# Patient Record
Sex: MALE | Race: WHITE | NOT HISPANIC OR LATINO | Employment: OTHER | URBAN - NONMETROPOLITAN AREA
[De-identification: names, ages, dates, MRNs, and addresses within clinical notes are randomized per-mention and may not be internally consistent; named-entity substitution may affect disease eponyms.]

---

## 2017-01-03 ENCOUNTER — APPOINTMENT (OUTPATIENT)
Dept: LAB | Facility: HOSPITAL | Age: 57
End: 2017-01-03

## 2017-01-03 ENCOUNTER — HOSPITAL ENCOUNTER (OUTPATIENT)
Dept: ULTRASOUND IMAGING | Facility: HOSPITAL | Age: 57
Discharge: HOME | End: 2017-01-03
Attending: INTERNAL MEDICINE | Admitting: INTERNAL MEDICINE

## 2017-01-03 ENCOUNTER — HOSPITAL ENCOUNTER (OUTPATIENT)
Dept: ULTRASOUND IMAGING | Facility: HOSPITAL | Age: 57
Discharge: HOME | End: 2017-01-03

## 2017-01-03 DIAGNOSIS — K70.31 ASCITES DUE TO ALCOHOLIC CIRRHOSIS (HCC): ICD-10-CM

## 2017-01-03 LAB
APPEARANCE FLD: ABNORMAL
APTT PPP: 28.9 SECONDS (ref 24.1–34.8)
COLOR FLD: YELLOW
INR PPP: 0.99 (ref 0.91–1.09)
LYMPHOCYTES NFR FLD MANUAL: 100 %
METHOD: ABNORMAL
NUC CELL # FLD: 196 /MM3
PLATELET # BLD AUTO: 164 10*3/MM3 (ref 130–400)
PROTHROMBIN TIME: 13.4 SECONDS (ref 11.9–14.6)
RBC # FLD AUTO: 70 /MM3

## 2017-01-03 PROCEDURE — 87205 SMEAR GRAM STAIN: CPT | Performed by: INTERNAL MEDICINE

## 2017-01-03 PROCEDURE — 82042 OTHER SOURCE ALBUMIN QUAN EA: CPT | Performed by: INTERNAL MEDICINE

## 2017-01-03 PROCEDURE — 84157 ASSAY OF PROTEIN OTHER: CPT | Performed by: INTERNAL MEDICINE

## 2017-01-03 PROCEDURE — 76942 ECHO GUIDE FOR BIOPSY: CPT

## 2017-01-03 PROCEDURE — 85730 THROMBOPLASTIN TIME PARTIAL: CPT | Performed by: RADIOLOGY

## 2017-01-03 PROCEDURE — 85049 AUTOMATED PLATELET COUNT: CPT | Performed by: RADIOLOGY

## 2017-01-03 PROCEDURE — 83615 LACTATE (LD) (LDH) ENZYME: CPT | Performed by: INTERNAL MEDICINE

## 2017-01-03 PROCEDURE — 76705 ECHO EXAM OF ABDOMEN: CPT

## 2017-01-03 PROCEDURE — 87070 CULTURE OTHR SPECIMN AEROBIC: CPT | Performed by: INTERNAL MEDICINE

## 2017-01-03 PROCEDURE — 87015 SPECIMEN INFECT AGNT CONCNTJ: CPT | Performed by: INTERNAL MEDICINE

## 2017-01-03 PROCEDURE — 89051 BODY FLUID CELL COUNT: CPT | Performed by: INTERNAL MEDICINE

## 2017-01-03 PROCEDURE — 85610 PROTHROMBIN TIME: CPT | Performed by: RADIOLOGY

## 2017-01-04 LAB
ALBUMIN FLD-MCNC: 1.4 G/DL
LDH FLD-CCNC: 86 IU/L
PROT FLD-MCNC: 3.1 G/DL

## 2017-01-08 LAB
BACTERIA FLD CULT: NORMAL
GRAM STN SPEC: NORMAL
GRAM STN SPEC: NORMAL

## 2017-01-09 ENCOUNTER — OFFICE VISIT (OUTPATIENT)
Dept: CARDIOLOGY | Facility: CLINIC | Age: 57
End: 2017-01-09

## 2017-01-09 ENCOUNTER — TRANSCRIBE ORDERS (OUTPATIENT)
Dept: ADMINISTRATIVE | Facility: HOSPITAL | Age: 57
End: 2017-01-09

## 2017-01-09 VITALS
HEART RATE: 84 BPM | HEIGHT: 68 IN | SYSTOLIC BLOOD PRESSURE: 110 MMHG | DIASTOLIC BLOOD PRESSURE: 58 MMHG | WEIGHT: 166 LBS | BODY MASS INDEX: 25.16 KG/M2

## 2017-01-09 DIAGNOSIS — E78.2 MIXED HYPERLIPIDEMIA: ICD-10-CM

## 2017-01-09 DIAGNOSIS — I50.22 CHRONIC SYSTOLIC CONGESTIVE HEART FAILURE (HCC): ICD-10-CM

## 2017-01-09 DIAGNOSIS — R18.8 OTHER ASCITES: Primary | ICD-10-CM

## 2017-01-09 DIAGNOSIS — I25.10 CORONARY ARTERY DISEASE INVOLVING NATIVE CORONARY ARTERY OF NATIVE HEART WITHOUT ANGINA PECTORIS: Primary | ICD-10-CM

## 2017-01-09 DIAGNOSIS — I10 ESSENTIAL HYPERTENSION: ICD-10-CM

## 2017-01-09 PROCEDURE — 93000 ELECTROCARDIOGRAM COMPLETE: CPT | Performed by: INTERNAL MEDICINE

## 2017-01-09 PROCEDURE — 99214 OFFICE O/P EST MOD 30 MIN: CPT | Performed by: INTERNAL MEDICINE

## 2017-01-09 RX ORDER — DIAZEPAM 5 MG/1
5 TABLET ORAL 2 TIMES DAILY PRN
COMMUNITY
End: 2017-07-12

## 2017-01-09 RX ORDER — OXYCODONE AND ACETAMINOPHEN 10; 325 MG/1; MG/1
1 TABLET ORAL EVERY 4 HOURS PRN
COMMUNITY
End: 2018-07-18

## 2017-01-10 ENCOUNTER — HOSPITAL ENCOUNTER (OUTPATIENT)
Dept: ULTRASOUND IMAGING | Facility: HOSPITAL | Age: 57
Discharge: HOME | End: 2017-01-10
Attending: INTERNAL MEDICINE | Admitting: INTERNAL MEDICINE

## 2017-01-10 ENCOUNTER — TRANSCRIBE ORDERS (OUTPATIENT)
Dept: ADMINISTRATIVE | Facility: HOSPITAL | Age: 57
End: 2017-01-10

## 2017-01-10 ENCOUNTER — LAB (OUTPATIENT)
Dept: LAB | Facility: HOSPITAL | Age: 57
End: 2017-01-10
Attending: INTERNAL MEDICINE

## 2017-01-10 DIAGNOSIS — R18.8 OTHER ASCITES: ICD-10-CM

## 2017-01-10 DIAGNOSIS — R18.8 OTHER ASCITES: Primary | ICD-10-CM

## 2017-01-10 LAB
INR PPP: 1.03 (ref 0.91–1.09)
PROTHROMBIN TIME: 13.8 SECONDS (ref 11.9–14.6)

## 2017-01-10 PROCEDURE — 76942 ECHO GUIDE FOR BIOPSY: CPT

## 2017-01-10 PROCEDURE — 85610 PROTHROMBIN TIME: CPT | Performed by: INTERNAL MEDICINE

## 2017-01-10 PROCEDURE — 36415 COLL VENOUS BLD VENIPUNCTURE: CPT

## 2017-01-10 PROCEDURE — 76705 ECHO EXAM OF ABDOMEN: CPT

## 2017-01-12 ENCOUNTER — TELEPHONE (OUTPATIENT)
Dept: VASCULAR SURGERY | Age: 57
End: 2017-01-12

## 2017-01-12 ENCOUNTER — OFFICE VISIT (OUTPATIENT)
Dept: VASCULAR SURGERY | Age: 57
End: 2017-01-12

## 2017-01-12 VITALS
TEMPERATURE: 97.7 F | HEART RATE: 83 BPM | DIASTOLIC BLOOD PRESSURE: 74 MMHG | SYSTOLIC BLOOD PRESSURE: 140 MMHG | RESPIRATION RATE: 18 BRPM

## 2017-01-12 DIAGNOSIS — N18.6 CKD (CHRONIC KIDNEY DISEASE) STAGE V REQUIRING CHRONIC DIALYSIS (HCC): Primary | ICD-10-CM

## 2017-01-12 DIAGNOSIS — Z99.2 CKD (CHRONIC KIDNEY DISEASE) STAGE V REQUIRING CHRONIC DIALYSIS (HCC): Primary | ICD-10-CM

## 2017-01-12 PROCEDURE — 99024 POSTOP FOLLOW-UP VISIT: CPT | Performed by: NURSE PRACTITIONER

## 2017-01-12 RX ORDER — LACTULOSE 10 G/10G
10 SOLUTION ORAL 3 TIMES DAILY PRN
Status: ON HOLD | COMMUNITY
End: 2020-04-10 | Stop reason: HOSPADM

## 2017-01-12 RX ORDER — LEVOCETIRIZINE DIHYDROCHLORIDE 5 MG/1
5 TABLET, FILM COATED ORAL NIGHTLY
COMMUNITY
End: 2017-02-09 | Stop reason: ALTCHOICE

## 2017-01-12 RX ORDER — CIPROFLOXACIN/CIPROFLOXA HCL 500 MG
500 TABLET,EXTENDED RELEASE MULTIPHASE 24 HR ORAL 2 TIMES DAILY PRN
COMMUNITY
End: 2019-06-04 | Stop reason: ALTCHOICE

## 2017-01-12 RX ORDER — OXYCODONE HCL 10 MG/1
10 TABLET, FILM COATED, EXTENDED RELEASE ORAL EVERY 8 HOURS
COMMUNITY
End: 2018-01-25 | Stop reason: ALTCHOICE

## 2017-01-16 ENCOUNTER — TELEPHONE (OUTPATIENT)
Dept: GASTROENTEROLOGY | Facility: CLINIC | Age: 57
End: 2017-01-16

## 2017-01-16 ENCOUNTER — APPOINTMENT (OUTPATIENT)
Dept: ULTRASOUND IMAGING | Facility: HOSPITAL | Age: 57
End: 2017-01-16
Attending: INTERNAL MEDICINE

## 2017-01-16 DIAGNOSIS — K70.31 ASCITES DUE TO ALCOHOLIC CIRRHOSIS (HCC): Primary | ICD-10-CM

## 2017-01-18 ENCOUNTER — PREP FOR PROCEDURE (OUTPATIENT)
Dept: VASCULAR SURGERY | Age: 57
End: 2017-01-18

## 2017-01-18 RX ORDER — CLONIDINE HYDROCHLORIDE 0.1 MG/1
0.1 TABLET ORAL PRN
Status: CANCELLED | OUTPATIENT
Start: 2017-01-18

## 2017-01-18 RX ORDER — ASPIRIN 81 MG/1
81 TABLET ORAL ONCE
Status: CANCELLED | OUTPATIENT
Start: 2017-01-18 | End: 2017-01-18

## 2017-01-18 RX ORDER — SODIUM CHLORIDE 0.9 % (FLUSH) 0.9 %
10 SYRINGE (ML) INJECTION PRN
Status: CANCELLED | OUTPATIENT
Start: 2017-01-18

## 2017-01-19 ENCOUNTER — HOSPITAL ENCOUNTER (OUTPATIENT)
Dept: ULTRASOUND IMAGING | Facility: HOSPITAL | Age: 57
Discharge: HOME | End: 2017-01-19
Attending: INTERNAL MEDICINE | Admitting: INTERNAL MEDICINE

## 2017-01-19 ENCOUNTER — LAB (OUTPATIENT)
Dept: LAB | Facility: HOSPITAL | Age: 57
End: 2017-01-19
Attending: INTERNAL MEDICINE

## 2017-01-19 DIAGNOSIS — K70.31 ASCITES DUE TO ALCOHOLIC CIRRHOSIS (HCC): ICD-10-CM

## 2017-01-19 LAB
APPEARANCE FLD: ABNORMAL
APTT PPP: 29.7 SECONDS (ref 24.1–34.8)
COLOR FLD: YELLOW
EOSINOPHIL NFR FLD MANUAL: 1 %
INR PPP: 1.03 (ref 0.91–1.09)
LYMPHOCYTES NFR FLD MANUAL: 26 %
METHOD: ABNORMAL
MONOCYTES NFR FLD: 19 %
NEUTROPHILS NFR FLD MANUAL: 33 %
NUC CELL # FLD: 657 /MM3
PROTHROMBIN TIME: 13.8 SECONDS (ref 11.9–14.6)
RBC # FLD AUTO: 253 /MM3
UNCLASSIFIED CELLS, FLUID: 21 %

## 2017-01-19 PROCEDURE — 89051 BODY FLUID CELL COUNT: CPT | Performed by: INTERNAL MEDICINE

## 2017-01-19 PROCEDURE — 83615 LACTATE (LD) (LDH) ENZYME: CPT | Performed by: INTERNAL MEDICINE

## 2017-01-19 PROCEDURE — 88305 TISSUE EXAM BY PATHOLOGIST: CPT | Performed by: INTERNAL MEDICINE

## 2017-01-19 PROCEDURE — 36415 COLL VENOUS BLD VENIPUNCTURE: CPT

## 2017-01-19 PROCEDURE — 88342 IMHCHEM/IMCYTCHM 1ST ANTB: CPT | Performed by: INTERNAL MEDICINE

## 2017-01-19 PROCEDURE — 87070 CULTURE OTHR SPECIMN AEROBIC: CPT | Performed by: INTERNAL MEDICINE

## 2017-01-19 PROCEDURE — 88341 IMHCHEM/IMCYTCHM EA ADD ANTB: CPT | Performed by: INTERNAL MEDICINE

## 2017-01-19 PROCEDURE — 76705 ECHO EXAM OF ABDOMEN: CPT

## 2017-01-19 PROCEDURE — 87205 SMEAR GRAM STAIN: CPT | Performed by: INTERNAL MEDICINE

## 2017-01-19 PROCEDURE — 85730 THROMBOPLASTIN TIME PARTIAL: CPT | Performed by: INTERNAL MEDICINE

## 2017-01-19 PROCEDURE — 84157 ASSAY OF PROTEIN OTHER: CPT | Performed by: INTERNAL MEDICINE

## 2017-01-19 PROCEDURE — 88112 CYTOPATH CELL ENHANCE TECH: CPT | Performed by: INTERNAL MEDICINE

## 2017-01-19 PROCEDURE — 85610 PROTHROMBIN TIME: CPT | Performed by: INTERNAL MEDICINE

## 2017-01-19 PROCEDURE — 87015 SPECIMEN INFECT AGNT CONCNTJ: CPT | Performed by: INTERNAL MEDICINE

## 2017-01-19 PROCEDURE — 82042 OTHER SOURCE ALBUMIN QUAN EA: CPT | Performed by: INTERNAL MEDICINE

## 2017-01-19 PROCEDURE — 76942 ECHO GUIDE FOR BIOPSY: CPT

## 2017-01-20 ENCOUNTER — HOSPITAL ENCOUNTER (OUTPATIENT)
Dept: INTERVENTIONAL RADIOLOGY/VASCULAR | Age: 57
Discharge: HOME | End: 2017-01-20
Payer: MEDICARE

## 2017-01-20 VITALS
BODY MASS INDEX: 23.25 KG/M2 | RESPIRATION RATE: 17 BRPM | WEIGHT: 156.97 LBS | HEART RATE: 76 BPM | TEMPERATURE: 99.3 F | OXYGEN SATURATION: 94 % | SYSTOLIC BLOOD PRESSURE: 148 MMHG | HEIGHT: 69 IN | DIASTOLIC BLOOD PRESSURE: 32 MMHG

## 2017-01-20 DIAGNOSIS — I82.A22 CHRONIC DEEP VEIN THROMBOSIS (DVT) OF AXILLARY VEIN OF LEFT UPPER EXTREMITY (HCC): ICD-10-CM

## 2017-01-20 DIAGNOSIS — Z99.2 CKD (CHRONIC KIDNEY DISEASE) STAGE V REQUIRING CHRONIC DIALYSIS (HCC): ICD-10-CM

## 2017-01-20 DIAGNOSIS — N18.6 CKD (CHRONIC KIDNEY DISEASE) STAGE V REQUIRING CHRONIC DIALYSIS (HCC): ICD-10-CM

## 2017-01-20 LAB
ALBUMIN FLD-MCNC: 1.9 G/DL
CYTO UR: NORMAL
LAB AP CASE REPORT: NORMAL
LDH FLD-CCNC: 93 IU/L
Lab: NORMAL
PATH REPORT.FINAL DX SPEC: NORMAL
PATH REPORT.GROSS SPEC: NORMAL
PROT FLD-MCNC: 3.3 G/DL

## 2017-01-20 PROCEDURE — 75822 VEIN X-RAY ARMS/LEGS: CPT | Performed by: SURGERY

## 2017-01-20 PROCEDURE — 6360000004 HC RX CONTRAST MEDICATION: Performed by: SURGERY

## 2017-01-20 PROCEDURE — 37248 TRLUML BALO ANGIOP 1ST VEIN: CPT | Performed by: SURGERY

## 2017-01-20 PROCEDURE — 6360000002 HC RX W HCPCS: Performed by: SURGERY

## 2017-01-20 PROCEDURE — 36005 INJECTION EXT VENOGRAPHY: CPT | Performed by: SURGERY

## 2017-01-20 PROCEDURE — 6370000000 HC RX 637 (ALT 250 FOR IP): Performed by: NURSE PRACTITIONER

## 2017-01-20 PROCEDURE — 76499 UNLISTED DX RADIOGRAPHIC PX: CPT | Performed by: SURGERY

## 2017-01-20 PROCEDURE — 2580000003 HC RX 258: Performed by: NURSE PRACTITIONER

## 2017-01-20 PROCEDURE — 2500000003 HC RX 250 WO HCPCS: Performed by: SURGERY

## 2017-01-20 PROCEDURE — 36010 PLACE CATHETER IN VEIN: CPT | Performed by: SURGERY

## 2017-01-20 PROCEDURE — 6360000002 HC RX W HCPCS: Performed by: NURSE PRACTITIONER

## 2017-01-20 RX ORDER — HEPARIN SODIUM 5000 [USP'U]/ML
INJECTION, SOLUTION INTRAVENOUS; SUBCUTANEOUS
Status: COMPLETED | OUTPATIENT
Start: 2017-01-20 | End: 2017-01-20

## 2017-01-20 RX ORDER — FENTANYL CITRATE 50 UG/ML
INJECTION, SOLUTION INTRAMUSCULAR; INTRAVENOUS
Status: COMPLETED | OUTPATIENT
Start: 2017-01-20 | End: 2017-01-20

## 2017-01-20 RX ORDER — CLONIDINE HYDROCHLORIDE 0.1 MG/1
0.1 TABLET ORAL PRN
Status: DISCONTINUED | OUTPATIENT
Start: 2017-01-20 | End: 2017-01-22 | Stop reason: HOSPADM

## 2017-01-20 RX ORDER — ASPIRIN 81 MG/1
81 TABLET ORAL ONCE
Status: DISCONTINUED | OUTPATIENT
Start: 2017-01-20 | End: 2017-01-22 | Stop reason: HOSPADM

## 2017-01-20 RX ORDER — ACETAMINOPHEN 325 MG/1
650 TABLET ORAL EVERY 4 HOURS PRN
Status: DISCONTINUED | OUTPATIENT
Start: 2017-01-20 | End: 2017-01-22 | Stop reason: HOSPADM

## 2017-01-20 RX ORDER — HYDROCODONE BITARTRATE AND ACETAMINOPHEN 5; 325 MG/1; MG/1
2 TABLET ORAL EVERY 4 HOURS PRN
Status: DISCONTINUED | OUTPATIENT
Start: 2017-01-20 | End: 2017-01-22 | Stop reason: HOSPADM

## 2017-01-20 RX ORDER — MIDAZOLAM HYDROCHLORIDE 1 MG/ML
INJECTION INTRAMUSCULAR; INTRAVENOUS
Status: COMPLETED | OUTPATIENT
Start: 2017-01-20 | End: 2017-01-20

## 2017-01-20 RX ORDER — SODIUM CHLORIDE 0.9 % (FLUSH) 0.9 %
10 SYRINGE (ML) INJECTION PRN
Status: DISCONTINUED | OUTPATIENT
Start: 2017-01-20 | End: 2017-01-22 | Stop reason: HOSPADM

## 2017-01-20 RX ORDER — HYDROCODONE BITARTRATE AND ACETAMINOPHEN 5; 325 MG/1; MG/1
1 TABLET ORAL EVERY 4 HOURS PRN
Status: DISCONTINUED | OUTPATIENT
Start: 2017-01-20 | End: 2017-01-22 | Stop reason: HOSPADM

## 2017-01-20 RX ORDER — CLONIDINE HYDROCHLORIDE 0.1 MG/1
0.1 TABLET ORAL PRN
Status: DISCONTINUED | OUTPATIENT
Start: 2017-01-20 | End: 2017-01-20 | Stop reason: SDUPTHER

## 2017-01-20 RX ORDER — ONDANSETRON 2 MG/ML
4 INJECTION INTRAMUSCULAR; INTRAVENOUS EVERY 8 HOURS PRN
Status: DISCONTINUED | OUTPATIENT
Start: 2017-01-20 | End: 2017-01-22 | Stop reason: HOSPADM

## 2017-01-20 RX ORDER — LIDOCAINE HYDROCHLORIDE 20 MG/ML
INJECTION, SOLUTION INFILTRATION; PERINEURAL
Status: COMPLETED | OUTPATIENT
Start: 2017-01-20 | End: 2017-01-20

## 2017-01-20 RX ORDER — SODIUM CHLORIDE 9 MG/ML
INJECTION, SOLUTION INTRAVENOUS CONTINUOUS
Status: DISCONTINUED | OUTPATIENT
Start: 2017-01-20 | End: 2017-01-22 | Stop reason: HOSPADM

## 2017-01-20 RX ORDER — ASPIRIN 81 MG/1
81 TABLET ORAL ONCE
Status: COMPLETED | OUTPATIENT
Start: 2017-01-20 | End: 2017-01-20

## 2017-01-20 RX ADMIN — LIDOCAINE HYDROCHLORIDE 10 ML: 20 INJECTION, SOLUTION INFILTRATION; PERINEURAL at 13:04

## 2017-01-20 RX ADMIN — FENTANYL CITRATE 25 MCG: 50 INJECTION INTRAMUSCULAR; INTRAVENOUS at 12:50

## 2017-01-20 RX ADMIN — SODIUM CHLORIDE: 9 INJECTION, SOLUTION INTRAVENOUS at 11:38

## 2017-01-20 RX ADMIN — IOVERSOL 65 ML: 678 INJECTION INTRA-ARTERIAL; INTRAVENOUS at 13:14

## 2017-01-20 RX ADMIN — ASPIRIN 81 MG: 81 TABLET, COATED ORAL at 11:35

## 2017-01-20 RX ADMIN — CEFAZOLIN SODIUM 1 G: 1 INJECTION, SOLUTION INTRAVENOUS at 12:40

## 2017-01-20 RX ADMIN — HEPARIN SODIUM 5000 UNITS: 5000 INJECTION, SOLUTION INTRAVENOUS; SUBCUTANEOUS at 13:10

## 2017-01-20 RX ADMIN — FENTANYL CITRATE 25 MCG: 50 INJECTION INTRAMUSCULAR; INTRAVENOUS at 13:11

## 2017-01-20 RX ADMIN — MIDAZOLAM HYDROCHLORIDE 1 MG: 1 INJECTION, SOLUTION INTRAMUSCULAR; INTRAVENOUS at 13:11

## 2017-01-20 RX ADMIN — MIDAZOLAM HYDROCHLORIDE 1 MG: 1 INJECTION, SOLUTION INTRAMUSCULAR; INTRAVENOUS at 12:50

## 2017-01-24 ENCOUNTER — TELEPHONE (OUTPATIENT)
Dept: VASCULAR SURGERY | Age: 57
End: 2017-01-24

## 2017-01-24 LAB
BACTERIA FLD CULT: NORMAL
GRAM STN SPEC: NORMAL
GRAM STN SPEC: NORMAL

## 2017-01-25 ENCOUNTER — TELEPHONE (OUTPATIENT)
Dept: CARDIOLOGY | Facility: CLINIC | Age: 57
End: 2017-01-25

## 2017-01-30 ENCOUNTER — TELEPHONE (OUTPATIENT)
Dept: GASTROENTEROLOGY | Facility: CLINIC | Age: 57
End: 2017-01-30

## 2017-01-30 DIAGNOSIS — K70.31 ASCITES DUE TO ALCOHOLIC CIRRHOSIS (HCC): Primary | ICD-10-CM

## 2017-01-31 ENCOUNTER — LAB (OUTPATIENT)
Dept: LAB | Facility: HOSPITAL | Age: 57
End: 2017-01-31
Attending: INTERNAL MEDICINE

## 2017-01-31 ENCOUNTER — HOSPITAL ENCOUNTER (OUTPATIENT)
Dept: ULTRASOUND IMAGING | Facility: HOSPITAL | Age: 57
Discharge: HOME | End: 2017-01-31
Attending: INTERNAL MEDICINE | Admitting: INTERNAL MEDICINE

## 2017-01-31 DIAGNOSIS — K70.31 ASCITES DUE TO ALCOHOLIC CIRRHOSIS (HCC): ICD-10-CM

## 2017-01-31 LAB
INR PPP: 1.1 (ref 0.91–1.09)
PROTHROMBIN TIME: 13.7 SECONDS (ref 10–13.8)

## 2017-01-31 PROCEDURE — 76705 ECHO EXAM OF ABDOMEN: CPT

## 2017-01-31 PROCEDURE — 85610 PROTHROMBIN TIME: CPT | Performed by: FAMILY MEDICINE

## 2017-02-02 ENCOUNTER — CLINICAL SUPPORT (OUTPATIENT)
Dept: CARDIOLOGY | Facility: CLINIC | Age: 57
End: 2017-02-02

## 2017-02-02 DIAGNOSIS — Z95.0 CARDIAC PACEMAKER IN SITU: Primary | ICD-10-CM

## 2017-02-02 DIAGNOSIS — I49.5 SICK SINUS SYNDROME (HCC): ICD-10-CM

## 2017-02-02 PROCEDURE — 93294 REM INTERROG EVL PM/LDLS PM: CPT | Performed by: INTERNAL MEDICINE

## 2017-02-02 PROCEDURE — 93296 REM INTERROG EVL PM/IDS: CPT | Performed by: INTERNAL MEDICINE

## 2017-02-03 DIAGNOSIS — K70.31 ASCITES DUE TO ALCOHOLIC CIRRHOSIS (HCC): Primary | ICD-10-CM

## 2017-02-07 ENCOUNTER — HOSPITAL ENCOUNTER (OUTPATIENT)
Dept: ULTRASOUND IMAGING | Facility: HOSPITAL | Age: 57
Discharge: HOME | End: 2017-02-07
Attending: INTERNAL MEDICINE | Admitting: INTERNAL MEDICINE

## 2017-02-07 ENCOUNTER — LAB (OUTPATIENT)
Dept: LAB | Facility: HOSPITAL | Age: 57
End: 2017-02-07
Attending: INTERNAL MEDICINE

## 2017-02-07 DIAGNOSIS — K70.31 ASCITES DUE TO ALCOHOLIC CIRRHOSIS (HCC): ICD-10-CM

## 2017-02-07 LAB
APPEARANCE FLD: ABNORMAL
COLOR FLD: YELLOW
INR PPP: 1.1 (ref 0.91–1.09)
LYMPHOCYTES NFR FLD MANUAL: 86 %
METHOD: ABNORMAL
NEUTROPHILS NFR FLD MANUAL: 7 %
NUC CELL # FLD: 268 /MM3
PROTHROMBIN TIME: 13.7 SECONDS (ref 10–13.8)
RBC # FLD AUTO: 38 /MM3
UNCLASSIFIED CELLS, FLUID: 7 %

## 2017-02-07 PROCEDURE — 88305 TISSUE EXAM BY PATHOLOGIST: CPT | Performed by: INTERNAL MEDICINE

## 2017-02-07 PROCEDURE — 76705 ECHO EXAM OF ABDOMEN: CPT

## 2017-02-07 PROCEDURE — 84157 ASSAY OF PROTEIN OTHER: CPT | Performed by: INTERNAL MEDICINE

## 2017-02-07 PROCEDURE — 87205 SMEAR GRAM STAIN: CPT | Performed by: INTERNAL MEDICINE

## 2017-02-07 PROCEDURE — 82042 OTHER SOURCE ALBUMIN QUAN EA: CPT | Performed by: INTERNAL MEDICINE

## 2017-02-07 PROCEDURE — 87070 CULTURE OTHR SPECIMN AEROBIC: CPT | Performed by: INTERNAL MEDICINE

## 2017-02-07 PROCEDURE — 88112 CYTOPATH CELL ENHANCE TECH: CPT | Performed by: INTERNAL MEDICINE

## 2017-02-07 PROCEDURE — 76942 ECHO GUIDE FOR BIOPSY: CPT

## 2017-02-07 PROCEDURE — 88312 SPECIAL STAINS GROUP 1: CPT | Performed by: INTERNAL MEDICINE

## 2017-02-07 PROCEDURE — 89051 BODY FLUID CELL COUNT: CPT | Performed by: INTERNAL MEDICINE

## 2017-02-07 PROCEDURE — 83615 LACTATE (LD) (LDH) ENZYME: CPT | Performed by: INTERNAL MEDICINE

## 2017-02-07 PROCEDURE — 85610 PROTHROMBIN TIME: CPT | Performed by: FAMILY MEDICINE

## 2017-02-07 PROCEDURE — 87015 SPECIMEN INFECT AGNT CONCNTJ: CPT | Performed by: INTERNAL MEDICINE

## 2017-02-08 LAB
ALBUMIN FLD-MCNC: 1.8 G/DL
LDH FLD-CCNC: 89 IU/L
PROT FLD-MCNC: 3.4 G/DL

## 2017-02-09 ENCOUNTER — HOSPITAL ENCOUNTER (OUTPATIENT)
Dept: PREADMISSION TESTING | Age: 57
Discharge: HOME | End: 2017-02-09
Payer: MEDICARE

## 2017-02-09 ENCOUNTER — HOSPITAL ENCOUNTER (OUTPATIENT)
Dept: GENERAL RADIOLOGY | Age: 57
Discharge: HOME | End: 2017-02-09
Payer: MEDICARE

## 2017-02-09 VITALS — HEIGHT: 68 IN | WEIGHT: 166 LBS | BODY MASS INDEX: 25.16 KG/M2

## 2017-02-09 LAB
CYTO UR: NORMAL
LAB AP CASE REPORT: NORMAL
Lab: NORMAL
PATH REPORT.FINAL DX SPEC: NORMAL
PATH REPORT.GROSS SPEC: NORMAL

## 2017-02-09 PROCEDURE — 71020 XR CHEST STANDARD TWO VW: CPT

## 2017-02-09 PROCEDURE — 93005 ELECTROCARDIOGRAM TRACING: CPT

## 2017-02-09 PROCEDURE — 87070 CULTURE OTHR SPECIMN AEROBIC: CPT

## 2017-02-10 DIAGNOSIS — K70.31 ASCITES DUE TO ALCOHOLIC CIRRHOSIS (HCC): Primary | ICD-10-CM

## 2017-02-10 LAB — MRSA CULTURE ONLY: NORMAL

## 2017-02-10 RX ORDER — ASPIRIN 81 MG/1
81 TABLET ORAL ONCE
Status: CANCELLED | OUTPATIENT
Start: 2017-02-10 | End: 2017-02-10

## 2017-02-10 RX ORDER — VANCOMYCIN HYDROCHLORIDE 1 G/200ML
1000 INJECTION, SOLUTION INTRAVENOUS
Status: CANCELLED | OUTPATIENT
Start: 2017-02-10 | End: 2017-02-10

## 2017-02-10 RX ORDER — SODIUM CHLORIDE 0.9 % (FLUSH) 0.9 %
10 SYRINGE (ML) INJECTION PRN
Status: CANCELLED | OUTPATIENT
Start: 2017-02-10

## 2017-02-10 RX ORDER — SODIUM CHLORIDE 0.9 % (FLUSH) 0.9 %
10 SYRINGE (ML) INJECTION EVERY 12 HOURS SCHEDULED
Status: CANCELLED | OUTPATIENT
Start: 2017-02-10

## 2017-02-12 LAB
BACTERIA FLD CULT: NORMAL
GRAM STN SPEC: NORMAL
GRAM STN SPEC: NORMAL

## 2017-02-14 LAB
EKG P AXIS: -3 DEGREES
EKG P-R INTERVAL: 186 MS
EKG Q-T INTERVAL: 462 MS
EKG QRS DURATION: 98 MS
EKG QTC CALCULATION (BAZETT): 467 MS
EKG T AXIS: 132 DEGREES

## 2017-02-16 ENCOUNTER — ANESTHESIA (OUTPATIENT)
Dept: OPERATING ROOM | Age: 57
End: 2017-02-16
Payer: MEDICARE

## 2017-02-16 ENCOUNTER — APPOINTMENT (OUTPATIENT)
Dept: INTERVENTIONAL RADIOLOGY/VASCULAR | Age: 57
End: 2017-02-16
Attending: SURGERY
Payer: MEDICARE

## 2017-02-16 ENCOUNTER — SURGERY (OUTPATIENT)
Age: 57
End: 2017-02-16

## 2017-02-16 ENCOUNTER — HOSPITAL ENCOUNTER (OUTPATIENT)
Age: 57
Setting detail: OUTPATIENT SURGERY
Discharge: HOME | End: 2017-02-16
Attending: SURGERY | Admitting: SURGERY
Payer: MEDICARE

## 2017-02-16 ENCOUNTER — ANESTHESIA EVENT (OUTPATIENT)
Dept: OPERATING ROOM | Age: 57
End: 2017-02-16
Payer: MEDICARE

## 2017-02-16 VITALS
HEART RATE: 66 BPM | BODY MASS INDEX: 25.16 KG/M2 | SYSTOLIC BLOOD PRESSURE: 106 MMHG | HEIGHT: 68 IN | RESPIRATION RATE: 18 BRPM | TEMPERATURE: 99.6 F | OXYGEN SATURATION: 95 % | WEIGHT: 166 LBS | DIASTOLIC BLOOD PRESSURE: 60 MMHG

## 2017-02-16 VITALS
DIASTOLIC BLOOD PRESSURE: 50 MMHG | SYSTOLIC BLOOD PRESSURE: 117 MMHG | RESPIRATION RATE: 2 BRPM | OXYGEN SATURATION: 100 % | TEMPERATURE: 98 F

## 2017-02-16 LAB
ANION GAP SERPL CALCULATED.3IONS-SCNC: 16 MMOL/L (ref 7–19)
APTT: 32.9 SEC (ref 26–36.2)
BASOPHILS ABSOLUTE: 0 K/UL (ref 0–0.2)
BASOPHILS RELATIVE PERCENT: 0.6 % (ref 0–1)
BUN BLDV-MCNC: 32 MG/DL (ref 6–20)
CALCIUM SERPL-MCNC: 8.6 MG/DL (ref 8.6–10)
CHLORIDE BLD-SCNC: 92 MMOL/L (ref 98–111)
CO2: 31 MMOL/L (ref 22–29)
CREAT SERPL-MCNC: 5.6 MG/DL (ref 0.5–1.2)
EOSINOPHILS ABSOLUTE: 0.1 K/UL (ref 0–0.6)
EOSINOPHILS RELATIVE PERCENT: 3 % (ref 0–5)
GFR NON-AFRICAN AMERICAN: 11
GLUCOSE BLD-MCNC: 102 MG/DL (ref 74–109)
HCT VFR BLD CALC: 37.2 % (ref 42–52)
HEMOGLOBIN: 12 G/DL (ref 14–18)
INR BLD: 1.12 (ref 0.88–1.18)
LYMPHOCYTES ABSOLUTE: 0.6 K/UL (ref 1.1–4.5)
LYMPHOCYTES RELATIVE PERCENT: 18.9 % (ref 20–40)
MCH RBC QN AUTO: 29.8 PG (ref 27–31)
MCHC RBC AUTO-ENTMCNC: 32.3 G/DL (ref 33–37)
MCV RBC AUTO: 92.3 FL (ref 80–94)
MONOCYTES ABSOLUTE: 0.3 K/UL (ref 0–0.9)
MONOCYTES RELATIVE PERCENT: 9.6 % (ref 0–10)
NEUTROPHILS ABSOLUTE: 2.3 K/UL (ref 1.5–7.5)
NEUTROPHILS RELATIVE PERCENT: 67.9 % (ref 50–65)
PDW BLD-RTO: 16.6 % (ref 11.5–14.5)
PLATELET # BLD: 124 K/UL (ref 130–400)
PMV BLD AUTO: 11 FL (ref 7.4–10.4)
POTASSIUM SERPL-SCNC: 4.6 MMOL/L (ref 3.5–4.9)
PROTHROMBIN TIME: 14.4 SEC (ref 12–14.6)
RBC # BLD: 4.03 M/UL (ref 4.7–6.1)
SODIUM BLD-SCNC: 139 MMOL/L (ref 136–145)
WBC # BLD: 3.3 K/UL (ref 4.8–10.8)

## 2017-02-16 PROCEDURE — 85610 PROTHROMBIN TIME: CPT

## 2017-02-16 PROCEDURE — C1768 GRAFT, VASCULAR: HCPCS | Performed by: SURGERY

## 2017-02-16 PROCEDURE — 37238 OPEN/PERQ PLACE STENT SAME: CPT | Performed by: SURGERY

## 2017-02-16 PROCEDURE — 2720000003 HC MISC SUTURE/STAPLES/RELOADS/ETC: Performed by: SURGERY

## 2017-02-16 PROCEDURE — 64415 NJX AA&/STRD BRCH PLXS IMG: CPT | Performed by: NURSE ANESTHETIST, CERTIFIED REGISTERED

## 2017-02-16 PROCEDURE — 85730 THROMBOPLASTIN TIME PARTIAL: CPT

## 2017-02-16 PROCEDURE — 2720000001 HC MISC SURG SUPPLY STERILE $51-500: Performed by: SURGERY

## 2017-02-16 PROCEDURE — C1769 GUIDE WIRE: HCPCS | Performed by: SURGERY

## 2017-02-16 PROCEDURE — 3600000013 HC SURGERY LEVEL 3 ADDTL 15MIN: Performed by: SURGERY

## 2017-02-16 PROCEDURE — C1887 CATHETER, GUIDING: HCPCS | Performed by: SURGERY

## 2017-02-16 PROCEDURE — 3700000000 HC ANESTHESIA ATTENDED CARE: Performed by: SURGERY

## 2017-02-16 PROCEDURE — 85025 COMPLETE CBC W/AUTO DIFF WBC: CPT

## 2017-02-16 PROCEDURE — 2500000003 HC RX 250 WO HCPCS: Performed by: NURSE ANESTHETIST, CERTIFIED REGISTERED

## 2017-02-16 PROCEDURE — C1725 CATH, TRANSLUMIN NON-LASER: HCPCS | Performed by: SURGERY

## 2017-02-16 PROCEDURE — 3700000001 HC ADD 15 MINUTES (ANESTHESIA): Performed by: SURGERY

## 2017-02-16 PROCEDURE — 7100000011 HC PHASE II RECOVERY - ADDTL 15 MIN: Performed by: SURGERY

## 2017-02-16 PROCEDURE — 7100000000 HC PACU RECOVERY - FIRST 15 MIN: Performed by: SURGERY

## 2017-02-16 PROCEDURE — 80048 BASIC METABOLIC PNL TOTAL CA: CPT

## 2017-02-16 PROCEDURE — 6360000002 HC RX W HCPCS: Performed by: SURGERY

## 2017-02-16 PROCEDURE — 2780000001 HC MISC IMPLANT NES: Performed by: SURGERY

## 2017-02-16 PROCEDURE — 7100000010 HC PHASE II RECOVERY - FIRST 15 MIN: Performed by: SURGERY

## 2017-02-16 PROCEDURE — 6360000002 HC RX W HCPCS: Performed by: ANESTHESIOLOGY

## 2017-02-16 PROCEDURE — 2580000003 HC RX 258: Performed by: NURSE ANESTHETIST, CERTIFIED REGISTERED

## 2017-02-16 PROCEDURE — 6360000002 HC RX W HCPCS: Performed by: NURSE ANESTHETIST, CERTIFIED REGISTERED

## 2017-02-16 PROCEDURE — C1894 INTRO/SHEATH, NON-LASER: HCPCS | Performed by: SURGERY

## 2017-02-16 PROCEDURE — 36415 COLL VENOUS BLD VENIPUNCTURE: CPT

## 2017-02-16 PROCEDURE — 36830 ARTERY-VEIN NONAUTOGRAFT: CPT | Performed by: SURGERY

## 2017-02-16 PROCEDURE — 3600000003 HC SURGERY LEVEL 3 BASE: Performed by: SURGERY

## 2017-02-16 PROCEDURE — 7100000001 HC PACU RECOVERY - ADDTL 15 MIN: Performed by: SURGERY

## 2017-02-16 PROCEDURE — C1760 CLOSURE DEV, VASC: HCPCS | Performed by: SURGERY

## 2017-02-16 PROCEDURE — 6370000000 HC RX 637 (ALT 250 FOR IP): Performed by: SURGERY

## 2017-02-16 PROCEDURE — 2580000003 HC RX 258: Performed by: SURGERY

## 2017-02-16 DEVICE — ANASTOCLIP GC CLOSURE SYSTEM, MEDIUM (M)
Type: IMPLANTABLE DEVICE | Status: FUNCTIONAL
Brand: ANASTOCLIP GC CLOSURE SYSTEM

## 2017-02-16 DEVICE — GRAFT VASC L40CM ID7MM BOV CAR ART CLLGN FOR FUNC HEMO DYLS: Type: IMPLANTABLE DEVICE | Status: FUNCTIONAL

## 2017-02-16 DEVICE — IMPLANTABLE DEVICE: Type: IMPLANTABLE DEVICE | Status: FUNCTIONAL

## 2017-02-16 RX ORDER — MORPHINE SULFATE 4 MG/ML
2 INJECTION, SOLUTION INTRAMUSCULAR; INTRAVENOUS EVERY 5 MIN PRN
Status: DISCONTINUED | OUTPATIENT
Start: 2017-02-16 | End: 2017-02-16 | Stop reason: HOSPADM

## 2017-02-16 RX ORDER — MIDAZOLAM HYDROCHLORIDE 1 MG/ML
2 INJECTION INTRAMUSCULAR; INTRAVENOUS
Status: COMPLETED | OUTPATIENT
Start: 2017-02-16 | End: 2017-02-16

## 2017-02-16 RX ORDER — ASPIRIN 81 MG/1
81 TABLET ORAL ONCE
Status: COMPLETED | OUTPATIENT
Start: 2017-02-16 | End: 2017-02-16

## 2017-02-16 RX ORDER — SODIUM CHLORIDE 450 MG/100ML
INJECTION, SOLUTION INTRAVENOUS CONTINUOUS PRN
Status: DISCONTINUED | OUTPATIENT
Start: 2017-02-16 | End: 2017-02-16 | Stop reason: SDUPTHER

## 2017-02-16 RX ORDER — SODIUM CHLORIDE 0.9 % (FLUSH) 0.9 %
10 SYRINGE (ML) INJECTION PRN
Status: DISCONTINUED | OUTPATIENT
Start: 2017-02-16 | End: 2017-02-16 | Stop reason: HOSPADM

## 2017-02-16 RX ORDER — SCOLOPAMINE TRANSDERMAL SYSTEM 1 MG/1
1 PATCH, EXTENDED RELEASE TRANSDERMAL
Status: DISCONTINUED | OUTPATIENT
Start: 2017-02-16 | End: 2017-02-16 | Stop reason: HOSPADM

## 2017-02-16 RX ORDER — FENTANYL CITRATE 50 UG/ML
50 INJECTION, SOLUTION INTRAMUSCULAR; INTRAVENOUS
Status: DISCONTINUED | OUTPATIENT
Start: 2017-02-16 | End: 2017-02-16 | Stop reason: HOSPADM

## 2017-02-16 RX ORDER — LIDOCAINE HYDROCHLORIDE 10 MG/ML
INJECTION, SOLUTION INFILTRATION; PERINEURAL PRN
Status: DISCONTINUED | OUTPATIENT
Start: 2017-02-16 | End: 2017-02-16 | Stop reason: SDUPTHER

## 2017-02-16 RX ORDER — HYDRALAZINE HYDROCHLORIDE 20 MG/ML
5 INJECTION INTRAMUSCULAR; INTRAVENOUS EVERY 10 MIN PRN
Status: DISCONTINUED | OUTPATIENT
Start: 2017-02-16 | End: 2017-02-16 | Stop reason: HOSPADM

## 2017-02-16 RX ORDER — LABETALOL HYDROCHLORIDE 5 MG/ML
5 INJECTION, SOLUTION INTRAVENOUS EVERY 10 MIN PRN
Status: DISCONTINUED | OUTPATIENT
Start: 2017-02-16 | End: 2017-02-16 | Stop reason: HOSPADM

## 2017-02-16 RX ORDER — LIDOCAINE HYDROCHLORIDE 10 MG/ML
1 INJECTION, SOLUTION EPIDURAL; INFILTRATION; INTRACAUDAL; PERINEURAL
Status: DISCONTINUED | OUTPATIENT
Start: 2017-02-16 | End: 2017-02-16 | Stop reason: HOSPADM

## 2017-02-16 RX ORDER — SODIUM CHLORIDE 0.9 % (FLUSH) 0.9 %
10 SYRINGE (ML) INJECTION EVERY 12 HOURS SCHEDULED
Status: DISCONTINUED | OUTPATIENT
Start: 2017-02-16 | End: 2017-02-16 | Stop reason: HOSPADM

## 2017-02-16 RX ORDER — VANCOMYCIN HYDROCHLORIDE 1 G/200ML
1000 INJECTION, SOLUTION INTRAVENOUS
Status: COMPLETED | OUTPATIENT
Start: 2017-02-16 | End: 2017-02-16

## 2017-02-16 RX ORDER — HEPARIN SODIUM 1000 [USP'U]/ML
INJECTION, SOLUTION INTRAVENOUS; SUBCUTANEOUS PRN
Status: DISCONTINUED | OUTPATIENT
Start: 2017-02-16 | End: 2017-02-16 | Stop reason: SDUPTHER

## 2017-02-16 RX ORDER — HYDROMORPHONE HYDROCHLORIDE 2 MG/1
2 TABLET ORAL EVERY 6 HOURS PRN
Qty: 30 TABLET | Refills: 0 | Status: SHIPPED | OUTPATIENT
Start: 2017-02-16 | End: 2017-02-23

## 2017-02-16 RX ORDER — METOCLOPRAMIDE HYDROCHLORIDE 5 MG/ML
10 INJECTION INTRAMUSCULAR; INTRAVENOUS
Status: DISCONTINUED | OUTPATIENT
Start: 2017-02-16 | End: 2017-02-16 | Stop reason: HOSPADM

## 2017-02-16 RX ORDER — MEPERIDINE HYDROCHLORIDE 25 MG/ML
12.5 INJECTION INTRAMUSCULAR; INTRAVENOUS; SUBCUTANEOUS EVERY 5 MIN PRN
Status: DISCONTINUED | OUTPATIENT
Start: 2017-02-16 | End: 2017-02-16 | Stop reason: HOSPADM

## 2017-02-16 RX ORDER — MORPHINE SULFATE 4 MG/ML
4 INJECTION, SOLUTION INTRAMUSCULAR; INTRAVENOUS EVERY 5 MIN PRN
Status: DISCONTINUED | OUTPATIENT
Start: 2017-02-16 | End: 2017-02-16 | Stop reason: HOSPADM

## 2017-02-16 RX ORDER — PROPOFOL 10 MG/ML
INJECTION, EMULSION INTRAVENOUS PRN
Status: DISCONTINUED | OUTPATIENT
Start: 2017-02-16 | End: 2017-02-16 | Stop reason: SDUPTHER

## 2017-02-16 RX ORDER — PROMETHAZINE HYDROCHLORIDE 25 MG/ML
6.25 INJECTION, SOLUTION INTRAMUSCULAR; INTRAVENOUS
Status: DISCONTINUED | OUTPATIENT
Start: 2017-02-16 | End: 2017-02-16 | Stop reason: HOSPADM

## 2017-02-16 RX ORDER — DIPHENHYDRAMINE HYDROCHLORIDE 50 MG/ML
12.5 INJECTION INTRAMUSCULAR; INTRAVENOUS
Status: DISCONTINUED | OUTPATIENT
Start: 2017-02-16 | End: 2017-02-16 | Stop reason: HOSPADM

## 2017-02-16 RX ORDER — SODIUM CHLORIDE 450 MG/100ML
INJECTION, SOLUTION INTRAVENOUS ONCE
Status: COMPLETED | OUTPATIENT
Start: 2017-02-16 | End: 2017-02-16

## 2017-02-16 RX ORDER — SODIUM CHLORIDE, SODIUM LACTATE, POTASSIUM CHLORIDE, CALCIUM CHLORIDE 600; 310; 30; 20 MG/100ML; MG/100ML; MG/100ML; MG/100ML
INJECTION, SOLUTION INTRAVENOUS CONTINUOUS
Status: DISCONTINUED | OUTPATIENT
Start: 2017-02-16 | End: 2017-02-16 | Stop reason: HOSPADM

## 2017-02-16 RX ORDER — FENTANYL CITRATE 50 UG/ML
INJECTION, SOLUTION INTRAMUSCULAR; INTRAVENOUS PRN
Status: DISCONTINUED | OUTPATIENT
Start: 2017-02-16 | End: 2017-02-16 | Stop reason: SDUPTHER

## 2017-02-16 RX ORDER — ONDANSETRON 2 MG/ML
INJECTION INTRAMUSCULAR; INTRAVENOUS PRN
Status: DISCONTINUED | OUTPATIENT
Start: 2017-02-16 | End: 2017-02-16 | Stop reason: SDUPTHER

## 2017-02-16 RX ORDER — MORPHINE SULFATE 4 MG/ML
4 INJECTION, SOLUTION INTRAMUSCULAR; INTRAVENOUS
Status: DISCONTINUED | OUTPATIENT
Start: 2017-02-16 | End: 2017-02-16 | Stop reason: HOSPADM

## 2017-02-16 RX ORDER — ONDANSETRON 2 MG/ML
4 INJECTION INTRAMUSCULAR; INTRAVENOUS EVERY 8 HOURS PRN
Status: DISCONTINUED | OUTPATIENT
Start: 2017-02-16 | End: 2017-02-16 | Stop reason: HOSPADM

## 2017-02-16 RX ADMIN — MIDAZOLAM 2 MG: 1 INJECTION INTRAMUSCULAR; INTRAVENOUS at 13:04

## 2017-02-16 RX ADMIN — ONDANSETRON HYDROCHLORIDE 4 MG: 2 INJECTION, SOLUTION INTRAVENOUS at 15:05

## 2017-02-16 RX ADMIN — LIDOCAINE HYDROCHLORIDE 50 MG: 10 INJECTION, SOLUTION INFILTRATION; PERINEURAL at 13:25

## 2017-02-16 RX ADMIN — HEPARIN SODIUM: 1000 INJECTION, SOLUTION INTRAVENOUS; SUBCUTANEOUS at 13:30

## 2017-02-16 RX ADMIN — VANCOMYCIN HYDROCHLORIDE: 500 INJECTION, POWDER, LYOPHILIZED, FOR SOLUTION INTRAVENOUS at 14:11

## 2017-02-16 RX ADMIN — HEPARIN SODIUM: 1000 INJECTION, SOLUTION INTRAVENOUS; SUBCUTANEOUS at 13:59

## 2017-02-16 RX ADMIN — ASPIRIN 81 MG: 81 TABLET, COATED ORAL at 12:04

## 2017-02-16 RX ADMIN — FENTANYL CITRATE 25 MCG: 50 INJECTION INTRAMUSCULAR; INTRAVENOUS at 13:28

## 2017-02-16 RX ADMIN — VANCOMYCIN HYDROCHLORIDE 1000 MG: 1 INJECTION, SOLUTION INTRAVENOUS at 12:43

## 2017-02-16 RX ADMIN — HEPARIN SODIUM 3000 UNITS: 1000 INJECTION, SOLUTION INTRAVENOUS; SUBCUTANEOUS at 14:04

## 2017-02-16 RX ADMIN — SODIUM CHLORIDE: 4.5 INJECTION, SOLUTION INTRAVENOUS at 13:18

## 2017-02-16 RX ADMIN — PROPOFOL 120 MG: 10 INJECTION, EMULSION INTRAVENOUS at 13:25

## 2017-02-16 RX ADMIN — SODIUM CHLORIDE: 4.5 INJECTION, SOLUTION INTRAVENOUS at 12:04

## 2017-02-16 RX ADMIN — FENTANYL CITRATE 50 MCG: 50 INJECTION INTRAMUSCULAR; INTRAVENOUS at 14:38

## 2017-02-16 ASSESSMENT — ENCOUNTER SYMPTOMS: SHORTNESS OF BREATH: 1

## 2017-02-16 ASSESSMENT — PAIN DESCRIPTION - LOCATION: LOCATION: ARM

## 2017-02-16 ASSESSMENT — PAIN DESCRIPTION - DESCRIPTORS: DESCRIPTORS: BURNING

## 2017-02-16 ASSESSMENT — PAIN SCALES - GENERAL
PAINLEVEL_OUTOF10: 0
PAINLEVEL_OUTOF10: 3
PAINLEVEL_OUTOF10: 1

## 2017-02-16 ASSESSMENT — PAIN DESCRIPTION - PAIN TYPE: TYPE: SURGICAL PAIN

## 2017-02-16 ASSESSMENT — PAIN DESCRIPTION - ORIENTATION: ORIENTATION: LEFT

## 2017-02-21 ENCOUNTER — LAB (OUTPATIENT)
Dept: LAB | Facility: HOSPITAL | Age: 57
End: 2017-02-21
Attending: INTERNAL MEDICINE

## 2017-02-21 ENCOUNTER — HOSPITAL ENCOUNTER (OUTPATIENT)
Dept: ULTRASOUND IMAGING | Facility: HOSPITAL | Age: 57
Discharge: HOME | End: 2017-02-21
Attending: INTERNAL MEDICINE | Admitting: INTERNAL MEDICINE

## 2017-02-21 DIAGNOSIS — K70.31 ASCITES DUE TO ALCOHOLIC CIRRHOSIS (HCC): ICD-10-CM

## 2017-02-21 LAB
INR PPP: 1.1 (ref 0.91–1.09)
PROTHROMBIN TIME: 12.9 SECONDS (ref 10–13.8)

## 2017-02-21 PROCEDURE — 76705 ECHO EXAM OF ABDOMEN: CPT

## 2017-02-21 PROCEDURE — 85610 PROTHROMBIN TIME: CPT | Performed by: FAMILY MEDICINE

## 2017-03-01 ENCOUNTER — OFFICE VISIT (OUTPATIENT)
Dept: GASTROENTEROLOGY | Facility: CLINIC | Age: 57
End: 2017-03-01

## 2017-03-01 VITALS
HEIGHT: 67 IN | BODY MASS INDEX: 26.37 KG/M2 | TEMPERATURE: 95.3 F | OXYGEN SATURATION: 93 % | HEART RATE: 97 BPM | WEIGHT: 168 LBS | DIASTOLIC BLOOD PRESSURE: 56 MMHG | SYSTOLIC BLOOD PRESSURE: 142 MMHG

## 2017-03-01 DIAGNOSIS — K70.31 ASCITES DUE TO ALCOHOLIC CIRRHOSIS (HCC): ICD-10-CM

## 2017-03-01 DIAGNOSIS — B18.2 CHRONIC HEPATITIS C WITHOUT HEPATIC COMA (HCC): ICD-10-CM

## 2017-03-01 DIAGNOSIS — K70.31 ALCOHOLIC CIRRHOSIS OF LIVER WITH ASCITES (HCC): Primary | ICD-10-CM

## 2017-03-01 PROCEDURE — 99214 OFFICE O/P EST MOD 30 MIN: CPT | Performed by: INTERNAL MEDICINE

## 2017-03-02 ENCOUNTER — OFFICE VISIT (OUTPATIENT)
Dept: VASCULAR SURGERY | Age: 57
End: 2017-03-02

## 2017-03-02 VITALS
HEART RATE: 83 BPM | DIASTOLIC BLOOD PRESSURE: 78 MMHG | SYSTOLIC BLOOD PRESSURE: 139 MMHG | RESPIRATION RATE: 18 BRPM | TEMPERATURE: 97.9 F

## 2017-03-02 DIAGNOSIS — Z09 FOLLOW UP: Primary | ICD-10-CM

## 2017-03-02 PROCEDURE — 99024 POSTOP FOLLOW-UP VISIT: CPT | Performed by: PHYSICIAN ASSISTANT

## 2017-03-09 DIAGNOSIS — K70.31 ASCITES DUE TO ALCOHOLIC CIRRHOSIS (HCC): Primary | ICD-10-CM

## 2017-03-23 ENCOUNTER — HOSPITAL ENCOUNTER (OUTPATIENT)
Dept: ULTRASOUND IMAGING | Facility: HOSPITAL | Age: 57
Discharge: HOME | End: 2017-03-23
Attending: INTERNAL MEDICINE | Admitting: INTERNAL MEDICINE

## 2017-03-23 ENCOUNTER — LAB (OUTPATIENT)
Dept: LAB | Facility: HOSPITAL | Age: 57
End: 2017-03-23
Attending: INTERNAL MEDICINE

## 2017-03-23 DIAGNOSIS — K70.31 ASCITES DUE TO ALCOHOLIC CIRRHOSIS (HCC): ICD-10-CM

## 2017-03-23 LAB
APPEARANCE FLD: ABNORMAL
COLOR FLD: YELLOW
INR PPP: 1.2 (ref 0.91–1.09)
LYMPHOCYTES NFR FLD MANUAL: 45 %
MONOCYTES NFR FLD: 22 %
NEUTROPHILS NFR FLD MANUAL: 3 %
NUC CELL # FLD: 162 /MM3
PROTHROMBIN TIME: 14.5 SECONDS (ref 10–13.8)
RBC # FLD AUTO: 93 /MM3
UNCLASSIFIED CELLS, FLUID: 30 %

## 2017-03-23 PROCEDURE — 82042 OTHER SOURCE ALBUMIN QUAN EA: CPT | Performed by: INTERNAL MEDICINE

## 2017-03-23 PROCEDURE — 87205 SMEAR GRAM STAIN: CPT | Performed by: INTERNAL MEDICINE

## 2017-03-23 PROCEDURE — 76942 ECHO GUIDE FOR BIOPSY: CPT

## 2017-03-23 PROCEDURE — 85610 PROTHROMBIN TIME: CPT | Performed by: FAMILY MEDICINE

## 2017-03-23 PROCEDURE — 88112 CYTOPATH CELL ENHANCE TECH: CPT | Performed by: INTERNAL MEDICINE

## 2017-03-23 PROCEDURE — 88312 SPECIAL STAINS GROUP 1: CPT | Performed by: INTERNAL MEDICINE

## 2017-03-23 PROCEDURE — 84157 ASSAY OF PROTEIN OTHER: CPT | Performed by: INTERNAL MEDICINE

## 2017-03-23 PROCEDURE — 89051 BODY FLUID CELL COUNT: CPT | Performed by: INTERNAL MEDICINE

## 2017-03-23 PROCEDURE — 88305 TISSUE EXAM BY PATHOLOGIST: CPT | Performed by: INTERNAL MEDICINE

## 2017-03-23 PROCEDURE — 83615 LACTATE (LD) (LDH) ENZYME: CPT | Performed by: INTERNAL MEDICINE

## 2017-03-23 PROCEDURE — 87015 SPECIMEN INFECT AGNT CONCNTJ: CPT | Performed by: INTERNAL MEDICINE

## 2017-03-23 PROCEDURE — 87070 CULTURE OTHR SPECIMN AEROBIC: CPT | Performed by: INTERNAL MEDICINE

## 2017-03-23 PROCEDURE — 76705 ECHO EXAM OF ABDOMEN: CPT

## 2017-03-24 LAB
ALBUMIN FLD-MCNC: 2.3 G/DL
LDH FLD-CCNC: 80 IU/L
PROT FLD-MCNC: 4 G/DL

## 2017-03-28 LAB
BACTERIA FLD CULT: NORMAL
GRAM STN SPEC: NORMAL
GRAM STN SPEC: NORMAL

## 2017-03-30 ENCOUNTER — TELEPHONE (OUTPATIENT)
Dept: VASCULAR SURGERY | Age: 57
End: 2017-03-30

## 2017-04-10 ENCOUNTER — PREP FOR PROCEDURE (OUTPATIENT)
Dept: VASCULAR SURGERY | Age: 57
End: 2017-04-10

## 2017-04-10 RX ORDER — LIDOCAINE HYDROCHLORIDE 10 MG/ML
5 INJECTION, SOLUTION EPIDURAL; INFILTRATION; INTRACAUDAL; PERINEURAL ONCE
Status: CANCELLED | OUTPATIENT
Start: 2017-04-10 | End: 2017-04-10

## 2017-04-11 ENCOUNTER — HOSPITAL ENCOUNTER (OUTPATIENT)
Dept: OTHER | Age: 57
Discharge: HOME | End: 2017-04-11
Payer: MEDICARE

## 2017-04-11 VITALS
BODY MASS INDEX: 26.37 KG/M2 | SYSTOLIC BLOOD PRESSURE: 141 MMHG | DIASTOLIC BLOOD PRESSURE: 52 MMHG | TEMPERATURE: 99.4 F | WEIGHT: 174 LBS | HEART RATE: 73 BPM | OXYGEN SATURATION: 99 % | RESPIRATION RATE: 10 BRPM | HEIGHT: 68 IN

## 2017-04-11 PROCEDURE — 36589 REMOVAL TUNNELED CV CATH: CPT | Performed by: SURGERY

## 2017-04-11 PROCEDURE — 36589 REMOVAL TUNNELED CV CATH: CPT

## 2017-04-11 RX ORDER — HYDROCODONE BITARTRATE AND ACETAMINOPHEN 5; 325 MG/1; MG/1
2 TABLET ORAL EVERY 4 HOURS PRN
Status: DISCONTINUED | OUTPATIENT
Start: 2017-04-11 | End: 2017-04-13 | Stop reason: HOSPADM

## 2017-04-11 RX ORDER — LIDOCAINE HYDROCHLORIDE 10 MG/ML
20 INJECTION, SOLUTION INFILTRATION; PERINEURAL ONCE
Status: DISCONTINUED | OUTPATIENT
Start: 2017-04-11 | End: 2017-04-13 | Stop reason: HOSPADM

## 2017-04-11 RX ORDER — ONDANSETRON 2 MG/ML
4 INJECTION INTRAMUSCULAR; INTRAVENOUS EVERY 8 HOURS PRN
Status: DISCONTINUED | OUTPATIENT
Start: 2017-04-11 | End: 2017-04-13 | Stop reason: HOSPADM

## 2017-04-11 RX ORDER — HYDROCODONE BITARTRATE AND ACETAMINOPHEN 5; 325 MG/1; MG/1
1 TABLET ORAL EVERY 4 HOURS PRN
Status: DISCONTINUED | OUTPATIENT
Start: 2017-04-11 | End: 2017-04-13 | Stop reason: HOSPADM

## 2017-04-11 RX ORDER — LIDOCAINE HYDROCHLORIDE 10 MG/ML
5 INJECTION, SOLUTION EPIDURAL; INFILTRATION; INTRACAUDAL; PERINEURAL ONCE
Status: DISCONTINUED | OUTPATIENT
Start: 2017-04-11 | End: 2017-04-11

## 2017-05-03 ENCOUNTER — TELEPHONE (OUTPATIENT)
Dept: GASTROENTEROLOGY | Facility: CLINIC | Age: 57
End: 2017-05-03

## 2017-05-03 DIAGNOSIS — K70.31 ASCITES DUE TO ALCOHOLIC CIRRHOSIS (HCC): Primary | ICD-10-CM

## 2017-05-04 ENCOUNTER — HOSPITAL ENCOUNTER (OUTPATIENT)
Dept: ULTRASOUND IMAGING | Facility: HOSPITAL | Age: 57
Discharge: HOME | End: 2017-05-04
Attending: INTERNAL MEDICINE | Admitting: INTERNAL MEDICINE

## 2017-05-04 ENCOUNTER — LAB (OUTPATIENT)
Dept: LAB | Facility: HOSPITAL | Age: 57
End: 2017-05-04
Attending: INTERNAL MEDICINE

## 2017-05-04 DIAGNOSIS — K70.31 ASCITES DUE TO ALCOHOLIC CIRRHOSIS (HCC): ICD-10-CM

## 2017-05-04 LAB
INR PPP: 1.1 (ref 0.91–1.09)
PROTHROMBIN TIME: 13.7 SECONDS (ref 10–13.8)

## 2017-05-04 PROCEDURE — 36415 COLL VENOUS BLD VENIPUNCTURE: CPT

## 2017-05-04 PROCEDURE — 76705 ECHO EXAM OF ABDOMEN: CPT

## 2017-05-04 PROCEDURE — 85610 PROTHROMBIN TIME: CPT | Performed by: FAMILY MEDICINE

## 2017-07-12 ENCOUNTER — OFFICE VISIT (OUTPATIENT)
Dept: CARDIOLOGY | Facility: CLINIC | Age: 57
End: 2017-07-12

## 2017-07-12 ENCOUNTER — CLINICAL SUPPORT (OUTPATIENT)
Dept: CARDIOLOGY | Facility: CLINIC | Age: 57
End: 2017-07-12

## 2017-07-12 VITALS
DIASTOLIC BLOOD PRESSURE: 70 MMHG | WEIGHT: 155 LBS | BODY MASS INDEX: 23.49 KG/M2 | HEART RATE: 95 BPM | SYSTOLIC BLOOD PRESSURE: 135 MMHG | HEIGHT: 68 IN | RESPIRATION RATE: 18 BRPM

## 2017-07-12 DIAGNOSIS — N18.6 END STAGE KIDNEY DISEASE (HCC): Primary | ICD-10-CM

## 2017-07-12 DIAGNOSIS — K70.31 ALCOHOLIC CIRRHOSIS OF LIVER WITH ASCITES (HCC): ICD-10-CM

## 2017-07-12 DIAGNOSIS — I25.10 CORONARY ARTERY DISEASE INVOLVING NATIVE CORONARY ARTERY OF NATIVE HEART WITHOUT ANGINA PECTORIS: ICD-10-CM

## 2017-07-12 DIAGNOSIS — E78.2 MIXED HYPERLIPIDEMIA: ICD-10-CM

## 2017-07-12 DIAGNOSIS — I49.5 SICK SINUS SYNDROME (HCC): ICD-10-CM

## 2017-07-12 DIAGNOSIS — Z95.0 PACEMAKER: ICD-10-CM

## 2017-07-12 DIAGNOSIS — I50.22 CHRONIC SYSTOLIC CONGESTIVE HEART FAILURE (HCC): ICD-10-CM

## 2017-07-12 DIAGNOSIS — Z95.0 CARDIAC PACEMAKER IN SITU: Primary | ICD-10-CM

## 2017-07-12 DIAGNOSIS — I10 ESSENTIAL HYPERTENSION: ICD-10-CM

## 2017-07-12 PROCEDURE — 93288 INTERROG EVL PM/LDLS PM IP: CPT | Performed by: INTERNAL MEDICINE

## 2017-07-12 PROCEDURE — 93000 ELECTROCARDIOGRAM COMPLETE: CPT | Performed by: PHYSICIAN ASSISTANT

## 2017-07-12 PROCEDURE — 99214 OFFICE O/P EST MOD 30 MIN: CPT | Performed by: PHYSICIAN ASSISTANT

## 2017-07-12 RX ORDER — ASPIRIN 81 MG/1
81 TABLET ORAL DAILY
COMMUNITY
End: 2018-03-15 | Stop reason: ALTCHOICE

## 2017-07-19 ENCOUNTER — OFFICE VISIT (OUTPATIENT)
Dept: GASTROENTEROLOGY | Facility: CLINIC | Age: 57
End: 2017-07-19

## 2017-07-19 ENCOUNTER — LAB (OUTPATIENT)
Dept: LAB | Facility: HOSPITAL | Age: 57
End: 2017-07-19
Attending: INTERNAL MEDICINE

## 2017-07-19 VITALS
HEART RATE: 82 BPM | TEMPERATURE: 98.7 F | DIASTOLIC BLOOD PRESSURE: 68 MMHG | WEIGHT: 159 LBS | BODY MASS INDEX: 24.1 KG/M2 | OXYGEN SATURATION: 98 % | SYSTOLIC BLOOD PRESSURE: 140 MMHG | HEIGHT: 68 IN

## 2017-07-19 DIAGNOSIS — R93.2 ABNORMAL CT OF LIVER: ICD-10-CM

## 2017-07-19 DIAGNOSIS — K70.31 ALCOHOLIC CIRRHOSIS OF LIVER WITH ASCITES (HCC): Primary | ICD-10-CM

## 2017-07-19 DIAGNOSIS — B18.2 CHRONIC HEPATITIS C WITHOUT HEPATIC COMA (HCC): ICD-10-CM

## 2017-07-19 DIAGNOSIS — K65.2 SPONTANEOUS BACTERIAL PERITONITIS (HCC): ICD-10-CM

## 2017-07-19 DIAGNOSIS — K70.31 ASCITES DUE TO ALCOHOLIC CIRRHOSIS (HCC): ICD-10-CM

## 2017-07-19 DIAGNOSIS — K70.31 ALCOHOLIC CIRRHOSIS OF LIVER WITH ASCITES (HCC): ICD-10-CM

## 2017-07-19 LAB
ALBUMIN SERPL-MCNC: 4.8 G/DL (ref 3.5–5)
ALBUMIN/GLOB SERPL: 1.2 G/DL (ref 1.1–2.5)
ALP SERPL-CCNC: 92 U/L (ref 24–120)
ALT SERPL W P-5'-P-CCNC: 67 U/L (ref 0–54)
ANION GAP SERPL CALCULATED.3IONS-SCNC: 14 MMOL/L (ref 4–13)
AST SERPL-CCNC: 48 U/L (ref 7–45)
BILIRUB SERPL-MCNC: 0.9 MG/DL (ref 0.1–1)
BUN BLD-MCNC: 21 MG/DL (ref 5–21)
BUN/CREAT SERPL: 5.1 (ref 7–25)
CALCIUM SPEC-SCNC: 9.6 MG/DL (ref 8.4–10.4)
CHLORIDE SERPL-SCNC: 90 MMOL/L (ref 98–110)
CO2 SERPL-SCNC: 36 MMOL/L (ref 24–31)
CREAT BLD-MCNC: 4.13 MG/DL (ref 0.5–1.4)
DEPRECATED RDW RBC AUTO: 54.7 FL (ref 40–54)
ERYTHROCYTE [DISTWIDTH] IN BLOOD BY AUTOMATED COUNT: 15.6 % (ref 12–15)
GFR SERPL CREATININE-BSD FRML MDRD: 15 ML/MIN/1.73
GLOBULIN UR ELPH-MCNC: 4 GM/DL
GLUCOSE BLD-MCNC: 118 MG/DL (ref 70–100)
HCT VFR BLD AUTO: 39.4 % (ref 40–52)
HGB BLD-MCNC: 12.8 G/DL (ref 14–18)
INR PPP: 1.01 (ref 0.91–1.09)
MCH RBC QN AUTO: 30.9 PG (ref 28–32)
MCHC RBC AUTO-ENTMCNC: 32.5 G/DL (ref 33–36)
MCV RBC AUTO: 95.2 FL (ref 82–95)
PLATELET # BLD AUTO: 132 10*3/MM3 (ref 130–400)
PMV BLD AUTO: 11.1 FL (ref 6–12)
POTASSIUM BLD-SCNC: 4.5 MMOL/L (ref 3.5–5.3)
PROT SERPL-MCNC: 8.8 G/DL (ref 6.3–8.7)
PROTHROMBIN TIME: 13.6 SECONDS (ref 11.9–14.6)
RBC # BLD AUTO: 4.14 10*6/MM3 (ref 4.8–5.9)
SODIUM BLD-SCNC: 140 MMOL/L (ref 135–145)
WBC NRBC COR # BLD: 5.02 10*3/MM3 (ref 4.8–10.8)

## 2017-07-19 PROCEDURE — 82105 ALPHA-FETOPROTEIN SERUM: CPT | Performed by: INTERNAL MEDICINE

## 2017-07-19 PROCEDURE — 85610 PROTHROMBIN TIME: CPT | Performed by: INTERNAL MEDICINE

## 2017-07-19 PROCEDURE — 99214 OFFICE O/P EST MOD 30 MIN: CPT | Performed by: INTERNAL MEDICINE

## 2017-07-19 PROCEDURE — 36415 COLL VENOUS BLD VENIPUNCTURE: CPT

## 2017-07-19 PROCEDURE — 80053 COMPREHEN METABOLIC PANEL: CPT | Performed by: INTERNAL MEDICINE

## 2017-07-19 PROCEDURE — 85027 COMPLETE CBC AUTOMATED: CPT | Performed by: INTERNAL MEDICINE

## 2017-07-20 ENCOUNTER — TELEPHONE (OUTPATIENT)
Dept: GASTROENTEROLOGY | Facility: CLINIC | Age: 57
End: 2017-07-20

## 2017-07-20 LAB — AFP-TM SERPL-MCNC: 4 NG/ML (ref 0–8.3)

## 2017-07-26 ENCOUNTER — TELEPHONE (OUTPATIENT)
Dept: CARDIOLOGY | Facility: CLINIC | Age: 57
End: 2017-07-26

## 2017-07-27 ENCOUNTER — HOSPITAL ENCOUNTER (EMERGENCY)
Facility: HOSPITAL | Age: 57
Discharge: ANOTHER HOSPITAL | End: 2017-07-27
Admitting: EMERGENCY MEDICINE

## 2017-07-27 ENCOUNTER — APPOINTMENT (OUTPATIENT)
Dept: CT IMAGING | Facility: HOSPITAL | Age: 57
End: 2017-07-27

## 2017-07-27 VITALS
HEART RATE: 93 BPM | OXYGEN SATURATION: 94 % | DIASTOLIC BLOOD PRESSURE: 51 MMHG | SYSTOLIC BLOOD PRESSURE: 130 MMHG | TEMPERATURE: 98.9 F | WEIGHT: 164 LBS | HEIGHT: 68 IN | RESPIRATION RATE: 14 BRPM | BODY MASS INDEX: 24.86 KG/M2

## 2017-07-27 DIAGNOSIS — R59.0 RETROPERITONEAL LYMPHADENOPATHY: Primary | ICD-10-CM

## 2017-07-27 DIAGNOSIS — R18.8 CIRRHOSIS OF LIVER WITH ASCITES, UNSPECIFIED HEPATIC CIRRHOSIS TYPE (HCC): ICD-10-CM

## 2017-07-27 DIAGNOSIS — Z99.2 END STAGE RENAL FAILURE ON DIALYSIS (HCC): ICD-10-CM

## 2017-07-27 DIAGNOSIS — N18.6 END STAGE RENAL FAILURE ON DIALYSIS (HCC): ICD-10-CM

## 2017-07-27 DIAGNOSIS — K74.60 CIRRHOSIS OF LIVER WITH ASCITES, UNSPECIFIED HEPATIC CIRRHOSIS TYPE (HCC): ICD-10-CM

## 2017-07-27 LAB
ALBUMIN SERPL-MCNC: 4.7 G/DL (ref 3.5–5)
ALBUMIN/GLOB SERPL: 1.3 G/DL (ref 1.1–2.5)
ALP SERPL-CCNC: 107 U/L (ref 24–120)
ALT SERPL W P-5'-P-CCNC: 43 U/L (ref 0–54)
AMYLASE SERPL-CCNC: 102 U/L (ref 30–110)
ANION GAP SERPL CALCULATED.3IONS-SCNC: 20 MMOL/L (ref 4–13)
APTT PPP: 31.5 SECONDS (ref 24.1–34.8)
AST SERPL-CCNC: 27 U/L (ref 7–45)
BASOPHILS # BLD AUTO: 0.02 10*3/MM3 (ref 0–0.2)
BASOPHILS NFR BLD AUTO: 0.4 % (ref 0–2)
BILIRUB SERPL-MCNC: 0.9 MG/DL (ref 0.1–1)
BUN BLD-MCNC: 79 MG/DL (ref 5–21)
BUN/CREAT SERPL: 8 (ref 7–25)
CALCIUM SPEC-SCNC: 10.3 MG/DL (ref 8.4–10.4)
CHLORIDE SERPL-SCNC: 91 MMOL/L (ref 98–110)
CO2 SERPL-SCNC: 25 MMOL/L (ref 24–31)
CREAT BLD-MCNC: 9.89 MG/DL (ref 0.5–1.4)
D-LACTATE SERPL-SCNC: 0.7 MMOL/L (ref 0.5–2)
DEPRECATED RDW RBC AUTO: 52.9 FL (ref 40–54)
EOSINOPHIL # BLD AUTO: 0.29 10*3/MM3 (ref 0–0.7)
EOSINOPHIL NFR BLD AUTO: 5.8 % (ref 0–4)
ERYTHROCYTE [DISTWIDTH] IN BLOOD BY AUTOMATED COUNT: 15.5 % (ref 12–15)
GFR SERPL CREATININE-BSD FRML MDRD: 5 ML/MIN/1.73
GLOBULIN UR ELPH-MCNC: 3.7 GM/DL
GLUCOSE BLD-MCNC: 96 MG/DL (ref 70–100)
HCT VFR BLD AUTO: 36.3 % (ref 40–52)
HGB BLD-MCNC: 12.4 G/DL (ref 14–18)
IMM GRANULOCYTES # BLD: 0.01 10*3/MM3 (ref 0–0.03)
IMM GRANULOCYTES NFR BLD: 0.2 % (ref 0–5)
INR PPP: 1.02 (ref 0.91–1.09)
LIPASE SERPL-CCNC: 73 U/L (ref 23–203)
LYMPHOCYTES # BLD AUTO: 1.1 10*3/MM3 (ref 0.72–4.86)
LYMPHOCYTES NFR BLD AUTO: 21.9 % (ref 15–45)
MAGNESIUM SERPL-MCNC: 2.4 MG/DL (ref 1.4–2.2)
MCH RBC QN AUTO: 31.7 PG (ref 28–32)
MCHC RBC AUTO-ENTMCNC: 34.2 G/DL (ref 33–36)
MCV RBC AUTO: 92.8 FL (ref 82–95)
MONOCYTES # BLD AUTO: 0.3 10*3/MM3 (ref 0.19–1.3)
MONOCYTES NFR BLD AUTO: 6 % (ref 4–12)
NEUTROPHILS # BLD AUTO: 3.3 10*3/MM3 (ref 1.87–8.4)
NEUTROPHILS NFR BLD AUTO: 65.7 % (ref 39–78)
PLATELET # BLD AUTO: 133 10*3/MM3 (ref 130–400)
PMV BLD AUTO: 11.8 FL (ref 6–12)
POTASSIUM BLD-SCNC: 7.7 MMOL/L (ref 3.5–5.3)
PROCALCITONIN SERPL-MCNC: 0.7 NG/ML
PROT SERPL-MCNC: 8.4 G/DL (ref 6.3–8.7)
PROTHROMBIN TIME: 13.7 SECONDS (ref 11.9–14.6)
RBC # BLD AUTO: 3.91 10*6/MM3 (ref 4.8–5.9)
SODIUM BLD-SCNC: 136 MMOL/L (ref 135–145)
TROPONIN I SERPL-MCNC: 0.03 NG/ML (ref 0–0.03)
WBC NRBC COR # BLD: 5.02 10*3/MM3 (ref 4.8–10.8)

## 2017-07-27 PROCEDURE — 25010000002 ONDANSETRON PER 1 MG: Performed by: EMERGENCY MEDICINE

## 2017-07-27 PROCEDURE — 83735 ASSAY OF MAGNESIUM: CPT | Performed by: EMERGENCY MEDICINE

## 2017-07-27 PROCEDURE — 74176 CT ABD & PELVIS W/O CONTRAST: CPT

## 2017-07-27 PROCEDURE — 85730 THROMBOPLASTIN TIME PARTIAL: CPT | Performed by: PHYSICIAN ASSISTANT

## 2017-07-27 PROCEDURE — 85610 PROTHROMBIN TIME: CPT | Performed by: PHYSICIAN ASSISTANT

## 2017-07-27 PROCEDURE — 94640 AIRWAY INHALATION TREATMENT: CPT

## 2017-07-27 PROCEDURE — 84145 PROCALCITONIN (PCT): CPT | Performed by: PHYSICIAN ASSISTANT

## 2017-07-27 PROCEDURE — 99284 EMERGENCY DEPT VISIT MOD MDM: CPT

## 2017-07-27 PROCEDURE — 25010000002 HYDRALAZINE PER 20 MG: Performed by: PHYSICIAN ASSISTANT

## 2017-07-27 PROCEDURE — 83605 ASSAY OF LACTIC ACID: CPT | Performed by: EMERGENCY MEDICINE

## 2017-07-27 PROCEDURE — 25010000002 CALCIUM GLUCONATE PER 10 ML: Performed by: PHYSICIAN ASSISTANT

## 2017-07-27 PROCEDURE — 94799 UNLISTED PULMONARY SVC/PX: CPT

## 2017-07-27 PROCEDURE — 93010 ELECTROCARDIOGRAM REPORT: CPT | Performed by: INTERNAL MEDICINE

## 2017-07-27 PROCEDURE — 25010000002 HYDROMORPHONE PER 4 MG: Performed by: FAMILY MEDICINE

## 2017-07-27 PROCEDURE — 80053 COMPREHEN METABOLIC PANEL: CPT | Performed by: EMERGENCY MEDICINE

## 2017-07-27 PROCEDURE — 93005 ELECTROCARDIOGRAM TRACING: CPT | Performed by: EMERGENCY MEDICINE

## 2017-07-27 PROCEDURE — 85025 COMPLETE CBC W/AUTO DIFF WBC: CPT | Performed by: EMERGENCY MEDICINE

## 2017-07-27 PROCEDURE — 63710000001 INSULIN REGULAR HUMAN PER 5 UNITS: Performed by: PHYSICIAN ASSISTANT

## 2017-07-27 PROCEDURE — 84484 ASSAY OF TROPONIN QUANT: CPT | Performed by: EMERGENCY MEDICINE

## 2017-07-27 PROCEDURE — 83690 ASSAY OF LIPASE: CPT | Performed by: PHYSICIAN ASSISTANT

## 2017-07-27 PROCEDURE — 82150 ASSAY OF AMYLASE: CPT | Performed by: PHYSICIAN ASSISTANT

## 2017-07-27 PROCEDURE — 25010000002 MORPHINE PER 10 MG: Performed by: EMERGENCY MEDICINE

## 2017-07-27 PROCEDURE — 25010000002 HYDRALAZINE PER 20 MG: Performed by: EMERGENCY MEDICINE

## 2017-07-27 PROCEDURE — 96376 TX/PRO/DX INJ SAME DRUG ADON: CPT

## 2017-07-27 PROCEDURE — 25010000002 ONDANSETRON PER 1 MG: Performed by: PHYSICIAN ASSISTANT

## 2017-07-27 PROCEDURE — 96375 TX/PRO/DX INJ NEW DRUG ADDON: CPT

## 2017-07-27 PROCEDURE — 96374 THER/PROPH/DIAG INJ IV PUSH: CPT

## 2017-07-27 PROCEDURE — G0257 UNSCHED DIALYSIS ESRD PT HOS: HCPCS

## 2017-07-27 PROCEDURE — 25010000002 HYDROMORPHONE PER 4 MG: Performed by: EMERGENCY MEDICINE

## 2017-07-27 RX ORDER — ONDANSETRON 2 MG/ML
4 INJECTION INTRAMUSCULAR; INTRAVENOUS ONCE
Status: COMPLETED | OUTPATIENT
Start: 2017-07-27 | End: 2017-07-27

## 2017-07-27 RX ORDER — CALCIUM GLUCONATE 94 MG/ML
1 INJECTION, SOLUTION INTRAVENOUS ONCE
Status: COMPLETED | OUTPATIENT
Start: 2017-07-27 | End: 2017-07-27

## 2017-07-27 RX ORDER — HYDRALAZINE HYDROCHLORIDE 20 MG/ML
10 INJECTION INTRAMUSCULAR; INTRAVENOUS ONCE
Status: COMPLETED | OUTPATIENT
Start: 2017-07-27 | End: 2017-07-27

## 2017-07-27 RX ORDER — HYDRALAZINE HYDROCHLORIDE 20 MG/ML
20 INJECTION INTRAMUSCULAR; INTRAVENOUS ONCE
Status: COMPLETED | OUTPATIENT
Start: 2017-07-27 | End: 2017-07-27

## 2017-07-27 RX ORDER — LORAZEPAM 2 MG/ML
1 INJECTION INTRAMUSCULAR ONCE
Status: DISCONTINUED | OUTPATIENT
Start: 2017-07-27 | End: 2017-07-27

## 2017-07-27 RX ORDER — DEXTROSE MONOHYDRATE 25 G/50ML
25 INJECTION, SOLUTION INTRAVENOUS ONCE
Status: COMPLETED | OUTPATIENT
Start: 2017-07-27 | End: 2017-07-27

## 2017-07-27 RX ORDER — MORPHINE SULFATE 4 MG/ML
4 INJECTION, SOLUTION INTRAMUSCULAR; INTRAVENOUS ONCE
Status: COMPLETED | OUTPATIENT
Start: 2017-07-27 | End: 2017-07-27

## 2017-07-27 RX ORDER — ALBUTEROL SULFATE 2.5 MG/3ML
2.5 SOLUTION RESPIRATORY (INHALATION) ONCE
Status: COMPLETED | OUTPATIENT
Start: 2017-07-27 | End: 2017-07-27

## 2017-07-27 RX ADMIN — ONDANSETRON 4 MG: 2 INJECTION, SOLUTION INTRAMUSCULAR; INTRAVENOUS at 10:55

## 2017-07-27 RX ADMIN — MORPHINE SULFATE 4 MG: 4 INJECTION, SOLUTION INTRAMUSCULAR; INTRAVENOUS at 14:20

## 2017-07-27 RX ADMIN — HYDROMORPHONE HYDROCHLORIDE 1 MG: 1 INJECTION, SOLUTION INTRAMUSCULAR; INTRAVENOUS; SUBCUTANEOUS at 12:39

## 2017-07-27 RX ADMIN — CALCIUM GLUCONATE 1 G: 94 INJECTION, SOLUTION INTRAVENOUS at 11:48

## 2017-07-27 RX ADMIN — HYDRALAZINE HYDROCHLORIDE 20 MG: 20 INJECTION INTRAMUSCULAR; INTRAVENOUS at 11:46

## 2017-07-27 RX ADMIN — HYDRALAZINE HYDROCHLORIDE 10 MG: 20 INJECTION INTRAMUSCULAR; INTRAVENOUS at 10:59

## 2017-07-27 RX ADMIN — ALBUTEROL SULFATE 2.5 MG: 2.5 SOLUTION RESPIRATORY (INHALATION) at 12:03

## 2017-07-27 RX ADMIN — SODIUM CHLORIDE 1000 ML: 9 INJECTION, SOLUTION INTRAVENOUS at 10:53

## 2017-07-27 RX ADMIN — ONDANSETRON 4 MG: 2 INJECTION INTRAMUSCULAR; INTRAVENOUS at 12:39

## 2017-07-27 RX ADMIN — HYDROMORPHONE HYDROCHLORIDE 1 MG: 1 INJECTION, SOLUTION INTRAMUSCULAR; INTRAVENOUS; SUBCUTANEOUS at 10:55

## 2017-07-27 RX ADMIN — HYDROMORPHONE HYDROCHLORIDE 1 MG: 1 INJECTION, SOLUTION INTRAMUSCULAR; INTRAVENOUS; SUBCUTANEOUS at 19:42

## 2017-07-27 RX ADMIN — DEXTROSE MONOHYDRATE 25 G: 25 INJECTION, SOLUTION INTRAVENOUS at 11:44

## 2017-07-27 RX ADMIN — INSULIN HUMAN 5 UNITS: 100 INJECTION, SOLUTION PARENTERAL at 11:41

## 2017-08-04 ENCOUNTER — TELEPHONE (OUTPATIENT)
Dept: GASTROENTEROLOGY | Facility: CLINIC | Age: 57
End: 2017-08-04

## 2017-08-04 DIAGNOSIS — K70.30 ALCOHOLIC CIRRHOSIS OF LIVER WITHOUT ASCITES (HCC): Primary | ICD-10-CM

## 2017-09-14 ENCOUNTER — TELEPHONE (OUTPATIENT)
Dept: CARDIOLOGY | Facility: CLINIC | Age: 57
End: 2017-09-14

## 2017-10-27 ENCOUNTER — TELEPHONE (OUTPATIENT)
Dept: GASTROENTEROLOGY | Facility: CLINIC | Age: 57
End: 2017-10-27

## 2017-10-27 DIAGNOSIS — K70.31 ASCITES DUE TO ALCOHOLIC CIRRHOSIS (HCC): Primary | ICD-10-CM

## 2017-10-31 ENCOUNTER — LAB (OUTPATIENT)
Dept: LAB | Facility: HOSPITAL | Age: 57
End: 2017-10-31
Attending: INTERNAL MEDICINE

## 2017-10-31 ENCOUNTER — HOSPITAL ENCOUNTER (OUTPATIENT)
Dept: ULTRASOUND IMAGING | Facility: HOSPITAL | Age: 57
Discharge: HOME | End: 2017-10-31
Attending: INTERNAL MEDICINE | Admitting: INTERNAL MEDICINE

## 2017-10-31 DIAGNOSIS — K70.31 ASCITES DUE TO ALCOHOLIC CIRRHOSIS (HCC): ICD-10-CM

## 2017-10-31 LAB
INR PPP: 1.2 (ref 0.91–1.09)
PROTHROMBIN TIME: 14.6 SECONDS (ref 10–13.8)

## 2017-10-31 PROCEDURE — 85610 PROTHROMBIN TIME: CPT | Performed by: INTERNAL MEDICINE

## 2017-10-31 PROCEDURE — 76705 ECHO EXAM OF ABDOMEN: CPT

## 2017-11-16 ENCOUNTER — HOSPITAL ENCOUNTER (OUTPATIENT)
Dept: CT IMAGING | Facility: HOSPITAL | Age: 57
Discharge: HOME | End: 2017-11-16
Attending: INTERNAL MEDICINE | Admitting: INTERNAL MEDICINE

## 2017-11-16 DIAGNOSIS — K70.30 ALCOHOLIC CIRRHOSIS OF LIVER WITHOUT ASCITES (HCC): ICD-10-CM

## 2017-11-16 LAB
CREAT BLD-MCNC: 5.11 MG/DL (ref 0.5–1.4)
GFR SERPL CREATININE-BSD FRML MDRD: 12 ML/MIN/1.73

## 2017-11-16 PROCEDURE — 82565 ASSAY OF CREATININE: CPT | Performed by: INTERNAL MEDICINE

## 2017-11-16 PROCEDURE — 74177 CT ABD & PELVIS W/CONTRAST: CPT

## 2017-11-16 PROCEDURE — 0 IOPAMIDOL 61 % SOLUTION: Performed by: INTERNAL MEDICINE

## 2017-11-16 RX ADMIN — IOPAMIDOL 100 ML: 612 INJECTION, SOLUTION INTRAVENOUS at 10:00

## 2017-11-26 ENCOUNTER — TELEPHONE (OUTPATIENT)
Dept: GASTROENTEROLOGY | Facility: CLINIC | Age: 57
End: 2017-11-26

## 2017-11-26 DIAGNOSIS — R93.2 ABNORMAL CT SCAN, LIVER: Primary | ICD-10-CM

## 2018-01-22 ENCOUNTER — OFFICE VISIT (OUTPATIENT)
Dept: GASTROENTEROLOGY | Facility: CLINIC | Age: 58
End: 2018-01-22

## 2018-01-22 ENCOUNTER — LAB (OUTPATIENT)
Dept: LAB | Facility: HOSPITAL | Age: 58
End: 2018-01-22
Attending: INTERNAL MEDICINE

## 2018-01-22 ENCOUNTER — TELEPHONE (OUTPATIENT)
Dept: VASCULAR SURGERY | Age: 58
End: 2018-01-22

## 2018-01-22 ENCOUNTER — OFFICE VISIT (OUTPATIENT)
Dept: VASCULAR SURGERY | Age: 58
End: 2018-01-22
Payer: MEDICARE

## 2018-01-22 VITALS
SYSTOLIC BLOOD PRESSURE: 128 MMHG | RESPIRATION RATE: 18 BRPM | OXYGEN SATURATION: 97 % | DIASTOLIC BLOOD PRESSURE: 60 MMHG | HEART RATE: 68 BPM | TEMPERATURE: 98 F

## 2018-01-22 VITALS
TEMPERATURE: 97.2 F | HEART RATE: 80 BPM | WEIGHT: 172 LBS | OXYGEN SATURATION: 99 % | DIASTOLIC BLOOD PRESSURE: 58 MMHG | BODY MASS INDEX: 26.07 KG/M2 | SYSTOLIC BLOOD PRESSURE: 132 MMHG | HEIGHT: 68 IN

## 2018-01-22 DIAGNOSIS — K70.31 ALCOHOLIC CIRRHOSIS OF LIVER WITH ASCITES (HCC): Primary | ICD-10-CM

## 2018-01-22 DIAGNOSIS — N18.6 CKD (CHRONIC KIDNEY DISEASE) STAGE V REQUIRING CHRONIC DIALYSIS (HCC): Primary | ICD-10-CM

## 2018-01-22 DIAGNOSIS — S61.209A OPEN WOUND OF FINGER, INITIAL ENCOUNTER: ICD-10-CM

## 2018-01-22 DIAGNOSIS — R93.2 ABNORMAL CT SCAN, LIVER: ICD-10-CM

## 2018-01-22 DIAGNOSIS — K70.31 ASCITES DUE TO ALCOHOLIC CIRRHOSIS (HCC): ICD-10-CM

## 2018-01-22 DIAGNOSIS — Z99.2 CKD (CHRONIC KIDNEY DISEASE) STAGE V REQUIRING CHRONIC DIALYSIS (HCC): Primary | ICD-10-CM

## 2018-01-22 DIAGNOSIS — B18.2 CHRONIC HEPATITIS C WITHOUT HEPATIC COMA (HCC): ICD-10-CM

## 2018-01-22 DIAGNOSIS — K65.2 SPONTANEOUS BACTERIAL PERITONITIS (HCC): ICD-10-CM

## 2018-01-22 DIAGNOSIS — R93.2 ABNORMAL FINDINGS ON DIAGNOSTIC IMAGING OF LIVER: ICD-10-CM

## 2018-01-22 DIAGNOSIS — Z99.2 DIALYSIS PATIENT (HCC): ICD-10-CM

## 2018-01-22 DIAGNOSIS — K63.5 POLYP OF COLON, UNSPECIFIED PART OF COLON, UNSPECIFIED TYPE: ICD-10-CM

## 2018-01-22 PROCEDURE — G8421 BMI NOT CALCULATED: HCPCS | Performed by: PHYSICIAN ASSISTANT

## 2018-01-22 PROCEDURE — G8484 FLU IMMUNIZE NO ADMIN: HCPCS | Performed by: PHYSICIAN ASSISTANT

## 2018-01-22 PROCEDURE — G8427 DOCREV CUR MEDS BY ELIG CLIN: HCPCS | Performed by: PHYSICIAN ASSISTANT

## 2018-01-22 PROCEDURE — 82105 ALPHA-FETOPROTEIN SERUM: CPT | Performed by: INTERNAL MEDICINE

## 2018-01-22 PROCEDURE — 99213 OFFICE O/P EST LOW 20 MIN: CPT | Performed by: PHYSICIAN ASSISTANT

## 2018-01-22 PROCEDURE — 99214 OFFICE O/P EST MOD 30 MIN: CPT | Performed by: INTERNAL MEDICINE

## 2018-01-22 PROCEDURE — G8599 NO ASA/ANTIPLAT THER USE RNG: HCPCS | Performed by: PHYSICIAN ASSISTANT

## 2018-01-22 PROCEDURE — 3017F COLORECTAL CA SCREEN DOC REV: CPT | Performed by: PHYSICIAN ASSISTANT

## 2018-01-22 PROCEDURE — 4004F PT TOBACCO SCREEN RCVD TLK: CPT | Performed by: PHYSICIAN ASSISTANT

## 2018-01-22 PROCEDURE — 36415 COLL VENOUS BLD VENIPUNCTURE: CPT

## 2018-01-22 RX ORDER — CLINDAMYCIN HYDROCHLORIDE 300 MG/1
300 CAPSULE ORAL EVERY 8 HOURS
Status: ON HOLD | COMMUNITY
End: 2018-01-30 | Stop reason: ALTCHOICE

## 2018-01-22 RX ORDER — LACTULOSE 10 G/15ML
10 SOLUTION ORAL DAILY
Qty: 473 ML | Refills: 11 | Status: SHIPPED | OUTPATIENT
Start: 2018-01-22

## 2018-01-22 RX ORDER — OXYCODONE AND ACETAMINOPHEN 10; 325 MG/1; MG/1
1 TABLET ORAL EVERY 8 HOURS PRN
Status: ON HOLD | COMMUNITY
End: 2019-06-28 | Stop reason: HOSPADM

## 2018-01-22 RX ORDER — CIPROFLOXACIN 500 MG/1
250 TABLET, FILM COATED ORAL DAILY
Qty: 30 TABLET | Refills: 11 | Status: SHIPPED | OUTPATIENT
Start: 2018-01-22 | End: 2019-02-28 | Stop reason: SDUPTHER

## 2018-01-23 LAB — AFP-TM SERPL-MCNC: 3 NG/ML (ref 0–8.3)

## 2018-01-24 ENCOUNTER — PREP FOR PROCEDURE (OUTPATIENT)
Dept: VASCULAR SURGERY | Age: 58
End: 2018-01-24

## 2018-01-24 RX ORDER — CLONIDINE HYDROCHLORIDE 0.1 MG/1
0.1 TABLET ORAL PRN
Status: CANCELLED | OUTPATIENT
Start: 2018-01-24

## 2018-01-24 RX ORDER — ASPIRIN 81 MG/1
81 TABLET ORAL ONCE
Status: CANCELLED | OUTPATIENT
Start: 2018-01-24 | End: 2018-01-24

## 2018-01-24 RX ORDER — SODIUM CHLORIDE 0.9 % (FLUSH) 0.9 %
10 SYRINGE (ML) INJECTION PRN
Status: CANCELLED | OUTPATIENT
Start: 2018-01-24

## 2018-01-24 RX ORDER — ONDANSETRON 2 MG/ML
4 INJECTION INTRAMUSCULAR; INTRAVENOUS EVERY 6 HOURS PRN
Status: CANCELLED | OUTPATIENT
Start: 2018-01-24

## 2018-01-24 RX ORDER — SODIUM CHLORIDE 9 MG/ML
INJECTION, SOLUTION INTRAVENOUS CONTINUOUS
Status: CANCELLED | OUTPATIENT
Start: 2018-01-24

## 2018-01-24 NOTE — PROGRESS NOTES
tablet Take 20 mg by mouth daily Indications: High Blood Pressure       amLODIPine (NORVASC) 10 MG tablet Take 10 mg by mouth daily Indications: High Blood Pressure       Calcium Acetate, Phos Binder, (PHOSLO PO) Take 3 tablets by mouth 3 times daily (with meals) Indications: Kidney Failure       oxyCODONE (OXYCONTIN) 10 MG extended release tablet Take 10 mg by mouth every 8 hours  Indications: Pain .  CIPROFLOX HCL-CIPRO BETAINE (CIPRO XR) 500 MG TB24 Take 500 mg by mouth 2 times daily as needed (takes when having paracentesis) Indications: Prevention of Bacterial Infection       diazepam (VALIUM) 5 MG tablet Take 5 mg by mouth 3 times daily Indications: Feeling Anxious        No current facility-administered medications for this visit. Allergies: Review of patient's allergies indicates no known allergies. Past Medical History:   Diagnosis Date    Anxiety     CAD (coronary artery disease)     stents x 2; per dr. Shahid Kerr [facility]congestive [facility]) [facility]     Chyloperitoneum determined by paracentesis     CKD (chronic kidney disease), stage V ([facility]     Dialysis patient ([facility]     mon wed fri at [place] [facility])     Hepatic cirrhosis ([facility]     dr. Reji Rios; has been going to [facility] for liver and kidney transplant list.    Hepatitis C, chronic ([facility]     HTN (hypertension)     Hx of blood clots     arm/fistula    Osteoarthritis     Sleep apnea     Sleep apnea     no cpap at present.  Type II diabetes mellitus (HCC)     no meds. Past Surgical History:   Procedure Laterality Date    ANGIOPLASTY  08/09/11 SUKHWINDER GERARDO cephalic vein balloon angioplasty with 7mm x 4cm balloon. coild embolization of 2 cephallic vein brances with 3mm x 5cm coils    CARDIAC CATHETERIZATION  04/04/11    cardiac cath and stent x1 by Dr. Jason Cook  07/26/11 SUKHWINDER GERARDO AV fistula. coild embolization of cephalic vein branch near the wrist with 3mm EMBO coils.  balloon a'plasty left cephalic vein with [id number] balloon and then [id number] balloon    DIALYSIS FISTULA CREATION Left 2/16/2017    LEFT UPPER EXTREMITY BRACHIAL / AXILLARY GRAFT AND STENT LEFT SUBCLAVIAN VEIN  performed by Uvaldo Carr MD at [address] [place], [place] [address]u Said  12/26/2010    Right internal jugular vein tunneled dialysis catheter placement    OTHER SURGICAL HISTORY  [id number]    Redo of dialysis catheter    OTHER SURGICAL HISTORY  9/6/2011   [facility]    Removal of RT IJV tunneled dialysis cath    OTHER SURGICAL HISTORY  5/22/12   [facility]    Ultrasound-guided cannulation of left cephalic vein in the mid forearm toward the AV anastomosis, Left upper  ext. fistulograms including venograms of the SVC, Left cephalic vein balloon angioplasty with a 4mm x 100mm Tod balloon, Completion fistulograms    PACEMAKER PLACEMENT      medtronic    PARACENTESIS      multiple/recent; per dr. Marc Woodard at  [facility]  6/19/12    [facility] arteriovenous fistulogram including venography of the superior vena cava. Balloon angioplasty, left forearm cephalic vein and upper arm cephalic vein with [id number] tod balloon.  VASCULAR SURGERY  7/10/2012 [facility]     Revision of left distal radial artery to cephalic vein arteriovenous fistula with 7mm Artegraft interposition.  VASCULAR SURGERY  7/30/15 [facility] [place] [facility] & [address]    Left upper extremity arteriovenous fistulograms including venography of the superior vena cava. Left cephalic vein balloon angioplasty with an 8 x 20 cm cutting balloon proximal cephalic vein. Balloon angioplasty of left cephalic vein/antecubital vein near the antecubital crease with 8 x 20 mm cutting balloon.  VASCULAR SURGERY  7/30/15 [place] [facility]    Balloon angioplasty proximal end distal upper arm cephalic vein with 9 x 40 cm  balloon. Completion fistulograms of the left upper extremity.     VASCULAR SURGERY  8/13/15 [facility]    Revision of left upper extremity arteriovenous fistula with excision of pseudoaneurysm and primary repair of cephalic vein.  VASCULAR SURGERY  5/3/16 [facility]    Left upper fistulograms/venograms, left cephalic balloon 8 x 20 cutter,9 x 40 conquest,left proximal cephalic vein stents (flair 2 9 x 50), balloon stents 9 x 40 conquest.    VASCULAR SURGERY  12/08/2016    [facility]- ultrasound guided cannulation of left distal cephalic vein ultrasound guided cannulation right internal jugular vein placement of right internal jugular vein tunneled dialysis catheter (Bard Equistream XK 23cm tip to cuff)     VASCULAR SURGERY  01/20/2017    [facility]-Right upper venograms, u/s guided cannulation left basilic vein, left upper venograms, balloon angioplasty left subclavian vein 10x40 conquest.    VASCULAR SURGERY  02/16/2017    [facility]. Left brachial artery to axillary vein arteriovenous graft with 7 mm artegraft. Left upper extremity venograms. Left subclavian vein stent viabahn 13x50. Left subclavian vein stent balloon angioplasty 12x40 atlas.  VASCULAR SURGERY  04/11/2017    [facility]. Removal of tunneled dialysis catheter right internal jugular vein. Family History   Problem Relation Age of Onset    Heart Disease Other     Diabetes Other     Hypertension Other     High Blood Pressure Mother     High Blood Pressure Father      Social History   Substance Use Topics    Smoking status: Former Smoker     Quit date: 2/20/2017    Smokeless tobacco: Current User    Alcohol use No       Review of Systems    Constitutional  no significant activity change, appetite change, or unexpected weight change. No fever or chills. No diaphoresis or significant fatigue. HENT  no significant rhinorrhea or epistaxis. No tinnitus or significant hearing loss. Eyes  no sudden vision change or amaurosis. Respiratory  no significant shortness of breath, wheezing, or stridor. No apnea, cough, or chest tightness associated with shortness of breath. Cardiovascular  no chest pain, syncope, or significant dizziness.   No palpitations or

## 2018-01-25 ENCOUNTER — HOSPITAL ENCOUNTER (OUTPATIENT)
Dept: INTERVENTIONAL RADIOLOGY/VASCULAR | Age: 58
Discharge: HOME | End: 2018-01-25
Payer: MEDICARE

## 2018-01-25 ENCOUNTER — HOSPITAL ENCOUNTER (OUTPATIENT)
Dept: GENERAL RADIOLOGY | Age: 58
Discharge: HOME | End: 2018-01-25
Payer: MEDICARE

## 2018-01-25 ENCOUNTER — TELEPHONE (OUTPATIENT)
Dept: VASCULAR SURGERY | Age: 58
End: 2018-01-25

## 2018-01-25 VITALS
RESPIRATION RATE: 14 BRPM | TEMPERATURE: 97.6 F | HEART RATE: 63 BPM | SYSTOLIC BLOOD PRESSURE: 180 MMHG | HEIGHT: 68 IN | DIASTOLIC BLOOD PRESSURE: 57 MMHG | OXYGEN SATURATION: 99 % | BODY MASS INDEX: 26.22 KG/M2 | WEIGHT: 173 LBS

## 2018-01-25 DIAGNOSIS — T82.898A STEAL SYNDROME AS COMPLICATION OF DIALYSIS ACCESS, INITIAL ENCOUNTER (HCC): ICD-10-CM

## 2018-01-25 DIAGNOSIS — N18.6 ESRD ON DIALYSIS (HCC): ICD-10-CM

## 2018-01-25 DIAGNOSIS — Z99.2 ESRD ON DIALYSIS (HCC): ICD-10-CM

## 2018-01-25 PROCEDURE — 76937 US GUIDE VASCULAR ACCESS: CPT | Performed by: SURGERY

## 2018-01-25 PROCEDURE — 2500000003 HC RX 250 WO HCPCS: Performed by: SURGERY

## 2018-01-25 PROCEDURE — 6360000004 HC RX CONTRAST MEDICATION: Performed by: SURGERY

## 2018-01-25 PROCEDURE — 87070 CULTURE OTHR SPECIMN AEROBIC: CPT

## 2018-01-25 PROCEDURE — 2580000003 HC RX 258: Performed by: PHYSICIAN ASSISTANT

## 2018-01-25 PROCEDURE — 93970 EXTREMITY STUDY: CPT

## 2018-01-25 PROCEDURE — 75710 ARTERY X-RAYS ARM/LEG: CPT | Performed by: SURGERY

## 2018-01-25 PROCEDURE — 93005 ELECTROCARDIOGRAM TRACING: CPT

## 2018-01-25 PROCEDURE — 36215 PLACE CATHETER IN ARTERY: CPT | Performed by: SURGERY

## 2018-01-25 PROCEDURE — C1894 INTRO/SHEATH, NON-LASER: HCPCS

## 2018-01-25 PROCEDURE — 36200 PLACE CATHETER IN AORTA: CPT | Performed by: SURGERY

## 2018-01-25 PROCEDURE — 99152 MOD SED SAME PHYS/QHP 5/>YRS: CPT | Performed by: SURGERY

## 2018-01-25 PROCEDURE — 36901 INTRO CATH DIALYSIS CIRCUIT: CPT | Performed by: SURGERY

## 2018-01-25 PROCEDURE — 99153 MOD SED SAME PHYS/QHP EA: CPT | Performed by: SURGERY

## 2018-01-25 PROCEDURE — 36010 PLACE CATHETER IN VEIN: CPT | Performed by: SURGERY

## 2018-01-25 PROCEDURE — 71046 X-RAY EXAM CHEST 2 VIEWS: CPT

## 2018-01-25 PROCEDURE — 6360000002 HC RX W HCPCS: Performed by: SURGERY

## 2018-01-25 PROCEDURE — 6360000002 HC RX W HCPCS: Performed by: PHYSICIAN ASSISTANT

## 2018-01-25 RX ORDER — HYDROCODONE BITARTRATE AND ACETAMINOPHEN 5; 325 MG/1; MG/1
1 TABLET ORAL EVERY 4 HOURS PRN
Status: DISCONTINUED | OUTPATIENT
Start: 2018-01-25 | End: 2018-01-27 | Stop reason: HOSPADM

## 2018-01-25 RX ORDER — ACETAMINOPHEN 325 MG/1
650 TABLET ORAL EVERY 4 HOURS PRN
Status: DISCONTINUED | OUTPATIENT
Start: 2018-01-25 | End: 2018-01-27 | Stop reason: HOSPADM

## 2018-01-25 RX ORDER — LIDOCAINE HYDROCHLORIDE 20 MG/ML
INJECTION, SOLUTION INFILTRATION; PERINEURAL
Status: COMPLETED | OUTPATIENT
Start: 2018-01-25 | End: 2018-01-25

## 2018-01-25 RX ORDER — SODIUM CHLORIDE 9 MG/ML
INJECTION, SOLUTION INTRAVENOUS CONTINUOUS
Status: DISCONTINUED | OUTPATIENT
Start: 2018-01-25 | End: 2018-01-27 | Stop reason: HOSPADM

## 2018-01-25 RX ORDER — HEPARIN SODIUM 5000 [USP'U]/ML
INJECTION, SOLUTION INTRAVENOUS; SUBCUTANEOUS
Status: COMPLETED | OUTPATIENT
Start: 2018-01-25 | End: 2018-01-25

## 2018-01-25 RX ORDER — HYDROCODONE BITARTRATE AND ACETAMINOPHEN 5; 325 MG/1; MG/1
2 TABLET ORAL EVERY 4 HOURS PRN
Status: DISCONTINUED | OUTPATIENT
Start: 2018-01-25 | End: 2018-01-27 | Stop reason: HOSPADM

## 2018-01-25 RX ORDER — ONDANSETRON 2 MG/ML
4 INJECTION INTRAMUSCULAR; INTRAVENOUS EVERY 6 HOURS PRN
Status: DISCONTINUED | OUTPATIENT
Start: 2018-01-25 | End: 2018-01-25 | Stop reason: SDUPTHER

## 2018-01-25 RX ORDER — FENTANYL CITRATE 50 UG/ML
INJECTION, SOLUTION INTRAMUSCULAR; INTRAVENOUS
Status: COMPLETED | OUTPATIENT
Start: 2018-01-25 | End: 2018-01-25

## 2018-01-25 RX ORDER — SODIUM CHLORIDE 0.9 % (FLUSH) 0.9 %
10 SYRINGE (ML) INJECTION PRN
Status: DISCONTINUED | OUTPATIENT
Start: 2018-01-25 | End: 2018-01-27 | Stop reason: HOSPADM

## 2018-01-25 RX ORDER — ASPIRIN 81 MG/1
81 TABLET ORAL ONCE
Status: DISCONTINUED | OUTPATIENT
Start: 2018-01-25 | End: 2018-01-27 | Stop reason: HOSPADM

## 2018-01-25 RX ORDER — CLONIDINE HYDROCHLORIDE 0.1 MG/1
0.1 TABLET ORAL PRN
Status: DISCONTINUED | OUTPATIENT
Start: 2018-01-25 | End: 2018-01-27 | Stop reason: HOSPADM

## 2018-01-25 RX ORDER — MIDAZOLAM HYDROCHLORIDE 1 MG/ML
INJECTION INTRAMUSCULAR; INTRAVENOUS
Status: COMPLETED | OUTPATIENT
Start: 2018-01-25 | End: 2018-01-25

## 2018-01-25 RX ORDER — ONDANSETRON 2 MG/ML
4 INJECTION INTRAMUSCULAR; INTRAVENOUS EVERY 8 HOURS PRN
Status: DISCONTINUED | OUTPATIENT
Start: 2018-01-25 | End: 2018-01-27 | Stop reason: HOSPADM

## 2018-01-25 RX ADMIN — SODIUM CHLORIDE: 9 INJECTION, SOLUTION INTRAVENOUS at 12:21

## 2018-01-25 RX ADMIN — HEPARIN SODIUM 5000 UNITS: 5000 INJECTION, SOLUTION INTRAVENOUS; SUBCUTANEOUS at 15:03

## 2018-01-25 RX ADMIN — MIDAZOLAM HYDROCHLORIDE 1 MG: 1 INJECTION, SOLUTION INTRAMUSCULAR; INTRAVENOUS at 14:52

## 2018-01-25 RX ADMIN — FENTANYL CITRATE 25 MCG: 50 INJECTION, SOLUTION INTRAMUSCULAR; INTRAVENOUS at 14:25

## 2018-01-25 RX ADMIN — WATER 1 G: 1 INJECTION INTRAMUSCULAR; INTRAVENOUS; SUBCUTANEOUS at 14:39

## 2018-01-25 RX ADMIN — IOVERSOL 80 ML: 678 INJECTION INTRA-ARTERIAL; INTRAVENOUS at 15:26

## 2018-01-25 RX ADMIN — LIDOCAINE HYDROCHLORIDE 10 ML: 20 INJECTION, SOLUTION INFILTRATION; PERINEURAL at 15:06

## 2018-01-25 NOTE — H&P (VIEW-ONLY)
tablet Take 20 mg by mouth daily Indications: High Blood Pressure       amLODIPine (NORVASC) 10 MG tablet Take 10 mg by mouth daily Indications: High Blood Pressure       Calcium Acetate, Phos Binder, (PHOSLO PO) Take 3 tablets by mouth 3 times daily (with meals) Indications: Kidney Failure       oxyCODONE (OXYCONTIN) 10 MG extended release tablet Take 10 mg by mouth every 8 hours  Indications: Pain .  CIPROFLOX HCL-CIPRO BETAINE (CIPRO XR) 500 MG TB24 Take 500 mg by mouth 2 times daily as needed (takes when having paracentesis) Indications: Prevention of Bacterial Infection       diazepam (VALIUM) 5 MG tablet Take 5 mg by mouth 3 times daily Indications: Feeling Anxious        No current facility-administered medications for this visit. Allergies: Review of patient's allergies indicates no known allergies. Past Medical History:   Diagnosis Date    Anxiety     CAD (coronary artery disease)     stents x 2; per dr. Ximena Blake CHF (congestive heart failure) ([facility]     Chyloperitoneum determined by paracentesis     CKD (chronic kidney disease), stage V ([facility]     Dialysis patient ([facility]     mon wed fri at [place] [facility])     Hepatic cirrhosis ([facility]     dr. Fantasma Sanchez; has been going [facility] for liver and kidney transplant list.    Hepatitis C, chronic ([facility]     HTN (hypertension)     Hx of blood clots     arm/fistula    Osteoarthritis     Sleep apnea     Sleep apnea     no cpap at present.  Type II diabetes mellitus (HCC)     no meds. Past Surgical History:   Procedure Laterality Date    ANGIOPLASTY  08/09/11 SUKHWINDER GERARDO cephalic vein balloon angioplasty with 7mm x 4cm balloon. coild embolization of 2 cephallic vein brances with 3mm x 5cm coils    CARDIAC CATHETERIZATION  04/04/11    cardiac cath and stent x1 by Dr. Shaunna Li  07/26/11 SUKHWINDER GERARDO AV fistula. coild embolization of cephalic vein branch near the wrist with 3mm EMBO coils.  balloon a'plasty left cephalic vein with [id number] balloon and then [id number] balloon    DIALYSIS FISTULA CREATION Left 2/16/2017    LEFT UPPER EXTREMITY BRACHIAL / AXILLARY GRAFT AND STENT LEFT SUBCLAVIAN VEIN  performed by Andria Geiger MD at [address] [place], [place] [address]u Said  12/26/2010    Right internal jugular vein tunneled dialysis catheter placement    OTHER SURGICAL HISTORY  [id number]    Redo of dialysis catheter    OTHER SURGICAL HISTORY  9/6/2011   [facility]    Removal of RT IJV tunneled dialysis cath    OTHER SURGICAL HISTORY  5/22/12   [facility]    Ultrasound-guided cannulation of left cephalic vein in the mid forearm toward the AV anastomosis, Left upper  ext. fistulograms including venograms of the SVC, Left cephalic vein balloon angioplasty with a 4mm x 100mm Tod balloon, Completion fistulograms    PACEMAKER PLACEMENT      medtronic    PARACENTESIS      multiple/recent; per dr. Harshad Trevino at [place] [address]  6/19/12    [facility] [place] arteriovenous fistulogram including venography of the superior vena cava. Balloon angioplasty, left forearm cephalic vein and upper arm cephalic vein with [id number] tod balloon.  VASCULAR SURGERY  7/10/2012 [facility]     Revision of left distal radial artery to cephalic vein arteriovenous fistula with 7mm Artegraft interposition.  VASCULAR SURGERY  7/30/15 [facility] & [address]    Left upper extremity arteriovenous fistulograms including venography of the superior vena cava. Left cephalic vein balloon angioplasty with an 8 x 20 cm cutting balloon proximal cephalic vein. Balloon angioplasty of left cephalic vein/antecubital vein near the antecubital crease with 8 x 20 mm cutting balloon.  VASCULAR SURGERY  7/30/15 [facility]    Balloon angioplasty proximal end distal upper arm cephalic vein with 9 x 40 cm  balloon. Completion fistulograms of the left upper extremity.     VASCULAR SURGERY  8/13/15 [facility]    Revision of left upper extremity arteriovenous fistula with excision of pseudoaneurysm and oriented X 3. Physiologic. Skin  warm, dry, and intact. No rash, erythema, or pallor. Psychiatric  mood, affect, and behavior appear normal.  Judgment and thought processes appear normal.      Options have been discussed with the patient including continued medical management and fistulogram with possible a'plasty; LUE angiogram with possible angioplasty/ stent. Patient has opted to proceed with fistulogram with possible angioplasty;LUE angiogram with possible angioplasty/ stent . Risks have been discussed with the patient including MI, death, stroke, nerve injury, infection, bleed and steal phenomenon. Assessment    1.  CKD (chronic kidney disease) stage V requiring chronic dialysis ([facility]    2. Open wound of finger, initial encounter          Plan    We encouraged him to keep his [facility] appointment  Fistulogram with possible angioplasty/stent; LUE angiogram with possible angioplasty/ stent

## 2018-01-25 NOTE — TELEPHONE ENCOUNTER
Due to steal syndrome and wound on LUE finger Pt has been scheduled for HUMAIRA ACKERMAN/Quang Mitchell at [facility] has been notified of patients upcoming appointment for surgery with [facility]. Patient is to arrive at [facility] on Tuesday 01/30/18 at 11:30am.  Debbi Monterroso voiced understanding.  OB

## 2018-01-26 ENCOUNTER — TELEPHONE (OUTPATIENT)
Dept: GASTROENTEROLOGY | Facility: CLINIC | Age: 58
End: 2018-01-26

## 2018-01-26 DIAGNOSIS — K70.31 ALCOHOLIC CIRRHOSIS OF LIVER WITH ASCITES (HCC): Primary | ICD-10-CM

## 2018-01-27 LAB — MRSA CULTURE ONLY: NORMAL

## 2018-01-29 ENCOUNTER — PREP FOR PROCEDURE (OUTPATIENT)
Dept: VASCULAR SURGERY | Age: 58
End: 2018-01-29

## 2018-01-29 ENCOUNTER — ANESTHESIA EVENT (OUTPATIENT)
Dept: OPERATING ROOM | Age: 58
DRG: 252 | End: 2018-01-29
Payer: MEDICARE

## 2018-01-29 LAB
EKG P AXIS: -37 DEGREES
EKG P-R INTERVAL: 256 MS
EKG Q-T INTERVAL: 458 MS
EKG QRS DURATION: 100 MS
EKG QTC CALCULATION (BAZETT): 466 MS
EKG T AXIS: 114 DEGREES

## 2018-01-29 RX ORDER — SODIUM CHLORIDE 0.9 % (FLUSH) 0.9 %
10 SYRINGE (ML) INJECTION PRN
Status: CANCELLED | OUTPATIENT
Start: 2018-01-29

## 2018-01-29 RX ORDER — ASPIRIN 81 MG/1
81 TABLET ORAL ONCE
Status: CANCELLED | OUTPATIENT
Start: 2018-01-29 | End: 2018-01-29

## 2018-01-29 RX ORDER — VANCOMYCIN HYDROCHLORIDE 1 G/200ML
1000 INJECTION, SOLUTION INTRAVENOUS
Status: CANCELLED | OUTPATIENT
Start: 2018-01-29 | End: 2018-01-29

## 2018-01-29 RX ORDER — SODIUM CHLORIDE 0.9 % (FLUSH) 0.9 %
10 SYRINGE (ML) INJECTION EVERY 12 HOURS SCHEDULED
Status: CANCELLED | OUTPATIENT
Start: 2018-01-29

## 2018-01-29 NOTE — PROGRESS NOTES
[facility] REVIEWED THE CARDIAC READ EKG PATIENT HAD DONE IN [facility] AND STATED PT WAS OK TO PROCEED WITH ANESTHESIA FOR SURGERY.

## 2018-01-29 NOTE — H&P
extremity arteriovenous fistula with excision of pseudoaneurysm and primary repair of cephalic vein.  VASCULAR SURGERY  5/3/16 [facility]    Left upper fistulograms/venograms, left cephalic balloon 8 x 20 cutter,9 x 40 conquest,left proximal cephalic vein stents (flair 2 9 x 50), balloon stents 9 x 40 conquest.    VASCULAR SURGERY  12/08/2016    [facility]- ultrasound guided cannulation of left distal cephalic vein ultrasound guided cannulation right internal jugular vein placement of right internal jugular vein tunneled dialysis catheter (Bard Equistream XK 23cm tip to cuff)     VASCULAR SURGERY  01/20/2017    [facility]-Right upper venograms, u/s guided cannulation left basilic vein, left upper venograms, balloon angioplasty left subclavian vein 10x40 conquest.    VASCULAR SURGERY  02/16/2017    [facility]. Left brachial artery to axillary vein arteriovenous graft with 7 mm artegraft. Left upper extremity venograms. Left subclavian vein stent viabahn 13x50. Left subclavian vein stent balloon angioplasty 12x40 atlas.  VASCULAR SURGERY  04/11/2017    [facility]. Removal of tunneled dialysis catheter right internal jugular vein.  VASCULAR SURGERY  01/25/2018    [facility]. Arch aortogram, left upper arteriogram, AV graft angiogram/venogram.      Family History   Problem Relation Age of Onset    Heart Disease Other     Diabetes Other     Hypertension Other     High Blood Pressure Mother     High Blood Pressure Father       Social History   Substance Use Topics    Smoking status: Former Smoker     Quit date: 2/20/2017    Smokeless tobacco: Former User    Alcohol use No       HEENT __normocepahlic; sclera clear  Neck   Supple  Lungs -cta  Heart  rr  GI - Abdomen soft, non-tender  Extremities without cyanosis  Skin without significant lesions   Diagnosis Stage V CKD    Permit for LUE DRIL possible permcath  Risks have been reviewed with the patient including but not limited to heart attack, death, CVA, bleeding, nerve injury, infection, steal, pain, and need for further surgery.       _______________________________________________________________________  Signature     Date    Time

## 2018-01-30 ENCOUNTER — ANESTHESIA (OUTPATIENT)
Dept: OPERATING ROOM | Age: 58
DRG: 252 | End: 2018-01-30
Payer: MEDICARE

## 2018-01-30 ENCOUNTER — HOSPITAL ENCOUNTER (INPATIENT)
Age: 58
LOS: 1 days | Discharge: HOME | DRG: 252 | End: 2018-01-31
Attending: SURGERY | Admitting: SURGERY
Payer: MEDICARE

## 2018-01-30 VITALS
TEMPERATURE: 95.8 F | SYSTOLIC BLOOD PRESSURE: 196 MMHG | RESPIRATION RATE: 35 BRPM | DIASTOLIC BLOOD PRESSURE: 74 MMHG | OXYGEN SATURATION: 100 %

## 2018-01-30 PROBLEM — T82.898A STEAL SYNDROME OF DIALYSIS VASCULAR ACCESS (HCC): Status: ACTIVE | Noted: 2018-01-30

## 2018-01-30 LAB
ANION GAP SERPL CALCULATED.3IONS-SCNC: 18 MMOL/L (ref 7–19)
APTT: 31.9 SEC (ref 26–36.2)
BUN BLDV-MCNC: 27 MG/DL (ref 6–20)
CALCIUM SERPL-MCNC: 9.3 MG/DL (ref 8.6–10)
CHLORIDE BLD-SCNC: 90 MMOL/L (ref 98–111)
CO2: 30 MMOL/L (ref 22–29)
CREAT SERPL-MCNC: 6.2 MG/DL (ref 0.5–1.2)
GFR NON-AFRICAN AMERICAN: 9
GLUCOSE BLD-MCNC: 90 MG/DL (ref 74–109)
HCT VFR BLD CALC: 39.3 % (ref 42–52)
HEMOGLOBIN: 12.7 G/DL (ref 14–18)
INR BLD: 1.14 (ref 0.88–1.18)
MCH RBC QN AUTO: 31.1 PG (ref 27–31)
MCHC RBC AUTO-ENTMCNC: 32.3 G/DL (ref 33–37)
MCV RBC AUTO: 96.1 FL (ref 80–94)
PDW BLD-RTO: 16.1 % (ref 11.5–14.5)
PLATELET # BLD: 125 K/UL (ref 130–400)
PMV BLD AUTO: 10.5 FL (ref 9.4–12.4)
POTASSIUM REFLEX MAGNESIUM: 5.1 MMOL/L (ref 3.5–4.9)
PROTHROMBIN TIME: 14.5 SEC (ref 12–14.6)
RBC # BLD: 4.09 M/UL (ref 4.7–6.1)
SODIUM BLD-SCNC: 138 MMOL/L (ref 136–145)
WBC # BLD: 4.6 K/UL (ref 4.8–10.8)

## 2018-01-30 PROCEDURE — 3600000014 HC SURGERY LEVEL 4 ADDTL 15MIN: Performed by: SURGERY

## 2018-01-30 PROCEDURE — 6360000002 HC RX W HCPCS: Performed by: SURGERY

## 2018-01-30 PROCEDURE — 0318091 BYPASS LEFT BRACHIAL ARTERY TO LEFT UPPER ARM ARTERY WITH AUTOLOGOUS VENOUS TISSUE, OPEN APPROACH: ICD-10-PCS | Performed by: SURGERY

## 2018-01-30 PROCEDURE — 7100000000 HC PACU RECOVERY - FIRST 15 MIN: Performed by: SURGERY

## 2018-01-30 PROCEDURE — 6360000002 HC RX W HCPCS: Performed by: NURSE ANESTHETIST, CERTIFIED REGISTERED

## 2018-01-30 PROCEDURE — 2500000003 HC RX 250 WO HCPCS: Performed by: NURSE ANESTHETIST, CERTIFIED REGISTERED

## 2018-01-30 PROCEDURE — 3600000004 HC SURGERY LEVEL 4 BASE: Performed by: SURGERY

## 2018-01-30 PROCEDURE — 6370000000 HC RX 637 (ALT 250 FOR IP): Performed by: SURGERY

## 2018-01-30 PROCEDURE — 85027 COMPLETE CBC AUTOMATED: CPT

## 2018-01-30 PROCEDURE — 85730 THROMBOPLASTIN TIME PARTIAL: CPT

## 2018-01-30 PROCEDURE — 2720000001 HC MISC SURG SUPPLY STERILE $51-500: Performed by: SURGERY

## 2018-01-30 PROCEDURE — 2580000003 HC RX 258: Performed by: NURSE ANESTHETIST, CERTIFIED REGISTERED

## 2018-01-30 PROCEDURE — 3700000001 HC ADD 15 MINUTES (ANESTHESIA): Performed by: SURGERY

## 2018-01-30 PROCEDURE — 06BP0ZZ EXCISION OF RIGHT SAPHENOUS VEIN, OPEN APPROACH: ICD-10-PCS | Performed by: SURGERY

## 2018-01-30 PROCEDURE — 2580000003 HC RX 258: Performed by: ANESTHESIOLOGY

## 2018-01-30 PROCEDURE — 6360000002 HC RX W HCPCS: Performed by: ANESTHESIOLOGY

## 2018-01-30 PROCEDURE — 6370000000 HC RX 637 (ALT 250 FOR IP): Performed by: NURSE PRACTITIONER

## 2018-01-30 PROCEDURE — 94664 DEMO&/EVAL PT USE INHALER: CPT

## 2018-01-30 PROCEDURE — 3700000000 HC ANESTHESIA ATTENDED CARE: Performed by: SURGERY

## 2018-01-30 PROCEDURE — 36415 COLL VENOUS BLD VENIPUNCTURE: CPT

## 2018-01-30 PROCEDURE — 1210000000 HC MED SURG R&B

## 2018-01-30 PROCEDURE — 2700000000 HC OXYGEN THERAPY PER DAY

## 2018-01-30 PROCEDURE — A4364 ADHESIVE, LIQUID OR EQUAL: HCPCS | Performed by: SURGERY

## 2018-01-30 PROCEDURE — 7100000001 HC PACU RECOVERY - ADDTL 15 MIN: Performed by: SURGERY

## 2018-01-30 PROCEDURE — 80048 BASIC METABOLIC PNL TOTAL CA: CPT

## 2018-01-30 PROCEDURE — 2500000003 HC RX 250 WO HCPCS: Performed by: ANESTHESIOLOGY

## 2018-01-30 PROCEDURE — 85610 PROTHROMBIN TIME: CPT

## 2018-01-30 PROCEDURE — 6360000002 HC RX W HCPCS: Performed by: NURSE PRACTITIONER

## 2018-01-30 PROCEDURE — 36838 DIST REVAS LIGATION HEMO: CPT | Performed by: SURGERY

## 2018-01-30 PROCEDURE — 2580000003 HC RX 258: Performed by: SURGERY

## 2018-01-30 RX ORDER — FENTANYL CITRATE 50 UG/ML
50 INJECTION, SOLUTION INTRAMUSCULAR; INTRAVENOUS
Status: DISCONTINUED | OUTPATIENT
Start: 2018-01-30 | End: 2018-01-30 | Stop reason: HOSPADM

## 2018-01-30 RX ORDER — HYDROMORPHONE HCL 110MG/55ML
0.5 PATIENT CONTROLLED ANALGESIA SYRINGE INTRAVENOUS EVERY 5 MIN PRN
Status: COMPLETED | OUTPATIENT
Start: 2018-01-30 | End: 2018-01-30

## 2018-01-30 RX ORDER — MORPHINE SULFATE 4 MG/ML
2 INJECTION, SOLUTION INTRAMUSCULAR; INTRAVENOUS EVERY 5 MIN PRN
Status: DISCONTINUED | OUTPATIENT
Start: 2018-01-30 | End: 2018-01-30 | Stop reason: HOSPADM

## 2018-01-30 RX ORDER — EPHEDRINE SULFATE 50 MG/ML
INJECTION, SOLUTION INTRAVENOUS PRN
Status: DISCONTINUED | OUTPATIENT
Start: 2018-01-30 | End: 2018-01-30 | Stop reason: SDUPTHER

## 2018-01-30 RX ORDER — ROCURONIUM BROMIDE 10 MG/ML
INJECTION, SOLUTION INTRAVENOUS PRN
Status: DISCONTINUED | OUTPATIENT
Start: 2018-01-30 | End: 2018-01-30 | Stop reason: SDUPTHER

## 2018-01-30 RX ORDER — CLONIDINE HYDROCHLORIDE 0.1 MG/1
0.1 TABLET ORAL
Status: DISCONTINUED | OUTPATIENT
Start: 2018-01-30 | End: 2018-01-31 | Stop reason: HOSPADM

## 2018-01-30 RX ORDER — SCOLOPAMINE TRANSDERMAL SYSTEM 1 MG/1
1 PATCH, EXTENDED RELEASE TRANSDERMAL ONCE
Status: DISCONTINUED | OUTPATIENT
Start: 2018-01-30 | End: 2018-01-30 | Stop reason: HOSPADM

## 2018-01-30 RX ORDER — MEPERIDINE HYDROCHLORIDE 50 MG/ML
12.5 INJECTION INTRAMUSCULAR; INTRAVENOUS; SUBCUTANEOUS EVERY 5 MIN PRN
Status: DISCONTINUED | OUTPATIENT
Start: 2018-01-30 | End: 2018-01-30 | Stop reason: HOSPADM

## 2018-01-30 RX ORDER — DIPHENHYDRAMINE HYDROCHLORIDE 50 MG/ML
12.5 INJECTION INTRAMUSCULAR; INTRAVENOUS
Status: DISCONTINUED | OUTPATIENT
Start: 2018-01-30 | End: 2018-01-30 | Stop reason: HOSPADM

## 2018-01-30 RX ORDER — AMLODIPINE BESYLATE 5 MG/1
10 TABLET ORAL DAILY
Status: DISCONTINUED | OUTPATIENT
Start: 2018-01-30 | End: 2018-01-31 | Stop reason: HOSPADM

## 2018-01-30 RX ORDER — FENTANYL CITRATE 50 UG/ML
INJECTION, SOLUTION INTRAMUSCULAR; INTRAVENOUS PRN
Status: DISCONTINUED | OUTPATIENT
Start: 2018-01-30 | End: 2018-01-30 | Stop reason: SDUPTHER

## 2018-01-30 RX ORDER — SODIUM CHLORIDE 450 MG/100ML
INJECTION, SOLUTION INTRAVENOUS CONTINUOUS PRN
Status: DISCONTINUED | OUTPATIENT
Start: 2018-01-30 | End: 2018-01-30 | Stop reason: SDUPTHER

## 2018-01-30 RX ORDER — SODIUM CHLORIDE 0.9 % (FLUSH) 0.9 %
10 SYRINGE (ML) INJECTION EVERY 12 HOURS SCHEDULED
Status: DISCONTINUED | OUTPATIENT
Start: 2018-01-30 | End: 2018-01-30 | Stop reason: HOSPADM

## 2018-01-30 RX ORDER — MORPHINE SULFATE 4 MG/ML
4 INJECTION, SOLUTION INTRAMUSCULAR; INTRAVENOUS
Status: DISCONTINUED | OUTPATIENT
Start: 2018-01-30 | End: 2018-01-30 | Stop reason: HOSPADM

## 2018-01-30 RX ORDER — SODIUM CHLORIDE 0.9 % (FLUSH) 0.9 %
10 SYRINGE (ML) INJECTION PRN
Status: DISCONTINUED | OUTPATIENT
Start: 2018-01-30 | End: 2018-01-31 | Stop reason: HOSPADM

## 2018-01-30 RX ORDER — LIDOCAINE HYDROCHLORIDE 10 MG/ML
1 INJECTION, SOLUTION EPIDURAL; INFILTRATION; INTRACAUDAL; PERINEURAL
Status: COMPLETED | OUTPATIENT
Start: 2018-01-30 | End: 2018-01-30

## 2018-01-30 RX ORDER — VANCOMYCIN HYDROCHLORIDE 1 G/200ML
1000 INJECTION, SOLUTION INTRAVENOUS
Status: COMPLETED | OUTPATIENT
Start: 2018-01-30 | End: 2018-01-30

## 2018-01-30 RX ORDER — SODIUM CHLORIDE 0.9 % (FLUSH) 0.9 %
10 SYRINGE (ML) INJECTION EVERY 12 HOURS SCHEDULED
Status: DISCONTINUED | OUTPATIENT
Start: 2018-01-30 | End: 2018-01-31 | Stop reason: HOSPADM

## 2018-01-30 RX ORDER — ONDANSETRON 2 MG/ML
INJECTION INTRAMUSCULAR; INTRAVENOUS PRN
Status: DISCONTINUED | OUTPATIENT
Start: 2018-01-30 | End: 2018-01-30 | Stop reason: SDUPTHER

## 2018-01-30 RX ORDER — LACTULOSE 10 G/15ML
10 SOLUTION ORAL 3 TIMES DAILY
Status: DISCONTINUED | OUTPATIENT
Start: 2018-01-30 | End: 2018-01-31 | Stop reason: HOSPADM

## 2018-01-30 RX ORDER — SODIUM CHLORIDE 450 MG/100ML
INJECTION, SOLUTION INTRAVENOUS CONTINUOUS
Status: DISCONTINUED | OUTPATIENT
Start: 2018-01-30 | End: 2018-01-31

## 2018-01-30 RX ORDER — MORPHINE SULFATE 4 MG/ML
4 INJECTION, SOLUTION INTRAMUSCULAR; INTRAVENOUS
Status: DISCONTINUED | OUTPATIENT
Start: 2018-01-30 | End: 2018-01-31 | Stop reason: HOSPADM

## 2018-01-30 RX ORDER — OXYCODONE HYDROCHLORIDE 5 MG/1
5 TABLET ORAL EVERY 4 HOURS PRN
Status: DISCONTINUED | OUTPATIENT
Start: 2018-01-30 | End: 2018-01-31 | Stop reason: HOSPADM

## 2018-01-30 RX ORDER — HEPARIN SODIUM 1000 [USP'U]/ML
INJECTION, SOLUTION INTRAVENOUS; SUBCUTANEOUS PRN
Status: DISCONTINUED | OUTPATIENT
Start: 2018-01-30 | End: 2018-01-30 | Stop reason: SDUPTHER

## 2018-01-30 RX ORDER — MORPHINE SULFATE 10 MG/ML
INJECTION, SOLUTION INTRAMUSCULAR; INTRAVENOUS PRN
Status: DISCONTINUED | OUTPATIENT
Start: 2018-01-30 | End: 2018-01-30 | Stop reason: SDUPTHER

## 2018-01-30 RX ORDER — MORPHINE SULFATE 4 MG/ML
4 INJECTION, SOLUTION INTRAMUSCULAR; INTRAVENOUS EVERY 5 MIN PRN
Status: DISCONTINUED | OUTPATIENT
Start: 2018-01-30 | End: 2018-01-30 | Stop reason: HOSPADM

## 2018-01-30 RX ORDER — OXYCODONE HYDROCHLORIDE 5 MG/1
10 TABLET ORAL EVERY 4 HOURS PRN
Status: DISCONTINUED | OUTPATIENT
Start: 2018-01-30 | End: 2018-01-31 | Stop reason: HOSPADM

## 2018-01-30 RX ORDER — HYDRALAZINE HYDROCHLORIDE 20 MG/ML
5 INJECTION INTRAMUSCULAR; INTRAVENOUS EVERY 10 MIN PRN
Status: DISCONTINUED | OUTPATIENT
Start: 2018-01-30 | End: 2018-01-30 | Stop reason: HOSPADM

## 2018-01-30 RX ORDER — ASPIRIN 81 MG/1
81 TABLET ORAL ONCE
Status: COMPLETED | OUTPATIENT
Start: 2018-01-30 | End: 2018-01-30

## 2018-01-30 RX ORDER — VECURONIUM BROMIDE 1 MG/ML
INJECTION, POWDER, LYOPHILIZED, FOR SOLUTION INTRAVENOUS PRN
Status: DISCONTINUED | OUTPATIENT
Start: 2018-01-30 | End: 2018-01-30 | Stop reason: SDUPTHER

## 2018-01-30 RX ORDER — PROPOFOL 10 MG/ML
INJECTION, EMULSION INTRAVENOUS PRN
Status: DISCONTINUED | OUTPATIENT
Start: 2018-01-30 | End: 2018-01-30 | Stop reason: SDUPTHER

## 2018-01-30 RX ORDER — DEXAMETHASONE SODIUM PHOSPHATE 10 MG/ML
INJECTION INTRAMUSCULAR; INTRAVENOUS PRN
Status: DISCONTINUED | OUTPATIENT
Start: 2018-01-30 | End: 2018-01-30 | Stop reason: SDUPTHER

## 2018-01-30 RX ORDER — HYDROMORPHONE HCL 110MG/55ML
0.25 PATIENT CONTROLLED ANALGESIA SYRINGE INTRAVENOUS EVERY 5 MIN PRN
Status: DISCONTINUED | OUTPATIENT
Start: 2018-01-30 | End: 2018-01-30 | Stop reason: HOSPADM

## 2018-01-30 RX ORDER — SODIUM CHLORIDE 0.9 % (FLUSH) 0.9 %
10 SYRINGE (ML) INJECTION PRN
Status: DISCONTINUED | OUTPATIENT
Start: 2018-01-30 | End: 2018-01-30 | Stop reason: HOSPADM

## 2018-01-30 RX ORDER — LABETALOL HYDROCHLORIDE 5 MG/ML
5 INJECTION, SOLUTION INTRAVENOUS EVERY 10 MIN PRN
Status: DISCONTINUED | OUTPATIENT
Start: 2018-01-30 | End: 2018-01-30 | Stop reason: HOSPADM

## 2018-01-30 RX ORDER — ACETAMINOPHEN 325 MG/1
650 TABLET ORAL EVERY 4 HOURS PRN
Status: DISCONTINUED | OUTPATIENT
Start: 2018-01-30 | End: 2018-01-31 | Stop reason: HOSPADM

## 2018-01-30 RX ORDER — GLYCOPYRROLATE 0.2 MG/ML
INJECTION INTRAMUSCULAR; INTRAVENOUS PRN
Status: DISCONTINUED | OUTPATIENT
Start: 2018-01-30 | End: 2018-01-30 | Stop reason: SDUPTHER

## 2018-01-30 RX ORDER — MORPHINE SULFATE 4 MG/ML
2 INJECTION, SOLUTION INTRAMUSCULAR; INTRAVENOUS
Status: DISCONTINUED | OUTPATIENT
Start: 2018-01-30 | End: 2018-01-31 | Stop reason: HOSPADM

## 2018-01-30 RX ORDER — PROMETHAZINE HYDROCHLORIDE 25 MG/ML
6.25 INJECTION, SOLUTION INTRAMUSCULAR; INTRAVENOUS
Status: DISCONTINUED | OUTPATIENT
Start: 2018-01-30 | End: 2018-01-30 | Stop reason: HOSPADM

## 2018-01-30 RX ORDER — ONDANSETRON 2 MG/ML
4 INJECTION INTRAMUSCULAR; INTRAVENOUS EVERY 6 HOURS PRN
Status: DISCONTINUED | OUTPATIENT
Start: 2018-01-30 | End: 2018-01-31 | Stop reason: HOSPADM

## 2018-01-30 RX ORDER — METOCLOPRAMIDE HYDROCHLORIDE 5 MG/ML
10 INJECTION INTRAMUSCULAR; INTRAVENOUS
Status: DISCONTINUED | OUTPATIENT
Start: 2018-01-30 | End: 2018-01-30 | Stop reason: HOSPADM

## 2018-01-30 RX ORDER — MINOXIDIL 10 MG/1
10 TABLET ORAL DAILY
Status: DISCONTINUED | OUTPATIENT
Start: 2018-01-30 | End: 2018-01-31 | Stop reason: HOSPADM

## 2018-01-30 RX ORDER — PROTAMINE SULFATE 10 MG/ML
INJECTION, SOLUTION INTRAVENOUS PRN
Status: DISCONTINUED | OUTPATIENT
Start: 2018-01-30 | End: 2018-01-30 | Stop reason: SDUPTHER

## 2018-01-30 RX ORDER — MIDAZOLAM HYDROCHLORIDE 1 MG/ML
2 INJECTION INTRAMUSCULAR; INTRAVENOUS
Status: COMPLETED | OUTPATIENT
Start: 2018-01-30 | End: 2018-01-30

## 2018-01-30 RX ORDER — SODIUM CHLORIDE, SODIUM LACTATE, POTASSIUM CHLORIDE, CALCIUM CHLORIDE 600; 310; 30; 20 MG/100ML; MG/100ML; MG/100ML; MG/100ML
INJECTION, SOLUTION INTRAVENOUS CONTINUOUS
Status: DISCONTINUED | OUTPATIENT
Start: 2018-01-30 | End: 2018-01-30

## 2018-01-30 RX ORDER — LISINOPRIL 10 MG/1
10 TABLET ORAL 2 TIMES DAILY
Status: DISCONTINUED | OUTPATIENT
Start: 2018-01-30 | End: 2018-01-31 | Stop reason: HOSPADM

## 2018-01-30 RX ADMIN — AMLODIPINE BESYLATE 5 MG: 5 TABLET ORAL at 22:15

## 2018-01-30 RX ADMIN — HYDROMORPHONE HYDROCHLORIDE 0.5 MG: 2 INJECTION INTRAMUSCULAR; INTRAVENOUS; SUBCUTANEOUS at 18:48

## 2018-01-30 RX ADMIN — VANCOMYCIN HYDROCHLORIDE 1000 MG: 1 INJECTION, SOLUTION INTRAVENOUS at 14:00

## 2018-01-30 RX ADMIN — VECURONIUM BROMIDE FOR INJECTION 2 MG: 1 INJECTION, POWDER, LYOPHILIZED, FOR SOLUTION INTRAVENOUS at 16:00

## 2018-01-30 RX ADMIN — VANCOMYCIN HYDROCHLORIDE 1000 MG: 1 INJECTION, POWDER, LYOPHILIZED, FOR SOLUTION INTRAVENOUS at 14:15

## 2018-01-30 RX ADMIN — SODIUM CHLORIDE: 4.5 INJECTION, SOLUTION INTRAVENOUS at 11:55

## 2018-01-30 RX ADMIN — MIDAZOLAM HYDROCHLORIDE 1 MG: 1 INJECTION, SOLUTION INTRAMUSCULAR; INTRAVENOUS at 14:59

## 2018-01-30 RX ADMIN — SODIUM CHLORIDE: 4.5 INJECTION, SOLUTION INTRAVENOUS at 17:41

## 2018-01-30 RX ADMIN — HYDROMORPHONE HYDROCHLORIDE 0.5 MG: 2 INJECTION INTRAMUSCULAR; INTRAVENOUS; SUBCUTANEOUS at 18:32

## 2018-01-30 RX ADMIN — NEOSTIGMINE METHYLSULFATE 5 MG: 1 INJECTION, SOLUTION INTRAMUSCULAR; INTRAVENOUS; SUBCUTANEOUS at 17:30

## 2018-01-30 RX ADMIN — EPHEDRINE SULFATE 5 MG: 50 INJECTION, SOLUTION INTRAMUSCULAR; INTRAVENOUS; SUBCUTANEOUS at 16:57

## 2018-01-30 RX ADMIN — FENTANYL CITRATE 50 MCG: 50 INJECTION, SOLUTION INTRAMUSCULAR; INTRAVENOUS at 16:14

## 2018-01-30 RX ADMIN — FENTANYL CITRATE 100 MCG: 50 INJECTION, SOLUTION INTRAMUSCULAR; INTRAVENOUS at 15:08

## 2018-01-30 RX ADMIN — Medication 4 MG: at 20:17

## 2018-01-30 RX ADMIN — PROTAMINE SULFATE 30 MG: 10 INJECTION, SOLUTION INTRAVENOUS at 17:25

## 2018-01-30 RX ADMIN — LIDOCAINE HYDROCHLORIDE 50 MG: 10 INJECTION, SOLUTION EPIDURAL; INFILTRATION; INTRACAUDAL; PERINEURAL at 15:08

## 2018-01-30 RX ADMIN — PROTAMINE SULFATE 30 MG: 10 INJECTION, SOLUTION INTRAVENOUS at 17:02

## 2018-01-30 RX ADMIN — MIDAZOLAM HYDROCHLORIDE 1 MG: 1 INJECTION, SOLUTION INTRAMUSCULAR; INTRAVENOUS at 15:07

## 2018-01-30 RX ADMIN — ROCURONIUM BROMIDE 50 MG: 10 INJECTION INTRAVENOUS at 15:08

## 2018-01-30 RX ADMIN — ASPIRIN 81 MG: 81 TABLET, COATED ORAL at 12:02

## 2018-01-30 RX ADMIN — Medication 4 MG: at 22:16

## 2018-01-30 RX ADMIN — HYDROMORPHONE HYDROCHLORIDE 0.5 MG: 2 INJECTION INTRAMUSCULAR; INTRAVENOUS; SUBCUTANEOUS at 18:38

## 2018-01-30 RX ADMIN — VECURONIUM BROMIDE FOR INJECTION 2 MG: 1 INJECTION, POWDER, LYOPHILIZED, FOR SOLUTION INTRAVENOUS at 16:57

## 2018-01-30 RX ADMIN — FENTANYL CITRATE 50 MCG: 50 INJECTION, SOLUTION INTRAMUSCULAR; INTRAVENOUS at 16:05

## 2018-01-30 RX ADMIN — MORPHINE SULFATE 5 MG: 10 INJECTION INTRAMUSCULAR; INTRAVENOUS; SUBCUTANEOUS at 17:58

## 2018-01-30 RX ADMIN — PROPOFOL 150 MG: 10 INJECTION, EMULSION INTRAVENOUS at 15:08

## 2018-01-30 RX ADMIN — HEPARIN SODIUM 5000 UNITS: 1000 INJECTION, SOLUTION INTRAVENOUS; SUBCUTANEOUS at 16:21

## 2018-01-30 RX ADMIN — DEXAMETHASONE SODIUM PHOSPHATE 10 MG: 10 INJECTION INTRAMUSCULAR; INTRAVENOUS at 15:36

## 2018-01-30 RX ADMIN — SODIUM CHLORIDE: 4.5 INJECTION, SOLUTION INTRAVENOUS at 22:16

## 2018-01-30 RX ADMIN — HYDROMORPHONE HYDROCHLORIDE 0.5 MG: 2 INJECTION INTRAMUSCULAR; INTRAVENOUS; SUBCUTANEOUS at 18:27

## 2018-01-30 RX ADMIN — GLYCOPYRROLATE 0.8 MG: 0.2 INJECTION, SOLUTION INTRAMUSCULAR; INTRAVENOUS at 17:30

## 2018-01-30 RX ADMIN — MORPHINE SULFATE 5 MG: 10 INJECTION INTRAMUSCULAR; INTRAVENOUS; SUBCUTANEOUS at 17:50

## 2018-01-30 RX ADMIN — CLONIDINE HYDROCHLORIDE 0.3 MG: 0.2 TABLET ORAL at 20:16

## 2018-01-30 RX ADMIN — LISINOPRIL 10 MG: 10 TABLET ORAL at 20:16

## 2018-01-30 RX ADMIN — SODIUM CHLORIDE: 4.5 INJECTION, SOLUTION INTRAVENOUS at 14:50

## 2018-01-30 RX ADMIN — ONDANSETRON HYDROCHLORIDE 4 MG: 2 SOLUTION INTRAMUSCULAR; INTRAVENOUS at 17:26

## 2018-01-30 RX ADMIN — MINOXIDIL 10 MG: 10 TABLET ORAL at 22:15

## 2018-01-30 ASSESSMENT — PAIN SCALES - GENERAL
PAINLEVEL_OUTOF10: 9
PAINLEVEL_OUTOF10: 10
PAINLEVEL_OUTOF10: 10
PAINLEVEL_OUTOF10: 9
PAINLEVEL_OUTOF10: 7
PAINLEVEL_OUTOF10: 8

## 2018-01-30 NOTE — ANESTHESIA PRE PROCEDURE
SURGERY  8/13/15 [facility]    Revision of left upper extremity arteriovenous fistula with excision of pseudoaneurysm and primary repair of cephalic vein.  VASCULAR SURGERY  5/3/16 [facility]    Left upper fistulograms/venograms, left cephalic balloon 8 x 20 cutter,9 x 40 conquest,left proximal cephalic vein stents (flair 2 9 x 50), balloon stents 9 x 40 conquest.    VASCULAR SURGERY  12/08/2016    [facility]- ultrasound guided cannulation of left distal cephalic vein ultrasound guided cannulation right internal jugular vein placement of right internal jugular vein tunneled dialysis catheter (Bard Equistream XK 23cm tip to cuff)     VASCULAR SURGERY  01/20/2017    [facility]-Right upper venograms, u/s guided cannulation left basilic vein, left upper venograms, balloon angioplasty left subclavian vein 10x40 conquest.    VASCULAR SURGERY  02/16/2017    [facility]. Left brachial artery to axillary vein arteriovenous graft with 7 mm artegraft. Left upper extremity venograms. Left subclavian vein stent viabahn 13x50. Left subclavian vein stent balloon angioplasty 12x40 atlas.  VASCULAR SURGERY  04/11/2017    [facility]. Removal of tunneled dialysis catheter right internal jugular vein.  VASCULAR SURGERY  01/25/2018    [facility]. Arch aortogram, left upper arteriogram, AV graft angiogram/venogram.       Social History:    Social History   Substance Use Topics    Smoking status: Former Smoker     Years: 35.00     Types: Cigarettes     Quit date: 2/20/2017    Smokeless tobacco: Former User    Alcohol use No                                Counseling given: Not Answered      Vital Signs (Current):   Vitals:    01/29/18 1429 01/30/18 1123   BP:  (!) 181/71   Pulse:  72   Temp:  97.7 °F (36.5 °C)   TempSrc:  Temporal   SpO2:  94%   Weight: 173 lb (78.5 kg) 173 lb (78.5 kg)   Height: 5' 8\" (1.727 m) 5' 8\" (1.727 m)                                              BP Readings from Last 3 Encounters:   01/30/18 (!) 181/71   01/25/18 (!) 180/57   01/22/18 128/60       NPO

## 2018-01-31 VITALS
TEMPERATURE: 98.1 F | SYSTOLIC BLOOD PRESSURE: 141 MMHG | HEIGHT: 68 IN | RESPIRATION RATE: 16 BRPM | BODY MASS INDEX: 26.25 KG/M2 | DIASTOLIC BLOOD PRESSURE: 55 MMHG | OXYGEN SATURATION: 98 % | WEIGHT: 173.19 LBS | HEART RATE: 69 BPM

## 2018-01-31 LAB
ANION GAP SERPL CALCULATED.3IONS-SCNC: 13 MMOL/L (ref 7–19)
BUN BLDV-MCNC: 35 MG/DL (ref 6–20)
CALCIUM SERPL-MCNC: 8.1 MG/DL (ref 8.6–10)
CHLORIDE BLD-SCNC: 88 MMOL/L (ref 98–111)
CO2: 32 MMOL/L (ref 22–29)
CREAT SERPL-MCNC: 8.3 MG/DL (ref 0.5–1.2)
GFR NON-AFRICAN AMERICAN: 7
GLUCOSE BLD-MCNC: 145 MG/DL (ref 74–109)
HCT VFR BLD CALC: 33.2 % (ref 42–52)
HEMOGLOBIN: 10.7 G/DL (ref 14–18)
MCH RBC QN AUTO: 30.7 PG (ref 27–31)
MCHC RBC AUTO-ENTMCNC: 32.2 G/DL (ref 33–37)
MCV RBC AUTO: 95.4 FL (ref 80–94)
PDW BLD-RTO: 16 % (ref 11.5–14.5)
PLATELET # BLD: 134 K/UL (ref 130–400)
PMV BLD AUTO: 11.5 FL (ref 9.4–12.4)
POTASSIUM REFLEX MAGNESIUM: 7.1 MMOL/L (ref 3.5–5)
RBC # BLD: 3.48 M/UL (ref 4.7–6.1)
SODIUM BLD-SCNC: 133 MMOL/L (ref 136–145)
WBC # BLD: 7.3 K/UL (ref 4.8–10.8)

## 2018-01-31 PROCEDURE — 85027 COMPLETE CBC AUTOMATED: CPT

## 2018-01-31 PROCEDURE — 8010000000 HC HEMODIALYSIS ACUTE INPT

## 2018-01-31 PROCEDURE — 2580000003 HC RX 258: Performed by: SURGERY

## 2018-01-31 PROCEDURE — 36415 COLL VENOUS BLD VENIPUNCTURE: CPT

## 2018-01-31 PROCEDURE — 5A1D70Z PERFORMANCE OF URINARY FILTRATION, INTERMITTENT, LESS THAN 6 HOURS PER DAY: ICD-10-PCS | Performed by: INTERNAL MEDICINE

## 2018-01-31 PROCEDURE — 99024 POSTOP FOLLOW-UP VISIT: CPT | Performed by: SURGERY

## 2018-01-31 PROCEDURE — 6360000002 HC RX W HCPCS: Performed by: SURGERY

## 2018-01-31 PROCEDURE — 80048 BASIC METABOLIC PNL TOTAL CA: CPT

## 2018-01-31 PROCEDURE — 6370000000 HC RX 637 (ALT 250 FOR IP): Performed by: SURGERY

## 2018-01-31 RX ORDER — OXYCODONE AND ACETAMINOPHEN 10; 325 MG/1; MG/1
1 TABLET ORAL EVERY 8 HOURS PRN
Qty: 20 TABLET | Refills: 0 | Status: SHIPPED | OUTPATIENT
Start: 2018-01-31 | End: 2018-02-07

## 2018-01-31 RX ADMIN — LACTULOSE 10 G: 20 SOLUTION ORAL at 14:19

## 2018-01-31 RX ADMIN — MINOXIDIL 10 MG: 10 TABLET ORAL at 11:58

## 2018-01-31 RX ADMIN — Medication 4 MG: at 11:59

## 2018-01-31 RX ADMIN — Medication 10 ML: at 07:00

## 2018-01-31 RX ADMIN — CALCIUM ACETATE 2001 MG: 667 CAPSULE ORAL at 11:58

## 2018-01-31 RX ADMIN — CLONIDINE HYDROCHLORIDE 0.3 MG: 0.2 TABLET ORAL at 14:21

## 2018-01-31 RX ADMIN — Medication 4 MG: at 14:19

## 2018-01-31 RX ADMIN — AMLODIPINE BESYLATE 10 MG: 5 TABLET ORAL at 11:58

## 2018-01-31 RX ADMIN — ONDANSETRON 4 MG: 2 INJECTION INTRAMUSCULAR; INTRAVENOUS at 00:21

## 2018-01-31 RX ADMIN — LACTULOSE 10 G: 20 SOLUTION ORAL at 11:58

## 2018-01-31 RX ADMIN — Medication 4 MG: at 02:26

## 2018-01-31 RX ADMIN — OXYCODONE HYDROCHLORIDE 10 MG: 5 TABLET ORAL at 00:21

## 2018-01-31 RX ADMIN — Medication 4 MG: at 06:42

## 2018-01-31 RX ADMIN — LISINOPRIL 10 MG: 10 TABLET ORAL at 11:58

## 2018-01-31 ASSESSMENT — PAIN SCALES - GENERAL
PAINLEVEL_OUTOF10: 7
PAINLEVEL_OUTOF10: 8
PAINLEVEL_OUTOF10: 10
PAINLEVEL_OUTOF10: 10
PAINLEVEL_OUTOF10: 9
PAINLEVEL_OUTOF10: 8

## 2018-01-31 NOTE — PROGRESS NOTES
Vascular Surgery  Dr.Scott Connie Palacios   Daily Progress Note    Pt Name: Ej Earl  Medical Record Number: [id number]  Date of Birth 1960   Today's Date: 1/31/2018        SUBJECTIVE:     Patient was seen and examined. Pain is not controlled, stating right thigh and left 2nd finger hurting  has had nausea/vomiting, received zofran     OBJECTIVE:     Patient Vitals for the past 24 hrs:   BP Temp Temp src Pulse Resp SpO2 Height Weight   01/31/18 0638 (!) 160/64 98.3 °F (36.8 °C) Temporal 69 16 94 % - -   01/31/18 0441 - - - - - - - 173 lb 3 oz (78.6 kg)   01/31/18 0412 (!) 140/58 98 °F (36.7 °C) Temporal 62 18 98 % - -   01/31/18 0018 (!) 142/62 99.2 °F (37.3 °C) Temporal 67 20 98 % - -   01/30/18 2128 (!) 198/75 - - 83 - 99 % - -   01/30/18 2007 (!) 218/70 - - - - - - -   01/30/18 1948 (!) 220/78 - - 77 - - - -   01/30/18 1947 (!) 203/69 98 °F (36.7 °C) Temporal 81 18 98 % - -   01/30/18 1853 - - - 63 - - - -   01/30/18 1850 (!) 183/54 - - 65 17 99 % - -   01/30/18 1845 (!) 192/56 - - 63 14 99 % - -   01/30/18 1840 (!) 195/58 - - 63 11 99 % - -   01/30/18 1835 (!) 203/57 - - 65 14 99 % - -   01/30/18 1830 (!) 211/57 - - 67 15 98 % - -   01/30/18 1825 (!) 210/63 - - 64 17 100 % - -   01/30/18 1820 (!) 198/61 - - 64 16 100 % - -   01/30/18 1815 (!) 217/64 - - 66 15 99 % - -   01/30/18 1810 (!) 229/70 - - 72 19 90 % - -   01/30/18 1805 (!) 243/71 98.2 °F (36.8 °C) Temporal 74 19 99 % - -   01/30/18 1801 - 98.2 °F (36.8 °C) - - - - - -   01/30/18 1800 - - - 80 19 98 % - -   01/30/18 1123 (!) 181/71 97.7 °F (36.5 °C) Temporal 72 - 94 % 5' 8\" (1.727 m) 173 lb (78.5 kg)         Intake/Output Summary (Last 24 hours) at 01/31/18 0659  Last data filed at 01/31/18 2396   Gross per 24 hour   Intake             1182 ml   Output                0 ml   Net             1182 ml       In: 1182 [P.O.:360; I.V.:822]  Out: -     I/O last 3 completed shifts:   In: 860 [P.O.:360; I.V.:500]  Out: -        Date 01/31/18 0000 - 01/31/18 2108

## 2018-01-31 NOTE — CONSULTS
(chronic kidney disease), stage V ([facility]  No date: Dialysis patient [facility])  No date: Hemodialysis patient ([facility]  No date: Hepatic cirrhosis ([facility]  No date: Hepatitis C, chronic (HCC)  No date: HTN (hypertension)  No date: Hx of blood clots  No date: Osteoarthritis  No date: Sleep apnea  No date: Sleep apnea  No date: Type II diabetes mellitus ([facility]      Review of Systems    Constitutional:  No weight loss, no fever/chills  Eyes:  No eye pain, no eye redness  Cardiovascular:  No chest pain, no worsening of edema  Respiratory:  No hemoptysis, no stidor  Gastrointestinal:  No blood in stool, no n/v, no diarrhea  Genitoruinary:  No hematuria, no difficulty with urination  Musculoskeletal:  Left arm pain and left index pain  Integumentary:  Gangrene in left index  Neurological:  No focal weakness, No new sensory deficit  Psychiatric:  No depression, no confusion  Endocrine:  No polyuria, no polydipsia       Medications     Current Facility-Administered Medications:   0.45 % sodium chloride infusion, , Intravenous, Continuous, Shayy Scott MD, Last Rate: 100 mL/hr at 01/30/18 2216  amLODIPine (NORVASC) tablet 10 mg, 10 mg, Oral, Daily, Mindy Bates MD, 5 mg at 01/30/18 2215  calcium acetate (PHOSLO) capsule 2,001 mg, 2,001 mg, Oral, TID WC, Mindy Bates MD  cloNIDine (CATAPRES) tablet 0.3 mg, 0.3 mg, Oral, TID, Mindy Bates MD, 0.3 mg at 01/30/18 2016  lactulose (CHRONULAC) 10 GM/15ML solution 10 g, 10 g, Oral, TID, Mindy Bates MD  lisinopril (PRINIVIL;ZESTRIL) tablet 10 mg, 10 mg, Oral, BID, Mindy Bates MD, 10 mg at 01/30/18 2016  minoxidil (LONITEN) tablet 10 mg, 10 mg, Oral, Daily, Mindy Bates MD, 10 mg at 01/30/18 2215  sodium chloride flush 0.9 % injection 10 mL, 10 mL, Intravenous, 2 times per day, Mindy Bates MD  sodium chloride flush 0.9 % injection 10 mL, 10 mL, Intravenous, PRN, Mindy Bates MD  acetaminophen (TYLENOL) tablet 650 mg, 650 mg, Oral, Q4H PRN, Jolinda Like

## 2018-01-31 NOTE — PROGRESS NOTES
Consult for nephrology called to [facility] . Given lab result of potassium 7.1 . Notified Dialysis per [facility] request of patient admission here and would need dialysis today.

## 2018-02-15 ENCOUNTER — OFFICE VISIT (OUTPATIENT)
Dept: VASCULAR SURGERY | Age: 58
End: 2018-02-15

## 2018-02-15 VITALS
OXYGEN SATURATION: 95 % | HEART RATE: 70 BPM | RESPIRATION RATE: 18 BRPM | SYSTOLIC BLOOD PRESSURE: 150 MMHG | DIASTOLIC BLOOD PRESSURE: 60 MMHG | TEMPERATURE: 98.3 F

## 2018-02-15 DIAGNOSIS — Z99.2 CKD (CHRONIC KIDNEY DISEASE) STAGE V REQUIRING CHRONIC DIALYSIS (HCC): ICD-10-CM

## 2018-02-15 DIAGNOSIS — N18.6 CKD (CHRONIC KIDNEY DISEASE) STAGE V REQUIRING CHRONIC DIALYSIS (HCC): ICD-10-CM

## 2018-02-15 DIAGNOSIS — T82.898D STEAL SYNDROME AS COMPLICATION OF DIALYSIS ACCESS, SUBSEQUENT ENCOUNTER: Primary | ICD-10-CM

## 2018-02-15 DIAGNOSIS — I70.213 ATHEROSCLEROSIS OF NATIVE ARTERY OF BOTH LOWER EXTREMITIES WITH INTERMITTENT CLAUDICATION (HCC): ICD-10-CM

## 2018-02-15 PROCEDURE — 99024 POSTOP FOLLOW-UP VISIT: CPT | Performed by: NURSE PRACTITIONER

## 2018-02-15 NOTE — OP NOTE
[employer] [facility] RUTH [address], [address] [facility]                                 OPERATIVE REPORT    PATIENT NAME: Edie Hawthorne                       :        1960  MED REC NO:   [id number]                              ROOM:       [facility]  ACCOUNT NO:   [de-identified]                           ADMIT DATE: 2018  PROVIDER:     Magdy Beckman MD    DATE OF PROCEDURE:  2018    PREOPERATIVE DIAGNOSES:  1.  End-stage renal disease on chronic hemodialysis. 2.  Left second finger gangrenous traumatic wound. 3.  Steal, left upper extremity secondary to atherosclerosis of native  arteries plus brachial axillary arteriovenous graft. POSTOPERATIVE DIAGNOSES:  1.  End-stage renal disease on chronic hemodialysis. 2.  Left second finger gangrenous traumatic wound. 3.  Steal, left upper extremity secondary to atherosclerosis of native  arteries plus brachial axillary arteriovenous graft. OPERATION PERFORMED:  Left upper extremity DRIL (proximal brachial artery  to distal brachial artery bypass with reverse saphenous vein graft  harvested from the right thigh) (distal revascularization and interval  ligation). SURGEON:  Magdy Beckman MD    ANESTHESIA:  General endotracheal.    ESTIMATED BLOOD LOSS:  100 mL. SPECIMENS:  None. FINDINGS:  1. The patient has good proximal and distal target with minimal plaque in  the brachial artery. 2.  The greater saphenous vein is around 3-4 mm in diameter and fairly good  quality vein for bypass. PROCEDURE IN DETAIL:  After the patient was consented and given intravenous  antibiotics, he was brought to the operating room, placed on the operating  table in supine position. General endotracheal anesthesia was achieved and  the patient's left arm, right thigh and leg were prepped and draped in the  usual sterile procedure.     An incision was made in the distal upper arm with knife and carried down  through subcutaneous tissue with Bovie electrocautery. The brachial artery  as well as the brachial axillary arteriovenous graft was dissected. Vessel  loops were placed on the brachial artery proximal and distal to the  arteriovenous graft anastomosis. Next, a longitudinal incision was made in the medial upper arm at least 15  cm proximal to the arterial anastomosis of the arteriovenous graft. This  was carried down through subcutaneous tissue and the brachial artery was  dissected proximally and distally here and vessel loops were placed for  future vascular control. Next, over the greater saphenous vein, separate skip incisions were made  with knife and the greater saphenous vein was dissected and branches were  transected between hemostats and ligated with 3-0 and 4-0 Vicryl sutures  and/or clips. The proximal and distal portion of the vein were then  ligated with 3-0 Vicryl sutures and clips and the vein was brought up into  the field and tested with Papaverine. It was of very good quality and  there were no leaks in the vein. Next, a subcutaneous tunnel was created in the arm between the proximal and  distal arm incisions. The patient was given intravenous heparin. The vein  was marked for orientation purposes and then tunneled. The proximal  brachial artery loops were tightened and a longitudinal arteriotomy made  with an 11 blade and White scissors. The vein was spatulated and then an  end vein to side artery anastomosis was created with 6-0 Prolene running  suture. A bulldog clamp was placed distally and standard flushing  techniques were used to remove any debris from the native vessels and  bypass graft. There was an excellent pulse in the bypass graft and it was  flushed with heparinized saline. The distal brachial artery loops were tightened and longitudinal  arteriotomy made in the distal brachial artery with 11 blade and White  scissors.   The vein was cut to length and spatulated and an end vein to  side brachial artery anastomosis was created with 6-0 Prolene running  suture. Prior to completing this anastomosis, standard flushing techniques  were used to remove any debris from native vessels and flow was restored to  the left arm. The brachial artery proximal to the distal anastomosis was then ligated  with 2-0 Vicryl suture. The patient has an excellent Doppler signal in the  radial artery in the hand and the hand and fingers are now pink. Hemostasis was excellent in all wounds and the arm wounds were closed in  layers with 3-0 PDS subcutaneous sutures and 3-0 nylon interrupted vertical  mattress sutures. The leg wounds are closed in layers with 3-0 PDS  subcutaneous sutures and skin staples. The patient tolerated the procedure  well. He was extubated and brought to the recovery room in good condition.         Merna Francis MD    D: 02/14/2018 17:34:45       T: 02/14/2018 21:04:19     [id number]  Job#: [id number]     Doc#: [id number]    CC:  Cherylene Bile, MD Vassie Drew, MD Lestine Herrlich, MD

## 2018-02-15 NOTE — PROGRESS NOTES
TB24 Take 500 mg by mouth 2 times daily as needed (takes when having paracentesis) Indications: Prevention of Bacterial Infection       minoxidil (LONITEN) 10 MG tablet Take 10 mg by mouth daily Indications: High Blood Pressure       cloNIDine (CATAPRES) 0.2 MG tablet Take 0.3 mg by mouth 3 times daily Indications: High Blood Pressure       lisinopril (PRINIVIL;ZESTRIL) 20 MG tablet Take 10 mg by mouth 2 times daily       amLODIPine (NORVASC) 10 MG tablet Take 10 mg by mouth daily Indications: High Blood Pressure       Calcium Acetate, Phos Binder, (PHOSLO PO) Take 3 tablets by mouth 3 times daily (with meals) Indications: Kidney Failure        No current facility-administered medications for this visit. Allergies: Review of patient's allergies indicates no known allergies. Past Medical History:   Diagnosis Date    Anxiety     CAD (coronary artery disease)     stents x 2; per dr. Cesar Starkey CHF [facility]congestive heart failure) [facility] [address]     Chyloperitoneum determined by paracentesis     CKD (chronic kidney disease), stage V ([address]     Dialysis patient ([facility] [address]     mon wed fri at [place] [facility])     Hepatic cirrhosis ([facility] [address]     dr. Sadia Quinones; has been going [facility] for liver and kidney transplant list.    Hepatitis C, chronic ([facility] [address]     HTN (hypertension)     Hx of blood clots     arm/fistula    Osteoarthritis     Sleep apnea     Sleep apnea     no cpap at present.  Type II diabetes mellitus (HCC)     no meds. Past Surgical History:   Procedure Laterality Date    ANGIOPLASTY  08/09/11 SUKHWINDER GERARDO cephalic vein balloon angioplasty with 7mm x 4cm balloon. coild embolization of 2 cephallic vein brances with 3mm x 5cm coils    CARDIAC CATHETERIZATION  04/04/11    cardiac cath and stent x1 by Dr. Queen Yanez  07/26/11 SUKHWINDER GERARDO AV fistula. coild embolization of cephalic vein branch near the wrist with 3mm EMBO coils.  balloon a'plasty left cephalic vein with [id number] edema. No signs atheroembolic event. Pulmonary  effort appears normal.  No respiratory distress. Lungs - Breath sounds normal. No wheezes or rales. GI - Abdomen  soft, non tender, bowel sounds X 4 quadrants. No guarding or rebound tenderness. No distension or palpable mass. Genitourinary  deferred. Musculoskeletal  ROM appears normal.  No significant edema. Neurologic  alert and oriented X 3. Physiologic. Skin  lateral foot with skin splits and discoloration; incision looks good  Psychiatric  mood, affect, and behavior appear normal.  Judgment and thought processes appear normal.    Risk factors for atherosclerosis of all vascular beds have been reviewed with the patient including:  Family history, tobacco abuse in all forms, elevated cholesterol, hyperlipidemia, and diabetes. Assessment    1. Steal syndrome as complication of dialysis access, subsequent encounter    2. Atherosclerosis of native artery of both lower extremities with intermittent claudication ([facility] [address]    3. CKD (chronic kidney disease) stage V requiring chronic dialysis (HCC)          Plan    Lower extremity arterial study:   The patient has moderately to severely diminished ankle-brachial indices    bilateral lower extremity(ies) which would be consistent with claudication    level or rest pain symptoms.    Based on segmental pressures and doppler waveforms, the patient likely has    disease in the right iliofemoral, superficial femoral, tibial arterial    segments.    Based on segmental pressures and doppler waveforms, the patient likely has    disease in the left superficial femoral, tibial arterial segments       Individual films reviewed: Yes. Test results were reviewed with the patient. Disease process is unstable      Options have been discussed with the patient including continued medical management and angiography and potential intervention. Patient has opted to proceed with angiography and potential intervention.

## 2018-02-22 ENCOUNTER — HOSPITAL ENCOUNTER (OUTPATIENT)
Dept: VASCULAR LAB | Age: 58
Discharge: HOME | End: 2018-02-22
Payer: MEDICARE

## 2018-02-22 DIAGNOSIS — I70.213 ATHEROSCLEROSIS OF NATIVE ARTERY OF BOTH LOWER EXTREMITIES WITH INTERMITTENT CLAUDICATION (HCC): ICD-10-CM

## 2018-02-22 PROCEDURE — 93923 UPR/LXTR ART STDY 3+ LVLS: CPT

## 2018-02-23 ENCOUNTER — TELEPHONE (OUTPATIENT)
Dept: VASCULAR SURGERY | Age: 58
End: 2018-02-23

## 2018-02-23 ENCOUNTER — PREP FOR PROCEDURE (OUTPATIENT)
Dept: VASCULAR SURGERY | Age: 58
End: 2018-02-23

## 2018-02-23 RX ORDER — ASPIRIN 81 MG/1
81 TABLET ORAL ONCE
Status: CANCELLED | OUTPATIENT
Start: 2018-02-23 | End: 2018-02-23

## 2018-02-23 RX ORDER — SODIUM CHLORIDE 0.9 % (FLUSH) 0.9 %
10 SYRINGE (ML) INJECTION PRN
Status: CANCELLED | OUTPATIENT
Start: 2018-02-23

## 2018-02-23 RX ORDER — CLONIDINE HYDROCHLORIDE 0.1 MG/1
0.1 TABLET ORAL PRN
Status: CANCELLED | OUTPATIENT
Start: 2018-02-23

## 2018-02-23 NOTE — H&P
Patient Care Team:  Tamika Rico MD as PCP - General (Anesthesiology)  Nikolas Culp MD as Consulting Physician (Cardiology)      History and Physical    Alec Turner is a 62 y.o. male who presents for post op follow up. Dr. Maria E Landeros performed a DRIL  2 weeks ago. Post op complications reported - none. Post op pain is minimal.  Symptoms of steal are absent. We removed the sutures and staples and the incision looked good. Milton Booth has a history of leg pain with walking. He reports that he has been declined a transplant in [place] [address] in the last couple of weeks due to pvd. He has started having rest pain in his left foot. He has developed some discoloration and some skin splits. Alec Turner is a 62 y.o. male with the following history reviewed and recorded in [facility]:  Patient Active Problem List    Diagnosis Date Noted    Atherosclerosis of native artery of both lower extremities with intermittent claudication ([facility] [address] 02/15/2018    Steal syndrome of dialysis vascular access ([facility] [address] 01/30/2018    Steal syndrome as complication of dialysis access ([address] 01/25/2018    ESRD on dialysis [facility])     Open wnd of finger 01/22/2018    Chronic deep vein thrombosis (DVT) of axillary vein of left upper extremity ([address] 01/20/2017    Superficial venous thrombosis of left upper extremity 12/16/2016    CKD (chronic kidney disease) stage V requiring chronic dialysis ([address]     Osteoarthritis     CHF (congestive heart failure) ([address]     CAD (coronary artery disease)     HTN (hypertension)     Liver disease      cirrosis, hep c       Current Outpatient Prescriptions   Medication Sig Dispense Refill    oxyCODONE-acetaminophen (PERCOCET)  MG per tablet Take 1 tablet by mouth every 4 hours as needed for Pain.       lactulose (CEPHULAC) 10 G packet Take 10 g by mouth 3 times daily Indications: Disorder of the Liver Enzymes       CIPROFLOX HCL-CIPRO BETAINE (CIPRO XR) 500 MG TB24 Take 500 mg by mouth 2 times daily as needed (takes when having paracentesis) Indications: Prevention of Bacterial Infection       minoxidil (LONITEN) 10 MG tablet Take 10 mg by mouth daily Indications: High Blood Pressure       cloNIDine (CATAPRES) 0.2 MG tablet Take 0.3 mg by mouth 3 times daily Indications: High Blood Pressure       lisinopril (PRINIVIL;ZESTRIL) 20 MG tablet Take 10 mg by mouth 2 times daily       amLODIPine (NORVASC) 10 MG tablet Take 10 mg by mouth daily Indications: High Blood Pressure       Calcium Acetate, Phos Binder, (PHOSLO PO) Take 3 tablets by mouth 3 times daily (with meals) Indications: Kidney Failure        No current facility-administered medications for this visit. Allergies: Review of patient's allergies indicates no known allergies. Past Medical History:   Diagnosis Date    Anxiety     CAD (coronary artery disease)     stents x 2; per dr. Fitz Almonte CHF (congestive heart failure) ([facility] [address]     Chyloperitoneum determined by paracentesis     CKD (chronic kidney disease), stage V ([facility] [address]     Dialysis patient ([facility] [address]     mon wed fri at [address] [facility] patient [facility])     Hepatic cirrhosis ([facility] [address]     dr. Marc Woodard; has been going to [facility] for liver and kidney transplant list.    Hepatitis C, chronic ([facility] [address]     HTN (hypertension)     Hx of blood clots     arm/fistula    Osteoarthritis     Sleep apnea     Sleep apnea     no cpap at present.  Type II diabetes mellitus (HCC)     no meds. Past Surgical History:   Procedure Laterality Date    ANGIOPLASTY  08/09/11 SUKHWINDER GERARDO cephalic vein balloon angioplasty with 7mm x 4cm balloon. coild embolization of 2 cephallic vein brances with 3mm x 5cm coils    CARDIAC CATHETERIZATION  04/04/11    cardiac cath and stent x1 by Dr. Ant Snyder  07/26/11 SUKHWINDER GERARDO AV fistula. coild embolization of cephalic vein branch near the wrist with 3mm EMBO coils.  balloon a'plasty left cephalic vein with [id number] VASCULAR SURGERY  8/13/15 [facility]    Revision of left upper extremity arteriovenous fistula with excision of pseudoaneurysm and primary repair of cephalic vein.  VASCULAR SURGERY  5/3/16 [facility]    Left upper fistulograms/venograms, left cephalic balloon 8 x 20 cutter,9 x 40 conquest,left proximal cephalic vein stents (flair 2 9 x 50), balloon stents 9 x 40 conquest.    VASCULAR SURGERY  12/08/2016    [facility]- ultrasound guided cannulation of left distal cephalic vein ultrasound guided cannulation right internal jugular vein placement of right internal jugular vein tunneled dialysis catheter (Bard Equistream XK 23cm tip to cuff)     VASCULAR SURGERY  01/20/2017    [facility]-Right upper venograms, u/s guided cannulation left basilic vein, left upper venograms, balloon angioplasty left subclavian vein 10x40 conquest.    VASCULAR SURGERY  02/16/2017    [facility]. Left brachial artery to axillary vein arteriovenous graft with 7 mm artegraft. Left upper extremity venograms. Left subclavian vein stent viabahn 13x50. Left subclavian vein stent balloon angioplasty 12x40 atlas.  VASCULAR SURGERY  04/11/2017    [facility]. Removal of tunneled dialysis catheter right internal jugular vein.  VASCULAR SURGERY  01/25/2018    [facility]. Arch aortogram, left upper arteriogram, AV graft angiogram/venogram.     Family History   Problem Relation Age of Onset    Heart Disease Other     Diabetes Other     Hypertension Other     High Blood Pressure Mother     High Blood Pressure Father      Social History   Substance Use Topics    Smoking status: Former Smoker     Years: 35.00     Types: Cigarettes     Quit date: 2/20/2017    Smokeless tobacco: Former User    Alcohol use No         Review of Systems    Constitutional - no significant activity change, appetite change, or unexpected weight change. No fever or chills. No diaphoresis or significant fatigue. HENT - no significant rhinorrhea or epistaxis. No tinnitus or significant hearing loss.   Eyes - no

## 2018-02-28 ENCOUNTER — HOSPITAL ENCOUNTER (OUTPATIENT)
Dept: INTERVENTIONAL RADIOLOGY/VASCULAR | Age: 58
Discharge: HOME | End: 2018-02-28
Payer: MEDICARE

## 2018-02-28 VITALS
RESPIRATION RATE: 16 BRPM | OXYGEN SATURATION: 94 % | HEIGHT: 68 IN | TEMPERATURE: 98.8 F | SYSTOLIC BLOOD PRESSURE: 117 MMHG | BODY MASS INDEX: 26.67 KG/M2 | HEART RATE: 65 BPM | WEIGHT: 176 LBS | DIASTOLIC BLOOD PRESSURE: 41 MMHG

## 2018-02-28 DIAGNOSIS — I70.229 ATHEROSCLEROSIS OF ARTERY OF EXTREMITY WITH REST PAIN (HCC): ICD-10-CM

## 2018-02-28 DIAGNOSIS — I70.213 ATHEROSCLEROSIS OF NATIVE ARTERY OF BOTH LOWER EXTREMITIES WITH INTERMITTENT CLAUDICATION (HCC): ICD-10-CM

## 2018-02-28 DIAGNOSIS — L97.522 SKIN ULCER OF LEFT FOOT WITH FAT LAYER EXPOSED (HCC): ICD-10-CM

## 2018-02-28 PROCEDURE — 2500000003 HC RX 250 WO HCPCS: Performed by: SURGERY

## 2018-02-28 PROCEDURE — 75716 ARTERY X-RAYS ARMS/LEGS: CPT | Performed by: SURGERY

## 2018-02-28 PROCEDURE — 6370000000 HC RX 637 (ALT 250 FOR IP): Performed by: SURGERY

## 2018-02-28 PROCEDURE — 37224 HC PLASTY UNI FEMPOP: CPT | Performed by: SURGERY

## 2018-02-28 PROCEDURE — 75625 CONTRAST EXAM ABDOMINL AORTA: CPT | Performed by: SURGERY

## 2018-02-28 PROCEDURE — 2580000003 HC RX 258: Performed by: SURGERY

## 2018-02-28 PROCEDURE — C1769 GUIDE WIRE: HCPCS

## 2018-02-28 PROCEDURE — 37224 PR REVSC OPN/PRG FEM/POP W/ANGIOPLASTY UNI: CPT | Performed by: SURGERY

## 2018-02-28 PROCEDURE — 6360000002 HC RX W HCPCS: Performed by: NURSE PRACTITIONER

## 2018-02-28 PROCEDURE — 6360000004 HC RX CONTRAST MEDICATION: Performed by: SURGERY

## 2018-02-28 PROCEDURE — 6370000000 HC RX 637 (ALT 250 FOR IP): Performed by: NURSE PRACTITIONER

## 2018-02-28 PROCEDURE — 6360000002 HC RX W HCPCS: Performed by: SURGERY

## 2018-02-28 PROCEDURE — 37221 HC PLASTY ILIAC W STENT: CPT | Performed by: SURGERY

## 2018-02-28 PROCEDURE — 37221 PR REVSC OPN/PRQ ILIAC ART W/STNT PLMT & ANGIOPLSTY: CPT | Performed by: SURGERY

## 2018-02-28 RX ORDER — HEPARIN SODIUM 5000 [USP'U]/ML
INJECTION, SOLUTION INTRAVENOUS; SUBCUTANEOUS
Status: COMPLETED | OUTPATIENT
Start: 2018-02-28 | End: 2018-02-28

## 2018-02-28 RX ORDER — FENTANYL CITRATE 50 UG/ML
INJECTION, SOLUTION INTRAMUSCULAR; INTRAVENOUS
Status: COMPLETED | OUTPATIENT
Start: 2018-02-28 | End: 2018-02-28

## 2018-02-28 RX ORDER — MIDAZOLAM HYDROCHLORIDE 1 MG/ML
INJECTION INTRAMUSCULAR; INTRAVENOUS
Status: COMPLETED | OUTPATIENT
Start: 2018-02-28 | End: 2018-02-28

## 2018-02-28 RX ORDER — ASPIRIN 81 MG/1
81 TABLET ORAL ONCE
Status: COMPLETED | OUTPATIENT
Start: 2018-02-28 | End: 2018-02-28

## 2018-02-28 RX ORDER — ONDANSETRON 2 MG/ML
4 INJECTION INTRAMUSCULAR; INTRAVENOUS EVERY 8 HOURS PRN
Status: DISCONTINUED | OUTPATIENT
Start: 2018-02-28 | End: 2018-03-02 | Stop reason: HOSPADM

## 2018-02-28 RX ORDER — ACETAMINOPHEN 325 MG/1
650 TABLET ORAL EVERY 4 HOURS PRN
Status: DISCONTINUED | OUTPATIENT
Start: 2018-02-28 | End: 2018-03-02 | Stop reason: HOSPADM

## 2018-02-28 RX ORDER — SODIUM CHLORIDE 9 MG/ML
INJECTION, SOLUTION INTRAVENOUS CONTINUOUS
Status: DISCONTINUED | OUTPATIENT
Start: 2018-02-28 | End: 2018-03-02 | Stop reason: HOSPADM

## 2018-02-28 RX ORDER — CLOPIDOGREL BISULFATE 75 MG/1
75 TABLET ORAL DAILY
Qty: 30 TABLET | Refills: 12 | Status: SHIPPED | OUTPATIENT
Start: 2018-02-28 | End: 2018-03-01 | Stop reason: SDUPTHER

## 2018-02-28 RX ORDER — SODIUM CHLORIDE 0.9 % (FLUSH) 0.9 %
10 SYRINGE (ML) INJECTION PRN
Status: DISCONTINUED | OUTPATIENT
Start: 2018-02-28 | End: 2018-03-02 | Stop reason: HOSPADM

## 2018-02-28 RX ORDER — LIDOCAINE HYDROCHLORIDE 20 MG/ML
INJECTION, SOLUTION INFILTRATION; PERINEURAL
Status: COMPLETED | OUTPATIENT
Start: 2018-02-28 | End: 2018-02-28

## 2018-02-28 RX ORDER — OXYCODONE HYDROCHLORIDE AND ACETAMINOPHEN 5; 325 MG/1; MG/1
2 TABLET ORAL EVERY 4 HOURS PRN
Status: DISCONTINUED | OUTPATIENT
Start: 2018-02-28 | End: 2018-03-02 | Stop reason: HOSPADM

## 2018-02-28 RX ORDER — OXYCODONE HYDROCHLORIDE AND ACETAMINOPHEN 5; 325 MG/1; MG/1
1 TABLET ORAL EVERY 4 HOURS PRN
Status: DISCONTINUED | OUTPATIENT
Start: 2018-02-28 | End: 2018-03-02 | Stop reason: HOSPADM

## 2018-02-28 RX ORDER — CLONIDINE HYDROCHLORIDE 0.1 MG/1
0.1 TABLET ORAL PRN
Status: DISCONTINUED | OUTPATIENT
Start: 2018-02-28 | End: 2018-03-02 | Stop reason: HOSPADM

## 2018-02-28 RX ADMIN — SODIUM CHLORIDE: 9 INJECTION, SOLUTION INTRAVENOUS at 12:58

## 2018-02-28 RX ADMIN — FENTANYL CITRATE 25 MCG: 50 INJECTION, SOLUTION INTRAMUSCULAR; INTRAVENOUS at 14:23

## 2018-02-28 RX ADMIN — IODIXANOL 220 ML: 320 INJECTION, SOLUTION INTRAVASCULAR at 15:13

## 2018-02-28 RX ADMIN — FENTANYL CITRATE 25 MCG: 50 INJECTION, SOLUTION INTRAMUSCULAR; INTRAVENOUS at 13:44

## 2018-02-28 RX ADMIN — ASPIRIN 81 MG: 81 TABLET, COATED ORAL at 13:08

## 2018-02-28 RX ADMIN — FENTANYL CITRATE 25 MCG: 50 INJECTION, SOLUTION INTRAMUSCULAR; INTRAVENOUS at 14:05

## 2018-02-28 RX ADMIN — MIDAZOLAM HYDROCHLORIDE 1 MG: 1 INJECTION, SOLUTION INTRAMUSCULAR; INTRAVENOUS at 14:23

## 2018-02-28 RX ADMIN — HEPARIN SODIUM 6000 UNITS: 5000 INJECTION, SOLUTION INTRAVENOUS; SUBCUTANEOUS at 13:56

## 2018-02-28 RX ADMIN — FENTANYL CITRATE 25 MCG: 50 INJECTION, SOLUTION INTRAMUSCULAR; INTRAVENOUS at 14:42

## 2018-02-28 RX ADMIN — MIDAZOLAM HYDROCHLORIDE 1 MG: 1 INJECTION, SOLUTION INTRAMUSCULAR; INTRAVENOUS at 14:42

## 2018-02-28 RX ADMIN — LIDOCAINE HYDROCHLORIDE 10 ML: 20 INJECTION, SOLUTION INFILTRATION; PERINEURAL at 13:46

## 2018-02-28 RX ADMIN — CEFAZOLIN SODIUM 1 G: 1 INJECTION, SOLUTION INTRAVENOUS at 13:26

## 2018-02-28 RX ADMIN — MIDAZOLAM HYDROCHLORIDE 1 MG: 1 INJECTION, SOLUTION INTRAMUSCULAR; INTRAVENOUS at 14:05

## 2018-02-28 RX ADMIN — OXYCODONE HYDROCHLORIDE AND ACETAMINOPHEN 2 TABLET: 5; 325 TABLET ORAL at 15:42

## 2018-02-28 RX ADMIN — HEPARIN SODIUM 5000 UNITS: 5000 INJECTION, SOLUTION INTRAVENOUS; SUBCUTANEOUS at 13:47

## 2018-02-28 RX ADMIN — FENTANYL CITRATE 50 MCG: 50 INJECTION, SOLUTION INTRAMUSCULAR; INTRAVENOUS at 14:54

## 2018-02-28 RX ADMIN — MIDAZOLAM HYDROCHLORIDE 1 MG: 1 INJECTION, SOLUTION INTRAMUSCULAR; INTRAVENOUS at 13:44

## 2018-02-28 ASSESSMENT — PAIN SCALES - GENERAL: PAINLEVEL_OUTOF10: 7

## 2018-02-28 NOTE — H&P (VIEW-ONLY)
Patient Care Team:  Karen Calderon MD as PCP - General (Anesthesiology)  Darrick Yeager MD as Consulting Physician (Cardiology)      History and Physical    Erin Gomez is a 62 y.o. male who presents for post op follow up. Dr. Jesus Manuel Wu performed a DRIL  2 weeks ago. Post op complications reported  none. Post op pain is minimal.  Symptoms of steal are absent. We removed the sutures and staples and the incision looked good. Lizeth Sun has a history of leg pain with walking. He reports that he has been declined a transplant in [place] [address] in the last couple of weeks due to pvd. He has started having rest pain in his left foot. He has developed some discoloration and some skin splits. Erin Gomez is a 62 y.o. male with the following history reviewed and recorded in [facility]:  Patient Active Problem List    Diagnosis Date Noted    Atherosclerosis of native artery of both lower extremities with intermittent claudication ([facility] [address] 02/15/2018    Steal syndrome of dialysis vascular access ([facility] [address] 01/30/2018    Steal syndrome as complication of dialysis access ([facility] [address] 01/25/2018    ESRD on dialysis [facility])     Open wnd of finger 01/22/2018    Chronic deep vein thrombosis (DVT) of axillary vein of left upper extremity ([address] 01/20/2017    Superficial venous thrombosis of left upper extremity 12/16/2016    CKD (chronic kidney disease) stage V requiring chronic dialysis ([address]     Osteoarthritis     CHF (congestive heart failure) ([address]     CAD (coronary artery disease)     HTN (hypertension)     Liver disease      cirrosis, hep c       Current Outpatient Prescriptions   Medication Sig Dispense Refill    oxyCODONE-acetaminophen (PERCOCET)  MG per tablet Take 1 tablet by mouth every 4 hours as needed for Pain.       lactulose (CEPHULAC) 10 G packet Take 10 g by mouth 3 times daily Indications: Disorder of the Liver Enzymes       CIPROFLOX HCL-CIPRO BETAINE (CIPRO XR) 500 MG balloon and then [id number] balloon    DIALYSIS FISTULA CREATION Left 2/16/2017    LEFT UPPER EXTREMITY BRACHIAL / AXILLARY GRAFT AND STENT LEFT SUBCLAVIAN VEIN  [facility]  12/26/2010    Right internal jugular vein tunneled dialysis catheter placement    OTHER SURGICAL HISTORY  [id number]    Redo of dialysis catheter    OTHER SURGICAL HISTORY  9/6/2011   [facility]    Removal of RT IJV tunneled dialysis cath    OTHER SURGICAL HISTORY  5/22/12   [facility]    Ultrasound-guided cannulation of left cephalic vein in the mid forearm toward the AV anastomosis, Left upper  ext. fistulograms including venograms of the SVC, Left cephalic vein balloon angioplasty with a 4mm x 100mm Tod balloon, Completion fistulograms    PACEMAKER PLACEMENT      medtronic    PARACENTESIS      multiple/recent; per dr. Cathie Luis at [address] [place] [address] Left 1/30/2018    UPPER EXTREMITY DRIL PROCEDURE WITH LEFT SAPHENOUS VEIN HARVESTING performed by Gamaliel Shah MD at [facility]  6/19/12    LUE arteriovenous fistulogram including venography of the superior vena cava. Balloon angioplasty, left forearm cephalic vein and upper arm cephalic vein with 2IGC0UX tod balloon.  VASCULAR SURGERY  7/10/2012 [facility]     Revision of left distal radial artery to cephalic vein arteriovenous fistula with 7mm Artegraft interposition.  VASCULAR SURGERY  7/30/15 [facility] [address]    Left upper extremity arteriovenous fistulograms including venography of the superior vena cava. Left cephalic vein balloon angioplasty with an 8 x 20 cm cutting balloon proximal cephalic vein. Balloon angioplasty of left cephalic vein/antecubital vein near the antecubital crease with 8 x 20 mm cutting balloon.  VASCULAR SURGERY  7/30/15 [facility]    Balloon angioplasty proximal end distal upper arm cephalic vein with 9 x 40 cm  balloon. Completion fistulograms of the left upper extremity.     sudden vision change or amaurosis. Respiratory  no significant shortness of breath, wheezing, or stridor. No apnea, cough, or chest tightness associated with shortness of breath. Cardiovascular  no chest pain, syncope, or significant dizziness. No palpitations or significant leg swelling. Patient reports has claudication. Gastrointestinal  no abdominal swelling or pain. No blood in stool. No severe constipation, diarrhea, nausea, or vomiting. Genitourinary  No difficulty urinating, dysuria, frequency, or urgency. No flank pain or hematuria. Musculoskeletal  no back pain, gait disturbance, or myalgia. Skin  no color change, rash, pallor, has new wounds. Neurologic  no dizziness, facial asymmetry, or light headedness. No seizures. No speech difficulty or lateralizing weakness. Hematologic  no easy bruising or excessive bleeding. Psychiatric  no severe anxiety or nervousness. No confusion. All other review of systems are negative. Physical Exam    BP (!) 150/60 (Site: Right Arm, Position: Sitting, Cuff Size: Medium Adult)   Pulse 70   Temp 98.3 °F (36.8 °C) (Temporal)   Resp 18   SpO2 95%     Constitutional  well developed, well nourished. No diaphoresis or acute distress. HENT  head normocephalic. Right external ear canal appears normal.  Left external ear canal appears normal.  Septum appears midline. Eyes  conjunctiva normal.  EOMS normal.  No exudate. No icterus. Neck- ROM appears normal, no tracheal deviation. Cardiovascular  Regular rate and rhythm. Heart sounds are normal.  No murmur, rub, or gallop. Carotid pulses are 2+ to palpation bilaterally without bruit. Extremities - Radial and brachial pulses are 2+ to palpation bilaterally. Right femoral pulse: absent; Right popliteal pulse: absent Right DP: absent; Right PT absent; Left femoral pulse: present 2+; Left popliteal pulse: absent; Left DP: absent;  Left PT: absent No cyanosis, clubbing, or significant Risks have been discussed with the patient including but not limited to MI, death, stroke, nerve injury, infection, atheroembolic event, contrast nephropathy which could require dialysis and bleed.       Arteriogram with runoff and possible angioplasty, atherectomy, and stent for ischemic rest pain left foot  Recommend no smoking  Strongly encourage statin therapy

## 2018-03-01 RX ORDER — CLOPIDOGREL BISULFATE 75 MG/1
75 TABLET ORAL DAILY
Qty: 30 TABLET | Refills: 12 | Status: SHIPPED | OUTPATIENT
Start: 2018-03-01 | End: 2019-02-25 | Stop reason: SDUPTHER

## 2018-03-01 RX ORDER — IODIXANOL 320 MG/ML
INJECTION, SOLUTION INTRAVASCULAR
Status: COMPLETED | OUTPATIENT
Start: 2018-02-28 | End: 2018-02-28

## 2018-03-02 ENCOUNTER — TELEPHONE (OUTPATIENT)
Dept: CARDIOLOGY | Facility: CLINIC | Age: 58
End: 2018-03-02

## 2018-03-15 ENCOUNTER — CLINICAL SUPPORT NO REQUIREMENTS (OUTPATIENT)
Dept: CARDIOLOGY | Facility: CLINIC | Age: 58
End: 2018-03-15

## 2018-03-15 ENCOUNTER — OFFICE VISIT (OUTPATIENT)
Dept: CARDIOLOGY | Facility: CLINIC | Age: 58
End: 2018-03-15

## 2018-03-15 VITALS
SYSTOLIC BLOOD PRESSURE: 145 MMHG | BODY MASS INDEX: 25.77 KG/M2 | HEIGHT: 70 IN | WEIGHT: 180 LBS | HEART RATE: 63 BPM | RESPIRATION RATE: 18 BRPM | DIASTOLIC BLOOD PRESSURE: 80 MMHG

## 2018-03-15 DIAGNOSIS — N18.6 END STAGE KIDNEY DISEASE (HCC): ICD-10-CM

## 2018-03-15 DIAGNOSIS — E78.2 MIXED HYPERLIPIDEMIA: ICD-10-CM

## 2018-03-15 DIAGNOSIS — Z95.0 PACEMAKER: ICD-10-CM

## 2018-03-15 DIAGNOSIS — R01.1 MURMUR: ICD-10-CM

## 2018-03-15 DIAGNOSIS — K70.31 ALCOHOLIC CIRRHOSIS OF LIVER WITH ASCITES (HCC): ICD-10-CM

## 2018-03-15 DIAGNOSIS — Z95.0 PACEMAKER: Primary | ICD-10-CM

## 2018-03-15 DIAGNOSIS — I10 ESSENTIAL HYPERTENSION: ICD-10-CM

## 2018-03-15 DIAGNOSIS — I50.22 CHRONIC SYSTOLIC CONGESTIVE HEART FAILURE (HCC): ICD-10-CM

## 2018-03-15 DIAGNOSIS — I25.10 CORONARY ARTERY DISEASE INVOLVING NATIVE CORONARY ARTERY OF NATIVE HEART WITHOUT ANGINA PECTORIS: Primary | ICD-10-CM

## 2018-03-15 DIAGNOSIS — Z72.0 TOBACCO ABUSE: ICD-10-CM

## 2018-03-15 PROBLEM — Z94.0 S/P KIDNEY TRANSPLANT: Status: ACTIVE | Noted: 2018-03-15

## 2018-03-15 PROBLEM — Z94.4 S/P LIVER TRANSPLANT (HCC): Status: ACTIVE | Noted: 2018-03-15

## 2018-03-15 PROCEDURE — 93288 INTERROG EVL PM/LDLS PM IP: CPT | Performed by: INTERNAL MEDICINE

## 2018-03-15 PROCEDURE — 99406 BEHAV CHNG SMOKING 3-10 MIN: CPT | Performed by: PHYSICIAN ASSISTANT

## 2018-03-15 PROCEDURE — 93000 ELECTROCARDIOGRAM COMPLETE: CPT | Performed by: PHYSICIAN ASSISTANT

## 2018-03-15 PROCEDURE — 99215 OFFICE O/P EST HI 40 MIN: CPT | Performed by: PHYSICIAN ASSISTANT

## 2018-03-15 RX ORDER — ISOSORBIDE MONONITRATE 30 MG/1
30 TABLET, EXTENDED RELEASE ORAL DAILY
Qty: 30 TABLET | Refills: 11 | Status: SHIPPED | OUTPATIENT
Start: 2018-03-15

## 2018-03-15 RX ORDER — VARENICLINE TARTRATE 1 MG/1
1 TABLET, FILM COATED ORAL DAILY
Qty: 30 TABLET | Refills: 3 | Status: SHIPPED | OUTPATIENT
Start: 2018-03-15 | End: 2018-07-18

## 2018-03-15 RX ORDER — CLOPIDOGREL BISULFATE 75 MG/1
75 TABLET ORAL DAILY
COMMUNITY
End: 2018-07-18

## 2018-04-10 ENCOUNTER — OFFICE VISIT (OUTPATIENT)
Dept: GASTROENTEROLOGY | Facility: CLINIC | Age: 58
End: 2018-04-10

## 2018-04-10 VITALS
WEIGHT: 183 LBS | TEMPERATURE: 96.9 F | HEART RATE: 56 BPM | OXYGEN SATURATION: 86 % | BODY MASS INDEX: 26.2 KG/M2 | DIASTOLIC BLOOD PRESSURE: 72 MMHG | HEIGHT: 70 IN | SYSTOLIC BLOOD PRESSURE: 154 MMHG

## 2018-04-10 DIAGNOSIS — K76.9 LIVER LESION: ICD-10-CM

## 2018-04-10 DIAGNOSIS — K70.30 ALCOHOLIC CIRRHOSIS OF LIVER WITHOUT ASCITES (HCC): Primary | ICD-10-CM

## 2018-04-10 PROCEDURE — 99213 OFFICE O/P EST LOW 20 MIN: CPT | Performed by: NURSE PRACTITIONER

## 2018-04-26 ENCOUNTER — TELEPHONE (OUTPATIENT)
Dept: CARDIOLOGY | Facility: CLINIC | Age: 58
End: 2018-04-26

## 2018-07-16 PROBLEM — Z79.01 ANTICOAGULATED BY ANTICOAGULATION TREATMENT: Status: ACTIVE | Noted: 2018-07-16

## 2018-07-16 PROBLEM — C22.0: Status: ACTIVE | Noted: 2018-07-16

## 2018-07-18 ENCOUNTER — OFFICE VISIT (OUTPATIENT)
Dept: GASTROENTEROLOGY | Facility: CLINIC | Age: 58
End: 2018-07-18

## 2018-07-18 ENCOUNTER — TELEPHONE (OUTPATIENT)
Dept: GASTROENTEROLOGY | Facility: CLINIC | Age: 58
End: 2018-07-18

## 2018-07-18 VITALS
WEIGHT: 176 LBS | OXYGEN SATURATION: 98 % | BODY MASS INDEX: 25.2 KG/M2 | SYSTOLIC BLOOD PRESSURE: 162 MMHG | TEMPERATURE: 97 F | DIASTOLIC BLOOD PRESSURE: 58 MMHG | HEART RATE: 64 BPM | HEIGHT: 70 IN

## 2018-07-18 DIAGNOSIS — B18.2 CHRONIC HEPATITIS C WITHOUT HEPATIC COMA (HCC): ICD-10-CM

## 2018-07-18 DIAGNOSIS — C22.0: ICD-10-CM

## 2018-07-18 DIAGNOSIS — Z79.01 ANTICOAGULATED BY ANTICOAGULATION TREATMENT: ICD-10-CM

## 2018-07-18 DIAGNOSIS — D12.6 ADENOMATOUS POLYP OF COLON, UNSPECIFIED PART OF COLON: ICD-10-CM

## 2018-07-18 DIAGNOSIS — C22.0 HEPATOCELLULAR CARCINOMA (HCC): Primary | ICD-10-CM

## 2018-07-18 DIAGNOSIS — K70.31 ALCOHOLIC CIRRHOSIS OF LIVER WITH ASCITES (HCC): ICD-10-CM

## 2018-07-18 DIAGNOSIS — N18.6 END STAGE KIDNEY DISEASE (HCC): ICD-10-CM

## 2018-07-18 DIAGNOSIS — Z99.2 DIALYSIS PATIENT (HCC): ICD-10-CM

## 2018-07-18 DIAGNOSIS — K70.31 ASCITES DUE TO ALCOHOLIC CIRRHOSIS (HCC): ICD-10-CM

## 2018-07-18 DIAGNOSIS — K65.2 SPONTANEOUS BACTERIAL PERITONITIS (HCC): ICD-10-CM

## 2018-07-18 DIAGNOSIS — Z72.0 TOBACCO ABUSE: ICD-10-CM

## 2018-07-18 PROCEDURE — 99214 OFFICE O/P EST MOD 30 MIN: CPT | Performed by: INTERNAL MEDICINE

## 2018-07-18 RX ORDER — OMEPRAZOLE 20 MG/1
20 CAPSULE, DELAYED RELEASE ORAL 2 TIMES DAILY
COMMUNITY

## 2018-07-18 RX ORDER — SUCRALFATE 1 G/1
1 TABLET ORAL 3 TIMES DAILY
COMMUNITY

## 2018-07-18 RX ORDER — MIDODRINE HYDROCHLORIDE 5 MG/1
5 TABLET ORAL 3 TIMES DAILY
COMMUNITY

## 2018-07-18 RX ORDER — OXYCODONE HCL 10 MG/1
10 TABLET, FILM COATED, EXTENDED RELEASE ORAL 3 TIMES DAILY
COMMUNITY

## 2018-10-10 ENCOUNTER — TELEPHONE (OUTPATIENT)
Dept: CARDIOLOGY | Facility: CLINIC | Age: 58
End: 2018-10-10

## 2018-10-11 DIAGNOSIS — Z01.818 PRE-OP EXAM: ICD-10-CM

## 2018-10-11 DIAGNOSIS — R06.09 DYSPNEA ON EXERTION: Primary | ICD-10-CM

## 2018-10-11 DIAGNOSIS — I25.119 CORONARY ARTERY DISEASE INVOLVING NATIVE CORONARY ARTERY OF NATIVE HEART WITH ANGINA PECTORIS (HCC): ICD-10-CM

## 2018-10-15 ENCOUNTER — APPOINTMENT (OUTPATIENT)
Dept: CARDIOLOGY | Facility: HOSPITAL | Age: 58
End: 2018-10-15

## 2018-12-03 ENCOUNTER — TELEPHONE (OUTPATIENT)
Dept: CARDIOLOGY | Facility: CLINIC | Age: 58
End: 2018-12-03

## 2019-01-31 ENCOUNTER — TELEPHONE (OUTPATIENT)
Dept: GASTROENTEROLOGY | Facility: CLINIC | Age: 59
End: 2019-01-31

## 2019-02-28 DIAGNOSIS — K65.2 SPONTANEOUS BACTERIAL PERITONITIS (HCC): ICD-10-CM

## 2019-03-01 RX ORDER — CIPROFLOXACIN 500 MG/1
TABLET, FILM COATED ORAL
Qty: 30 TABLET | Refills: 1 | Status: SHIPPED | OUTPATIENT
Start: 2019-03-01

## 2019-03-05 ENCOUNTER — LAB (OUTPATIENT)
Dept: LAB | Facility: HOSPITAL | Age: 59
End: 2019-03-05

## 2019-03-05 ENCOUNTER — TRANSCRIBE ORDERS (OUTPATIENT)
Dept: ADMINISTRATIVE | Facility: HOSPITAL | Age: 59
End: 2019-03-05

## 2019-03-05 ENCOUNTER — APPOINTMENT (OUTPATIENT)
Dept: LAB | Facility: HOSPITAL | Age: 59
End: 2019-03-05

## 2019-03-05 DIAGNOSIS — Z79.891 LONG TERM (CURRENT) USE OF OPIATE ANALGESIC: Primary | ICD-10-CM

## 2019-03-05 DIAGNOSIS — K70.31 ALCOHOLIC CIRRHOSIS OF LIVER WITH ASCITES (HCC): ICD-10-CM

## 2019-03-05 DIAGNOSIS — K70.31 ALCOHOLIC CIRRHOSIS OF LIVER WITH ASCITES (HCC): Primary | ICD-10-CM

## 2019-03-05 LAB
ALBUMIN SERPL-MCNC: 4.7 G/DL (ref 3.5–5)
ALBUMIN/GLOB SERPL: 1.2 G/DL (ref 1.1–2.5)
ALP SERPL-CCNC: 117 U/L (ref 24–120)
ALT SERPL W P-5'-P-CCNC: 23 U/L (ref 0–54)
ANION GAP SERPL CALCULATED.3IONS-SCNC: 11 MMOL/L (ref 4–13)
AST SERPL-CCNC: 30 U/L (ref 7–45)
BILIRUB SERPL-MCNC: 0.8 MG/DL (ref 0.1–1)
BUN BLD-MCNC: 47 MG/DL (ref 5–21)
BUN/CREAT SERPL: 7.1 (ref 7–25)
CALCIUM SPEC-SCNC: 8.9 MG/DL (ref 8.4–10.4)
CHLORIDE SERPL-SCNC: 90 MMOL/L (ref 98–110)
CO2 SERPL-SCNC: 36 MMOL/L (ref 24–31)
CREAT BLD-MCNC: 6.63 MG/DL (ref 0.5–1.4)
DEPRECATED RDW RBC AUTO: 59.1 FL (ref 40–54)
ERYTHROCYTE [DISTWIDTH] IN BLOOD BY AUTOMATED COUNT: 18.4 % (ref 12–15)
GFR SERPL CREATININE-BSD FRML MDRD: 9 ML/MIN/1.73
GFR SERPL CREATININE-BSD FRML MDRD: ABNORMAL ML/MIN/1.73
GLOBULIN UR ELPH-MCNC: 3.8 GM/DL
GLUCOSE BLD-MCNC: 153 MG/DL (ref 70–100)
HCT VFR BLD AUTO: 33 % (ref 40–52)
HGB BLD-MCNC: 10.7 G/DL (ref 14–18)
INR PPP: 1.12 (ref 0.91–1.09)
MCH RBC QN AUTO: 29.3 PG (ref 28–32)
MCHC RBC AUTO-ENTMCNC: 32.4 G/DL (ref 33–36)
MCV RBC AUTO: 90.4 FL (ref 82–95)
PLATELET # BLD AUTO: 103 10*3/MM3 (ref 130–400)
PMV BLD AUTO: 10.5 FL (ref 6–12)
POTASSIUM BLD-SCNC: 5.4 MMOL/L (ref 3.5–5.3)
PROT SERPL-MCNC: 8.5 G/DL (ref 6.3–8.7)
PROTHROMBIN TIME: 14.8 SECONDS (ref 11.9–14.6)
RBC # BLD AUTO: 3.65 10*6/MM3 (ref 4.8–5.9)
SODIUM BLD-SCNC: 137 MMOL/L (ref 135–145)
WBC NRBC COR # BLD: 3.44 10*3/MM3 (ref 4.8–10.8)

## 2019-03-05 PROCEDURE — G0480 DRUG TEST DEF 1-7 CLASSES: HCPCS | Performed by: NURSE PRACTITIONER

## 2019-03-05 PROCEDURE — 85610 PROTHROMBIN TIME: CPT | Performed by: INTERNAL MEDICINE

## 2019-03-05 PROCEDURE — 80307 DRUG TEST PRSMV CHEM ANLYZR: CPT | Performed by: NURSE PRACTITIONER

## 2019-03-05 PROCEDURE — 85027 COMPLETE CBC AUTOMATED: CPT | Performed by: INTERNAL MEDICINE

## 2019-03-05 PROCEDURE — 80053 COMPREHEN METABOLIC PANEL: CPT | Performed by: INTERNAL MEDICINE

## 2019-03-05 PROCEDURE — 36415 COLL VENOUS BLD VENIPUNCTURE: CPT

## 2019-03-06 ENCOUNTER — TELEPHONE (OUTPATIENT)
Dept: GASTROENTEROLOGY | Facility: CLINIC | Age: 59
End: 2019-03-06

## 2019-03-07 LAB — PREGABALIN: <0.5 UG/ML

## 2019-03-12 LAB
6MAM SERPLBLD-MCNC: NEGATIVE NG/ML
CODEINE UR QL: NEGATIVE NG/ML
DHC BLD-MCNC: NEGATIVE NG/ML
HYDROCODONE SERPL-MCNC: NEGATIVE NG/ML
HYDROMORPHONE SERPL-MCNC: 1.7 NG/ML
METHADONE BLD QL SCN: 13.4 NG/ML
METHADONE SPEC QL: POSITIVE
MORPHINE SERPLBLD-MCNC: NEGATIVE NG/ML
OPIATES SPEC QL: POSITIVE
OXYCODONE SERPLBLD CFM-MCNC: 132.3 NG/ML
OXYCODONE+OXYMORPHONE SERPLBLD CFM-IMP: POSITIVE
OXYMORPHONE SERPLBLD CFM-MCNC: NEGATIVE NG/ML

## 2019-03-13 LAB
7AMINOCLONAZEPAM UR-MCNC: NEGATIVE NG/ML
ALPRAZ SPEC-MCNC: NEGATIVE NG/ML
AMPHETAMINES SERPL QL SCN: NEGATIVE NG/ML
BARBITURATES SERPL QL SCN: NEGATIVE UG/ML
BENZODIAZ BLD QL: ABNORMAL NG/ML
BENZODIAZ SPEC QL: POSITIVE
BUPRENORPHINE SERPLBLD-MCNC: NEGATIVE NG/ML
CANNABINOIDS BLD QL SCN: NEGATIVE NG/ML
CARISOPRODOL+MEPROB BLD QL SCN: NEGATIVE UG/ML
CHLORDIAZEP SERPL-MCNC: NEGATIVE NG/ML
CLONAZEPAM BLD-MCNC: NEGATIVE NG/ML
COCAINE+BZE SERPLBLD QL SCN: NEGATIVE NG/ML
DESALKYLFLURAZ BLD CFM-MCNC: NEGATIVE NG/ML
DIAZEPAM SERPL-MCNC: 25 NG/ML
FENTANYL BLD QL SCN: NEGATIVE NG/ML
FLURAZEPAM BLD CFM-MCNC: NEGATIVE NG/ML
GABAPENTIN, IA: NEGATIVE UG/ML
KETAMINE [MASS/VOLUME] IN SERUM OR PLASMA: NEGATIVE NG/ML
LORAZEPAM SPEC-MCNC: NEGATIVE NG/ML
Lab: NEGATIVE NG/ML
MEPERIDINE SERPLBLD-MCNC: NEGATIVE NG/ML
METHADONE SERPL QL SCN: ABNORMAL NG/ML
MIDAZOLAM SPEC-MCNC: NEGATIVE NG/ML
NORCHLORDIAZEP SERPL-MCNC: NEGATIVE NG/ML
NORDIAZEPAM SERPL CFM-MCNC: 16 NG/ML
OPIATES, IA: ABNORMAL NG/ML
OXAZEPAM SPEC-MCNC: NEGATIVE NG/ML
OXYCODONES, IA: ABNORMAL NG/ML
PCP SERPL QL SCN: NEGATIVE NG/ML
PROPOXYPH SERPL QL SCN: NEGATIVE NG/ML
TAPENTADOL SERPLBLD-MCNC: <1 NG/ML
TEMAZEPAM SPEC-MCNC: NEGATIVE NG/ML
TRAMADOL BLD QL SCN: NEGATIVE NG/ML
TRIAZOLAM SPEC-MCNC: NEGATIVE NG/ML

## 2019-03-14 LAB
MDA SERPLBLD-MCNC: NEGATIVE NG/ML
MDMA-ECSTASY: NEGATIVE NG/ML

## 2019-05-15 ENCOUNTER — TRANSCRIBE ORDERS (OUTPATIENT)
Dept: ADMINISTRATIVE | Facility: HOSPITAL | Age: 59
End: 2019-05-15

## 2019-05-15 DIAGNOSIS — Z79.891 LONG-TERM CURRENT USE OF OPIATE ANALGESIC: Primary | ICD-10-CM

## 2019-05-29 ENCOUNTER — TELEPHONE (OUTPATIENT)
Dept: VASCULAR SURGERY | Age: 59
End: 2019-05-29

## 2019-05-29 DIAGNOSIS — I70.213 ATHEROSCLEROSIS OF NATIVE ARTERY OF BOTH LOWER EXTREMITIES WITH INTERMITTENT CLAUDICATION (HCC): Primary | ICD-10-CM

## 2019-05-29 NOTE — TELEPHONE ENCOUNTER
I called and spoke with Alvin Newsome at [address] [place] [address] to see if patient was still there so appointment information could be provided and he had done left so the information could not be given to him.

## 2019-05-29 NOTE — TELEPHONE ENCOUNTER
Tried to call Mr Katherin Aase and there was no answer but I did leave a voicemail to let him know we had him scheduled for tomorrow 05/30/2019 he needs to arrive at 10:30 for a 11:00 scan and then to see [facility] at 1:00pm

## 2019-05-29 NOTE — TELEPHONE ENCOUNTER
Tried to call Mr Mary Miles and there was no answer but I did leave a voicemail to let him know we had him scheduled for tomorrow 05/30/2019 he needs to arrive at 10:30 for a 11:00 scan and then to see [facility] at 1:00pm

## 2019-06-04 ENCOUNTER — OFFICE VISIT (OUTPATIENT)
Dept: VASCULAR SURGERY | Age: 59
End: 2019-06-04
Payer: MEDICARE

## 2019-06-04 VITALS
TEMPERATURE: 97.7 F | HEART RATE: 56 BPM | DIASTOLIC BLOOD PRESSURE: 60 MMHG | SYSTOLIC BLOOD PRESSURE: 158 MMHG | OXYGEN SATURATION: 94 % | RESPIRATION RATE: 18 BRPM

## 2019-06-04 DIAGNOSIS — R09.89 BILATERAL CAROTID BRUITS: ICD-10-CM

## 2019-06-04 DIAGNOSIS — S91.301A OPEN WOUND OF RIGHT FOOT, INITIAL ENCOUNTER: ICD-10-CM

## 2019-06-04 DIAGNOSIS — I70.213 ATHEROSCLEROSIS OF NATIVE ARTERY OF BOTH LOWER EXTREMITIES WITH INTERMITTENT CLAUDICATION (HCC): Primary | ICD-10-CM

## 2019-06-04 PROCEDURE — 99214 OFFICE O/P EST MOD 30 MIN: CPT | Performed by: NURSE PRACTITIONER

## 2019-06-04 PROCEDURE — 3017F COLORECTAL CA SCREEN DOC REV: CPT | Performed by: NURSE PRACTITIONER

## 2019-06-04 PROCEDURE — G8421 BMI NOT CALCULATED: HCPCS | Performed by: NURSE PRACTITIONER

## 2019-06-04 PROCEDURE — G8598 ASA/ANTIPLAT THER USED: HCPCS | Performed by: NURSE PRACTITIONER

## 2019-06-04 PROCEDURE — G8427 DOCREV CUR MEDS BY ELIG CLIN: HCPCS | Performed by: NURSE PRACTITIONER

## 2019-06-04 PROCEDURE — 1036F TOBACCO NON-USER: CPT | Performed by: NURSE PRACTITIONER

## 2019-06-04 RX ORDER — ISOSORBIDE MONONITRATE 30 MG/1
30 TABLET, EXTENDED RELEASE ORAL DAILY
Status: ON HOLD | COMMUNITY
End: 2019-06-28 | Stop reason: HOSPADM

## 2019-06-04 NOTE — Clinical Note
Please let him know his carotids were 50-69% percent. His legs were not good as we expected.   We will see him next week for the procedure

## 2019-06-04 NOTE — PROGRESS NOTES
Patient Care Team:  Iman Morris MD as PCP - General (Anesthesiology)  R Han[address]rera [address]  Brannon Bush MD as Consulting Physician (Cardiology)      History and Physical    Bryan Godinez has a history of peripheral vascular disease of the lower extremities. He has had this for 1 - 5 years. Current treatment includes clopidogrel 75 mg po qd, ASA EC daily. Bryan Godinez has new wounds. Recently, he reports claudication at a distance of  50 feet. Bryan Godinez reports that the right leg is equal to the left. He reports claudication is not changed and is mostly in the form of crampy type pain starting in the hips and calves. He has a short recovery time. This is reproducible in nature. He reports ischemic rest pain 0 times per night. He reports walking with cart does not help.     Jo Zavala is a 61 y.o. male with the following history reviewed and recorded in [facility]:  Patient Active Problem List    Diagnosis Date Noted    Atherosclerosis of artery of extremity with rest pain ([address] 02/28/2018    Skin ulcer of left foot with fat layer exposed ([address] 02/28/2018    Atherosclerosis of native artery of both lower extremities with intermittent claudication ([address] 02/15/2018    Steal syndrome of dialysis vascular access ([address] 01/30/2018    Steal syndrome as complication of dialysis access ([address] 01/25/2018    ESRD on dialysis ([address]     Open wnd of finger 01/22/2018    Chronic deep vein thrombosis (DVT) of axillary vein of left upper extremity ([address] 01/20/2017    Superficial venous thrombosis of left upper extremity 12/16/2016    CKD (chronic kidney disease) stage V requiring chronic dialysis ([address]     Osteoarthritis     CHF (congestive heart failure) ([address]     CAD (coronary artery disease)     HTN (hypertension)     Liver disease      cirrosis, hep c       Current Outpatient Medications   Medication Sig Dispense Refill    isosorbide mononitrate (IMDUR) 30 MG extended release tablet Take 30 mg by mouth daily      Ciprofloxacin-Ciproflox HCl (CIPRO XR PO) Take 500 mg by mouth 2 times daily      clopidogrel (PLAVIX) 75 MG tablet TAKE 1 TABLET EVERY DAY 30 tablet 5    oxyCODONE-acetaminophen (PERCOCET)  MG per tablet Take 1 tablet by mouth every 4 hours as needed for Pain.  lactulose (CEPHULAC) 10 G packet Take 10 g by mouth 3 times daily Indications: Disorder of the Liver Enzymes       minoxidil (LONITEN) 10 MG tablet Take 10 mg by mouth daily Indications: High Blood Pressure       cloNIDine (CATAPRES) 0.2 MG tablet Take 0.3 mg by mouth 3 times daily Indications: High Blood Pressure       lisinopril (PRINIVIL;ZESTRIL) 20 MG tablet Take 10 mg by mouth 2 times daily       amLODIPine (NORVASC) 10 MG tablet Take 10 mg by mouth daily Indications: High Blood Pressure       Calcium Acetate, Phos Binder, (PHOSLO PO) Take 3 tablets by mouth 3 times daily (with meals) Indications: Kidney Failure        No current facility-administered medications for this visit. Allergies: Patient has no known allergies. Past Medical History:   Diagnosis Date    Anxiety     CAD (coronary artery disease)     stents x 2; per dr. Silvia Scott CH[facility]congestive heart failure) [facility]     Chyloperitoneum determined by paracentesis     CKD (chronic kidney disease), stage V ([facility]     Dialysis patient ([facility]     [facility] fri [facility])     Hepatic cirrhosis ([facility]     dr. Kendall Nolen; has been going [facility] for liver and kidney transplant list.    Hepatitis C, chronic ([facility]     HTN (hypertension)     Hx of blood clots     arm/fistula    Osteoarthritis     Sleep apnea     Sleep apnea     no cpap at present.  Type II diabetes mellitus (HCC)     no meds. Past Surgical History:   Procedure Laterality Date    ANGIOPLASTY  08/09/11 SUKHWINDER GERARDO cephalic vein balloon angioplasty with 7mm x 4cm balloon.  coild embolization of 2 cephallic vein brances with 3mm x 5cm coils    CARDIAC CATHETERIZATION  04/04/11 cardiac cath and stent x1 by Dr. Caty Mcneil  07/26/11 [facility] [place]    LUE AV fistula. coild embolization of cephalic vein branch near the wrist with 3mm EMBO coils. balloon a'plasty left cephalic vein with [id number] balloon and then [id number] balloon    DIALYSIS FISTULA CREATION Left 2/16/2017    LEFT UPPER EXTREMITY BRACHIAL / AXILLARY GRAFT AND STENT LEFT SUBCLAVIAN VEIN  performed by Crystal Badillo MD at [facility]  12/26/2010    Right internal jugular vein tunneled dialysis catheter placement    OTHER SURGICAL HISTORY  [id number]    Redo of dialysis catheter    OTHER SURGICAL HISTORY  9/6/2011   [facility]    Removal of RT IJV tunneled dialysis cath    OTHER SURGICAL HISTORY  5/22/12   S    Ultrasound-guided cannulation of left cephalic vein in the mid forearm toward the AV anastomosis, Left upper  ext. fistulograms including venograms of the SVC, Left cephalic vein balloon angioplasty with a 4mm x 100mm Tod balloon, Completion fistulograms    PACEMAKER PLACEMENT      medtronic    PARACENTESIS      multiple/recent; per dr. Paris Tran at [address] [place] [address] Left 1/30/2018    UPPER EXTREMITY DRIL PROCEDURE WITH LEFT SAPHENOUS VEIN HARVESTING performed by Crystal Badillo MD at [address] [place] [address]  6/19/12    LUE arteriovenous fistulogram including venography of the superior vena cava. Balloon angioplasty, left forearm cephalic vein and upper arm cephalic vein with [id number] tod balloon.  VASCULAR SURGERY  7/10/2012 S     Revision of left distal radial artery to cephalic vein arteriovenous fistula with 7mm Artegraft interposition.  VASCULAR SURGERY  7/30/15 [facility] & [address]    Left upper extremity arteriovenous fistulograms including venography of the superior vena cava. Left cephalic vein balloon angioplasty with an 8 x 20 cm cutting balloon proximal cephalic vein. Balloon angioplasty of left cephalic vein/antecubital vein near the antecubital crease with 8 x 20 mm cutting balloon.  VASCULAR SURGERY  7/30/15 [facility]    Balloon angioplasty proximal end distal upper arm cephalic vein with 9 x 40 cm  balloon. Completion fistulograms of the left upper extremity.  VASCULAR SURGERY  8/13/15 S    Revision of left upper extremity arteriovenous fistula with excision of pseudoaneurysm and primary repair of cephalic vein.  VASCULAR SURGERY  5/3/16 [facility]    Left upper fistulograms/venograms, left cephalic balloon 8 x 20 cutter,9 x 40 conquest,left proximal cephalic vein stents (flair 2 9 x 50), balloon stents 9 x 40 conquest.    VASCULAR SURGERY  12/08/2016    [facility]- ultrasound guided cannulation of left distal cephalic vein ultrasound guided cannulation right internal jugular vein placement of right internal jugular vein tunneled dialysis catheter (Bard Equistream XK 23cm tip to cuff)     VASCULAR SURGERY  01/20/2017    [facility]-Right upper venograms, u/s guided cannulation left basilic vein, left upper venograms, balloon angioplasty left subclavian vein 10x40 conquest.    VASCULAR SURGERY  02/16/2017    [facility]. Left brachial artery to axillary vein arteriovenous graft with 7 mm artegraft. Left upper extremity venograms. Left subclavian vein stent viabahn 13x50. Left subclavian vein stent balloon angioplasty 12x40 atlas.  VASCULAR SURGERY  04/11/2017    [facility]. Removal of tunneled dialysis catheter right internal jugular vein.  VASCULAR SURGERY  01/25/2018    [facility]. Arch aortogram, left upper arteriogram, AV graft angiogram/venogram.    VASCULAR SURGERY  02/28/2018    [facility]. Right CFA 5f-6f-7f sheath, aortogram with bilateral lower arteriogram, left SFA/pop  balloon angioplasty 5x100 , 6x150 lutonix ( 2), left CFA  7f sheath, bilateral iliac kissing stents right 10x38 icast, left 9x59 icast expanded with 10x40 , completion aortogram, mynx left CFA , failed mynx right CFA.      Family History   Problem Relation Age of Onset    Heart Disease Other bleed.    Assessment    1. Atherosclerosis of native artery of both lower extremities with intermittent claudication ([address]    2. Open wound of right foot, initial encounter          Plan    Arteriogram with runoff and possible angioplasty, atherectomy, and stent for pvd and wound  Needs baseline yamileth and carotid u/s  Recommend no smoking  Strongly encourage statin therapy    Yamileth -   The patient has severely diminished ankle-brachial indices right lower    extremity(ies) which would be consistent with rest pain symptoms.    Based on segmental pressures and doppler waveforms, the patient likely has    disease in the right superficial femoral, popliteal, tibial arterial    segments.    The patient has noncompressible left tibial arteries. This would be    consistent with a history of diabetes and calcified tibial vessels.    However, there is good doppler waveforms from the bilateral femoral all    the way to the tibial arteries. Great toe pressure is mildly diminished on    the left. This wound be consistent with mild decrease in arterial flow to    the left foot, probably through tibial level disease. Individual images were reviewed. Results were reviewed with the patient. Doppler results:    Right CCA/ICA 50-69% stenotic  Left CCA/ICA 50-69% stenotic  Right verterbral artery flow is antegrade  Left verterbral artery flow is antegrade  Individual velocities reviewed: Yes. Results were reviewed with the patient. Individual images were reviewed. Results were reviewed with the patient.

## 2019-06-05 ENCOUNTER — HOSPITAL ENCOUNTER (OUTPATIENT)
Dept: VASCULAR LAB | Age: 59
Discharge: HOME | End: 2019-06-05
Payer: MEDICARE

## 2019-06-05 DIAGNOSIS — R09.89 BILATERAL CAROTID BRUITS: ICD-10-CM

## 2019-06-05 DIAGNOSIS — I70.213 ATHEROSCLEROSIS OF NATIVE ARTERY OF BOTH LOWER EXTREMITIES WITH INTERMITTENT CLAUDICATION (HCC): ICD-10-CM

## 2019-06-05 PROCEDURE — 93923 UPR/LXTR ART STDY 3+ LVLS: CPT

## 2019-06-05 PROCEDURE — 93880 EXTRACRANIAL BILAT STUDY: CPT

## 2019-06-06 ENCOUNTER — PREP FOR PROCEDURE (OUTPATIENT)
Dept: VASCULAR SURGERY | Age: 59
End: 2019-06-06

## 2019-06-06 ENCOUNTER — TELEPHONE (OUTPATIENT)
Dept: VASCULAR SURGERY | Age: 59
End: 2019-06-06

## 2019-06-06 RX ORDER — SODIUM CHLORIDE 0.9 % (FLUSH) 0.9 %
10 SYRINGE (ML) INJECTION PRN
Status: CANCELLED | OUTPATIENT
Start: 2019-06-06

## 2019-06-06 RX ORDER — CLONIDINE HYDROCHLORIDE 0.1 MG/1
0.1 TABLET ORAL PRN
Status: CANCELLED | OUTPATIENT
Start: 2019-06-06

## 2019-06-06 RX ORDER — VANCOMYCIN HYDROCHLORIDE 1 G/200ML
1000 INJECTION, SOLUTION INTRAVENOUS
Status: CANCELLED | OUTPATIENT
Start: 2019-06-06 | End: 2019-06-06

## 2019-06-06 RX ORDER — ASPIRIN 81 MG/1
81 TABLET ORAL ONCE
Status: CANCELLED | OUTPATIENT
Start: 2019-06-06 | End: 2019-06-06

## 2019-06-06 NOTE — H&P
Patient Care Team:  Brandon Santoro MD as PCP - General (Anesthesiology)  R Hanh Cabrera [facility]  Esme Carpio MD as Consulting Physician (Cardiology)      History and Physical    Serge Thomas has a history of peripheral vascular disease of the lower extremities. He has had this for 1 - 5 years. Current treatment includes clopidogrel 75 mg po qd, ASA EC daily. Serge Thomas has new wounds. Recently, he reports claudication at a distance of  50 feet. Serge Thomas reports that the right leg is equal to the left. He reports claudication is not changed and is mostly in the form of crampy type pain starting in the hips and calves. He has a short recovery time. This is reproducible in nature. He reports ischemic rest pain 0 times per night. He reports walking with cart does not help.     Jeannette Duarte is a 61 y.o. male with the following history reviewed and recorded in [facility]:  Patient Active Problem List    Diagnosis Date Noted    Atherosclerosis of artery of extremity with rest pain ([address] 02/28/2018    Skin ulcer of left foot with fat layer exposed ([address] 02/28/2018    Atherosclerosis of native artery of both lower extremities with intermittent claudication ([address] 02/15/2018    Steal syndrome of dialysis vascular access ([address] 01/30/2018    Steal syndrome as complication of dialysis access ([address] 01/25/2018    ESRD on dialysis ([address]     Open wnd of finger 01/22/2018    Chronic deep vein thrombosis (DVT) of axillary vein of left upper extremity ([address] 01/20/2017    Superficial venous thrombosis of left upper extremity 12/16/2016    CKD (chronic kidney disease) stage V requiring chronic dialysis ([address]     Osteoarthritis     CHF (congestive heart failure) ([address]     CAD (coronary artery disease)     HTN (hypertension)     Liver disease      cirrosis, hep c       Current Outpatient Medications   Medication Sig Dispense Refill    isosorbide mononitrate (IMDUR) 30 MG extended release tablet Take 30 mg by mouth daily      Ciprofloxacin-Ciproflox HCl (CIPRO XR PO) Take 500 mg by mouth 2 times daily      clopidogrel (PLAVIX) 75 MG tablet TAKE 1 TABLET EVERY DAY 30 tablet 5    oxyCODONE-acetaminophen (PERCOCET)  MG per tablet Take 1 tablet by mouth every 4 hours as needed for Pain.  lactulose (CEPHULAC) 10 G packet Take 10 g by mouth 3 times daily Indications: Disorder of the Liver Enzymes       minoxidil (LONITEN) 10 MG tablet Take 10 mg by mouth daily Indications: High Blood Pressure       cloNIDine (CATAPRES) 0.2 MG tablet Take 0.3 mg by mouth 3 times daily Indications: High Blood Pressure       lisinopril (PRINIVIL;ZESTRIL) 20 MG tablet Take 10 mg by mouth 2 times daily       amLODIPine (NORVASC) 10 MG tablet Take 10 mg by mouth daily Indications: High Blood Pressure       Calcium Acetate, Phos Binder, (PHOSLO PO) Take 3 tablets by mouth 3 times daily (with meals) Indications: Kidney Failure        No current facility-administered medications for this visit. Allergies: Patient has no known allergies. Past Medical History:   Diagnosis Date    Anxiety     CAD (coronary artery disease)     stents x 2; per dr. Lia Lagos CHF (congestive heart failure) [facility]     Chyloperitoneum determined by paracentesis     CKD (chronic kidney disease), stage V ([facility]     Dialysis patient ([facility]     mon wed fri at [place] [facility])     Hepatic cirrhosis ([facility]     dr. Alyec Wise; has been [facility] for liver and kidney transplant list.    Hepatitis C, chronic ([facility]     HTN (hypertension)     Hx of blood clots     arm/fistula    Osteoarthritis     Sleep apnea     Sleep apnea     no cpap at present.  Type II diabetes mellitus (HCC)     no meds. Past Surgical History:   Procedure Laterality Date    ANGIOPLASTY  08/09/11 SUKHWINDER GERARDO cephalic vein balloon angioplasty with 7mm x 4cm balloon.  coild embolization of 2 cephallic vein brances with 3mm x 5cm coils    CARDIAC CATHETERIZATION  04/04/11 cardiac cath and stent x1 by Dr. Amy Mathis  07/26/11 [facility]    LUE AV fistula. coild embolization of cephalic vein branch near the wrist with 3mm EMBO coils. balloon a'plasty left cephalic vein with [id number] balloon and then [id number] balloon    DIALYSIS FISTULA CREATION Left 2/16/2017    LEFT UPPER EXTREMITY BRACHIAL / AXILLARY GRAFT AND STENT LEFT SUBCLAVIAN VEIN  performed by Octavia Oquendo MD at [facility]  12/26/2010    Right internal jugular vein tunneled dialysis catheter placement    OTHER SURGICAL HISTORY  [id number]    Redo of dialysis catheter    OTHER SURGICAL HISTORY  9/6/2011   [facility]    Removal of RT IJV tunneled dialysis cath    OTHER SURGICAL HISTORY  5/22/12   [facility]    Ultrasound-guided cannulation of left cephalic vein in the mid forearm toward the AV anastomosis, Left upper  ext. fistulograms including venograms of the SVC, Left cephalic vein balloon angioplasty with a 4mm x 100mm Tod balloon, Completion fistulograms    PACEMAKER PLACEMENT      medtronic    PARACENTESIS      multiple/recent; per dr. Fabien Lr at [address] [place] [address] 1/30/2018    UPPER EXTREMITY DRIL PROCEDURE WITH LEFT SAPHENOUS VEIN HARVESTING performed by Octavia Oquendo MD at [facility]  6/19/12    LUE arteriovenous fistulogram including venography of the superior vena cava. Balloon angioplasty, left forearm cephalic vein and upper arm cephalic vein with [id number] tod balloon.  VASCULAR SURGERY  7/10/2012 [facility]     Revision of left distal radial artery to cephalic vein arteriovenous fistula with 7mm Artegraft interposition.  VASCULAR SURGERY  7/30/15 [facility] & [address]    Left upper extremity arteriovenous fistulograms including venography of the superior vena cava. Left cephalic vein balloon angioplasty with an 8 x 20 cm cutting balloon proximal cephalic vein. Balloon angioplasty of left cephalic vein/antecubital vein near the antecubital Left external ear canal appears normal.  Septum appears midline. Eyes - conjunctiva normal.  EOMS normal.  No exudate. No icterus. Neck- ROM appears normal, no tracheal deviation. Cardiovascular - Regular rate and rhythm. Heart sounds are normal.  has murmur,no rub, or gallop. Carotid pulses are 2+ to palpation bilaterally with bruit. Extremities - Radial and brachial pulses are 2+ to palpation bilaterally. Right femoral pulse: absent with bruit; Right popliteal pulse: absent Right DP: absent; Right PT absent; Left femoral pulse: present 1+ with bruit; Left popliteal pulse: absent; Left DP: absent; Left PT: absent No cyanosis, clubbing, or significant edema. No signs atheroembolic event. Pulmonary - effort appears normal.  No respiratory distress. Lungs - Breath sounds normal. No wheezes or rales. GI - Abdomen - soft, non tender, bowel sounds X 4 quadrants. No guarding or rebound tenderness. No distension or palpable mass. Genitourinary - deferred. Musculoskeletal - ROM appears normal.  No significant edema. Neurologic - alert and oriented X 3. Physiologic. Skin - wounds right foot involving posterior first metatarsal, 3rd tip metatarsal, and lateral 5th metatarsal with fat layer exposed. Psychiatric - mood, affect, and behavior appear normal.  Judgment and thought processes appear normal.    Risk factors for atherosclerosis of all vascular beds have been reviewed with the patient including:  Family history, tobacco abuse in all forms, elevated cholesterol, hyperlipidemia, and diabetes. Options have been discussed with the patient including continued medical management and angiography and potential intervention. Patient has opted to proceed with angiography and potential intervention.   Risks have been discussed with the patient including but not limited to MI, death, stroke, nerve injury, infection, atheroembolic event, contrast nephropathy which could require dialysis and bleed.    Assessment    1. Atherosclerosis of native artery of both lower extremities with intermittent claudication ([address]    2. Open wound of right foot, initial encounter          Plan    Arteriogram with runoff and possible angioplasty, atherectomy, and stent for pvd and wound  Needs baseline yamileth and carotid u/s  Recommend no smoking  Strongly encourage statin therapy    Yamileth -   The patient has severely diminished ankle-brachial indices right lower    extremity(ies) which would be consistent with rest pain symptoms.    Based on segmental pressures and doppler waveforms, the patient likely has    disease in the right superficial femoral, popliteal, tibial arterial    segments.    The patient has noncompressible left tibial arteries. This would be    consistent with a history of diabetes and calcified tibial vessels.    However, there is good doppler waveforms from the bilateral femoral all    the way to the tibial arteries. Great toe pressure is mildly diminished on    the left. This wound be consistent with mild decrease in arterial flow to    the left foot, probably through tibial level disease. Individual images were reviewed. Results were reviewed with the patient. Doppler results:    Right CCA/ICA 50-69% stenotic  Left CCA/ICA 50-69% stenotic  Right verterbral artery flow is antegrade  Left verterbral artery flow is antegrade  Individual velocities reviewed: Yes. Results were reviewed with the patient. Individual images were reviewed. Results were reviewed with the patient.

## 2019-06-06 NOTE — TELEPHONE ENCOUNTER
----- Message from TIFF Butler sent at 6/6/2019  7:14 AM CDT -----  Please let him know his carotids were 50-69% percent. His legs were not good as we expected.   We will see him next week for the procedure ,[email or web address]

## 2019-06-13 ENCOUNTER — HOSPITAL ENCOUNTER (OUTPATIENT)
Dept: INTERVENTIONAL RADIOLOGY/VASCULAR | Age: 59
Discharge: HOME | End: 2019-06-13
Payer: MEDICARE

## 2019-06-13 VITALS
TEMPERATURE: 98.4 F | DIASTOLIC BLOOD PRESSURE: 52 MMHG | OXYGEN SATURATION: 91 % | HEART RATE: 60 BPM | BODY MASS INDEX: 26.37 KG/M2 | SYSTOLIC BLOOD PRESSURE: 152 MMHG | RESPIRATION RATE: 18 BRPM | WEIGHT: 174 LBS | HEIGHT: 68 IN

## 2019-06-13 DIAGNOSIS — I70.229 ATHEROSCLEROSIS OF ARTERY OF EXTREMITY WITH REST PAIN (HCC): ICD-10-CM

## 2019-06-13 PROBLEM — L97.909 ATHEROSCLEROSIS OF ARTERY OF EXTREMITY WITH ULCERATION (HCC): Status: ACTIVE | Noted: 2018-02-15

## 2019-06-13 PROBLEM — I70.299 ATHEROSCLEROSIS OF ARTERY OF EXTREMITY WITH ULCERATION (HCC): Status: ACTIVE | Noted: 2018-02-15

## 2019-06-13 LAB
ANION GAP SERPL CALCULATED.3IONS-SCNC: 12 MMOL/L (ref 7–19)
BUN BLDV-MCNC: 16 MG/DL (ref 6–20)
CALCIUM SERPL-MCNC: 9 MG/DL (ref 8.6–10)
CHLORIDE BLD-SCNC: 93 MMOL/L (ref 98–111)
CO2: 34 MMOL/L (ref 22–29)
CREAT SERPL-MCNC: 4.8 MG/DL (ref 0.5–1.2)
GFR NON-AFRICAN AMERICAN: 13
GLUCOSE BLD-MCNC: 97 MG/DL (ref 74–109)
HCT VFR BLD CALC: 29.5 % (ref 42–52)
HEMOGLOBIN: 9.2 G/DL (ref 14–18)
MCH RBC QN AUTO: 29.6 PG (ref 27–31)
MCHC RBC AUTO-ENTMCNC: 31.2 G/DL (ref 33–37)
MCV RBC AUTO: 94.9 FL (ref 80–94)
PDW BLD-RTO: 16.4 % (ref 11.5–14.5)
PLATELET # BLD: 95 K/UL (ref 130–400)
PMV BLD AUTO: 11.8 FL (ref 9.4–12.4)
POTASSIUM SERPL-SCNC: 4.3 MMOL/L (ref 3.5–5)
RBC # BLD: 3.11 M/UL (ref 4.7–6.1)
SODIUM BLD-SCNC: 139 MMOL/L (ref 136–145)
WBC # BLD: 3.7 K/UL (ref 4.8–10.8)

## 2019-06-13 PROCEDURE — 6370000000 HC RX 637 (ALT 250 FOR IP): Performed by: NURSE PRACTITIONER

## 2019-06-13 PROCEDURE — 6360000002 HC RX W HCPCS: Performed by: NURSE PRACTITIONER

## 2019-06-13 PROCEDURE — 36200 PLACE CATHETER IN AORTA: CPT | Performed by: SURGERY

## 2019-06-13 PROCEDURE — 75716 ARTERY X-RAYS ARMS/LEGS: CPT | Performed by: SURGERY

## 2019-06-13 PROCEDURE — 75625 CONTRAST EXAM ABDOMINL AORTA: CPT | Performed by: SURGERY

## 2019-06-13 PROCEDURE — 2709999900 IR AORTAGRAM ABDOMINAL SERIALOGRAM

## 2019-06-13 PROCEDURE — 2500000003 HC RX 250 WO HCPCS: Performed by: SURGERY

## 2019-06-13 PROCEDURE — 80048 BASIC METABOLIC PNL TOTAL CA: CPT

## 2019-06-13 PROCEDURE — 93970 EXTREMITY STUDY: CPT

## 2019-06-13 PROCEDURE — G0269 OCCLUSIVE DEVICE IN VEIN ART: HCPCS | Performed by: SURGERY

## 2019-06-13 PROCEDURE — 85027 COMPLETE CBC AUTOMATED: CPT

## 2019-06-13 PROCEDURE — 6360000002 HC RX W HCPCS: Performed by: SURGERY

## 2019-06-13 PROCEDURE — 6360000004 HC RX CONTRAST MEDICATION: Performed by: SURGERY

## 2019-06-13 PROCEDURE — 2580000003 HC RX 258: Performed by: SURGERY

## 2019-06-13 PROCEDURE — 36415 COLL VENOUS BLD VENIPUNCTURE: CPT

## 2019-06-13 PROCEDURE — 6370000000 HC RX 637 (ALT 250 FOR IP): Performed by: SURGERY

## 2019-06-13 RX ORDER — ASPIRIN 81 MG/1
81 TABLET ORAL ONCE
Status: COMPLETED | OUTPATIENT
Start: 2019-06-13 | End: 2019-06-13

## 2019-06-13 RX ORDER — FENTANYL CITRATE 50 UG/ML
INJECTION, SOLUTION INTRAMUSCULAR; INTRAVENOUS
Status: COMPLETED | OUTPATIENT
Start: 2019-06-13 | End: 2019-06-13

## 2019-06-13 RX ORDER — SUCRALFATE 1 G/1
1 TABLET ORAL 3 TIMES DAILY PRN
Status: ON HOLD | COMMUNITY
End: 2019-06-28 | Stop reason: HOSPADM

## 2019-06-13 RX ORDER — HEPARIN SODIUM 5000 [USP'U]/ML
INJECTION, SOLUTION INTRAVENOUS; SUBCUTANEOUS
Status: COMPLETED | OUTPATIENT
Start: 2019-06-13 | End: 2019-06-13

## 2019-06-13 RX ORDER — SODIUM CHLORIDE 9 MG/ML
INJECTION, SOLUTION INTRAVENOUS CONTINUOUS
Status: DISCONTINUED | OUTPATIENT
Start: 2019-06-13 | End: 2019-06-15 | Stop reason: HOSPADM

## 2019-06-13 RX ORDER — CLONIDINE HYDROCHLORIDE 0.1 MG/1
0.1 TABLET ORAL PRN
Status: DISCONTINUED | OUTPATIENT
Start: 2019-06-13 | End: 2019-06-15 | Stop reason: HOSPADM

## 2019-06-13 RX ORDER — IODIXANOL 320 MG/ML
INJECTION, SOLUTION INTRAVASCULAR
Status: COMPLETED | OUTPATIENT
Start: 2019-06-13 | End: 2019-06-13

## 2019-06-13 RX ORDER — ACETAMINOPHEN 325 MG/1
650 TABLET ORAL EVERY 4 HOURS PRN
Status: DISCONTINUED | OUTPATIENT
Start: 2019-06-13 | End: 2019-06-15 | Stop reason: HOSPADM

## 2019-06-13 RX ORDER — OXYCODONE HYDROCHLORIDE AND ACETAMINOPHEN 5; 325 MG/1; MG/1
1 TABLET ORAL EVERY 4 HOURS PRN
Status: DISCONTINUED | OUTPATIENT
Start: 2019-06-13 | End: 2019-06-15 | Stop reason: HOSPADM

## 2019-06-13 RX ORDER — VANCOMYCIN HYDROCHLORIDE 1 G/200ML
1000 INJECTION, SOLUTION INTRAVENOUS
Status: COMPLETED | OUTPATIENT
Start: 2019-06-13 | End: 2019-06-13

## 2019-06-13 RX ORDER — ONDANSETRON 2 MG/ML
4 INJECTION INTRAMUSCULAR; INTRAVENOUS EVERY 8 HOURS PRN
Status: DISCONTINUED | OUTPATIENT
Start: 2019-06-13 | End: 2019-06-15 | Stop reason: HOSPADM

## 2019-06-13 RX ORDER — SODIUM CHLORIDE 0.9 % (FLUSH) 0.9 %
10 SYRINGE (ML) INJECTION PRN
Status: DISCONTINUED | OUTPATIENT
Start: 2019-06-13 | End: 2019-06-15 | Stop reason: HOSPADM

## 2019-06-13 RX ORDER — LIDOCAINE HYDROCHLORIDE 20 MG/ML
INJECTION, SOLUTION INFILTRATION; PERINEURAL
Status: COMPLETED | OUTPATIENT
Start: 2019-06-13 | End: 2019-06-13

## 2019-06-13 RX ORDER — OXYCODONE HYDROCHLORIDE AND ACETAMINOPHEN 5; 325 MG/1; MG/1
2 TABLET ORAL EVERY 4 HOURS PRN
Status: DISCONTINUED | OUTPATIENT
Start: 2019-06-13 | End: 2019-06-15 | Stop reason: HOSPADM

## 2019-06-13 RX ORDER — MIDAZOLAM HYDROCHLORIDE 1 MG/ML
INJECTION INTRAMUSCULAR; INTRAVENOUS
Status: COMPLETED | OUTPATIENT
Start: 2019-06-13 | End: 2019-06-13

## 2019-06-13 RX ADMIN — SODIUM CHLORIDE: 9 INJECTION, SOLUTION INTRAVENOUS at 10:46

## 2019-06-13 RX ADMIN — MIDAZOLAM 0.5 MG: 1 INJECTION INTRAMUSCULAR; INTRAVENOUS at 12:34

## 2019-06-13 RX ADMIN — VANCOMYCIN HYDROCHLORIDE 1000 MG: 1 INJECTION, SOLUTION INTRAVENOUS at 12:23

## 2019-06-13 RX ADMIN — FENTANYL CITRATE 50 MCG: 50 INJECTION INTRAMUSCULAR; INTRAVENOUS at 12:44

## 2019-06-13 RX ADMIN — FENTANYL CITRATE 25 MCG: 50 INJECTION INTRAMUSCULAR; INTRAVENOUS at 12:34

## 2019-06-13 RX ADMIN — OXYCODONE HYDROCHLORIDE AND ACETAMINOPHEN 2 TABLET: 5; 325 TABLET ORAL at 13:34

## 2019-06-13 RX ADMIN — LIDOCAINE HYDROCHLORIDE 10 ML: 20 INJECTION, SOLUTION INFILTRATION; PERINEURAL at 12:35

## 2019-06-13 RX ADMIN — ASPIRIN 81 MG: 81 TABLET ORAL at 10:55

## 2019-06-13 RX ADMIN — HEPARIN SODIUM 5000 UNITS: 5000 INJECTION, SOLUTION INTRAVENOUS; SUBCUTANEOUS at 12:35

## 2019-06-13 RX ADMIN — IODIXANOL 120 ML: 320 INJECTION, SOLUTION INTRAVASCULAR at 12:49

## 2019-06-13 ASSESSMENT — PAIN SCALES - GENERAL: PAINLEVEL_OUTOF10: 8

## 2019-06-13 NOTE — PROGRESS NOTES
Patient and mother instructed on care of left groin site post arteriogram.  Given written instructions. Both stated understanding.

## 2019-06-13 NOTE — PROGRESS NOTES
Returned post arteriogram.  Awake and alert. Puncture site to left groin clear with gauze and tegaderm dressing in place. Mother at bedside.

## 2019-06-13 NOTE — PROGRESS NOTES
Vascular lab preliminary. Bilateral lower extremity vein mapping and marking completed. Right GSV: Zone 1:  1.5mm    Zone 2:  1.8mm    Zone 3:  non- visual    Zone 4:  3.3mm    Zone 5:  3.9mm    Zone 6:  4.2mm    Zone 7:  3.3mm    Zone 8:  4.5mm    Left GSV: Zone 1:  6.5mm    Zone 2:  4.9mm    Zone 3:  3.3mm    Zone 4:  3.1mm    Zone 5:  3.5mm    Zone 6:  3.3mm    Zone 7:  3.0mm    Zone 8:  4.9mm  Final report pending.

## 2019-06-17 ENCOUNTER — OFFICE VISIT (OUTPATIENT)
Dept: VASCULAR SURGERY | Age: 59
End: 2019-06-17
Payer: MEDICARE

## 2019-06-17 VITALS
RESPIRATION RATE: 18 BRPM | DIASTOLIC BLOOD PRESSURE: 63 MMHG | SYSTOLIC BLOOD PRESSURE: 170 MMHG | TEMPERATURE: 98.6 F | HEART RATE: 74 BPM | OXYGEN SATURATION: 96 %

## 2019-06-17 DIAGNOSIS — I70.213 ATHEROSCLEROSIS OF NATIVE ARTERY OF BOTH LOWER EXTREMITIES WITH INTERMITTENT CLAUDICATION (HCC): ICD-10-CM

## 2019-06-17 DIAGNOSIS — Z01.818 PRE-OP TESTING: Primary | ICD-10-CM

## 2019-06-17 DIAGNOSIS — I70.299 ATHEROSCLEROSIS OF ARTERY OF EXTREMITY WITH ULCERATION (HCC): Primary | ICD-10-CM

## 2019-06-17 DIAGNOSIS — I70.229 ATHEROSCLEROSIS OF ARTERY OF EXTREMITY WITH REST PAIN (HCC): ICD-10-CM

## 2019-06-17 DIAGNOSIS — L97.909 ATHEROSCLEROSIS OF ARTERY OF EXTREMITY WITH ULCERATION (HCC): Primary | ICD-10-CM

## 2019-06-17 PROCEDURE — G8427 DOCREV CUR MEDS BY ELIG CLIN: HCPCS | Performed by: NURSE PRACTITIONER

## 2019-06-17 PROCEDURE — 1036F TOBACCO NON-USER: CPT | Performed by: NURSE PRACTITIONER

## 2019-06-17 PROCEDURE — 3017F COLORECTAL CA SCREEN DOC REV: CPT | Performed by: NURSE PRACTITIONER

## 2019-06-17 PROCEDURE — G8598 ASA/ANTIPLAT THER USED: HCPCS | Performed by: NURSE PRACTITIONER

## 2019-06-17 PROCEDURE — G8419 CALC BMI OUT NRM PARAM NOF/U: HCPCS | Performed by: NURSE PRACTITIONER

## 2019-06-17 PROCEDURE — 99214 OFFICE O/P EST MOD 30 MIN: CPT | Performed by: NURSE PRACTITIONER

## 2019-06-18 ENCOUNTER — HOSPITAL ENCOUNTER (OUTPATIENT)
Dept: GENERAL RADIOLOGY | Age: 59
Discharge: HOME | End: 2019-06-18
Payer: MEDICARE

## 2019-06-18 ENCOUNTER — HOSPITAL ENCOUNTER (OUTPATIENT)
Dept: PREADMISSION TESTING | Age: 59
Discharge: HOME | End: 2019-06-22
Payer: MEDICARE

## 2019-06-18 ENCOUNTER — TELEPHONE (OUTPATIENT)
Dept: VASCULAR SURGERY | Age: 59
End: 2019-06-18

## 2019-06-18 ENCOUNTER — TELEPHONE (OUTPATIENT)
Dept: CARDIOLOGY | Facility: CLINIC | Age: 59
End: 2019-06-18

## 2019-06-18 VITALS — HEIGHT: 68 IN | BODY MASS INDEX: 26.37 KG/M2 | WEIGHT: 174 LBS

## 2019-06-18 DIAGNOSIS — Z01.818 PRE-OP TESTING: ICD-10-CM

## 2019-06-18 LAB
EKG P AXIS: -56 DEGREES
EKG P-R INTERVAL: 202 MS
EKG Q-T INTERVAL: 408 MS
EKG QRS DURATION: 104 MS
EKG QTC CALCULATION (BAZETT): 441 MS
EKG T AXIS: 119 DEGREES

## 2019-06-18 PROCEDURE — 93005 ELECTROCARDIOGRAM TRACING: CPT

## 2019-06-18 PROCEDURE — 71046 X-RAY EXAM CHEST 2 VIEWS: CPT

## 2019-06-18 PROCEDURE — 87081 CULTURE SCREEN ONLY: CPT

## 2019-06-18 RX ORDER — CLOPIDOGREL BISULFATE 75 MG/1
75 TABLET ORAL DAILY
COMMUNITY
End: 2019-08-29 | Stop reason: SDUPTHER

## 2019-06-18 NOTE — TELEPHONE ENCOUNTER
Donald Ramachandran with Dr Jerardo Rader. Wolfgang's office returned my call in regards to cardiac clearance prior to surgery. Informed her that we have him scheduled for surgery (right femoral-tibial/peroneal bypass) under general anesthesia at [facility] on 6/25/19 with Dr. Garo Alejo for his severe PVD and ischemic leg with multiple ischemic foot wounds with risk of impending leg loss. We are requesting urgent cardiac clearance prior to surgery due to abnormal EKG done at [facility] today 6/18/19. Per Donald Ramachandran, patient hasn't been seen in their office since March 2018. She will check with Dr. Ngoc Cheung and let us know. She request that we fax the request for cardiac clearance & EKG to her attention at [phone]. Request for cardiac clearance & EKG were faxed with fax confirmation received.

## 2019-06-18 NOTE — PROGRESS NOTES
Called cardiology read ekg results to rita sepulveda rn.  States they are having pt get cardiac clearance with dr. Era Magallon.

## 2019-06-18 NOTE — TELEPHONE ENCOUNTER
Left message on nurse voice mail to call our office in regards to needing cardiac clearance prior to surgery with Dr. Chino Hobbs.

## 2019-06-19 ENCOUNTER — TELEPHONE (OUTPATIENT)
Dept: VASCULAR SURGERY | Age: 59
End: 2019-06-19

## 2019-06-19 DIAGNOSIS — I20.0 UNSTABLE ANGINA (HCC): Primary | ICD-10-CM

## 2019-06-19 PROBLEM — I70.213 ATHEROSCLEROSIS OF NATIVE ARTERY OF BOTH LOWER EXTREMITIES WITH INTERMITTENT CLAUDICATION (HCC): Status: ACTIVE | Noted: 2019-06-19

## 2019-06-19 LAB — MRSA CULTURE ONLY: NORMAL

## 2019-06-19 NOTE — TELEPHONE ENCOUNTER
Donald Ramachandran with Dr. Terry Mcbride office called to let us know that before Dr. Ngoc Cheung will clear for surgery he will need an echo & stress test. They will get scheduled ASAP but Dr. Ngoc Cheung is off after today and will not be back until Mon. 6/24/19 and surgery may need to be post-pone but she will call and let us know when they get these test scheduled. They will contact the patient. GA Napoles was informed. 11:37am Spoke with patient to inform him that I talked with Dr. Terry Mcbride office and that they will need to set him up for further testing before Dr. Ngoc Cheung can clear him for surgery. Patient states that he thought he had been cleared by physician at [facility] last year. Informed him that his EKG done yesterday had changes from his last EKG and that we will need to get clearance prior to surgery. Dr. Terry Mcbride office will be contacting him to set up testing for his cardiac clearance. Surgery may have to be post-pone if we can't get cardiac clearance prior to surgery scheduled on 7/25/19.

## 2019-06-19 NOTE — PROGRESS NOTES
Patient Care Team:  Vicky Santos DO as PCP - General (Family Medicine)  O [facility] Shayan Key MD as Consulting Physician (Cardiology)      History and Physical    Maura Saavedra has a history of peripheral vascular disease of the lower extremities. He has had this for 1 - 5 years. Current treatment includes clopidogrel 75 mg po qd, ASA EC daily. Maura Saavedra has new wounds. Recently, he reports claudication at a distance of  50 feet. Maura Saavedra reports that the right leg is more signifcant than the left . He reports claudication is worsened and is mostly in the form of crampy type pain starting in the hips and calves. He has a short recovery time. This is reproducible in nature. He reports ischemic rest pain a few times per night. He reports walking with cart does not help. Old records have been obtained, reviewed, and summarized.     Shaunna Apodaca is a 61 y.o. male with the following history reviewed and recorded in [facility]:  Patient Active Problem List    Diagnosis Date Noted    Atherosclerosis of native artery of both lower extremities with intermittent claudication ([address] 06/19/2019    Atherosclerosis of artery of extremity with rest pain ([address] 02/28/2018    Skin ulcer of left foot with fat layer exposed ([address] 02/28/2018    Atherosclerosis of artery of extremity with ulceration ([address] 02/15/2018    Steal syndrome of dialysis vascular access ([address] 01/30/2018    Steal syndrome as complication of dialysis access ([address] 01/25/2018    ESRD on dialysis ([address]     Open wnd of finger 01/22/2018    Chronic deep vein thrombosis (DVT) of axillary vein of left upper extremity ([address] 01/20/2017    Superficial venous thrombosis of left upper extremity 12/16/2016    Osteoarthritis     CHF (congestive heart failure) ([address]     CAD (coronary artery disease)     HTN (hypertension)     Liver disease      cirrosis, hep c       Current Outpatient Medications   Medication Sig Dispense Refill    sucralfate (CARAFATE) 1 GM tablet Take 1 g by mouth 3 times daily as needed      isosorbide mononitrate (IMDUR) 30 MG extended release tablet Take 30 mg by mouth daily      Ciprofloxacin-Ciproflox HCl (CIPRO XR PO) Take 500 mg by mouth 2 times daily      oxyCODONE-acetaminophen (PERCOCET)  MG per tablet Take 1 tablet by mouth every 4 hours as needed for Pain.  lactulose (CEPHULAC) 10 G packet Take 10 g by mouth 3 times daily Indications: Disorder of the Liver Enzymes       minoxidil (LONITEN) 10 MG tablet Take 10 mg by mouth daily Indications: High Blood Pressure       cloNIDine (CATAPRES) 0.2 MG tablet Take 0.3 mg by mouth 3 times daily Indications: High Blood Pressure       lisinopril (PRINIVIL;ZESTRIL) 20 MG tablet Take 10 mg by mouth 2 times daily       amLODIPine (NORVASC) 10 MG tablet Take 10 mg by mouth daily Indications: High Blood Pressure       Calcium Acetate, Phos Binder, (PHOSLO PO) Take 3 tablets by mouth 3 times daily (with meals) Indications: Kidney Failure       clopidogrel (PLAVIX) 75 MG tablet Take 75 mg by mouth daily       No current facility-administered medications for this visit. Allergies: Patient has no known allergies. Past Medical History:   Diagnosis Date    Anxiety     CAD (coronary artery disease)     stents x 2; per dr. Stef Stevens [facility])     liver cancer    CHF (congestive heart failure) ([facility]     Chyloperitoneum determined by paracentesis     CKD (chronic kidney disease), stage V ([facility]     Dialysis patient ([facility]     mon wed fri at [place])     mon wed fri in [place] Hepatic cirrhosis ([facility]     dr. Esteban Del Castillo; has [facility].    Hep[facility] C, chronic ([facility]     History of blood transfusion     HTN (hypertension)     Hx of blood clots     arm/fistula    Osteoarthritis     PVD (peripheral vascular disease) ([facility]     Sleep apnea     no cpap at present.  Type II diabetes mellitus (HCC)     no meds. Past Surgical History:   Procedure Laterality Date    ANGIOPLASTY  08/09/11 [facility]    LUE cephalic vein balloon angioplasty with 7mm x 4cm balloon. coild embolization of 2 cephallic vein brances with 3mm x 5cm coils    CARDIAC CATHETERIZATION  04/04/11    cardiac cath and stent x1 by Dr. Jarod Shipley  07/26/11 [facility]    LU AV fistula. coild embolization of cephalic vein branch near the wrist with 3mm EMBO coils. balloon a'plasty left cephalic vein with [id number] balloon and then [id number] balloon    DIALYSIS FISTULA CREATION Left 2/16/2017    LEFT UPPER EXTREMITY BRACHIAL / AXILLARY GRAFT AND STENT LEFT SUBCLAVIAN VEIN  performed by Jason Murray MD at [address] [facility]""  12/26/2010    Right internal jugular vein tunneled dialysis catheter placement    OTHER SURGICAL HISTORY  [id number]    Redo of dialysis catheter    OTHER SURGICAL HISTORY  9/6/2011   [facility]    Removal of RT IJV tunneled dialysis cath    OTHER SURGICAL HISTORY  5/22/12   [facility]    Ultrasound-guided cannulation of left cephalic vein in the mid forearm toward the AV anastomosis, Left upper  ext. fistulograms including venograms of the SVC, Left cephalic vein balloon angioplasty with a 4mm x 100mm Tod balloon, Completion fistulograms    PACEMAKER PLACEMENT      medtronic    PARACENTESIS      multiple/recent; per dr. Socorro Adams at [address] [place] [address] Left 1/30/2018    UPPER EXTREMITY DRIL PROCEDURE WITH LEFT SAPHENOUS VEIN HARVESTING performed by Jason Murray MD at [address] [place] [address]  6/19/12    [facility] arteriovenous fistulogram including venography of the superior vena cava. Balloon angioplasty, left forearm cephalic vein and upper arm cephalic vein with [id number] tod balloon.  VASCULAR SURGERY  7/10/2012 [facility]     Revision of left distal radial artery to cephalic vein arteriovenous fistula with 7mm Artegraft interposition.      VASCULAR SURGERY  7/30/15 [facility] [address]    Left upper confusion. All other review of systems are negative. Physical Exam    BP (!) 170/63 Comment: right  Pulse 74   Temp 98.6 °F (37 °C)   Resp 18   SpO2 96%     Constitutional - well developed, well nourished. No diaphoresis or acute distress. HENT - head normocephalic. Right external ear canal appears normal.  Left external ear canal appears normal.  Septum appears midline. Eyes - conjunctiva normal.  EOMS normal.  No exudate. No icterus. Neck- ROM appears normal, no tracheal deviation. Cardiovascular - Regular rate and rhythm. Heart sounds are normal.  No murmur, rub, or gallop. Carotid pulses are 2+ to palpation bilaterally without bruit. Extremities - Radial and brachial pulses are 2+ to palpation bilaterally. Right femoral pulse: present 2+; Right popliteal pulse: absent Right DP: absent; Right PT absent; Left femoral pulse: present 2+; Left popliteal pulse: absent; Left DP: absent; Left PT: absent   No cyanosis, clubbing, or significant edema. No signs atheroembolic event. Pulmonary - effort appears normal.  No respiratory distress. Lungs - Breath sounds normal. No wheezes or rales. GI - Abdomen - soft, non tender, bowel sounds X 4 quadrants. No guarding or rebound tenderness. No distension or palpable mass. Genitourinary - deferred. Musculoskeletal - ROM appears normal.  No significant edema. Neurologic - alert and oriented X 3. Physiologic. Skin - wounds involving right foot posterior first metatarsal, 3rd tip metatarsal, and 5th metatarsal with fat layer exposed  Psychiatric - mood, affect, and behavior appear normal.  Judgment and thought processes appear normal.    Risk factors for atherosclerosis of all vascular beds have been reviewed with the patient including:  Family history, tobacco abuse in all forms, elevated cholesterol, hyperlipidemia, and diabetes.     Lower extremity arterial study:  The patient has severely diminished ankle-brachial indices right lower  extremity(ies) which would be consistent with rest pain symptoms.    Based on segmental pressures and doppler waveforms, the patient likely has    disease in the right superficial femoral, popliteal, tibial arterial    segments.    The patient has noncompressible left tibial arteries. This would be    consistent with a history of diabetes and calcified tibial vessels.    However, there is good doppler waveforms from the bilateral femoral all    the way to the tibial arteries. Great toe pressure is mildly diminished on    the left. This wound be consistent with mild decrease in arterial flow to    the left foot, probably through tibial level disease      . Individual films reviewed: Yes. Test results were reviewed with the patient. Disease process is unstable    Angiogram -  right SFA, Calcified stenosis right CFA and below knee popliteal artery, GWYN stenosis    Individual images were reviewed. Results were reviewed with the patient. Options have been discussed with the patient including continued medical management vs. proceeding with right fem to TPT bypass with left GSV . Patient has opted to proceed with this. Risks have been discussed with the patient including but not limited to MI, death, CVA, bleed, nerve injury, and infection. I spent a long time talking with family and patient. He understands without surgery he will loose his leg. He also understands he is at higher risk with surgery due to his multiple medical co-morbidities. He understands this and wants to proceed        Assessment    1. Atherosclerosis of artery of extremity with ulceration ([address]    2.  Atherosclerosis of artery of extremity with rest pain ([address]    3. Atherosclerosis of native artery of both lower extremities with intermittent claudication ([address]          Plan    Right femoral to TPT bypass with left GSV  Addendum - EKG shows possible ischemia - this has been sent to Dr. Costa for clearance      Recommend no smoking  Strongly encourage statin therapy

## 2019-06-20 ENCOUNTER — TELEPHONE (OUTPATIENT)
Dept: VASCULAR SURGERY | Age: 59
End: 2019-06-20

## 2019-06-20 DIAGNOSIS — I25.10 CORONARY ARTERY DISEASE INVOLVING NATIVE CORONARY ARTERY OF NATIVE HEART, ANGINA PRESENCE UNSPECIFIED: Primary | ICD-10-CM

## 2019-06-20 DIAGNOSIS — R01.1 HEART MURMUR: ICD-10-CM

## 2019-06-20 NOTE — TELEPHONE ENCOUNTER
Patient called the office wanting to know if we received a copy of his test (Echo) done 12/10/18. He wants to know if this test will clear him for his surgery. Informed him that we did received the echo report but that we will still need cardiac clearance from Dr. Dionne Delarosa prior to surgery due to changes on his EKG done on 6/18/19. He request that we send the echo report dated 12/10/18 to Dr. Sharon Marley.  Alda faxed echo report (12/10/18) to [facility] with Dr. Sid Dc office per patient request.

## 2019-06-20 NOTE — TELEPHONE ENCOUNTER
Darrius Moore with Dr. Gurwinder Gambino office called and left a message that they are trying to get him scheduled for testing tomorrow 6/21/19 but that it will be Mon. 6/24/19 before they will have the results to determine if he will be cleared or not. She will give us a call with the status of clearance on Mon. 6/24/19 but that it may be late on Monday before she can call.

## 2019-06-21 ENCOUNTER — HOSPITAL ENCOUNTER (OUTPATIENT)
Dept: CARDIOLOGY | Facility: HOSPITAL | Age: 59
Discharge: HOME | End: 2019-06-21
Admitting: NURSE PRACTITIONER

## 2019-06-21 ENCOUNTER — HOSPITAL ENCOUNTER (OUTPATIENT)
Dept: CARDIOLOGY | Facility: HOSPITAL | Age: 59
Discharge: HOME | End: 2019-06-21

## 2019-06-21 VITALS
DIASTOLIC BLOOD PRESSURE: 70 MMHG | BODY MASS INDEX: 25.19 KG/M2 | HEART RATE: 62 BPM | HEIGHT: 70 IN | SYSTOLIC BLOOD PRESSURE: 143 MMHG | WEIGHT: 175.93 LBS

## 2019-06-21 VITALS
HEIGHT: 70 IN | BODY MASS INDEX: 25.2 KG/M2 | DIASTOLIC BLOOD PRESSURE: 64 MMHG | SYSTOLIC BLOOD PRESSURE: 137 MMHG | WEIGHT: 176 LBS

## 2019-06-21 DIAGNOSIS — R01.1 HEART MURMUR: ICD-10-CM

## 2019-06-21 DIAGNOSIS — I25.10 CORONARY ARTERY DISEASE INVOLVING NATIVE CORONARY ARTERY OF NATIVE HEART, ANGINA PRESENCE UNSPECIFIED: ICD-10-CM

## 2019-06-21 PROCEDURE — 78452 HT MUSCLE IMAGE SPECT MULT: CPT | Performed by: INTERNAL MEDICINE

## 2019-06-21 PROCEDURE — 93017 CV STRESS TEST TRACING ONLY: CPT

## 2019-06-21 PROCEDURE — 0 TECHNETIUM SESTAMIBI: Performed by: NURSE PRACTITIONER

## 2019-06-21 PROCEDURE — 25010000002 REGADENOSON 0.4 MG/5ML SOLUTION: Performed by: INTERNAL MEDICINE

## 2019-06-21 PROCEDURE — 93306 TTE W/DOPPLER COMPLETE: CPT | Performed by: INTERNAL MEDICINE

## 2019-06-21 PROCEDURE — A9500 TC99M SESTAMIBI: HCPCS | Performed by: NURSE PRACTITIONER

## 2019-06-21 PROCEDURE — 93018 CV STRESS TEST I&R ONLY: CPT | Performed by: INTERNAL MEDICINE

## 2019-06-21 PROCEDURE — 78452 HT MUSCLE IMAGE SPECT MULT: CPT

## 2019-06-21 PROCEDURE — 93306 TTE W/DOPPLER COMPLETE: CPT

## 2019-06-21 RX ADMIN — TECHNETIUM TC 99M SESTAMIBI 1 DOSE: 1 INJECTION INTRAVENOUS at 08:49

## 2019-06-21 RX ADMIN — REGADENOSON 0.4 MG: 0.08 INJECTION, SOLUTION INTRAVENOUS at 09:50

## 2019-06-21 RX ADMIN — TECHNETIUM TC 99M SESTAMIBI 1 DOSE: 1 INJECTION INTRAVENOUS at 10:07

## 2019-06-23 NOTE — OP NOTE
[facility] [facility] [address] [facility]                                OPERATIVE REPORT    PATIENT NAME: Swapna Pineda                       :        1960  MED REC NO:   [id number]                              ROOM:  ACCOUNT NO:   [de-identified]                           ADMIT DATE: 2019  PROVIDER:     Earl Llanes MD    DATE OF PROCEDURE:  2019    PREPROCEDURE DIAGNOSES:  1. Atherosclerosis of native arteries bilateral lower extremities with  right foot ulceration. 2.  End-stage renal disease, on hemodialysis. 3.  Congestive heart failure. 4.  Coronary artery disease. 5.  Hypertension. 6.  Liver disease. POSTPROCEDURE DIAGNOSES:  1. Atherosclerosis of native arteries bilateral lower extremities with  right foot ulceration. 2.  End-stage renal disease, on hemodialysis. 3.  Congestive heart failure. 4.  Coronary artery disease. 5.  Hypertension. 6.  Liver disease. PROCEDURES:  1. Percutaneous cannulation left common femoral artery with 5-[place]  Glidesheath. 2.  Suprarenal abdominal aortogram with bilateral iliofemoral  arteriogram.  3.  Bilateral lower extremity arteriogram.  4.  Mynx closure left common femoral artery puncture site. SURGEON:  Earl Llanes MD    ANESTHESIA:  Moderate sedation plus local.    ESTIMATED BLOOD LOSS:  Less than 50 mL. FINDINGS:  1. The celiac and superior mesenteric arteries are patent. The right  renal artery is patent. The left renal artery is not well visualized. The infrarenal abdominal aorta is fairly widely patent. The patient has  bilateral common iliac artery kissing stents, both of which are fairly  widely patent. The right internal and external iliac arteries are both  widely patent. The left internal iliac artery has a moderate stenosis  proximally. The left external iliac artery is fairly widely patent.   2.  The right common femoral artery has an 80% to 90% on the  angiography suite table in supine position. Intravenous/moderate  sedation was administered. Both groins were prepped and draped in the  usual sterile procedure. Skin and subcutaneous tissues in the left groin were anesthetized with  lidocaine. Micropuncture needle was used to cannulate the left common  femoral artery over the femoral head without difficulty. Seldinger  technique was utilized to place a 5-[place] Glidesheath. Over an 0.035 wire, an Omni Flush catheter was placed up into the  suprarenal abdominal aorta. Suprarenal abdominal aortogram with  bilateral iliofemoral arteriograms were performed with the above  findings. The catheter was withdrawn to the distal abdominal aorta and  distal abdominal aortogram with bilateral iliofemoral arteriograms were  performed in both oblique views with the above findings. Finally,  bilateral lower extremity arteriograms were performed with the above  findings. The catheter was removed and then the sheath removed and left common  femoral artery puncture site was closed with the Mynx device. The  patient tolerated the procedure well and he was brought back to Cath  Holding in good condition. The plan will be to obtain bilateral lower extremity vein mapping and he  would be best served with a right femoral to either distal popliteal or  tibioperoneal trunk bypass.         Crys Cadet MD    D: 06/23/2019 15:13:44      T: 06/23/2019 15:15:39     [email or web address]  Job#: [id number]     Doc#: [id number]    CC:  Pastor Hayden MD

## 2019-06-24 ENCOUNTER — PREP FOR PROCEDURE (OUTPATIENT)
Dept: VASCULAR SURGERY | Age: 59
End: 2019-06-24

## 2019-06-24 ENCOUNTER — TELEPHONE (OUTPATIENT)
Dept: VASCULAR SURGERY | Age: 59
End: 2019-06-24

## 2019-06-24 LAB
BH CV ECHO MEAS - AO MAX PG (FULL): 15.9 MMHG
BH CV ECHO MEAS - AO MAX PG: 20.3 MMHG
BH CV ECHO MEAS - AO MEAN PG (FULL): 9 MMHG
BH CV ECHO MEAS - AO MEAN PG: 12 MMHG
BH CV ECHO MEAS - AO ROOT AREA (BSA CORRECTED): 2
BH CV ECHO MEAS - AO ROOT AREA: 11.9 CM^2
BH CV ECHO MEAS - AO ROOT DIAM: 3.9 CM
BH CV ECHO MEAS - AO V2 MAX: 225 CM/SEC
BH CV ECHO MEAS - AO V2 MEAN: 162 CM/SEC
BH CV ECHO MEAS - AO V2 VTI: 57.3 CM
BH CV ECHO MEAS - AVA(I,A): 2.1 CM^2
BH CV ECHO MEAS - AVA(I,D): 2.1 CM^2
BH CV ECHO MEAS - AVA(V,A): 1.9 CM^2
BH CV ECHO MEAS - AVA(V,D): 1.9 CM^2
BH CV ECHO MEAS - BSA(HAYCOCK): 2 M^2
BH CV ECHO MEAS - BSA: 2 M^2
BH CV ECHO MEAS - BZI_BMI: 25.3 KILOGRAMS/M^2
BH CV ECHO MEAS - BZI_METRIC_HEIGHT: 177.8 CM
BH CV ECHO MEAS - BZI_METRIC_WEIGHT: 79.8 KG
BH CV ECHO MEAS - EDV(CUBED): 162.8 ML
BH CV ECHO MEAS - EDV(MOD-SP4): 147 ML
BH CV ECHO MEAS - EDV(TEICH): 145 ML
BH CV ECHO MEAS - EF(CUBED): 65.8 %
BH CV ECHO MEAS - EF(MOD-SP4): 56.5 %
BH CV ECHO MEAS - EF(TEICH): 56.7 %
BH CV ECHO MEAS - ESV(CUBED): 55.7 ML
BH CV ECHO MEAS - ESV(MOD-SP4): 63.9 ML
BH CV ECHO MEAS - ESV(TEICH): 62.7 ML
BH CV ECHO MEAS - FS: 30 %
BH CV ECHO MEAS - IVS/LVPW: 1
BH CV ECHO MEAS - IVSD: 1.6 CM
BH CV ECHO MEAS - LA DIMENSION: 5.8 CM
BH CV ECHO MEAS - LA/AO: 1.5
BH CV ECHO MEAS - LAT PEAK E' VEL: 25.9 CM/SEC
BH CV ECHO MEAS - LV DIASTOLIC VOL/BSA (35-75): 74.4 ML/M^2
BH CV ECHO MEAS - LV MASS(C)D: 392.5 GRAMS
BH CV ECHO MEAS - LV MASS(C)DI: 198.6 GRAMS/M^2
BH CV ECHO MEAS - LV MAX PG: 4.3 MMHG
BH CV ECHO MEAS - LV MEAN PG: 3 MMHG
BH CV ECHO MEAS - LV SYSTOLIC VOL/BSA (12-30): 32.3 ML/M^2
BH CV ECHO MEAS - LV V1 MAX: 104 CM/SEC
BH CV ECHO MEAS - LV V1 MEAN: 82.1 CM/SEC
BH CV ECHO MEAS - LV V1 VTI: 29.6 CM
BH CV ECHO MEAS - LVIDD: 5.5 CM
BH CV ECHO MEAS - LVIDS: 3.8 CM
BH CV ECHO MEAS - LVLD AP4: 9.1 CM
BH CV ECHO MEAS - LVLS AP4: 7.9 CM
BH CV ECHO MEAS - LVOT AREA (M): 4.2 CM^2
BH CV ECHO MEAS - LVOT AREA: 4.2 CM^2
BH CV ECHO MEAS - LVOT DIAM: 2.3 CM
BH CV ECHO MEAS - LVPWD: 1.5 CM
BH CV ECHO MEAS - MED PEAK E' VEL: 25.9 CM/SEC
BH CV ECHO MEAS - MV A MAX VEL: 122 CM/SEC
BH CV ECHO MEAS - MV DEC SLOPE: 641 CM/SEC^2
BH CV ECHO MEAS - MV DEC TIME: 0.22 SEC
BH CV ECHO MEAS - MV E MAX VEL: 138 CM/SEC
BH CV ECHO MEAS - MV E/A: 1.1
BH CV ECHO MEAS - RAP SYSTOLE: 5 MMHG
BH CV ECHO MEAS - RVSP: 40 MMHG
BH CV ECHO MEAS - SI(AO): 346.2 ML/M^2
BH CV ECHO MEAS - SI(CUBED): 54.1 ML/M^2
BH CV ECHO MEAS - SI(LVOT): 62.2 ML/M^2
BH CV ECHO MEAS - SI(MOD-SP4): 42 ML/M^2
BH CV ECHO MEAS - SI(TEICH): 41.6 ML/M^2
BH CV ECHO MEAS - SV(AO): 684.5 ML
BH CV ECHO MEAS - SV(CUBED): 107 ML
BH CV ECHO MEAS - SV(LVOT): 123 ML
BH CV ECHO MEAS - SV(MOD-SP4): 83.1 ML
BH CV ECHO MEAS - SV(TEICH): 82.2 ML
BH CV ECHO MEAS - TR MAX VEL: 296 CM/SEC
BH CV ECHO MEASUREMENTS AVERAGE E/E' RATIO: 5.33
BH CV STRESS BP STAGE 1: NORMAL
BH CV STRESS COMMENTS STAGE 1: NORMAL
BH CV STRESS DOSE REGADENOSON STAGE 1: 0.4
BH CV STRESS DURATION MIN STAGE 1: 0
BH CV STRESS DURATION SEC STAGE 1: 10
BH CV STRESS HR STAGE 1: 60
BH CV STRESS PROTOCOL 1: NORMAL
BH CV STRESS RECOVERY BP: NORMAL MMHG
BH CV STRESS RECOVERY HR: 60 BPM
BH CV STRESS STAGE 1: 1
LEFT ATRIUM VOLUME INDEX: 59.1 ML/M2
LEFT ATRIUM VOLUME: 117 CM3
LV EF 2D ECHO EST: 55 %
LV EF NUC BP: 56 %
MAXIMAL PREDICTED HEART RATE: 161 BPM
MAXIMAL PREDICTED HEART RATE: 161 BPM
PERCENT MAX PREDICTED HR: 37.27 %
STRESS BASELINE BP: NORMAL MMHG
STRESS BASELINE HR: 63 BPM
STRESS PERCENT HR: 44 %
STRESS POST EXERCISE DUR SEC: 10 SEC
STRESS POST PEAK BP: NORMAL MMHG
STRESS POST PEAK HR: 60 BPM
STRESS TARGET HR: 137 BPM
STRESS TARGET HR: 137 BPM

## 2019-06-24 RX ORDER — ASPIRIN 81 MG/1
81 TABLET ORAL ONCE
Status: CANCELLED | OUTPATIENT
Start: 2019-06-24 | End: 2019-06-24

## 2019-06-24 RX ORDER — SODIUM CHLORIDE 0.9 % (FLUSH) 0.9 %
10 SYRINGE (ML) INJECTION EVERY 12 HOURS SCHEDULED
Status: CANCELLED | OUTPATIENT
Start: 2019-06-24

## 2019-06-24 RX ORDER — SODIUM CHLORIDE 0.9 % (FLUSH) 0.9 %
10 SYRINGE (ML) INJECTION PRN
Status: CANCELLED | OUTPATIENT
Start: 2019-06-24

## 2019-06-24 NOTE — TELEPHONE ENCOUNTER
Trav Sender with Dr. Laura Wagner office called to let us know that he has been cleared for surgery per Dr. Elva Cervantes. Trav Sender ask that I call the patient to let him know that Dr. Elva Cervantes has cleared him for surgery. Trav Sender states she will fax clearance letter later today. GA Martin was informed.

## 2019-06-24 NOTE — H&P (VIEW-ONLY)
Patient Care Team:  Erna Patton DO as PCP - General (Family Medicine)  [facility] Shayan Key MD as Consulting Physician (Cardiology)        History and Physical     Batool Cavazos has a history of peripheral vascular disease of the lower extremities. He has had this for 1 - 5 years. Current treatment includes clopidogrel 75 mg po qd, ASA EC daily. Batool Cavazos has new wounds. Recently, he reports claudication at a distance of  50 feet. Batool Cavazos reports that the right leg is more signifcant than the left . He reports claudication is worsened and is mostly in the form of crampy type pain starting in the hips and calves. He has a short recovery time. This is reproducible in nature. He reports ischemic rest pain a few times per night.   He reports walking with cart does not help.     Old records have been obtained, reviewed, and summarized.     Aldair Delvalle is a 61 y.o. male with the following history reviewed and recorded in [facility]:        Patient Active Problem List     Diagnosis Date Noted    Atherosclerosis of native artery of both lower extremities with intermittent claudication ([address] 06/19/2019    Atherosclerosis of artery of extremity with rest pain ([address] 02/28/2018    Skin ulcer of left foot with fat layer exposed ([address] 02/28/2018    Atherosclerosis of artery of extremity with ulceration ([address] 02/15/2018    Steal syndrome of dialysis vascular access ([address] 01/30/2018    Steal syndrome as complication of dialysis access ([address] 01/25/2018    ESRD on dialysis [facility])      Open wnd of finger 01/22/2018    Chronic deep vein thrombosis (DVT) of axillary vein of left upper extremity ([address] 01/20/2017    Superficial venous thrombosis of left upper extremity 12/16/2016    Osteoarthritis      CHF (congestive heart failure) ([address]      CAD (coronary artery disease)      HTN (hypertension)      Liver disease         cirrosis, hep c         Current Facility-Administered Medications          Current Outpatient Medications   Medication Sig Dispense Refill    sucralfate (CARAFATE) 1 GM tablet Take 1 g by mouth 3 times daily as needed        isosorbide mononitrate (IMDUR) 30 MG extended release tablet Take 30 mg by mouth daily        Ciprofloxacin-Ciproflox HCl (CIPRO XR PO) Take 500 mg by mouth 2 times daily        oxyCODONE-acetaminophen (PERCOCET)  MG per tablet Take 1 tablet by mouth every 4 hours as needed for Pain.        lactulose (CEPHULAC) 10 G packet Take 10 g by mouth 3 times daily Indications: Disorder of the Liver Enzymes         minoxidil (LONITEN) 10 MG tablet Take 10 mg by mouth daily Indications: High Blood Pressure         cloNIDine (CATAPRES) 0.2 MG tablet Take 0.3 mg by mouth 3 times daily Indications: High Blood Pressure         lisinopril (PRINIVIL;ZESTRIL) 20 MG tablet Take 10 mg by mouth 2 times daily         amLODIPine (NORVASC) 10 MG tablet Take 10 mg by mouth daily Indications: High Blood Pressure         Calcium Acetate, Phos Binder, (PHOSLO PO) Take 3 tablets by mouth 3 times daily (with meals) Indications: Kidney Failure         clopidogrel (PLAVIX) 75 MG tablet Take 75 mg by mouth daily          No current facility-administered medications for this visit.          Allergies: Patient has no known allergies.   Past Medical History        Past Medical History:   Diagnosis Date    Anxiety      CAD (coronary artery disease)       stents x 2; per dr. Miguel Odell [facility])       liver cancer    CH[facility] (congestive heart failure) [facility]      Chyloperitoneum determined by paracentesis      CKD (chronic kidney disease), stage V ([facility]      Dialysis patient ([facility]       mon wed fri at [place] Hemodialysis patient [facility])       mon wed fri in [place] Hepatic cirrhosis [facility])       dr. Roxanna Landrum; has been going to [facility] for liver and kidney transplant list.    Hepatitis C, chronic ([facility]      History of blood transfusion      HTN (hypertension)      Hx of blood clots       arm/fistula    Osteoarthritis      PVD (peripheral vascular disease) ([facility])      Sleep apnea       no cpap at present.  Type II diabetes mellitus ([facility]       no meds.         Past Surgical History         Past Surgical History:   Procedure Laterality Date    ANGIOPLASTY   08/09/11 hospitals     LULEONARD cephalic vein balloon angioplasty with 7mm x 4cm balloon. coild embolization of 2 cephallic vein brances with 3mm x 5cm coils    CARDIAC CATHETERIZATION   04/04/11     cardiac cath and stent x1 by Dr. Myles Masters   07/26/11 hospitals     LULEONARD AV fistula. coild embolization of cephalic vein branch near the wrist with 3mm EMBO coils. balloon a'plasty left cephalic vein with [id number] balloon and then [id number] balloon    DIALYSIS FISTULA CREATION Left 2/16/2017     LEFT UPPER EXTREMITY BRACHIAL / AXILLARY GRAFT AND STENT LEFT SUBCLAVIAN VEIN  performed by Candi Horton MD at [address] [place] [address]   12/26/2010     Right internal jugular vein tunneled dialysis catheter placement    OTHER SURGICAL HISTORY   [id number]     Redo of dialysis catheter    OTHER SURGICAL HISTORY   9/6/2011   [facility]     Removal of RT IJV tunneled dialysis cath    OTHER SURGICAL HISTORY   5/22/12   hospitals     Ultrasound-guided cannulation of left cephalic vein in the mid forearm toward the AV anastomosis, Left upper  ext. fistulograms including venograms of the SVC, Left cephalic vein balloon angioplasty with a 4mm x 100mm Tod balloon, Completion fistulograms    PACEMAKER PLACEMENT         medtronic    PARACENTESIS         multiple/recent; per dr. Justine Horowitz at [address] [place] [address] 1/30/2018     UPPER EXTREMITY DRIL PROCEDURE WITH LEFT SAPHENOUS VEIN HARVESTING performed by Candi Horton MD at [address] [place] [address]   6/19/12     [facility] arteriovenous fistulogram including venography of the superior vena cava.  Balloon angioplasty, left forearm cephalic vein and upper arm cephalic vein with [id number] tod balloon.  VASCULAR SURGERY   7/10/2012 [facility]      Revision of left distal radial artery to cephalic vein arteriovenous fistula with 7mm Artegraft interposition.  VASCULAR SURGERY   7/30/15 [facility]     Left upper extremity arteriovenous fistulograms including venography of the superior vena cava. Left cephalic vein balloon angioplasty with an 8 x 20 cm cutting balloon proximal cephalic vein. Balloon angioplasty of left cephalic vein/antecubital vein near the antecubital crease with 8 x 20 mm cutting balloon.  VASCULAR SURGERY   7/30/15 [facility] cont     Balloon angioplasty proximal end distal upper arm cephalic vein with 9 x 40 cm  balloon. Completion fistulograms of the left upper extremity.  VASCULAR SURGERY   8/13/15 [facility]     Revision of left upper extremity arteriovenous fistula with excision of pseudoaneurysm and primary repair of cephalic vein.  VASCULAR SURGERY   5/3/16 [facility]     Left upper fistulograms/venograms, left cephalic balloon 8 x 20 cutter,9 x 40 conquest,left proximal cephalic vein stents (flair 2 9 x 50), balloon stents 9 x 40 conquest.    VASCULAR SURGERY   12/08/2016     [facility]- ultrasound guided cannulation of left distal cephalic vein ultrasound guided cannulation right internal jugular vein placement of right internal jugular vein tunneled dialysis catheter (Bard Equistream XK 23cm tip to cuff)     VASCULAR SURGERY   01/20/2017     [facility]-Right upper venograms, u/s guided cannulation left basilic vein, left upper venograms, balloon angioplasty left subclavian vein 10x40 conquest.    VASCULAR SURGERY   02/16/2017     [facility]. Left brachial artery to axillary vein arteriovenous graft with 7 mm artegraft. Left upper extremity venograms. Left subclavian vein stent viabahn 13x50. Left subclavian vein stent balloon angioplasty 12x40 atlas.  VASCULAR SURGERY   04/11/2017     [facility]. Removal of tunneled dialysis catheter right internal jugular vein.  VASCULAR SURGERY   01/25/2018     [facility]. Arch aortogram, left upper arteriogram, AV graft angiogram/venogram.    VASCULAR SURGERY   2018     [facility]. Right CFA 5f-6f-7f sheath, aortogram with bilateral lower arteriogram, left SFA/pop  balloon angioplasty 5x100 , 6x150 lutonix ( 2), left CFA  7f sheath, bilateral iliac kissing stents right 10x38 icast, left 9x59 icast expanded with 10x40 , completion aortogram, mynx left CFA , failed mynx right CFA.  VASCULAR SURGERY   2019     [facility] left CFA 5f sheath, aortogram with bilateral lower arteriogram, mynx left CFA.         Family History         Family History   Problem Relation Age of Onset    Heart Disease Other      Diabetes Other      Hypertension Other      High Blood Pressure Mother      High Blood Pressure Father           Social History            Tobacco Use    Smoking status: Former Smoker       Years: 35.00       Types: Cigarettes       Last attempt to quit: 2017       Years since quittin.3    Smokeless tobacco: Former User   Substance Use Topics    Alcohol use: No            Review of Systems     Constitutional - no significant activity change, appetite change, or unexpected weight change. No fever or chills. No diaphoresis or significant fatigue. HENT - no significant rhinorrhea or epistaxis. No tinnitus or significant hearing loss. Eyes - no sudden vision change or amaurosis. Respiratory - no significant shortness of breath, wheezing, or stridor. No apnea, cough, or chest tightness associated with shortness of breath. Cardiovascular - no chest pain, syncope, or significant dizziness. No palpitations or significant leg swelling. Patient reports has claudication. Gastrointestinal - no abdominal swelling or pain. No blood in stool. No severe constipation, diarrhea, nausea, or vomiting. Genitourinary - No difficulty urinating, dysuria, frequency, or urgency. No flank pain or hematuria. Musculoskeletal - no back pain, gait disturbance, or myalgia.   Skin - no color change, rash, pallor, has wound. Neurologic - no dizziness, facial asymmetry, or light headedness. No seizures. No speech difficulty or lateralizing weakness. Hematologic - no easy bruising or excessive bleeding. Psychiatric - no severe anxiety or nervousness. No confusion. All other review of systems are negative.     Physical Exam     BP (!) 170/63 Comment: right  Pulse 74   Temp 98.6 °F (37 °C)   Resp 18   SpO2 96%      Constitutional - well developed, well nourished. No diaphoresis or acute distress. HENT - head normocephalic. Right external ear canal appears normal.  Left external ear canal appears normal.  Septum appears midline. Eyes - conjunctiva normal.  EOMS normal.  No exudate. No icterus. Neck- ROM appears normal, no tracheal deviation. Cardiovascular - Regular rate and rhythm. Heart sounds are normal.  No murmur, rub, or gallop. Carotid pulses are 2+ to palpation bilaterally without bruit. Extremities - Radial and brachial pulses are 2+ to palpation bilaterally. Right femoral pulse: present 2+; Right popliteal pulse: absent Right DP: absent; Right PT absent; Left femoral pulse: present 2+; Left popliteal pulse: absent; Left DP: absent; Left PT: absent   No cyanosis, clubbing, or significant edema. No signs atheroembolic event. Pulmonary - effort appears normal.  No respiratory distress. Lungs - Breath sounds normal. No wheezes or rales. GI - Abdomen - soft, non tender, bowel sounds X 4 quadrants. No guarding or rebound tenderness. No distension or palpable mass. Genitourinary - deferred. Musculoskeletal - ROM appears normal.  No significant edema. Neurologic - alert and oriented X 3. Physiologic.   Skin - wounds involving right foot posterior first metatarsal, 3rd tip metatarsal, and 5th metatarsal with fat layer exposed  Psychiatric - mood, affect, and behavior appear normal.  Judgment and thought processes appear normal.     Risk factors for atherosclerosis ([facility]    3. Atherosclerosis of native artery of both lower extremities with intermittent claudication (HCC)             Plan     Right common femoral and below knee popliteal/TPT endarterectomy with vein patch and common femoral to TPT bypass with reversed left GSV

## 2019-06-24 NOTE — TELEPHONE ENCOUNTER
6/24/19 11:47am Spoke with patient to inform him that Marilee Reno with Dr. Sabra Maxwell office called to let us know that Dr. Nirmal Mccann has cleared him for surgery.

## 2019-06-24 NOTE — TELEPHONE ENCOUNTER
Spoke with Nasima Strauss at [facility], informed her that patient has been cleared for surgery per Dr. Aaliyah Van. Clearance and testing in Epic under Media.

## 2019-06-25 ENCOUNTER — ANESTHESIA (OUTPATIENT)
Dept: OPERATING ROOM | Age: 59
DRG: 252 | End: 2019-06-25
Payer: MEDICARE

## 2019-06-25 ENCOUNTER — HOSPITAL ENCOUNTER (INPATIENT)
Age: 59
LOS: 3 days | Discharge: HOME | DRG: 252 | End: 2019-06-28
Attending: SURGERY | Admitting: SURGERY
Payer: MEDICARE

## 2019-06-25 ENCOUNTER — APPOINTMENT (OUTPATIENT)
Dept: INTERVENTIONAL RADIOLOGY/VASCULAR | Age: 59
DRG: 252 | End: 2019-06-25
Attending: SURGERY
Payer: MEDICARE

## 2019-06-25 ENCOUNTER — ANESTHESIA EVENT (OUTPATIENT)
Dept: OPERATING ROOM | Age: 59
DRG: 252 | End: 2019-06-25
Payer: MEDICARE

## 2019-06-25 VITALS — TEMPERATURE: 97.9 F | RESPIRATION RATE: 3 BRPM | OXYGEN SATURATION: 100 %

## 2019-06-25 DIAGNOSIS — G89.18 POST-OP PAIN: Primary | ICD-10-CM

## 2019-06-25 PROBLEM — I70.213 ATHEROSCLEROSIS OF NATIVE ARTERY OF BOTH LOWER EXTREMITIES WITH INTERMITTENT CLAUDICATION (HCC): Status: RESOLVED | Noted: 2019-06-19 | Resolved: 2019-06-25

## 2019-06-25 LAB
ABO/RH: NORMAL
ANION GAP SERPL CALCULATED.3IONS-SCNC: 14 MMOL/L (ref 7–19)
ANTIBODY SCREEN: NORMAL
APTT: 31.3 SEC (ref 26–36.2)
BASOPHILS ABSOLUTE: 0 K/UL (ref 0–0.2)
BASOPHILS RELATIVE PERCENT: 0.8 % (ref 0–1)
BUN BLDV-MCNC: 21 MG/DL (ref 6–20)
CALCIUM SERPL-MCNC: 9 MG/DL (ref 8.6–10)
CHLORIDE BLD-SCNC: 93 MMOL/L (ref 98–111)
CO2: 31 MMOL/L (ref 22–29)
CREAT SERPL-MCNC: 4.3 MG/DL (ref 0.5–1.2)
EOSINOPHILS ABSOLUTE: 0.2 K/UL (ref 0–0.6)
EOSINOPHILS RELATIVE PERCENT: 4.7 % (ref 0–5)
GFR NON-AFRICAN AMERICAN: 14
GLUCOSE BLD-MCNC: 134 MG/DL (ref 74–109)
HCT VFR BLD CALC: 29.1 % (ref 42–52)
HCT VFR BLD CALC: 30.6 % (ref 42–52)
HEMOGLOBIN: 9.3 G/DL (ref 14–18)
HEMOGLOBIN: 9.5 G/DL (ref 14–18)
INR BLD: 1.19 (ref 0.88–1.18)
LYMPHOCYTES ABSOLUTE: 0.5 K/UL (ref 1.1–4.5)
LYMPHOCYTES RELATIVE PERCENT: 14.3 % (ref 20–40)
MCH RBC QN AUTO: 29.1 PG (ref 27–31)
MCH RBC QN AUTO: 29.2 PG (ref 27–31)
MCHC RBC AUTO-ENTMCNC: 31 G/DL (ref 33–37)
MCHC RBC AUTO-ENTMCNC: 32 G/DL (ref 33–37)
MCV RBC AUTO: 91.5 FL (ref 80–94)
MCV RBC AUTO: 93.9 FL (ref 80–94)
MONOCYTES ABSOLUTE: 0.3 K/UL (ref 0–0.9)
MONOCYTES RELATIVE PERCENT: 7.2 % (ref 0–10)
NEUTROPHILS ABSOLUTE: 2.6 K/UL (ref 1.5–7.5)
NEUTROPHILS RELATIVE PERCENT: 72.7 % (ref 50–65)
PDW BLD-RTO: 17 % (ref 11.5–14.5)
PDW BLD-RTO: 17 % (ref 11.5–14.5)
PLATELET # BLD: 106 K/UL (ref 130–400)
PLATELET # BLD: 176 K/UL (ref 130–400)
PMV BLD AUTO: 11.4 FL (ref 9.4–12.4)
PMV BLD AUTO: 12.3 FL (ref 9.4–12.4)
POC ACT LR: 203 SEC
POC ACT LR: 222 SEC
POC ACT LR: 251 SEC
POTASSIUM SERPL-SCNC: 4.3 MMOL/L (ref 3.5–4.9)
PROTHROMBIN TIME: 14.5 SEC (ref 12–14.6)
RBC # BLD: 3.18 M/UL (ref 4.7–6.1)
RBC # BLD: 3.26 M/UL (ref 4.7–6.1)
SODIUM BLD-SCNC: 138 MMOL/L (ref 136–145)
WBC # BLD: 3.6 K/UL (ref 4.8–10.8)
WBC # BLD: 5.7 K/UL (ref 4.8–10.8)

## 2019-06-25 PROCEDURE — 85610 PROTHROMBIN TIME: CPT

## 2019-06-25 PROCEDURE — 86901 BLOOD TYPING SEROLOGIC RH(D): CPT

## 2019-06-25 PROCEDURE — 2580000003 HC RX 258: Performed by: NURSE ANESTHETIST, CERTIFIED REGISTERED

## 2019-06-25 PROCEDURE — 041K09L BYPASS RIGHT FEMORAL ARTERY TO POPLITEAL ARTERY WITH AUTOLOGOUS VENOUS TISSUE, OPEN APPROACH: ICD-10-PCS | Performed by: SURGERY

## 2019-06-25 PROCEDURE — 3600000005 HC SURGERY LEVEL 5 BASE: Performed by: SURGERY

## 2019-06-25 PROCEDURE — 2580000003 HC RX 258: Performed by: SURGERY

## 2019-06-25 PROCEDURE — 6370000000 HC RX 637 (ALT 250 FOR IP): Performed by: SURGERY

## 2019-06-25 PROCEDURE — 2500000003 HC RX 250 WO HCPCS: Performed by: NURSE ANESTHETIST, CERTIFIED REGISTERED

## 2019-06-25 PROCEDURE — 86923 COMPATIBILITY TEST ELECTRIC: CPT

## 2019-06-25 PROCEDURE — 88305 TISSUE EXAM BY PATHOLOGIST: CPT

## 2019-06-25 PROCEDURE — 6360000002 HC RX W HCPCS: Performed by: ANESTHESIOLOGY

## 2019-06-25 PROCEDURE — 86850 RBC ANTIBODY SCREEN: CPT

## 2019-06-25 PROCEDURE — 6370000000 HC RX 637 (ALT 250 FOR IP): Performed by: NURSE PRACTITIONER

## 2019-06-25 PROCEDURE — 6360000002 HC RX W HCPCS: Performed by: NURSE ANESTHETIST, CERTIFIED REGISTERED

## 2019-06-25 PROCEDURE — 04CK0ZZ EXTIRPATION OF MATTER FROM RIGHT FEMORAL ARTERY, OPEN APPROACH: ICD-10-PCS | Performed by: SURGERY

## 2019-06-25 PROCEDURE — 86900 BLOOD TYPING SEROLOGIC ABO: CPT

## 2019-06-25 PROCEDURE — 6360000002 HC RX W HCPCS: Performed by: SURGERY

## 2019-06-25 PROCEDURE — 85730 THROMBOPLASTIN TIME PARTIAL: CPT

## 2019-06-25 PROCEDURE — C1894 INTRO/SHEATH, NON-LASER: HCPCS | Performed by: SURGERY

## 2019-06-25 PROCEDURE — 85027 COMPLETE CBC AUTOMATED: CPT

## 2019-06-25 PROCEDURE — 88342 IMHCHEM/IMCYTCHM 1ST ANTB: CPT

## 2019-06-25 PROCEDURE — 85025 COMPLETE CBC W/AUTO DIFF WBC: CPT

## 2019-06-25 PROCEDURE — 3700000001 HC ADD 15 MINUTES (ANESTHESIA): Performed by: SURGERY

## 2019-06-25 PROCEDURE — 80048 BASIC METABOLIC PNL TOTAL CA: CPT

## 2019-06-25 PROCEDURE — 06BQ0ZZ EXCISION OF LEFT SAPHENOUS VEIN, OPEN APPROACH: ICD-10-PCS | Performed by: SURGERY

## 2019-06-25 PROCEDURE — 3600000015 HC SURGERY LEVEL 5 ADDTL 15MIN: Performed by: SURGERY

## 2019-06-25 PROCEDURE — 88323 CONSLTJ&REPRT MATRL PREP SLD: CPT

## 2019-06-25 PROCEDURE — C1769 GUIDE WIRE: HCPCS | Performed by: SURGERY

## 2019-06-25 PROCEDURE — 3700000000 HC ANESTHESIA ATTENDED CARE: Performed by: SURGERY

## 2019-06-25 PROCEDURE — 36415 COLL VENOUS BLD VENIPUNCTURE: CPT

## 2019-06-25 PROCEDURE — 35556 ART BYP GRFT FEM-POPLITEAL: CPT | Performed by: SURGERY

## 2019-06-25 PROCEDURE — 2709999900 HC NON-CHARGEABLE SUPPLY: Performed by: SURGERY

## 2019-06-25 PROCEDURE — 7100000000 HC PACU RECOVERY - FIRST 15 MIN: Performed by: SURGERY

## 2019-06-25 PROCEDURE — 7100000001 HC PACU RECOVERY - ADDTL 15 MIN: Performed by: SURGERY

## 2019-06-25 PROCEDURE — 85347 COAGULATION TIME ACTIVATED: CPT

## 2019-06-25 PROCEDURE — 2000000000 HC ICU R&B

## 2019-06-25 PROCEDURE — 07BF0ZX EXCISION OF RIGHT LOWER EXTREMITY LYMPHATIC, OPEN APPROACH, DIAGNOSTIC: ICD-10-PCS | Performed by: SURGERY

## 2019-06-25 PROCEDURE — 88341 IMHCHEM/IMCYTCHM EA ADD ANTB: CPT

## 2019-06-25 RX ORDER — SODIUM CHLORIDE 9 MG/ML
INJECTION, SOLUTION INTRAVENOUS CONTINUOUS
Status: DISCONTINUED | OUTPATIENT
Start: 2019-06-25 | End: 2019-06-26

## 2019-06-25 RX ORDER — MEPERIDINE HYDROCHLORIDE 50 MG/ML
12.5 INJECTION INTRAMUSCULAR; INTRAVENOUS; SUBCUTANEOUS EVERY 5 MIN PRN
Status: DISCONTINUED | OUTPATIENT
Start: 2019-06-25 | End: 2019-06-25 | Stop reason: HOSPADM

## 2019-06-25 RX ORDER — HYDRALAZINE HYDROCHLORIDE 20 MG/ML
5 INJECTION INTRAMUSCULAR; INTRAVENOUS EVERY 10 MIN PRN
Status: DISCONTINUED | OUTPATIENT
Start: 2019-06-25 | End: 2019-06-25 | Stop reason: HOSPADM

## 2019-06-25 RX ORDER — ROCURONIUM BROMIDE 10 MG/ML
INJECTION, SOLUTION INTRAVENOUS PRN
Status: DISCONTINUED | OUTPATIENT
Start: 2019-06-25 | End: 2019-06-25 | Stop reason: SDUPTHER

## 2019-06-25 RX ORDER — MIDAZOLAM HYDROCHLORIDE 1 MG/ML
2 INJECTION INTRAMUSCULAR; INTRAVENOUS
Status: COMPLETED | OUTPATIENT
Start: 2019-06-25 | End: 2019-06-25

## 2019-06-25 RX ORDER — DIPHENHYDRAMINE HYDROCHLORIDE 50 MG/ML
12.5 INJECTION INTRAMUSCULAR; INTRAVENOUS
Status: DISCONTINUED | OUTPATIENT
Start: 2019-06-25 | End: 2019-06-25 | Stop reason: HOSPADM

## 2019-06-25 RX ORDER — LACTULOSE 10 G/10G
10 SOLUTION ORAL 3 TIMES DAILY
Status: DISCONTINUED | OUTPATIENT
Start: 2019-06-25 | End: 2019-06-25 | Stop reason: RX

## 2019-06-25 RX ORDER — PROMETHAZINE HYDROCHLORIDE 25 MG/ML
6.25 INJECTION, SOLUTION INTRAMUSCULAR; INTRAVENOUS
Status: DISCONTINUED | OUTPATIENT
Start: 2019-06-25 | End: 2019-06-25 | Stop reason: HOSPADM

## 2019-06-25 RX ORDER — KETAMINE HYDROCHLORIDE 100 MG/ML
INJECTION, SOLUTION INTRAMUSCULAR; INTRAVENOUS PRN
Status: DISCONTINUED | OUTPATIENT
Start: 2019-06-25 | End: 2019-06-25 | Stop reason: SDUPTHER

## 2019-06-25 RX ORDER — MORPHINE SULFATE 2 MG/ML
2 INJECTION, SOLUTION INTRAMUSCULAR; INTRAVENOUS EVERY 5 MIN PRN
Status: DISCONTINUED | OUTPATIENT
Start: 2019-06-25 | End: 2019-06-25 | Stop reason: HOSPADM

## 2019-06-25 RX ORDER — ISOSORBIDE MONONITRATE 30 MG/1
30 TABLET, EXTENDED RELEASE ORAL DAILY
Status: DISCONTINUED | OUTPATIENT
Start: 2019-06-26 | End: 2019-06-28 | Stop reason: HOSPADM

## 2019-06-25 RX ORDER — PROPOFOL 10 MG/ML
INJECTION, EMULSION INTRAVENOUS PRN
Status: DISCONTINUED | OUTPATIENT
Start: 2019-06-25 | End: 2019-06-25 | Stop reason: SDUPTHER

## 2019-06-25 RX ORDER — SODIUM CHLORIDE 0.9 % (FLUSH) 0.9 %
10 SYRINGE (ML) INJECTION EVERY 12 HOURS SCHEDULED
Status: DISCONTINUED | OUTPATIENT
Start: 2019-06-25 | End: 2019-06-28 | Stop reason: HOSPADM

## 2019-06-25 RX ORDER — CLONIDINE HYDROCHLORIDE 0.1 MG/1
0.1 TABLET ORAL
Status: DISCONTINUED | OUTPATIENT
Start: 2019-06-25 | End: 2019-06-28 | Stop reason: HOSPADM

## 2019-06-25 RX ORDER — ONDANSETRON 2 MG/ML
INJECTION INTRAMUSCULAR; INTRAVENOUS PRN
Status: DISCONTINUED | OUTPATIENT
Start: 2019-06-25 | End: 2019-06-25 | Stop reason: SDUPTHER

## 2019-06-25 RX ORDER — SUCRALFATE 1 G/1
1 TABLET ORAL 3 TIMES DAILY PRN
Status: DISCONTINUED | OUTPATIENT
Start: 2019-06-25 | End: 2019-06-28 | Stop reason: HOSPADM

## 2019-06-25 RX ORDER — EPHEDRINE SULFATE 50 MG/ML
INJECTION, SOLUTION INTRAVENOUS PRN
Status: DISCONTINUED | OUTPATIENT
Start: 2019-06-25 | End: 2019-06-25 | Stop reason: SDUPTHER

## 2019-06-25 RX ORDER — LISINOPRIL 10 MG/1
10 TABLET ORAL 2 TIMES DAILY
Status: DISCONTINUED | OUTPATIENT
Start: 2019-06-25 | End: 2019-06-28 | Stop reason: HOSPADM

## 2019-06-25 RX ORDER — ASPIRIN 81 MG/1
81 TABLET ORAL DAILY
Status: DISCONTINUED | OUTPATIENT
Start: 2019-06-25 | End: 2019-06-28 | Stop reason: HOSPADM

## 2019-06-25 RX ORDER — LABETALOL 20 MG/4 ML (5 MG/ML) INTRAVENOUS SYRINGE
5 EVERY 10 MIN PRN
Status: DISCONTINUED | OUTPATIENT
Start: 2019-06-25 | End: 2019-06-25 | Stop reason: HOSPADM

## 2019-06-25 RX ORDER — SODIUM CHLORIDE 0.9 % (FLUSH) 0.9 %
10 SYRINGE (ML) INJECTION EVERY 12 HOURS SCHEDULED
Status: DISCONTINUED | OUTPATIENT
Start: 2019-06-25 | End: 2019-06-25

## 2019-06-25 RX ORDER — SODIUM CHLORIDE 0.9 % (FLUSH) 0.9 %
10 SYRINGE (ML) INJECTION PRN
Status: DISCONTINUED | OUTPATIENT
Start: 2019-06-25 | End: 2019-06-28 | Stop reason: HOSPADM

## 2019-06-25 RX ORDER — AMLODIPINE BESYLATE 10 MG/1
10 TABLET ORAL DAILY
Status: DISCONTINUED | OUTPATIENT
Start: 2019-06-26 | End: 2019-06-28 | Stop reason: HOSPADM

## 2019-06-25 RX ORDER — METOCLOPRAMIDE HYDROCHLORIDE 5 MG/ML
10 INJECTION INTRAMUSCULAR; INTRAVENOUS
Status: DISCONTINUED | OUTPATIENT
Start: 2019-06-25 | End: 2019-06-25 | Stop reason: HOSPADM

## 2019-06-25 RX ORDER — LIDOCAINE HYDROCHLORIDE 10 MG/ML
INJECTION, SOLUTION EPIDURAL; INFILTRATION; INTRACAUDAL; PERINEURAL PRN
Status: DISCONTINUED | OUTPATIENT
Start: 2019-06-25 | End: 2019-06-25 | Stop reason: SDUPTHER

## 2019-06-25 RX ORDER — SODIUM CHLORIDE 0.9 % (FLUSH) 0.9 %
10 SYRINGE (ML) INJECTION EVERY 12 HOURS SCHEDULED
Status: DISCONTINUED | OUTPATIENT
Start: 2019-06-25 | End: 2019-06-25 | Stop reason: HOSPADM

## 2019-06-25 RX ORDER — OXYCODONE HYDROCHLORIDE AND ACETAMINOPHEN 5; 325 MG/1; MG/1
1 TABLET ORAL EVERY 4 HOURS PRN
Status: DISCONTINUED | OUTPATIENT
Start: 2019-06-25 | End: 2019-06-26

## 2019-06-25 RX ORDER — SODIUM CHLORIDE 450 MG/100ML
INJECTION, SOLUTION INTRAVENOUS CONTINUOUS
Status: DISCONTINUED | OUTPATIENT
Start: 2019-06-25 | End: 2019-06-25

## 2019-06-25 RX ORDER — MIDAZOLAM HYDROCHLORIDE 1 MG/ML
INJECTION INTRAMUSCULAR; INTRAVENOUS PRN
Status: DISCONTINUED | OUTPATIENT
Start: 2019-06-25 | End: 2019-06-25 | Stop reason: SDUPTHER

## 2019-06-25 RX ORDER — SODIUM CHLORIDE 0.9 % (FLUSH) 0.9 %
10 SYRINGE (ML) INJECTION PRN
Status: DISCONTINUED | OUTPATIENT
Start: 2019-06-25 | End: 2019-06-25

## 2019-06-25 RX ORDER — LACTULOSE 10 G/15ML
10 SOLUTION ORAL 3 TIMES DAILY
Status: DISCONTINUED | OUTPATIENT
Start: 2019-06-25 | End: 2019-06-28 | Stop reason: HOSPADM

## 2019-06-25 RX ORDER — ASPIRIN 81 MG/1
81 TABLET ORAL ONCE
Status: COMPLETED | OUTPATIENT
Start: 2019-06-25 | End: 2019-06-25

## 2019-06-25 RX ORDER — NITROGLYCERIN 20 MG/100ML
INJECTION INTRAVENOUS PRN
Status: DISCONTINUED | OUTPATIENT
Start: 2019-06-25 | End: 2019-06-25 | Stop reason: SDUPTHER

## 2019-06-25 RX ORDER — SODIUM CHLORIDE 0.9 % (FLUSH) 0.9 %
10 SYRINGE (ML) INJECTION PRN
Status: DISCONTINUED | OUTPATIENT
Start: 2019-06-25 | End: 2019-06-25 | Stop reason: HOSPADM

## 2019-06-25 RX ORDER — CALCIUM ACETATE 667 MG/1
667 CAPSULE ORAL
Status: DISCONTINUED | OUTPATIENT
Start: 2019-06-25 | End: 2019-06-28 | Stop reason: HOSPADM

## 2019-06-25 RX ORDER — FENTANYL CITRATE 50 UG/ML
INJECTION, SOLUTION INTRAMUSCULAR; INTRAVENOUS PRN
Status: DISCONTINUED | OUTPATIENT
Start: 2019-06-25 | End: 2019-06-25 | Stop reason: SDUPTHER

## 2019-06-25 RX ORDER — FENTANYL CITRATE 50 UG/ML
50 INJECTION, SOLUTION INTRAMUSCULAR; INTRAVENOUS
Status: DISCONTINUED | OUTPATIENT
Start: 2019-06-25 | End: 2019-06-25 | Stop reason: HOSPADM

## 2019-06-25 RX ORDER — LIDOCAINE HYDROCHLORIDE 10 MG/ML
1 INJECTION, SOLUTION EPIDURAL; INFILTRATION; INTRACAUDAL; PERINEURAL
Status: DISCONTINUED | OUTPATIENT
Start: 2019-06-25 | End: 2019-06-25 | Stop reason: HOSPADM

## 2019-06-25 RX ORDER — HEPARIN SODIUM 1000 [USP'U]/ML
INJECTION, SOLUTION INTRAVENOUS; SUBCUTANEOUS PRN
Status: DISCONTINUED | OUTPATIENT
Start: 2019-06-25 | End: 2019-06-25 | Stop reason: SDUPTHER

## 2019-06-25 RX ORDER — GLYCOPYRROLATE 0.2 MG/ML
INJECTION INTRAMUSCULAR; INTRAVENOUS PRN
Status: DISCONTINUED | OUTPATIENT
Start: 2019-06-25 | End: 2019-06-25 | Stop reason: SDUPTHER

## 2019-06-25 RX ORDER — ACETAMINOPHEN 325 MG/1
650 TABLET ORAL EVERY 4 HOURS PRN
Status: DISCONTINUED | OUTPATIENT
Start: 2019-06-25 | End: 2019-06-28 | Stop reason: HOSPADM

## 2019-06-25 RX ORDER — FENTANYL CITRATE 50 UG/ML
25 INJECTION, SOLUTION INTRAMUSCULAR; INTRAVENOUS
Status: DISCONTINUED | OUTPATIENT
Start: 2019-06-25 | End: 2019-06-25 | Stop reason: HOSPADM

## 2019-06-25 RX ORDER — SODIUM CHLORIDE, SODIUM LACTATE, POTASSIUM CHLORIDE, CALCIUM CHLORIDE 600; 310; 30; 20 MG/100ML; MG/100ML; MG/100ML; MG/100ML
INJECTION, SOLUTION INTRAVENOUS CONTINUOUS
Status: DISCONTINUED | OUTPATIENT
Start: 2019-06-25 | End: 2019-06-25

## 2019-06-25 RX ORDER — MORPHINE SULFATE 2 MG/ML
4 INJECTION, SOLUTION INTRAMUSCULAR; INTRAVENOUS EVERY 5 MIN PRN
Status: DISCONTINUED | OUTPATIENT
Start: 2019-06-25 | End: 2019-06-25 | Stop reason: HOSPADM

## 2019-06-25 RX ORDER — DEXAMETHASONE SODIUM PHOSPHATE 10 MG/ML
INJECTION INTRAMUSCULAR; INTRAVENOUS PRN
Status: DISCONTINUED | OUTPATIENT
Start: 2019-06-25 | End: 2019-06-25 | Stop reason: SDUPTHER

## 2019-06-25 RX ORDER — ONDANSETRON 2 MG/ML
4 INJECTION INTRAMUSCULAR; INTRAVENOUS EVERY 6 HOURS PRN
Status: DISCONTINUED | OUTPATIENT
Start: 2019-06-25 | End: 2019-06-28 | Stop reason: HOSPADM

## 2019-06-25 RX ORDER — MINOXIDIL 2.5 MG/1
10 TABLET ORAL DAILY
Status: DISCONTINUED | OUTPATIENT
Start: 2019-06-26 | End: 2019-06-28 | Stop reason: HOSPADM

## 2019-06-25 RX ORDER — POLYETHYLENE GLYCOL 3350 17 G/17G
17 POWDER, FOR SOLUTION ORAL DAILY PRN
Status: DISCONTINUED | OUTPATIENT
Start: 2019-06-25 | End: 2019-06-28 | Stop reason: HOSPADM

## 2019-06-25 RX ORDER — OXYCODONE HYDROCHLORIDE AND ACETAMINOPHEN 5; 325 MG/1; MG/1
2 TABLET ORAL EVERY 4 HOURS PRN
Status: DISCONTINUED | OUTPATIENT
Start: 2019-06-25 | End: 2019-06-26

## 2019-06-25 RX ADMIN — ROCURONIUM BROMIDE 20 MG: 10 INJECTION INTRAVENOUS at 14:45

## 2019-06-25 RX ADMIN — HYDROMORPHONE HYDROCHLORIDE 0.5 MG: 1 INJECTION, SOLUTION INTRAMUSCULAR; INTRAVENOUS; SUBCUTANEOUS at 16:58

## 2019-06-25 RX ADMIN — NEOSTIGMINE METHYLSULFATE 3 MG: 1 INJECTION, SOLUTION INTRAMUSCULAR; INTRAVENOUS; SUBCUTANEOUS at 16:22

## 2019-06-25 RX ADMIN — ROCURONIUM BROMIDE 20 MG: 10 INJECTION INTRAVENOUS at 12:57

## 2019-06-25 RX ADMIN — EPHEDRINE SULFATE 10 MG: 50 INJECTION, SOLUTION INTRAMUSCULAR; INTRAVENOUS; SUBCUTANEOUS at 16:27

## 2019-06-25 RX ADMIN — GLYCOPYRROLATE 0.6 MG: 0.2 INJECTION INTRAMUSCULAR; INTRAVENOUS at 16:22

## 2019-06-25 RX ADMIN — ROCURONIUM BROMIDE 30 MG: 10 INJECTION INTRAVENOUS at 10:16

## 2019-06-25 RX ADMIN — FENTANYL CITRATE 50 MCG: 50 INJECTION INTRAMUSCULAR; INTRAVENOUS at 10:38

## 2019-06-25 RX ADMIN — HEPARIN SODIUM 2000 UNITS: 1000 INJECTION, SOLUTION INTRAVENOUS; SUBCUTANEOUS at 12:56

## 2019-06-25 RX ADMIN — Medication 2 G: at 10:24

## 2019-06-25 RX ADMIN — SODIUM CHLORIDE: 9 INJECTION, SOLUTION INTRAVENOUS at 19:30

## 2019-06-25 RX ADMIN — HEPARIN SODIUM 1000 UNITS: 1000 INJECTION, SOLUTION INTRAVENOUS; SUBCUTANEOUS at 11:42

## 2019-06-25 RX ADMIN — Medication 20 MG: at 14:46

## 2019-06-25 RX ADMIN — Medication 10 MG: at 12:57

## 2019-06-25 RX ADMIN — OXYCODONE HYDROCHLORIDE AND ACETAMINOPHEN 2 TABLET: 5; 325 TABLET ORAL at 23:17

## 2019-06-25 RX ADMIN — MIDAZOLAM 2 MG: 1 INJECTION INTRAMUSCULAR; INTRAVENOUS at 10:08

## 2019-06-25 RX ADMIN — HYDROMORPHONE HYDROCHLORIDE 0.5 MG: 1 INJECTION, SOLUTION INTRAMUSCULAR; INTRAVENOUS; SUBCUTANEOUS at 20:50

## 2019-06-25 RX ADMIN — LISINOPRIL 10 MG: 10 TABLET ORAL at 21:13

## 2019-06-25 RX ADMIN — ASPIRIN 81 MG: 81 TABLET ORAL at 21:12

## 2019-06-25 RX ADMIN — SODIUM CHLORIDE: 4.5 INJECTION, SOLUTION INTRAVENOUS at 12:48

## 2019-06-25 RX ADMIN — PROPOFOL 100 MG: 10 INJECTION, EMULSION INTRAVENOUS at 10:16

## 2019-06-25 RX ADMIN — ROCURONIUM BROMIDE 10 MG: 10 INJECTION INTRAVENOUS at 12:17

## 2019-06-25 RX ADMIN — PHENYLEPHRINE HYDROCHLORIDE 50 MCG/MIN: 10 INJECTION INTRAVENOUS at 15:06

## 2019-06-25 RX ADMIN — Medication 10 MG: at 11:55

## 2019-06-25 RX ADMIN — CLONIDINE HYDROCHLORIDE 0.3 MG: 0.2 TABLET ORAL at 21:12

## 2019-06-25 RX ADMIN — NITROGLYCERIN 50 MCG: 20 INJECTION INTRAVENOUS at 16:36

## 2019-06-25 RX ADMIN — HEPARIN SODIUM 1000 UNITS: 1000 INJECTION, SOLUTION INTRAVENOUS; SUBCUTANEOUS at 14:11

## 2019-06-25 RX ADMIN — ROCURONIUM BROMIDE 20 MG: 10 INJECTION INTRAVENOUS at 11:16

## 2019-06-25 RX ADMIN — Medication 10 MG: at 15:46

## 2019-06-25 RX ADMIN — EPHEDRINE SULFATE 20 MG: 50 INJECTION, SOLUTION INTRAMUSCULAR; INTRAVENOUS; SUBCUTANEOUS at 14:12

## 2019-06-25 RX ADMIN — HYDROMORPHONE HYDROCHLORIDE 0.5 MG: 1 INJECTION, SOLUTION INTRAMUSCULAR; INTRAVENOUS; SUBCUTANEOUS at 16:30

## 2019-06-25 RX ADMIN — FENTANYL CITRATE 50 MCG: 50 INJECTION INTRAMUSCULAR; INTRAVENOUS at 10:41

## 2019-06-25 RX ADMIN — FENTANYL CITRATE 100 MCG: 50 INJECTION INTRAMUSCULAR; INTRAVENOUS at 10:47

## 2019-06-25 RX ADMIN — HYDRALAZINE HYDROCHLORIDE 5 MG: 20 INJECTION INTRAMUSCULAR; INTRAVENOUS at 17:25

## 2019-06-25 RX ADMIN — Medication 10 MG: at 11:28

## 2019-06-25 RX ADMIN — DEXAMETHASONE SODIUM PHOSPHATE 10 MG: 10 INJECTION INTRAMUSCULAR; INTRAVENOUS at 10:24

## 2019-06-25 RX ADMIN — ONDANSETRON HYDROCHLORIDE 4 MG: 2 INJECTION, SOLUTION INTRAMUSCULAR; INTRAVENOUS at 16:21

## 2019-06-25 RX ADMIN — Medication 30 MG: at 10:32

## 2019-06-25 RX ADMIN — ASPIRIN 81 MG: 81 TABLET, COATED ORAL at 08:31

## 2019-06-25 RX ADMIN — Medication 10 ML: at 21:20

## 2019-06-25 RX ADMIN — HEPARIN SODIUM 1000 UNITS: 1000 INJECTION, SOLUTION INTRAVENOUS; SUBCUTANEOUS at 15:23

## 2019-06-25 RX ADMIN — FENTANYL CITRATE 50 MCG: 50 INJECTION INTRAMUSCULAR; INTRAVENOUS at 10:16

## 2019-06-25 RX ADMIN — EPHEDRINE SULFATE 20 MG: 50 INJECTION, SOLUTION INTRAMUSCULAR; INTRAVENOUS; SUBCUTANEOUS at 14:00

## 2019-06-25 RX ADMIN — SODIUM CHLORIDE: 4.5 INJECTION, SOLUTION INTRAVENOUS at 08:26

## 2019-06-25 RX ADMIN — LIDOCAINE HYDROCHLORIDE 50 MG: 10 INJECTION, SOLUTION EPIDURAL; INFILTRATION; INTRACAUDAL; PERINEURAL at 10:16

## 2019-06-25 RX ADMIN — HYDROMORPHONE HYDROCHLORIDE 0.5 MG: 1 INJECTION, SOLUTION INTRAMUSCULAR; INTRAVENOUS; SUBCUTANEOUS at 16:37

## 2019-06-25 RX ADMIN — ROCURONIUM BROMIDE 20 MG: 10 INJECTION INTRAVENOUS at 10:38

## 2019-06-25 RX ADMIN — HEPARIN SODIUM 1000 UNITS: 1000 INJECTION, SOLUTION INTRAVENOUS; SUBCUTANEOUS at 14:00

## 2019-06-25 RX ADMIN — HEPARIN SODIUM 5000 UNITS: 1000 INJECTION, SOLUTION INTRAVENOUS; SUBCUTANEOUS at 11:32

## 2019-06-25 RX ADMIN — MIDAZOLAM 2 MG: 1 INJECTION INTRAMUSCULAR; INTRAVENOUS at 09:50

## 2019-06-25 ASSESSMENT — PAIN SCALES - GENERAL
PAINLEVEL_OUTOF10: 9
PAINLEVEL_OUTOF10: 10
PAINLEVEL_OUTOF10: 8
PAINLEVEL_OUTOF10: 7
PAINLEVEL_OUTOF10: 8

## 2019-06-25 ASSESSMENT — LIFESTYLE VARIABLES: SMOKING_STATUS: 1

## 2019-06-25 NOTE — ANESTHESIA PROCEDURE NOTES
Arterial Line:    An arterial line was placed using surface landmarks, in the holding area for the following indication(s): continuous blood pressure monitoring and blood sampling needed. A 20 gauge (size), 1 and 3/4 inch (length), Arrow (type) catheter was placed, Seldinger technique used, into the right radial artery, secured by tape and Tegaderm. Anesthesia type: Local  Local infiltration: Injection    Events:  patient tolerated procedure well with no complications.   6/25/2019 10:02 AM6/25/2019 10:02 AM  Anesthesiologist: Natasha Zambrano MD  Preanesthetic Checklist  Completed: patient identified, site marked, surgical consent, pre-op evaluation, timeout performed, IV checked, risks and benefits discussed, monitors and equipment checked, anesthesia consent given, oxygen available and patient being monitored

## 2019-06-25 NOTE — ANESTHESIA PRE PROCEDURE
Department of Anesthesiology  Preprocedure Note       Name:  Crista Mcallister   Age:  61 y.o.  :  1960                                          MRN:  [id number]         Date:  2019      Surgeon: Jefferson Franklin):  Jay Best MD    Procedure: FEMORAL TIBIAL/PERINEAL BYPASS WITH REVERSED GREATER SAPHENOUS VEIN (Right )    Medications prior to admission:   Prior to Admission medications    Medication Sig Start Date End Date Taking? Authorizing Provider   clopidogrel (PLAVIX) 75 MG tablet Take 75 mg by mouth daily   Yes Historical Provider, MD   sucralfate (CARAFATE) 1 GM tablet Take 1 g by mouth 3 times daily as needed   Yes Historical Provider, MD   isosorbide mononitrate (IMDUR) 30 MG extended release tablet Take 30 mg by mouth daily   Yes Historical Provider, MD   oxyCODONE-acetaminophen (PERCOCET)  MG per tablet Take 1 tablet by mouth every 8 hours as needed for Pain.     Yes Historical Provider, MD   minoxidil (LONITEN) 10 MG tablet Take 10 mg by mouth daily Indications: High Blood Pressure    Yes Historical Provider, MD   cloNIDine (CATAPRES) 0.2 MG tablet Take 0.3 mg by mouth 3 times daily Indications: High Blood Pressure    Yes Historical Provider, MD   lisinopril (PRINIVIL;ZESTRIL) 20 MG tablet Take 10 mg by mouth 2 times daily    Yes Historical Provider, MD   amLODIPine (NORVASC) 10 MG tablet Take 10 mg by mouth daily Indications: High Blood Pressure    Yes Historical Provider, MD   Calcium Acetate, Phos Binder, (PHOSLO PO) Take 3 tablets by mouth 3 times daily (with meals) Indications: Kidney Failure    Yes Historical Provider, MD   Ciprofloxacin-Ciproflox HCl (CIPRO XR PO) Take 500 mg by mouth 2 times daily    Historical Provider, MD   lactulose (CEPHULAC) 10 G packet Take 10 g by mouth 3 times daily Indications: Disorder of the Liver Enzymes     Historical Provider, MD       Current medications:    Current Facility-Administered Medications   Medication Dose Route Frequency Provider Last Rate Last fistulograms including venography of the superior vena cava. Left cephalic vein balloon angioplasty with an 8 x 20 cm cutting balloon proximal cephalic vein. Balloon angioplasty of left cephalic vein/antecubital vein near the antecubital crease with 8 x 20 mm cutting balloon.  VASCULAR SURGERY  7/30/15 [facility]    Balloon angioplasty proximal end distal upper arm cephalic vein with 9 x 40 cm  balloon. Completion fistulograms of the left upper extremity.  VASCULAR SURGERY  8/13/15 [facility]    Revision of left upper extremity arteriovenous fistula with excision of pseudoaneurysm and primary repair of cephalic vein.  VASCULAR SURGERY  5/3/16 [facility]    Left upper fistulograms/venograms, left cephalic balloon 8 x 20 cutter,9 x 40 conquest,left proximal cephalic vein stents (flair 2 9 x 50), balloon stents 9 x 40 conquest.    VASCULAR SURGERY  12/08/2016    [facility]- ultrasound guided cannulation of left distal cephalic vein ultrasound guided cannulation right internal jugular vein placement of right internal jugular vein tunneled dialysis catheter (Bard Equistream XK 23cm tip to cuff)     VASCULAR SURGERY  01/20/2017    [facility]-Right upper venograms, u/s guided cannulation left basilic vein, left upper venograms, balloon angioplasty left subclavian vein 10x40 conquest.    VASCULAR SURGERY  02/16/2017    [facility]. Left brachial artery to axillary vein arteriovenous graft with 7 mm artegraft. Left upper extremity venograms. Left subclavian vein stent viabahn 13x50. Left subclavian vein stent balloon angioplasty 12x40 atlas.  VASCULAR SURGERY  04/11/2017    [facility]. Removal of tunneled dialysis catheter right internal jugular vein.  VASCULAR SURGERY  01/25/2018    [facility]. Arch aortogram, left upper arteriogram, AV graft angiogram/venogram.    VASCULAR SURGERY  02/28/2018    [facility]. Right CFA 5f-6f-7f sheath, aortogram with bilateral lower arteriogram, left SFA/pop  balloon angioplasty 5x100 , 6x150 lutonix ( 2), left CFA  7f sheath, bilateral iliac kissing stents right 10x38 icast, left 9x59 icast expanded with 10x40 , completion aortogram, mynx left CFA , failed mynx right CFA.  VASCULAR SURGERY  2019    [facility] left CFA 5f sheath, aortogram with bilateral lower arteriogram, mynx left CFA. Social History:    Social History     Tobacco Use    Smoking status: Former Smoker     Packs/day: 1.00     Years: 35.00     Pack years: 35.00     Types: Cigarettes     Last attempt to quit: 2017     Years since quittin.3    Smokeless tobacco: Never Used   Substance Use Topics    Alcohol use: No                                Counseling given: Not Answered      Vital Signs (Current):   Vitals:    19 0759   BP: (!) 183/53   Pulse: 64   Resp: 18   Temp: 98 °F (36.7 °C)   TempSrc: Temporal   SpO2: 92%   Weight: 174 lb (78.9 kg)   Height: 5' 9\" (1.753 m)                                              BP Readings from Last 3 Encounters:   19 (!) 183/53   19 (!) 170/63   19 (!) 152/52       NPO Status:                                                                                 BMI:   Wt Readings from Last 3 Encounters:   19 174 lb (78.9 kg)   19 174 lb (78.9 kg)   19 174 lb (78.9 kg)     Body mass index is 25.7 kg/m².     CBC:   Lab Results   Component Value Date    WBC 3.7 2019    RBC 3.11 2019    HGB 9.2 2019    HCT 29.5 2019    MCV 94.9 2019    RDW 16.4 2019    PLT 95 2019       CMP:   Lab Results   Component Value Date     2019    K 4.3 2019    K 7.1 2018    CL 93 2019    CO2 34 2019    BUN 16 2019    CREATININE 4.8 2019    LABGLOM 13 2019    GLUCOSE 97 2019    PROT 5.8 2016    CALCIUM 9.0 2019    BILITOT 0.3 2016    ALKPHOS 86 2016    AST 12 2016    ALT <5 2016       POC Tests: No results for input(s): POCGLU, POCNA, POCK, POCCL, POCBUN, Millicent Holcomb in the last 72 hours. Coags:   Lab Results   Component Value Date    PROTIME 14.5 01/30/2018    INR 1.14 01/30/2018    APTT 31.9 01/30/2018       HCG (If Applicable): No results found for: PREGTESTUR, PREGSERUM, HCG, HCGQUANT     ABGs: No results found for: PHART, PO2ART, SIW4ZFU, FIA2XVW, BEART, A0PBTDMR     Type & Screen (If Applicable):  No results found for: [facility]    Anesthesia Evaluation  Patient summary reviewed and Nursing notes reviewed no history of anesthetic complications:   Airway: Mallampati: I  TM distance: >3 FB   Neck ROM: full  Mouth opening: > = 3 FB Dental:    (+) edentulous      Pulmonary:   (+) sleep apnea: on noncompliant,  current smoker          Patient did not smoke on day of surgery. Cardiovascular:    (+) hypertension:, pacemaker (Medtronic): pacemaker, CAD:, CABG/stent (PTCA with stent in past):, dysrhythmias:,       ECG reviewed               Beta Blocker:  Not on Beta Blocker         Neuro/Psych:   Negative Neuro/Psych ROS              GI/Hepatic/Renal:   (+) hepatitis: C, liver disease:, renal disease: ESRD and dialysis,           Endo/Other:    (+) Diabetes, blood dyscrasia: anemia:., .                 Abdominal:           Vascular:                                        Anesthesia Plan      general     ASA 4     (Decadron/Zofran Intraop)  Induction: intravenous. arterial line  MIPS: Postoperative opioids intended and Prophylactic antiemetics administered. Anesthetic plan and risks discussed with patient.                       Flynn Traore MD   6/25/2019

## 2019-06-25 NOTE — PROGRESS NOTES
CBC result of hgb 9.3/hct called to Dr. Monica Cardoza. Updated him on patient treated with dilaudid 0.5 mg for pain, and then b/p up to 530 systolic, and treated with hydralazine 5 mg. No new orders received.  sn

## 2019-06-25 NOTE — OP NOTE
Preoperative diagnosis:  1. Atherosclerosis of native arteries bilateral lower extremities with right toe ulcers and rest pain (critical limb ischemia)  2. End-stage renal disease on hemodialysis  3. Coronary artery disease  4. Congestive heart failure  5. Hypertension  6. Liver disease    Post procedure diagnosis: Same    Procedure:  1. Right common femoral endarterectomy with greater saphenous vein patch  2. Right tibial peroneal trunk and distal below-knee popliteal artery endarterectomy with greater saphenous vein patch  3.  Right common femoral to below-knee popliteal artery bypass graft with reversed saphenous vein graft harvested from the left lower extremity  4. Right lower extremity arteriograms    Surgeon: Dr. Kyra Huber    First Assistant: Dr. Alonzo Kaplan    Anesthesia: General endotracheal    Estimated blood loss: 200 mL    Specimens: Right groin lymph nodes    Complications: None    Findings:  1. The patient has severe high-grade stenoses with extensive plaque in the right common femoral proximal profunda femoris and proximal superficial femoral arteries. 2.  There is high-grade stenosis in the distal popliteal and tibial peroneal trunk. 3.  The patient has extensive very large lymphadenopathy in the right groin. 4.  The left greater saphenous vein was harvested without any injury. The vein is around 3 to 4 mm in diameter. Procedure in detail:    After the patient was consented and given intravenous antibiotics, he was brought to the operating room and placed on the operating room table supine position. General endotracheal anesthesia was achieved and both groins bilateral legs and feet were prepped and draped in usual sterile procedure. Dr. Haydee Osborne made a longitudinal incision in the right groin with knife. Dissected through extensive scar tissue and very large lymph nodes with Bovie elect cautery and sharp dissection.   Some of the very large lymph nodes were excised and sent for pathology. They were oversewn with 3-0 Vicryl suture. The distal right external iliac common femoral profunda femoris and superficial femoral arteries were then dissected circumferentially and loops were placed for future vascular control. Meanwhile, I made a below-knee incision with a knife and carried this dissection down through subcu cutaneous tissue with Bovie electrocautery and sharp dissection. The tibial peroneal trunk anterior tibial and popliteal arteries were controlled with loops. A segment of greater saphenous vein within this incision was then dissected circumferentially and ligated proximally distally with 3-0 Vicryl suture. The vein was expanded with vein solution and then spatulated to be used for vein patch of both the common femoral and tibioperoneal trunk/popliteal endarterectomy site. The patient was given intravenous heparin. After adequate heparinization time with ACT greater than 250, the vessel loops below the knee were tightened for vascular control. A longitudinal arteriotomy was made in the below-knee popliteal artery with knife and carried into the tibial peroneal trunk with White scissors. A fairly extensive endarterectomy was performed with freer elevator and any additional debris was removed under loupe magnification with small forceps. The area was irrigated with heparinized saline. The plaque to taper off very nicely distally so I did not have to place any tacking sutures distally. A greater saphenous vein patch was performed with running 6-0 Prolene suture. The loops on the femoral arteries were then tightened and longitudinal arteriotomy made with 11 blade and White scissors all the way into the superficial femoral artery and all the way up to the distal external iliac artery. Dr. Bear Flowers performed this while I was performing the below-knee portion.   An extensive endarterectomy was performed of these vessels with intact including the occluding the distal external iliac proximal superficial femoral entire common femoral and proximal proximal profunda femoris arteries. Again, debris was removed under loupe medication with small forceps. The greater saphenous patch was then performed with 6-0 Prolene running suture. Prior to completing this vein patch repair standard flushing techniques were used to remove any debris from the native vessels and flow was restored to the right lower extremity. I then made several incisions in the left leg from the calf all the way up to the groin. These were skip incisions. The greater saphenous vein was dissected circumferentially and then any branches were transected between hemostats and ligated with 4-0 Vicryl sutures and/or clips. The entire greater saphenous vein was then harvested after it was ligated proximally and distally with 3-0 Vicryl suture and clips. The vein was brought up into the field and then flushed with vein solution. It appears to be around 3 to 4 mm in diameter and there is no injury to the vein itself. The vein was then tunneled subcutaneously in a reversed fashion from the below-knee incision to the groin incision. Of note, I did try to maintain ACT greater than 250 and the patient was given additional heparin throughout the procedure. The common femoral vessels were controlled with a side-biting clamp and a longitudinal venotomy was made over the vein vein patch in the common femoral artery with 11 blade and White scissors. The proximal vein was spatulated and then an end vein to side vein patch anastomosis was created with 6-0 Prolene running suture. Her to complete this anastomosis standard flushing techniques were used to remove any debris from the native vessels and flow restored to the right lower extremity bypass graft. There is pulsatile inflow. Side-biting clamp was placed in the below-knee popliteal artery and tibioperoneal trunk origin.   A longitudinal venotomy was

## 2019-06-25 NOTE — PROGRESS NOTES
Pt father called. Pt said ok to speak with him about his wellbeing.   Electronically signed by Seb Lawrence RN on 6/25/19 at 5:34 PM

## 2019-06-25 NOTE — ANESTHESIA POSTPROCEDURE EVALUATION
Department of Anesthesiology  Postprocedure Note    Patient: Rosa Mccracken  MRN: [id number]  YOB: 1960  Date of evaluation: 6/25/2019  Time:  4:59 PM     Procedure Summary     Date:  06/25/19 Room / Location:  [facility] OR  / [facility] OR    Anesthesia Start:  1011 Anesthesia Stop:  7375    Procedure:  FEMORAL TIBIAL/PERINEAL BYPASS WITH REVERSED GREATER SAPHENOUS VEIN (Right ) Diagnosis:  (J58.414)    Surgeon:  Lindy Muhammad MD Responsible Provider:  TIFF Agosto CRNA    Anesthesia Type:  general ASA Status:  4          Anesthesia Type: general    Teja Phase I:      Teja Phase II:      Last vitals: Reviewed and per EMR flowsheets.        Anesthesia Post Evaluation    Patient location during evaluation: PACU  Patient participation: complete - patient participated  Level of consciousness: awake and alert  Pain score: 2  Airway patency: patent  Nausea & Vomiting: no nausea and no vomiting  Complications: no  Cardiovascular status: blood pressure returned to baseline  Respiratory status: acceptable, spontaneous ventilation and room air  Hydration status: stable

## 2019-06-25 NOTE — BRIEF OP NOTE
Brief Postoperative Note  ______________________________________________________________    Patient: Amber Cornelius  YOB: 1960  MRN: [id number]  Date of Procedure: 6/25/2019    Pre-Op Diagnosis: I70.213    Post-Op Diagnosis: Same       Procedure(s):  See op note    Anesthesia: General    Surgeon(s):  MD Luis Antonio Quinteros DO        Specimens:   ID Type Source Tests Collected by Time Destination   A : Right Groin Lymph Node Tissue Lymph Node SURGICAL PATHOLOGY Todd Kraft MD 6/25/2019 1551        Implants:  * No implants in log *      Drains: * No LDAs found *      Carlito Arambula MD  Date: 6/25/2019  Time: 4:38 PM

## 2019-06-26 PROBLEM — D63.8 ANEMIA, CHRONIC DISEASE: Chronic | Status: ACTIVE | Noted: 2019-06-26

## 2019-06-26 LAB
HCT VFR BLD CALC: 28.2 % (ref 42–52)
HEMOGLOBIN: 8.9 G/DL (ref 14–18)
HEPATITIS B SURFACE ANTIGEN INTERPRETATION: NORMAL
MCH RBC QN AUTO: 29.1 PG (ref 27–31)
MCHC RBC AUTO-ENTMCNC: 31.6 G/DL (ref 33–37)
MCV RBC AUTO: 92.2 FL (ref 80–94)
PDW BLD-RTO: 17.4 % (ref 11.5–14.5)
PLATELET # BLD: 155 K/UL (ref 130–400)
PMV BLD AUTO: 12.1 FL (ref 9.4–12.4)
RBC # BLD: 3.06 M/UL (ref 4.7–6.1)
WBC # BLD: 7.3 K/UL (ref 4.8–10.8)

## 2019-06-26 PROCEDURE — 6370000000 HC RX 637 (ALT 250 FOR IP): Performed by: NURSE PRACTITIONER

## 2019-06-26 PROCEDURE — 87340 HEPATITIS B SURFACE AG IA: CPT

## 2019-06-26 PROCEDURE — 1210000000 HC MED SURG R&B

## 2019-06-26 PROCEDURE — 2580000003 HC RX 258: Performed by: SURGERY

## 2019-06-26 PROCEDURE — 6360000002 HC RX W HCPCS: Performed by: NURSE PRACTITIONER

## 2019-06-26 PROCEDURE — 86923 COMPATIBILITY TEST ELECTRIC: CPT

## 2019-06-26 PROCEDURE — 5A1D70Z PERFORMANCE OF URINARY FILTRATION, INTERMITTENT, LESS THAN 6 HOURS PER DAY: ICD-10-PCS | Performed by: INTERNAL MEDICINE

## 2019-06-26 PROCEDURE — 6370000000 HC RX 637 (ALT 250 FOR IP): Performed by: SURGERY

## 2019-06-26 PROCEDURE — 85027 COMPLETE CBC AUTOMATED: CPT

## 2019-06-26 PROCEDURE — 8010000000 HC HEMODIALYSIS ACUTE INPT

## 2019-06-26 PROCEDURE — 6360000002 HC RX W HCPCS: Performed by: SURGERY

## 2019-06-26 RX ORDER — KETOROLAC TROMETHAMINE 30 MG/ML
15 INJECTION, SOLUTION INTRAMUSCULAR; INTRAVENOUS EVERY 8 HOURS
Status: DISCONTINUED | OUTPATIENT
Start: 2019-06-26 | End: 2019-06-28 | Stop reason: HOSPADM

## 2019-06-26 RX ORDER — KETOROLAC TROMETHAMINE 30 MG/ML
30 INJECTION, SOLUTION INTRAMUSCULAR; INTRAVENOUS EVERY 8 HOURS
Status: DISCONTINUED | OUTPATIENT
Start: 2019-06-26 | End: 2019-06-26

## 2019-06-26 RX ORDER — HEPARIN SODIUM 5000 [USP'U]/ML
5000 INJECTION, SOLUTION INTRAVENOUS; SUBCUTANEOUS EVERY 8 HOURS SCHEDULED
Status: DISCONTINUED | OUTPATIENT
Start: 2019-06-26 | End: 2019-06-28 | Stop reason: HOSPADM

## 2019-06-26 RX ORDER — OXYCODONE AND ACETAMINOPHEN 10; 325 MG/1; MG/1
1 TABLET ORAL EVERY 4 HOURS PRN
Status: DISCONTINUED | OUTPATIENT
Start: 2019-06-26 | End: 2019-06-28 | Stop reason: HOSPADM

## 2019-06-26 RX ADMIN — ASPIRIN 81 MG: 81 TABLET ORAL at 08:24

## 2019-06-26 RX ADMIN — HYDROMORPHONE HYDROCHLORIDE 0.5 MG: 1 INJECTION, SOLUTION INTRAMUSCULAR; INTRAVENOUS; SUBCUTANEOUS at 01:10

## 2019-06-26 RX ADMIN — OXYCODONE AND ACETAMINOPHEN 1 TABLET: 10; 325 TABLET ORAL at 15:57

## 2019-06-26 RX ADMIN — OXYCODONE HYDROCHLORIDE AND ACETAMINOPHEN 2 TABLET: 5; 325 TABLET ORAL at 03:49

## 2019-06-26 RX ADMIN — CLONIDINE HYDROCHLORIDE 0.1 MG: 0.1 TABLET ORAL at 03:48

## 2019-06-26 RX ADMIN — AMLODIPINE BESYLATE 10 MG: 10 TABLET ORAL at 08:24

## 2019-06-26 RX ADMIN — OXYCODONE AND ACETAMINOPHEN 1 TABLET: 10; 325 TABLET ORAL at 07:55

## 2019-06-26 RX ADMIN — CLONIDINE HYDROCHLORIDE 0.3 MG: 0.2 TABLET ORAL at 20:59

## 2019-06-26 RX ADMIN — HEPARIN SODIUM 5000 UNITS: 5000 INJECTION INTRAVENOUS; SUBCUTANEOUS at 10:01

## 2019-06-26 RX ADMIN — KETOROLAC TROMETHAMINE 15 MG: 30 INJECTION, SOLUTION INTRAMUSCULAR; INTRAVENOUS at 07:55

## 2019-06-26 RX ADMIN — LACTULOSE 10 G: 20 SOLUTION ORAL at 20:59

## 2019-06-26 RX ADMIN — KETOROLAC TROMETHAMINE 15 MG: 30 INJECTION, SOLUTION INTRAMUSCULAR; INTRAVENOUS at 15:57

## 2019-06-26 RX ADMIN — HEPARIN SODIUM 5000 UNITS: 5000 INJECTION INTRAVENOUS; SUBCUTANEOUS at 20:59

## 2019-06-26 RX ADMIN — OXYCODONE AND ACETAMINOPHEN 1 TABLET: 10; 325 TABLET ORAL at 21:01

## 2019-06-26 RX ADMIN — Medication 10 ML: at 07:57

## 2019-06-26 RX ADMIN — ISOSORBIDE MONONITRATE 30 MG: 30 TABLET, EXTENDED RELEASE ORAL at 08:24

## 2019-06-26 RX ADMIN — LISINOPRIL 10 MG: 10 TABLET ORAL at 20:59

## 2019-06-26 ASSESSMENT — PAIN SCALES - GENERAL
PAINLEVEL_OUTOF10: 8
PAINLEVEL_OUTOF10: 6
PAINLEVEL_OUTOF10: 8
PAINLEVEL_OUTOF10: 10
PAINLEVEL_OUTOF10: 6
PAINLEVEL_OUTOF10: 7
PAINLEVEL_OUTOF10: 4

## 2019-06-26 NOTE — PROGRESS NOTES
Vascular Surgery  Dr.Scott Sharla Meneses   Daily Progress Note    Pt Name: Vilma Reyes  Medical Record Number: [id number]  Date of Birth 1960   Today's Date: 6/26/2019        SUBJECTIVE:     Patient was seen and examined. Resting, but complaining of severe pain. Wife at bedside, asking if he can have more pain medication.        OBJECTIVE:     Patient Vitals for the past 24 hrs:   BP Temp Temp src Pulse Resp SpO2 Height Weight   06/26/19 0700 (!) 145/43 -- -- 78 10 96 % -- --   06/26/19 0600 -- -- -- 67 11 97 % -- --   06/26/19 0535 -- -- -- 67 10 94 % -- 172 lb 4.8 oz (78.2 kg)   06/26/19 0530 -- -- -- 66 10 93 % -- --   06/26/19 0500 (!) 133/39 98.5 °F (36.9 °C) Temporal 65 10 95 % -- --   06/26/19 0430 -- -- -- 69 14 94 % -- --   06/26/19 0410 (!) 167/42 -- -- 70 10 92 % -- --   06/26/19 0400 -- -- -- 74 -- -- -- --   06/26/19 0230 -- -- -- 79 -- -- -- --   06/26/19 0223 -- -- -- 79 -- -- -- --   06/26/19 0222 -- -- -- 79 -- -- -- --   06/26/19 0200 -- 98.7 °F (37.1 °C) Temporal 76 17 95 % -- --   06/26/19 0130 -- -- -- 74 10 93 % -- --   06/26/19 0100 -- -- -- 73 10 94 % -- --   06/26/19 0030 -- -- -- 71 -- -- -- --   06/26/19 0000 -- -- -- 73 12 95 % -- --   06/25/19 2300 -- -- -- 69 17 95 % -- --   06/25/19 2230 -- -- -- 70 12 96 % -- --   06/25/19 2200 -- -- -- 70 12 97 % -- --   06/25/19 2100 -- -- -- 71 13 97 % -- --   06/25/19 2030 -- -- -- 69 13 96 % -- --   06/25/19 2000 -- 97.8 °F (36.6 °C) Temporal 70 15 96 % -- --   06/25/19 1930 -- -- -- 71 15 96 % -- --   06/25/19 1830 -- -- -- 63 15 94 % -- --   06/25/19 1828 -- -- -- -- 23 99 % -- --   06/25/19 1815 -- -- -- 64 14 95 % -- --   06/25/19 1800 -- 98.6 °F (37 °C) Temporal 64 14 95 % -- --   06/25/19 1745 (!) 163/50 -- -- 63 12 97 % -- --   06/25/19 1739 (!) 161/43 -- -- 62 12 93 % -- --   06/25/19 1725 (!) 191/53 -- -- 62 12 93 % -- --   06/25/19 1715 (!) 188/49 -- -- 61 12 92 % -- --   06/25/19 1705 (!) 191/53 -- -- 63 14 91 % -- --   06/25/19 1700 (!) 178/47 -- -- 63 14 96 % -- --   06/25/19 1655 (!) 174/48 -- -- 67 14 96 % -- --   06/25/19 1653 (!) 181/49 99.6 °F (37.6 °C) Tympanic 64 12 93 % -- --   06/25/19 0759 (!) 183/53 98 °F (36.7 °C) Temporal 64 18 92 % 5' 9\" (1.753 m) 174 lb (78.9 kg)         Intake/Output Summary (Last 24 hours) at 6/26/2019 0751  Last data filed at 6/26/2019 0600  Gross per 24 hour   Intake 1282.5 ml   Output 400 ml   Net 882.5 ml       In: 782.5 [P.O.:320; I.V.:462.5]  Out: -     I/O last 3 completed shifts: In: 1282.5 [P.O.:320; I.V.:962.5]  Out: 400 [Blood:400]     Date 06/26/19 0000 - 06/26/19 2359   Shift 5347-8350 1186-0596 8014-0187 24 Hour Total   INTAKE   P.O. 220   220   I. V.(mL/kg/hr) 162.5   162.5   Shift Total(mL/kg) 382. 5(4.9)   382. 5(4.9)   OUTPUT   Shift Total(mL/kg)       Weight (kg) 78.2 78.2 78.2 78.2       Wt Readings from Last 3 Encounters:   06/26/19 172 lb 4.8 oz (78.2 kg)   06/18/19 174 lb (78.9 kg)   06/13/19 174 lb (78.9 kg)        Body mass index is 25.44 kg/m². Diet: DIET CARDIAC;        MEDS:     Scheduled Meds:   ketorolac  15 mg Intravenous Q8H    amLODIPine  10 mg Oral Daily    Calcium Acetate (Phos Binder)  667 mg Oral TID WC    cloNIDine  0.3 mg Oral TID    isosorbide mononitrate  30 mg Oral Daily    lisinopril  10 mg Oral BID    minoxidil  10 mg Oral Daily    sodium chloride flush  10 mL Intravenous 2 times per day    aspirin  81 mg Oral Daily    lactulose  10 g Oral TID     Continuous Infusions:   sodium chloride 25 mL/hr at 06/25/19 1930     PRN Meds:  oxyCODONE-acetaminophen 1 tablet Q4H PRN   sucralfate 1 g TID PRN   sodium chloride flush 10 mL PRN   acetaminophen 650 mg Q4H PRN   ondansetron 4 mg Q6H PRN   metoprolol tartrate 25 mg Q12H PRN   cloNIDine 0.1 mg Q2H PRN   HYDROmorphone 0.5 mg Q3H PRN   polyethylene glycol 17 g Daily PRN         PHYSICAL EXAM:     CONSTITUTIONAL: Alert and oriented times 3, no acute distress and cooperative to examination.   LUNGS: Clear bilateral APRN-BC

## 2019-06-26 NOTE — PROGRESS NOTES
4 Eyes Skin Assessment    Amber Cornelius is being assessed upon: Transfer to [facility]    I agree that Linnette Zurita, along with Popeye Steward (either 2 RN's or 1 LPN and 1 RN) have performed a thorough Head to Toe Skin Assessment on the patient. ALL assessment sites listed below have been assessed. Areas assessed by both nurses:     [x]   Head, Face, and Ears   [x]   Shoulders, Back, and Chest  [x]   Arms, Elbows, and Hands   [x]   Coccyx, Sacrum, and Ischium  [x]   Legs, Feet, and Heels    Does the Patient have Skin Breakdown? Yes, wound(s) noted upon assessment. It is the responsibility of the Primary Nurse to assure that the following documentation, preventions, orders, and consults are complete on the above noted wound(s): Wound LDA initiated. LDA Flowsheet Documentation includes the Shaila-wound, Wound Assessment, Measurements, Dressing Treatment, Drainage, and Color.     Jose Prevention initiated: Yes  Wound Care Orders initiated: Yes    [address]  nurse consulted for Pressure Injury (Stage 3,4, Unstageable, DTI, NWPT, and Complex wounds) and New or Established Ostomies: NA        Primary Nurse eSignature: Rolinda Saint, RN on 6/26/2019 at 4:36 PM      Co-Signer eSignature: Electronically signed by Jorge L Ferreira RN on 6/26/2019 at 4:38 PM

## 2019-06-26 NOTE — CONSULTS
Nephrology ([facility]) Consult Note      Patient:  Vinay Salamanca  YOB: 1960  Date of Service: 6/26/2019  MRN: [id number]   Acct: [de-identified]   Primary Care Physician: Gale Wood DO  Advance Directive: Full Code  Admit Date: 6/25/2019       Hospital Day: 1  Referring Provider: Candi Horton MD    Patient independently seen and examined, Chart, Consults, Notes, Operative notes, Labs, Cardiology, and Radiology studies reviewed as able. Subjective:  Vinay Salamanca is a 61 y.o. male  whom we were consulted for end-stage renal disease. Patient receives hemodialysis Monday Wednesday Friday at the [address] [place] [address] dialysis clinic. Mated for peripheral vascular disease and underwent endarterectomies and arterial bypass grafting with Dr. Elvia Arcos. Reports no issues with dialysis and completed his last outpatient dialysis on Monday without complication. Today, he is up in chair eating breakfast.  He denies chest pain, shortness of air, nausea or vomiting. Allergies:  Patient has no known allergies.     Medicines:  Current Facility-Administered Medications   Medication Dose Route Frequency Provider Last Rate Last Dose    oxyCODONE-acetaminophen (PERCOCET)  MG per tablet 1 tablet  1 tablet Oral Q4H PRN TIFF Posadas   1 tablet at 06/26/19 0755    ketorolac (TORADOL) injection 15 mg  15 mg Intravenous Q8H Denia Hayward APRN   15 mg at 06/26/19 0755    heparin (porcine) injection 5,000 Units  5,000 Units Subcutaneous 3 times per day TIFF Posadas   5,000 Units at 06/26/19 1001    amLODIPine (NORVASC) tablet 10 mg  10 mg Oral Daily Candi Horton MD   10 mg at 06/26/19 4355    Calcium Acetate (Phos Binder) CAPS 667 mg  667 mg Oral TID WC Candi Horton MD        cloNIDine (CATAPRES) tablet 0.3 mg  0.3 mg Oral TID Candi Horton MD   0.3 mg at 06/25/19 4245    isosorbide mononitrate (IMDUR) extended release tablet 30 mg  30 mg Oral Daily Carlos GRACIA Chandan Golden MD   30 mg at 06/26/19 9491    lisinopril (PRINIVIL;ZESTRIL) tablet 10 mg  10 mg Oral BID Gayle Restrepo MD   10 mg at 06/25/19 2113    minoxidil (LONITEN) tablet 10 mg  10 mg Oral Daily Gayle Restrepo MD        sucralfate (CARAFATE) tablet 1 g  1 g Oral TID PRN Gayle Restrepo MD        0.9 % sodium chloride infusion   Intravenous Continuous Gayle Restrepo MD 25 mL/hr at 06/25/19 1930      sodium chloride flush 0.9 % injection 10 mL  10 mL Intravenous 2 times per day Gayle Restrepo MD   10 mL at 06/26/19 0757    sodium chloride flush 0.9 % injection 10 mL  10 mL Intravenous PRN Gayle Restrepo MD        acetaminophen (TYLENOL) tablet 650 mg  650 mg Oral Q4H PRN Gayle Restrepo MD        ondansetron [facility]) injection 4 mg  4 mg Intravenous Q6H PRN Gayle Restrepo MD        aspirin EC tablet 81 mg  81 mg Oral Daily Gayle Restrepo MD   81 mg at 06/26/19 5524    metoprolol tartrate (LOPRESSOR) tablet 25 mg  25 mg Oral Q12H PRN Gayle Restrepo MD        cloNIDine (CATAPRES) tablet 0.1 mg  0.1 mg Oral Q2H PRN Gayle Restrepo MD   0.1 mg at 06/26/19 0348    HYDROmorphone (DILAUDID) injection 0.5 mg  0.5 mg Intravenous Q3H PRN Gayle Restrepo MD   0.5 mg at 06/26/19 0110    polyethylene glycol (GLYCOLAX) packet 17 g  17 g Oral Daily PRN Gayle Restrepo MD        lactulose (CHRONULAC) 10 GM/15ML solution 10 g  10 g Oral TID Gayle Restrepo MD           Past Medical History:  Past Medical History:   Diagnosis Date    Anxiety     CAD (coronary artery disease)     stents x 2; per dr. Miguel Odell [facility])     liver cancer    [facility]congestive heart failure) ([facility]     Chyloperitoneum determined by paracentesis     CKD (chronic kidney disease), stage V ([facility]     Dialysis patient ([facility]     mon wed fri at [place] [facility] patient [facility])     mon wed fri in [place] Hepatic cirrhosis ([facility]     dr. Roxanna Landrum; has been going [facility] for liver and kidney transplant list.    Hepatitis C, chronic ([facility]     History of blood transfusion     HTN (hypertension)     Hx of blood clots     arm/fistula    Osteoarthritis     PVD (peripheral vascular disease) (HCC)     Sleep apnea     no cpap at present.  Type II diabetes mellitus (HCC)     no meds. Past Surgical History:  Past Surgical History:   Procedure Laterality Date    ANGIOPLASTY  08/09/11 JAI GERARDO cephalic vein balloon angioplasty with 7mm x 4cm balloon. coild embolization of 2 cephallic vein brances with 3mm x 5cm coils    CARDIAC CATHETERIZATION  04/04/11    cardiac cath and stent x1 by Dr. Cronin Arts  07/26/11 [facility]    LU AV fistula. coild embolization of cephalic vein branch near the wrist with 3mm EMBO coils. balloon a'plasty left cephalic vein with [id number] balloon and then [id number] balloon    DIALYSIS FISTULA CREATION Left 2/16/2017    LEFT UPPER EXTREMITY BRACHIAL / AXILLARY GRAFT AND STENT LEFT SUBCLAVIAN VEIN  performed by Lis Ferris MD at [address] FEMORAL-TIBIAL BYPASS GRAFT Right 6/25/2019    FEMORAL TIBIAL/PERINEAL BYPASS WITH REVERSED GREATER SAPHENOUS VEIN performed by Lis Ferris MD at [address] [facility] [address]  12/26/2010    Right internal jugular vein tunneled dialysis catheter placement    OTHER SURGICAL HISTORY  [id number]    Redo of dialysis catheter    OTHER SURGICAL HISTORY  9/6/2011   [facility]    Removal of RT IJV tunneled dialysis cath    OTHER SURGICAL HISTORY  5/22/12   [facility]    Ultrasound-guided cannulation of left cephalic vein in the mid forearm toward the AV anastomosis, Left upper  ext.  fistulograms including venograms of the SVC, Left cephalic vein balloon angioplasty with a 4mm x 100mm Darrell balloon, Completion fistulograms    PACEMAKER PLACEMENT      medtronic    PARACENTESIS      multiple/recent; per dr. Sera Rodriguez [place] [id number] [facility] Left 1/30/2018    UPPER EXTREMITY DRIL PROCEDURE WITH LEFT SAPHENOUS VEIN HARVESTING performed by Bianca Gaming MD at [address] [place] [address]  6/19/12    LUE arteriovenous fistulogram including venography of the superior vena cava. Balloon angioplasty, left forearm cephalic vein and upper arm cephalic vein with [id number] tod balloon.  VASCULAR SURGERY  7/10/2012 S     Revision of left distal radial artery to cephalic vein arteriovenous fistula with 7mm Artegraft interposition.  VASCULAR SURGERY  7/30/15 [facility] & [address]    Left upper extremity arteriovenous fistulograms including venography of the superior vena cava. Left cephalic vein balloon angioplasty with an 8 x 20 cm cutting balloon proximal cephalic vein. Balloon angioplasty of left cephalic vein/antecubital vein near the antecubital crease with 8 x 20 mm cutting balloon.  VASCULAR SURGERY  7/30/15 [facility] cont    Balloon angioplasty proximal end distal upper arm cephalic vein with 9 x 40 cm  balloon. Completion fistulograms of the left upper extremity.  VASCULAR SURGERY  8/13/15 S    Revision of left upper extremity arteriovenous fistula with excision of pseudoaneurysm and primary repair of cephalic vein.  VASCULAR SURGERY  5/3/16 [facility]    Left upper fistulograms/venograms, left cephalic balloon 8 x 20 cutter,9 x 40 conquest,left proximal cephalic vein stents (flair 2 9 x 50), balloon stents 9 x 40 conquest.    VASCULAR SURGERY  12/08/2016    [facility]- ultrasound guided cannulation of left distal cephalic vein ultrasound guided cannulation right internal jugular vein placement of right internal jugular vein tunneled dialysis catheter (Bard Equistream XK 23cm tip to cuff)     VASCULAR SURGERY  01/20/2017    [facility]-Right upper venograms, u/s guided cannulation left basilic vein, left upper venograms, balloon angioplasty left subclavian vein 10x40 conquest.    VASCULAR SURGERY  02/16/2017    [facility]. Left brachial artery to axillary vein arteriovenous graft with 7 mm artegraft. Left upper extremity venograms. Left subclavian vein stent viabahn 13x50. Left subclavian vein stent balloon angioplasty 12x40 atlas.  VASCULAR SURGERY  2017    [facility]. Removal of tunneled dialysis catheter right internal jugular vein.  VASCULAR SURGERY  2018    [facility]. Arch aortogram, left upper arteriogram, AV graft angiogram/venogram.    VASCULAR SURGERY  2018    [facility]. Right CFA 5f-6f-7f sheath, aortogram with bilateral lower arteriogram, left SFA/pop  balloon angioplasty 5x100 , 6x150 lutonix ( 2), left CFA  7f sheath, bilateral iliac kissing stents right 10x38 icast, left 9x59 icast expanded with 10x40 , completion aortogram, mynx left CFA , failed mynx right CFA.  VASCULAR SURGERY  2019    [facility] left CFA 5f sheath, aortogram with bilateral lower arteriogram, mynx left CFA.  VASCULAR SURGERY  2019    [facility]. Femoral tibial/perineal bypass with reversed greater saphenous vein.        Family History  Family History   Problem Relation Age of Onset    Heart Disease Other     Diabetes Other     Hypertension Other     High Blood Pressure Mother     High Blood Pressure Father        Social History  Social History     Socioeconomic History    Marital status:      Spouse name: Not on file    Number of children: Not on file    Years of education: Not on file    Highest education level: Not on file   Occupational History    Not on file   Social Needs    Financial resource strain: Not on file    Food insecurity:     Worry: Not on file     Inability: Not on file    Transportation needs:     Medical: Not on file     Non-medical: Not on file   Tobacco Use    Smoking status: Former Smoker     Packs/day: 1.00     Years: 35.00     Pack years: 35.00     Types: Cigarettes     Last attempt to quit: 2017     Years since quittin.3    Smokeless tobacco: Never Used   Substance and Sexual Activity    Alcohol use: No    Drug use: No    Sexual activity: Yes     Partners: Female   Lifestyle    Physical activity:     Days per week: Not on file     Minutes per session: Not on file    Stress: Not on file   Relationships    Social connections:     Talks on phone: Not on file     Gets together: Not on file     Attends Caodaism service: Not on file     Active member of club or organization: Not on file     Attends meetings of clubs or organizations: Not on file     Relationship status: Not on file    Intimate partner violence:     Fear of current or ex partner: Not on file     Emotionally abused: Not on file     Physically abused: Not on file     Forced sexual activity: Not on file   Other Topics Concern    Not on file   Social History Narrative    Not on file         Review of Systems:  History obtained from chart review and the patient  General ROS: No fever or chills  Respiratory ROS: No cough, shortness of breath, wheezing  Cardiovascular ROS: No chest pain or palpitations  Gastrointestinal ROS: No abdominal pain or melena  Genito-Urinary ROS: No dysuria or hematuria  Musculoskeletal ROS: No joint pain or swelling   14 point ROS reviewed with the patient and negative except as noted above and in the HPI unless unable to obtain.     Objective:  Patient Vitals for the past 24 hrs:   BP Temp Temp src Pulse Resp SpO2 Weight   06/26/19 1000 -- -- -- 64 (!) 7 98 % --   06/26/19 0922 (!) 111/43 -- -- -- -- -- --   06/26/19 0920 (!) 111/43 -- -- -- -- -- --   06/26/19 0901 -- -- -- 66 22 95 % --   06/26/19 0824 (!) 133/52 -- -- -- -- -- --   06/26/19 0822 (!) 119/37 -- -- -- -- -- --   06/26/19 0800 -- 99 °F (37.2 °C) Temporal 67 11 96 % --   06/26/19 0700 (!) 145/43 -- -- 78 10 96 % --   06/26/19 0600 -- -- -- 67 11 97 % --   06/26/19 0535 -- -- -- 67 10 94 % 172 lb 4.8 oz (78.2 kg)   06/26/19 0530 -- -- -- 66 10 93 % --   06/26/19 0500 (!) 133/39 98.5 °F (36.9 °C) Temporal 65 10 95 % --   06/26/19 0430 -- -- -- 69 14 94 % --   06/26/19 0410 (!) 167/42 -- -- 70 10 92 % --   06/26/19 0400 -- -- -- 74 -- -- --   06/26/19 0230 -- -- -- 79 -- -- --   06/26/19 0223 -- -- -- 79 -- -- --   06/26/19 0222 -- -- -- 79 -- -- --   06/26/19 0200 -- 98.7 °F (37.1 °C) Temporal 76 17 95 % --   06/26/19 0130 -- -- -- 74 10 93 % --   06/26/19 0100 -- -- -- 73 10 94 % --   06/26/19 0030 -- -- -- 71 -- -- --   06/26/19 0000 -- -- -- 73 12 95 % --   06/25/19 2300 -- -- -- 69 17 95 % --   06/25/19 2230 -- -- -- 70 12 96 % --   06/25/19 2200 -- -- -- 70 12 97 % --   06/25/19 2100 -- -- -- 71 13 97 % --   06/25/19 2030 -- -- -- 69 13 96 % --   06/25/19 2000 -- 97.8 °F (36.6 °C) Temporal 70 15 96 % --   06/25/19 1930 -- -- -- 71 15 96 % --   06/25/19 1830 -- -- -- 63 15 94 % --   06/25/19 1828 -- -- -- -- 23 99 % --   06/25/19 1815 -- -- -- 64 14 95 % --   06/25/19 1800 -- 98.6 °F (37 °C) Temporal 64 14 95 % --   06/25/19 1745 (!) 163/50 -- -- 63 12 97 % --   06/25/19 1739 (!) 161/43 -- -- 62 12 93 % --   06/25/19 1725 (!) 191/53 -- -- 62 12 93 % --   06/25/19 1715 (!) 188/49 -- -- 61 12 92 % --   06/25/19 1705 (!) 191/53 -- -- 63 14 91 % --   06/25/19 1700 (!) 178/47 -- -- 63 14 96 % --   06/25/19 1655 (!) 174/48 -- -- 67 14 96 % --   06/25/19 1653 (!) 181/49 99.6 °F (37.6 °C) Tympanic 64 12 93 % --       Intake/Output Summary (Last 24 hours) at 6/26/2019 1044  Last data filed at 6/26/2019 0600  Gross per 24 hour   Intake 1282.5 ml   Output 400 ml   Net 882.5 ml     General: awake/alert   Chest:  clear to auscultation bilaterally without respiratory distress  CVS: regular rate and rhythm  Abdominal: soft, nontender, normal bowel sounds  Extremities: no cyanosis or edema  Skin: warm and dry without rash      Labs:  BMP:   Recent Labs     06/25/19  0802      K 4.3   CL 93*   CO2 31*   BUN 21*   CREATININE 4.3*   CALCIUM 9.0     CBC:   Recent Labs     06/25/19  0802 06/25/19  1708 06/26/19  0400   WBC 3.6* 5.7 7.3   HGB 9.5* 9.3* 8.9*   HCT 30.6* 29.1* 28.2*   MCV 93.9 91.5 92.2   * 176 155     LIVER PROFILE: No results for input(s): AST, ALT, LIPASE, BILIDIR, BILITOT, ALKPHOS in the last 72 hours. Invalid input(s): AMYLASE,  ALB  PT/INR:   Recent Labs     06/25/19  0802   PROTIME 14.5   INR 1.19*     APTT:   Recent Labs     06/25/19  0802   APTT 31.3     BNP:  No results for input(s): BNP in the last 72 hours. Ionized Calcium:No results for input(s): IONCA in the last 72 hours. Magnesium:No results for input(s): MG in the last 72 hours. Phosphorus:No results for input(s): PHOS in the last 72 hours. HgbA1C: No results for input(s): LABA1C in the last 72 hours. Hepatic: No results for input(s): ALKPHOS, ALT, AST, PROT, BILITOT, BILIDIR, LABALBU in the last 72 hours. Lactic Acid: No results for input(s): LACTA in the last 72 hours. Troponin: No results for input(s): CKTOTAL, CKMB, TROPONINT in the last 72 hours. ABGs: No results for input(s): PH, PCO2, PO2, HCO3, O2SAT in the last 72 hours. CRP:  No results for input(s): CRP in the last 72 hours. Sed Rate:  No results for input(s): SEDRATE in the last 72 hours. Cultures:   No results for input(s): CULTURE in the last 72 hours. No results for input(s): BC, Peggy Olsona in the last 72 hours. No results for input(s): CXSURG in the last 72 hours. Radiology reports as per the Radiologist  Radiology: Xr Chest Standard (2 Vw)    Result Date: 6/18/2019  XR CHEST (2 VW) 6/18/2019 1:49 PM History: Preop right leg arterial bypass. Two-view chest x-ray compared with 1/25/2018. Hyperexpanded lungs with chronic interstitial disease and a few granulomas. Chronic blunting of the right lateral costophrenic angle compatible with pleural thickening. No significant pleural fluid is seen on the lateral view. 1. Stable chronic lung changes.  Signed by Dr Sumnath Petersen on 6/18/2019 1:50 PM       Assessment   End-stage renal disease  Hypertension  Anemia of chronic kidney disease  Chronic diastolic congestive heart failure  History of cirrhosis and hepatitis C  Peripheral arterial disease status post endarterectomies and bypasses    Plan:  Hemodialysis Monday Wednesday Friday  Monitor labs  Okay to the floor from our standpoint  Discussed with nursing, vascular      Thank you for the consult, we appreciate the opportunity to provide care to your patients. Feel free to contact me if I can be of any further assistance.       Lennox Roup, MD  06/26/19  10:44 AM

## 2019-06-26 NOTE — PROGRESS NOTES
Gui Velazco received from ICU to room # 329 . Mental Status: Patient is oriented, alert, coherent, logical, thought processes intact and able to concentrate and follow conversation. Vitals:    06/26/19 1050   BP:    Pulse: 61   Resp: 9   Temp:    SpO2: 98%     Placed on cardiac monitor: No.  Belongings: None location is not applicable . Family at bedside No.  Oriented Patient to room. Call light within reach. Yes. Transfer was: Well tolerated by patient. .    Electronically signed by Gloria Pisano RN on 6/26/2019 at 3:41 PM

## 2019-06-27 PROCEDURE — 6360000002 HC RX W HCPCS: Performed by: NURSE PRACTITIONER

## 2019-06-27 PROCEDURE — 1210000000 HC MED SURG R&B

## 2019-06-27 PROCEDURE — 99024 POSTOP FOLLOW-UP VISIT: CPT | Performed by: NURSE PRACTITIONER

## 2019-06-27 PROCEDURE — 2500000003 HC RX 250 WO HCPCS: Performed by: NURSE PRACTITIONER

## 2019-06-27 PROCEDURE — 2580000003 HC RX 258: Performed by: NURSE PRACTITIONER

## 2019-06-27 PROCEDURE — 6370000000 HC RX 637 (ALT 250 FOR IP): Performed by: NURSE PRACTITIONER

## 2019-06-27 PROCEDURE — 93922 UPR/L XTREMITY ART 2 LEVELS: CPT

## 2019-06-27 RX ADMIN — HYDROMORPHONE HYDROCHLORIDE 0.5 MG: 1 INJECTION, SOLUTION INTRAMUSCULAR; INTRAVENOUS; SUBCUTANEOUS at 00:34

## 2019-06-27 RX ADMIN — CALCIUM ACETATE 667 MG: 667 CAPSULE ORAL at 13:38

## 2019-06-27 RX ADMIN — OXYCODONE AND ACETAMINOPHEN 1 TABLET: 10; 325 TABLET ORAL at 03:05

## 2019-06-27 RX ADMIN — CALCIUM ACETATE 667 MG: 667 CAPSULE ORAL at 08:35

## 2019-06-27 RX ADMIN — OXYCODONE AND ACETAMINOPHEN 1 TABLET: 10; 325 TABLET ORAL at 07:21

## 2019-06-27 RX ADMIN — OXYCODONE AND ACETAMINOPHEN 1 TABLET: 10; 325 TABLET ORAL at 13:38

## 2019-06-27 RX ADMIN — MINOXIDIL 10 MG: 2.5 TABLET ORAL at 08:35

## 2019-06-27 RX ADMIN — HEPARIN SODIUM 5000 UNITS: 5000 INJECTION INTRAVENOUS; SUBCUTANEOUS at 22:00

## 2019-06-27 RX ADMIN — CLONIDINE HYDROCHLORIDE 0.3 MG: 0.2 TABLET ORAL at 08:33

## 2019-06-27 RX ADMIN — ASPIRIN 81 MG: 81 TABLET ORAL at 08:33

## 2019-06-27 RX ADMIN — CLONIDINE HYDROCHLORIDE 0.3 MG: 0.2 TABLET ORAL at 20:36

## 2019-06-27 RX ADMIN — ISOSORBIDE MONONITRATE 30 MG: 30 TABLET, EXTENDED RELEASE ORAL at 08:33

## 2019-06-27 RX ADMIN — HEPARIN SODIUM 5000 UNITS: 5000 INJECTION INTRAVENOUS; SUBCUTANEOUS at 13:33

## 2019-06-27 RX ADMIN — LACTULOSE 10 G: 20 SOLUTION ORAL at 20:36

## 2019-06-27 RX ADMIN — LISINOPRIL 10 MG: 10 TABLET ORAL at 08:33

## 2019-06-27 RX ADMIN — HYDROMORPHONE HYDROCHLORIDE 0.5 MG: 1 INJECTION, SOLUTION INTRAMUSCULAR; INTRAVENOUS; SUBCUTANEOUS at 22:00

## 2019-06-27 RX ADMIN — HYDROMORPHONE HYDROCHLORIDE 0.5 MG: 1 INJECTION, SOLUTION INTRAMUSCULAR; INTRAVENOUS; SUBCUTANEOUS at 04:57

## 2019-06-27 RX ADMIN — HYDROMORPHONE HYDROCHLORIDE 0.5 MG: 1 INJECTION, SOLUTION INTRAMUSCULAR; INTRAVENOUS; SUBCUTANEOUS at 16:50

## 2019-06-27 RX ADMIN — LISINOPRIL 10 MG: 10 TABLET ORAL at 20:36

## 2019-06-27 RX ADMIN — AMLODIPINE BESYLATE 10 MG: 10 TABLET ORAL at 08:33

## 2019-06-27 RX ADMIN — SUCRALFATE 1 G: 1 TABLET ORAL at 18:42

## 2019-06-27 RX ADMIN — KETOROLAC TROMETHAMINE 15 MG: 30 INJECTION, SOLUTION INTRAMUSCULAR; INTRAVENOUS at 00:34

## 2019-06-27 RX ADMIN — CALCIUM ACETATE 667 MG: 667 CAPSULE ORAL at 16:53

## 2019-06-27 RX ADMIN — KETOROLAC TROMETHAMINE 15 MG: 30 INJECTION, SOLUTION INTRAMUSCULAR; INTRAVENOUS at 16:52

## 2019-06-27 RX ADMIN — Medication 10 ML: at 20:37

## 2019-06-27 RX ADMIN — KETOROLAC TROMETHAMINE 15 MG: 30 INJECTION, SOLUTION INTRAMUSCULAR; INTRAVENOUS at 08:34

## 2019-06-27 RX ADMIN — Medication 10 ML: at 04:59

## 2019-06-27 RX ADMIN — CLONIDINE HYDROCHLORIDE 0.3 MG: 0.2 TABLET ORAL at 13:33

## 2019-06-27 RX ADMIN — KETOROLAC TROMETHAMINE 15 MG: 30 INJECTION, SOLUTION INTRAMUSCULAR; INTRAVENOUS at 23:59

## 2019-06-27 RX ADMIN — HEPARIN SODIUM 5000 UNITS: 5000 INJECTION INTRAVENOUS; SUBCUTANEOUS at 06:10

## 2019-06-27 ASSESSMENT — PAIN DESCRIPTION - LOCATION
LOCATION: LEG
LOCATION: FOOT;LEG

## 2019-06-27 ASSESSMENT — PAIN SCALES - GENERAL
PAINLEVEL_OUTOF10: 6
PAINLEVEL_OUTOF10: 7
PAINLEVEL_OUTOF10: 8
PAINLEVEL_OUTOF10: 6
PAINLEVEL_OUTOF10: 7
PAINLEVEL_OUTOF10: 6
PAINLEVEL_OUTOF10: 10
PAINLEVEL_OUTOF10: 7
PAINLEVEL_OUTOF10: 6
PAINLEVEL_OUTOF10: 7
PAINLEVEL_OUTOF10: 10
PAINLEVEL_OUTOF10: 7

## 2019-06-27 ASSESSMENT — PAIN DESCRIPTION - PROGRESSION
CLINICAL_PROGRESSION: NOT CHANGED
CLINICAL_PROGRESSION: NOT CHANGED

## 2019-06-27 ASSESSMENT — PAIN DESCRIPTION - DESCRIPTORS
DESCRIPTORS: THROBBING
DESCRIPTORS: ACHING;SHARP

## 2019-06-27 ASSESSMENT — PAIN - FUNCTIONAL ASSESSMENT
PAIN_FUNCTIONAL_ASSESSMENT: PREVENTS OR INTERFERES SOME ACTIVE ACTIVITIES AND ADLS
PAIN_FUNCTIONAL_ASSESSMENT: PREVENTS OR INTERFERES SOME ACTIVE ACTIVITIES AND ADLS

## 2019-06-27 ASSESSMENT — PAIN DESCRIPTION - ORIENTATION
ORIENTATION: RIGHT;LEFT
ORIENTATION: RIGHT;LEFT

## 2019-06-27 ASSESSMENT — PAIN DESCRIPTION - ONSET
ONSET: ON-GOING
ONSET: ON-GOING

## 2019-06-27 ASSESSMENT — PAIN DESCRIPTION - FREQUENCY
FREQUENCY: CONTINUOUS
FREQUENCY: CONTINUOUS

## 2019-06-27 ASSESSMENT — PAIN DESCRIPTION - PAIN TYPE
TYPE: SURGICAL PAIN
TYPE: SURGICAL PAIN

## 2019-06-27 NOTE — PROGRESS NOTES
Nephrology ([facility]) Consult Note      Patient:  Hector Maloney  YOB: 1960  Date of Service: 6/27/2019  MRN: [id number]   Acct: [de-identified]   Primary Care Physician: Lourdes Eller DO  Advance Directive: Full Code  Admit Date: 6/25/2019       Hospital Day: 2  Referring Provider: Nancy Diaz MD    Patient independently seen and examined, Chart, Consults, Notes, Operative notes, Labs, Cardiology, and Radiology studies reviewed as able. Subjective:  Hector Maloney is a 61 y.o. male  whom we were consulted for end-stage renal disease. Patient receives hemodialysis Monday Wednesday Friday at the [address] [place] [address] dialysis [facility]. Mated for peripheral vascular disease and underwent endarterectomies and arterial bypass grafting with Dr. Johnanna Galeazzi. Reports no issues with dialysis and completed his last outpatient dialysis on Monday without complication. Today, he reports no new events. He denies chest pain, shortness of air, nausea or vomiting. No issues with dialysis. Allergies:  Patient has no known allergies.     Medicines:  Current Facility-Administered Medications   Medication Dose Route Frequency Provider Last Rate Last Dose    oxyCODONE-acetaminophen (PERCOCET)  MG per tablet 1 tablet  1 tablet Oral Q4H PRN Mekhi Travis APRN   1 tablet at 06/27/19 1338    ketorolac (TORADOL) injection 15 mg  15 mg Intravenous Q8H Mekhi Travis APRN   15 mg at 06/27/19 0834    heparin (porcine) injection 5,000 Units  5,000 Units Subcutaneous 3 times per day TIFF Hurt   5,000 Units at 06/27/19 1333    amLODIPine (NORVASC) tablet 10 mg  10 mg Oral Daily Mekhi Travis APRN   10 mg at 06/27/19 0252    Calcium Acetate (Phos Binder) CAPS 667 mg  667 mg Oral TID WC Mekhi Travis APRN   667 mg at 06/27/19 1338    cloNIDine (CATAPRES) tablet 0.3 mg  0.3 mg Oral TID Mekhi Travis APRN   0.3 mg at 06/27/19 1333    isosorbide mononitrate (IMDUR) extended release tablet 30 mg  30 mg Oral Daily Peri Music, APRN   30 mg at 06/27/19 4391    lisinopril (PRINIVIL;ZESTRIL) tablet 10 mg  10 mg Oral BID Peri Music, APRN   10 mg at 06/27/19 4998    minoxidil (LONITEN) tablet 10 mg  10 mg Oral Daily Peri Music, APRN   10 mg at 06/27/19 7610    sucralfate (CARAFATE) tablet 1 g  1 g Oral TID PRN Peri Music, APRN        sodium chloride flush 0.9 % injection 10 mL  10 mL Intravenous 2 times per day Peri Music, APRN   10 mL at 06/26/19 0757    sodium chloride flush 0.9 % injection 10 mL  10 mL Intravenous PRN Peri Music, APRN   10 mL at 06/27/19 0459    acetaminophen (TYLENOL) tablet 650 mg  650 mg Oral Q4H PRN Peri Music, APRN        ondansetron [facility]) injection 4 mg  4 mg Intravenous Q6H PRN Peri Music, APRN        aspirin EC tablet 81 mg  81 mg Oral Daily Peri Music, APRN   81 mg at 06/27/19 7335    metoprolol tartrate (LOPRESSOR) tablet 25 mg  25 mg Oral Q12H PRN Peri Music, APRN        cloNIDine (CATAPRES) tablet 0.1 mg  0.1 mg Oral Q2H PRN Peri Music, APRN   0.1 mg at 06/26/19 0348    HYDROmorphone (DILAUDID) injection 0.5 mg  0.5 mg Intravenous Q3H PRN Peri Music, APRN   0.5 mg at 06/27/19 0457    polyethylene glycol (GLYCOLAX) packet 17 g  17 g Oral Daily PRN Peri Music, APRN        lactulose (CHRONULAC) 10 GM/15ML solution 10 g  10 g Oral TID Peri Music, APRN   10 g at 06/26/19 2059       Past Medical History:  Past Medical History:   Diagnosis Date    Anxiety     CAD (coronary artery disease)     stents x 2; per dr. Miguel Odell [facility])     liver cancer    CH[facility]congestive heart failure) ([facility]     Chyloperitoneum determined by paracentesis     CKD (chronic kidney disease), stage V ([facility]     Dialysis patient ([facility]     mon wed fri at [place] Hem[facility] patient [facility])     mon wed fri in [place] [facility]     dr. Roxanna Landrum; has been going to [facility] for liver and kidney transplant list.    Hepatitis C, chronic ([facility]     History of blood transfusion     HTN (hypertension)     Hx of blood clots     arm/fistula    Osteoarthritis     PVD (peripheral vascular disease) (HCC)     Sleep apnea     no cpap at present.  Type II diabetes mellitus (HCC)     no meds. Past Surgical History:  Past Surgical History:   Procedure Laterality Date    ANGIOPLASTY  08/09/11 [facility]    LUE cephalic vein balloon angioplasty with 7mm x 4cm balloon. coild embolization of 2 cephallic vein brances with 3mm x 5cm coils    CARDIAC CATHETERIZATION  04/04/11    cardiac cath and stent x1 by Dr. Tod Almeida  07/26/11 [facility]    LU AV fistula. coild embolization of cephalic vein branch near the wrist with 3mm EMBO coils. balloon a'plasty left cephalic vein with [id number] balloon and then [id number] balloon    DIALYSIS FISTULA CREATION Left 2/16/2017    LEFT UPPER EXTREMITY BRACHIAL / AXILLARY GRAFT AND STENT LEFT SUBCLAVIAN VEIN  performed by Aracelis Bourne MD at [address] [place] [address] FEMORAL-TIBIAL BYPASS GRAFT Right 6/25/2019    FEMORAL TIBIAL/PERINEAL BYPASS WITH REVERSED GREATER SAPHENOUS VEIN performed by Aracelis Bourne MD at [place] [address]  12/26/2010    Right internal jugular vein tunneled dialysis catheter placement    OTHER SURGICAL HISTORY  [id number]    Redo of dialysis catheter    OTHER SURGICAL HISTORY  9/6/2011   [facility]    Removal of RT IJV tunneled dialysis cath    OTHER SURGICAL HISTORY  5/22/12   [facility]    Ultrasound-guided cannulation of left cephalic vein in the mid forearm toward the AV anastomosis, Left upper  ext.  fistulograms including venograms of the SVC, Left cephalic vein balloon angioplasty with a 4mm x 100mm Darrell balloon, Completion fistulograms    PACEMAKER PLACEMENT      medtronic    PARACENTESIS      multiple/recent; per dr. Stacia Erazo at [address] [facility] Left 1/30/2018    UPPER EXTREMITY DRIL PROCEDURE WITH LEFT SAPHENOUS VEIN HARVESTING performed by Aracelis Bourne MD at [address] [place] [address]  6/19/12    LUE arteriovenous fistulogram including venography of the superior vena cava. Balloon angioplasty, left forearm cephalic vein and upper arm cephalic vein with [id number] balloon.  VASCULAR SURGERY  7/10/2012 [facility]     Revision of left distal radial artery to cephalic vein arteriovenous fistula with 7mm Artegraft interposition.  VASCULAR SURGERY  7/30/15 [facility] & [address]    Left upper extremity arteriovenous fistulograms including venography of the superior vena cava. Left cephalic vein balloon angioplasty with an 8 x 20 cm cutting balloon proximal cephalic vein. Balloon angioplasty of left cephalic vein/antecubital vein near the antecubital crease with 8 x 20 mm cutting balloon.  VASCULAR SURGERY  7/30/15 [facility]    Balloon angioplasty proximal end distal upper arm cephalic vein with 9 x 40 cm  balloon. Completion fistulograms of the left upper extremity.  VASCULAR SURGERY  8/13/15 [facility]    Revision of left upper extremity arteriovenous fistula with excision of pseudoaneurysm and primary repair of cephalic vein.  VASCULAR SURGERY  5/3/16 [facility]    Left upper fistulograms/venograms, left cephalic balloon 8 x 20 cutter,9 x 40 conquest,left proximal cephalic vein stents (flair 2 9 x 50), balloon stents 9 x 40 conquest.    VASCULAR SURGERY  12/08/2016    [facility]- ultrasound guided cannulation of left distal cephalic vein ultrasound guided cannulation right internal jugular vein placement of right internal jugular vein tunneled dialysis catheter (Bard Equistream XK 23cm tip to cuff)     VASCULAR SURGERY  01/20/2017    [facility]-Right upper venograms, u/s guided cannulation left basilic vein, left upper venograms, balloon angioplasty left subclavian vein 10x40 conquest.    VASCULAR SURGERY  02/16/2017    [facility]. Left brachial artery to axillary vein arteriovenous graft with 7 mm artegraft. Left upper extremity venograms. Left Physical activity:     Days per week: Not on file     Minutes per session: Not on file    Stress: Not on file   Relationships    Social connections:     Talks on phone: Not on file     Gets together: Not on file     Attends Taoist service: Not on file     Active member of club or organization: Not on file     Attends meetings of clubs or organizations: Not on file     Relationship status: Not on file    Intimate partner violence:     Fear of current or ex partner: Not on file     Emotionally abused: Not on file     Physically abused: Not on file     Forced sexual activity: Not on file   Other Topics Concern    Not on file   Social History Narrative    Not on file         Review of Systems:  History obtained from chart review and the patient  General ROS: No fever or chills  Respiratory ROS: No cough, shortness of breath, wheezing  Cardiovascular ROS: No chest pain or palpitations  Gastrointestinal ROS: No abdominal pain or melena  Genito-Urinary ROS: No dysuria or hematuria  Musculoskeletal ROS: No joint pain or swelling   14 point ROS reviewed with the patient and negative except as noted above and in the HPI unless unable to obtain.     Objective:  Patient Vitals for the past 24 hrs:   BP Temp Temp src Pulse Resp SpO2 Weight   06/27/19 0650 (!) 179/56 98.2 °F (36.8 °C) Temporal 64 18 91 % --   06/27/19 0515 (!) 160/54 97.9 °F (36.6 °C) Temporal 60 20 94 % --   06/27/19 0505 -- -- -- -- -- -- 168 lb (76.2 kg)   06/27/19 0030 (!) 158/52 98.4 °F (36.9 °C) Temporal 66 18 91 % --   06/26/19 1947 (!) 182/56 97.6 °F (36.4 °C) Temporal 63 20 90 % --   06/26/19 1607 (!) 154/54 99.7 °F (37.6 °C) -- 71 16 99 % --       Intake/Output Summary (Last 24 hours) at 6/27/2019 1453  Last data filed at 6/27/2019 1358  Gross per 24 hour   Intake 696 ml   Output --   Net 696 ml     General: awake/alert   Chest:  clear to auscultation bilaterally without respiratory distress  CVS: regular rate and rhythm  Abdominal: soft, nontender, normal bowel sounds  Extremities: no cyanosis or edema  Skin: warm and dry without rash      Labs:  BMP:   Recent Labs     06/25/19  0802      K 4.3   CL 93*   CO2 31*   BUN 21*   CREATININE 4.3*   CALCIUM 9.0     CBC:   Recent Labs     06/25/19  0802 06/25/19  1708 06/26/19  0400   WBC 3.6* 5.7 7.3   HGB 9.5* 9.3* 8.9*   HCT 30.6* 29.1* 28.2*   MCV 93.9 91.5 92.2   * 176 155     LIVER PROFILE: No results for input(s): AST, ALT, LIPASE, BILIDIR, BILITOT, ALKPHOS in the last 72 hours. Invalid input(s): AMYLASE,  ALB  PT/INR:   Recent Labs     06/25/19  0802   PROTIME 14.5   INR 1.19*     APTT:   Recent Labs     06/25/19  0802   APTT 31.3     BNP:  No results for input(s): BNP in the last 72 hours. Ionized Calcium:No results for input(s): IONCA in the last 72 hours. Magnesium:No results for input(s): MG in the last 72 hours. Phosphorus:No results for input(s): PHOS in the last 72 hours. HgbA1C: No results for input(s): LABA1C in the last 72 hours. Hepatic: No results for input(s): ALKPHOS, ALT, AST, PROT, BILITOT, BILIDIR, LABALBU in the last 72 hours. Lactic Acid: No results for input(s): LACTA in the last 72 hours. Troponin: No results for input(s): CKTOTAL, CKMB, TROPONINT in the last 72 hours. ABGs: No results for input(s): PH, PCO2, PO2, HCO3, O2SAT in the last 72 hours. CRP:  No results for input(s): CRP in the last 72 hours. Sed Rate:  No results for input(s): SEDRATE in the last 72 hours. Cultures:   No results for input(s): CULTURE in the last 72 hours. No results for input(s): BC, [place] Left in the last 72 hours. No results for input(s): CXSURG in the last 72 hours. Radiology reports as per the Radiologist  Radiology: Xr Chest Standard (2 Vw)    Result Date: 6/18/2019  XR CHEST (2 VW) 6/18/2019 1:49 PM History: Preop right leg arterial bypass. Two-view chest x-ray compared with 1/25/2018.  Hyperexpanded lungs with chronic interstitial disease and a few granulomas. Chronic blunting of the right lateral costophrenic angle compatible with pleural thickening. No significant pleural fluid is seen on the lateral view. 1. Stable chronic lung changes.  Signed by Dr Miranda Becerra on 6/18/2019 1:50 PM       Assessment   End-stage renal disease  Hypertension  Anemia of chronic kidney disease  Chronic diastolic congestive heart failure  History of cirrhosis and hepatitis C  Peripheral arterial disease status post endarterectomies and bypasses    Plan:  Hemodialysis Monday Wednesday Friday  Monitor labs  Discussed with nursing, vascular, family  Okay for discharge when okay with others, otherwise dialysis in the morning as inpatient      Troy Acuña MD  06/27/19  2:53 PM

## 2019-06-27 NOTE — PLAN OF CARE
Problem: Pain:  Goal: Pain level will decrease  Description  Pain level will decrease  Outcome: Ongoing  Goal: Control of acute pain  Description  Control of acute pain  Outcome: Ongoing  Goal: Control of chronic pain  Description  Control of chronic pain  Outcome: Ongoing     Problem: Risk for Impaired Skin Integrity  Goal: Tissue integrity - skin and mucous membranes  Description  Structural intactness and normal physiological function of skin and  mucous membranes.   Outcome: Ongoing     Problem: Falls - Risk of:  Goal: Will remain free from falls  Description  Will remain free from falls  Outcome: Ongoing  Goal: Absence of physical injury  Description  Absence of physical injury  Outcome: Ongoing     Problem: Tissue Perfusion:  Goal: Peripheral tissue perfusion will improve  Description  Peripheral tissue perfusion will improve  Outcome: Ongoing

## 2019-06-27 NOTE — PROGRESS NOTES
Bilateral Ankle Brachial Indices performed. RT GABRIEL:0.66                           LT GABRIEL: NC    DIGIT: 0.22            DIGIT: 0.35          This is a preliminary report.  Final report pending

## 2019-06-28 VITALS
HEIGHT: 69 IN | RESPIRATION RATE: 15 BRPM | DIASTOLIC BLOOD PRESSURE: 53 MMHG | HEART RATE: 62 BPM | TEMPERATURE: 98.6 F | SYSTOLIC BLOOD PRESSURE: 143 MMHG | OXYGEN SATURATION: 97 % | WEIGHT: 164.6 LBS | BODY MASS INDEX: 24.38 KG/M2

## 2019-06-28 LAB
ANION GAP SERPL CALCULATED.3IONS-SCNC: 16 MMOL/L (ref 7–19)
BUN BLDV-MCNC: 36 MG/DL (ref 6–20)
CALCIUM SERPL-MCNC: 8.5 MG/DL (ref 8.6–10)
CHLORIDE BLD-SCNC: 90 MMOL/L (ref 98–111)
CO2: 26 MMOL/L (ref 22–29)
CREAT SERPL-MCNC: 5.5 MG/DL (ref 0.5–1.2)
GFR NON-AFRICAN AMERICAN: 11
GLUCOSE BLD-MCNC: 117 MG/DL (ref 74–109)
POTASSIUM SERPL-SCNC: 4.7 MMOL/L (ref 3.5–5)
REASON FOR REJECTION: NORMAL
REJECTED TEST: NORMAL
SODIUM BLD-SCNC: 132 MMOL/L (ref 136–145)

## 2019-06-28 PROCEDURE — 6370000000 HC RX 637 (ALT 250 FOR IP): Performed by: NURSE PRACTITIONER

## 2019-06-28 PROCEDURE — 99024 POSTOP FOLLOW-UP VISIT: CPT | Performed by: NURSE PRACTITIONER

## 2019-06-28 PROCEDURE — 6360000002 HC RX W HCPCS: Performed by: NURSE PRACTITIONER

## 2019-06-28 PROCEDURE — 80048 BASIC METABOLIC PNL TOTAL CA: CPT

## 2019-06-28 PROCEDURE — 2500000003 HC RX 250 WO HCPCS: Performed by: NURSE PRACTITIONER

## 2019-06-28 PROCEDURE — 36415 COLL VENOUS BLD VENIPUNCTURE: CPT

## 2019-06-28 PROCEDURE — 8010000000 HC HEMODIALYSIS ACUTE INPT

## 2019-06-28 RX ORDER — ISOSORBIDE MONONITRATE 30 MG/1
30 TABLET, EXTENDED RELEASE ORAL DAILY
Qty: 30 TABLET | Refills: 3 | Status: ON HOLD | OUTPATIENT
Start: 2019-06-28 | End: 2019-12-11

## 2019-06-28 RX ORDER — SUCRALFATE 1 G/1
1 TABLET ORAL 3 TIMES DAILY PRN
Qty: 120 TABLET | Refills: 3 | Status: SHIPPED | OUTPATIENT
Start: 2019-06-28

## 2019-06-28 RX ORDER — ASPIRIN 81 MG/1
81 TABLET ORAL DAILY
Qty: 30 TABLET | Refills: 3 | Status: ON HOLD | OUTPATIENT
Start: 2019-06-28 | End: 2019-12-11

## 2019-06-28 RX ORDER — OXYCODONE AND ACETAMINOPHEN 10; 325 MG/1; MG/1
1 TABLET ORAL EVERY 4 HOURS PRN
Qty: 60 TABLET | Refills: 0 | Status: ON HOLD
Start: 2019-06-28 | End: 2019-07-11 | Stop reason: HOSPADM

## 2019-06-28 RX ADMIN — LISINOPRIL 10 MG: 10 TABLET ORAL at 12:03

## 2019-06-28 RX ADMIN — HYDROMORPHONE HYDROCHLORIDE 0.5 MG: 1 INJECTION, SOLUTION INTRAMUSCULAR; INTRAVENOUS; SUBCUTANEOUS at 12:00

## 2019-06-28 RX ADMIN — AMLODIPINE BESYLATE 10 MG: 10 TABLET ORAL at 12:04

## 2019-06-28 RX ADMIN — HYDROMORPHONE HYDROCHLORIDE 0.5 MG: 1 INJECTION, SOLUTION INTRAMUSCULAR; INTRAVENOUS; SUBCUTANEOUS at 04:36

## 2019-06-28 RX ADMIN — HEPARIN SODIUM 5000 UNITS: 5000 INJECTION INTRAVENOUS; SUBCUTANEOUS at 06:13

## 2019-06-28 RX ADMIN — METOPROLOL TARTRATE 25 MG: 25 TABLET ORAL at 12:04

## 2019-06-28 RX ADMIN — ISOSORBIDE MONONITRATE 30 MG: 30 TABLET, EXTENDED RELEASE ORAL at 12:04

## 2019-06-28 RX ADMIN — HYDROMORPHONE HYDROCHLORIDE 0.5 MG: 1 INJECTION, SOLUTION INTRAMUSCULAR; INTRAVENOUS; SUBCUTANEOUS at 07:31

## 2019-06-28 RX ADMIN — HYDROMORPHONE HYDROCHLORIDE 0.5 MG: 1 INJECTION, SOLUTION INTRAMUSCULAR; INTRAVENOUS; SUBCUTANEOUS at 01:04

## 2019-06-28 RX ADMIN — CALCIUM ACETATE 667 MG: 667 CAPSULE ORAL at 12:05

## 2019-06-28 RX ADMIN — MINOXIDIL 10 MG: 2.5 TABLET ORAL at 12:03

## 2019-06-28 RX ADMIN — ASPIRIN 81 MG: 81 TABLET ORAL at 12:03

## 2019-06-28 ASSESSMENT — PAIN DESCRIPTION - PAIN TYPE: TYPE: SURGICAL PAIN

## 2019-06-28 ASSESSMENT — PAIN DESCRIPTION - ONSET: ONSET: ON-GOING

## 2019-06-28 ASSESSMENT — PAIN DESCRIPTION - DESCRIPTORS: DESCRIPTORS: OTHER (COMMENT)

## 2019-06-28 ASSESSMENT — PAIN SCALES - GENERAL
PAINLEVEL_OUTOF10: 8
PAINLEVEL_OUTOF10: 10
PAINLEVEL_OUTOF10: 7
PAINLEVEL_OUTOF10: 10
PAINLEVEL_OUTOF10: 6
PAINLEVEL_OUTOF10: 4

## 2019-06-28 ASSESSMENT — PAIN DESCRIPTION - PROGRESSION: CLINICAL_PROGRESSION: NOT CHANGED

## 2019-06-28 ASSESSMENT — PAIN DESCRIPTION - FREQUENCY: FREQUENCY: INTERMITTENT

## 2019-06-28 ASSESSMENT — PAIN DESCRIPTION - ORIENTATION: ORIENTATION: RIGHT;LEFT

## 2019-06-28 ASSESSMENT — PAIN - FUNCTIONAL ASSESSMENT: PAIN_FUNCTIONAL_ASSESSMENT: PREVENTS OR INTERFERES SOME ACTIVE ACTIVITIES AND ADLS

## 2019-06-28 NOTE — PROGRESS NOTES
Vascular Surgery  Dr.Scott Bhupinder Simmons   Daily Progress Note    Pt Name: Jaden Vick  Medical Record Number: [id number]  Date of Birth 1960   Today's Date: 6/28/2019        SUBJECTIVE:     Patient was seen and examined. In dialysis. Stated pain was controlled. OBJECTIVE:     Patient Vitals for the past 24 hrs:   BP Temp Temp src Pulse Resp SpO2 Weight   06/28/19 0720 (!) 168/60 97.9 °F (36.6 °C) Temporal 62 18 95 % --   06/28/19 0509 -- -- -- -- -- -- 164 lb 9.6 oz (74.7 kg)   06/28/19 0137 (!) 168/61 98.3 °F (36.8 °C) Temporal 62 18 90 % --   06/27/19 1850 (!) 161/65 98.8 °F (37.1 °C) Temporal 82 20 100 % --         Intake/Output Summary (Last 24 hours) at 6/28/2019 0753  Last data filed at 6/27/2019 2136  Gross per 24 hour   Intake 720 ml   Output --   Net 720 ml       In: 240 [P.O.:240]  Out: -     I/O last 3 completed shifts: In: 720 [P.O.:720]  Out: -          Wt Readings from Last 3 Encounters:   06/28/19 164 lb 9.6 oz (74.7 kg)   06/18/19 174 lb (78.9 kg)   06/13/19 174 lb (78.9 kg)        Body mass index is 24.31 kg/m².      Diet: DIET CARDIAC;        MEDS:     Scheduled Meds:   ketorolac  15 mg Intravenous Q8H    heparin (porcine)  5,000 Units Subcutaneous 3 times per day    amLODIPine  10 mg Oral Daily    Calcium Acetate (Phos Binder)  667 mg Oral TID WC    cloNIDine  0.3 mg Oral TID    isosorbide mononitrate  30 mg Oral Daily    lisinopril  10 mg Oral BID    minoxidil  10 mg Oral Daily    sodium chloride flush  10 mL Intravenous 2 times per day    aspirin  81 mg Oral Daily    lactulose  10 g Oral TID     Continuous Infusions:    PRN Meds:    oxyCODONE-acetaminophen 1 tablet Q4H PRN   sucralfate 1 g TID PRN   sodium chloride flush 10 mL PRN   acetaminophen 650 mg Q4H PRN   ondansetron 4 mg Q6H PRN   metoprolol tartrate 25 mg Q12H PRN   cloNIDine 0.1 mg Q2H PRN   HYDROmorphone 0.5 mg Q3H PRN   polyethylene glycol 17 g Daily PRN         PHYSICAL EXAM:     CONSTITUTIONAL: Alert and oriented times 3, no acute distress and cooperative to examination. LUNGS: Clear bilateral breath sounds without wheezes or rhonchi. CARDIOVASCULAR: Normal HT with RRR. 3/6 systolic murmur. No gallops or rubs. ABDOMEN: Soft, non-tender with active bowel sounds. NEUROLOGIC: Grossly intact. WOUND/INCISION:  Right groin, left groin, and LLE incisions X 3 with staples intact. Edges approximated. No drainage or erythema. EXTREMITY: Foot hyperemic     LABS:     CBC:   Recent Labs     06/25/19  0802 06/25/19  1708 06/26/19  0400   WBC 3.6* 5.7 7.3   RBC 3.26* 3.18* 3.06*   HGB 9.5* 9.3* 8.9*   HCT 30.6* 29.1* 28.2*   MCV 93.9 91.5 92.2   MCH 29.1 29.2 29.1   MCHC 31.0* 32.0* 31.6*   RDW 17.0* 17.0* 17.4*   * 176 155   MPV 12.3 11.4 12.1      Last 3 CMP:   Recent Labs     06/25/19  0802 06/28/19  0627    132*   K 4.3 4.7   CL 93* 90*   CO2 31* 26   BUN 21* 36*   CREATININE 4.3* 5.5*   GLUCOSE 134* 117*   CALCIUM 9.0 8.5*        Calcium:   Lab Results   Component Value Date    CALCIUM 9.0 06/25/2019    CALCIUM 9.0 06/13/2019      INR:   Recent Labs     06/25/19  0802   INR 1.19*         DVT prophylaxis: Heparin 5000 units sq every 8 hours      Right juan carlos - 0.66 and left juan carlos -1.53    ASSESSMENT:     1. Atherosclerosis of native arteries bilateral lower extremities with right toe ulcers and rest pain (critical limb ischemia) - S/P Right common femoral endarterectomy with greater saphenous vein patch, Right tibial peroneal trunk and distal below-knee popliteal artery endarterectomy with greater saphenous vein patch, Right common femoral to below-knee popliteal artery bypass graft with reversed saphenous vein graft harvested from the left lower extremity, Right lower extremity arteriograms on 06/25/2019  2. Chronic Diastolic CHF  3. Essential HTN  4. CAD  5. Liver Disease - Cirrhosis, Hepatitis C  6. ESRD on Hemodialysis  7. Anemia of Chronic Disease                 PLAN:     1. Will d/c after dialysis  2.   Follow up radha [facility] 1 week  3.   Follow up with us 2 weeks

## 2019-06-28 NOTE — PROGRESS NOTES
Wounds BLE intact with staples,  Incision lines washed gently with dial soap and water, dressings changed and ace wrap applied to LLE. Patient tolerated procedure well.

## 2019-06-28 NOTE — PROGRESS NOTES
Nephrology ([facility]) Consult Note      Patient:  Laurel Masters  YOB: 1960  Date of Service: 6/28/2019  MRN: [id number]   Acct: [de-identified]   Primary Care Physician: Da Rodriguez DO  Advance Directive: Full Code  Admit Date: 6/25/2019       Hospital Day: 3  Referring Provider: Tamar Branham MD    Patient independently seen and examined, Chart, Consults, Notes, Operative notes, Labs, Cardiology, and Radiology studies reviewed as able. Subjective:  Laurel Masters is a 61 y.o. male  whom we were consulted for end-stage renal disease. Patient receives hemodialysis Monday Wednesday Friday at the [address] [place] [address] dialysis [facility]. Mated for peripheral vascular disease and underwent endarterectomies and arterial bypass grafting with Dr. Sivakumar Calderon. Reports no issues with dialysis and completed his last outpatient dialysis on Monday without complication. Today, he reports no new events. He denies chest pain, shortness of air, nausea or vomiting. No issues with dialysis. Dialysis   Pt was seen on RRT  Modality: Hemodialysis  Access: Arterial Venous Fistula  Location: left upper  QB: 450  QD: 700  UF: 780      Allergies:  Patient has no known allergies.     Medicines:  Current Facility-Administered Medications   Medication Dose Route Frequency Provider Last Rate Last Dose    oxyCODONE-acetaminophen (PERCOCET)  MG per tablet 1 tablet  1 tablet Oral Q4H PRN Gaetana Rhein, APRN   1 tablet at 06/27/19 1338    ketorolac (TORADOL) injection 15 mg  15 mg Intravenous Q8H Gaetana Rhein, APRN   Stopped at 06/28/19 8800    heparin (porcine) injection 5,000 Units  5,000 Units Subcutaneous 3 times per day Gaetana Rhein, APRN   5,000 Units at 06/28/19 2580    amLODIPine (NORVASC) tablet 10 mg  10 mg Oral Daily Gaetana Rhein, APRN   10 mg at 06/27/19 3134    Calcium Acetate (Phos Binder) CAPS 667 mg  667 mg Oral TID WC Gaetana Rhein, APRN   667 mg at 06/27/19 4943    cloNIDine (CATAPRES) tablet 0.3 mg  0.3 mg Oral TID Bush Stephan, APRN   Stopped at 06/28/19 0800    isosorbide mononitrate (IMDUR) extended release tablet 30 mg  30 mg Oral Daily Bush Stephan, APRN   30 mg at 06/27/19 1898    lisinopril (PRINIVIL;ZESTRIL) tablet 10 mg  10 mg Oral BID Bush Alvarado, APRN   10 mg at 06/27/19 2036    minoxidil (LONITEN) tablet 10 mg  10 mg Oral Daily Bush Alvarado, APRN   10 mg at 06/27/19 7521    sucralfate (CARAFATE) tablet 1 g  1 g Oral TID PRN Bush Alvarado, APRN   1 g at 06/27/19 1842    sodium chloride flush 0.9 % injection 10 mL  10 mL Intravenous 2 times per day Bush Alvarado, APRN   10 mL at 06/27/19 2037    sodium chloride flush 0.9 % injection 10 mL  10 mL Intravenous PRN Bush Alvarado, APRN   10 mL at 06/27/19 0459    acetaminophen (TYLENOL) tablet 650 mg  650 mg Oral Q4H PRN Bush Stephan, APRN        ondansetron [facility]) injection 4 mg  4 mg Intravenous Q6H PRN Bush Alvarado, APRN        aspirin EC tablet 81 mg  81 mg Oral Daily Bush Stephan, APRN   81 mg at 06/27/19 4730    metoprolol tartrate (LOPRESSOR) tablet 25 mg  25 mg Oral Q12H PRN Bush Stephan, APRN        cloNIDine (CATAPRES) tablet 0.1 mg  0.1 mg Oral Q2H PRN Bush Stephan, APRN   0.1 mg at 06/26/19 0348    HYDROmorphone (DILAUDID) injection 0.5 mg  0.5 mg Intravenous Q3H PRN Bush Alvarado, APRN   0.5 mg at 06/28/19 0731    polyethylene glycol (GLYCOLAX) packet 17 g  17 g Oral Daily PRN Bush Stephan, APRN        lactulose (CHRONULAC) 10 GM/15ML solution 10 g  10 g Oral TID Bush Stephan, APRN   10 g at 06/27/19 2036       Past Medical History:  Past Medical History:   Diagnosis Date    Anxiety     CAD (coronary artery disease)     stents x 2; per dr. Giovani Carmona [facility])     liver cancer    [facility]     Chyloperitoneum determined by paracentesis     CKD (chronic kidney disease), stage V ([facility]     Dialysis patient ([facility]     mon PARACENTESIS      multiple/recent; per dr. Esteban Del Castillo at [address] [address] [facility] Left 1/30/2018    UPPER EXTREMITY DRIL PROCEDURE WITH LEFT SAPHENOUS VEIN HARVESTING performed by Lindy Muhammad MD at [address] [place] [address]  6/19/12    LUE arteriovenous fistulogram including venography of the superior vena cava. Balloon angioplasty, left forearm cephalic vein and upper arm cephalic vein with [id number] tod balloon.  VASCULAR SURGERY  7/10/2012 [facility]     Revision of left distal radial artery to cephalic vein arteriovenous fistula with 7mm Artegraft interposition.  VASCULAR SURGERY  7/30/15 [facility] & [address]    Left upper extremity arteriovenous fistulograms including venography of the superior vena cava. Left cephalic vein balloon angioplasty with an 8 x 20 cm cutting balloon proximal cephalic vein. Balloon angioplasty of left cephalic vein/antecubital vein near the antecubital crease with 8 x 20 mm cutting balloon.  VASCULAR SURGERY  7/30/15 [facility] cont    Balloon angioplasty proximal end distal upper arm cephalic vein with 9 x 40 cm  balloon. Completion fistulograms of the left upper extremity.  VASCULAR SURGERY  8/13/15 [facility]    Revision of left upper extremity arteriovenous fistula with excision of pseudoaneurysm and primary repair of cephalic vein.     VASCULAR SURGERY  5/3/16 [facility]    Left upper fistulograms/venograms, left cephalic balloon 8 x 20 cutter,9 x 40 conquest,left proximal cephalic vein stents (flair 2 9 x 50), balloon stents 9 x 40 conquest.    VASCULAR SURGERY  12/08/2016    [facility]- ultrasound guided cannulation of left distal cephalic vein ultrasound guided cannulation right internal jugular vein placement of right internal jugular vein tunneled dialysis catheter (Bard Equistream XK 23cm tip to cuff)     VASCULAR SURGERY  01/20/2017    [facility]-Right upper venograms, u/s guided cannulation left basilic vein, left upper venograms, balloon angioplasty left subclavian vein 10x40 conquest.   Aubrey Drain VASCULAR SURGERY  2017    [facility]. Left brachial artery to axillary vein arteriovenous graft with 7 mm artegraft. Left upper extremity venograms. Left subclavian vein stent viabahn 13x50. Left subclavian vein stent balloon angioplasty 12x40 atlas.  VASCULAR SURGERY  2017    [facility]. Removal of tunneled dialysis catheter right internal jugular vein.  VASCULAR SURGERY  2018    [facility]. Arch aortogram, left upper arteriogram, AV graft angiogram/venogram.    VASCULAR SURGERY  2018    [facility]. Right CFA 5f-6f-7f sheath, aortogram with bilateral lower arteriogram, left SFA/pop  balloon angioplasty 5x100 , 6x150 lutonix ( 2), left CFA  7f sheath, bilateral iliac kissing stents right 10x38 icast, left 9x59 icast expanded with 10x40 , completion aortogram, mynx left CFA , failed mynx right CFA.  VASCULAR SURGERY  2019    [facility] left CFA 5f sheath, aortogram with bilateral lower arteriogram, mynx left CFA.  VASCULAR SURGERY  2019    [facility]. Femoral tibial/perineal bypass with reversed greater saphenous vein.        Family History  Family History   Problem Relation Age of Onset    Heart Disease Other     Diabetes Other     Hypertension Other     High Blood Pressure Mother     High Blood Pressure Father        Social History  Social History     Socioeconomic History    Marital status:      Spouse name: Not on file    Number of children: Not on file    Years of education: Not on file    Highest education level: Not on file   Occupational History    Not on file   Social Needs    Financial resource strain: Not on file    Food insecurity:     Worry: Not on file     Inability: Not on file    Transportation needs:     Medical: Not on file     Non-medical: Not on file   Tobacco Use    Smoking status: Former Smoker     Packs/day: 1.00     Years: 35.00     Pack years: 35.00     Types: Cigarettes     Last attempt to quit: 2017     Years since quittin.3    Smokeless tobacco: Never Used   Substance and Sexual Activity    Alcohol use: No    Drug use: No    Sexual activity: Yes     Partners: Female   Lifestyle    Physical activity:     Days per week: Not on file     Minutes per session: Not on file    Stress: Not on file   Relationships    Social connections:     Talks on phone: Not on file     Gets together: Not on file     Attends Voodoo service: Not on file     Active member of club or organization: Not on file     Attends meetings of clubs or organizations: Not on file     Relationship status: Not on file    Intimate partner violence:     Fear of current or ex partner: Not on file     Emotionally abused: Not on file     Physically abused: Not on file     Forced sexual activity: Not on file   Other Topics Concern    Not on file   Social History Narrative    Not on file         Review of Systems:  History obtained from chart review and the patient  General ROS: No fever or chills  Respiratory ROS: No cough, shortness of breath, wheezing  Cardiovascular ROS: No chest pain or palpitations  Gastrointestinal ROS: No abdominal pain or melena  Genito-Urinary ROS: No dysuria or hematuria  Musculoskeletal ROS: No joint pain or swelling   14 point ROS reviewed with the patient and negative except as noted above and in the HPI unless unable to obtain.     Objective:  Patient Vitals for the past 24 hrs:   BP Temp Temp src Pulse Resp SpO2 Weight   06/28/19 0720 (!) 168/60 97.9 °F (36.6 °C) Temporal 62 18 95 % --   06/28/19 0509 -- -- -- -- -- -- 164 lb 9.6 oz (74.7 kg)   06/28/19 0137 (!) 168/61 98.3 °F (36.8 °C) Temporal 62 18 90 % --   06/27/19 1850 (!) 161/65 98.8 °F (37.1 °C) Temporal 82 20 100 % --       Intake/Output Summary (Last 24 hours) at 6/28/2019 1056  Last data filed at 6/27/2019 2136  Gross per 24 hour   Intake 480 ml   Output --   Net 480 ml     General: awake/alert   Chest:  clear to auscultation bilaterally without respiratory distress  CVS: regular rate and rhythm  Abdominal: soft, nontender, normal bowel sounds  Extremities: no cyanosis or edema  Skin: warm and dry without rash      Labs:  BMP:   Recent Labs     06/28/19  0627   *   K 4.7   CL 90*   CO2 26   BUN 36*   CREATININE 5.5*   CALCIUM 8.5*     CBC:   Recent Labs     06/25/19  1708 06/26/19  0400   WBC 5.7 7.3   HGB 9.3* 8.9*   HCT 29.1* 28.2*   MCV 91.5 92.2    155     LIVER PROFILE: No results for input(s): AST, ALT, LIPASE, BILIDIR, BILITOT, ALKPHOS in the last 72 hours. Invalid input(s): AMYLASE,  ALB  PT/INR:   No results for input(s): PROTIME, INR in the last 72 hours. APTT:   No results for input(s): APTT in the last 72 hours. BNP:  No results for input(s): BNP in the last 72 hours. Ionized Calcium:No results for input(s): IONCA in the last 72 hours. Magnesium:No results for input(s): MG in the last 72 hours. Phosphorus:No results for input(s): PHOS in the last 72 hours. HgbA1C: No results for input(s): LABA1C in the last 72 hours. Hepatic: No results for input(s): ALKPHOS, ALT, AST, PROT, BILITOT, BILIDIR, LABALBU in the last 72 hours. Lactic Acid: No results for input(s): LACTA in the last 72 hours. Troponin: No results for input(s): CKTOTAL, CKMB, TROPONINT in the last 72 hours. ABGs: No results for input(s): PH, PCO2, PO2, HCO3, O2SAT in the last 72 hours. CRP:  No results for input(s): CRP in the last 72 hours. Sed Rate:  No results for input(s): SEDRATE in the last 72 hours. Cultures:   No results for input(s): CULTURE in the last 72 hours. No results for input(s): BC, Ely Rung in the last 72 hours. No results for input(s): CXSURG in the last 72 hours. Radiology reports as per the Radiologist  Radiology: Xr Chest Standard (2 Vw)    Result Date: 6/18/2019  XR CHEST (2 VW) 6/18/2019 1:49 PM History: Preop right leg arterial bypass. Two-view chest x-ray compared with 1/25/2018.  Hyperexpanded lungs with chronic interstitial disease and a few granulomas. Chronic blunting of the right lateral costophrenic angle compatible with pleural thickening. No significant pleural fluid is seen on the lateral view. 1. Stable chronic lung changes.  Signed by Dr Queenie Haley on 6/18/2019 1:50 PM       Assessment   End-stage renal disease  Hypertension  Anemia of chronic kidney disease  Chronic diastolic congestive heart failure  History of cirrhosis and hepatitis C  Peripheral arterial disease status post endarterectomies and bypasses    Plan:  Hemodialysis Monday Wednesday Friday  Monitor labs  Discussed with nursing, vascular, family  Okay for discharge when okay with others after dialysis today      Joaquin Ramirez MD  06/28/19  10:56 AM

## 2019-06-28 NOTE — DISCHARGE SUMMARY
Physician Discharge Summary     Patient ID:  Jeannette Duarte  [id number]  85 y.o.  1960    Admit date: 6/25/2019    Discharge date and time: No discharge date for patient encounter. Admitting Physician: Crystal Badillo MD     Discharge Physician: same    Admission Diagnoses: Atherosclerosis of native arteries of extremities with intermittent claudication, bilateral legs [I70.213]  Atherosclerosis of artery of extremity with rest pain [facility]) [I70.229]    Discharge Diagnoses: same    Admission Condition: fair    Discharged Condition: fair    Indication for Admission: ischemic foot with wound    Hospital Course: admitted and taken to OR for 1. Right common femoral endarterectomy with greater saphenous vein patch  2. Right tibial peroneal trunk and distal below-knee popliteal artery endarterectomy with greater saphenous vein patch  3.  Right common femoral to below-knee popliteal artery bypass graft with reversed saphenous vein graft harvested from the left lower extremity  4. Right lower extremity arteriograms  He did well with the procedure. Post op labs within expected limits. Pain controlled. He was continued on dialysis. Incisions look good. Foot warm. Wounds stable. Post op GABRIEL 0.66. He is walking without assistance. He is allowed to be discharged to home. Consults: nephrology    Significant Diagnostic Studies:     Outstanding Order Results     Date and Time Order Name Status Description    6/26/2019 0706 IR ANGIOGRAM EXTREMITY RIGHT In process     6/25/2019 1451 Surgical Pathology In process     6/25/2019 1002 Arterial Line In process     6/25/2019 0802 PREPARE RBC (CROSSMATCH) Preliminary     6/13/2019 1257 IR AORTAGRAM ABDOMINAL SERIALOGRAM In process           Treatments: 1. Right common femoral endarterectomy with greater saphenous vein patch  2.   Right tibial peroneal trunk and distal below-knee popliteal artery endarterectomy with greater saphenous vein patch  3.  Right common femoral to

## 2019-06-29 ENCOUNTER — HOSPITAL ENCOUNTER (EMERGENCY)
Age: 59
Discharge: HOME | End: 2019-06-29
Attending: EMERGENCY MEDICINE
Payer: MEDICARE

## 2019-06-29 VITALS
OXYGEN SATURATION: 96 % | SYSTOLIC BLOOD PRESSURE: 164 MMHG | HEIGHT: 69 IN | WEIGHT: 170 LBS | TEMPERATURE: 98.5 F | RESPIRATION RATE: 20 BRPM | BODY MASS INDEX: 25.18 KG/M2 | HEART RATE: 63 BPM | DIASTOLIC BLOOD PRESSURE: 67 MMHG

## 2019-06-29 DIAGNOSIS — Z48.89 ENCOUNTER FOR POSTOPERATIVE WOUND CHECK: Primary | ICD-10-CM

## 2019-06-29 LAB
BLOOD BANK DISPENSE STATUS: NORMAL
BLOOD BANK PRODUCT CODE: NORMAL
BPU ID: NORMAL
DESCRIPTION BLOOD BANK: NORMAL

## 2019-06-29 PROCEDURE — 12031 INTMD RPR S/A/T/EXT 2.5 CM/<: CPT

## 2019-06-29 PROCEDURE — 99283 EMERGENCY DEPT VISIT LOW MDM: CPT | Performed by: EMERGENCY MEDICINE

## 2019-06-29 PROCEDURE — 99283 EMERGENCY DEPT VISIT LOW MDM: CPT

## 2019-06-29 PROCEDURE — 2500000003 HC RX 250 WO HCPCS: Performed by: SURGERY

## 2019-06-29 PROCEDURE — 99024 POSTOP FOLLOW-UP VISIT: CPT | Performed by: SURGERY

## 2019-06-29 RX ORDER — LIDOCAINE HYDROCHLORIDE 10 MG/ML
5 INJECTION, SOLUTION INFILTRATION; PERINEURAL ONCE
Status: COMPLETED | OUTPATIENT
Start: 2019-06-29 | End: 2019-06-29

## 2019-06-29 RX ADMIN — LIDOCAINE HYDROCHLORIDE 5 ML: 10 INJECTION, SOLUTION EPIDURAL; INFILTRATION; INTRACAUDAL; PERINEURAL at 02:05

## 2019-06-29 ASSESSMENT — PAIN SCALES - GENERAL
PAINLEVEL_OUTOF10: 0
PAINLEVEL_OUTOF10: 10

## 2019-06-29 ASSESSMENT — PAIN DESCRIPTION - LOCATION: LOCATION: LEG;BACK

## 2019-06-29 ASSESSMENT — PAIN DESCRIPTION - ORIENTATION: ORIENTATION: RIGHT

## 2019-06-29 ASSESSMENT — ENCOUNTER SYMPTOMS
ABDOMINAL PAIN: 0
SHORTNESS OF BREATH: 0

## 2019-06-29 NOTE — ED NOTES
ASSESSMENT:    PT ALERT/ORIENTED X4. PUPILS EQUAL/REACTIVE    SKIN:  WARM/DRY PINK CAPILLARY REFILL < 2SECS    CARDIAC:  S1 S2 NOTED     LUNGS: CLEAR UPPER AND LOWER LOBES, RESPIRATIONS EVEN/UNLABORED     ABDOMEN: BOWEL SOUNDS NOTED UPPER AND LOWER QUADRANTS                     SOFT AND NONTENDER. EXTREMITIES:  BILATERAL DP AND PT AND NO EDEMA NOTED. Sandbag removed  8 staple oozing blood 11 and 12 staple noted with surgiceal blleeding stopped new box 4x4 applied. Left leg noted with dried blood noted to staples upper and lower leg    NO DISTRESS NOTED. SIDE RAILS UP AND CALL LIGHT IN REACH.      Cait Mitchell RN  06/29/19 0100       Cait Mitchell RN  06/29/19 [address] Merida RN  06/29/19 0104

## 2019-06-29 NOTE — ED NOTES
Teresa removed per Dr. Edward Bridges irrigated with 180ml sterile saline     Sincere Mccormack RN  06/29/19 2325

## 2019-06-29 NOTE — ED PROVIDER NOTES
vein balloon angioplasty with a 4mm x 100mm Tod balloon, Completion fistulograms    PACEMAKER PLACEMENT      medtronic    PARACENTESIS      multiple/recent; per dr. Isha Lentz [place] [address] [facility] Left 1/30/2018    UPPER EXTREMITY DRIL PROCEDURE WITH LEFT SAPHENOUS VEIN HARVESTING performed by Carmen Fermin MD at [address] [place] [address]  6/19/12    LUE arteriovenous fistulogram including venography of the superior vena cava. Balloon angioplasty, left forearm cephalic vein and upper arm cephalic vein with [id number] tod balloon.  VASCULAR SURGERY  7/10/2012 [facility]     Revision of left distal radial artery to cephalic vein arteriovenous fistula with 7mm Artegraft interposition.  VASCULAR SURGERY  7/30/15 [facility] & [address]    Left upper extremity arteriovenous fistulograms including venography of the superior vena cava. Left cephalic vein balloon angioplasty with an 8 x 20 cm cutting balloon proximal cephalic vein. Balloon angioplasty of left cephalic vein/antecubital vein near the antecubital crease with 8 x 20 mm cutting balloon.  VASCULAR SURGERY  7/30/15 [facility] cont    Balloon angioplasty proximal end distal upper arm cephalic vein with 9 x 40 cm  balloon. Completion fistulograms of the left upper extremity.  VASCULAR SURGERY  8/13/15 S    Revision of left upper extremity arteriovenous fistula with excision of pseudoaneurysm and primary repair of cephalic vein.     VASCULAR SURGERY  5/3/16 S    Left upper fistulograms/venograms, left cephalic balloon 8 x 20 cutter,9 x 40 conquest,left proximal cephalic vein stents (flair 2 9 x 50), balloon stents 9 x 40 conquest.    VASCULAR SURGERY  12/08/2016    [facility]- ultrasound guided cannulation of left distal cephalic vein ultrasound guided cannulation right internal jugular vein placement of right internal jugular vein tunneled dialysis catheter (Bard Equistream XK 23cm tip to cuff)     VASCULAR SURGERY  01/20/2017    [facility]-Right upper venograms, u/s guided cannulation left basilic vein, left upper venograms, balloon angioplasty left subclavian vein 10x40 conquest.    VASCULAR SURGERY  02/16/2017    [facility]. Left brachial artery to axillary vein arteriovenous graft with 7 mm artegraft. Left upper extremity venograms. Left subclavian vein stent viabahn 13x50. Left subclavian vein stent balloon angioplasty 12x40 atlas.  VASCULAR SURGERY  04/11/2017    [facility]. Removal of tunneled dialysis catheter right internal jugular vein.  VASCULAR SURGERY  01/25/2018    [facility]. Arch aortogram, left upper arteriogram, AV graft angiogram/venogram.    VASCULAR SURGERY  02/28/2018    [facility]. Right CFA 5f-6f-7f sheath, aortogram with bilateral lower arteriogram, left SFA/pop  balloon angioplasty 5x100 , 6x150 lutonix ( 2), left CFA  7f sheath, bilateral iliac kissing stents right 10x38 icast, left 9x59 icast expanded with 10x40 , completion aortogram, mynx left CFA , failed mynx right CFA.  VASCULAR SURGERY  06/13/2019    [facility] left CFA 5f sheath, aortogram with bilateral lower arteriogram, mynx left CFA.  VASCULAR SURGERY  06/25/2019    [facility]. Femoral tibial/perineal bypass with reversed greater saphenous vein.          CURRENT MEDICATIONS     Previous Medications    AMLODIPINE (NORVASC) 10 MG TABLET    Take 10 mg by mouth daily Indications: High Blood Pressure     ASPIRIN 81 MG EC TABLET    Take 1 tablet by mouth daily    CALCIUM ACETATE, PHOS BINDER, (PHOSLO PO)    Take 3 tablets by mouth 3 times daily (with meals) Indications: Kidney Failure     CIPROFLOXACIN-CIPROFLOX HCL (CIPRO XR PO)    Take 500 mg by mouth 2 times daily    CLONIDINE (CATAPRES) 0.2 MG TABLET    Take 0.3 mg by mouth 3 times daily Indications: High Blood Pressure     CLOPIDOGREL (PLAVIX) 75 MG TABLET    Take 75 mg by mouth daily    ISOSORBIDE MONONITRATE (IMDUR) 30 MG EXTENDED RELEASE TABLET    Take 1 tablet by mouth daily    LACTULOSE (CEPHULAC) 10 G PACKET    Take 10 g by mouth 3 times Weight: 170 lb (77.1 kg)    Height: 5' 9\" (1.753 m)        MDM      CONSULTS:  IP CONSULT TO VASCULAR SURGERY     Patient was seen and evaluated by Dr. Cherelle Stephenson here in the emergency department. He is performed local wound repair here in the emergency department. We will plan for observation with subsequent discharge home. PROCEDURES:  Unless otherwise noted below, none     Procedures    FINAL IMPRESSION      1.  Encounter for postoperative wound check          DISPOSITION/PLAN   DISPOSITION  Discharge      PATIENT REFERRED TO:  Zan Ganser, MD  [address] [facility] [address]            (Please note that portions of this note were completed with a voice recognition program.  Efforts were made to edit thedictations but occasionally words are mis-transcribed.)    Jackie Boykin MD (electronically signed)  Attending Emergency Physician         Jackie Boykin MD  06/29/19 6549       Jackie Boykin MD  06/29/19 7394

## 2019-07-02 ENCOUNTER — APPOINTMENT (OUTPATIENT)
Dept: LAB | Facility: HOSPITAL | Age: 59
End: 2019-07-02

## 2019-07-02 ENCOUNTER — TELEPHONE (OUTPATIENT)
Dept: VASCULAR SURGERY | Age: 59
End: 2019-07-02

## 2019-07-02 ENCOUNTER — TRANSCRIBE ORDERS (OUTPATIENT)
Dept: ADMINISTRATIVE | Facility: HOSPITAL | Age: 59
End: 2019-07-02

## 2019-07-02 DIAGNOSIS — Z79.891 LONG TERM (CURRENT) USE OF OPIATE ANALGESIC: Primary | ICD-10-CM

## 2019-07-02 PROCEDURE — 80307 DRUG TEST PRSMV CHEM ANLYZR: CPT | Performed by: NURSE PRACTITIONER

## 2019-07-02 PROCEDURE — G0480 DRUG TEST DEF 1-7 CLASSES: HCPCS | Performed by: NURSE PRACTITIONER

## 2019-07-02 PROCEDURE — 36415 COLL VENOUS BLD VENIPUNCTURE: CPT | Performed by: NURSE PRACTITIONER

## 2019-07-03 ENCOUNTER — HOSPITAL ENCOUNTER (OUTPATIENT)
Dept: INFUSION THERAPY | Age: 59
Setting detail: INFUSION SERIES
Discharge: HOME | End: 2019-07-03
Payer: MEDICARE

## 2019-07-03 VITALS
OXYGEN SATURATION: 97 % | SYSTOLIC BLOOD PRESSURE: 124 MMHG | DIASTOLIC BLOOD PRESSURE: 45 MMHG | TEMPERATURE: 99.9 F | RESPIRATION RATE: 17 BRPM | HEART RATE: 59 BPM

## 2019-07-03 LAB
ABO/RH: NORMAL
ANTIBODY SCREEN: NORMAL

## 2019-07-03 PROCEDURE — 86850 RBC ANTIBODY SCREEN: CPT

## 2019-07-03 PROCEDURE — 86900 BLOOD TYPING SEROLOGIC ABO: CPT

## 2019-07-03 PROCEDURE — 36430 TRANSFUSION BLD/BLD COMPNT: CPT

## 2019-07-03 PROCEDURE — 2580000003 HC RX 258: Performed by: CLINICAL NURSE SPECIALIST

## 2019-07-03 PROCEDURE — 86901 BLOOD TYPING SEROLOGIC RH(D): CPT

## 2019-07-03 PROCEDURE — P9016 RBC LEUKOCYTES REDUCED: HCPCS

## 2019-07-03 PROCEDURE — 86923 COMPATIBILITY TEST ELECTRIC: CPT

## 2019-07-03 RX ORDER — 0.9 % SODIUM CHLORIDE 0.9 %
250 INTRAVENOUS SOLUTION INTRAVENOUS ONCE
Status: COMPLETED | OUTPATIENT
Start: 2019-07-03 | End: 2019-07-04

## 2019-07-03 RX ADMIN — SODIUM CHLORIDE 250 ML: 9 INJECTION, SOLUTION INTRAVENOUS at 13:20

## 2019-07-04 LAB — PREGABALIN: <0.5 UG/ML

## 2019-07-07 LAB
BLOOD BANK DISPENSE STATUS: NORMAL
BLOOD BANK DISPENSE STATUS: NORMAL
BLOOD BANK PRODUCT CODE: NORMAL
BLOOD BANK PRODUCT CODE: NORMAL
BPU ID: NORMAL
BPU ID: NORMAL
DESCRIPTION BLOOD BANK: NORMAL
DESCRIPTION BLOOD BANK: NORMAL

## 2019-07-08 ENCOUNTER — HOSPITAL ENCOUNTER (INPATIENT)
Age: 59
LOS: 8 days | Discharge: NURSING, REHAB OR LONG-TERM CARE | DRG: 856 | End: 2019-07-16
Attending: EMERGENCY MEDICINE | Admitting: FAMILY MEDICINE
Payer: MEDICARE

## 2019-07-08 ENCOUNTER — APPOINTMENT (OUTPATIENT)
Dept: CT IMAGING | Age: 59
DRG: 856 | End: 2019-07-08
Payer: MEDICARE

## 2019-07-08 ENCOUNTER — APPOINTMENT (OUTPATIENT)
Dept: GENERAL RADIOLOGY | Age: 59
DRG: 856 | End: 2019-07-08
Payer: MEDICARE

## 2019-07-08 DIAGNOSIS — A41.9 SEPSIS, DUE TO UNSPECIFIED ORGANISM: Primary | ICD-10-CM

## 2019-07-08 DIAGNOSIS — B18.2 CHRONIC HEPATITIS C WITHOUT HEPATIC COMA (HCC): ICD-10-CM

## 2019-07-08 DIAGNOSIS — Z95.0 PACEMAKER: ICD-10-CM

## 2019-07-08 DIAGNOSIS — L08.9 WOUND INFECTION: ICD-10-CM

## 2019-07-08 DIAGNOSIS — T14.8XXA WOUND INFECTION: ICD-10-CM

## 2019-07-08 DIAGNOSIS — N18.6 ESRD (END STAGE RENAL DISEASE) (HCC): ICD-10-CM

## 2019-07-08 DIAGNOSIS — D64.9 ANEMIA, UNSPECIFIED TYPE: ICD-10-CM

## 2019-07-08 DIAGNOSIS — R77.8 ELEVATED TROPONIN: ICD-10-CM

## 2019-07-08 DIAGNOSIS — G93.40 ACUTE ENCEPHALOPATHY: ICD-10-CM

## 2019-07-08 PROBLEM — D69.6 THROMBOCYTOPENIA (HCC): Status: ACTIVE | Noted: 2019-07-08

## 2019-07-08 PROBLEM — T81.49XA WOUND INFECTION AFTER SURGERY: Status: ACTIVE | Noted: 2019-07-08

## 2019-07-08 PROBLEM — L97.522 SKIN ULCER OF LEFT FOOT WITH FAT LAYER EXPOSED (HCC): Status: RESOLVED | Noted: 2018-02-28 | Resolved: 2019-07-08

## 2019-07-08 PROBLEM — E87.1 HYPONATREMIA: Status: ACTIVE | Noted: 2019-07-08

## 2019-07-08 PROBLEM — E87.5 HYPERKALEMIA: Status: ACTIVE | Noted: 2019-07-08

## 2019-07-08 PROBLEM — S61.209A OPEN WND OF FINGER: Status: RESOLVED | Noted: 2018-01-22 | Resolved: 2019-07-08

## 2019-07-08 LAB
ABO/RH: NORMAL
ALBUMIN SERPL-MCNC: 3.7 G/DL (ref 3.5–5.2)
ALP BLD-CCNC: 93 U/L (ref 40–130)
ALT SERPL-CCNC: 6 U/L (ref 5–41)
AMMONIA: 22 UMOL/L (ref 16–60)
ANION GAP SERPL CALCULATED.3IONS-SCNC: 19 MMOL/L (ref 7–19)
ANTIBODY SCREEN: NORMAL
APTT: 32.7 SEC (ref 26–36.2)
AST SERPL-CCNC: 14 U/L (ref 5–40)
BASOPHILS ABSOLUTE: 0 K/UL (ref 0–0.2)
BASOPHILS RELATIVE PERCENT: 0.2 % (ref 0–1)
BILIRUB SERPL-MCNC: 0.5 MG/DL (ref 0.2–1.2)
BLOOD BANK DISPENSE STATUS: NORMAL
BLOOD BANK PRODUCT CODE: NORMAL
BPU ID: NORMAL
BUN BLDV-MCNC: 52 MG/DL (ref 6–20)
CALCIUM SERPL-MCNC: 9 MG/DL (ref 8.6–10)
CHLORIDE BLD-SCNC: 85 MMOL/L (ref 98–111)
CO2: 27 MMOL/L (ref 22–29)
CREAT SERPL-MCNC: 7 MG/DL (ref 0.5–1.2)
DESCRIPTION BLOOD BANK: NORMAL
EKG P AXIS: 35 DEGREES
EKG P-R INTERVAL: 192 MS
EKG Q-T INTERVAL: 352 MS
EKG QRS DURATION: 100 MS
EKG QTC CALCULATION (BAZETT): 401 MS
EKG T AXIS: 113 DEGREES
EOSINOPHILS ABSOLUTE: 0 K/UL (ref 0–0.6)
EOSINOPHILS RELATIVE PERCENT: 0.3 % (ref 0–5)
GFR NON-AFRICAN AMERICAN: 8
GLUCOSE BLD-MCNC: 122 MG/DL (ref 74–109)
HCT VFR BLD CALC: 23.2 % (ref 42–52)
HEMOGLOBIN: 7.6 G/DL (ref 14–18)
HEPATITIS B SURFACE ANTIGEN INTERPRETATION: NORMAL
HYDROCODONE SERPL-MCNC: NORMAL NG/ML (ref 10–100)
HYDROMORPHONE SERPL-MCNC: NORMAL NG/ML (ref 1–30)
INR BLD: 1.34 (ref 0.88–1.18)
LACTIC ACID: 0.6 MMOL/L (ref 0.5–1.9)
LACTIC ACID: 1.3 MMOL/L (ref 0.5–1.9)
LYMPHOCYTES ABSOLUTE: 0.3 K/UL (ref 1.1–4.5)
LYMPHOCYTES RELATIVE PERCENT: 2.7 % (ref 20–40)
MCH RBC QN AUTO: 29.7 PG (ref 27–31)
MCHC RBC AUTO-ENTMCNC: 32.8 G/DL (ref 33–37)
MCV RBC AUTO: 90.6 FL (ref 80–94)
MONOCYTES ABSOLUTE: 0.8 K/UL (ref 0–0.9)
MONOCYTES RELATIVE PERCENT: 6.8 % (ref 0–10)
NEUTROPHILS ABSOLUTE: 11.1 K/UL (ref 1.5–7.5)
NEUTROPHILS RELATIVE PERCENT: 89 % (ref 50–65)
PDW BLD-RTO: 15.9 % (ref 11.5–14.5)
PLATELET # BLD: 99 K/UL (ref 130–400)
PMV BLD AUTO: 12.9 FL (ref 9.4–12.4)
POTASSIUM SERPL-SCNC: 5.1 MMOL/L (ref 3.5–5)
PROTHROMBIN TIME: 15.9 SEC (ref 12–14.6)
RBC # BLD: 2.56 M/UL (ref 4.7–6.1)
SODIUM BLD-SCNC: 131 MMOL/L (ref 136–145)
TOTAL PROTEIN: 7.3 G/DL (ref 6.6–8.7)
TROPONIN: 0.18 NG/ML (ref 0–0.03)
WBC # BLD: 12.4 K/UL (ref 4.8–10.8)

## 2019-07-08 PROCEDURE — 6360000002 HC RX W HCPCS: Performed by: EMERGENCY MEDICINE

## 2019-07-08 PROCEDURE — 87040 BLOOD CULTURE FOR BACTERIA: CPT

## 2019-07-08 PROCEDURE — 83605 ASSAY OF LACTIC ACID: CPT

## 2019-07-08 PROCEDURE — P9016 RBC LEUKOCYTES REDUCED: HCPCS

## 2019-07-08 PROCEDURE — 85025 COMPLETE CBC W/AUTO DIFF WBC: CPT

## 2019-07-08 PROCEDURE — 93005 ELECTROCARDIOGRAM TRACING: CPT

## 2019-07-08 PROCEDURE — 6370000000 HC RX 637 (ALT 250 FOR IP): Performed by: EMERGENCY MEDICINE

## 2019-07-08 PROCEDURE — 94762 N-INVAS EAR/PLS OXIMTRY CONT: CPT

## 2019-07-08 PROCEDURE — 84484 ASSAY OF TROPONIN QUANT: CPT

## 2019-07-08 PROCEDURE — 99223 1ST HOSP IP/OBS HIGH 75: CPT | Performed by: HOSPITALIST

## 2019-07-08 PROCEDURE — 86850 RBC ANTIBODY SCREEN: CPT

## 2019-07-08 PROCEDURE — 80053 COMPREHEN METABOLIC PANEL: CPT

## 2019-07-08 PROCEDURE — 6370000000 HC RX 637 (ALT 250 FOR IP): Performed by: FAMILY MEDICINE

## 2019-07-08 PROCEDURE — 99285 EMERGENCY DEPT VISIT HI MDM: CPT | Performed by: EMERGENCY MEDICINE

## 2019-07-08 PROCEDURE — 99285 EMERGENCY DEPT VISIT HI MDM: CPT

## 2019-07-08 PROCEDURE — 96374 THER/PROPH/DIAG INJ IV PUSH: CPT

## 2019-07-08 PROCEDURE — 71045 X-RAY EXAM CHEST 1 VIEW: CPT

## 2019-07-08 PROCEDURE — 36415 COLL VENOUS BLD VENIPUNCTURE: CPT

## 2019-07-08 PROCEDURE — 5A1D70Z PERFORMANCE OF URINARY FILTRATION, INTERMITTENT, LESS THAN 6 HOURS PER DAY: ICD-10-PCS | Performed by: INTERNAL MEDICINE

## 2019-07-08 PROCEDURE — 99999 PR OFFICE/OUTPT VISIT,PROCEDURE ONLY: CPT | Performed by: FAMILY MEDICINE

## 2019-07-08 PROCEDURE — 99024 POSTOP FOLLOW-UP VISIT: CPT | Performed by: NURSE PRACTITIONER

## 2019-07-08 PROCEDURE — 6370000000 HC RX 637 (ALT 250 FOR IP): Performed by: HOSPITALIST

## 2019-07-08 PROCEDURE — 86900 BLOOD TYPING SEROLOGIC ABO: CPT

## 2019-07-08 PROCEDURE — 2580000003 HC RX 258: Performed by: EMERGENCY MEDICINE

## 2019-07-08 PROCEDURE — 1210000000 HC MED SURG R&B

## 2019-07-08 PROCEDURE — 85610 PROTHROMBIN TIME: CPT

## 2019-07-08 PROCEDURE — 86923 COMPATIBILITY TEST ELECTRIC: CPT

## 2019-07-08 PROCEDURE — 82140 ASSAY OF AMMONIA: CPT

## 2019-07-08 PROCEDURE — 87340 HEPATITIS B SURFACE AG IA: CPT

## 2019-07-08 PROCEDURE — 6360000002 HC RX W HCPCS: Performed by: HOSPITALIST

## 2019-07-08 PROCEDURE — 86901 BLOOD TYPING SEROLOGIC RH(D): CPT

## 2019-07-08 PROCEDURE — 70450 CT HEAD/BRAIN W/O DYE: CPT

## 2019-07-08 PROCEDURE — 8010000000 HC HEMODIALYSIS ACUTE INPT

## 2019-07-08 PROCEDURE — 85730 THROMBOPLASTIN TIME PARTIAL: CPT

## 2019-07-08 RX ORDER — LACTULOSE 10 G/15ML
10 SOLUTION ORAL 3 TIMES DAILY
Status: DISCONTINUED | OUTPATIENT
Start: 2019-07-08 | End: 2019-07-09

## 2019-07-08 RX ORDER — ASPIRIN 81 MG/1
81 TABLET ORAL DAILY
Status: DISCONTINUED | OUTPATIENT
Start: 2019-07-08 | End: 2019-07-16 | Stop reason: HOSPADM

## 2019-07-08 RX ORDER — LISINOPRIL 10 MG/1
10 TABLET ORAL 2 TIMES DAILY
Status: DISCONTINUED | OUTPATIENT
Start: 2019-07-08 | End: 2019-07-16 | Stop reason: HOSPADM

## 2019-07-08 RX ORDER — CLOPIDOGREL BISULFATE 75 MG/1
75 TABLET ORAL DAILY
Status: DISCONTINUED | OUTPATIENT
Start: 2019-07-08 | End: 2019-07-16 | Stop reason: HOSPADM

## 2019-07-08 RX ORDER — AMLODIPINE BESYLATE 5 MG/1
10 TABLET ORAL DAILY
Status: DISCONTINUED | OUTPATIENT
Start: 2019-07-08 | End: 2019-07-16 | Stop reason: HOSPADM

## 2019-07-08 RX ORDER — HEPARIN SODIUM 5000 [USP'U]/ML
5000 INJECTION, SOLUTION INTRAVENOUS; SUBCUTANEOUS EVERY 8 HOURS SCHEDULED
Status: DISCONTINUED | OUTPATIENT
Start: 2019-07-08 | End: 2019-07-12

## 2019-07-08 RX ORDER — ASPIRIN 325 MG
325 TABLET ORAL ONCE
Status: COMPLETED | OUTPATIENT
Start: 2019-07-08 | End: 2019-07-08

## 2019-07-08 RX ORDER — SODIUM CHLORIDE 0.9 % (FLUSH) 0.9 %
10 SYRINGE (ML) INJECTION EVERY 12 HOURS SCHEDULED
Status: DISCONTINUED | OUTPATIENT
Start: 2019-07-08 | End: 2019-07-16 | Stop reason: HOSPADM

## 2019-07-08 RX ORDER — ONDANSETRON 2 MG/ML
4 INJECTION INTRAMUSCULAR; INTRAVENOUS EVERY 6 HOURS PRN
Status: DISCONTINUED | OUTPATIENT
Start: 2019-07-08 | End: 2019-07-12

## 2019-07-08 RX ORDER — MINOXIDIL 10 MG/1
10 TABLET ORAL DAILY
Status: DISCONTINUED | OUTPATIENT
Start: 2019-07-08 | End: 2019-07-16 | Stop reason: HOSPADM

## 2019-07-08 RX ORDER — LANOLIN ALCOHOL/MO/W.PET/CERES
3 CREAM (GRAM) TOPICAL NIGHTLY PRN
Status: DISCONTINUED | OUTPATIENT
Start: 2019-07-08 | End: 2019-07-16 | Stop reason: HOSPADM

## 2019-07-08 RX ORDER — POLYETHYLENE GLYCOL 3350 17 G/17G
17 POWDER, FOR SOLUTION ORAL DAILY PRN
Status: DISCONTINUED | OUTPATIENT
Start: 2019-07-08 | End: 2019-07-16 | Stop reason: HOSPADM

## 2019-07-08 RX ORDER — LINEZOLID 600 MG/1
600 TABLET, FILM COATED ORAL EVERY 12 HOURS SCHEDULED
Status: DISCONTINUED | OUTPATIENT
Start: 2019-07-08 | End: 2019-07-11

## 2019-07-08 RX ORDER — ISOSORBIDE MONONITRATE 30 MG/1
30 TABLET, EXTENDED RELEASE ORAL DAILY
Status: DISCONTINUED | OUTPATIENT
Start: 2019-07-08 | End: 2019-07-16 | Stop reason: HOSPADM

## 2019-07-08 RX ORDER — OXYCODONE AND ACETAMINOPHEN 10; 325 MG/1; MG/1
1 TABLET ORAL EVERY 4 HOURS PRN
Status: DISCONTINUED | OUTPATIENT
Start: 2019-07-08 | End: 2019-07-16 | Stop reason: HOSPADM

## 2019-07-08 RX ORDER — SODIUM CHLORIDE 0.9 % (FLUSH) 0.9 %
10 SYRINGE (ML) INJECTION PRN
Status: DISCONTINUED | OUTPATIENT
Start: 2019-07-08 | End: 2019-07-16 | Stop reason: HOSPADM

## 2019-07-08 RX ORDER — SUCRALFATE 1 G/1
1 TABLET ORAL 3 TIMES DAILY PRN
Status: DISCONTINUED | OUTPATIENT
Start: 2019-07-08 | End: 2019-07-16 | Stop reason: HOSPADM

## 2019-07-08 RX ADMIN — CALCIUM ACETATE 667 MG: 667 CAPSULE ORAL at 14:08

## 2019-07-08 RX ADMIN — HEPARIN SODIUM 5000 UNITS: 5000 INJECTION INTRAVENOUS; SUBCUTANEOUS at 14:16

## 2019-07-08 RX ADMIN — CEFTRIAXONE 1 G: 1 INJECTION, POWDER, FOR SOLUTION INTRAMUSCULAR; INTRAVENOUS at 06:15

## 2019-07-08 RX ADMIN — ASPIRIN 325 MG: 325 TABLET, COATED ORAL at 06:23

## 2019-07-08 RX ADMIN — HEPARIN SODIUM 5000 UNITS: 5000 INJECTION INTRAVENOUS; SUBCUTANEOUS at 20:43

## 2019-07-08 RX ADMIN — CLOPIDOGREL 75 MG: 75 TABLET, FILM COATED ORAL at 14:09

## 2019-07-08 RX ADMIN — LINEZOLID 600 MG: 600 TABLET, FILM COATED ORAL at 20:43

## 2019-07-08 RX ADMIN — ISOSORBIDE MONONITRATE 30 MG: 30 TABLET, EXTENDED RELEASE ORAL at 14:07

## 2019-07-08 RX ADMIN — CLONIDINE HYDROCHLORIDE 0.3 MG: 0.2 TABLET ORAL at 20:42

## 2019-07-08 RX ADMIN — LINEZOLID 600 MG: 600 TABLET, FILM COATED ORAL at 14:07

## 2019-07-08 RX ADMIN — OXYCODONE HYDROCHLORIDE AND ACETAMINOPHEN 1 TABLET: 10; 325 TABLET ORAL at 20:42

## 2019-07-08 RX ADMIN — Medication 3 MG: at 20:42

## 2019-07-08 RX ADMIN — CALCIUM ACETATE 667 MG: 667 CAPSULE ORAL at 16:01

## 2019-07-08 RX ADMIN — LISINOPRIL 10 MG: 10 TABLET ORAL at 20:43

## 2019-07-08 RX ADMIN — ASPIRIN 81 MG: 81 TABLET, COATED ORAL at 14:08

## 2019-07-08 RX ADMIN — LACTULOSE 10 G: 20 SOLUTION ORAL at 20:43

## 2019-07-08 RX ADMIN — OXYCODONE HYDROCHLORIDE AND ACETAMINOPHEN 1 TABLET: 10; 325 TABLET ORAL at 16:00

## 2019-07-08 ASSESSMENT — PAIN SCALES - GENERAL
PAINLEVEL_OUTOF10: 5
PAINLEVEL_OUTOF10: 9
PAINLEVEL_OUTOF10: 7
PAINLEVEL_OUTOF10: 10
PAINLEVEL_OUTOF10: 0

## 2019-07-08 ASSESSMENT — ENCOUNTER SYMPTOMS
ABDOMINAL PAIN: 0
RHINORRHEA: 0
COUGH: 0
DIARRHEA: 0
VOMITING: 0

## 2019-07-08 ASSESSMENT — PAIN DESCRIPTION - PROGRESSION: CLINICAL_PROGRESSION: NOT CHANGED

## 2019-07-08 NOTE — CONSULTS
Inpatient consult to Nephrology  Consult performed by: Christos Castaneda MD  Consult ordered by: Jennifer Ross MD  Assessment/Recommendations: Nephrology [facility]) Consult Note      Patient:  Nate Pino  YOB: 1960  Date of Service: 7/8/2019  MRN: [id number]   Acct: [de-identified]   Primary Care Physician: Shadi Powell DO  Advance Directive: Full Code  Admit Date: 7/8/2019       Hospital Day: 0  Referring Provider: Azul Marquez DO    Patient independently seen and examined, Chart, Consults, Notes, Operative notes, Labs, Cardiology, and Radiology studies reviewed as able. Chief complaint: End-stage renal disease/encephalopathy. Subjective:  Nate Pino is a 61 y.o. male  whom we were consulted for end-stage renal disease. Patient has end-stage renal disease secondary to diabetic nephropathy. He goes to [facility] on Monday Wednesday Friday. Patient was recently hospitalized, underwent bilateral femoral-popliteal bypass surgery for treatment of peripheral vascular disease. Patient presented today with increasing encephalopathy, confusion and disorientation. He is currently being work-up for possible sepsis. Patient has sero-sanguinous discharge from his right lower extremity surgical wound. Currently seen on hemodialysis  Hemodialysis access: Permacath  Hemodialysis: 3-1/2-hour  Ultrafiltration: 3000 cc  2K bath  Blood flow rate is 450 cc/min. Allergies:  Patient has no known allergies.     Medicines:  Current Facility-Administered Medications:  (START ON 7/9/2019) cefTRIAXone (ROCEPHIN) 1 g in sodium chloride (PF) 10 mL IV syringe, 1 g, Intravenous, Q24H, Antwan Cuevas MD  aspirin EC tablet 81 mg, 81 mg, Oral, Daily, Antwan Cuevas MD  calcium acetate (PHOSLO) capsule 667 mg, 667 mg, Oral, TID , Antwan Cuevas MD  clopidogrel (PLAVIX) tablet 75 mg, 75 mg, Oral, Daily, Antwan Cuevas MD  isosorbide mononitrate (IMDUR) extended release tablet 30 mg, 30 of children: 0      Years of education: Not on file      Highest education level: Not on file    Occupational History      Occupation:     Social Needs      Financial resource strain: Not on file      Food insecurity:        Worry: Not on file        Inability: Not on file      Transportation needs:        Medical: Not on file        Non-medical: Not on file    Tobacco Use      Smoking status: Former Smoker        Packs/day: 1.00        Years: 45.00        Pack years: 39        Types: Cigarettes        Quit date: 2017        Years since quittin.3      Smokeless tobacco: Never Used      Tobacco comment: [id number] smoked 45 years at 1 P[facility]    Substance and Sexual Activity      Alcohol use: No      Drug use: Not Currently        Types: Marijuana, Cocaine, Methamphetamines, IV, Opiates , Other-see comments        Comment: in the 80s, LSD      Sexual activity: Yes        Partners: Female        Comment: has a wife    Lifestyle      Physical activity:        Days per week: Not on file        Minutes per session: Not on file      Stress: Not on file    Relationships      Social connections:        Talks on phone: Not on file        Gets together: Not on file        Attends Restorationism service: Not on file        Active member of club or organization: Not on file        Attends meetings of clubs or organizations: Not on file        Relationship status: Not on file      Intimate partner violence:        Fear of current or ex partner: Not on file        Emotionally abused: Not on file        Physically abused: Not on file        Forced sexual activity: Not on file    Other Topics      Concerns:        Not on file    Social History Narrative      CODE STATUS: Full Code      HEALTH CARE PROXY: Mrs. Trudi Dewitt, [phone]      Back up: his Mother, Mrs. Linda Padilla, [phone]      AMBULATES: independantly      DOMICILED: lives in a private house with 2 steps to get in, has no stairs inside,

## 2019-07-08 NOTE — CONSULTS
about 2 years ago. His smoking use included cigarettes. He has a 45.00 pack-year smoking history. He has never used smokeless tobacco. He reports that he has current or past drug history. Drugs: Marijuana, Cocaine, Methamphetamines, IV, Opiates , and Other-see comments. He reports that he does not drink alcohol. Family History:      Problem Relation Age of Onset    High Blood Pressure Mother     High Blood Pressure Father     High Blood Pressure Sister        REVIEW OF SYSTEMS:  General: Denies any fever or chills. Weakness with recent fall. HEENT: Denies any headaches or visual changes. Respiratory: Denies any cough or hoarseness. Denies shortness of breath. Cardiac: Denies any chest pain or pressure. Denies any palpitations. Denies any presyncope or syncope. Denies any orthopnea or PND. Increased swelling of lower extremities. GI: Denies any abdominal pain. Denies any nausea or vomiting. Denies any change in bowel habits. Denies any recent history of GI tract blood loss. : Denies any hematuria, frequency, hesitancy, or dysuria. Musculoskeletal: Denies any pain or swelling in his joints. Neurological: Denies any paraesthesias. Denies any history of seizure or stroke symptoms. Psychological: Denies any problems with anxiety or depression. Wound/Surgical Incisions: Stated that LLE is doing fine, RLE has a lot of drainage. All other systems are negative except where stated above. PHYSICAL EXAM:  BP (!) 140/58   Pulse 67   Temp 98.1 °F (36.7 °C) (Tympanic)   Resp 20   Wt 164 lb 3.9 oz (74.5 kg)   SpO2 98%   BMI 24.25 kg/m²   General appearance: Demonstrates an ill-appearing male who is alert and oriented, but having confused conversation at times. In no acute distress; appears older than his stated age. HEENT: Normocephalic. Atraumatic. RODOLFO. NECK: Supple. NO JVD. No carotids bruits auscultated. Chest: Clear to auscultation bilaterally without wheezes or rhonchi.   Cardiac: Normal

## 2019-07-08 NOTE — H&P
History of blood transfusion     HTN (hypertension)     Hx of blood clots     arm/fistula    Osteoarthritis     Pacemaker     PVD (peripheral vascular disease) (HCC)     Sleep apnea     no cpap at present.  Type II diabetes mellitus (HCC)     no meds. Past Psychiatric History:  As per above    Past Surgical History:   Procedure Laterality Date    [facility]OP[facility]Y  [facility]    LUE cephalic vein balloon angioplasty with 7mm x 4cm balloon. coild embolization of 2 cephallic vein brances with 3mm x 5cm coils    CARDIAC CATHETERIZATION  04/04/11    cardiac cath and stent x1 by Dr. Namrata Berman  07/26/11 [facility]    LUE AV fistula. coild embolization of cephalic vein branch near the wrist with 3mm EMBO coils. balloon a'plasty left cephalic vein with [id number] balloon and then [id number] balloon    DIALYSIS FISTULA CREATION Left 2/16/2017    LEFT UPPER EXTREMITY BRACHIAL / AXILLARY GRAFT AND STENT LEFT SUBCLAVIAN VEIN  performed by Rosas Gaitan MD at [address] [place] [address] FEMORAL-TIBIAL BYPASS GRAFT Right 6/25/2019    FEMORAL TIBIAL/PERINEAL BYPASS WITH REVERSED GREATER SAPHENOUS VEIN performed by Rosas Gaitan MD at [facility]  12/26/2010    Right internal jugular vein tunneled dialysis catheter placement    OTHER SURGICAL HISTORY  [id number]    Redo of dialysis catheter    OTHER SURGICAL HISTORY  9/6/2011   [facility]    Removal of RT IJV tunneled dialysis cath    OTHER SURGICAL HISTORY  5/22/12   S    Ultrasound-guided cannulation of left cephalic vein in the mid forearm toward the AV anastomosis, Left upper  ext.  fistulograms including venograms of the SVC, Left cephalic vein balloon angioplasty with a 4mm x 100mm Darrell balloon, Completion fistulograms    PACEMAKER PLACEMENT      medtronic    PARACENTESIS      multiple/recent; per dr. Aniyah Romero at [address] [facility] Left 1/30/2018    UPPER EXTREMITY DRIL PROCEDURE WITH LEFT SAPHENOUS VEIN HARVESTING Acetate PO TID    CAD, HTN, PAD, Pacemalker in place:  Continue ASA 81 daily, for 325mg now  Continue Plavix 75mg PO QDay  Continue Imdur 30mg PO QDay  Continue Mioxidil 10g PO QDay    Liver cirrohsis secondary to Hep C now in SVR:  GI consult for help with encephalitis  Continue Lactulose 10g PO TID    DVT PPx: Heparin 5,000 SQ Q8h  Melatonin 3g PO QHS PRN  Zofran 4mg IVP Q6h  Miralax PO QDay PRN        Patient Active Problem List   Diagnosis    Osteoarthritis    Chronic diastolic congestive heart failure (HCC)    Coronary artery disease involving native coronary artery of native heart without angina pectoris    Essential hypertension    Liver disease    Chronic deep vein thrombosis (DVT) of axillary vein of left upper extremity (HCC)    Steal syndrome as complication of dialysis access ([address]    ESRD on dialysis ([address]    Steal syndrome of dialysis vascular access ([address]    Atherosclerosis of artery of extremity with ulceration (HCC)    Atherosclerosis of artery of extremity with rest pain (HCC)    Anemia, chronic disease    Iatrogenic complication of vascular surgery    Wound infection after surgery    Pacemaker    Hepatitis C, chronic ([address]       Care time of >60 minutes

## 2019-07-08 NOTE — ED PROVIDER NOTES
TIBIAL/PERINEAL BYPASS WITH REVERSED GREATER SAPHENOUS VEIN performed by Regina Verduzco MD at [address] [facility]  12/26/2010    Right internal jugular vein tunneled dialysis catheter placement    OTHER SURGICAL HISTORY  [id number]    Redo of dialysis catheter    OTHER SURGICAL HISTORY  9/6/2011   [facility]    Removal of RT IJV tunneled dialysis cath    OTHER SURGICAL HISTORY  5/22/12   [facility]    Ultrasound-guided cannulation of left cephalic vein in the mid forearm toward the AV anastomosis, Left upper  ext. fistulograms including venograms of the SVC, Left cephalic vein balloon angioplasty with a 4mm x 100mm Tod balloon, Completion fistulograms    PACEMAKER PLACEMENT      medtronic    PARACENTESIS      multiple/recent; per dr. Phyllis Hauser at [address] [place] [address] 1/30/2018    UPPER EXTREMITY DRIL PROCEDURE WITH LEFT SAPHENOUS VEIN HARVESTING performed by Regina Verduzco MD at [facility]  6/19/12    LU arteriovenous fistulogram including venography of the superior vena cava. Balloon angioplasty, left forearm cephalic vein and upper arm cephalic vein with [id number] tod balloon.  VASCULAR SURGERY  7/10/2012 [facility]     Revision of left distal radial artery to cephalic vein arteriovenous fistula with 7mm Artegraft interposition.  VASCULAR SURGERY  7/30/15 [facility] [address]    Left upper extremity arteriovenous fistulograms including venography of the superior vena cava. Left cephalic vein balloon angioplasty with an 8 x 20 cm cutting balloon proximal cephalic vein. Balloon angioplasty of left cephalic vein/antecubital vein near the antecubital crease with 8 x 20 mm cutting balloon.  VASCULAR SURGERY  7/30/15 [facility]    Balloon angioplasty proximal end distal upper arm cephalic vein with 9 x 40 cm  balloon. Completion fistulograms of the left upper extremity.     VASCULAR SURGERY  8/13/15 [facility]    Revision of left upper extremity arteriovenous fistula with excision of pseudoaneurysm and primary repair of cephalic vein.  VASCULAR SURGERY  5/3/16 [facility]    Left upper fistulograms/venograms, left cephalic balloon 8 x 20 cutter,9 x 40 conquest,left proximal cephalic vein stents (flair 2 9 x 50), balloon stents 9 x 40 conquest.    VASCULAR SURGERY  12/08/2016    [facility]- ultrasound guided cannulation of left distal cephalic vein ultrasound guided cannulation right internal jugular vein placement of right internal jugular vein tunneled dialysis catheter (Bard Equistream XK 23cm tip to cuff)     VASCULAR SURGERY  01/20/2017    [facility]-Right upper venograms, u/s guided cannulation left basilic vein, left upper venograms, balloon angioplasty left subclavian vein 10x40 conquest.    VASCULAR SURGERY  02/16/2017    [facility]. Left brachial artery to axillary vein arteriovenous graft with 7 mm artegraft. Left upper extremity venograms. Left subclavian vein stent viabahn 13x50. Left subclavian vein stent balloon angioplasty 12x40 atlas.  VASCULAR SURGERY  04/11/2017    [facility]. Removal of tunneled dialysis catheter right internal jugular vein.  VASCULAR SURGERY  01/25/2018    [facility]. Arch aortogram, left upper arteriogram, AV graft angiogram/venogram.    VASCULAR SURGERY  02/28/2018    [facility]. Right CFA 5f-6f-7f sheath, aortogram with bilateral lower arteriogram, left SFA/pop  balloon angioplasty 5x100 , 6x150 lutonix ( 2), left CFA  7f sheath, bilateral iliac kissing stents right 10x38 icast, left 9x59 icast expanded with 10x40 , completion aortogram, mynx left CFA , failed mynx right CFA.  VASCULAR SURGERY  06/13/2019    [facility] left CFA 5f sheath, aortogram with bilateral lower arteriogram, mynx left CFA.  VASCULAR SURGERY  06/25/2019    [facility]. Femoral tibial/perineal bypass with reversed greater saphenous vein.          CURRENT MEDICATIONS     Previous Medications    AMLODIPINE (NORVASC) 10 MG TABLET    Take 10 mg by mouth daily Indications: High Blood Pressure     ASPIRIN 81 MG EC TABLET Neutrophils % 89.0 (*)     Lymphocytes % 2.7 (*)     Neutrophils # 11.1 (*)     Lymphocytes # 0.3 (*)     All other components within normal limits   COMPREHENSIVE METABOLIC PANEL - Abnormal; Notable for the following components:    Sodium 131 (*)     Potassium 5.1 (*)     Chloride 85 (*)     Glucose 122 (*)     BUN 52 (*)     CREATININE 7.0 (*)     GFR Non- 8 (*)     All other components within normal limits   PROTIME-INR - Abnormal; Notable for the following components:    Protime 15.9 (*)     INR 1.34 (*)     All other components within normal limits   TROPONIN - Abnormal; Notable for the following components:    Troponin 0.18 (*)     All other components within normal limits    Narrative:     CALL  [place]  [facility] tel. ,  Chemistry results called to and read back by Frances King in ED, 07/08/2019  06:17, by [employer]  Chemistry results called to and read back by Adonis Meigs, RN in ED, 07/08/2019  06:16, by Jaylen N Ramana Wilson #1   CULTURE BLOOD #2   APTT   LACTIC ACID, PLASMA   URINE RT REFLEX TO CULTURE   LACTIC ACID, PLASMA   TYPE AND SCREEN   PREPARE RBC (CROSSMATCH)       All other labs were within normal range or not returned as of this dictation.     EMERGENCY DEPARTMENT COURSE and DIFFERENTIAL DIAGNOSIS/MDM:   Vitals:    Vitals:    07/08/19 0543 07/08/19 0602 07/08/19 0631   BP: (!) 165/59 (!) 153/56 (!) 145/54   Pulse: 87 82 80   Resp:  15 16   Temp: 102.6 °F (39.2 °C)     TempSrc: Oral     SpO2: 94% 94% 97%   Weight: 164 lb 3.9 oz (74.5 kg)         MDM  Number of Diagnoses or Management Options     Amount and/or Complexity of Data Reviewed  Clinical lab tests: ordered and reviewed  Tests in the radiology section of CPT®: ordered and reviewed  Discuss the patient with other providers: yes  Independent visualization of images, tracings, or specimens: yes      Febrile, seems to have some mild confusion, ct head neg per my read, likely source of fever is RLE stapled wound foul smell and edema/erythema, lactic ok, covered with antibx anemic post op due to CAD hx will transfuse 1 unit prbc, has some lateral ST depression and mild trop elevation, ?mild demand and also related to ESRD, no chest pain, d/w Dr. Jose Humphrey who will see as consult, placed routine nephro consult as due to dialyze today, d/w Dr. Mohan Handing for admission    Ct head read noted no focal findings on exam to suggest this, was ordered due to fall/trauma history, can be worked up further as inpatient      CONSULTS:  [address]:  Unless otherwise noted below, none     Procedures    FINAL IMPRESSION      1. Sepsis, due to unspecified organism ([facility]    2. Wound infection    3. Anemia, unspecified type    4. Acute encephalopathy    5. Elevated troponin    6.  ESRD (end stage renal disease) ([facility]          DISPOSITION/PLAN   DISPOSITION        PATIENT REFERRED TO:  Lamont Briseno DO  [address] [phone]            DISCHARGE MEDICATIONS:  New Prescriptions    No medications on file          (Please note that portions of this note were completed with a voice recognition program.  Efforts were made to edit thedictations but occasionally words are mis-transcribed.)    Richie Nunez MD (electronically signed)  Attending Emergency Physician        Corrie Fuller MD  07/08/19 2130       Corrie Fuller MD  07/08/19 6118

## 2019-07-09 ENCOUNTER — APPOINTMENT (OUTPATIENT)
Dept: CT IMAGING | Age: 59
DRG: 856 | End: 2019-07-09
Payer: MEDICARE

## 2019-07-09 PROBLEM — Z51.5 PALLIATIVE CARE PATIENT: Status: ACTIVE | Noted: 2019-07-09

## 2019-07-09 LAB
7AMINOCLONAZEPAM UR-MCNC: NEGATIVE NG/ML
ALPRAZ SPEC-MCNC: NEGATIVE NG/ML
BASOPHILS ABSOLUTE: 0 K/UL (ref 0–0.2)
BASOPHILS RELATIVE PERCENT: 0.2 % (ref 0–1)
BENZODIAZ SPEC QL: NEGATIVE
CHLORDIAZEP SERPL-MCNC: NEGATIVE NG/ML
CLONAZEPAM BLD-MCNC: NEGATIVE NG/ML
DESALKYLFLURAZ BLD CFM-MCNC: NEGATIVE NG/ML
DIAZEPAM SERPL-MCNC: NEGATIVE NG/ML
EOSINOPHILS ABSOLUTE: 0 K/UL (ref 0–0.6)
EOSINOPHILS RELATIVE PERCENT: 0.4 % (ref 0–5)
FLURAZEPAM BLD CFM-MCNC: NEGATIVE NG/ML
HCT VFR BLD CALC: 26.9 % (ref 42–52)
HEMOGLOBIN: 8.6 G/DL (ref 14–18)
LORAZEPAM SPEC-MCNC: NEGATIVE NG/ML
LYMPHOCYTES ABSOLUTE: 0.6 K/UL (ref 1.1–4.5)
LYMPHOCYTES RELATIVE PERCENT: 5.7 % (ref 20–40)
MCH RBC QN AUTO: 28.9 PG (ref 27–31)
MCHC RBC AUTO-ENTMCNC: 32 G/DL (ref 33–37)
MCV RBC AUTO: 90.3 FL (ref 80–94)
MIDAZOLAM SPEC-MCNC: NEGATIVE NG/ML
MONOCYTES ABSOLUTE: 0.8 K/UL (ref 0–0.9)
MONOCYTES RELATIVE PERCENT: 8.5 % (ref 0–10)
NEUTROPHILS ABSOLUTE: 8.2 K/UL (ref 1.5–7.5)
NEUTROPHILS RELATIVE PERCENT: 84.7 % (ref 50–65)
NORCHLORDIAZEP SERPL-MCNC: NEGATIVE NG/ML
NORDIAZEPAM SERPL CFM-MCNC: NEGATIVE NG/ML
OXAZEPAM SPEC-MCNC: NEGATIVE NG/ML
PDW BLD-RTO: 16.9 % (ref 11.5–14.5)
PLATELET # BLD: 136 K/UL (ref 130–400)
PMV BLD AUTO: 11.3 FL (ref 9.4–12.4)
RBC # BLD: 2.98 M/UL (ref 4.7–6.1)
TEMAZEPAM SPEC-MCNC: NEGATIVE NG/ML
TRIAZOLAM SPEC-MCNC: NEGATIVE NG/ML
WBC # BLD: 9.7 K/UL (ref 4.8–10.8)

## 2019-07-09 PROCEDURE — 99232 SBSQ HOSP IP/OBS MODERATE 35: CPT | Performed by: FAMILY MEDICINE

## 2019-07-09 PROCEDURE — 94762 N-INVAS EAR/PLS OXIMTRY CONT: CPT

## 2019-07-09 PROCEDURE — 6370000000 HC RX 637 (ALT 250 FOR IP): Performed by: HOSPITALIST

## 2019-07-09 PROCEDURE — 99223 1ST HOSP IP/OBS HIGH 75: CPT | Performed by: PSYCHIATRY & NEUROLOGY

## 2019-07-09 PROCEDURE — 6370000000 HC RX 637 (ALT 250 FOR IP): Performed by: INTERNAL MEDICINE

## 2019-07-09 PROCEDURE — 74176 CT ABD & PELVIS W/O CONTRAST: CPT

## 2019-07-09 PROCEDURE — 85025 COMPLETE CBC W/AUTO DIFF WBC: CPT

## 2019-07-09 PROCEDURE — 36415 COLL VENOUS BLD VENIPUNCTURE: CPT

## 2019-07-09 PROCEDURE — 6360000002 HC RX W HCPCS: Performed by: HOSPITALIST

## 2019-07-09 PROCEDURE — 2580000003 HC RX 258: Performed by: HOSPITALIST

## 2019-07-09 PROCEDURE — 1210000000 HC MED SURG R&B

## 2019-07-09 PROCEDURE — 6370000000 HC RX 637 (ALT 250 FOR IP): Performed by: FAMILY MEDICINE

## 2019-07-09 RX ORDER — FAMOTIDINE 20 MG/1
20 TABLET, FILM COATED ORAL 2 TIMES DAILY
Status: DISCONTINUED | OUTPATIENT
Start: 2019-07-09 | End: 2019-07-10

## 2019-07-09 RX ORDER — FAMOTIDINE 20 MG/1
20 TABLET, FILM COATED ORAL 2 TIMES DAILY
Status: DISCONTINUED | OUTPATIENT
Start: 2019-07-09 | End: 2019-07-09

## 2019-07-09 RX ORDER — LACTULOSE 10 G/15ML
10 SOLUTION ORAL DAILY
Status: DISCONTINUED | OUTPATIENT
Start: 2019-07-10 | End: 2019-07-16 | Stop reason: HOSPADM

## 2019-07-09 RX ORDER — FAMOTIDINE 20 MG/1
20 TABLET, FILM COATED ORAL DAILY
Status: DISCONTINUED | OUTPATIENT
Start: 2019-07-10 | End: 2019-07-09

## 2019-07-09 RX ADMIN — OXYCODONE HYDROCHLORIDE AND ACETAMINOPHEN 1 TABLET: 10; 325 TABLET ORAL at 05:53

## 2019-07-09 RX ADMIN — OXYCODONE HYDROCHLORIDE AND ACETAMINOPHEN 1 TABLET: 10; 325 TABLET ORAL at 21:25

## 2019-07-09 RX ADMIN — LISINOPRIL 10 MG: 10 TABLET ORAL at 21:23

## 2019-07-09 RX ADMIN — Medication 1 G: at 05:53

## 2019-07-09 RX ADMIN — ASPIRIN 81 MG: 81 TABLET, COATED ORAL at 09:33

## 2019-07-09 RX ADMIN — CALCIUM ACETATE 667 MG: 667 CAPSULE ORAL at 09:35

## 2019-07-09 RX ADMIN — MINOXIDIL 10 MG: 10 TABLET ORAL at 09:33

## 2019-07-09 RX ADMIN — CLOPIDOGREL 75 MG: 75 TABLET, FILM COATED ORAL at 09:33

## 2019-07-09 RX ADMIN — FAMOTIDINE 20 MG: 20 TABLET ORAL at 12:51

## 2019-07-09 RX ADMIN — FAMOTIDINE 20 MG: 20 TABLET ORAL at 21:23

## 2019-07-09 RX ADMIN — CALCIUM ACETATE 667 MG: 667 CAPSULE ORAL at 18:07

## 2019-07-09 RX ADMIN — Medication 10 ML: at 09:34

## 2019-07-09 RX ADMIN — CLONIDINE HYDROCHLORIDE 0.3 MG: 0.2 TABLET ORAL at 09:33

## 2019-07-09 RX ADMIN — ISOSORBIDE MONONITRATE 30 MG: 30 TABLET, EXTENDED RELEASE ORAL at 09:33

## 2019-07-09 RX ADMIN — LINEZOLID 600 MG: 600 TABLET, FILM COATED ORAL at 09:33

## 2019-07-09 RX ADMIN — AMLODIPINE BESYLATE 10 MG: 5 TABLET ORAL at 09:33

## 2019-07-09 RX ADMIN — HEPARIN SODIUM 5000 UNITS: 5000 INJECTION INTRAVENOUS; SUBCUTANEOUS at 15:34

## 2019-07-09 RX ADMIN — LISINOPRIL 10 MG: 10 TABLET ORAL at 09:33

## 2019-07-09 RX ADMIN — CLONIDINE HYDROCHLORIDE 0.3 MG: 0.2 TABLET ORAL at 15:34

## 2019-07-09 RX ADMIN — HEPARIN SODIUM 5000 UNITS: 5000 INJECTION INTRAVENOUS; SUBCUTANEOUS at 21:43

## 2019-07-09 RX ADMIN — LINEZOLID 600 MG: 600 TABLET, FILM COATED ORAL at 21:23

## 2019-07-09 RX ADMIN — CALCIUM ACETATE 667 MG: 667 CAPSULE ORAL at 12:51

## 2019-07-09 RX ADMIN — HEPARIN SODIUM 5000 UNITS: 5000 INJECTION INTRAVENOUS; SUBCUTANEOUS at 05:53

## 2019-07-09 RX ADMIN — Medication 10 ML: at 21:25

## 2019-07-09 RX ADMIN — CLONIDINE HYDROCHLORIDE 0.3 MG: 0.2 TABLET ORAL at 21:23

## 2019-07-09 ASSESSMENT — PAIN DESCRIPTION - ORIENTATION: ORIENTATION: RIGHT

## 2019-07-09 ASSESSMENT — PAIN DESCRIPTION - PAIN TYPE: TYPE: SURGICAL PAIN

## 2019-07-09 ASSESSMENT — PAIN SCALES - GENERAL
PAINLEVEL_OUTOF10: 7
PAINLEVEL_OUTOF10: 5
PAINLEVEL_OUTOF10: 8
PAINLEVEL_OUTOF10: 6

## 2019-07-09 ASSESSMENT — PAIN DESCRIPTION - LOCATION: LOCATION: GROIN

## 2019-07-09 ASSESSMENT — PAIN DESCRIPTION - FREQUENCY: FREQUENCY: INTERMITTENT

## 2019-07-09 ASSESSMENT — PAIN DESCRIPTION - DESCRIPTORS: DESCRIPTORS: OTHER (COMMENT)

## 2019-07-09 NOTE — CONSULTS
Khurram Giron MD, 81 mg at 07/09/19 0933    calcium acetate (PHOSLO) capsule 667 mg, 667 mg, Oral, TID WC, Suzanne Paredes MD, 667 mg at 07/09/19 0935    clopidogrel (PLAVIX) tablet 75 mg, 75 mg, Oral, Daily, Suzanne Paredes MD, 75 mg at 07/09/19 0933    isosorbide mononitrate (IMDUR) extended release tablet 30 mg, 30 mg, Oral, Daily, Suzanne Paredes MD, 30 mg at 07/09/19 0933    minoxidil (LONITEN) tablet 10 mg, 10 mg, Oral, Daily, Suzanne Paredes MD, 10 mg at 07/09/19 0933    sucralfate (CARAFATE) tablet 1 g, 1 g, Oral, TID PRN, Suzanne Paredes MD    sodium chloride flush 0.9 % injection 10 mL, 10 mL, Intravenous, 2 times per day, Suzanne Paredes MD, 10 mL at 07/09/19 0934    sodium chloride flush 0.9 % injection 10 mL, 10 mL, Intravenous, PRN, Suzanne Paredes MD    ondansetron [facility]) injection 4 mg, 4 mg, Intravenous, Q6H PRN, Suzanne Paredes MD    polyethylene glycol [facility]) packet 17 g, 17 g, Oral, Daily PRN, Suzanne Paredes MD    melatonin tablet 3 mg, 3 mg, Oral, Nightly PRN, Suzanne Paredes MD, 3 mg at 07/08/19 2042    heparin (porcine) injection 5,000 Units, 5,000 Units, Subcutaneous, 3 times per day, Suzanne Paredes MD, 5,000 Units at 07/09/19 0553    linezolid (ZYVOX) tablet 600 mg, 600 mg, Oral, 2 times per day, Suzanne Paredes MD, 600 mg at 07/09/19 0933    amLODIPine (NORVASC) tablet 10 mg, 10 mg, Oral, Daily, Carter Box, DO, 10 mg at 07/09/19 5632    cloNIDine (CATAPRES) tablet 0.3 mg, 0.3 mg, Oral, TID, Carter Box, DO, 0.3 mg at 07/09/19 0933    lisinopril (PRINIVIL;ZESTRIL) tablet 10 mg, 10 mg, Oral, BID, Willie Spencer, DO, 10 mg at 07/09/19 0933    metoprolol tartrate (LOPRESSOR) tablet 25 mg, 25 mg, Oral, Q12H PRN, Carter Box, DO    oxyCODONE-acetaminophen (PERCOCET)  MG per tablet 1 tablet, 1 tablet, Oral, Q4H PRN, Carter Box, DO, 1 tablet at 07/09/19 0585  Allergies: Patient has no known allergies. Social History:   TOBACCO:  reports that he quit smoking about 2 years ago.  His smoking use

## 2019-07-09 NOTE — PROGRESS NOTES
Vascular Surgery  Dr.Scott Bere Helton   Daily Progress Note    Pt Name: Felicia Seo  Medical Record Number: [id number]  Date of Birth 1960   Today's Date: 7/9/2019    SUBJECTIVE:     Patient was seen and examined. Pain is not controlled, stating entire RLE is painful to touch    OBJECTIVE:     Patient Vitals for the past 24 hrs:   BP Temp Temp src Pulse Resp SpO2 Height Weight   07/09/19 0621 (!) 175/60 98.4 °F (36.9 °C) Oral 74 16 100 % -- --   07/09/19 0516 -- -- -- -- -- -- 5' 9\" (1.753 m) 166 lb 4 oz (75.4 kg)   07/08/19 1907 (!) 158/57 98.9 °F (37.2 °C) Temporal 71 16 97 % -- --       Intake/Output Summary (Last 24 hours) at 7/9/2019 1300  Last data filed at 7/8/2019 1315  Gross per 24 hour   Intake --   Output 3000 ml   Net -3000 ml     No intake/output data recorded. I/O last 3 completed shifts:  In: -   Out: 3000 [Other:3000]     Wt Readings from Last 3 Encounters:   07/09/19 166 lb 4 oz (75.4 kg)   06/29/19 170 lb (77.1 kg)   06/28/19 164 lb 9.6 oz (74.7 kg)      Body mass index is 24.55 kg/m². Diet: DIET RENAL; Low Sodium (2 GM);  Daily Fluid Restriction: 1000 ml      MEDS:     Scheduled Meds:   famotidine  20 mg Oral BID    [START ON 7/10/2019] lactulose  10 g Oral Daily    cefTRIAXone (ROCEPHIN) IV  1 g Intravenous Q24H    aspirin  81 mg Oral Daily    calcium acetate  667 mg Oral TID WC    clopidogrel  75 mg Oral Daily    isosorbide mononitrate  30 mg Oral Daily    minoxidil  10 mg Oral Daily    sodium chloride flush  10 mL Intravenous 2 times per day    heparin (porcine)  5,000 Units Subcutaneous 3 times per day    linezolid  600 mg Oral 2 times per day    amLODIPine  10 mg Oral Daily    cloNIDine  0.3 mg Oral TID    lisinopril  10 mg Oral BID     Continuous Infusions:  PRN Meds:  sucralfate 1 g TID PRN   sodium chloride flush 10 mL PRN   ondansetron 4 mg Q6H PRN   polyethylene glycol 17 g Daily PRN   melatonin 3 mg Nightly PRN   metoprolol tartrate 25 mg Q12H PRN

## 2019-07-09 NOTE — PROGRESS NOTES
WBC 12.4* 9.7   RBC 2.56* 2.98*   HGB 7.6* 8.6*   HCT 23.2* 26.9*   MCV 90.6 90.3   MCH 29.7 28.9   MCHC 32.8* 32.0*   PLT 99* 136     Recent Labs     07/08/19  0545   *   K 5.1*   ANIONGAP 19   CL 85*   CO2 27   BUN 52*   CREATININE 7.0*   GLUCOSE 122*   CALCIUM 9.0     No results for input(s): MG, PHOS in the last 72 hours. Recent Labs     07/08/19  0545   AST 14   ALT 6   BILITOT 0.5   ALKPHOS 93     ABGs:No results for input(s): PH, PO2, PCO2, HCO3, BE, O2SAT in the last 72 hours.   Troponin T:   Recent Labs     07/08/19  0545   TROPONINI 0.18*     INR:   Recent Labs     07/08/19  0545   INR 1.34*     Lactic Acid:   Recent Labs     07/08/19  1227   LACTA 0.6       Objective:   Vitals: BP (!) 175/60   Pulse 74   Temp 98.4 °F (36.9 °C) (Oral)   Resp 16   Ht 5' 9\" (1.753 m)   Wt 166 lb 4 oz (75.4 kg)   SpO2 100%   BMI 24.55 kg/m²   24HR INTAKE/OUTPUT:  No intake or output data in the 24 hours ending 07/09/19 1445  General appearance: alert and cooperative with exam  HEENT: atraumatic, eyes with clear conjunctiva and normal lids, pupils and irises normal, external ears and nose are normal, lips normal  Neck without masses, lympadenopathy, bruit, thyroid normal  Lungs: no increased work of breathing, diminished breath sounds bilaterally  Heart: regular rate and rhythm, S1, S2 normal, no murmur, click, rub or gallop  Abdomen: soft, non-tender; bowel sounds normal; no masses,  no organomegaly  Extremities: edema 1+ bilaterally, modified Ezekiel's equivocal due to generalized tenderness  Neurologic: no focal neurologic deficits, normal sensation, alert and oriented, affect and mood appropriate  Skin: surgical wounds with some purulence from right groin    Assessment and Plan:   Principal Problem:    Iatrogenic complication of vascular surgery  Active Problems:    ESRD on dialysis ([address]    Wound infection after surgery    Acute encephalopathy    PVD (peripheral vascular disease) (HCC)    Hyperkalemia

## 2019-07-09 NOTE — PROGRESS NOTES
72 hours. Recent Labs     07/08/19  0545 07/08/19  0608   BC No Growth to date. Any change in status will be called. --    BLOODCULT2  --  No Growth to date. Any change in status will be called. No results for input(s): CXSURG in the last 72 hours. Radiology reports as per the Radiologist  Radiology: Ct Head Wo Contrast    Result Date: 7/8/2019  Examination. CT HEAD WO CONTRAST History: Altered mental status after a fall. DLP: 805 mGycm. The CT scan of the head is performed without intravenous contrast enhancement. The images are acquired in axial plane with subsequent reconstruction in coronal and sagittal planes. There is no previous study for comparison. There is no evidence of an intracranial hemorrhage or hematoma. There is no evidence of mass or midline shift. The ventricles, the basal cistern and the cortical sulci are moderately prominent suggesting mild atrophy. Focal low-density area in the left basal ganglia may represent an area of chronic infarction? . This may be further evaluated. Similar changes are seen in the right external capsule. The images reviewed in bone window show no acute bony abnormality. There is mucosal thickening involving the maxillary antrum bilaterally which are moderately hypoplastic. The mastoid air cells are normal. There is severe atheromatous changes of the superficial scalp arteries. A small low-density area in the left basal ganglia may represent an acute or chronic infarction. This may be further evaluated with MR imaging of the brain. Chronic ischemic and atrophic changes. Maxillary sinusitis. The above finding are recorded on a digital voice clip in PACS. Signed by Dr Marylen Beers on 7/8/2019 7:03 AM    Xr Chest Portable    Result Date: 7/8/2019  EXAMINATION: XR CHEST PORTABLE 7/8/2019 7:05 AM HISTORY: XR CHEST PORTABLE 7/8/2019 5:00 AM HISTORY: Altered mental status COMPARISON: June 18, 2019. FINDINGS: The lungs are clear.  Cardiac silhouettes mildly

## 2019-07-10 LAB
6MAM SERPLBLD-MCNC: NEGATIVE NG/ML
ALBUMIN SERPL-MCNC: 2.6 G/DL (ref 3.5–5.2)
ALP BLD-CCNC: 94 U/L (ref 40–130)
ALT SERPL-CCNC: 6 U/L (ref 5–41)
ANION GAP SERPL CALCULATED.3IONS-SCNC: 18 MMOL/L (ref 7–19)
AST SERPL-CCNC: 13 U/L (ref 5–40)
BASOPHILS ABSOLUTE: 0 K/UL (ref 0–0.2)
BASOPHILS RELATIVE PERCENT: 0.3 % (ref 0–1)
BILIRUB SERPL-MCNC: 0.4 MG/DL (ref 0.2–1.2)
BUN BLDV-MCNC: 44 MG/DL (ref 6–20)
CALCIUM SERPL-MCNC: 8.9 MG/DL (ref 8.6–10)
CHLORIDE BLD-SCNC: 87 MMOL/L (ref 98–111)
CO2: 23 MMOL/L (ref 22–29)
CODEINE UR QL: NEGATIVE NG/ML
CREAT SERPL-MCNC: 6 MG/DL (ref 0.5–1.2)
DHC BLD-MCNC: NEGATIVE NG/ML
EOSINOPHILS ABSOLUTE: 0.1 K/UL (ref 0–0.6)
EOSINOPHILS RELATIVE PERCENT: 1.4 % (ref 0–5)
GFR NON-AFRICAN AMERICAN: 10
GLUCOSE BLD-MCNC: 125 MG/DL (ref 74–109)
HCT VFR BLD CALC: 28.8 % (ref 42–52)
HEMOGLOBIN: 8.4 G/DL (ref 14–18)
HYDROCODONE SERPL-MCNC: NEGATIVE NG/ML
HYDROMORPHONE SERPL-MCNC: 1.9 NG/ML
KETAMINE [MASS/VOLUME] IN SERUM OR PLASMA: NEGATIVE NG/ML
LYMPHOCYTES ABSOLUTE: 0.5 K/UL (ref 1.1–4.5)
LYMPHOCYTES RELATIVE PERCENT: 8.5 % (ref 20–40)
Lab: NEGATIVE NG/ML
MCH RBC QN AUTO: 29.1 PG (ref 27–31)
MCHC RBC AUTO-ENTMCNC: 29.2 G/DL (ref 33–37)
MCV RBC AUTO: 99.7 FL (ref 80–94)
MONOCYTES ABSOLUTE: 0.4 K/UL (ref 0–0.9)
MONOCYTES RELATIVE PERCENT: 6.2 % (ref 0–10)
MORPHINE SERPLBLD-MCNC: 1.8 NG/ML
NEUTROPHILS ABSOLUTE: 5.3 K/UL (ref 1.5–7.5)
NEUTROPHILS RELATIVE PERCENT: 83.3 % (ref 50–65)
OPIATES SPEC QL: POSITIVE
PDW BLD-RTO: 16.9 % (ref 11.5–14.5)
PLATELET # BLD: 134 K/UL (ref 130–400)
PMV BLD AUTO: 11.7 FL (ref 9.4–12.4)
POTASSIUM REFLEX MAGNESIUM: 5.1 MMOL/L (ref 3.5–5)
RBC # BLD: 2.89 M/UL (ref 4.7–6.1)
SODIUM BLD-SCNC: 128 MMOL/L (ref 136–145)
TAPENTADOL SERPLBLD-MCNC: <1 NG/ML
TOTAL PROTEIN: 6.8 G/DL (ref 6.6–8.7)
WBC # BLD: 6.3 K/UL (ref 4.8–10.8)

## 2019-07-10 PROCEDURE — 1210000000 HC MED SURG R&B

## 2019-07-10 PROCEDURE — 85025 COMPLETE CBC W/AUTO DIFF WBC: CPT

## 2019-07-10 PROCEDURE — 99233 SBSQ HOSP IP/OBS HIGH 50: CPT | Performed by: FAMILY MEDICINE

## 2019-07-10 PROCEDURE — 2580000003 HC RX 258: Performed by: HOSPITALIST

## 2019-07-10 PROCEDURE — 8010000000 HC HEMODIALYSIS ACUTE INPT

## 2019-07-10 PROCEDURE — 6370000000 HC RX 637 (ALT 250 FOR IP): Performed by: INTERNAL MEDICINE

## 2019-07-10 PROCEDURE — 99232 SBSQ HOSP IP/OBS MODERATE 35: CPT | Performed by: PSYCHIATRY & NEUROLOGY

## 2019-07-10 PROCEDURE — 80053 COMPREHEN METABOLIC PANEL: CPT

## 2019-07-10 PROCEDURE — 6370000000 HC RX 637 (ALT 250 FOR IP): Performed by: HOSPITALIST

## 2019-07-10 PROCEDURE — 36415 COLL VENOUS BLD VENIPUNCTURE: CPT

## 2019-07-10 PROCEDURE — 94762 N-INVAS EAR/PLS OXIMTRY CONT: CPT

## 2019-07-10 PROCEDURE — 6360000002 HC RX W HCPCS: Performed by: FAMILY MEDICINE

## 2019-07-10 PROCEDURE — 6360000002 HC RX W HCPCS: Performed by: HOSPITALIST

## 2019-07-10 PROCEDURE — 6370000000 HC RX 637 (ALT 250 FOR IP): Performed by: FAMILY MEDICINE

## 2019-07-10 RX ORDER — FAMOTIDINE 20 MG/1
20 TABLET, FILM COATED ORAL DAILY
Status: DISCONTINUED | OUTPATIENT
Start: 2019-07-11 | End: 2019-07-16 | Stop reason: HOSPADM

## 2019-07-10 RX ADMIN — HYDROMORPHONE HYDROCHLORIDE 0.5 MG: 1 INJECTION, SOLUTION INTRAMUSCULAR; INTRAVENOUS; SUBCUTANEOUS at 14:38

## 2019-07-10 RX ADMIN — HEPARIN SODIUM 5000 UNITS: 5000 INJECTION INTRAVENOUS; SUBCUTANEOUS at 05:44

## 2019-07-10 RX ADMIN — CLONIDINE HYDROCHLORIDE 0.3 MG: 0.2 TABLET ORAL at 23:31

## 2019-07-10 RX ADMIN — Medication 10 ML: at 23:32

## 2019-07-10 RX ADMIN — MINOXIDIL 10 MG: 10 TABLET ORAL at 11:45

## 2019-07-10 RX ADMIN — CALCIUM ACETATE 667 MG: 667 CAPSULE ORAL at 18:08

## 2019-07-10 RX ADMIN — AMLODIPINE BESYLATE 10 MG: 5 TABLET ORAL at 11:45

## 2019-07-10 RX ADMIN — HYDROMORPHONE HYDROCHLORIDE 0.5 MG: 1 INJECTION, SOLUTION INTRAMUSCULAR; INTRAVENOUS; SUBCUTANEOUS at 23:38

## 2019-07-10 RX ADMIN — CLONIDINE HYDROCHLORIDE 0.3 MG: 0.2 TABLET ORAL at 14:33

## 2019-07-10 RX ADMIN — ISOSORBIDE MONONITRATE 30 MG: 30 TABLET, EXTENDED RELEASE ORAL at 11:46

## 2019-07-10 RX ADMIN — HEPARIN SODIUM 5000 UNITS: 5000 INJECTION INTRAVENOUS; SUBCUTANEOUS at 14:33

## 2019-07-10 RX ADMIN — LISINOPRIL 10 MG: 10 TABLET ORAL at 23:31

## 2019-07-10 RX ADMIN — CALCIUM ACETATE 667 MG: 667 CAPSULE ORAL at 11:46

## 2019-07-10 RX ADMIN — LISINOPRIL 10 MG: 10 TABLET ORAL at 11:45

## 2019-07-10 RX ADMIN — Medication 1 G: at 05:43

## 2019-07-10 RX ADMIN — Medication 10 ML: at 11:48

## 2019-07-10 RX ADMIN — CLOPIDOGREL 75 MG: 75 TABLET, FILM COATED ORAL at 11:46

## 2019-07-10 RX ADMIN — CLONIDINE HYDROCHLORIDE 0.3 MG: 0.2 TABLET ORAL at 11:44

## 2019-07-10 RX ADMIN — HYDROMORPHONE HYDROCHLORIDE 0.5 MG: 1 INJECTION, SOLUTION INTRAMUSCULAR; INTRAVENOUS; SUBCUTANEOUS at 18:41

## 2019-07-10 RX ADMIN — OXYCODONE HYDROCHLORIDE AND ACETAMINOPHEN 1 TABLET: 10; 325 TABLET ORAL at 11:53

## 2019-07-10 RX ADMIN — FAMOTIDINE 20 MG: 20 TABLET ORAL at 11:45

## 2019-07-10 RX ADMIN — LINEZOLID 600 MG: 600 TABLET, FILM COATED ORAL at 23:31

## 2019-07-10 RX ADMIN — LINEZOLID 600 MG: 600 TABLET, FILM COATED ORAL at 11:44

## 2019-07-10 RX ADMIN — ASPIRIN 81 MG: 81 TABLET, COATED ORAL at 11:45

## 2019-07-10 RX ADMIN — HEPARIN SODIUM 5000 UNITS: 5000 INJECTION INTRAVENOUS; SUBCUTANEOUS at 23:31

## 2019-07-10 ASSESSMENT — PAIN SCALES - GENERAL
PAINLEVEL_OUTOF10: 10
PAINLEVEL_OUTOF10: 0

## 2019-07-10 ASSESSMENT — PAIN - FUNCTIONAL ASSESSMENT: PAIN_FUNCTIONAL_ASSESSMENT: PREVENTS OR INTERFERES SOME ACTIVE ACTIVITIES AND ADLS

## 2019-07-10 ASSESSMENT — PAIN DESCRIPTION - PROGRESSION: CLINICAL_PROGRESSION: GRADUALLY IMPROVING

## 2019-07-10 ASSESSMENT — PAIN DESCRIPTION - PAIN TYPE: TYPE: SURGICAL PAIN

## 2019-07-10 NOTE — PROGRESS NOTES
Pharmacy Renal Dose Adjustment      Recent Labs     07/08/19  0545 07/10/19  0407   BUN 52* 44*       Recent Labs     07/08/19  0545 07/10/19  0407   CREATININE 7.0* 6.0*       Estimated Creatinine Clearance: 13 mL/min (A) (based on SCr of 6 mg/dL (H)). Plan: Changed Pepcid to 20 mg PO daily due to patients renal function. Pharmacy will continue to follow and make adjustments as needed.     Electronically signed by Gabby Ashley [facility] [place] on 7/10/2019 at 11:50 AM

## 2019-07-10 NOTE — PLAN OF CARE
Problem: Falls - Risk of:  Goal: Will remain free from falls  Description  Will remain free from falls  Outcome: Ongoing  Goal: Absence of physical injury  Description  Absence of physical injury  Outcome: Ongoing     Problem: Pain:  Goal: Pain level will decrease  Description  Pain level will decrease  Outcome: Ongoing  Goal: Control of acute pain  Description  Control of acute pain  Outcome: Ongoing  Goal: Control of chronic pain  Description  Control of chronic pain  Outcome: Ongoing     Problem: Physical Regulation:  Goal: Diagnostic test results will improve  Description  Diagnostic test results will improve  Outcome: Ongoing  Goal: Will remain free from infection  Description  Will remain free from infection  Outcome: Ongoing  Goal: Ability to maintain vital signs within normal range will improve  Description  Ability to maintain vital signs within normal range will improve  Outcome: Ongoing     Problem: Skin Integrity:  Goal: Demonstration of wound healing without infection will improve  Description  Demonstration of wound healing without infection will improve  Outcome: Ongoing  Goal: Complications related to intravenous access or infusion will be avoided or minimized  Description  Complications related to intravenous access or infusion will be avoided or minimized  Outcome: Ongoing     Problem: Risk for Impaired Skin Integrity  Goal: Tissue integrity - skin and mucous membranes  Description  Structural intactness and normal physiological function of skin and  mucous membranes.   Outcome: Ongoing

## 2019-07-10 NOTE — PROGRESS NOTES
0.5 mg Intravenous Q3H PRN Hoa White DO        lactulose (CHRONULAC) 10 GM/15ML solution 10 g  10 g Oral Daily Christine Burroughs MD        cefTRIAXone (ROCEPHIN) 1 g in sodium chloride (PF) 10 mL IV syringe  1 g Intravenous Q24H Aracelis Henry MD   1 g at 07/10/19 0543    aspirin EC tablet 81 mg  81 mg Oral Daily Aracelis Henry MD   81 mg at 07/10/19 1145    calcium acetate (PHOSLO) capsule 667 mg  667 mg Oral TID WC Aracelis Henry MD   667 mg at 07/10/19 1146    clopidogrel (PLAVIX) tablet 75 mg  75 mg Oral Daily Aracelis Henry MD   75 mg at 07/10/19 1146    isosorbide mononitrate (IMDUR) extended release tablet 30 mg  30 mg Oral Daily Aracelis Henry MD   30 mg at 07/10/19 1146    minoxidil (LONITEN) tablet 10 mg  10 mg Oral Daily Aracelis Henry MD   10 mg at 07/10/19 1145    sucralfate (CARAFATE) tablet 1 g  1 g Oral TID PRN Aracelis Henry MD        sodium chloride flush 0.9 % injection 10 mL  10 mL Intravenous 2 times per day Aracelis Henry MD   10 mL at 07/10/19 1148    sodium chloride flush 0.9 % injection 10 mL  10 mL Intravenous PRN Aracelis Henry MD        ondansetron [facility]) injection 4 mg  4 mg Intravenous Q6H PRN Aracelis Henry MD        polyethylene glycol [facility]) packet 17 g  17 g Oral Daily PRN Aracelis Henry MD        melatonin tablet 3 mg  3 mg Oral Nightly PRN Aracelis Henry MD   3 mg at 07/08/19 2042    heparin (porcine) injection 5,000 Units  5,000 Units Subcutaneous 3 times per day Aracelis Henry MD   5,000 Units at 07/10/19 0544    linezolid (ZYVOX) tablet 600 mg  600 mg Oral 2 times per day Aracelis Henry MD   600 mg at 07/10/19 1144    amLODIPine (NORVASC) tablet 10 mg  10 mg Oral Daily Willie Spencer, DO   10 mg at 07/10/19 1145    cloNIDine (CATAPRES) tablet 0.3 mg  0.3 mg Oral TID Hoa White DO   0.3 mg at 07/10/19 1144    lisinopril (PRINIVIL;ZESTRIL) tablet 10 mg  10 mg Oral BID [facility], DO   10 mg at 07/10/19 1145    metoprolol tartrate (LOPRESSOR) tablet 25 mg  25 equivocal due to generalized tenderness  Neurologic: no focal neurologic deficits, normal sensation, alert and oriented, affect and mood appropriate  Skin: surgical wounds with some purulence from right groin    Assessment and Plan:   Principal Problem:    Iatrogenic complication of vascular surgery  Active Problems:    ESRD on dialysis ([address]    Wound infection after surgery    Acute encephalopathy    PVD (peripheral vascular disease) (HCC)    Hyperkalemia    Hyponatremia    Normocytic anemia    Thrombocytopenia (HCC)    Sepsis (HCC)    Wound infection    Palliative care patient  Resolved Problems:    * No resolved hospital problems. *    Day #3 ceftriaxone and linezolid empiric treatment for infected surgical wound. Hydromorphone 0.5 mg IV q3h PRN breakthrough pain. Mental status stable. I believe the patient's slurred speech was largely chronic and any alteration in mental status was due to delirium, since resolved. Monitor electrolytes and blood counts closely. Dialysis per nephrology. Advance Directive: Full Code    DVT prophylaxis: heparin    Discharge planning: To home possibly tomorrow.       Pranav Melgoza DO  Rounding Hospitalist

## 2019-07-11 PROBLEM — E87.5 HYPERKALEMIA: Status: RESOLVED | Noted: 2019-07-08 | Resolved: 2019-07-11

## 2019-07-11 LAB
ALBUMIN SERPL-MCNC: 2.8 G/DL (ref 3.5–5.2)
ALP BLD-CCNC: 88 U/L (ref 40–130)
ALT SERPL-CCNC: <5 U/L (ref 5–41)
ANION GAP SERPL CALCULATED.3IONS-SCNC: 12 MMOL/L (ref 7–19)
AST SERPL-CCNC: 10 U/L (ref 5–40)
BASOPHILS ABSOLUTE: 0 K/UL (ref 0–0.2)
BASOPHILS RELATIVE PERCENT: 0.5 % (ref 0–1)
BILIRUB SERPL-MCNC: 0.4 MG/DL (ref 0.2–1.2)
BUN BLDV-MCNC: 29 MG/DL (ref 6–20)
CALCIUM SERPL-MCNC: 9 MG/DL (ref 8.6–10)
CHLORIDE BLD-SCNC: 92 MMOL/L (ref 98–111)
CO2: 30 MMOL/L (ref 22–29)
CREAT SERPL-MCNC: 4.6 MG/DL (ref 0.5–1.2)
EOSINOPHILS ABSOLUTE: 0 K/UL (ref 0–0.6)
EOSINOPHILS RELATIVE PERCENT: 0.3 % (ref 0–5)
GFR NON-AFRICAN AMERICAN: 13
GLUCOSE BLD-MCNC: 162 MG/DL (ref 74–109)
HCT VFR BLD CALC: 24.3 % (ref 42–52)
HEMOGLOBIN: 7.7 G/DL (ref 14–18)
LYMPHOCYTES ABSOLUTE: 0.8 K/UL (ref 1.1–4.5)
LYMPHOCYTES RELATIVE PERCENT: 11.6 % (ref 20–40)
MCH RBC QN AUTO: 29.2 PG (ref 27–31)
MCHC RBC AUTO-ENTMCNC: 31.7 G/DL (ref 33–37)
MCV RBC AUTO: 92 FL (ref 80–94)
MONOCYTES ABSOLUTE: 0.6 K/UL (ref 0–0.9)
MONOCYTES RELATIVE PERCENT: 8.4 % (ref 0–10)
NEUTROPHILS ABSOLUTE: 5.2 K/UL (ref 1.5–7.5)
NEUTROPHILS RELATIVE PERCENT: 78.6 % (ref 50–65)
PDW BLD-RTO: 15.9 % (ref 11.5–14.5)
PLATELET # BLD: 127 K/UL (ref 130–400)
PMV BLD AUTO: 12.1 FL (ref 9.4–12.4)
POTASSIUM SERPL-SCNC: 4.5 MMOL/L (ref 3.5–5)
RBC # BLD: 2.64 M/UL (ref 4.7–6.1)
SODIUM BLD-SCNC: 134 MMOL/L (ref 136–145)
TOTAL PROTEIN: 6.9 G/DL (ref 6.6–8.7)
WBC # BLD: 6.6 K/UL (ref 4.8–10.8)

## 2019-07-11 PROCEDURE — 99232 SBSQ HOSP IP/OBS MODERATE 35: CPT | Performed by: PSYCHIATRY & NEUROLOGY

## 2019-07-11 PROCEDURE — 87077 CULTURE AEROBIC IDENTIFY: CPT

## 2019-07-11 PROCEDURE — 6370000000 HC RX 637 (ALT 250 FOR IP): Performed by: SURGERY

## 2019-07-11 PROCEDURE — 99232 SBSQ HOSP IP/OBS MODERATE 35: CPT | Performed by: FAMILY MEDICINE

## 2019-07-11 PROCEDURE — 1210000000 HC MED SURG R&B

## 2019-07-11 PROCEDURE — 6370000000 HC RX 637 (ALT 250 FOR IP): Performed by: FAMILY MEDICINE

## 2019-07-11 PROCEDURE — 6360000002 HC RX W HCPCS: Performed by: FAMILY MEDICINE

## 2019-07-11 PROCEDURE — 6360000002 HC RX W HCPCS: Performed by: HOSPITALIST

## 2019-07-11 PROCEDURE — 94762 N-INVAS EAR/PLS OXIMTRY CONT: CPT

## 2019-07-11 PROCEDURE — 85025 COMPLETE CBC W/AUTO DIFF WBC: CPT

## 2019-07-11 PROCEDURE — 99232 SBSQ HOSP IP/OBS MODERATE 35: CPT | Performed by: INTERNAL MEDICINE

## 2019-07-11 PROCEDURE — 2580000003 HC RX 258: Performed by: HOSPITALIST

## 2019-07-11 PROCEDURE — 6370000000 HC RX 637 (ALT 250 FOR IP): Performed by: HOSPITALIST

## 2019-07-11 PROCEDURE — 36415 COLL VENOUS BLD VENIPUNCTURE: CPT

## 2019-07-11 PROCEDURE — 99024 POSTOP FOLLOW-UP VISIT: CPT | Performed by: NURSE PRACTITIONER

## 2019-07-11 PROCEDURE — 87205 SMEAR GRAM STAIN: CPT

## 2019-07-11 PROCEDURE — 97116 GAIT TRAINING THERAPY: CPT

## 2019-07-11 PROCEDURE — 97162 PT EVAL MOD COMPLEX 30 MIN: CPT

## 2019-07-11 PROCEDURE — 80053 COMPREHEN METABOLIC PANEL: CPT

## 2019-07-11 PROCEDURE — 87070 CULTURE OTHR SPECIMN AEROBIC: CPT

## 2019-07-11 PROCEDURE — 87186 SC STD MICRODIL/AGAR DIL: CPT

## 2019-07-11 RX ORDER — LANOLIN ALCOHOL/MO/W.PET/CERES
3 CREAM (GRAM) TOPICAL NIGHTLY PRN
Qty: 30 TABLET | Refills: 0 | Status: SHIPPED | OUTPATIENT
Start: 2019-07-11 | End: 2020-02-14 | Stop reason: ALTCHOICE

## 2019-07-11 RX ORDER — FAMOTIDINE 20 MG/1
20 TABLET, FILM COATED ORAL DAILY
Qty: 60 TABLET | Refills: 0 | Status: SHIPPED | OUTPATIENT
Start: 2019-07-12 | End: 2019-07-25

## 2019-07-11 RX ORDER — POLYETHYLENE GLYCOL 3350 17 G/17G
17 POWDER, FOR SOLUTION ORAL DAILY PRN
Qty: 527 G | Refills: 0 | Status: SHIPPED | OUTPATIENT
Start: 2019-07-11 | End: 2019-08-10

## 2019-07-11 RX ORDER — SODIUM HYPOCHLORITE 1.25 MG/ML
SOLUTION TOPICAL 2 TIMES DAILY
Status: DISCONTINUED | OUTPATIENT
Start: 2019-07-11 | End: 2019-07-12

## 2019-07-11 RX ORDER — OXYCODONE AND ACETAMINOPHEN 10; 325 MG/1; MG/1
1 TABLET ORAL EVERY 4 HOURS PRN
Qty: 18 TABLET | Refills: 0 | Status: SHIPPED | OUTPATIENT
Start: 2019-07-11 | End: 2019-07-16 | Stop reason: HOSPADM

## 2019-07-11 RX ADMIN — LINEZOLID 600 MG: 600 TABLET, FILM COATED ORAL at 08:00

## 2019-07-11 RX ADMIN — MINOXIDIL 10 MG: 10 TABLET ORAL at 08:00

## 2019-07-11 RX ADMIN — HYDROMORPHONE HYDROCHLORIDE 0.5 MG: 1 INJECTION, SOLUTION INTRAMUSCULAR; INTRAVENOUS; SUBCUTANEOUS at 17:59

## 2019-07-11 RX ADMIN — HYDROMORPHONE HYDROCHLORIDE 0.5 MG: 1 INJECTION, SOLUTION INTRAMUSCULAR; INTRAVENOUS; SUBCUTANEOUS at 23:28

## 2019-07-11 RX ADMIN — CALCIUM ACETATE 667 MG: 667 CAPSULE ORAL at 17:59

## 2019-07-11 RX ADMIN — Medication 1 G: at 06:28

## 2019-07-11 RX ADMIN — FAMOTIDINE 20 MG: 20 TABLET ORAL at 08:01

## 2019-07-11 RX ADMIN — CLONIDINE HYDROCHLORIDE 0.3 MG: 0.2 TABLET ORAL at 17:59

## 2019-07-11 RX ADMIN — OXYCODONE HYDROCHLORIDE AND ACETAMINOPHEN 1 TABLET: 10; 325 TABLET ORAL at 10:58

## 2019-07-11 RX ADMIN — DAKIN'S SOLUTION 0.125% (QUARTER STRENGTH) 473 ML: 0.12 SOLUTION at 21:43

## 2019-07-11 RX ADMIN — HEPARIN SODIUM 5000 UNITS: 5000 INJECTION INTRAVENOUS; SUBCUTANEOUS at 18:00

## 2019-07-11 RX ADMIN — CLONIDINE HYDROCHLORIDE 0.3 MG: 0.2 TABLET ORAL at 08:00

## 2019-07-11 RX ADMIN — HYDROMORPHONE HYDROCHLORIDE 0.5 MG: 1 INJECTION, SOLUTION INTRAMUSCULAR; INTRAVENOUS; SUBCUTANEOUS at 08:42

## 2019-07-11 RX ADMIN — LISINOPRIL 10 MG: 10 TABLET ORAL at 21:41

## 2019-07-11 RX ADMIN — Medication 10 ML: at 21:42

## 2019-07-11 RX ADMIN — Medication 10 ML: at 18:00

## 2019-07-11 RX ADMIN — HYDROMORPHONE HYDROCHLORIDE 0.5 MG: 1 INJECTION, SOLUTION INTRAMUSCULAR; INTRAVENOUS; SUBCUTANEOUS at 04:31

## 2019-07-11 RX ADMIN — CLONIDINE HYDROCHLORIDE 0.3 MG: 0.2 TABLET ORAL at 21:41

## 2019-07-11 RX ADMIN — LISINOPRIL 10 MG: 10 TABLET ORAL at 08:00

## 2019-07-11 RX ADMIN — CALCIUM ACETATE 667 MG: 667 CAPSULE ORAL at 08:02

## 2019-07-11 RX ADMIN — ASPIRIN 81 MG: 81 TABLET, COATED ORAL at 08:00

## 2019-07-11 RX ADMIN — CLOPIDOGREL 75 MG: 75 TABLET, FILM COATED ORAL at 08:00

## 2019-07-11 RX ADMIN — HEPARIN SODIUM 5000 UNITS: 5000 INJECTION INTRAVENOUS; SUBCUTANEOUS at 21:43

## 2019-07-11 RX ADMIN — OXYCODONE HYDROCHLORIDE AND ACETAMINOPHEN 1 TABLET: 10; 325 TABLET ORAL at 21:41

## 2019-07-11 RX ADMIN — ISOSORBIDE MONONITRATE 30 MG: 30 TABLET, EXTENDED RELEASE ORAL at 08:01

## 2019-07-11 RX ADMIN — HEPARIN SODIUM 5000 UNITS: 5000 INJECTION INTRAVENOUS; SUBCUTANEOUS at 06:28

## 2019-07-11 RX ADMIN — AMLODIPINE BESYLATE 10 MG: 5 TABLET ORAL at 08:00

## 2019-07-11 ASSESSMENT — PAIN SCALES - GENERAL
PAINLEVEL_OUTOF10: 9
PAINLEVEL_OUTOF10: 5
PAINLEVEL_OUTOF10: 5
PAINLEVEL_OUTOF10: 9
PAINLEVEL_OUTOF10: 9
PAINLEVEL_OUTOF10: 7
PAINLEVEL_OUTOF10: 9
PAINLEVEL_OUTOF10: 9

## 2019-07-11 NOTE — PROGRESS NOTES
GM); Daily Fluid Restriction: 1000 ml  No intake or output data in the 24 hours ending 07/11/19 1445  Medications:  Current Facility-Administered Medications   Medication Dose Route Frequency Provider Last Rate Last Dose    sodium hypochlorite (DAKINS) 0.125 % external solution   Irrigation BID Willie Garcia MD        famotidine (PEPCID) tablet 20 mg  20 mg Oral Daily Willie Spencer, DO   20 mg at 07/11/19 0801    HYDROmorphone (DILAUDID) injection 0.5 mg  0.5 mg Intravenous Q3H PRN Willie Spencer, DO   0.5 mg at 07/11/19 0842    lactulose (CHRONULAC) 10 GM/15ML solution 10 g  10 g Oral Daily Viviane Lala MD        cefTRIAXone (ROCEPHIN) 1 g in sodium chloride (PF) 10 mL IV syringe  1 g Intravenous Q24H Chrissy Mae MD   1 g at 07/11/19 0628    aspirin EC tablet 81 mg  81 mg Oral Daily Chrissy Mae MD   81 mg at 07/11/19 0800    calcium acetate (PHOSLO) capsule 667 mg  667 mg Oral TID WC Chrissy Mae MD   667 mg at 07/11/19 0802    clopidogrel (PLAVIX) tablet 75 mg  75 mg Oral Daily Chrissy Mae MD   75 mg at 07/11/19 0800    isosorbide mononitrate (IMDUR) extended release tablet 30 mg  30 mg Oral Daily Chrissy Mae MD   30 mg at 07/11/19 0801    minoxidil (LONITEN) tablet 10 mg  10 mg Oral Daily Chrissy Mae MD   10 mg at 07/11/19 0800    sucralfate (CARAFATE) tablet 1 g  1 g Oral TID PRN Chrissy Mae MD        sodium chloride flush 0.9 % injection 10 mL  10 mL Intravenous 2 times per day Chrissy Mae MD   10 mL at 07/10/19 2332    sodium chloride flush 0.9 % injection 10 mL  10 mL Intravenous PRN Chrissy Mae MD        ondansetron [facility]) injection 4 mg  4 mg Intravenous Q6H PRN Chrissy Mae MD        polyethylene glycol [facility]) packet 17 g  17 g Oral Daily PRN Chrissy Mae MD        melatonin tablet 3 mg  3 mg Oral Nightly PRN Chrissy Mae MD   3 mg at 07/08/19 2042    heparin (porcine) injection 5,000 Units  5,000 Units Subcutaneous 3 times per day Chrissy Mae MD

## 2019-07-11 NOTE — PROGRESS NOTES
age. No acute distress. HEENT: Normocephalic. Atraumatic. RODOLFO. NECK: Supple. NO JVD. No carotids bruits auscultated. Chest: Clear to auscultation bilaterally without wheezes or rhonchi. Cardiac: Normal heart tones with regular rate and rhythm, S1, S2 normal. No murmurs, gallops, or rubs auscultated. Abdomen: Soft, non-tender; non-distended normal bowel sounds no masses, no organomegaly. NEUROLOGIC: Grossly intact. WOUND/INCISION: RLE with groin incision clean and dry with sutures/staples, mild erythema, mild edema. Right thigh and leg with improved edema. Right medial popliteal and calf incision line with staples, moderate amount of serosang drainage, proximal and mid incision line macerated from drainage. LLE with staples CDI, mild edema to LLE. Media Information                  Document Information     Wound   Right groin incision photo   07/11/2019 11:30   Attached To: Hospital Encounter on 7/8/19     Source Information     Spencer Vega RN  [facility]         Media Information                  Document Information     Wound   Right medial popliteal incision photo   07/11/2019 11:25   Attached To: Hospital Encounter on 7/8/19     [address], RN  [facility]     EXTREMITY:Feet warm to touch . Bilateral feet with callous/wounds noted to plantar surfaces. Tubigrip  to bilateral LE's from base of toes to below knees.      LABS:     CBC: Recent Labs     07/09/19  1001 07/10/19  0406 07/11/19  0425   WBC 9.7 6.3 6.6   RBC 2.98* 2.89* 2.64*   HGB 8.6* 8.4* 7.7*   HCT 26.9* 28.8* 24.3*   MCV 90.3 99.7* 92.0   MCH 28.9 29.1 29.2   MCHC 32.0* 29.2* 31.7*   RDW 16.9* 16.9* 15.9*    134 127*   MPV 11.3 11.7 12.1      Last 3 CMP:   Recent Labs     07/10/19  0407 07/11/19  0425   * 134*   K 5.1* 4.5   CL 87* 92*   CO2 23 30*   BUN 44* 29*   CREATININE 6.0* 4.6*   GLUCOSE 125* 162*   CALCIUM 8.9 9.0   PROT 6.8 6.9   LABALBU 2.6* 2.8*   BILITOT 0.4 0. 4   ALKPHOS 94 88   AST 13 10   ALT 6 <5*      Calcium:   Lab Results   Component Value Date    CALCIUM 9.0 07/11/2019    CALCIUM 8.9 07/10/2019    CALCIUM 9.0 07/08/2019      Lactic Acid:   Lab Results   Component Value Date    LACTA 0.6 07/08/2019    LACTA 1.3 07/08/2019            DVT prophylaxis: Heparin 5000 units sq every 8 hours          ASSESSMENT:     1. Altered Mental Status - Undetermined, Likely Multifactorial, per Hospitalist  2.         Atherosclerosis of native arteries bilateral lower extremities with right toe ulcers and rest pain (critical limb ischemia) - S/P Right common femoral endarterectomy with greater saphenous vein patch, Right tibial peroneal trunk and distal below-knee popliteal artery endarterectomy with greater saphenous vein patch, Right common femoral to below-knee popliteal artery bypass graft with reversed saphenous vein graft harvested from the left lower extremity, Right lower extremity arteriograms on 06/25/2019,[place] and oversewing of lymphatic leak on 6/29/19 per [facility] -- No Infection. Drainage 2' to Edema. 3.         Chronic Diastolic CHF  4.         Essential HTN  5.         CAD  6.         Liver Disease - Cirrhosis, Hepatitis C  7.         ESRD on Hemodialysis  8.         Anemia of Chronic Disease      PLAN:       1. Elevate LE's. Tubigrip to bilateral LE's  2. Remove every other jose alejandro, LLE.       Jennifer Wolf, APRN-BC

## 2019-07-11 NOTE — PROGRESS NOTES
Extensive GI history now able to be reviewed with the patient mentation improved. He has been followed by Dr Rex Wasserman at [facility] [place] [facility] at [facility]. He has moderate cirrhosis due to alcohol abuse and chronic Hepatitis C, portal hypertension, ascites, splenomegaly, and chronic thrombocytopenia. He was previously evaluated at [facility] for possible possible transplant candidate, but no longer seen there. Cirrhosis has degenerated into Hepatocellular CA for which he received radiation one time and now chemotherapy at [facility]. I agree with current management and will sign off. Suggest continuum of care with above care already established.

## 2019-07-11 NOTE — PROGRESS NOTES
Nephrology ([address] [facility]) Progress  Note      Patient:  Sunday Dailey  YOB: 1960  Date of Service: 7/11/2019  MRN: [id number]   Acct: [de-identified]   Primary Care Physician: Ming Rivera DO  Advance Directive: Full Code  Admit Date: 7/8/2019       Hospital Day: 3  Referring Provider: Linnette Blackmon DO    Patient independently seen and examined, Chart, Consults, Notes, Operative notes, Labs, Cardiology, and Radiology studies reviewed as able. Chief complaint: End-stage renal disease. Subjective:  Sunday Dailey is a 61 y.o. male  whom we were consulted for end-stage renal disease secondary to diabetic nephropathy. He goes to maintenance dialysis treatment at [address] on Monday Wednesday Friday. He presented with severe encephalopathy, weakness. Patient was recently admitted, underwent extensive vascular surgery on bilateral lower extremities including endarterectomy of CFA, popliteal bypass surgery bilaterally. Patient also has hepatitis C and liver disease. Infection of vascular surgical wound has been ruled out. This morning he is feeling much better and was able to walk around in the hallways with walker. Allergies:  Patient has no known allergies.     Medicines:  Current Facility-Administered Medications   Medication Dose Route Frequency Provider Last Rate Last Dose    famotidine (PEPCID) tablet 20 mg  20 mg Oral Daily Willie Spencer, DO   20 mg at 07/11/19 0801    HYDROmorphone (DILAUDID) injection 0.5 mg  0.5 mg Intravenous Q3H PRN Linnette Blackmon DO   0.5 mg at 07/11/19 0842    lactulose (CHRONULAC) 10 GM/15ML solution 10 g  10 g Oral Daily Annabel Mccray MD        cefTRIAXone (ROCEPHIN) 1 g in sodium chloride (PF) 10 mL IV syringe  1 g Intravenous Q24H Era Herron MD   1 g at 07/11/19 0628    aspirin EC tablet 81 mg  81 mg Oral Daily Era Herron MD   81 mg at 07/11/19 0800    calcium acetate (PHOSLO) capsule 667 mg  667 mg Oral TID  Era Herron MD   667 mg balloon angioplasty with a 4mm x 100mm Darrell balloon, Completion fistulograms    PACEMAKER PLACEMENT      medtronic    PARACENTESIS      multiple/recent; per dr. Sj Luna at [address] [facility] Left 2018    UPPER EXTREMITY DRIL PROCEDURE WITH LEFT SAPHENOUS VEIN HARVESTING performed by Jose Luis Krishna MD at [address]  [address]    LUE arteriovenous fistulogram including venography of the superior vena cava. Balloon angioplasty, left forearm cephalic vein and upper arm cephalic vein with [id number] darrell balloon.  VASCULAR SURGERY  7/10/2012 [facility]     Revision of left distal radial artery to cephalic vein arteriovenous fistula with 7mm Artegraft interposition.  VASCULAR SURGERY  7/30/15 [facility] & [address]    Left upper extremity arteriovenous fistulograms including venography of the superior vena cava. Left cephalic vein balloon angioplasty with an 8 x 20 cm cutting balloon proximal cephalic vein. Balloon angioplasty of left cephalic vein/antecubital vein near the antecubital crease with 8 x 20 mm cutting balloon.  VASCULAR SURGERY  7/30/15 [facility]    Balloon angioplasty proximal end distal upper arm cephalic vein with 9 x 40 cm  balloon. Completion fistulograms of the left upper extremity.  VASCULAR SURGERY  8/13/15 [facility]    Revision of left upper extremity arteriovenous fistula with excision of pseudoaneurysm and primary repair of cephalic vein.     VASCULAR SURGERY  5/3/16 [facility]    Left upper fistulograms/venograms, left cephalic balloon 8 x 20 cutter,9 x 40 conquest,left proximal cephalic vein stents (flair 2 9 x 50), balloon stents 9 x 40 conquest.    VASCULAR SURGERY  2016    [facility]- ultrasound guided cannulation of left distal cephalic vein ultrasound guided cannulation right internal jugular vein placement of right internal jugular vein tunneled dialysis catheter (Bard Equistream XK 23cm tip to cuff)     VASCULAR SURGERY  2017    [facility]-Right upper venograms, u/s guided cannulation left basilic vein, left upper venograms, balloon angioplasty left subclavian vein 10x40 conquest.    VASCULAR SURGERY  02/16/2017    [facility]. Left brachial artery to axillary vein arteriovenous graft with 7 mm artegraft. Left upper extremity venograms. Left subclavian vein stent viabahn 13x50. Left subclavian vein stent balloon angioplasty 12x40 atlas.  VASCULAR SURGERY  04/11/2017    [facility]. Removal of tunneled dialysis catheter right internal jugular vein.  VASCULAR SURGERY  01/25/2018    [facility]. Arch aortogram, left upper arteriogram, AV graft angiogram/venogram.    VASCULAR SURGERY  02/28/2018    [facility]. Right CFA 5f-6f-7f sheath, aortogram with bilateral lower arteriogram, left SFA/pop  balloon angioplasty 5x100 , 6x150 lutonix ( 2), left CFA  7f sheath, bilateral iliac kissing stents right 10x38 icast, left 9x59 icast expanded with 10x40 , completion aortogram, mynx left CFA , failed mynx right CFA.  VASCULAR SURGERY  06/13/2019    [facility] left CFA 5f sheath, aortogram with bilateral lower arteriogram, mynx left CFA.  VASCULAR SURGERY  06/25/2019    [facility]. Femoral tibial/perineal bypass with reversed greater saphenous vein.        Family History  Family History   Problem Relation Age of Onset    High Blood Pressure Mother     High Blood Pressure Father     High Blood Pressure Sister        Social History  Social History     Socioeconomic History    Marital status:      Spouse name: Not on file    Number of children: 0    Years of education: Not on file    Highest education level: Not on file   Occupational History    Occupation:    Social Needs    Financial resource strain: Not on file    Food insecurity:     Worry: Not on file     Inability: Not on file    Transportation needs:     Medical: Not on file     Non-medical: Not on file   Tobacco Use    Smoking status: Former Smoker     Packs/day: 1.00     Years: 45.00     Pack years: 45.00 FINDINGS: The lungs are clear. Cardiac silhouettes mildly enlarged. Pacing device present soft tissues the right chest. Blunting of the right costophrenic angle is noted. The osseous structures and surrounding soft tissues demonstrate no acute abnormality. 1. Cardiomegaly with mild blunting of the right lateral costophrenic angle. This may represent mild congestive failure. . Signed by Dr Elijah Lyons on 7/8/2019 7:07 AM       Assessment   1. End-stage renal disease. 2.  Diabetic nephropathy. 3.  Recent bilateral lower extremity vascular surgeries. 4.  Encephalopathy improving. 5.  Severe anemia. 6.  Secondary hyperparathyroidism. 7.  Insulin-dependent type 2 diabetes. 8.  Hyponatremia  9. Hyperkalemia/recurrent    Plan:  1. Continue physical therapy. 2.  hemodialysis treatment in the morning. 3.  Continue empiric antibiotics.        Lake Ariza MD  07/11/19  10:01 AM

## 2019-07-11 NOTE — DISCHARGE SUMMARY
Triny Louis  :  1960  MRN:  [id number]    Admit date:  2019  Discharge date:      Admitting Physician:  Mitesh Esqueda DO    Advance Directive: Full Code    Consults: GI, nephrology, neurology and vascular surgery    Primary Care Physician:  Maday Wang DO    Discharge Diagnoses:  Principal Problem:    Iatrogenic complication of vascular surgery  Active Problems:    ESRD on dialysis ([address]    Wound infection after surgery    PVD (peripheral vascular disease) ([address]    Hyponatremia    Normocytic anemia    Thrombocytopenia ([address]    Palliative care patient  Resolved Problems:    Acute encephalopathy    Hyperkalemia    Wound infection and sepsis ruled out by vascular surgery, who have determined that the complication was a lymphatic leak, not infection. Significant Diagnostic Studies:   Ct Abdomen Pelvis Wo Contrast Additional Contrast? None    Result Date: 2019  ORIGINAL REPORT * EXAM: CT ABDOMEN PELVIS WO CONTRAST -- 2019 10:30 AM HISTORY: 59 years, Male, abdominal pain, nausea COMPARISON: None DLP: 1359 mGy cm. Automated exposure control was utilized to minimize patient radiation dose. TECHNIQUE: Unenhanced axial images of the abdomen and pelvis obtained with coronal and sagittal reformats. FINDINGS: Evaluation limited secondary to lack of intravenous contrast. LUNG BASES: No acute findings of the lung bases. No inferior pericardial or pleural effusion. Low-density the blood pool, which can be seen with anemia. LIVER: Abnormal nodular liver contour, which can be seen with chronic liver disease. Limited evaluation of the parenchyma without intravenous contrast. Perihepatic ascites. Gastrohepatic varices versus adenopathy. GALLBLADDER and BILLARY: Calcified gallstone. No bile duct dilation. PANCREAS: The pancreas is normal in noncontrasted CT appearance.  SPLEEN: Spleen measures 14.6 cm in maximal dimension, borderline enlarged ADRENAL: Right adrenal gland with 1.5 cm indeterminate density adrenal right costophrenic angle is noted. The osseous structures and surrounding soft tissues demonstrate no acute abnormality. 1. Cardiomegaly with mild blunting of the right lateral costophrenic angle. This may represent mild congestive failure. . Signed by Dr Jose Roberto Oconnor on 7/8/2019 7:07 AM      Pertinent Labs:   CBC:   Recent Labs     07/09/19  1001 07/10/19  0406 07/11/19  0425   WBC 9.7 6.3 6.6   HGB 8.6* 8.4* 7.7*    134 127*     BMP:    Recent Labs     07/10/19  0407 07/11/19  0425   * 134*   K 5.1* 4.5   CL 87* 92*   CO2 23 30*   BUN 44* 29*   CREATININE 6.0* 4.6*   GLUCOSE 125* 162*     INR: No results for input(s): INR in the last 72 hours. ABGs:No results for input(s): PH, PO2, PCO2, HCO3, BE, O2SAT in the last 72 hours. Lactic Acid:No results for input(s): LACTA in the last 72 hours. Procedures: None performed. Hospital Course:   7-8: Presents with altered mental status and purulent drainage from incision on RLE from his endarterectomy in late June. Patient with subjective fever with chills. Scheduled for dialysis but unable to stand and was guided to the floor by his wife. He is currently unable to bear weight, wife is concerned she cannot care for him any longer. CT showed possible stroke of left basal ganglia. Admitted to med/surg on linezolid and ceftriaxone. Vascular surgery consulted. Nephrology consulted to manage HD. GI consulted for suspected hepatic encephalopathy  7-9: Altered mental status improved. Ammonia is normal, GI suspects stroke. Neurologically intact at this time, neurology does not believe he has an acute stroke. Cannot perform MRI. Improving with treatment of surgical wound infection, may represent delirium and deconditioning. 7-10: Hydromorphone PRN breakthrough pain. Possibly home tomorrow if pain is managed. 7-11: Worked with physical therapy, unable to ambulate safely. Agreeable to SNF discharge, accepted and discharged for short-stay rehab.     Physical lisinopril (PRINIVIL;ZESTRIL) 20 MG tablet  Take 10 mg by mouth 2 times daily              melatonin 3 MG TABS tablet  Take 1 tablet by mouth nightly as needed (insomnia)             metoprolol tartrate (LOPRESSOR) 25 MG tablet  Take 1 tablet by mouth every 12 hours as needed (hypertension)             minoxidil (LONITEN) 10 MG tablet  Take 10 mg by mouth daily Indications: High Blood Pressure              oxyCODONE-acetaminophen (PERCOCET)  MG per tablet  Take 1 tablet by mouth every 4 hours as needed for Pain for up to 3 days. polyethylene glycol (GLYCOLAX) packet  Take 17 g by mouth daily as needed for Constipation or Other (No BM in 60 hours)             sucralfate (CARAFATE) 1 GM tablet  Take 1 tablet by mouth 3 times daily as needed (upset stomach)                 Discharge Instructions: Follow up with Caitlin Smiley DO in 7 days. Take medications as directed. Resume activity as tolerated. Diet: DIET RENAL; Low Sodium (2 GM); Daily Fluid Restriction: 1000 ml     Disposition: Patient is medically stable and will be discharged Skilled nursing facility. Time spent on discharge less than 30 minutes.     Signed:  [facility]

## 2019-07-12 ENCOUNTER — ANESTHESIA EVENT (OUTPATIENT)
Dept: OPERATING ROOM | Age: 59
DRG: 856 | End: 2019-07-12
Payer: MEDICARE

## 2019-07-12 ENCOUNTER — ANESTHESIA (OUTPATIENT)
Dept: OPERATING ROOM | Age: 59
DRG: 856 | End: 2019-07-12
Payer: MEDICARE

## 2019-07-12 VITALS
OXYGEN SATURATION: 100 % | DIASTOLIC BLOOD PRESSURE: 38 MMHG | RESPIRATION RATE: 8 BRPM | SYSTOLIC BLOOD PRESSURE: 102 MMHG

## 2019-07-12 LAB
ABO/RH: NORMAL
ALBUMIN SERPL-MCNC: 2.5 G/DL (ref 3.5–5.2)
ALP BLD-CCNC: 89 U/L (ref 40–130)
ALT SERPL-CCNC: 5 U/L (ref 5–41)
ANION GAP SERPL CALCULATED.3IONS-SCNC: 13 MMOL/L (ref 7–19)
ANION GAP SERPL CALCULATED.3IONS-SCNC: 17 MMOL/L (ref 7–19)
ANTIBODY SCREEN: NORMAL
AST SERPL-CCNC: 11 U/L (ref 5–40)
BASOPHILS ABSOLUTE: 0 K/UL (ref 0–0.2)
BASOPHILS RELATIVE PERCENT: 0.3 % (ref 0–1)
BILIRUB SERPL-MCNC: 0.3 MG/DL (ref 0.2–1.2)
BUN BLDV-MCNC: 19 MG/DL (ref 6–20)
BUN BLDV-MCNC: 45 MG/DL (ref 6–20)
CALCIUM SERPL-MCNC: 8.6 MG/DL (ref 8.6–10)
CALCIUM SERPL-MCNC: 8.9 MG/DL (ref 8.6–10)
CHLORIDE BLD-SCNC: 87 MMOL/L (ref 98–111)
CHLORIDE BLD-SCNC: 95 MMOL/L (ref 98–111)
CO2: 23 MMOL/L (ref 22–29)
CO2: 29 MMOL/L (ref 22–29)
CREAT SERPL-MCNC: 3.1 MG/DL (ref 0.5–1.2)
CREAT SERPL-MCNC: 6.1 MG/DL (ref 0.5–1.2)
EOSINOPHILS ABSOLUTE: 0.2 K/UL (ref 0–0.6)
EOSINOPHILS RELATIVE PERCENT: 2.5 % (ref 0–5)
GFR NON-AFRICAN AMERICAN: 21
GFR NON-AFRICAN AMERICAN: 9
GLUCOSE BLD-MCNC: 159 MG/DL (ref 74–109)
GLUCOSE BLD-MCNC: 162 MG/DL (ref 74–109)
HCT VFR BLD CALC: 25 % (ref 42–52)
HCT VFR BLD CALC: 25.5 % (ref 42–52)
HCT VFR BLD CALC: 26.7 % (ref 42–52)
HEMOGLOBIN: 7.5 G/DL (ref 14–18)
HEMOGLOBIN: 8.2 G/DL (ref 14–18)
HEMOGLOBIN: 8.5 G/DL (ref 14–18)
LYMPHOCYTES ABSOLUTE: 0.8 K/UL (ref 1.1–4.5)
LYMPHOCYTES RELATIVE PERCENT: 13.3 % (ref 20–40)
MCH RBC QN AUTO: 28.7 PG (ref 27–31)
MCH RBC QN AUTO: 28.8 PG (ref 27–31)
MCHC RBC AUTO-ENTMCNC: 30 G/DL (ref 33–37)
MCHC RBC AUTO-ENTMCNC: 32.2 G/DL (ref 33–37)
MCV RBC AUTO: 89.5 FL (ref 80–94)
MCV RBC AUTO: 95.8 FL (ref 80–94)
MONOCYTES ABSOLUTE: 0.5 K/UL (ref 0–0.9)
MONOCYTES RELATIVE PERCENT: 7.7 % (ref 0–10)
NEUTROPHILS ABSOLUTE: 4.6 K/UL (ref 1.5–7.5)
NEUTROPHILS RELATIVE PERCENT: 75.7 % (ref 50–65)
PDW BLD-RTO: 15.4 % (ref 11.5–14.5)
PDW BLD-RTO: 15.7 % (ref 11.5–14.5)
PLATELET # BLD: 102 K/UL (ref 130–400)
PLATELET # BLD: 137 K/UL (ref 130–400)
PMV BLD AUTO: 11.1 FL (ref 9.4–12.4)
PMV BLD AUTO: 11.2 FL (ref 9.4–12.4)
POTASSIUM REFLEX MAGNESIUM: 4.5 MMOL/L (ref 3.5–5)
POTASSIUM SERPL-SCNC: 3.8 MMOL/L (ref 3.5–4.9)
RBC # BLD: 2.61 M/UL (ref 4.7–6.1)
RBC # BLD: 2.85 M/UL (ref 4.7–6.1)
SODIUM BLD-SCNC: 127 MMOL/L (ref 136–145)
SODIUM BLD-SCNC: 137 MMOL/L (ref 136–145)
TOTAL PROTEIN: 6.5 G/DL (ref 6.6–8.7)
WBC # BLD: 5.5 K/UL (ref 4.8–10.8)
WBC # BLD: 6.1 K/UL (ref 4.8–10.8)

## 2019-07-12 PROCEDURE — 3700000000 HC ANESTHESIA ATTENDED CARE: Performed by: SURGERY

## 2019-07-12 PROCEDURE — 2580000003 HC RX 258: Performed by: SURGERY

## 2019-07-12 PROCEDURE — 6360000002 HC RX W HCPCS

## 2019-07-12 PROCEDURE — 6360000002 HC RX W HCPCS: Performed by: SURGERY

## 2019-07-12 PROCEDURE — 2580000003 HC RX 258: Performed by: NURSE PRACTITIONER

## 2019-07-12 PROCEDURE — 86900 BLOOD TYPING SEROLOGIC ABO: CPT

## 2019-07-12 PROCEDURE — 6370000000 HC RX 637 (ALT 250 FOR IP): Performed by: FAMILY MEDICINE

## 2019-07-12 PROCEDURE — 6370000000 HC RX 637 (ALT 250 FOR IP): Performed by: SURGERY

## 2019-07-12 PROCEDURE — 7100000001 HC PACU RECOVERY - ADDTL 15 MIN: Performed by: SURGERY

## 2019-07-12 PROCEDURE — 94762 N-INVAS EAR/PLS OXIMTRY CONT: CPT

## 2019-07-12 PROCEDURE — 2500000003 HC RX 250 WO HCPCS

## 2019-07-12 PROCEDURE — 6360000002 HC RX W HCPCS: Performed by: ANESTHESIOLOGY

## 2019-07-12 PROCEDURE — 85027 COMPLETE CBC AUTOMATED: CPT

## 2019-07-12 PROCEDURE — 8010000000 HC HEMODIALYSIS ACUTE INPT

## 2019-07-12 PROCEDURE — 85014 HEMATOCRIT: CPT

## 2019-07-12 PROCEDURE — 7100000000 HC PACU RECOVERY - FIRST 15 MIN: Performed by: SURGERY

## 2019-07-12 PROCEDURE — 3600000004 HC SURGERY LEVEL 4 BASE: Performed by: SURGERY

## 2019-07-12 PROCEDURE — 2580000003 HC RX 258: Performed by: HOSPITALIST

## 2019-07-12 PROCEDURE — 1210000000 HC MED SURG R&B

## 2019-07-12 PROCEDURE — 86901 BLOOD TYPING SEROLOGIC RH(D): CPT

## 2019-07-12 PROCEDURE — 85018 HEMOGLOBIN: CPT

## 2019-07-12 PROCEDURE — 6370000000 HC RX 637 (ALT 250 FOR IP): Performed by: HOSPITALIST

## 2019-07-12 PROCEDURE — 6360000002 HC RX W HCPCS: Performed by: FAMILY MEDICINE

## 2019-07-12 PROCEDURE — 99232 SBSQ HOSP IP/OBS MODERATE 35: CPT | Performed by: FAMILY MEDICINE

## 2019-07-12 PROCEDURE — 3600000014 HC SURGERY LEVEL 4 ADDTL 15MIN: Performed by: SURGERY

## 2019-07-12 PROCEDURE — 80053 COMPREHEN METABOLIC PANEL: CPT

## 2019-07-12 PROCEDURE — 6360000002 HC RX W HCPCS: Performed by: NURSE PRACTITIONER

## 2019-07-12 PROCEDURE — 0KNS0ZZ RELEASE RIGHT LOWER LEG MUSCLE, OPEN APPROACH: ICD-10-PCS | Performed by: SURGERY

## 2019-07-12 PROCEDURE — 3700000001 HC ADD 15 MINUTES (ANESTHESIA): Performed by: SURGERY

## 2019-07-12 PROCEDURE — 99024 POSTOP FOLLOW-UP VISIT: CPT | Performed by: NURSE PRACTITIONER

## 2019-07-12 PROCEDURE — 85025 COMPLETE CBC W/AUTO DIFF WBC: CPT

## 2019-07-12 PROCEDURE — 2580000003 HC RX 258: Performed by: ANESTHESIOLOGY

## 2019-07-12 PROCEDURE — 35860 EXPLORE LIMB VESSELS: CPT | Performed by: SURGERY

## 2019-07-12 PROCEDURE — 2709999900 HC NON-CHARGEABLE SUPPLY: Performed by: SURGERY

## 2019-07-12 PROCEDURE — 99232 SBSQ HOSP IP/OBS MODERATE 35: CPT | Performed by: PSYCHIATRY & NEUROLOGY

## 2019-07-12 PROCEDURE — 2700000000 HC OXYGEN THERAPY PER DAY

## 2019-07-12 PROCEDURE — 86850 RBC ANTIBODY SCREEN: CPT

## 2019-07-12 PROCEDURE — 36415 COLL VENOUS BLD VENIPUNCTURE: CPT

## 2019-07-12 PROCEDURE — 6360000002 HC RX W HCPCS: Performed by: HOSPITALIST

## 2019-07-12 PROCEDURE — 0J9N0ZZ DRAINAGE OF RIGHT LOWER LEG SUBCUTANEOUS TISSUE AND FASCIA, OPEN APPROACH: ICD-10-PCS | Performed by: SURGERY

## 2019-07-12 RX ORDER — LIDOCAINE HYDROCHLORIDE 10 MG/ML
1 INJECTION, SOLUTION EPIDURAL; INFILTRATION; INTRACAUDAL; PERINEURAL
Status: DISCONTINUED | OUTPATIENT
Start: 2019-07-12 | End: 2019-07-12 | Stop reason: HOSPADM

## 2019-07-12 RX ORDER — ONDANSETRON 2 MG/ML
4 INJECTION INTRAMUSCULAR; INTRAVENOUS EVERY 6 HOURS PRN
Status: DISCONTINUED | OUTPATIENT
Start: 2019-07-12 | End: 2019-07-16 | Stop reason: HOSPADM

## 2019-07-12 RX ORDER — ONDANSETRON 2 MG/ML
4 INJECTION INTRAMUSCULAR; INTRAVENOUS
Status: DISCONTINUED | OUTPATIENT
Start: 2019-07-12 | End: 2019-07-12 | Stop reason: HOSPADM

## 2019-07-12 RX ORDER — HEPARIN SODIUM 5000 [USP'U]/ML
5000 INJECTION, SOLUTION INTRAVENOUS; SUBCUTANEOUS EVERY 8 HOURS SCHEDULED
Status: DISCONTINUED | OUTPATIENT
Start: 2019-07-13 | End: 2019-07-16 | Stop reason: HOSPADM

## 2019-07-12 RX ORDER — FENTANYL CITRATE 50 UG/ML
50 INJECTION, SOLUTION INTRAMUSCULAR; INTRAVENOUS
Status: DISCONTINUED | OUTPATIENT
Start: 2019-07-12 | End: 2019-07-12 | Stop reason: HOSPADM

## 2019-07-12 RX ORDER — SODIUM CHLORIDE 9 MG/ML
INJECTION, SOLUTION INTRAVENOUS CONTINUOUS
Status: DISCONTINUED | OUTPATIENT
Start: 2019-07-12 | End: 2019-07-13

## 2019-07-12 RX ORDER — DEXAMETHASONE SODIUM PHOSPHATE 10 MG/ML
INJECTION INTRAMUSCULAR; INTRAVENOUS PRN
Status: DISCONTINUED | OUTPATIENT
Start: 2019-07-12 | End: 2019-07-12 | Stop reason: SDUPTHER

## 2019-07-12 RX ORDER — ACETAMINOPHEN 325 MG/1
650 TABLET ORAL EVERY 4 HOURS PRN
Status: DISCONTINUED | OUTPATIENT
Start: 2019-07-12 | End: 2019-07-16 | Stop reason: HOSPADM

## 2019-07-12 RX ORDER — HYDRALAZINE HYDROCHLORIDE 20 MG/ML
5 INJECTION INTRAMUSCULAR; INTRAVENOUS EVERY 10 MIN PRN
Status: DISCONTINUED | OUTPATIENT
Start: 2019-07-12 | End: 2019-07-12 | Stop reason: HOSPADM

## 2019-07-12 RX ORDER — DIPHENHYDRAMINE HYDROCHLORIDE 50 MG/ML
12.5 INJECTION INTRAMUSCULAR; INTRAVENOUS
Status: DISCONTINUED | OUTPATIENT
Start: 2019-07-12 | End: 2019-07-12 | Stop reason: HOSPADM

## 2019-07-12 RX ORDER — MORPHINE SULFATE 2 MG/ML
2 INJECTION, SOLUTION INTRAMUSCULAR; INTRAVENOUS EVERY 5 MIN PRN
Status: DISCONTINUED | OUTPATIENT
Start: 2019-07-12 | End: 2019-07-12 | Stop reason: HOSPADM

## 2019-07-12 RX ORDER — SODIUM CHLORIDE, SODIUM LACTATE, POTASSIUM CHLORIDE, CALCIUM CHLORIDE 600; 310; 30; 20 MG/100ML; MG/100ML; MG/100ML; MG/100ML
INJECTION, SOLUTION INTRAVENOUS CONTINUOUS
Status: DISCONTINUED | OUTPATIENT
Start: 2019-07-12 | End: 2019-07-12

## 2019-07-12 RX ORDER — FENTANYL CITRATE 50 UG/ML
25 INJECTION, SOLUTION INTRAMUSCULAR; INTRAVENOUS
Status: DISCONTINUED | OUTPATIENT
Start: 2019-07-12 | End: 2019-07-12 | Stop reason: HOSPADM

## 2019-07-12 RX ORDER — MIDAZOLAM HYDROCHLORIDE 1 MG/ML
2 INJECTION INTRAMUSCULAR; INTRAVENOUS
Status: DISCONTINUED | OUTPATIENT
Start: 2019-07-12 | End: 2019-07-12 | Stop reason: HOSPADM

## 2019-07-12 RX ORDER — SODIUM HYPOCHLORITE 1.25 MG/ML
SOLUTION TOPICAL 2 TIMES DAILY
Status: DISCONTINUED | OUTPATIENT
Start: 2019-07-12 | End: 2019-07-16 | Stop reason: HOSPADM

## 2019-07-12 RX ORDER — SODIUM CHLORIDE 450 MG/100ML
INJECTION, SOLUTION INTRAVENOUS CONTINUOUS
Status: DISCONTINUED | OUTPATIENT
Start: 2019-07-12 | End: 2019-07-12

## 2019-07-12 RX ORDER — HYDROCODONE BITARTRATE AND ACETAMINOPHEN 5; 325 MG/1; MG/1
2 TABLET ORAL EVERY 4 HOURS PRN
Status: DISCONTINUED | OUTPATIENT
Start: 2019-07-12 | End: 2019-07-16 | Stop reason: HOSPADM

## 2019-07-12 RX ORDER — LIDOCAINE HYDROCHLORIDE 10 MG/ML
INJECTION, SOLUTION EPIDURAL; INFILTRATION; INTRACAUDAL; PERINEURAL PRN
Status: DISCONTINUED | OUTPATIENT
Start: 2019-07-12 | End: 2019-07-12 | Stop reason: SDUPTHER

## 2019-07-12 RX ORDER — MEPERIDINE HYDROCHLORIDE 50 MG/ML
12.5 INJECTION INTRAMUSCULAR; INTRAVENOUS; SUBCUTANEOUS EVERY 5 MIN PRN
Status: DISCONTINUED | OUTPATIENT
Start: 2019-07-12 | End: 2019-07-12 | Stop reason: HOSPADM

## 2019-07-12 RX ORDER — SODIUM HYPOCHLORITE 1.25 MG/ML
SOLUTION TOPICAL PRN
Status: DISCONTINUED | OUTPATIENT
Start: 2019-07-12 | End: 2019-07-12 | Stop reason: ALTCHOICE

## 2019-07-12 RX ORDER — SODIUM CHLORIDE 0.9 % (FLUSH) 0.9 %
10 SYRINGE (ML) INJECTION EVERY 12 HOURS SCHEDULED
Status: DISCONTINUED | OUTPATIENT
Start: 2019-07-12 | End: 2019-07-12 | Stop reason: HOSPADM

## 2019-07-12 RX ORDER — SODIUM CHLORIDE 0.9 % (FLUSH) 0.9 %
10 SYRINGE (ML) INJECTION PRN
Status: DISCONTINUED | OUTPATIENT
Start: 2019-07-12 | End: 2019-07-12 | Stop reason: HOSPADM

## 2019-07-12 RX ORDER — ENALAPRILAT 2.5 MG/2ML
1.25 INJECTION INTRAVENOUS
Status: DISCONTINUED | OUTPATIENT
Start: 2019-07-12 | End: 2019-07-12 | Stop reason: HOSPADM

## 2019-07-12 RX ORDER — PROMETHAZINE HYDROCHLORIDE 25 MG/ML
6.25 INJECTION, SOLUTION INTRAMUSCULAR; INTRAVENOUS
Status: DISCONTINUED | OUTPATIENT
Start: 2019-07-12 | End: 2019-07-12 | Stop reason: HOSPADM

## 2019-07-12 RX ORDER — FENTANYL CITRATE 50 UG/ML
INJECTION, SOLUTION INTRAMUSCULAR; INTRAVENOUS PRN
Status: DISCONTINUED | OUTPATIENT
Start: 2019-07-12 | End: 2019-07-12 | Stop reason: SDUPTHER

## 2019-07-12 RX ORDER — LABETALOL 20 MG/4 ML (5 MG/ML) INTRAVENOUS SYRINGE
5 EVERY 10 MIN PRN
Status: DISCONTINUED | OUTPATIENT
Start: 2019-07-12 | End: 2019-07-12 | Stop reason: HOSPADM

## 2019-07-12 RX ORDER — PROPOFOL 10 MG/ML
INJECTION, EMULSION INTRAVENOUS PRN
Status: DISCONTINUED | OUTPATIENT
Start: 2019-07-12 | End: 2019-07-12 | Stop reason: SDUPTHER

## 2019-07-12 RX ORDER — SODIUM HYPOCHLORITE 1.25 MG/ML
SOLUTION TOPICAL DAILY
Status: DISCONTINUED | OUTPATIENT
Start: 2019-07-12 | End: 2019-07-12 | Stop reason: SDUPTHER

## 2019-07-12 RX ORDER — HYDROCODONE BITARTRATE AND ACETAMINOPHEN 5; 325 MG/1; MG/1
1 TABLET ORAL EVERY 4 HOURS PRN
Status: DISCONTINUED | OUTPATIENT
Start: 2019-07-12 | End: 2019-07-16 | Stop reason: HOSPADM

## 2019-07-12 RX ORDER — MORPHINE SULFATE 1 MG/ML
1 INJECTION, SOLUTION EPIDURAL; INTRATHECAL; INTRAVENOUS EVERY 5 MIN PRN
Status: DISCONTINUED | OUTPATIENT
Start: 2019-07-12 | End: 2019-07-12 | Stop reason: HOSPADM

## 2019-07-12 RX ORDER — MIDAZOLAM HYDROCHLORIDE 1 MG/ML
INJECTION INTRAMUSCULAR; INTRAVENOUS PRN
Status: DISCONTINUED | OUTPATIENT
Start: 2019-07-12 | End: 2019-07-12 | Stop reason: SDUPTHER

## 2019-07-12 RX ADMIN — PROPOFOL 150 MG: 10 INJECTION, EMULSION INTRAVENOUS at 14:07

## 2019-07-12 RX ADMIN — AMLODIPINE BESYLATE 10 MG: 5 TABLET ORAL at 08:02

## 2019-07-12 RX ADMIN — CLONIDINE HYDROCHLORIDE 0.3 MG: 0.2 TABLET ORAL at 08:02

## 2019-07-12 RX ADMIN — DEXAMETHASONE SODIUM PHOSPHATE 10 MG: 10 INJECTION INTRAMUSCULAR; INTRAVENOUS at 14:13

## 2019-07-12 RX ADMIN — HYDROMORPHONE HYDROCHLORIDE 0.5 MG: 1 INJECTION, SOLUTION INTRAMUSCULAR; INTRAVENOUS; SUBCUTANEOUS at 16:02

## 2019-07-12 RX ADMIN — OXYCODONE HYDROCHLORIDE AND ACETAMINOPHEN 1 TABLET: 10; 325 TABLET ORAL at 08:05

## 2019-07-12 RX ADMIN — FENTANYL CITRATE 50 MCG: 50 INJECTION INTRAMUSCULAR; INTRAVENOUS at 14:25

## 2019-07-12 RX ADMIN — PROPOFOL 50 MG: 10 INJECTION, EMULSION INTRAVENOUS at 14:32

## 2019-07-12 RX ADMIN — ASPIRIN 81 MG: 81 TABLET, COATED ORAL at 17:45

## 2019-07-12 RX ADMIN — FENTANYL CITRATE 25 MCG: 50 INJECTION INTRAMUSCULAR; INTRAVENOUS at 14:32

## 2019-07-12 RX ADMIN — HYDROMORPHONE HYDROCHLORIDE 0.5 MG: 1 INJECTION, SOLUTION INTRAMUSCULAR; INTRAVENOUS; SUBCUTANEOUS at 19:04

## 2019-07-12 RX ADMIN — HYDROMORPHONE HYDROCHLORIDE 0.5 MG: 1 INJECTION, SOLUTION INTRAMUSCULAR; INTRAVENOUS; SUBCUTANEOUS at 05:02

## 2019-07-12 RX ADMIN — LIDOCAINE HYDROCHLORIDE 50 MG: 10 INJECTION, SOLUTION EPIDURAL; INFILTRATION; INTRACAUDAL; PERINEURAL at 14:07

## 2019-07-12 RX ADMIN — FENTANYL CITRATE 25 MCG: 50 INJECTION INTRAMUSCULAR; INTRAVENOUS at 14:17

## 2019-07-12 RX ADMIN — Medication 10 ML: at 19:05

## 2019-07-12 RX ADMIN — Medication 10 ML: at 22:00

## 2019-07-12 RX ADMIN — MIDAZOLAM 1 MG: 1 INJECTION INTRAMUSCULAR; INTRAVENOUS at 13:58

## 2019-07-12 RX ADMIN — HYDROMORPHONE HYDROCHLORIDE 0.5 MG: 1 INJECTION, SOLUTION INTRAMUSCULAR; INTRAVENOUS; SUBCUTANEOUS at 16:22

## 2019-07-12 RX ADMIN — FENTANYL CITRATE 50 MCG: 50 INJECTION INTRAMUSCULAR; INTRAVENOUS at 14:42

## 2019-07-12 RX ADMIN — HYDROMORPHONE HYDROCHLORIDE 1 MG: 1 INJECTION, SOLUTION INTRAMUSCULAR; INTRAVENOUS; SUBCUTANEOUS at 14:51

## 2019-07-12 RX ADMIN — ISOSORBIDE MONONITRATE 30 MG: 30 TABLET, EXTENDED RELEASE ORAL at 17:46

## 2019-07-12 RX ADMIN — MIDAZOLAM 1 MG: 1 INJECTION INTRAMUSCULAR; INTRAVENOUS at 14:01

## 2019-07-12 RX ADMIN — VANCOMYCIN HYDROCHLORIDE 1250 MG: 10 INJECTION, POWDER, LYOPHILIZED, FOR SOLUTION INTRAVENOUS at 11:10

## 2019-07-12 RX ADMIN — SODIUM CHLORIDE: 4.5 INJECTION, SOLUTION INTRAVENOUS at 13:32

## 2019-07-12 RX ADMIN — OXYCODONE HYDROCHLORIDE AND ACETAMINOPHEN 1 TABLET: 10; 325 TABLET ORAL at 17:43

## 2019-07-12 RX ADMIN — HYDROMORPHONE HYDROCHLORIDE 1 MG: 1 INJECTION, SOLUTION INTRAMUSCULAR; INTRAVENOUS; SUBCUTANEOUS at 14:55

## 2019-07-12 RX ADMIN — FAMOTIDINE 20 MG: 20 TABLET ORAL at 17:46

## 2019-07-12 RX ADMIN — MINOXIDIL 10 MG: 10 TABLET ORAL at 17:45

## 2019-07-12 RX ADMIN — Medication 10 ML: at 08:03

## 2019-07-12 RX ADMIN — CLONIDINE HYDROCHLORIDE 0.3 MG: 0.2 TABLET ORAL at 22:10

## 2019-07-12 RX ADMIN — HEPARIN SODIUM 5000 UNITS: 5000 INJECTION INTRAVENOUS; SUBCUTANEOUS at 05:02

## 2019-07-12 RX ADMIN — LISINOPRIL 10 MG: 10 TABLET ORAL at 08:03

## 2019-07-12 RX ADMIN — CLOPIDOGREL 75 MG: 75 TABLET, FILM COATED ORAL at 17:45

## 2019-07-12 ASSESSMENT — PAIN SCALES - GENERAL
PAINLEVEL_OUTOF10: 0
PAINLEVEL_OUTOF10: 10
PAINLEVEL_OUTOF10: 10
PAINLEVEL_OUTOF10: 8
PAINLEVEL_OUTOF10: 8
PAINLEVEL_OUTOF10: 10
PAINLEVEL_OUTOF10: 3
PAINLEVEL_OUTOF10: 10

## 2019-07-12 ASSESSMENT — PAIN DESCRIPTION - PAIN TYPE: TYPE: SURGICAL PAIN

## 2019-07-12 ASSESSMENT — PAIN DESCRIPTION - ORIENTATION: ORIENTATION: RIGHT

## 2019-07-12 ASSESSMENT — LIFESTYLE VARIABLES: SMOKING_STATUS: 1

## 2019-07-12 ASSESSMENT — PAIN DESCRIPTION - LOCATION: LOCATION: LEG

## 2019-07-12 ASSESSMENT — PAIN DESCRIPTION - ONSET: ONSET: ON-GOING

## 2019-07-12 NOTE — PROGRESS NOTES
Vascular Surgery  Dr. Pearlean Hodgkin   Daily Progress Note    Pt Name: Janie Harada Record Number: [id number]  Date of Birth 1960   Today's Date: 7/12/2019        SUBJECTIVE:     Patient was seen and examined. Complaining of RLE/knee pain. OBJECTIVE:     Patient Vitals for the past 24 hrs:   BP Temp Temp src Pulse Resp SpO2   07/12/19 0621 (!) 146/57 98 °F (36.7 °C) -- 60 18 92 %   07/11/19 2001 (!) 131/49 98.3 °F (36.8 °C) Oral 60 18 94 %   07/11/19 1703 (!) 145/57 -- -- 62 -- --         Intake/Output Summary (Last 24 hours) at 7/12/2019 0748  Last data filed at 7/11/2019 2142  Gross per 24 hour   Intake 10 ml   Output --   Net 10 ml       In: 10 [I.V.:10]  Out: -     I/O last 3 completed shifts: In: 10 [I.V.:10]  Out: -          Wt Readings from Last 3 Encounters:   07/11/19 165 lb 14.4 oz (75.3 kg)   06/29/19 170 lb (77.1 kg)   06/28/19 164 lb 9.6 oz (74.7 kg)        Body mass index is 24.5 kg/m².      Diet: Diet NPO Effective Now        MEDS:     Scheduled Meds:   vancomycin  1,250 mg Intravenous Once    sodium hypochlorite   Irrigation BID    famotidine  20 mg Oral Daily    lactulose  10 g Oral Daily    aspirin  81 mg Oral Daily    calcium acetate  667 mg Oral TID WC    clopidogrel  75 mg Oral Daily    isosorbide mononitrate  30 mg Oral Daily    minoxidil  10 mg Oral Daily    sodium chloride flush  10 mL Intravenous 2 times per day    heparin (porcine)  5,000 Units Subcutaneous 3 times per day    amLODIPine  10 mg Oral Daily    cloNIDine  0.3 mg Oral TID    lisinopril  10 mg Oral BID     Continuous Infusions:  PRN Meds:    HYDROmorphone 0.5 mg Q3H PRN   sucralfate 1 g TID PRN   sodium chloride flush 10 mL PRN   ondansetron 4 mg Q6H PRN   polyethylene glycol 17 g Daily PRN   melatonin 3 mg Nightly PRN   metoprolol tartrate 25 mg Q12H PRN   oxyCODONE-acetaminophen 1 tablet Q4H PRN         PHYSICAL EXAM:     CONSTITUTIONAL: Ill-appearing male who is alert and oriented, appears older

## 2019-07-12 NOTE — PROGRESS NOTES
Occupational Therapy  Patient in dialysis a.m, and I & D this pm.  Will follow as able.  Electronically signed by Moo Arellano OT on 7/12/2019 at 1:54 PM

## 2019-07-12 NOTE — ANESTHESIA POSTPROCEDURE EVALUATION
Department of Anesthesiology  Postprocedure Note    Patient: Shiela Garcia  MRN: [id number]  YOB: 1960  Date of evaluation: 7/12/2019  Time:  3:06 PM     Procedure Summary     Date:  07/12/19 Room / Location:  [facility] OR  / [facility] OR    Anesthesia Start:  1400 Anesthesia Stop:      Procedure:  RIGHT LEG WOUND WASHOUT (Right ) Diagnosis:  (.)    Surgeon:  Bard Princess MD Responsible Provider:  Reji Cedeño MD    Anesthesia Type:  general ASA Status:  4          Anesthesia Type: general    Teja Phase I:      Teja Phase II:      Last vitals: Reviewed and per EMR flowsheets.        Anesthesia Post Evaluation    Patient location during evaluation: PACU  Patient participation: waiting for patient participation  Level of consciousness: responsive to light touch  Pain score: 0  Airway patency: patent  Nausea & Vomiting: no nausea and no vomiting  Complications: no  Cardiovascular status: blood pressure returned to baseline  Respiratory status: room air  Hydration status: euvolemic  Comments: T 100.1

## 2019-07-12 NOTE — PLAN OF CARE
Problem: Falls - Risk of:  Goal: Will remain free from falls  Description  Will remain free from falls  7/12/2019 0436 by Connor Gonzalez RN  Outcome: Ongoing  7/11/2019 1907 by Oksana Montano LPN  Outcome: Ongoing  Goal: Absence of physical injury  Description  Absence of physical injury  7/12/2019 0436 by Connor Gonzalez RN  Outcome: Ongoing  7/11/2019 1907 by Oksana Montano LPN  Outcome: Ongoing     Problem: Pain:  Goal: Pain level will decrease  Description  Pain level will decrease  7/12/2019 0436 by Connor Gonzalez RN  Outcome: Ongoing  7/11/2019 1907 by Oksana Montano LPN  Outcome: Ongoing  Goal: Control of acute pain  Description  Control of acute pain  7/12/2019 0436 by Connor Gonzalez RN  Outcome: Ongoing  7/11/2019 1907 by Oksana Montano LPN  Outcome: Ongoing  Goal: Control of chronic pain  Description  Control of chronic pain  7/12/2019 0436 by Connor Gonzalez RN  Outcome: Ongoing  7/11/2019 1907 by Oksana Montano LPN  Outcome: Ongoing     Problem: Physical Regulation:  Goal: Diagnostic test results will improve  Description  Diagnostic test results will improve  7/12/2019 0436 by Connor Gonzalez RN  Outcome: Ongoing  7/11/2019 1907 by Oksana Montano LPN  Outcome: Ongoing  Goal: Will remain free from infection  Description  Will remain free from infection  7/12/2019 0436 by Connor Gonzalez RN  Outcome: Ongoing  7/11/2019 1907 by Oksana Montano LPN  Outcome: Ongoing  Goal: Ability to maintain vital signs within normal range will improve  Description  Ability to maintain vital signs within normal range will improve  7/12/2019 0436 by Connor Gonzalez RN  Outcome: Ongoing  7/11/2019 1907 by Oksana Montano LPN  Outcome: Ongoing     Problem: Skin Integrity:  Goal: Demonstration of wound healing without infection will improve  Description  Demonstration of wound healing without infection will improve  7/12/2019 0436 by Connor Gonzalez

## 2019-07-12 NOTE — PROGRESS NOTES
internal jugular vein tunneled dialysis catheter (Bard Equistream XK 23cm tip to cuff)     VASCULAR SURGERY  01/20/2017    SJS-Right upper venograms, u/s guided cannulation left basilic vein, left upper venograms, balloon angioplasty left subclavian vein 10x40 conquest.    VASCULAR SURGERY  02/16/2017    SJS. Left brachial artery to axillary vein arteriovenous graft with 7 mm artegraft. Left upper extremity venograms. Left subclavian vein stent viabahn 13x50. Left subclavian vein stent balloon angioplasty 12x40 atlas.  VASCULAR SURGERY  04/11/2017    SJS. Removal of tunneled dialysis catheter right internal jugular vein.  VASCULAR SURGERY  01/25/2018    SJS. Arch aortogram, left upper arteriogram, AV graft angiogram/venogram.    VASCULAR SURGERY  02/28/2018    SJS. Right CFA 5f-6f-7f sheath, aortogram with bilateral lower arteriogram, left SFA/pop  balloon angioplasty 5x100 , 6x150 lutonix ( 2), left CFA  7f sheath, bilateral iliac kissing stents right 10x38 icast, left 9x59 icast expanded with 10x40 , completion aortogram, mynx left CFA , failed mynx right CFA.  VASCULAR SURGERY  06/13/2019    SJS left CFA 5f sheath, aortogram with bilateral lower arteriogram, mynx left CFA.  VASCULAR SURGERY  06/25/2019    SJS-VI. Femoral tibial/perineal bypass with reversed greater saphenous vein.        Family History  Family History   Problem Relation Age of Onset    High Blood Pressure Mother     High Blood Pressure Father     High Blood Pressure Sister        Social History  Social History     Socioeconomic History    Marital status:      Spouse name: Not on file    Number of children: 0    Years of education: Not on file    Highest education level: Not on file   Occupational History    Occupation:    Social Needs    Financial resource strain: Not on file    Food insecurity:     Worry: Not on file     Inability: Not on file    Transportation needs:     Medical: Not on file 6 <5* 5   AST 13 10 11   PROT 6.8 6.9 6.5*   BILITOT 0.4 0.4 0.3   LABALBU 2.6* 2.8* 2.5*     Lactic Acid:   No results for input(s): LACTA in the last 72 hours. Troponin: No results for input(s): CKTOTAL, CKMB, TROPONINT in the last 72 hours. ABGs: No results for input(s): PH, PCO2, PO2, HCO3, O2SAT in the last 72 hours. CRP:  No results for input(s): CRP in the last 72 hours. Sed Rate:  No results for input(s): SEDRATE in the last 72 hours. Cultures:   No results for input(s): CULTURE in the last 72 hours. No results for input(s): BC, Truddie Jose M in the last 72 hours. No results for input(s): CXSURG in the last 72 hours. Radiology reports as per the Radiologist  Radiology: Ct Head Wo Contrast    Result Date: 7/8/2019  Examination. CT HEAD WO CONTRAST History: Altered mental status after a fall. DLP: 805 mGycm. The CT scan of the head is performed without intravenous contrast enhancement. The images are acquired in axial plane with subsequent reconstruction in coronal and sagittal planes. There is no previous study for comparison. There is no evidence of an intracranial hemorrhage or hematoma. There is no evidence of mass or midline shift. The ventricles, the basal cistern and the cortical sulci are moderately prominent suggesting mild atrophy. Focal low-density area in the left basal ganglia may represent an area of chronic infarction? . This may be further evaluated. Similar changes are seen in the right external capsule. The images reviewed in bone window show no acute bony abnormality. There is mucosal thickening involving the maxillary antrum bilaterally which are moderately hypoplastic. The mastoid air cells are normal. There is severe atheromatous changes of the superficial scalp arteries. A small low-density area in the left basal ganglia may represent an acute or chronic infarction. This may be further evaluated with MR imaging of the brain. Chronic ischemic and atrophic changes.  Maxillary

## 2019-07-12 NOTE — PROGRESS NOTES
Hospitalist Progress Note  7/12/2019 12:53 PM  Subjective:   Admit Date: 7/8/2019  PCP: Alfonzo Blackmon DO    Chief Complaint: altered mental status    Subjective: Continued pain, states that Percocet is not working well. Vascular surgery has decided to take down to OR for I&D and washout procedure. History is otherwise unchanged. [facility] History:   7-8: Presents with altered mental status and purulent drainage from incision on RLE from his endarterectomy in late June. Patient with subjective fever with chills. Scheduled for dialysis but unable to stand and was guided to the floor by his wife. He is currently unable to bear weight, wife is concerned she cannot care for him any longer. CT showed possible stroke of left basal ganglia. Admitted to med/surg on linezolid and ceftriaxone. Vascular surgery consulted. Nephrology consulted to manage HD. GI consulted for suspected hepatic encephalopathy  7-9: Altered mental status improved. Ammonia is normal, GI suspects stroke. Neurologically intact at this time, neurology does not believe he has an acute stroke. Cannot perform MRI. Improving with treatment of surgical wound infection, may represent delirium and deconditioning. 7-10: Hydromorphone PRN breakthrough pain. Possibly home tomorrow if pain is managed. 7-11: Continued pain. IV hydromorphone stopped by vascular surgery. Patient worked with PT this morning but was unable to safely be discharged to home, SNF placement sought and obtained. Unfortunately, vascular surgery subsequently reevaluated the patient's wound and decided to open it up for drainage, with discharge delayed until tomorrow. 7-12: To OR today for I&D and washout. Started on vancomycin as wound culture is now growing Gram negative rods. ROS: 14 point review of systems is negative except as specifically addressed above.     Diet NPO Effective Now    Intake/Output Summary (Last 24 hours) at 7/12/2019 1253  Last data filed at 7/12/2019 1212  Gross per 24 hour   Intake 20 ml   Output 3000 ml   Net -2980 ml     Medications:  Current Facility-Administered Medications   Medication Dose Route Frequency Provider Last Rate Last Dose    [START ON 7/13/2019] heparin (porcine) injection 5,000 Units  5,000 Units Subcutaneous 3 times per day TIFF Grewal        sodium hypochlorite (DAKINS) 0.125 % external solution   Irrigation BID Lupis Lawler MD   473 mL at 07/11/19 2143    famotidine (PEPCID) tablet 20 mg  20 mg Oral Daily Willie Spencer, DO   20 mg at 07/12/19 0803    HYDROmorphone (DILAUDID) injection 0.5 mg  0.5 mg Intravenous Q3H PRN [facility], DO   0.5 mg at 07/12/19 0502    lactulose (CHRONULAC) 10 GM/15ML solution 10 g  10 g Oral Daily Nichole Muse MD        aspirin EC tablet 81 mg  81 mg Oral Daily Cooper Sands MD   81 mg at 07/12/19 0802    calcium acetate (PHOSLO) capsule 667 mg  667 mg Oral TID WC Cooper Sands MD   667 mg at 07/11/19 1759    clopidogrel (PLAVIX) tablet 75 mg  75 mg Oral Daily Cooper Sands MD   75 mg at 07/12/19 0802    isosorbide mononitrate (IMDUR) extended release tablet 30 mg  30 mg Oral Daily Cooper Sands MD   30 mg at 07/12/19 0802    minoxidil (LONITEN) tablet 10 mg  10 mg Oral Daily Cooper Sands MD   10 mg at 07/12/19 0802    sucralfate (CARAFATE) tablet 1 g  1 g Oral TID PRN Cooper Sands MD        sodium chloride flush 0.9 % injection 10 mL  10 mL Intravenous 2 times per day Cooper Sands MD   10 mL at 07/12/19 0803    sodium chloride flush 0.9 % injection 10 mL  10 mL Intravenous PRN Cooper Sands MD        ondansetron [facility]) injection 4 mg  4 mg Intravenous Q6H PRN Cooper Sands MD        polyethylene glycol [facility]) packet 17 g  17 g Oral Daily PRN Cooper Sands MD        melatonin tablet 3 mg  3 mg Oral Nightly PRN Cooper Sands MD   3 mg at 07/08/19 2042    amLODIPine (NORVASC) tablet 10 mg  10 mg Oral Daily Willie Spencer DO   10 mg at 07/12/19 0802    cloNIDine masses, lympadenopathy, bruit, thyroid normal  Lungs: no increased work of breathing, diminished breath sounds bilaterally  Heart: regular rate and rhythm, S1, S2 normal, no murmur, click, rub or gallop  Abdomen: soft, non-tender; bowel sounds normal; no masses,  no organomegaly  Extremities: edema 1+ bilaterally, modified Ezekiel's equivocal due to generalized tenderness  Neurologic: no focal neurologic deficits, normal sensation, alert and oriented, affect and mood appropriate  Skin: surgical wounds with some purulence from right groin    Assessment and Plan:   Principal Problem:    Iatrogenic complication of vascular surgery  Active Problems:    ESRD on dialysis ([facility] [address]    Wound infection after surgery    PVD (peripheral vascular disease) ([facility]    Hyponatremia    Normocytic anemia    Thrombocytopenia (H[facility])    Palliative care patient    Sepsis ([facility]    Wound infection  Resolved Problems:    Acute encephalopathy    Hyperkalemia    Day #1 vancomycin empiric treatment for infected wound. Received 4 days ceftriaxone and linezolid empiric treatment previously. To OR today for I&D and washout. Mental status stable. I believe the patient's slurred speech was largely chronic and any alteration in mental status was due to delirium, since resolved. Monitor electrolytes and blood counts closely. Dialysis per nephrology. Advance Directive: Full Code    DVT prophylaxis: heparin    Discharge planning:  To SNF      DO Bambi Carballo Hospitalist

## 2019-07-12 NOTE — PROGRESS NOTES
INR 1.14 01/30/2018   No results found for: PHENYTOIN, ESR, CRP          RECORD REVIEW: Previous medical records, medications were reviewed at today's visit    IMPRESSION:   1. Altered mental status. Appears to have resolved. Likely multifactorial and related in part to infection and pain. 2.  Right leg weakness. Appears to have some involvement of the femoral nerve. CT of the abdomen does not show any retroperitoneal hemorrhage. Unclear how much weakness he truly has with hip flexion and knee extension because of pain associated with movement. He is to go back to the OR today for I&D and washout. I will see him on Monday if here.

## 2019-07-12 NOTE — ANESTHESIA PRE PROCEDURE
mg by mouth 2 times daily     Historical Provider, MD   amLODIPine (NORVASC) 10 MG tablet Take 10 mg by mouth daily Indications: High Blood Pressure     Historical Provider, MD   Calcium Acetate, Phos Binder, (PHOSLO PO) Take 3 tablets by mouth 3 times daily (with meals) Indications: Kidney Failure     Historical Provider, MD       Current medications:    No current facility-administered medications for this visit. Current Outpatient Medications   Medication Sig Dispense Refill    oxyCODONE-acetaminophen (PERCOCET)  MG per tablet Take 1 tablet by mouth every 4 hours as needed for Pain for up to 3 days.  18 tablet 0    polyethylene glycol (GLYCOLAX) packet Take 17 g by mouth daily as needed for Constipation or Other (No BM in 60 hours) 527 g 0    melatonin 3 MG TABS tablet Take 1 tablet by mouth nightly as needed (insomnia) 30 tablet 0    famotidine (PEPCID) 20 MG tablet Take 1 tablet by mouth daily 60 tablet 0     Facility-Administered Medications Ordered in Other Visits   Medication Dose Route Frequency Provider Last Rate Last Dose    [START ON 7/13/2019] heparin (porcine) injection 5,000 Units  5,000 Units Subcutaneous 3 times per day TIFF Gutierrez        sodium hypochlorite (DAKINS) 0.125 % external solution   Irrigation BID Leticia Sosa MD   473 mL at 07/11/19 2143    famotidine (PEPCID) tablet 20 mg  20 mg Oral Daily Willie Spencer, DO   20 mg at 07/11/19 0801    HYDROmorphone (DILAUDID) injection 0.5 mg  0.5 mg Intravenous Q3H PRN Dominguez Dexter Spencer, DO   0.5 mg at 07/12/19 0502    lactulose (CHRONULAC) 10 GM/15ML solution 10 g  10 g Oral Daily Shavonne Owens MD        aspirin EC tablet 81 mg  81 mg Oral Daily Tonya Guzman MD   81 mg at 07/11/19 0800    calcium acetate (PHOSLO) capsule 667 mg  667 mg Oral TID  Tonya Guzman MD   667 mg at 07/11/19 1759    clopidogrel (PLAVIX) tablet 75 mg  75 mg Oral Daily Tonya Guzman MD   75 mg at 07/11/19 0800    isosorbide mononitrate

## 2019-07-13 LAB
ANION GAP SERPL CALCULATED.3IONS-SCNC: 17 MMOL/L (ref 7–19)
BASOPHILS ABSOLUTE: 0 K/UL (ref 0–0.2)
BASOPHILS RELATIVE PERCENT: 0.3 % (ref 0–1)
BLOOD CULTURE, ROUTINE: NORMAL
BUN BLDV-MCNC: 27 MG/DL (ref 6–20)
CALCIUM SERPL-MCNC: 8.5 MG/DL (ref 8.6–10)
CHLORIDE BLD-SCNC: 92 MMOL/L (ref 98–111)
CO2: 25 MMOL/L (ref 22–29)
CREAT SERPL-MCNC: 4.3 MG/DL (ref 0.5–1.2)
CULTURE, BLOOD 2: NORMAL
EOSINOPHILS ABSOLUTE: 0.1 K/UL (ref 0–0.6)
EOSINOPHILS RELATIVE PERCENT: 2 % (ref 0–5)
GFR NON-AFRICAN AMERICAN: 14
GLUCOSE BLD-MCNC: 174 MG/DL (ref 74–109)
GRAM STAIN RESULT: ABNORMAL
HCT VFR BLD CALC: 23.6 % (ref 42–52)
HEMOGLOBIN: 7.5 G/DL (ref 14–18)
LYMPHOCYTES ABSOLUTE: 0.5 K/UL (ref 1.1–4.5)
LYMPHOCYTES RELATIVE PERCENT: 7 % (ref 20–40)
MCH RBC QN AUTO: 28.5 PG (ref 27–31)
MCHC RBC AUTO-ENTMCNC: 31.8 G/DL (ref 33–37)
MCV RBC AUTO: 89.7 FL (ref 80–94)
MONOCYTES ABSOLUTE: 0.6 K/UL (ref 0–0.9)
MONOCYTES RELATIVE PERCENT: 8.4 % (ref 0–10)
NEUTROPHILS ABSOLUTE: 5.8 K/UL (ref 1.5–7.5)
NEUTROPHILS RELATIVE PERCENT: 81.9 % (ref 50–65)
ORGANISM: ABNORMAL
PDW BLD-RTO: 15.3 % (ref 11.5–14.5)
PLATELET # BLD: 117 K/UL (ref 130–400)
PMV BLD AUTO: 11.9 FL (ref 9.4–12.4)
POTASSIUM REFLEX MAGNESIUM: 4.2 MMOL/L (ref 3.5–5)
RBC # BLD: 2.63 M/UL (ref 4.7–6.1)
SODIUM BLD-SCNC: 134 MMOL/L (ref 136–145)
WBC # BLD: 7 K/UL (ref 4.8–10.8)
WOUND/ABSCESS: ABNORMAL
WOUND/ABSCESS: ABNORMAL

## 2019-07-13 PROCEDURE — 94762 N-INVAS EAR/PLS OXIMTRY CONT: CPT

## 2019-07-13 PROCEDURE — 2580000003 HC RX 258: Performed by: SURGERY

## 2019-07-13 PROCEDURE — 85025 COMPLETE CBC W/AUTO DIFF WBC: CPT

## 2019-07-13 PROCEDURE — 80048 BASIC METABOLIC PNL TOTAL CA: CPT

## 2019-07-13 PROCEDURE — 99232 SBSQ HOSP IP/OBS MODERATE 35: CPT | Performed by: FAMILY MEDICINE

## 2019-07-13 PROCEDURE — 6370000000 HC RX 637 (ALT 250 FOR IP): Performed by: SURGERY

## 2019-07-13 PROCEDURE — 6360000002 HC RX W HCPCS: Performed by: SURGERY

## 2019-07-13 PROCEDURE — 2700000000 HC OXYGEN THERAPY PER DAY

## 2019-07-13 PROCEDURE — 1210000000 HC MED SURG R&B

## 2019-07-13 PROCEDURE — 36415 COLL VENOUS BLD VENIPUNCTURE: CPT

## 2019-07-13 RX ADMIN — DAKIN'S SOLUTION 0.125% (QUARTER STRENGTH): 0.12 SOLUTION at 20:03

## 2019-07-13 RX ADMIN — CALCIUM ACETATE 667 MG: 667 CAPSULE ORAL at 08:53

## 2019-07-13 RX ADMIN — ASPIRIN 81 MG: 81 TABLET, COATED ORAL at 08:53

## 2019-07-13 RX ADMIN — HYDROMORPHONE HYDROCHLORIDE 0.5 MG: 1 INJECTION, SOLUTION INTRAMUSCULAR; INTRAVENOUS; SUBCUTANEOUS at 14:56

## 2019-07-13 RX ADMIN — HEPARIN SODIUM 5000 UNITS: 5000 INJECTION, SOLUTION INTRAVENOUS; SUBCUTANEOUS at 21:10

## 2019-07-13 RX ADMIN — DAKIN'S SOLUTION 0.125% (QUARTER STRENGTH): 0.12 SOLUTION at 11:24

## 2019-07-13 RX ADMIN — Medication 10 ML: at 20:03

## 2019-07-13 RX ADMIN — Medication 10 ML: at 23:21

## 2019-07-13 RX ADMIN — OXYCODONE HYDROCHLORIDE AND ACETAMINOPHEN 1 TABLET: 10; 325 TABLET ORAL at 21:10

## 2019-07-13 RX ADMIN — CLOPIDOGREL 75 MG: 75 TABLET, FILM COATED ORAL at 08:53

## 2019-07-13 RX ADMIN — CLONIDINE HYDROCHLORIDE 0.3 MG: 0.2 TABLET ORAL at 08:53

## 2019-07-13 RX ADMIN — CALCIUM ACETATE 667 MG: 667 CAPSULE ORAL at 17:09

## 2019-07-13 RX ADMIN — MINOXIDIL 10 MG: 10 TABLET ORAL at 08:53

## 2019-07-13 RX ADMIN — LISINOPRIL 10 MG: 10 TABLET ORAL at 08:54

## 2019-07-13 RX ADMIN — OXYCODONE HYDROCHLORIDE AND ACETAMINOPHEN 1 TABLET: 10; 325 TABLET ORAL at 06:41

## 2019-07-13 RX ADMIN — CALCIUM ACETATE 667 MG: 667 CAPSULE ORAL at 12:47

## 2019-07-13 RX ADMIN — HYDROMORPHONE HYDROCHLORIDE 0.5 MG: 1 INJECTION, SOLUTION INTRAMUSCULAR; INTRAVENOUS; SUBCUTANEOUS at 07:27

## 2019-07-13 RX ADMIN — HYDROMORPHONE HYDROCHLORIDE 0.5 MG: 1 INJECTION, SOLUTION INTRAMUSCULAR; INTRAVENOUS; SUBCUTANEOUS at 20:03

## 2019-07-13 RX ADMIN — HEPARIN SODIUM 5000 UNITS: 5000 INJECTION, SOLUTION INTRAVENOUS; SUBCUTANEOUS at 14:50

## 2019-07-13 RX ADMIN — AMLODIPINE BESYLATE 10 MG: 5 TABLET ORAL at 08:54

## 2019-07-13 RX ADMIN — ISOSORBIDE MONONITRATE 30 MG: 30 TABLET, EXTENDED RELEASE ORAL at 08:55

## 2019-07-13 RX ADMIN — OXYCODONE HYDROCHLORIDE AND ACETAMINOPHEN 1 TABLET: 10; 325 TABLET ORAL at 17:10

## 2019-07-13 RX ADMIN — CLONIDINE HYDROCHLORIDE 0.3 MG: 0.2 TABLET ORAL at 14:49

## 2019-07-13 RX ADMIN — FAMOTIDINE 20 MG: 20 TABLET ORAL at 08:54

## 2019-07-13 RX ADMIN — OXYCODONE HYDROCHLORIDE AND ACETAMINOPHEN 1 TABLET: 10; 325 TABLET ORAL at 12:48

## 2019-07-13 RX ADMIN — HYDROMORPHONE HYDROCHLORIDE 0.5 MG: 1 INJECTION, SOLUTION INTRAMUSCULAR; INTRAVENOUS; SUBCUTANEOUS at 11:22

## 2019-07-13 RX ADMIN — Medication 1 G: at 14:48

## 2019-07-13 RX ADMIN — LISINOPRIL 10 MG: 10 TABLET ORAL at 20:02

## 2019-07-13 RX ADMIN — HYDROMORPHONE HYDROCHLORIDE 0.5 MG: 1 INJECTION, SOLUTION INTRAMUSCULAR; INTRAVENOUS; SUBCUTANEOUS at 23:20

## 2019-07-13 ASSESSMENT — PAIN SCALES - GENERAL
PAINLEVEL_OUTOF10: 8
PAINLEVEL_OUTOF10: 9
PAINLEVEL_OUTOF10: 8
PAINLEVEL_OUTOF10: 0
PAINLEVEL_OUTOF10: 9
PAINLEVEL_OUTOF10: 8
PAINLEVEL_OUTOF10: 7
PAINLEVEL_OUTOF10: 8
PAINLEVEL_OUTOF10: 0
PAINLEVEL_OUTOF10: 0

## 2019-07-13 ASSESSMENT — PAIN DESCRIPTION - DESCRIPTORS: DESCRIPTORS: OTHER (COMMENT)

## 2019-07-13 ASSESSMENT — PAIN DESCRIPTION - FREQUENCY: FREQUENCY: INTERMITTENT

## 2019-07-13 ASSESSMENT — PAIN DESCRIPTION - ONSET: ONSET: ON-GOING

## 2019-07-13 ASSESSMENT — PAIN DESCRIPTION - PROGRESSION: CLINICAL_PROGRESSION: GRADUALLY IMPROVING

## 2019-07-13 ASSESSMENT — PAIN - FUNCTIONAL ASSESSMENT: PAIN_FUNCTIONAL_ASSESSMENT: PREVENTS OR INTERFERES WITH ALL ACTIVE AND SOME PASSIVE ACTIVITIES

## 2019-07-13 ASSESSMENT — PAIN DESCRIPTION - ORIENTATION: ORIENTATION: RIGHT

## 2019-07-13 ASSESSMENT — PAIN DESCRIPTION - PAIN TYPE: TYPE: SURGICAL PAIN

## 2019-07-13 ASSESSMENT — PAIN DESCRIPTION - LOCATION: LOCATION: LEG

## 2019-07-13 NOTE — PROGRESS NOTES
 0.9 % sodium chloride infusion   Intravenous Continuous Bull Clark MD        sodium hypochlorite (DAKINS) 0.125 % external solution   Irrigation BID Bull Clark MD        famotidine (PEPCID) tablet 20 mg  20 mg Oral Daily Bull Clark MD   20 mg at 07/13/19 0854    HYDROmorphone (DILAUDID) injection 0.5 mg  0.5 mg Intravenous Q3H PRN Bull Clark MD   0.5 mg at 07/13/19 0727    lactulose (CHRONULAC) 10 GM/15ML solution 10 g  10 g Oral Daily Bull Clark MD        aspirin EC tablet 81 mg  81 mg Oral Daily Bull Clark MD   81 mg at 07/13/19 0853    calcium acetate (PHOSLO) capsule 667 mg  667 mg Oral TID WC Bull Clark MD   667 mg at 07/13/19 0853    clopidogrel (PLAVIX) tablet 75 mg  75 mg Oral Daily Bull Clark MD   75 mg at 07/13/19 0853    isosorbide mononitrate (IMDUR) extended release tablet 30 mg  30 mg Oral Daily Bull Clark MD   30 mg at 07/13/19 0855    minoxidil (LONITEN) tablet 10 mg  10 mg Oral Daily Bull Clark MD   10 mg at 07/13/19 0853    sucralfate (CARAFATE) tablet 1 g  1 g Oral TID PRN Bull Clark MD        sodium chloride flush 0.9 % injection 10 mL  10 mL Intravenous 2 times per day Bull Clark MD   10 mL at 07/12/19 2200    sodium chloride flush 0.9 % injection 10 mL  10 mL Intravenous PRN Bull Clark MD   10 mL at 07/12/19 1905    polyethylene glycol (GLYCOLAX) packet 17 g  17 g Oral Daily PRN Bull Clark MD        melatonin tablet 3 mg  3 mg Oral Nightly PRN Bull Clark MD   3 mg at 07/08/19 2042    amLODIPine (NORVASC) tablet 10 mg  10 mg Oral Daily Bull Clark MD   10 mg at 07/13/19 0854    cloNIDine (CATAPRES) tablet 0.3 mg  0.3 mg Oral TID Bull Clark MD   0.3 mg at 07/13/19 0853    lisinopril (PRINIVIL;ZESTRIL) tablet 10 mg  10 mg Oral BID Bull Clark MD   10 mg at 07/13/19 0854    metoprolol tartrate (LOPRESSOR) tablet 25 mg  25 mg Oral Q12H PRN Neto Mendoza Right internal jugular vein tunneled dialysis catheter placement    OTHER SURGICAL HISTORY  [id number]    Redo of dialysis catheter    OTHER SURGICAL HISTORY  9/6/2011   \[facility]\""    Removal of RT IJV tunneled dialysis cath    OTHER SURGICAL HISTORY  5/22/12   \[facility]\""    Ultrasound-guided cannulation of left cephalic vein in the mid forearm toward the AV anastomosis, Left upper  ext. fistulograms including venograms of the SVC, Left cephalic vein balloon angioplasty with a 4mm x 100mm Tod balloon, Completion fistulograms    PACEMAKER PLACEMENT      medtronic    PARACENTESIS      multiple/recent; per dr. Keith Hein at [address] [facility] 1/30/2018    UPPER EXTREMITY DRIL PROCEDURE WITH LEFT SAPHENOUS VEIN HARVESTING performed by Talia Smith MD at [address] [place] [address]  6/19/12    [facility] [place], [place] arteriovenous fistulogram including venography of the superior vena cava. Balloon angioplasty, left forearm cephalic vein and upper arm cephalic vein with [id number] tod balloon.  VASCULAR SURGERY  7/10/2012 \[facility]\""     Revision of left distal radial artery to cephalic vein arteriovenous fistula with 7mm Artegraft interposition.  VASCULAR SURGERY  7/30/15 [facility] & [address]    Left upper extremity arteriovenous fistulograms including venography of the superior vena cava. Left cephalic vein balloon angioplasty with an 8 x 20 cm cutting balloon proximal cephalic vein. Balloon angioplasty of left cephalic vein/antecubital vein near the antecubital crease with 8 x 20 mm cutting balloon.  VASCULAR SURGERY  7/30/15 [facility] [place]    Balloon angioplasty proximal end distal upper arm cephalic vein with 9 x 40 cm  balloon. Completion fistulograms of the left upper extremity.  VASCULAR SURGERY  8/13/15 \[facility]\""    Revision of left upper extremity arteriovenous fistula with excision of pseudoaneurysm and primary repair of cephalic vein.     VASCULAR SURGERY  5/3/16 \[facility]\""    Left upper fistulograms/venograms, left cephalic balloon 8 x 20 cutter,9 x 40 conquest,left proximal cephalic vein stents (flair 2 9 x 50), balloon stents 9 x 40 conquest.    VASCULAR SURGERY  12/08/2016    [facility]- ultrasound guided cannulation of left distal cephalic vein ultrasound guided cannulation right internal jugular vein placement of right internal jugular vein tunneled dialysis catheter (Bard Equistream XK 23cm tip to cuff)     VASCULAR SURGERY  01/20/2017    [facility]-Right upper venograms, u/s guided cannulation left basilic vein, left upper venograms, balloon angioplasty left subclavian vein 10x40 conquest.    VASCULAR SURGERY  02/16/2017    [facility]. Left brachial artery to axillary vein arteriovenous graft with 7 mm artegraft. Left upper extremity venograms. Left subclavian vein stent viabahn 13x50. Left subclavian vein stent balloon angioplasty 12x40 atlas.  VASCULAR SURGERY  04/11/2017    [facility]. Removal of tunneled dialysis catheter right internal jugular vein.  VASCULAR SURGERY  01/25/2018    [facility]. Arch aortogram, left upper arteriogram, AV graft angiogram/venogram.    VASCULAR SURGERY  02/28/2018    [facility]. Right CFA 5f-6f-7f sheath, aortogram with bilateral lower arteriogram, left SFA/pop  balloon angioplasty 5x100 , 6x150 lutonix ( 2), left CFA  7f sheath, bilateral iliac kissing stents right 10x38 icast, left 9x59 icast expanded with 10x40 , completion aortogram, mynx left CFA , failed mynx right CFA.  VASCULAR SURGERY  06/13/2019    [facility] left CFA 5f sheath, aortogram with bilateral lower arteriogram, mynx left CFA.  VASCULAR SURGERY  06/25/2019    [facility]. Femoral tibial/perineal bypass with reversed greater saphenous vein.        Family History  Family History   Problem Relation Age of Onset    High Blood Pressure Mother     High Blood Pressure Father     High Blood Pressure Sister        Social History  Social History     Socioeconomic History    Marital status:      Spouse name: Not on file    Number of children: 0    Years ROS: No fever or chills  Respiratory ROS: No cough, shortness of breath, wheezing  Cardiovascular ROS: No chest pain or palpitations  Gastrointestinal ROS: No abdominal pain or melena  Genito-Urinary ROS: No dysuria or hematuria  Musculoskeletal ROS: No joint pain or swelling   14 point ROS reviewed with the patient and negative except as noted above and in the HPI unless unable to obtain. Objective:  Patient Vitals for the past 24 hrs:   BP Temp Temp src Pulse Resp SpO2 Weight   07/13/19 0721 -- -- -- -- -- 100 % --   07/13/19 0632 (!) 158/64 97.6 °F (36.4 °C) Temporal 61 20 100 % --   07/13/19 0000 (!) 134/49 99 °F (37.2 °C) Temporal 60 18 97 % --   07/12/19 1904 (!) 164/59 98.3 °F (36.8 °C) Temporal 68 19 96 % --   07/12/19 1800 (!) 164/56 98.8 °F (37.1 °C) -- 60 20 99 % --   07/12/19 1700 -- -- -- 65 -- -- --   07/12/19 1655 (!) 160/47 -- -- 65 15 99 % --   07/12/19 1645 (!) 172/43 -- -- 66 17 100 % --   07/12/19 1615 (!) 163/51 -- -- 66 18 96 % --   07/12/19 1609 -- -- -- 65 -- -- --   07/12/19 1600 (!) 170/38 98.9 °F (37.2 °C) Tympanic 64 17 97 % --   07/12/19 1555 -- -- -- 64 -- -- --   07/12/19 1545 (!) 161/38 -- -- 64 16 95 % --   07/12/19 1540 (!) 165/39 -- -- 63 14 96 % --   07/12/19 1525 (!) 159/43 -- -- 63 14 96 % --   07/12/19 1520 (!) 159/43 -- -- 63 17 97 % --   07/12/19 1515 (!) 165/48 -- -- 63 17 96 % --   07/12/19 1510 (!) 143/44 -- -- 62 18 96 % --   07/12/19 1506 (!) 126/40 100.1 °F (37.8 °C) Tympanic 63 18 91 % --   07/12/19 1246 (!) 158/52 97.7 °F (36.5 °C) -- 62 18 99 % --       Intake/Output Summary (Last 24 hours) at 7/13/2019 1000  Last data filed at 7/13/2019 0000  Gross per 24 hour   Intake 500 ml   Output 3000 ml   Net -2500 ml     General: awake/alert  x3  HEENT: Normocephalic atraumatic head  Neck: Supple with no JVD or carotid bruits.   Chest:  clear to auscultation bilaterally without respiratory distress  CVS: regular rate and rhythm  Abdominal: soft, nontender, normal bowel sounds  Extremities: Lower extremity surgical wound looks clean. Skin: warm and dry without rash      Labs:  BMP:   Recent Labs     07/12/19  0521 07/12/19  1345 07/13/19  0307   * 137 134*   K 4.5 3.8 4.2   CL 87* 95* 92*   CO2 23 29 25   BUN 45* 19 27*   CREATININE 6.1* 3.1* 4.3*   CALCIUM 8.6 8.9 8.5*     CBC:   Recent Labs     07/12/19  0521 07/12/19  1345 07/12/19  1538 07/13/19  0307   WBC 6.1 5.5  --  7.0   HGB 7.5* 8.2* 8.5* 7.5*   HCT 25.0* 25.5* 26.7* 23.6*   MCV 95.8* 89.5  --  89.7    102*  --  117*     LIVER PROFILE:   Recent Labs     07/11/19  0425 07/12/19  0521   AST 10 11   ALT <5* 5   BILITOT 0.4 0.3   ALKPHOS 88 89     PT/INR:   No results for input(s): PROTIME, INR in the last 72 hours. APTT:   No results for input(s): APTT in the last 72 hours. BNP:  No results for input(s): BNP in the last 72 hours. Ionized Calcium:No results for input(s): IONCA in the last 72 hours. Magnesium:No results for input(s): MG in the last 72 hours. Phosphorus:No results for input(s): PHOS in the last 72 hours. HgbA1C: No results for input(s): LABA1C in the last 72 hours. Hepatic:   Recent Labs     07/11/19  0425 07/12/19  0521   ALKPHOS 88 89   ALT <5* 5   AST 10 11   PROT 6.9 6.5*   BILITOT 0.4 0.3   LABALBU 2.8* 2.5*     Lactic Acid:   No results for input(s): LACTA in the last 72 hours. Troponin: No results for input(s): CKTOTAL, CKMB, TROPONINT in the last 72 hours. ABGs: No results for input(s): PH, PCO2, PO2, HCO3, O2SAT in the last 72 hours. CRP:  No results for input(s): CRP in the last 72 hours. Sed Rate:  No results for input(s): SEDRATE in the last 72 hours. Cultures:   No results for input(s): CULTURE in the last 72 hours. No results for input(s): BC, Katy Libman in the last 72 hours. No results for input(s): CXSURG in the last 72 hours. Radiology reports as per the Radiologist  Radiology: Ct Head Wo Contrast    Result Date: 7/8/2019  Examination.  CT HEAD WO CONTRAST History: Altered mental status after a fall. DLP: 805 mGycm. The CT scan of the head is performed without intravenous contrast enhancement. The images are acquired in axial plane with subsequent reconstruction in coronal and sagittal planes. There is no previous study for comparison. There is no evidence of an intracranial hemorrhage or hematoma. There is no evidence of mass or midline shift. The ventricles, the basal cistern and the cortical sulci are moderately prominent suggesting mild atrophy. Focal low-density area in the left basal ganglia may represent an area of chronic infarction? . This may be further evaluated. Similar changes are seen in the right external capsule. The images reviewed in bone window show no acute bony abnormality. There is mucosal thickening involving the maxillary antrum bilaterally which are moderately hypoplastic. The mastoid air cells are normal. There is severe atheromatous changes of the superficial scalp arteries. A small low-density area in the left basal ganglia may represent an acute or chronic infarction. This may be further evaluated with MR imaging of the brain. Chronic ischemic and atrophic changes. Maxillary sinusitis. The above finding are recorded on a digital voice clip in PACS. Signed by Dr Natasha Osborn on 7/8/2019 7:03 AM    Xr Chest Portable    Result Date: 7/8/2019  EXAMINATION: XR CHEST PORTABLE 7/8/2019 7:05 AM HISTORY: XR CHEST PORTABLE 7/8/2019 5:00 AM HISTORY: Altered mental status COMPARISON: June 18, 2019. FINDINGS: The lungs are clear. Cardiac silhouettes mildly enlarged. Pacing device present soft tissues the right chest. Blunting of the right costophrenic angle is noted. The osseous structures and surrounding soft tissues demonstrate no acute abnormality. 1. Cardiomegaly with mild blunting of the right lateral costophrenic angle. This may represent mild congestive failure. . Signed by Dr Courtenay Gowers on 7/8/2019 7:07 AM       Assessment   1. End-stage renal disease. 2.  Diabetic nephropathy. 3.  Recent bilateral lower extremity vascular surgeries. 4.  Encephalopathy/ resolved  5. Severe anemia. 6.  Secondary hyperparathyroidism. 7.  Insulin-dependent type 2 diabetes. 8.  Hyponatremia  9. Hyperkalemia/recurrent. 10.  Hepatitis C/cirrhosis/hepatocellular CA    Plan:  1. Wound care. 2.  IV antibiotics.          Benedict Mendoza MD  07/13/19  10:00 AM

## 2019-07-14 LAB
6MAM SERPLBLD-MCNC: NEGATIVE NG/ML
ALBUMIN SERPL-MCNC: 2.6 G/DL (ref 3.5–5.2)
ALP BLD-CCNC: 116 U/L (ref 40–130)
ALT SERPL-CCNC: 6 U/L (ref 5–41)
ANION GAP SERPL CALCULATED.3IONS-SCNC: 16 MMOL/L (ref 7–19)
AST SERPL-CCNC: 13 U/L (ref 5–40)
BASOPHILS ABSOLUTE: 0 K/UL (ref 0–0.2)
BASOPHILS RELATIVE PERCENT: 0.2 % (ref 0–1)
BILIRUB SERPL-MCNC: 0.4 MG/DL (ref 0.2–1.2)
BUN BLDV-MCNC: 47 MG/DL (ref 6–20)
CALCIUM SERPL-MCNC: 8.7 MG/DL (ref 8.6–10)
CHLORIDE BLD-SCNC: 91 MMOL/L (ref 98–111)
CO2: 26 MMOL/L (ref 22–29)
CREAT SERPL-MCNC: 5.6 MG/DL (ref 0.5–1.2)
EOSINOPHILS ABSOLUTE: 0.4 K/UL (ref 0–0.6)
EOSINOPHILS RELATIVE PERCENT: 5.3 % (ref 0–5)
GFR NON-AFRICAN AMERICAN: 10
GLUCOSE BLD-MCNC: 143 MG/DL (ref 74–109)
HCT VFR BLD CALC: 22.9 % (ref 42–52)
HEMOGLOBIN: 7.4 G/DL (ref 14–18)
LYMPHOCYTES ABSOLUTE: 0.7 K/UL (ref 1.1–4.5)
LYMPHOCYTES RELATIVE PERCENT: 9.8 % (ref 20–40)
MCH RBC QN AUTO: 28.6 PG (ref 27–31)
MCHC RBC AUTO-ENTMCNC: 32.3 G/DL (ref 33–37)
MCV RBC AUTO: 88.4 FL (ref 80–94)
MONOCYTES ABSOLUTE: 0.5 K/UL (ref 0–0.9)
MONOCYTES RELATIVE PERCENT: 7.8 % (ref 0–10)
MORPHINE SERPLBLD-MCNC: NEGATIVE NG/ML
NEUTROPHILS ABSOLUTE: 5.1 K/UL (ref 1.5–7.5)
NEUTROPHILS RELATIVE PERCENT: 76.6 % (ref 50–65)
PDW BLD-RTO: 15 % (ref 11.5–14.5)
PLATELET # BLD: 119 K/UL (ref 130–400)
PMV BLD AUTO: 11.9 FL (ref 9.4–12.4)
POTASSIUM REFLEX MAGNESIUM: 5 MMOL/L (ref 3.5–5)
POTASSIUM SERPL-SCNC: 5 MMOL/L (ref 3.5–5)
RBC # BLD: 2.59 M/UL (ref 4.7–6.1)
SODIUM BLD-SCNC: 133 MMOL/L (ref 136–145)
TOTAL PROTEIN: 6.7 G/DL (ref 6.6–8.7)
WBC # BLD: 6.6 K/UL (ref 4.8–10.8)

## 2019-07-14 PROCEDURE — 99232 SBSQ HOSP IP/OBS MODERATE 35: CPT | Performed by: FAMILY MEDICINE

## 2019-07-14 PROCEDURE — 94762 N-INVAS EAR/PLS OXIMTRY CONT: CPT

## 2019-07-14 PROCEDURE — 6360000002 HC RX W HCPCS: Performed by: SURGERY

## 2019-07-14 PROCEDURE — 6370000000 HC RX 637 (ALT 250 FOR IP): Performed by: SURGERY

## 2019-07-14 PROCEDURE — 85025 COMPLETE CBC W/AUTO DIFF WBC: CPT

## 2019-07-14 PROCEDURE — 99024 POSTOP FOLLOW-UP VISIT: CPT | Performed by: SURGERY

## 2019-07-14 PROCEDURE — 2580000003 HC RX 258: Performed by: SURGERY

## 2019-07-14 PROCEDURE — 36415 COLL VENOUS BLD VENIPUNCTURE: CPT

## 2019-07-14 PROCEDURE — 80053 COMPREHEN METABOLIC PANEL: CPT

## 2019-07-14 PROCEDURE — 1210000000 HC MED SURG R&B

## 2019-07-14 RX ADMIN — HEPARIN SODIUM 5000 UNITS: 5000 INJECTION, SOLUTION INTRAVENOUS; SUBCUTANEOUS at 05:33

## 2019-07-14 RX ADMIN — DAKIN'S SOLUTION 0.125% (QUARTER STRENGTH): 0.12 SOLUTION at 21:19

## 2019-07-14 RX ADMIN — Medication 10 ML: at 21:20

## 2019-07-14 RX ADMIN — Medication 10 ML: at 08:42

## 2019-07-14 RX ADMIN — CALCIUM ACETATE 667 MG: 667 CAPSULE ORAL at 08:41

## 2019-07-14 RX ADMIN — HYDROMORPHONE HYDROCHLORIDE 0.5 MG: 1 INJECTION, SOLUTION INTRAMUSCULAR; INTRAVENOUS; SUBCUTANEOUS at 04:23

## 2019-07-14 RX ADMIN — CLOPIDOGREL 75 MG: 75 TABLET, FILM COATED ORAL at 08:41

## 2019-07-14 RX ADMIN — CALCIUM ACETATE 667 MG: 667 CAPSULE ORAL at 17:14

## 2019-07-14 RX ADMIN — MINOXIDIL 10 MG: 10 TABLET ORAL at 08:50

## 2019-07-14 RX ADMIN — LISINOPRIL 10 MG: 10 TABLET ORAL at 20:25

## 2019-07-14 RX ADMIN — AMLODIPINE BESYLATE 10 MG: 5 TABLET ORAL at 08:41

## 2019-07-14 RX ADMIN — CALCIUM ACETATE 667 MG: 667 CAPSULE ORAL at 11:41

## 2019-07-14 RX ADMIN — HYDROMORPHONE HYDROCHLORIDE 0.5 MG: 1 INJECTION, SOLUTION INTRAMUSCULAR; INTRAVENOUS; SUBCUTANEOUS at 21:19

## 2019-07-14 RX ADMIN — CLONIDINE HYDROCHLORIDE 0.3 MG: 0.2 TABLET ORAL at 08:50

## 2019-07-14 RX ADMIN — LISINOPRIL 10 MG: 10 TABLET ORAL at 08:41

## 2019-07-14 RX ADMIN — ISOSORBIDE MONONITRATE 30 MG: 30 TABLET, EXTENDED RELEASE ORAL at 08:41

## 2019-07-14 RX ADMIN — HYDROMORPHONE HYDROCHLORIDE 0.5 MG: 1 INJECTION, SOLUTION INTRAMUSCULAR; INTRAVENOUS; SUBCUTANEOUS at 08:42

## 2019-07-14 RX ADMIN — HYDROMORPHONE HYDROCHLORIDE 0.5 MG: 1 INJECTION, SOLUTION INTRAMUSCULAR; INTRAVENOUS; SUBCUTANEOUS at 13:48

## 2019-07-14 RX ADMIN — OXYCODONE HYDROCHLORIDE AND ACETAMINOPHEN 1 TABLET: 10; 325 TABLET ORAL at 11:41

## 2019-07-14 RX ADMIN — HEPARIN SODIUM 5000 UNITS: 5000 INJECTION, SOLUTION INTRAVENOUS; SUBCUTANEOUS at 13:49

## 2019-07-14 RX ADMIN — OXYCODONE HYDROCHLORIDE AND ACETAMINOPHEN 1 TABLET: 10; 325 TABLET ORAL at 15:53

## 2019-07-14 RX ADMIN — Medication 10 ML: at 21:21

## 2019-07-14 RX ADMIN — FAMOTIDINE 20 MG: 20 TABLET ORAL at 08:41

## 2019-07-14 RX ADMIN — ASPIRIN 81 MG: 81 TABLET, COATED ORAL at 08:41

## 2019-07-14 RX ADMIN — CLONIDINE HYDROCHLORIDE 0.3 MG: 0.2 TABLET ORAL at 20:25

## 2019-07-14 RX ADMIN — HYDROMORPHONE HYDROCHLORIDE 0.5 MG: 1 INJECTION, SOLUTION INTRAMUSCULAR; INTRAVENOUS; SUBCUTANEOUS at 18:14

## 2019-07-14 RX ADMIN — HEPARIN SODIUM 5000 UNITS: 5000 INJECTION, SOLUTION INTRAVENOUS; SUBCUTANEOUS at 20:25

## 2019-07-14 RX ADMIN — OXYCODONE HYDROCHLORIDE AND ACETAMINOPHEN 1 TABLET: 10; 325 TABLET ORAL at 02:56

## 2019-07-14 RX ADMIN — Medication 10 ML: at 04:24

## 2019-07-14 RX ADMIN — CLONIDINE HYDROCHLORIDE 0.3 MG: 0.2 TABLET ORAL at 13:48

## 2019-07-14 RX ADMIN — OXYCODONE HYDROCHLORIDE AND ACETAMINOPHEN 1 TABLET: 10; 325 TABLET ORAL at 20:26

## 2019-07-14 RX ADMIN — DAKIN'S SOLUTION 0.125% (QUARTER STRENGTH) 473 ML: 0.12 SOLUTION at 15:57

## 2019-07-14 RX ADMIN — OXYCODONE HYDROCHLORIDE AND ACETAMINOPHEN 1 TABLET: 10; 325 TABLET ORAL at 07:22

## 2019-07-14 RX ADMIN — Medication 1 G: at 15:53

## 2019-07-14 ASSESSMENT — PAIN SCALES - GENERAL
PAINLEVEL_OUTOF10: 8
PAINLEVEL_OUTOF10: 0
PAINLEVEL_OUTOF10: 7
PAINLEVEL_OUTOF10: 0
PAINLEVEL_OUTOF10: 7
PAINLEVEL_OUTOF10: 7
PAINLEVEL_OUTOF10: 4
PAINLEVEL_OUTOF10: 10
PAINLEVEL_OUTOF10: 8
PAINLEVEL_OUTOF10: 0
PAINLEVEL_OUTOF10: 9
PAINLEVEL_OUTOF10: 7
PAINLEVEL_OUTOF10: 6
PAINLEVEL_OUTOF10: 8

## 2019-07-14 ASSESSMENT — PAIN DESCRIPTION - LOCATION
LOCATION: LEG

## 2019-07-14 ASSESSMENT — PAIN DESCRIPTION - ORIENTATION
ORIENTATION: RIGHT

## 2019-07-14 ASSESSMENT — PAIN SCALES - WONG BAKER: WONGBAKER_NUMERICALRESPONSE: 8

## 2019-07-14 ASSESSMENT — PAIN DESCRIPTION - PAIN TYPE
TYPE: SURGICAL PAIN

## 2019-07-14 NOTE — PROGRESS NOTES
Vascular Surgery Rounding Note    7/14/2019  10:33 AM  Antibiotic day 2, Antibiotic Type cefepime    Post op day - 2 debridement, drainage of right leg wound    Subjective- still with discomfort. Objective-   Invalid input(s): 24H    Intake/Output Summary (Last 24 hours) at 7/14/2019 1033  Last data filed at 7/14/2019 1031  Gross per 24 hour   Intake 1450 ml   Output --   Net 1450 ml     In: 240 [P.O.:240]  Out: -     CBC:   Recent Labs     07/12/19  1345 07/12/19  1538 07/13/19  0307 07/14/19  0421   WBC 5.5  --  7.0 6.6   RBC 2.85*  --  2.63* 2.59*   HGB 8.2* 8.5* 7.5* 7.4*   HCT 25.5* 26.7* 23.6* 22.9*   MCV 89.5  --  89.7 88.4   MCH 28.8  --  28.5 28.6   MCHC 32.2*  --  31.8* 32.3*   RDW 15.4*  --  15.3* 15.0*   *  --  117* 119*   MPV 11.2  --  11.9 11.9      Last 3 CMP:   Recent Labs     07/12/19  0521 07/12/19  1345 07/13/19  0307 07/14/19  0421   * 137 134* 133*   K 4.5 3.8 4.2 5.0  5.0   CL 87* 95* 92* 91*   CO2 23 29 25 26   BUN 45* 19 27* 47*   CREATININE 6.1* 3.1* 4.3* 5.6*   GLUCOSE 162* 159* 174* 143*   CALCIUM 8.6 8.9 8.5* 8.7   PROT 6.5*  --   --  6.7   LABALBU 2.5*  --   --  2.6*   BILITOT 0.3  --   --  0.4   ALKPHOS 89  --   --  116   AST 11  --   --  13   ALT 5  --   --  6          Physical Exam:  Awake, alert, appropriate Yes  BP (!) 123/50   Pulse 60   Temp 98 °F (36.7 °C) (Temporal)   Resp 18   Ht 5' 9\" (1.753 m)   Wt 165 lb 14.4 oz (75.3 kg)   SpO2 96%   BMI 24.50 kg/m²   Heart-Normal S1 and S2.  Regular rhythm. No murmurs, gallops, or rubs. Lung-negative  Neck-suppleno adenopathy, no carotid bruit, no JVD, supple, symmetrical, trachea midline and thyroid not enlarged, symmetric, no tenderness/mass/nodules  Abdomen-Abdomen soft, non-tender. BS normal. No masses,  No organomegaly  Extremities-dressing in place  Neurologic- alert, oriented, normal speech, no focal findings or movement disorder noted  Assessment/Plan  1.  POD 2 opening of right leg wound, will re-examine

## 2019-07-14 NOTE — PROGRESS NOTES
MD Perry        Or    HYDROcodone-acetaminophen (NORCO) 5-325 MG per tablet 2 tablet  2 tablet Oral Q4H PRN Zainab Basurto MD        ondansetron (ZOFRAN) injection 4 mg  4 mg Intravenous Q6H PRN Zainab Basurto MD        sodium hypochlorite (DAKINS) 0.125 % external solution   Irrigation BID Zainab Basurto MD        famotidine (PEPCID) tablet 20 mg  20 mg Oral Daily Zainab Basurto MD   20 mg at 07/14/19 0841    HYDROmorphone (DILAUDID) injection 0.5 mg  0.5 mg Intravenous Q3H PRN Zainab Basurto MD   0.5 mg at 07/14/19 0842    lactulose (CHRONULAC) 10 GM/15ML solution 10 g  10 g Oral Daily Zainab Basurto MD        aspirin EC tablet 81 mg  81 mg Oral Daily Zainab Basurto MD   81 mg at 07/14/19 0841    calcium acetate (PHOSLO) capsule 667 mg  667 mg Oral TID WC Zainab Basurto MD   667 mg at 07/14/19 0841    clopidogrel (PLAVIX) tablet 75 mg  75 mg Oral Daily Zainab Basurto MD   75 mg at 07/14/19 0841    isosorbide mononitrate (IMDUR) extended release tablet 30 mg  30 mg Oral Daily Zainab Basurto MD   30 mg at 07/14/19 0841    minoxidil (LONITEN) tablet 10 mg  10 mg Oral Daily Zainab Basurto MD   10 mg at 07/14/19 0850    sucralfate (CARAFATE) tablet 1 g  1 g Oral TID PRN Zainab Basurto MD        sodium chloride flush 0.9 % injection 10 mL  10 mL Intravenous 2 times per day Zainab Basruto MD   10 mL at 07/14/19 0842    sodium chloride flush 0.9 % injection 10 mL  10 mL Intravenous PRN Zainab Basurto MD   10 mL at 07/14/19 0424    polyethylene glycol (GLYCOLAX) packet 17 g  17 g Oral Daily PRN Zainab Basurto MD        melatonin tablet 3 mg  3 mg Oral Nightly PRN Zainab Basurto MD   3 mg at 07/08/19 2042    amLODIPine (NORVASC) tablet 10 mg  10 mg Oral Daily Zainab Basurto MD   10 mg at 07/14/19 0841    cloNIDine (CATAPRES) tablet 0.3 mg  0.3 mg Oral TID Zainab Basurto MD   0.3 mg at 07/14/19 0850    lisinopril (PRINIVIL;ZESTRIL) tablet 10 mg  10 mg of pseudoaneurysm and primary repair of cephalic vein.  VASCULAR SURGERY  5/3/16 [facility]    Left upper fistulograms/venograms, left cephalic balloon 8 x 20 cutter,9 x 40 conquest,left proximal cephalic vein stents (flair 2 9 x 50), balloon stents 9 x 40 conquest.    VASCULAR SURGERY  12/08/2016    [facility]- ultrasound guided cannulation of left distal cephalic vein ultrasound guided cannulation right internal jugular vein placement of right internal jugular vein tunneled dialysis catheter (Bard Equistream XK 23cm tip to cuff)     VASCULAR SURGERY  01/20/2017    [facility]-Right upper venograms, u/s guided cannulation left basilic vein, left upper venograms, balloon angioplasty left subclavian vein 10x40 conquest.    VASCULAR SURGERY  02/16/2017    [facility]. Left brachial artery to axillary vein arteriovenous graft with 7 mm artegraft. Left upper extremity venograms. Left subclavian vein stent viabahn 13x50. Left subclavian vein stent balloon angioplasty 12x40 atlas.  VASCULAR SURGERY  04/11/2017    [facility]. Removal of tunneled dialysis catheter right internal jugular vein.  VASCULAR SURGERY  01/25/2018    [facility]. Arch aortogram, left upper arteriogram, AV graft angiogram/venogram.    VASCULAR SURGERY  02/28/2018    [facility]. Right CFA 5f-6f-7f sheath, aortogram with bilateral lower arteriogram, left SFA/pop  balloon angioplasty 5x100 , 6x150 lutonix ( 2), left CFA  7f sheath, bilateral iliac kissing stents right 10x38 icast, left 9x59 icast expanded with 10x40 , completion aortogram, mynx left CFA , failed mynx right CFA.  VASCULAR SURGERY  06/13/2019    [facility] left CFA 5f sheath, aortogram with bilateral lower arteriogram, mynx left CFA.  VASCULAR SURGERY  06/25/2019    [facility]. Femoral tibial/perineal bypass with reversed greater saphenous vein.        Family History  Family History   Problem Relation Age of Onset    High Blood Pressure Mother     High Blood Pressure Father     High Blood Pressure Sister        Social History  Social History     Socioeconomic History    Marital status:      Spouse name: Not on file    Number of children: 0    Years of education: Not on file    Highest education level: Not on file   Occupational History    Occupation:    Social Needs    Financial resource strain: Not on file    Food insecurity:     Worry: Not on file     Inability: Not on file    Transportation needs:     Medical: Not on file     Non-medical: Not on file   Tobacco Use    Smoking status: Former Smoker     Packs/day: 1.00     Years: 45.00     Pack years: 45.00     Types: Cigarettes     Last attempt to quit: 2017     Years since quittin.3    Smokeless tobacco: Never Used    Tobacco comment: [id number] smoked 45 years at 1 PPD   Substance and Sexual Activity    Alcohol use: No    Drug use: Not Currently     Types: Marijuana, Cocaine, Methamphetamines, IV, Opiates , Other-see comments     Comment: in the 1970s, LSD    Sexual activity: Yes     Partners: Female     Comment: has a wife   Lifestyle    Physical activity:     Days per week: Not on file     Minutes per session: Not on file    Stress: Not on file   Relationships    Social connections:     Talks on phone: Not on file     Gets together: Not on file     Attends Church service: Not on file     Active member of club or organization: Not on file     Attends meetings of clubs or organizations: Not on file     Relationship status: Not on file    Intimate partner violence:     Fear of current or ex partner: Not on file     Emotionally abused: Not on file     Physically abused: Not on file     Forced sexual activity: Not on file   Other Topics Concern    Not on file   Social History Narrative    CODE STATUS: Full Code    HEALTH CARE PROXY: Mrs. Carlos Jasso, [phone]    Back up: his Mother, Mrs. Mari Zhang, [phone]    AMBULATES: independantly    DOMICILED: lives in a private house with 2 steps to get in, has no stairs inside, lives with wife and step children, has 3 pets         Review of Systems:  History obtained from chart review and the patient  General ROS: No fever or chills  Respiratory ROS: No cough, shortness of breath, wheezing  Cardiovascular ROS: No chest pain or palpitations  Gastrointestinal ROS: No abdominal pain or melena  Genito-Urinary ROS: No dysuria or hematuria  Musculoskeletal ROS: No joint pain or swelling   14 point ROS reviewed with the patient and negative except as noted above and in the HPI unless unable to obtain. Objective:  Patient Vitals for the past 24 hrs:   BP Temp Temp src Pulse Resp SpO2   07/14/19 0638 (!) 123/50 98 °F (36.7 °C) Temporal 60 18 96 %   07/14/19 0458 (!) 162/58 97.9 °F (36.6 °C) Temporal 62 16 96 %   07/13/19 1934 (!) 107/49 98.8 °F (37.1 °C) Temporal 60 16 97 %       Intake/Output Summary (Last 24 hours) at 7/14/2019 0959  Last data filed at 7/13/2019 1745  Gross per 24 hour   Intake 1210 ml   Output --   Net 1210 ml     General: awake/alert  x3  HEENT: Normocephalic atraumatic head  Neck: Supple with no JVD or carotid bruits. Chest:  clear to auscultation bilaterally without respiratory distress  CVS: regular rate and rhythm  Abdominal: soft, nontender, normal bowel sounds  Extremities: Lower extremity surgical wound looks clean.   Skin: warm and dry without rash      Labs:  BMP:   Recent Labs     07/12/19  1345 07/13/19  0307 07/14/19  0421    134* 133*   K 3.8 4.2 5.0  5.0   CL 95* 92* 91*   CO2 29 25 26   BUN 19 27* 47*   CREATININE 3.1* 4.3* 5.6*   CALCIUM 8.9 8.5* 8.7     CBC:   Recent Labs     07/12/19  1345 07/12/19  1538 07/13/19  0307 07/14/19  0421   WBC 5.5  --  7.0 6.6   HGB 8.2* 8.5* 7.5* 7.4*   HCT 25.5* 26.7* 23.6* 22.9*   MCV 89.5  --  89.7 88.4   *  --  117* 119*     LIVER PROFILE:   Recent Labs     07/12/19  0521 07/14/19  0421   AST 11 13   ALT 5 6   BILITOT 0.3 0.4   ALKPHOS 89 116     PT/INR:   No results for input(s): PROTIME, INR

## 2019-07-14 NOTE — PROGRESS NOTES
Hospitalist Progress Note  7/14/2019 10:18 AM  Subjective:   Admit Date: 7/8/2019  PCP: Ishan Esqueda DO    Chief Complaint: altered mental status    Subjective: Pain in right knee continues. Some swelling and discomfort in his right groin now as well. History is otherwise unchanged. Cumulative Hospital History:   7-8: Presents with altered mental status and purulent drainage from incision on RLE from his endarterectomy in late June. Patient with subjective fever with chills. Scheduled for dialysis but unable to stand and was guided to the floor by his wife. He is currently unable to bear weight, wife is concerned she cannot care for him any longer. CT showed possible stroke of left basal ganglia. Admitted to med/surg on linezolid and ceftriaxone. Vascular surgery consulted. Nephrology consulted to manage HD. GI consulted for suspected hepatic encephalopathy  7-9: Altered mental status improved. Ammonia is normal, GI suspects stroke. Neurologically intact at this time, neurology does not believe he has an acute stroke. Cannot perform MRI. Improving with treatment of surgical wound infection, may represent delirium and deconditioning. 7-10: Hydromorphone PRN breakthrough pain. Possibly home tomorrow if pain is managed. 7-11: Continued pain. IV hydromorphone stopped by vascular surgery. Patient worked with PT this morning but was unable to safely be discharged to home, SNF placement sought and obtained. Unfortunately, vascular surgery subsequently reevaluated the patient's wound and decided to open it up for drainage, with discharge delayed until tomorrow. 7-12: To OR today for I&D and washout. Started on vancomycin as wound culture is now growing Gram negative rods. 7-13: Pain poorly controlled postoperatively, defer to vascular surgery. Bed pending at SNF.    ROS: 14 point review of systems is negative except as specifically addressed above. DIET RENAL; Low Sodium (2 GM);  Daily Fluid Restriction: 1000 ml    Intake/Output Summary (Last 24 hours) at 7/14/2019 1018  Last data filed at 7/13/2019 1745  Gross per 24 hour   Intake 1210 ml   Output --   Net 1210 ml     Medications:    Current Facility-Administered Medications   Medication Dose Route Frequency Provider Last Rate Last Dose    cefepime (MAXIPIME) 1 g in sodium chloride (PF) 10 mL IV syringe  1 g Intravenous Q24H Willie Garcia MD   1 g at 07/13/19 1448    heparin (porcine) injection 5,000 Units  5,000 Units Subcutaneous 3 times per day Willie Garcia MD   5,000 Units at 07/14/19 0533    acetaminophen (TYLENOL) tablet 650 mg  650 mg Oral Q4H PRN Willie Garcia MD        HYDROcodone-acetaminophen (NORCO) 5-325 MG per tablet 1 tablet  1 tablet Oral Q4H PRN Willie Garcai MD        Or    HYDROcodone-acetaminophen (NORCO) 5-325 MG per tablet 2 tablet  2 tablet Oral Q4H PRN Willie Garcia MD        ondansetron (ZOFRAN) injection 4 mg  4 mg Intravenous Q6H PRN Willie Garcia MD        sodium hypochlorite (DAKINS) 0.125 % external solution   Irrigation BID Willie Garcia MD        famotidine (PEPCID) tablet 20 mg  20 mg Oral Daily Willie Garcia MD   20 mg at 07/14/19 0841    HYDROmorphone (DILAUDID) injection 0.5 mg  0.5 mg Intravenous Q3H PRN Willie Garcia MD   0.5 mg at 07/14/19 0842    lactulose (CHRONULAC) 10 GM/15ML solution 10 g  10 g Oral Daily Willie Garcia MD        aspirin EC tablet 81 mg  81 mg Oral Daily Willie Garcia MD   81 mg at 07/14/19 0841    calcium acetate (PHOSLO) capsule 667 mg  667 mg Oral TID WC Willie Garcia MD   667 mg at 07/14/19 0841    clopidogrel (PLAVIX) tablet 75 mg  75 mg Oral Daily Willie Garcia MD   75 mg at 07/14/19 0841    isosorbide mononitrate (IMDUR) extended release tablet 30 mg  30 mg Oral Daily Willie Garcia MD   30 mg at 07/14/19 0841    minoxidil (LONITEN) tablet 10 mg  10 mg Oral Daily Willie Garcia MD   10 mg at 07/14/19 0850    sucralfate (Robertoa Darshana)

## 2019-07-15 LAB
ALBUMIN SERPL-MCNC: 2.6 G/DL (ref 3.5–5.2)
ALP BLD-CCNC: 122 U/L (ref 40–130)
ALT SERPL-CCNC: 7 U/L (ref 5–41)
ANION GAP SERPL CALCULATED.3IONS-SCNC: 18 MMOL/L (ref 7–19)
AST SERPL-CCNC: 14 U/L (ref 5–40)
BASOPHILS ABSOLUTE: 0 K/UL (ref 0–0.2)
BASOPHILS RELATIVE PERCENT: 0.3 % (ref 0–1)
BILIRUB SERPL-MCNC: 0.3 MG/DL (ref 0.2–1.2)
BLOOD BANK DISPENSE STATUS: NORMAL
BLOOD BANK PRODUCT CODE: NORMAL
BPU ID: NORMAL
BUN BLDV-MCNC: 61 MG/DL (ref 6–20)
CALCIUM SERPL-MCNC: 8.8 MG/DL (ref 8.6–10)
CHLORIDE BLD-SCNC: 86 MMOL/L (ref 98–111)
CO2: 23 MMOL/L (ref 22–29)
CREAT SERPL-MCNC: 6.5 MG/DL (ref 0.5–1.2)
DESCRIPTION BLOOD BANK: NORMAL
EOSINOPHILS ABSOLUTE: 0.4 K/UL (ref 0–0.6)
EOSINOPHILS RELATIVE PERCENT: 7.4 % (ref 0–5)
GFR NON-AFRICAN AMERICAN: 9
GLUCOSE BLD-MCNC: 131 MG/DL (ref 74–109)
HCT VFR BLD CALC: 22.6 % (ref 42–52)
HCT VFR BLD CALC: 26.2 % (ref 42–52)
HCT VFR BLD CALC: 28.2 % (ref 42–52)
HEMOGLOBIN: 7.3 G/DL (ref 14–18)
HEMOGLOBIN: 8.5 G/DL (ref 14–18)
HEMOGLOBIN: 8.7 G/DL (ref 14–18)
LYMPHOCYTES ABSOLUTE: 0.7 K/UL (ref 1.1–4.5)
LYMPHOCYTES RELATIVE PERCENT: 11.8 % (ref 20–40)
MCH RBC QN AUTO: 28.7 PG (ref 27–31)
MCH RBC QN AUTO: 28.7 PG (ref 27–31)
MCH RBC QN AUTO: 28.8 PG (ref 27–31)
MCHC RBC AUTO-ENTMCNC: 30.9 G/DL (ref 33–37)
MCHC RBC AUTO-ENTMCNC: 32.3 G/DL (ref 33–37)
MCHC RBC AUTO-ENTMCNC: 32.4 G/DL (ref 33–37)
MCV RBC AUTO: 88.5 FL (ref 80–94)
MCV RBC AUTO: 89 FL (ref 80–94)
MCV RBC AUTO: 93.4 FL (ref 80–94)
MONOCYTES ABSOLUTE: 0.5 K/UL (ref 0–0.9)
MONOCYTES RELATIVE PERCENT: 9.1 % (ref 0–10)
NEUTROPHILS ABSOLUTE: 4.2 K/UL (ref 1.5–7.5)
NEUTROPHILS RELATIVE PERCENT: 70.7 % (ref 50–65)
PDW BLD-RTO: 14.8 % (ref 11.5–14.5)
PDW BLD-RTO: 14.9 % (ref 11.5–14.5)
PDW BLD-RTO: 15 % (ref 11.5–14.5)
PLATELET # BLD: 128 K/UL (ref 130–400)
PLATELET # BLD: 130 K/UL (ref 130–400)
PLATELET # BLD: 146 K/UL (ref 130–400)
PMV BLD AUTO: 10.6 FL (ref 9.4–12.4)
PMV BLD AUTO: 11.7 FL (ref 9.4–12.4)
PMV BLD AUTO: 11.9 FL (ref 9.4–12.4)
POTASSIUM REFLEX MAGNESIUM: 5.3 MMOL/L (ref 3.5–5)
POTASSIUM SERPL-SCNC: 5.3 MMOL/L (ref 3.5–5)
RBC # BLD: 2.54 M/UL (ref 4.7–6.1)
RBC # BLD: 2.96 M/UL (ref 4.7–6.1)
RBC # BLD: 3.02 M/UL (ref 4.7–6.1)
SODIUM BLD-SCNC: 127 MMOL/L (ref 136–145)
TOTAL PROTEIN: 6.6 G/DL (ref 6.6–8.7)
WBC # BLD: 5.3 K/UL (ref 4.8–10.8)
WBC # BLD: 5.5 K/UL (ref 4.8–10.8)
WBC # BLD: 5.9 K/UL (ref 4.8–10.8)

## 2019-07-15 PROCEDURE — 2580000003 HC RX 258: Performed by: SURGERY

## 2019-07-15 PROCEDURE — 6370000000 HC RX 637 (ALT 250 FOR IP): Performed by: SURGERY

## 2019-07-15 PROCEDURE — 8010000000 HC HEMODIALYSIS ACUTE INPT

## 2019-07-15 PROCEDURE — 85025 COMPLETE CBC W/AUTO DIFF WBC: CPT

## 2019-07-15 PROCEDURE — 99232 SBSQ HOSP IP/OBS MODERATE 35: CPT | Performed by: PSYCHIATRY & NEUROLOGY

## 2019-07-15 PROCEDURE — 6360000002 HC RX W HCPCS: Performed by: SURGERY

## 2019-07-15 PROCEDURE — 36415 COLL VENOUS BLD VENIPUNCTURE: CPT

## 2019-07-15 PROCEDURE — 1210000000 HC MED SURG R&B

## 2019-07-15 PROCEDURE — 85027 COMPLETE CBC AUTOMATED: CPT

## 2019-07-15 PROCEDURE — 36430 TRANSFUSION BLD/BLD COMPNT: CPT

## 2019-07-15 PROCEDURE — 86923 COMPATIBILITY TEST ELECTRIC: CPT

## 2019-07-15 PROCEDURE — P9016 RBC LEUKOCYTES REDUCED: HCPCS

## 2019-07-15 PROCEDURE — 80053 COMPREHEN METABOLIC PANEL: CPT

## 2019-07-15 PROCEDURE — 94762 N-INVAS EAR/PLS OXIMTRY CONT: CPT

## 2019-07-15 PROCEDURE — 99232 SBSQ HOSP IP/OBS MODERATE 35: CPT | Performed by: HOSPITALIST

## 2019-07-15 RX ADMIN — OXYCODONE HYDROCHLORIDE AND ACETAMINOPHEN 1 TABLET: 10; 325 TABLET ORAL at 08:38

## 2019-07-15 RX ADMIN — ASPIRIN 81 MG: 81 TABLET, COATED ORAL at 11:35

## 2019-07-15 RX ADMIN — HEPARIN SODIUM 5000 UNITS: 5000 INJECTION, SOLUTION INTRAVENOUS; SUBCUTANEOUS at 14:35

## 2019-07-15 RX ADMIN — MINOXIDIL 10 MG: 10 TABLET ORAL at 11:35

## 2019-07-15 RX ADMIN — HYDROMORPHONE HYDROCHLORIDE 0.5 MG: 1 INJECTION, SOLUTION INTRAMUSCULAR; INTRAVENOUS; SUBCUTANEOUS at 19:38

## 2019-07-15 RX ADMIN — Medication 10 ML: at 02:31

## 2019-07-15 RX ADMIN — HEPARIN SODIUM 5000 UNITS: 5000 INJECTION, SOLUTION INTRAVENOUS; SUBCUTANEOUS at 06:17

## 2019-07-15 RX ADMIN — OXYCODONE HYDROCHLORIDE AND ACETAMINOPHEN 1 TABLET: 10; 325 TABLET ORAL at 23:18

## 2019-07-15 RX ADMIN — CLONIDINE HYDROCHLORIDE 0.3 MG: 0.2 TABLET ORAL at 11:35

## 2019-07-15 RX ADMIN — HYDROMORPHONE HYDROCHLORIDE 0.5 MG: 1 INJECTION, SOLUTION INTRAMUSCULAR; INTRAVENOUS; SUBCUTANEOUS at 16:23

## 2019-07-15 RX ADMIN — Medication 3 MG: at 23:18

## 2019-07-15 RX ADMIN — LISINOPRIL 10 MG: 10 TABLET ORAL at 11:36

## 2019-07-15 RX ADMIN — HYDROMORPHONE HYDROCHLORIDE 0.5 MG: 1 INJECTION, SOLUTION INTRAMUSCULAR; INTRAVENOUS; SUBCUTANEOUS at 21:47

## 2019-07-15 RX ADMIN — CLOPIDOGREL 75 MG: 75 TABLET, FILM COATED ORAL at 11:35

## 2019-07-15 RX ADMIN — CALCIUM ACETATE 667 MG: 667 CAPSULE ORAL at 12:47

## 2019-07-15 RX ADMIN — Medication 1 G: at 14:35

## 2019-07-15 RX ADMIN — LISINOPRIL 10 MG: 10 TABLET ORAL at 21:48

## 2019-07-15 RX ADMIN — DAKIN'S SOLUTION 0.125% (QUARTER STRENGTH): 0.12 SOLUTION at 15:41

## 2019-07-15 RX ADMIN — CLONIDINE HYDROCHLORIDE 0.3 MG: 0.2 TABLET ORAL at 21:48

## 2019-07-15 RX ADMIN — Medication 10 ML: at 21:48

## 2019-07-15 RX ADMIN — OXYCODONE HYDROCHLORIDE AND ACETAMINOPHEN 1 TABLET: 10; 325 TABLET ORAL at 00:30

## 2019-07-15 RX ADMIN — ISOSORBIDE MONONITRATE 30 MG: 30 TABLET, EXTENDED RELEASE ORAL at 11:35

## 2019-07-15 RX ADMIN — AMLODIPINE BESYLATE 10 MG: 5 TABLET ORAL at 11:36

## 2019-07-15 RX ADMIN — CLONIDINE HYDROCHLORIDE 0.3 MG: 0.2 TABLET ORAL at 14:35

## 2019-07-15 RX ADMIN — HYDROMORPHONE HYDROCHLORIDE 0.5 MG: 1 INJECTION, SOLUTION INTRAMUSCULAR; INTRAVENOUS; SUBCUTANEOUS at 02:31

## 2019-07-15 RX ADMIN — Medication 10 ML: at 08:41

## 2019-07-15 RX ADMIN — LACTULOSE 10 G: 20 SOLUTION ORAL at 11:34

## 2019-07-15 RX ADMIN — DAKIN'S SOLUTION 0.125% (QUARTER STRENGTH): 0.12 SOLUTION at 21:55

## 2019-07-15 RX ADMIN — CALCIUM ACETATE 667 MG: 667 CAPSULE ORAL at 17:06

## 2019-07-15 RX ADMIN — OXYCODONE HYDROCHLORIDE AND ACETAMINOPHEN 1 TABLET: 10; 325 TABLET ORAL at 18:15

## 2019-07-15 RX ADMIN — Medication 10 ML: at 06:21

## 2019-07-15 RX ADMIN — HYDROMORPHONE HYDROCHLORIDE 0.5 MG: 1 INJECTION, SOLUTION INTRAMUSCULAR; INTRAVENOUS; SUBCUTANEOUS at 06:20

## 2019-07-15 RX ADMIN — HYDROMORPHONE HYDROCHLORIDE 0.5 MG: 1 INJECTION, SOLUTION INTRAMUSCULAR; INTRAVENOUS; SUBCUTANEOUS at 14:45

## 2019-07-15 RX ADMIN — HEPARIN SODIUM 5000 UNITS: 5000 INJECTION, SOLUTION INTRAVENOUS; SUBCUTANEOUS at 21:47

## 2019-07-15 RX ADMIN — FAMOTIDINE 20 MG: 20 TABLET ORAL at 11:36

## 2019-07-15 RX ADMIN — OXYCODONE HYDROCHLORIDE AND ACETAMINOPHEN 1 TABLET: 10; 325 TABLET ORAL at 04:24

## 2019-07-15 ASSESSMENT — PAIN DESCRIPTION - LOCATION
LOCATION: LEG
LOCATION: LEG

## 2019-07-15 ASSESSMENT — PAIN DESCRIPTION - ONSET
ONSET: ON-GOING
ONSET: ON-GOING

## 2019-07-15 ASSESSMENT — PAIN SCALES - GENERAL
PAINLEVEL_OUTOF10: 8
PAINLEVEL_OUTOF10: 7
PAINLEVEL_OUTOF10: 10
PAINLEVEL_OUTOF10: 8
PAINLEVEL_OUTOF10: 7
PAINLEVEL_OUTOF10: 8
PAINLEVEL_OUTOF10: 0
PAINLEVEL_OUTOF10: 8
PAINLEVEL_OUTOF10: 0
PAINLEVEL_OUTOF10: 8
PAINLEVEL_OUTOF10: 7
PAINLEVEL_OUTOF10: 0
PAINLEVEL_OUTOF10: 5
PAINLEVEL_OUTOF10: 8

## 2019-07-15 ASSESSMENT — PAIN DESCRIPTION - FREQUENCY
FREQUENCY: INTERMITTENT
FREQUENCY: INTERMITTENT

## 2019-07-15 ASSESSMENT — PAIN DESCRIPTION - ORIENTATION
ORIENTATION: RIGHT
ORIENTATION: RIGHT

## 2019-07-15 ASSESSMENT — PAIN DESCRIPTION - PAIN TYPE
TYPE: SURGICAL PAIN
TYPE: SURGICAL PAIN

## 2019-07-15 ASSESSMENT — PAIN DESCRIPTION - PROGRESSION
CLINICAL_PROGRESSION: GRADUALLY IMPROVING
CLINICAL_PROGRESSION: GRADUALLY IMPROVING

## 2019-07-15 ASSESSMENT — PAIN DESCRIPTION - DESCRIPTORS
DESCRIPTORS: ACHING
DESCRIPTORS: ACHING

## 2019-07-15 ASSESSMENT — PAIN SCALES - WONG BAKER
WONGBAKER_NUMERICALRESPONSE: 6
WONGBAKER_NUMERICALRESPONSE: 6

## 2019-07-15 NOTE — PROGRESS NOTES
Follow up    Pt is resting in bed, he has had dialysis today. Wife at bedside and says he has had quite a bit of pain and requiring PRN medications. Pt continues with IV ABX. Wife states the goal remains f/u care at [facility] when pt is stable for discharge. Provided support and encouragement. Will continue to follow POC.     Electronically signed by Riri Bautista RN on 7/15/2019 at 2:56 PM

## 2019-07-15 NOTE — PROGRESS NOTES
Nephrology ([facility]) Progress Note    Patient:  Zackary Ignacio  YOB: 1960  Date of Service: 7/15/2019  MRN: [id number]   Acct: [de-identified]   Primary Care Physician: Sho Portillo DO  Advance Directive: Full Code  Admit Date: 7/8/2019       Hospital Day: 7  Referring Provider: Humera Romero, *    Patient independently seen and examined, Chart, Consults, Notes, Operative notes, Labs, Cardiology, and Radiology studies reviewed as able. Subjective:  Zackary Ignacio is a 61 y.o. male  whom we were consulted for end-stage renal disease secondary to diabetic nephropathy. He goes to maintenance dialysis treatment at [address] on Monday Wednesday Friday. He presented with severe encephalopathy, weakness. Patient was recently admitted and underwent extensive vascular surgery on bilateral lower extremities including endarterectomy of CFA, popliteal bypass surgery bilaterally. Patient also has hepatitis C/cirrhosis/hepatocellular CA. Patient was found to have infected right lower extremity surgical wound. On July 12, patient underwent debridement of surgical wound of right lower extremity. His encephalopathy has now resolved.     Today, no overnight events. He denies chest pain, shortness of air, nausea or vomiting. Tolerating dialysis. Dialysis   Pt was seen on RRT  Modality: Hemodialysis  Access: Arterial Venous Fistula  Location: left upper  QB: 450  QD: 700  UF: 1160         Allergies:  Patient has no known allergies.     Medicines:  Current Facility-Administered Medications   Medication Dose Route Frequency Provider Last Rate Last Dose    cefepime (MAXIPIME) 1 g in sodium chloride (PF) 10 mL IV syringe  1 g Intravenous Q24H Leticia Sosa MD   1 g at 07/14/19 1553    heparin (porcine) injection 5,000 Units  5,000 Units Subcutaneous 3 times per day Leticia Sosa MD   5,000 Units at 07/15/19 0617    acetaminophen (TYLENOL) tablet 650 mg  650 mg Oral Q4H PRN Jason Rogers MD        HYDROcodone-acetaminophen (NORCO) 5-325 MG per tablet 1 tablet  1 tablet Oral Q4H PRN Jason Rogers MD        Or    HYDROcodone-acetaminophen (NORCO) 5-325 MG per tablet 2 tablet  2 tablet Oral Q4H PRN Jason Rogers MD        ondansetron (ZOFRAN) injection 4 mg  4 mg Intravenous Q6H PRN Jason Rogers MD        sodium hypochlorite (DAKINS) 0.125 % external solution   Irrigation BID Jason Rogers MD        famotidine (PEPCID) tablet 20 mg  20 mg Oral Daily Jason Rogers MD   20 mg at 07/15/19 1136    HYDROmorphone (DILAUDID) injection 0.5 mg  0.5 mg Intravenous Q3H PRN Jason Rogers MD   0.5 mg at 07/15/19 0620    lactulose (CHRONULAC) 10 GM/15ML solution 10 g  10 g Oral Daily Jason Rogers MD   10 g at 07/15/19 1134    aspirin EC tablet 81 mg  81 mg Oral Daily Jason Rogers MD   81 mg at 07/15/19 1135    calcium acetate (PHOSLO) capsule 667 mg  667 mg Oral TID WC Jason Rogers MD   667 mg at 07/14/19 1714    clopidogrel (PLAVIX) tablet 75 mg  75 mg Oral Daily Jason Rogers MD   75 mg at 07/15/19 1135    isosorbide mononitrate (IMDUR) extended release tablet 30 mg  30 mg Oral Daily Jason Rogers MD   30 mg at 07/15/19 1135    minoxidil (LONITEN) tablet 10 mg  10 mg Oral Daily Jason Rogers MD   10 mg at 07/15/19 1135    sucralfate (CARAFATE) tablet 1 g  1 g Oral TID PRN Jason Rogers MD        sodium chloride flush 0.9 % injection 10 mL  10 mL Intravenous 2 times per day Jason Rogers MD   10 mL at 07/15/19 0841    sodium chloride flush 0.9 % injection 10 mL  10 mL Intravenous PRN Jason Rogers MD   10 mL at 07/15/19 0621    polyethylene glycol (GLYCOLAX) packet 17 g  17 g Oral Daily PRN Jason Rogers MD        melatonin tablet 3 mg  3 mg Oral Nightly PRN Jason Rogers MD   3 mg at 07/08/19 2042    amLODIPine (NORVASC) tablet 10 mg  10 mg Oral Daily Jason Rogers MD   10 mg at 07/15/19 1137    SLC cont    Balloon angioplasty proximal end distal upper arm cephalic vein with 9 x 40 cm  balloon. Completion fistulograms of the left upper extremity.  VASCULAR SURGERY  8/13/15 [facility]    Revision of left upper extremity arteriovenous fistula with excision of pseudoaneurysm and primary repair of cephalic vein.  VASCULAR SURGERY  5/3/16 [facility]    Left upper fistulograms/venograms, left cephalic balloon 8 x 20 cutter,9 x 40 conquest,left proximal cephalic vein stents (flair 2 9 x 50), balloon stents 9 x 40 conquest.    VASCULAR SURGERY  12/08/2016    [facility]- ultrasound guided cannulation of left distal cephalic vein ultrasound guided cannulation right internal jugular vein placement of right internal jugular vein tunneled dialysis catheter (Bard Equistream XK 23cm tip to cuff)     VASCULAR SURGERY  01/20/2017    [facility]-Right upper venograms, u/s guided cannulation left basilic vein, left upper venograms, balloon angioplasty left subclavian vein 10x40 conquest.    VASCULAR SURGERY  02/16/2017    [facility]. Left brachial artery to axillary vein arteriovenous graft with 7 mm artegraft. Left upper extremity venograms. Left subclavian vein stent viabahn 13x50. Left subclavian vein stent balloon angioplasty 12x40 atlas.  VASCULAR SURGERY  04/11/2017    [facility]. Removal of tunneled dialysis catheter right internal jugular vein.  VASCULAR SURGERY  01/25/2018    [facility]. Arch aortogram, left upper arteriogram, AV graft angiogram/venogram.    VASCULAR SURGERY  02/28/2018    [facility]. Right CFA 5f-6f-7f sheath, aortogram with bilateral lower arteriogram, left SFA/pop  balloon angioplasty 5x100 , 6x150 lutonix ( 2), left CFA  7f sheath, bilateral iliac kissing stents right 10x38 icast, left 9x59 icast expanded with 10x40 , completion aortogram, mynx left CFA , failed mynx right CFA.  VASCULAR SURGERY  06/13/2019    [facility] left CFA 5f sheath, aortogram with bilateral lower arteriogram, mynx left CFA.     VASCULAR SURGERY

## 2019-07-16 VITALS
HEIGHT: 69 IN | WEIGHT: 167.25 LBS | OXYGEN SATURATION: 97 % | DIASTOLIC BLOOD PRESSURE: 60 MMHG | BODY MASS INDEX: 24.77 KG/M2 | SYSTOLIC BLOOD PRESSURE: 169 MMHG | RESPIRATION RATE: 18 BRPM | HEART RATE: 62 BPM | TEMPERATURE: 96.7 F

## 2019-07-16 LAB
AMPHETAMINES SERPL QL SCN: NEGATIVE NG/ML
ANION GAP SERPL CALCULATED.3IONS-SCNC: 15 MMOL/L (ref 7–19)
BARBITURATES SERPL QL SCN: NEGATIVE UG/ML
BASOPHILS ABSOLUTE: 0 K/UL (ref 0–0.2)
BASOPHILS RELATIVE PERCENT: 0.5 % (ref 0–1)
BENZODIAZ BLD QL: NEGATIVE NG/ML
BUN BLDV-MCNC: 36 MG/DL (ref 6–20)
BUPRENORPHINE SERPLBLD-MCNC: NEGATIVE NG/ML
CALCIUM SERPL-MCNC: 8.7 MG/DL (ref 8.6–10)
CANNABINOIDS BLD QL SCN: NEGATIVE NG/ML
CARISOPRODOL+MEPROB BLD QL SCN: NEGATIVE UG/ML
CHLORIDE BLD-SCNC: 89 MMOL/L (ref 98–111)
CO2: 26 MMOL/L (ref 22–29)
COCAINE+BZE SERPLBLD QL SCN: NEGATIVE NG/ML
CREAT SERPL-MCNC: 4.6 MG/DL (ref 0.5–1.2)
EOSINOPHILS ABSOLUTE: 0.3 K/UL (ref 0–0.6)
EOSINOPHILS RELATIVE PERCENT: 5 % (ref 0–5)
FENTANYL BLD QL SCN: NEGATIVE NG/ML
GABAPENTIN, IA: NEGATIVE UG/ML
GFR NON-AFRICAN AMERICAN: 13
GLUCOSE BLD-MCNC: 136 MG/DL (ref 74–109)
HCT VFR BLD CALC: 25.2 % (ref 42–52)
HEMOGLOBIN: 8 G/DL (ref 14–18)
LYMPHOCYTES ABSOLUTE: 0.7 K/UL (ref 1.1–4.5)
LYMPHOCYTES RELATIVE PERCENT: 11.9 % (ref 20–40)
MCH RBC QN AUTO: 28.3 PG (ref 27–31)
MCHC RBC AUTO-ENTMCNC: 31.7 G/DL (ref 33–37)
MCV RBC AUTO: 89 FL (ref 80–94)
MEPERIDINE SERPLBLD-MCNC: NEGATIVE NG/ML
METHADONE SERPL QL SCN: NEGATIVE NG/ML
MONOCYTES ABSOLUTE: 0.5 K/UL (ref 0–0.9)
MONOCYTES RELATIVE PERCENT: 8.3 % (ref 0–10)
NEUTROPHILS ABSOLUTE: 4.3 K/UL (ref 1.5–7.5)
NEUTROPHILS RELATIVE PERCENT: 73.8 % (ref 50–65)
OPIATES, IA: ABNORMAL NG/ML
OXYCODONE SERPLBLD CFM-MCNC: 150 NG/ML
OXYCODONE+OXYMORPHONE SERPLBLD CFM-IMP: POSITIVE
OXYCODONES, IA: ABNORMAL NG/ML
OXYMORPHONE SERPLBLD CFM-MCNC: NEGATIVE NG/ML
PCP SERPL QL SCN: NEGATIVE NG/ML
PDW BLD-RTO: 14.9 % (ref 11.5–14.5)
PLATELET # BLD: 136 K/UL (ref 130–400)
PMV BLD AUTO: 11.8 FL (ref 9.4–12.4)
POTASSIUM REFLEX MAGNESIUM: 4.6 MMOL/L (ref 3.5–5)
PROPOXYPH SERPL QL SCN: NEGATIVE NG/ML
RBC # BLD: 2.83 M/UL (ref 4.7–6.1)
SODIUM BLD-SCNC: 130 MMOL/L (ref 136–145)
TRAMADOL BLD QL SCN: NEGATIVE NG/ML
WBC # BLD: 5.8 K/UL (ref 4.8–10.8)

## 2019-07-16 PROCEDURE — 6370000000 HC RX 637 (ALT 250 FOR IP): Performed by: SURGERY

## 2019-07-16 PROCEDURE — 80048 BASIC METABOLIC PNL TOTAL CA: CPT

## 2019-07-16 PROCEDURE — 85025 COMPLETE CBC W/AUTO DIFF WBC: CPT

## 2019-07-16 PROCEDURE — 99239 HOSP IP/OBS DSCHRG MGMT >30: CPT | Performed by: HOSPITALIST

## 2019-07-16 PROCEDURE — 6360000002 HC RX W HCPCS: Performed by: SURGERY

## 2019-07-16 PROCEDURE — 6370000000 HC RX 637 (ALT 250 FOR IP): Performed by: HOSPITALIST

## 2019-07-16 PROCEDURE — 36415 COLL VENOUS BLD VENIPUNCTURE: CPT

## 2019-07-16 PROCEDURE — 2580000003 HC RX 258: Performed by: SURGERY

## 2019-07-16 RX ORDER — LEVOFLOXACIN 250 MG/1
250 TABLET ORAL DAILY
Qty: 14 TABLET | Refills: 0
Start: 2019-07-16 | End: 2019-07-30

## 2019-07-16 RX ORDER — HYDROCODONE BITARTRATE AND ACETAMINOPHEN 5; 325 MG/1; MG/1
1 TABLET ORAL EVERY 8 HOURS PRN
Qty: 9 TABLET | Refills: 0 | Status: SHIPPED | OUTPATIENT
Start: 2019-07-16 | End: 2019-07-16 | Stop reason: HOSPADM

## 2019-07-16 RX ORDER — LEVOFLOXACIN 500 MG/1
250 TABLET, FILM COATED ORAL EVERY 24 HOURS
Status: DISCONTINUED | OUTPATIENT
Start: 2019-07-16 | End: 2019-07-16 | Stop reason: HOSPADM

## 2019-07-16 RX ORDER — OXYCODONE AND ACETAMINOPHEN 10; 325 MG/1; MG/1
1 TABLET ORAL EVERY 4 HOURS PRN
Qty: 18 TABLET | Refills: 0 | Status: SHIPPED | OUTPATIENT
Start: 2019-07-16 | End: 2019-07-19

## 2019-07-16 RX ADMIN — FAMOTIDINE 20 MG: 20 TABLET ORAL at 08:54

## 2019-07-16 RX ADMIN — HYDROCODONE BITARTRATE AND ACETAMINOPHEN 2 TABLET: 5; 325 TABLET ORAL at 12:02

## 2019-07-16 RX ADMIN — ASPIRIN 81 MG: 81 TABLET, COATED ORAL at 08:54

## 2019-07-16 RX ADMIN — MINOXIDIL 10 MG: 10 TABLET ORAL at 08:53

## 2019-07-16 RX ADMIN — DAKIN'S SOLUTION 0.125% (QUARTER STRENGTH): 0.12 SOLUTION at 08:57

## 2019-07-16 RX ADMIN — AMLODIPINE BESYLATE 10 MG: 5 TABLET ORAL at 08:54

## 2019-07-16 RX ADMIN — LISINOPRIL 10 MG: 10 TABLET ORAL at 08:54

## 2019-07-16 RX ADMIN — OXYCODONE HYDROCHLORIDE AND ACETAMINOPHEN 1 TABLET: 10; 325 TABLET ORAL at 10:42

## 2019-07-16 RX ADMIN — OXYCODONE HYDROCHLORIDE AND ACETAMINOPHEN 1 TABLET: 10; 325 TABLET ORAL at 15:13

## 2019-07-16 RX ADMIN — HYDROMORPHONE HYDROCHLORIDE 0.5 MG: 1 INJECTION, SOLUTION INTRAMUSCULAR; INTRAVENOUS; SUBCUTANEOUS at 09:28

## 2019-07-16 RX ADMIN — CLOPIDOGREL 75 MG: 75 TABLET, FILM COATED ORAL at 08:54

## 2019-07-16 RX ADMIN — OXYCODONE HYDROCHLORIDE AND ACETAMINOPHEN 1 TABLET: 10; 325 TABLET ORAL at 03:09

## 2019-07-16 RX ADMIN — CLONIDINE HYDROCHLORIDE 0.3 MG: 0.2 TABLET ORAL at 15:13

## 2019-07-16 RX ADMIN — CLONIDINE HYDROCHLORIDE 0.3 MG: 0.2 TABLET ORAL at 08:54

## 2019-07-16 RX ADMIN — Medication 10 ML: at 09:01

## 2019-07-16 RX ADMIN — ISOSORBIDE MONONITRATE 30 MG: 30 TABLET, EXTENDED RELEASE ORAL at 08:54

## 2019-07-16 RX ADMIN — HYDROMORPHONE HYDROCHLORIDE 0.5 MG: 1 INJECTION, SOLUTION INTRAMUSCULAR; INTRAVENOUS; SUBCUTANEOUS at 06:11

## 2019-07-16 RX ADMIN — CALCIUM ACETATE 667 MG: 667 CAPSULE ORAL at 12:02

## 2019-07-16 RX ADMIN — LEVOFLOXACIN 250 MG: 500 TABLET, FILM COATED ORAL at 15:13

## 2019-07-16 RX ADMIN — HEPARIN SODIUM 5000 UNITS: 5000 INJECTION, SOLUTION INTRAVENOUS; SUBCUTANEOUS at 06:07

## 2019-07-16 RX ADMIN — CALCIUM ACETATE 667 MG: 667 CAPSULE ORAL at 08:54

## 2019-07-16 RX ADMIN — HEPARIN SODIUM 5000 UNITS: 5000 INJECTION, SOLUTION INTRAVENOUS; SUBCUTANEOUS at 15:13

## 2019-07-16 ASSESSMENT — PAIN SCALES - GENERAL
PAINLEVEL_OUTOF10: 6
PAINLEVEL_OUTOF10: 9
PAINLEVEL_OUTOF10: 8
PAINLEVEL_OUTOF10: 7
PAINLEVEL_OUTOF10: 6
PAINLEVEL_OUTOF10: 6
PAINLEVEL_OUTOF10: 7
PAINLEVEL_OUTOF10: 7
PAINLEVEL_OUTOF10: 8
PAINLEVEL_OUTOF10: 8
PAINLEVEL_OUTOF10: 9
PAINLEVEL_OUTOF10: 7

## 2019-07-16 ASSESSMENT — PAIN SCALES - WONG BAKER: WONGBAKER_NUMERICALRESPONSE: 4

## 2019-07-16 NOTE — PROGRESS NOTES
Oral TID Isaias Odell MD   0.3 mg at 07/16/19 0854    lisinopril (PRINIVIL;ZESTRIL) tablet 10 mg  10 mg Oral BID Isaias Odell MD   10 mg at 07/16/19 0854    metoprolol tartrate (LOPRESSOR) tablet 25 mg  25 mg Oral Q12H PRN Isaias Odell MD        oxyCODONE-acetaminophen (PERCOCET)  MG per tablet 1 tablet  1 tablet Oral Q4H PRN Isaias Odell MD   1 tablet at 07/16/19 1042       Past Medical History:  Past Medical History:   Diagnosis Date    Anxiety     CAD (coronary artery disease)     stents x 2; per dr. Stefanie Whitman [facility])     liver cancer    [facility]estive [facility] failure) [facility]     Chyloperitoneum determined by paracentesis     CKD (chronic kidney disease), stage V ([facility]     Dialysis patient ([facility]     mon wed fri at [place] Hemodialysis patient [facility])     mon wed fri in [place] Hepatic cirrhosis ([facility]     dr. Nilton Auguste; has been going to [facility] for liver and kidney transplant list.    Hepatitis C, chronic ([facility]     History of blood transfusion     HTN (hypertension)     Hx of blood clots     arm/fistula    Osteoarthritis     Pacemaker     Palliative care patient 07/09/2019    PVD (peripheral vascular disease) ([facility]     Sleep apnea     no cpap at present.  Type II diabetes mellitus (HCC)     no meds. Past Surgical History:  Past Surgical History:   Procedure Laterality Date    ANGIOPLASTY  08/09/11 SUKHWINDER GERARDO cephalic vein balloon angioplasty with 7mm x 4cm balloon. coild embolization of 2 cephallic vein brances with 3mm x 5cm coils    CARDIAC CATHETERIZATION  04/04/11    cardiac cath and stent x1 by Dr. Claudine Garza  07/26/11 SUKHWINDER GERARDO AV fistula. coild embolization of cephalic vein branch near the wrist with 3mm EMBO coils.  balloon a'plasty left cephalic vein with [id number] balloon and then [id number] balloon    DIALYSIS FISTULA CREATION Left 2/16/2017    LEFT UPPER EXTREMITY BRACHIAL / AXILLARY GRAFT AND STENT LEFT SUBCLAVIAN VEIN performed by Jessica Borden MD at [address] Right 7/12/2019    RIGHT LEG WOUND WASHOUT performed by Elisha Sutton MD at [address] [facility] FEMORAL-TIBIAL BYPASS GRAFT Right 6/25/2019    FEMORAL TIBIAL/PERINEAL BYPASS WITH REVERSED GREATER SAPHENOUS VEIN performed by Jessica Borden MD at [place] [address]  12/26/2010    Right internal jugular vein tunneled dialysis catheter placement    OTHER SURGICAL HISTORY  [id number]    Redo of dialysis catheter    OTHER SURGICAL HISTORY  9/6/2011   [facility]    Removal of RT IJV tunneled dialysis cath    OTHER SURGICAL HISTORY  5/22/12   [facility]    Ultrasound-guided cannulation of left cephalic vein in the mid forearm toward the AV anastomosis, Left upper  ext. fistulograms including venograms of the SVC, Left cephalic vein balloon angioplasty with a 4mm x 100mm Tod balloon, Completion fistulograms    PACEMAKER PLACEMENT      medtronic    PARACENTESIS      multiple/recent; per dr. Jaime Davila at [address] [facility] Left 1/30/2018    UPPER EXTREMITY DRIL PROCEDURE WITH LEFT SAPHENOUS VEIN HARVESTING performed by Jessica Borden MD at [address] [facility]  6/19/12    LUE arteriovenous fistulogram including venography of the superior vena cava. Balloon angioplasty, left forearm cephalic vein and upper arm cephalic vein with [id number] tod balloon.  VASCULAR SURGERY  7/10/2012 S     Revision of left distal radial artery to cephalic vein arteriovenous fistula with 7mm Artegraft interposition.  VASCULAR SURGERY  7/30/15 [facility] & [address]    Left upper extremity arteriovenous fistulograms including venography of the superior vena cava. Left cephalic vein balloon angioplasty with an 8 x 20 cm cutting balloon proximal cephalic vein. Balloon angioplasty of left cephalic vein/antecubital vein near the antecubital crease with 8 x 20 mm cutting balloon.     VASCULAR SURGERY  7/30/15 [facility] cont    Balloon angioplasty tibial/perineal bypass with reversed greater saphenous vein.        Family History  Family History   Problem Relation Age of Onset    High Blood Pressure Mother     High Blood Pressure Father     High Blood Pressure Sister        Social History  Social History     Socioeconomic History    Marital status:      Spouse name: Not on file    Number of children: 0    Years of education: Not on file    Highest education level: Not on file   Occupational History    Occupation:    Social Needs    Financial resource strain: Not on file    Food insecurity:     Worry: Not on file     Inability: Not on file    Transportation needs:     Medical: Not on file     Non-medical: Not on file   Tobacco Use    Smoking status: Former Smoker     Packs/day: 1.00     Years: 45.00     Pack years: 45.00     Types: Cigarettes     Last attempt to quit: 2017     Years since quittin.4    Smokeless tobacco: Never Used    Tobacco comment: [id number] smoked 45 years at [facility]   Substance and Sexual Activity    Alcohol use: No    Drug use: Not Currently     Types: Marijuana, Cocaine, Methamphetamines, IV, Opiates , Other-see comments     Comment: in the 1970s, LSD    Sexual activity: Yes     Partners: Female     Comment: has a wife   Lifestyle    Physical activity:     Days per week: Not on file     Minutes per session: Not on file    Stress: Not on file   Relationships    Social connections:     Talks on phone: Not on file     Gets together: Not on file     Attends [employer] service: Not on file     Active member of club or organization: Not on file     Attends meetings of clubs or organizations: Not on file     Relationship status: Not on file    Intimate partner violence:     Fear of current or ex partner: Not on file     Emotionally abused: Not on file     Physically abused: Not on file     Forced sexual activity: Not on file   Other Topics Concern    Not on file   Social History Narrative    CODE disease. Limited evaluation of the parenchyma without intravenous contrast. Perihepatic ascites. Gastrohepatic varices versus adenopathy. GALLBLADDER and BILLARY: Calcified gallstone. No bile duct dilation. PANCREAS: The pancreas is normal in noncontrasted CT appearance. SPLEEN: Spleen measures 14.6 cm in maximal dimension, borderline enlarged ADRENAL: Right adrenal gland with 1.5 cm indeterminate density adrenal nodule. GENITOURINARY: Atrophic kidneys with lobular contours and multiple low-density lesions, incompletely evaluated on this exam. Significant renovascular calcifications. No hydronephrosis. Thickening of the under distended urinary bladder, could be due to cystitis or chronic bladder outlet obstruction. Normal prostate size. PERITONEUM: No ascites/free fluid. No pneumoperitoneum. BOWEL: 2 cm duodenal diverticulum. Normal C-sweep of the duodenum. No evidence of bowel obstruction. No convincing focal bowel wall thickening on this noncontrast exam. Normal appendix on axial images 57-62. RETROPERITONEUM:   Aorta with calcified atherosclerosis. No evidence of aortic aneurysm. Numerous small retroperitoneal lymph nodes. ABDOMINAL WALL: Body wall edema. Inguinal adenopathy. Surgical clips at the bilateral groin. There is a small focus of gas on the lowest axial image, image 94 with a questionable 1 cm area of fluid, incompletely visualized and evaluated on this exam. BONES: Old right L2 transverse process fracture. Degenerative changes in the visualized spine. No acute bony finding. 1. Right groin with a single focus of gas and a 1 cm area of low density, possibly a fluid collection, incompletely evaluated on this exam. Correlate for recent intervention or symptoms of infection. 2. Gastrohepatic varices versus mild adenopathy, difficult to distinguish due to ascites. There is also mild adenopathy at the groin. While these lymph nodes could be reactive, low-grade malignancy could also be considered.  3.

## 2019-07-17 LAB
REASON FOR REJECTION: NORMAL
REJECTED TEST: NORMAL

## 2019-07-18 LAB
MDA SERPLBLD-MCNC: NEGATIVE NG/ML
MDMA-ECSTASY: NEGATIVE NG/ML

## 2019-07-23 ENCOUNTER — HOSPITAL ENCOUNTER (EMERGENCY)
Age: 59
Discharge: HOME | End: 2019-07-23
Attending: EMERGENCY MEDICINE
Payer: MEDICARE

## 2019-07-23 ENCOUNTER — APPOINTMENT (OUTPATIENT)
Dept: GENERAL RADIOLOGY | Age: 59
End: 2019-07-23
Payer: MEDICARE

## 2019-07-23 ENCOUNTER — TELEPHONE (OUTPATIENT)
Dept: VASCULAR SURGERY | Age: 59
End: 2019-07-23

## 2019-07-23 ENCOUNTER — NURSE TRIAGE (OUTPATIENT)
Dept: OTHER | Facility: CLINIC | Age: 59
End: 2019-07-23

## 2019-07-23 VITALS
RESPIRATION RATE: 12 BRPM | SYSTOLIC BLOOD PRESSURE: 158 MMHG | DIASTOLIC BLOOD PRESSURE: 49 MMHG | OXYGEN SATURATION: 96 % | HEART RATE: 68 BPM | TEMPERATURE: 98.1 F

## 2019-07-23 DIAGNOSIS — M25.561 ACUTE PAIN OF RIGHT KNEE: ICD-10-CM

## 2019-07-23 DIAGNOSIS — M25.461 PAIN AND SWELLING OF KNEE, RIGHT: ICD-10-CM

## 2019-07-23 DIAGNOSIS — M79.604 RIGHT LEG PAIN: Primary | ICD-10-CM

## 2019-07-23 DIAGNOSIS — M25.561 PAIN AND SWELLING OF KNEE, RIGHT: ICD-10-CM

## 2019-07-23 LAB
ANION GAP SERPL CALCULATED.3IONS-SCNC: 11 MMOL/L (ref 7–19)
APPEARANCE FLUID: NORMAL
APTT: 23.6 SEC (ref 26–36.2)
BASOPHILS ABSOLUTE: 0 K/UL (ref 0–0.2)
BASOPHILS RELATIVE PERCENT: 0.5 % (ref 0–1)
BUN BLDV-MCNC: 43 MG/DL (ref 6–20)
CALCIUM SERPL-MCNC: 9.6 MG/DL (ref 8.6–10)
CELL COUNT FLUID TYPE: NORMAL
CHLORIDE BLD-SCNC: 88 MMOL/L (ref 98–111)
CLOT EVALUATION: NORMAL
CO2: 32 MMOL/L (ref 22–29)
COLOR FLUID: YELLOW
CREAT SERPL-MCNC: 5 MG/DL (ref 0.5–1.2)
EOSINOPHILS ABSOLUTE: 0.2 K/UL (ref 0–0.6)
EOSINOPHILS RELATIVE PERCENT: 3 % (ref 0–5)
FLUID TYPE: NORMAL
GFR NON-AFRICAN AMERICAN: 12
GLUCOSE BLD-MCNC: 151 MG/DL (ref 74–109)
GLUCOSE, FLUID: 109
HCT VFR BLD CALC: 27.4 % (ref 42–52)
HEMOGLOBIN: 8.5 G/DL (ref 14–18)
INR BLD: 1.18 (ref 0.88–1.18)
LYMPHOCYTES ABSOLUTE: 0.5 K/UL (ref 1.1–4.5)
LYMPHOCYTES RELATIVE PERCENT: 8.4 % (ref 20–40)
LYMPHOCYTES, BODY FLUID: 4 %
MCH RBC QN AUTO: 28.2 PG (ref 27–31)
MCHC RBC AUTO-ENTMCNC: 31 G/DL (ref 33–37)
MCV RBC AUTO: 91 FL (ref 80–94)
MONOCYTE, FLUID: 2 %
MONOCYTES ABSOLUTE: 0.6 K/UL (ref 0–0.9)
MONOCYTES RELATIVE PERCENT: 8.7 % (ref 0–10)
NEUTROPHIL, FLUID: 94 %
NEUTROPHILS ABSOLUTE: 5 K/UL (ref 1.5–7.5)
NEUTROPHILS RELATIVE PERCENT: 78.8 % (ref 50–65)
NUCLEATED CELLS FLUID: 5015 /CUMM
NUMBER OF CELLS COUNTED FLUID: 100
PDW BLD-RTO: 14.6 % (ref 11.5–14.5)
PLATELET # BLD: 207 K/UL (ref 130–400)
PMV BLD AUTO: 10.5 FL (ref 9.4–12.4)
POTASSIUM REFLEX MAGNESIUM: 5.7 MMOL/L (ref 3.5–5)
PROTEIN FLUID: 5.3 G/DL
PROTHROMBIN TIME: 14.4 SEC (ref 12–14.6)
RBC # BLD: 3.01 M/UL (ref 4.7–6.1)
RBC FLUID: 460 /CUMM
SODIUM BLD-SCNC: 131 MMOL/L (ref 136–145)
TROPONIN: 0.12 NG/ML (ref 0–0.03)
TROPONIN: 0.12 NG/ML (ref 0–0.03)
WBC # BLD: 6.3 K/UL (ref 4.8–10.8)

## 2019-07-23 PROCEDURE — 87075 CULTR BACTERIA EXCEPT BLOOD: CPT

## 2019-07-23 PROCEDURE — 87070 CULTURE OTHR SPECIMN AEROBIC: CPT

## 2019-07-23 PROCEDURE — 6370000000 HC RX 637 (ALT 250 FOR IP): Performed by: EMERGENCY MEDICINE

## 2019-07-23 PROCEDURE — 87205 SMEAR GRAM STAIN: CPT

## 2019-07-23 PROCEDURE — 80048 BASIC METABOLIC PNL TOTAL CA: CPT

## 2019-07-23 PROCEDURE — 71045 X-RAY EXAM CHEST 1 VIEW: CPT

## 2019-07-23 PROCEDURE — 96374 THER/PROPH/DIAG INJ IV PUSH: CPT

## 2019-07-23 PROCEDURE — 6360000002 HC RX W HCPCS: Performed by: EMERGENCY MEDICINE

## 2019-07-23 PROCEDURE — 85025 COMPLETE CBC W/AUTO DIFF WBC: CPT

## 2019-07-23 PROCEDURE — 89060 EXAM SYNOVIAL FLUID CRYSTALS: CPT

## 2019-07-23 PROCEDURE — 94640 AIRWAY INHALATION TREATMENT: CPT

## 2019-07-23 PROCEDURE — 99284 EMERGENCY DEPT VISIT MOD MDM: CPT

## 2019-07-23 PROCEDURE — 73562 X-RAY EXAM OF KNEE 3: CPT

## 2019-07-23 PROCEDURE — 84157 ASSAY OF PROTEIN OTHER: CPT

## 2019-07-23 PROCEDURE — 87015 SPECIMEN INFECT AGNT CONCNTJ: CPT

## 2019-07-23 PROCEDURE — 84484 ASSAY OF TROPONIN QUANT: CPT

## 2019-07-23 PROCEDURE — 82945 GLUCOSE OTHER FLUID: CPT

## 2019-07-23 PROCEDURE — 93971 EXTREMITY STUDY: CPT

## 2019-07-23 PROCEDURE — 2500000003 HC RX 250 WO HCPCS: Performed by: EMERGENCY MEDICINE

## 2019-07-23 PROCEDURE — 89051 BODY FLUID CELL COUNT: CPT

## 2019-07-23 PROCEDURE — 20610 DRAIN/INJ JOINT/BURSA W/O US: CPT | Performed by: EMERGENCY MEDICINE

## 2019-07-23 PROCEDURE — 96375 TX/PRO/DX INJ NEW DRUG ADDON: CPT

## 2019-07-23 PROCEDURE — 85730 THROMBOPLASTIN TIME PARTIAL: CPT

## 2019-07-23 PROCEDURE — 20610 DRAIN/INJ JOINT/BURSA W/O US: CPT

## 2019-07-23 PROCEDURE — 73560 X-RAY EXAM OF KNEE 1 OR 2: CPT

## 2019-07-23 PROCEDURE — 85610 PROTHROMBIN TIME: CPT

## 2019-07-23 PROCEDURE — 99284 EMERGENCY DEPT VISIT MOD MDM: CPT | Performed by: EMERGENCY MEDICINE

## 2019-07-23 PROCEDURE — 36415 COLL VENOUS BLD VENIPUNCTURE: CPT

## 2019-07-23 PROCEDURE — 6370000000 HC RX 637 (ALT 250 FOR IP)

## 2019-07-23 PROCEDURE — 93005 ELECTROCARDIOGRAM TRACING: CPT

## 2019-07-23 RX ORDER — FENTANYL CITRATE 50 UG/ML
100 INJECTION, SOLUTION INTRAMUSCULAR; INTRAVENOUS
Status: DISCONTINUED | OUTPATIENT
Start: 2019-07-23 | End: 2019-07-23 | Stop reason: HOSPADM

## 2019-07-23 RX ORDER — BISMUTH TRIBROMOPH/PETROLATUM 5"X9"
BANDAGE TOPICAL
Status: COMPLETED
Start: 2019-07-23 | End: 2019-07-23

## 2019-07-23 RX ORDER — ETHYL CHLORIDE 100 %
AEROSOL, SPRAY (ML) TOPICAL ONCE
Status: COMPLETED | OUTPATIENT
Start: 2019-07-23 | End: 2019-07-23

## 2019-07-23 RX ORDER — HYDROMORPHONE HYDROCHLORIDE 2 MG/1
4 TABLET ORAL ONCE
Status: COMPLETED | OUTPATIENT
Start: 2019-07-23 | End: 2019-07-23

## 2019-07-23 RX ORDER — ALBUTEROL SULFATE 2.5 MG/3ML
7.5 SOLUTION RESPIRATORY (INHALATION) ONCE
Status: COMPLETED | OUTPATIENT
Start: 2019-07-23 | End: 2019-07-23

## 2019-07-23 RX ORDER — SODIUM POLYSTYRENE SULFONATE 15 G/60ML
15 SUSPENSION ORAL; RECTAL ONCE
Status: COMPLETED | OUTPATIENT
Start: 2019-07-23 | End: 2019-07-23

## 2019-07-23 RX ADMIN — SODIUM BICARBONATE 50 MEQ: 84 INJECTION, SOLUTION INTRAVENOUS at 18:07

## 2019-07-23 RX ADMIN — ALBUTEROL SULFATE 7.5 MG: 2.5 SOLUTION RESPIRATORY (INHALATION) at 17:57

## 2019-07-23 RX ADMIN — SODIUM POLYSTYRENE SULFONATE 15 G: 15 SUSPENSION ORAL; RECTAL at 18:06

## 2019-07-23 RX ADMIN — Medication: at 16:34

## 2019-07-23 RX ADMIN — HYDROMORPHONE HYDROCHLORIDE 4 MG: 2 TABLET ORAL at 20:32

## 2019-07-23 RX ADMIN — FENTANYL CITRATE 100 MCG: 50 INJECTION, SOLUTION INTRAMUSCULAR; INTRAVENOUS at 18:21

## 2019-07-23 RX ADMIN — Medication: at 18:32

## 2019-07-23 RX ADMIN — INSULIN HUMAN 5 UNITS: 100 INJECTION, SOLUTION PARENTERAL at 18:07

## 2019-07-23 ASSESSMENT — PAIN DESCRIPTION - FREQUENCY: FREQUENCY: CONTINUOUS

## 2019-07-23 ASSESSMENT — ENCOUNTER SYMPTOMS
SORE THROAT: 0
RHINORRHEA: 0
BACK PAIN: 0
CHEST TIGHTNESS: 0
VOMITING: 0
BLOOD IN STOOL: 0
ABDOMINAL PAIN: 0
CONSTIPATION: 0
TROUBLE SWALLOWING: 0
NAUSEA: 0
DIARRHEA: 0
SHORTNESS OF BREATH: 0
ABDOMINAL DISTENTION: 0
COUGH: 0

## 2019-07-23 ASSESSMENT — PAIN DESCRIPTION - ORIENTATION: ORIENTATION: RIGHT

## 2019-07-23 ASSESSMENT — PAIN SCALES - GENERAL
PAINLEVEL_OUTOF10: 8
PAINLEVEL_OUTOF10: 9

## 2019-07-23 ASSESSMENT — PAIN DESCRIPTION - LOCATION: LOCATION: LEG

## 2019-07-23 ASSESSMENT — PAIN DESCRIPTION - DESCRIPTORS: DESCRIPTORS: ACHING

## 2019-07-23 NOTE — ED PROVIDER NOTES
SURGICAL HISTORY  9/6/2011   [facility]    Removal of RT IJV tunneled dialysis cath    OTHER SURGICAL HISTORY  5/22/12   [facility]    Ultrasound-guided cannulation of left cephalic vein in the mid forearm toward the AV anastomosis, Left upper  ext. fistulograms including venograms of the SVC, Left cephalic vein balloon angioplasty with a 4mm x 100mm Tod balloon, Completion fistulograms    PACEMAKER PLACEMENT      medtronic    PARACENTESIS      multiple/recent; per dr. Miranda [facility] at [address] [facility] 1/30/2018    UPPER EXTREMITY DRIL PROCEDURE WITH LEFT SAPHENOUS VEIN HARVESTING performed by Peggy Haney MD at [address] [place] [address]  6/19/12    [facility] arteriovenous fistulogram including venography of the superior vena cava. Balloon angioplasty, left forearm cephalic vein and upper arm cephalic vein with [id number] tod balloon.  VASCULAR SURGERY  7/10/2012 [facility]     Revision of left distal radial artery to cephalic vein arteriovenous fistula with 7mm Artegraft interposition.  VASCULAR SURGERY  7/30/15 [facility] & [address]    Left upper extremity arteriovenous fistulograms including venography of the superior vena cava. Left cephalic vein balloon angioplasty with an 8 x 20 cm cutting balloon proximal cephalic vein. Balloon angioplasty of left cephalic vein/antecubital vein near the antecubital crease with 8 x 20 mm cutting balloon.  VASCULAR SURGERY  7/30/15 [facility]    Balloon angioplasty proximal end distal upper arm cephalic vein with 9 x 40 cm  balloon. Completion fistulograms of the left upper extremity.  VASCULAR SURGERY  8/13/15 [facility]    Revision of left upper extremity arteriovenous fistula with excision of pseudoaneurysm and primary repair of cephalic vein.     VASCULAR SURGERY  5/3/16 [facility]    Left upper fistulograms/venograms, left cephalic balloon 8 x 20 cutter,9 x 40 conquest,left proximal cephalic vein stents (flair 2 9 x 50), balloon stents 9 x 40 conquest.    VASCULAR SURGERY 12/08/2016    [facility]- ultrasound guided cannulation of left distal cephalic vein ultrasound guided cannulation right internal jugular vein placement of right internal jugular vein tunneled dialysis catheter (Bard Equistream XK 23cm tip to cuff)     VASCULAR SURGERY  01/20/2017    [facility]-Right upper venograms, u/s guided cannulation left basilic vein, left upper venograms, balloon angioplasty left subclavian vein 10x40 conquest.    VASCULAR SURGERY  02/16/2017    [facility]. Left brachial artery to axillary vein arteriovenous graft with 7 mm artegraft. Left upper extremity venograms. Left subclavian vein stent viabahn 13x50. Left subclavian vein stent balloon angioplasty 12x40 atlas.  VASCULAR SURGERY  04/11/2017    [facility]. Removal of tunneled dialysis catheter right internal jugular vein.  VASCULAR SURGERY  01/25/2018    [facility]. Arch aortogram, left upper arteriogram, AV graft angiogram/venogram.    VASCULAR SURGERY  02/28/2018    [facility]. Right CFA 5f-6f-7f sheath, aortogram with bilateral lower arteriogram, left SFA/pop  balloon angioplasty 5x100 , 6x150 lutonix ( 2), left CFA  7f sheath, bilateral iliac kissing stents right 10x38 icast, left 9x59 icast expanded with 10x40 , completion aortogram, mynx left CFA , failed mynx right CFA.  VASCULAR SURGERY  06/13/2019    [facility] left CFA 5f sheath, aortogram with bilateral lower arteriogram, mynx left CFA.  VASCULAR SURGERY  06/25/2019    [facility]. Femoral tibial/perineal bypass with reversed greater saphenous vein.        CURRENT MEDICATIONS       Discharge Medication List as of 7/23/2019  8:24 PM      CONTINUE these medications which have NOT CHANGED    Details   levofloxacin (LEVAQUIN) 250 MG tablet Take 1 tablet by mouth daily for 14 days, Disp-14 tablet, R-0NO PRINT      polyethylene glycol (GLYCOLAX) packet Take 17 g by mouth daily as needed for Constipation or Other (No BM in 60 hours), Disp-527 g, R-0Normal      melatonin 3 MG TABS tablet Take 1 tablet by mouth Transportation needs:     Medical: None     Non-medical: None   Tobacco Use    Smoking status: Former Smoker     Packs/day: 1.00     Years: 45.00     Pack years: 45.00     Types: Cigarettes     Last attempt to quit: 2017     Years since quittin.4    Smokeless tobacco: Never Used    Tobacco comment: [id number] smoked 45 years at 1 P[facility]   Substance and Sexual Activity    Alcohol use: No    Drug use: Not Currently     Types: Marijuana, Cocaine, Methamphetamines, IV, Opiates , Other-see comments     Comment: in the 1970s, LSD    Sexual activity: Yes     Partners: Female     Comment: has a wife   Lifestyle    Physical activity:     Days per week: None     Minutes per session: None    Stress: None   Relationships    Social connections:     Talks on phone: None     Gets together: None     Attends Advent service: None     Active member of club or organization: None     Attends meetings of clubs or organizations: None     Relationship status: None    Intimate partner violence:     Fear of current or ex partner: None     Emotionally abused: None     Physically abused: None     Forced sexual activity: None   Other Topics Concern    None   Social History Narrative    CODE STATUS: Full Code    HEALTH CARE PROXY: Mrs. Ashutosh Moss, [phone]    Back up: his Mother, Mrs. Ravi Hope, [phone]    AMBULATES: independantly    DOMICILED: lives in a private house with 2 steps to get in, has no stairs inside, lives with wife and step children, has 3 pets       SCREENINGS           PHYSICAL EXAM    (up to 7 for level 4, 8 or more for level 5)     ED Triage Vitals [19 1601]   BP Temp Temp src Pulse Resp SpO2 Height Weight   (!) 216/69 -- -- 82 14 -- -- --       Physical Exam   Constitutional: He is oriented to person, place, and time. No distress. Chronically ill-appearing   HENT:   Head: Normocephalic and atraumatic.    Mouth/Throat: Oropharynx is clear and moist.   Eyes: Pupils are equal, WITH DIFFERENTIAL   GLUCOSE, BODY FLUID   PROTEIN, BODY FLUID   BODY FLUID CRYSTAL     other labs were within normal range or not returned as of this dictation. EMERGENCY DEPARTMENT COURSE and DIFFERENTIAL DIAGNOSIS/MDM:   Vitals:    Vitals:    07/23/19 1902 07/23/19 1932 07/23/19 2003 07/23/19 2033   BP: (!) 141/50 (!) 159/47 (!) 155/47 (!) 158/49   Pulse: 67 66 67 68   Resp: 19 17 10 12   Temp:    98.1 °F (36.7 °C)   SpO2: 95% 95% 97% 96%       MDM  Number of Diagnoses or Management Options  Diagnosis management comments: 63-year-old male with recent discharge from the hospital due to encephalopathy and wound infection. Now with worsening pain and swelling of the knee. Plan for IV, pain control, labs, serial EKGs given the acute onset of chest pain just prior to arrival in EMS. Very atypical in nature. And to evaluate for DVT, likely will need to tap the knee given the swelling severe pain. ED Course  Patient was seen and evaluated. His chest pain I feel is very low suspicion for cardiac in origin as it only developed after riding in the ambulance, he states it was a very rough ride causing his pain. EKGs were nondiagnostic and unremarkable. Patient's troponins were elevated. They have chronically been elevated. Venous values have been 0.18. Today's values are 0.12, both times  by a couple hours. This is also in the setting of renal failure with a creatinine of 5. His potassium was treated. Patient had an ultrasound, x-ray, and arthrocentesis of the right knee due to the large amount of swelling. Gram stain does not show any organisms and only shows white cells. Only 94 neutrophils. Low suspicion for septic arthritis. His pain was controlled in the ED. At this point in time I feel that the patient is not having a life-threatening emergency causing his symptoms. Although his troponins are elevated I do not think he is having a heart attack or an acute coronary syndrome.   I discussed patient's results with him specifically talking about his troponins and what they could potentially mean at this point in time. I feel that the patient can be discharged home and go back to the nursing home for continued care. Instructed him about RI CE therapy. Return precautions reviewed. Patient and  state understanding and agreement. CONSULTS:  None    PROCEDURES:  Unless otherwise noted below, n     Arthrocentesis  Date/Time: 7/23/2019 8:40 PM  Performed by: Abeba Nolen MD  Authorized by: Abeba Nolen MD     Consent:     Consent obtained:  Verbal    Consent given by:  Patient    Risks discussed:  Bleeding, incomplete drainage, infection, nerve damage, pain and poor cosmetic result    Alternatives discussed:  No treatment, delayed treatment, alternative treatment and observation  Location:     Location:  Knee    Knee:  R knee  Anesthesia (see MAR for exact dosages): Anesthesia method:  Topical application    Topical anesthesia: Ethyl chloride. Procedure details:     Preparation: Patient was prepped and draped in usual sterile fashion      Needle gauge:  18 G    Ultrasound guidance: no      Approach:  Lateral    Aspirate amount:  15 cc    Aspirate characteristics:  Blood-tinged, yellow and serous    Steroid injected: no      Specimen collected: yes    Post-procedure details:     Dressing:  Adhesive bandage    Patient tolerance of procedure: Tolerated well, no immediate complications        FINAL IMPRESSION      1. Right leg pain    2.  Acute pain of right knee          DISPOSITION/PLAN   DISPOSITION        PATIENT REFERRED TO:  Jany Bonner MD  [address] [place] [address]  [phone]    Call in 1 day  For post emergency department visit follow-up      DISCHARGE MEDICATIONS:  Discharge Medication List as of 7/23/2019  8:24 PM             (Pleasenote that portions of this note were completed with a voice recognition program.  Efforts were made to edit the

## 2019-07-24 PROBLEM — Z79.01 ANTICOAGULATED BY ANTICOAGULATION TREATMENT: Status: ACTIVE | Noted: 2018-07-16

## 2019-07-24 PROBLEM — Z72.0 TOBACCO ABUSE: Status: ACTIVE | Noted: 2018-03-15

## 2019-07-24 PROBLEM — E78.2 MIXED HYPERLIPIDEMIA: Status: ACTIVE | Noted: 2017-01-09

## 2019-07-24 PROBLEM — K63.5 COLON POLYPS: Status: ACTIVE | Noted: 2018-01-22

## 2019-07-24 PROBLEM — C22.0 HEPATOCELLULAR CARCINOMA (HCC): Status: ACTIVE | Noted: 2018-01-22

## 2019-07-24 LAB
EKG P AXIS: -20 DEGREES
EKG P AXIS: 18 DEGREES
EKG P-R INTERVAL: 160 MS
EKG P-R INTERVAL: 190 MS
EKG Q-T INTERVAL: 406 MS
EKG Q-T INTERVAL: 424 MS
EKG QRS DURATION: 98 MS
EKG QRS DURATION: 98 MS
EKG QTC CALCULATION (BAZETT): 421 MS
EKG QTC CALCULATION (BAZETT): 424 MS
EKG T AXIS: 109 DEGREES
EKG T AXIS: 116 DEGREES

## 2019-07-24 RX ORDER — OXYCODONE HYDROCHLORIDE 10 MG/1
TABLET ORAL
Refills: 0 | COMMUNITY
Start: 2019-06-30 | End: 2019-10-22

## 2019-07-24 RX ORDER — LIDOCAINE AND PRILOCAINE 25; 25 MG/G; MG/G
CREAM TOPICAL
Refills: 6 | Status: ON HOLD | COMMUNITY
Start: 2019-07-19 | End: 2019-12-17 | Stop reason: HOSPADM

## 2019-07-24 RX ORDER — SEVELAMER CARBONATE 800 MG/1
TABLET, FILM COATED ORAL
Refills: 6 | Status: ON HOLD | COMMUNITY
Start: 2019-06-04 | End: 2019-12-11 | Stop reason: ALTCHOICE

## 2019-07-25 ENCOUNTER — TELEPHONE (OUTPATIENT)
Dept: WOUND CARE | Age: 59
End: 2019-07-25

## 2019-07-25 ENCOUNTER — HOSPITAL ENCOUNTER (OUTPATIENT)
Dept: WOUND CARE | Age: 59
Discharge: HOME | End: 2019-07-25
Payer: MEDICARE

## 2019-07-25 VITALS
BODY MASS INDEX: 25.18 KG/M2 | HEIGHT: 69 IN | TEMPERATURE: 98.2 F | RESPIRATION RATE: 18 BRPM | HEART RATE: 73 BPM | SYSTOLIC BLOOD PRESSURE: 128 MMHG | WEIGHT: 170 LBS | DIASTOLIC BLOOD PRESSURE: 57 MMHG

## 2019-07-25 DIAGNOSIS — M25.461 EFFUSION OF KNEE JOINT RIGHT: Primary | ICD-10-CM

## 2019-07-25 DIAGNOSIS — M25.561 ACUTE PAIN OF RIGHT KNEE: ICD-10-CM

## 2019-07-25 DIAGNOSIS — M25.561 PAIN IN JOINT OF RIGHT KNEE: ICD-10-CM

## 2019-07-25 DIAGNOSIS — T14.8XXA NON-HEALING NON-SURGICAL WOUND: ICD-10-CM

## 2019-07-25 PROCEDURE — 97597 DBRDMT OPN WND 1ST 20 CM/<: CPT

## 2019-07-25 PROCEDURE — 97597 DBRDMT OPN WND 1ST 20 CM/<: CPT | Performed by: SURGERY

## 2019-07-25 PROCEDURE — 99214 OFFICE O/P EST MOD 30 MIN: CPT

## 2019-07-25 ASSESSMENT — PAIN SCALES - GENERAL: PAINLEVEL_OUTOF10: 7

## 2019-07-25 ASSESSMENT — PAIN DESCRIPTION - PAIN TYPE: TYPE: SURGICAL PAIN

## 2019-07-25 ASSESSMENT — PAIN DESCRIPTION - LOCATION: LOCATION: LEG

## 2019-07-25 ASSESSMENT — PAIN DESCRIPTION - ONSET: ONSET: ON-GOING

## 2019-07-25 ASSESSMENT — PAIN DESCRIPTION - ORIENTATION: ORIENTATION: RIGHT

## 2019-07-25 ASSESSMENT — PAIN DESCRIPTION - DESCRIPTORS: DESCRIPTORS: ACHING;THROBBING

## 2019-07-25 ASSESSMENT — PAIN DESCRIPTION - FREQUENCY: FREQUENCY: CONTINUOUS

## 2019-07-25 ASSESSMENT — PAIN DESCRIPTION - PROGRESSION: CLINICAL_PROGRESSION: NOT CHANGED

## 2019-07-25 NOTE — PROGRESS NOTES
confusion. All other review of systems are negative. Physical Exam    BP (!) 128/57   Pulse 73   Temp 98.2 °F (36.8 °C) (Temporal)   Resp 18   Ht 5' 9\" (1.753 m)   Wt 170 lb (77.1 kg)   BMI 25.10 kg/m²     Constitutional - pale. No diaphoresis or acute distress. HENT - head normocephalic. Eyes - conjunctiva normal.   No icterus. Neck- ROM appears normal, no tracheal deviation. Extremities - palpable pulse in the bypass  No cyanosis, clubbing. Right groin with stitches. Wound at the lower incision of right leg. Left leg incisions healed  Pulmonary - effort appears normal.  No respiratory distress. .    Genitourinary - deferred. Musculoskeletal - cannot move right leg at the knee joint  Neurologic - alert and oriented X 3. Physiologic. Psychiatric - mood, affect, and behavior appear normal.  Judgment and thought processes appear normal.    Incision 06/25/19 Groin Right (Active)   Wound Image   7/25/2019  9:10 AM   Wound Assessment Pink;Red;Swelling;Tan;Yellow 7/25/2019  9:10 AM   Shaila-wound Assessment Edema;Pink 7/25/2019  9:10 AM   Wound Length (cm) 8 cm 7/25/2019  9:10 AM   Wound Width (cm) 0.2 cm 7/25/2019  9:10 AM   Wound Depth (cm) 0.2 cm 7/25/2019  9:10 AM   Wound Volume (cm^3) 0.32 cm^3 7/25/2019  9:10 AM   Closure Sutures 7/25/2019  9:10 AM   Drainage Amount Small 7/25/2019  9:10 AM   Drainage Description Serosanguinous;Tan;Yellow 7/25/2019  9:10 AM   Odor None 7/25/2019  9:10 AM   Dressing/Treatment Silver Dressing 7/16/2019  9:01 AM   Dressing Changed Changed/New 7/16/2019  9:01 AM   Dressing Status Clean;Dry; Intact 7/16/2019  9:01 AM   Dressing Change Due 07/16/19 7/16/2019  9:01 AM   Number of days: 29       Incision 06/25/19 Leg Left (Active)   Wound Assessment Clean;Dry; Intact 7/16/2019  9:01 AM   Shaila-wound Assessment Clean;Dry; Intact 7/16/2019  9:01 AM   Closure Staples 7/16/2019  9:01 AM   Drainage Amount None 7/16/2019  9:01 AM   Odor None 7/16/2019  9:01 AM   Dressing/Treatment 7/25/2019  9:10 AM   Non-staged Wound Description Full thickness 7/25/2019  9:10 AM   [place]%Wound Bed 40 7/25/2019  9:10 AM   Red%Wound Bed 40 7/25/2019  9:10 AM   Yellow%Wound Bed 20 7/25/2019  9:10 AM   Black%Wound Bed 0 7/25/2019  9:10 AM   Purple%Wound Bed 0 7/25/2019  9:10 AM   Number of days: 0       Wound 07/25/19 Foot Right;Plantar Wound 3. Right plantar foot (Active)   Wound Image   7/25/2019  9:10 AM   Wound Diabetic [place]oto 1 7/25/2019  9:10 AM   Dressing Status Old drainage 7/25/2019  9:10 AM   Dressing Changed Changed/New 7/25/2019 10:57 AM   Dressing/Treatment Wound gel;4x4 7/25/2019 10:57 AM   Wound Cleansed Rinsed/Irrigated with saline 7/25/2019  9:10 AM   Wound Length (cm) 0.5 cm 7/25/2019  9:10 AM   Wound Width (cm) 0.7 cm 7/25/2019  9:10 AM   Wound Depth (cm) 0.1 cm 7/25/2019  9:10 AM   Wound Surface Area (cm^2) 0.35 cm^2 7/25/2019  9:10 AM   Wound Volume (cm^3) 0.04 cm^3 7/25/2019  9:10 AM   Distance Tunneling (cm) 0 cm 7/25/2019  9:10 AM   Tunneling Position ___ O'Clock 0 7/25/2019  9:10 AM   Undermining Starts ___ O'Clock 0 7/25/2019  9:10 AM   Undermining Ends___ O'Clock 0 7/25/2019  9:10 AM   Undermining Maxium Distance (cm) 0 7/25/2019  9:10 AM   Wound Assessment Pink 7/25/2019  9:10 AM   Drainage Amount Small 7/25/2019  9:10 AM   Drainage Description Serosanguinous 7/25/2019  9:10 AM   Odor None 7/25/2019  9:10 AM   Margins Defined edges 7/25/2019  9:10 AM   Shaila-wound Assessment [place] 7/25/2019  9:10 AM   [place]%Wound Bed 80 7/25/2019  9:10 AM   Red%Wound Bed 0 7/25/2019  9:10 AM   Yellow%Wound Bed 20 7/25/2019  9:10 AM   Black%Wound Bed 0 7/25/2019  9:10 AM   Purple%Wound Bed 0 7/25/2019  9:10 AM   Number of days: 0      The patients pain isPain Level: 7 Pain Type: Surgical pain. Wound is seen first time since surgery. Please refer to nursing measurements and assessment regarding wound pre and post debridement.     Procedure Note: wound 2    Debridement: Non-excisional

## 2019-07-28 LAB
ANAEROBIC CULTURE: NORMAL
BODY FLUID CULTURE, STERILE: NORMAL
GRAM STAIN RESULT: NORMAL

## 2019-08-01 ENCOUNTER — HOSPITAL ENCOUNTER (OUTPATIENT)
Dept: WOUND CARE | Age: 59
Discharge: HOME | End: 2019-08-01
Payer: COMMERCIAL

## 2019-08-01 VITALS
TEMPERATURE: 97 F | DIASTOLIC BLOOD PRESSURE: 67 MMHG | BODY MASS INDEX: 21.14 KG/M2 | SYSTOLIC BLOOD PRESSURE: 166 MMHG | HEART RATE: 83 BPM | RESPIRATION RATE: 18 BRPM | HEIGHT: 75 IN | WEIGHT: 170 LBS

## 2019-08-01 DIAGNOSIS — I73.9 PVD (PERIPHERAL VASCULAR DISEASE) (HCC): ICD-10-CM

## 2019-08-01 DIAGNOSIS — T81.49XA WOUND INFECTION AFTER SURGERY: Primary | ICD-10-CM

## 2019-08-01 PROCEDURE — 97598 DBRDMT OPN WND ADDL 20CM/<: CPT

## 2019-08-01 PROCEDURE — 97598 DBRDMT OPN WND ADDL 20CM/<: CPT | Performed by: SURGERY

## 2019-08-01 PROCEDURE — 97597 DBRDMT OPN WND 1ST 20 CM/<: CPT | Performed by: SURGERY

## 2019-08-01 PROCEDURE — 97597 DBRDMT OPN WND 1ST 20 CM/<: CPT

## 2019-08-01 ASSESSMENT — PAIN DESCRIPTION - PAIN TYPE: TYPE: CHRONIC PAIN

## 2019-08-01 ASSESSMENT — PAIN DESCRIPTION - DESCRIPTORS: DESCRIPTORS: ACHING

## 2019-08-01 ASSESSMENT — PAIN SCALES - GENERAL: PAINLEVEL_OUTOF10: 7

## 2019-08-01 ASSESSMENT — PAIN DESCRIPTION - ORIENTATION: ORIENTATION: RIGHT

## 2019-08-01 ASSESSMENT — PAIN DESCRIPTION - LOCATION: LOCATION: LEG

## 2019-08-01 ASSESSMENT — PAIN DESCRIPTION - ONSET: ONSET: ON-GOING

## 2019-08-01 ASSESSMENT — PAIN DESCRIPTION - FREQUENCY: FREQUENCY: CONTINUOUS

## 2019-08-01 ASSESSMENT — PAIN DESCRIPTION - PROGRESSION: CLINICAL_PROGRESSION: NOT CHANGED

## 2019-08-01 NOTE — PROGRESS NOTES
of knee joint right M25.461       ALLERGIES    No Known Allergies    Incision 06/25/19 Foot Right (Active)   Wound Assessment [facility] 7/8/2019  8:00 AM   Shaila-wound Assessment [facility] 7/8/2019  8:00 AM   Closure G[place]ON 7/8/2019  8:00 AM   Drainage Amount None 7/8/2019  8:00 AM   Odor None 7/8/2019  8:00 AM   Dressing/Treatment Silicone Dressing 2/0/1769  8:00 AM   Dressing Changed Changed/New 7/8/2019  8:00 AM   Dressing Status Clean;Dry; Intact 7/8/2019  8:00 AM   Dressing Change Due 06/28/19 7/8/2019  8:00 AM   Number of days: 36       Incision 06/25/19 Thigh Anterior;Right (Active)   Wound Assessment [facility] 7/16/2019  9:39 AM   Shaila-wound Assessment [facility] 7/16/2019  9:39 AM   Closure G[place]ON 7/8/2019  8:00 AM   Drainage Amount Small 7/16/2019  9:39 AM   Drainage Description Sanguinous 7/16/2019  9:39 AM   Odor None 7/16/2019  9:39 AM   Dressing/Treatment Silicone Dressing 3/01/1158  9:39 AM   Dressing Changed Changed/New 7/16/2019  9:39 AM   Dressing Status Clean;Dry; Intact 7/16/2019  9:39 AM   Dressing Change Due 07/16/19 7/16/2019  9:39 AM   Number of days: 37       Incision 06/25/19 Pretibial Right (Active)   Number of days: 37       Objective:      BP (!) 166/67   Pulse 83   Temp 97 °F (36.1 °C) (Temporal)   Resp 18   Ht 6' 3\" (1.905 m)   Wt 170 lb (77.1 kg)   BMI 21.25 kg/m²     Post Debridement Measurements and Assessment:  Wound 07/25/19 Leg Right;Medial;Distal Wound 1.   Right medial leg - distal (Active)   Wound Image   8/1/2019  8:57 AM   Wound Non-Healing Surgical 8/1/2019  8:57 AM   Dressing Status Old drainage 8/1/2019  8:57 AM   Dressing Changed Changed/New 7/25/2019 10:57 AM   Wound Cleansed Rinsed/Irrigated with saline 8/1/2019  8:57 AM   Wound Length (cm) 14.5 cm 8/1/2019  8:57 AM   Wound Width (cm) 4 cm 8/1/2019  8:57 AM   Wound Depth (cm) 2.5 cm 8/1/2019  8:57 AM   Wound Surface Area (cm^2) 58 cm^2 8/1/2019  8:57 AM   Change in Wound Size % (l*w) -28.89 8/1/2019  8:57 AM   Wound Volume (cm^3) 145 cm^3 8/1/2019 8: 62 AM   Undermining Starts ___ O'Clock 0 8/1/2019  8:57 AM   Undermining Ends___ O'Clock 0 8/1/2019  8:57 AM   Undermining Maxium Distance (cm) 0 8/1/2019  8:57 AM   Wound Assessment Pink;Red 8/1/2019  8:57 AM   Drainage Amount Moderate 8/1/2019  8:57 AM   Drainage Description Serosanguinous 8/1/2019  8:57 AM   Odor None 8/1/2019  8:57 AM   Margins Defined edges 8/1/2019  8:57 AM   Exposed structure Muscle 8/1/2019  8:57 AM   Shaila-wound Assessment Clean;Dry 8/1/2019  8:57 AM   Non-staged Wound Description Full thickness 8/1/2019  8:57 AM   [place]%Wound Bed 40 8/1/2019  8:57 AM   Red%Wound Bed 40 8/1/2019  8:57 AM   Yellow%Wound Bed 20 8/1/2019  8:57 AM   Black%Wound Bed 0 8/1/2019  8:57 AM   Purple%Wound Bed 0 8/1/2019  8:57 AM   Number of days: 6       Wound 07/25/19 Foot Right;Plantar Wound 3.   Right plantar foot (Active)   Wound Image   8/1/2019  8:57 AM   Wound Diabetic [place]oto 1 8/1/2019  8:57 AM   Dressing Status Old drainage 8/1/2019  8:57 AM   Dressing Changed Changed/New 7/25/2019 10:57 AM   Dressing/Treatment Wound gel;4x4 7/25/2019 10:57 AM   Wound Cleansed Rinsed/Irrigated with saline 8/1/2019  8:57 AM   Wound Length (cm) 0.5 cm 8/1/2019  8:57 AM   Wound Width (cm) 0.6 cm 8/1/2019  8:57 AM   Wound Depth (cm) 0.1 cm 8/1/2019  8:57 AM   Wound Surface Area (cm^2) 0.3 cm^2 8/1/2019  8:57 AM   Change in Wound Size % (l*w) 14.29 8/1/2019  8:57 AM   Wound Volume (cm^3) 0.03 cm^3 8/1/2019  8:57 AM   Wound Healing % 25 8/1/2019  8:57 AM   Distance Tunneling (cm) 0 cm 8/1/2019  8:57 AM   Tunneling Position ___ O'Clock 0 8/1/2019  8:57 AM   Undermining Starts ___ O'Clock 0 8/1/2019  8:57 AM   Undermining Ends___ O'Clock 0 8/1/2019  8:57 AM   Undermining Maxium Distance (cm) 0 8/1/2019  8:57 AM   Wound Assessment Pink 8/1/2019  8:57 AM   Drainage Amount Small 8/1/2019  8:57 AM   Drainage Description Serosanguinous 8/1/2019  8:57 AM   Odor None 8/1/2019  8:57 AM   Margins Defined edges 8/1/2019  8:57 AM   Shaila-wound

## 2019-08-08 ENCOUNTER — HOSPITAL ENCOUNTER (OUTPATIENT)
Dept: WOUND CARE | Age: 59
Discharge: HOME | End: 2019-08-08
Payer: MEDICARE

## 2019-08-08 VITALS
HEIGHT: 75 IN | SYSTOLIC BLOOD PRESSURE: 138 MMHG | DIASTOLIC BLOOD PRESSURE: 50 MMHG | BODY MASS INDEX: 21.14 KG/M2 | WEIGHT: 170 LBS | HEART RATE: 67 BPM | RESPIRATION RATE: 18 BRPM | TEMPERATURE: 96.9 F

## 2019-08-08 DIAGNOSIS — T14.8XXA NON-HEALING NON-SURGICAL WOUND: ICD-10-CM

## 2019-08-08 DIAGNOSIS — L97.422 ULCER OF LEFT HEEL, WITH FAT LAYER EXPOSED (HCC): Primary | ICD-10-CM

## 2019-08-08 DIAGNOSIS — T81.49XA WOUND INFECTION AFTER SURGERY: ICD-10-CM

## 2019-08-08 DIAGNOSIS — L97.909 ATHEROSCLEROSIS OF ARTERY OF EXTREMITY WITH ULCERATION (HCC): ICD-10-CM

## 2019-08-08 DIAGNOSIS — I70.299 ATHEROSCLEROSIS OF ARTERY OF EXTREMITY WITH ULCERATION (HCC): ICD-10-CM

## 2019-08-08 PROCEDURE — 97598 DBRDMT OPN WND ADDL 20CM/<: CPT | Performed by: SURGERY

## 2019-08-08 PROCEDURE — 97598 DBRDMT OPN WND ADDL 20CM/<: CPT

## 2019-08-08 PROCEDURE — 97597 DBRDMT OPN WND 1ST 20 CM/<: CPT

## 2019-08-08 PROCEDURE — 97597 DBRDMT OPN WND 1ST 20 CM/<: CPT | Performed by: SURGERY

## 2019-08-08 ASSESSMENT — PAIN DESCRIPTION - ONSET: ONSET: ON-GOING

## 2019-08-08 ASSESSMENT — PAIN DESCRIPTION - FREQUENCY: FREQUENCY: CONTINUOUS

## 2019-08-08 ASSESSMENT — PAIN DESCRIPTION - ORIENTATION: ORIENTATION: RIGHT

## 2019-08-08 ASSESSMENT — PAIN DESCRIPTION - PROGRESSION: CLINICAL_PROGRESSION: NOT CHANGED

## 2019-08-08 ASSESSMENT — PAIN DESCRIPTION - PAIN TYPE: TYPE: CHRONIC PAIN

## 2019-08-08 ASSESSMENT — PAIN DESCRIPTION - DESCRIPTORS: DESCRIPTORS: ACHING

## 2019-08-08 ASSESSMENT — PAIN SCALES - GENERAL: PAINLEVEL_OUTOF10: 7

## 2019-08-08 ASSESSMENT — PAIN DESCRIPTION - LOCATION: LOCATION: LEG

## 2019-08-08 NOTE — PLAN OF CARE
Problem: Pain:  Goal: Pain level will decrease  Description  Pain level will decrease  Outcome: Ongoing  Goal: Control of acute pain  Description  Control of acute pain  Outcome: Ongoing  Goal: Control of chronic pain  Description  Control of chronic pain  Outcome: Ongoing     Problem: Wound:  Goal: Will show signs of wound healing; wound closure and no evidence of infection  Description  Will show signs of wound healing; wound closure and no evidence of infection  Outcome: Ongoing     Problem: Arterial:  Goal: Optimize blood flow for wound healing  Description  Optimize blood flow for wound healing  Outcome: Ongoing     Problem: Falls - Risk of:  Goal: Will remain free from falls  Description  Will remain free from falls  Outcome: Ongoing

## 2019-08-08 NOTE — PROGRESS NOTES
right M25.561    Effusion of knee joint right M25.461       ALLERGIES    No Known Allergies    Incision 06/25/19 Foot Right (Active)   Wound Assessment [facility] 7/8/2019  8:00 AM   Shaila-wound Assessment [facility] 7/8/2019  8:00 AM   Closure [facility] 7/8/2019  8:00 AM   Drainage Amount None 7/8/2019  8:00 AM   Odor None 7/8/2019  8:00 AM   Dressing/Treatment Silicone Dressing 6/4/8486  8:00 AM   Dressing Changed Changed/New 7/8/2019  8:00 AM   Dressing Status Clean;Dry; Intact 7/8/2019  8:00 AM   Dressing Change Due 06/28/19 7/8/2019  8:00 AM   Number of days: 43       Incision 06/25/19 Thigh Anterior;Right (Active)   Wound Assessment [facility] 7/16/2019  9:39 AM   Shaila-wound Assessment [facility] 7/16/2019  9:39 AM   Closure [facility] 7/8/2019  8:00 AM   Drainage Amount Small 7/16/2019  9:39 AM   Drainage Description Sanguinous 7/16/2019  9:39 AM   Odor None 7/16/2019  9:39 AM   Dressing/Treatment Silicone Dressing 3/61/5093  9:39 AM   Dressing Changed Changed/New 7/16/2019  9:39 AM   Dressing Status Clean;Dry; Intact 7/16/2019  9:39 AM   Dressing Change Due 07/16/19 7/16/2019  9:39 AM   Number of days: 44       Incision 06/25/19 Pretibial Right (Active)   Number of days: 44       Objective:      BP (!) 138/50   Pulse 67   Temp 96.9 °F (36.1 °C) (Temporal)   Resp 18   Ht 6' 3\" (1.905 m)   Wt 170 lb (77.1 kg)   BMI 21.25 kg/m²     Post Debridement Measurements and Assessment:  Wound 07/25/19 Leg Right;Medial;Distal Wound 1. Right medial leg - distal (Active)   Wound Image    8/8/2019  8:23 AM   Wound Non-Healing Surgical 8/8/2019  8:23 AM   Dressing Status Old drainage; Intact 8/8/2019  8:23 AM   Dressing Changed Changed/New 8/1/2019  9:41 AM   Wound Cleansed Rinsed/Irrigated with saline 8/8/2019  8:23 AM   Wound Length (cm) 14.5 cm 8/8/2019  8:23 AM   Wound Width (cm) 3.5 cm 8/8/2019  8:23 AM   Wound Depth (cm) 1.5 cm 8/8/2019  8:23 AM   Wound Surface Area (cm^2) 50.75 cm^2 8/8/2019  8:23 AM   Change in Wound Size % (l*w) -12.78 8/8/2019  8:23 [place]; Yellow;Pink;Red 8/8/2019  8:23 AM   Drainage Amount Moderate 8/8/2019  8:23 AM   Drainage Description Serosanguinous 8/8/2019  8:23 AM   Odor None 8/8/2019  8:23 AM   Margins Attached edges 8/8/2019  8:23 AM   Shaila-wound Assessment Dry; Intact; Pink 8/8/2019  8:23 AM   Non-staged Wound Description Full thickness 8/8/2019  8:23 AM   [place]%Wound Bed 45 8/8/2019  8:23 AM   Red%Wound Bed 45 8/8/2019  8:23 AM   Yellow%Wound Bed 10 8/8/2019  8:23 AM   Black%Wound Bed 0 8/1/2019  8:57 AM   Purple%Wound Bed 0 8/1/2019  8:57 AM   Other%Wound Bed 0 8/1/2019  8:57 AM   Number of days: 7       Wound 08/08/19 Heel Right;Posterior WOUND 5 RIGHT HEEL DIABETIC WAG 1 (Active)   Wound Image    8/8/2019  8:23 AM   Wound Diabetic [place] 1 8/8/2019  8:23 AM   Dressing Status Old drainage; Intact 8/8/2019  8:23 AM   Wound Cleansed Rinsed/Irrigated with saline 8/8/2019  8:23 AM   Wound Length (cm) 2 cm 8/8/2019  8:23 AM   Wound Width (cm) 1.4 cm 8/8/2019  8:23 AM   Wound Depth (cm) 0.2 cm 8/8/2019  8:23 AM   Wound Surface Area (cm^2) 2.8 cm^2 8/8/2019  8:23 AM   Wound Volume (cm^3) 0.56 cm^3 8/8/2019  8:23 AM   Post-Procedure Length (cm) 2 cm 8/8/2019  9:16 AM   Post-Procedure Width (cm) 1.4 cm 8/8/2019  9:16 AM   Post-Procedure Depth (cm) 0.2 cm 8/8/2019  9:16 AM   Post-Procedure Surface Area (cm^2) 2.8 cm^2 8/8/2019  9:16 AM   Post-Procedure Volume (cm^3) 0.56 cm^3 8/8/2019  9:16 AM   Wound Assessment Pink;Red;[place]; Yellow 8/8/2019  8:23 AM   Drainage Amount Moderate 8/8/2019  8:23 AM   Drainage Description Serosanguinous 8/8/2019  8:23 AM   Odor None 8/8/2019  8:23 AM   Margins Attached edges 8/8/2019  8:23 AM   Shaila-wound Assessment Dry; Intact; Pink 8/8/2019  8:23 AM   Non-staged Wound Description Not applicable 9/4/4828  3:44 AM   [place]%Wound Bed 45 8/8/2019  8:23 AM   Red%Wound Bed 45 8/8/2019  8:23 AM   Yellow%Wound Bed 10 8/8/2019  8:23 AM   Number of days: 0          The patients pain isPain Level: 7 Pain Type: Chronic

## 2019-08-12 ENCOUNTER — TELEPHONE (OUTPATIENT)
Dept: VASCULAR SURGERY | Age: 59
End: 2019-08-12

## 2019-08-12 NOTE — TELEPHONE ENCOUNTER
This is a [facility] patient, not a Vascular patient. Can you please forward to the [address]?

## 2019-08-12 NOTE — TELEPHONE ENCOUNTER
Pt wants to resched. testing and office visit  unable to do it on thursday because of dialysis. Please contact pt back to sched.

## 2019-08-13 ENCOUNTER — TELEPHONE (OUTPATIENT)
Dept: VASCULAR SURGERY | Age: 59
End: 2019-08-13

## 2019-08-13 NOTE — TELEPHONE ENCOUNTER
The pt called c/o RLE pain. He asked if he could get a RX for pain medication. Mary Stephen in regards to this request. Per Dr. Caren Stephen the pt will need an appt in wound care tomorrow. Evangelist Patel in the wound care office. She was going to correlate an appt with Mago GALLARDO and contact the pt. I left the pt a vm with this information. Pt is aware via vm.

## 2019-08-14 ENCOUNTER — HOSPITAL ENCOUNTER (OUTPATIENT)
Dept: NON INVASIVE DIAGNOSTICS | Age: 59
Discharge: HOME | End: 2019-08-14
Payer: MEDICARE

## 2019-08-14 ENCOUNTER — HOSPITAL ENCOUNTER (OUTPATIENT)
Dept: WOUND CARE | Age: 59
Discharge: HOME | End: 2019-08-14
Payer: MEDICARE

## 2019-08-14 VITALS
TEMPERATURE: 97.9 F | WEIGHT: 170 LBS | DIASTOLIC BLOOD PRESSURE: 54 MMHG | RESPIRATION RATE: 18 BRPM | BODY MASS INDEX: 21.14 KG/M2 | HEIGHT: 75 IN | HEART RATE: 71 BPM | SYSTOLIC BLOOD PRESSURE: 155 MMHG

## 2019-08-14 DIAGNOSIS — I70.299 ATHEROSCLEROSIS OF ARTERY OF EXTREMITY WITH ULCERATION (HCC): ICD-10-CM

## 2019-08-14 DIAGNOSIS — L97.512 ULCER OF RIGHT FOOT, WITH FAT LAYER EXPOSED (HCC): Chronic | ICD-10-CM

## 2019-08-14 DIAGNOSIS — T14.8XXA NON-HEALING NON-SURGICAL WOUND: ICD-10-CM

## 2019-08-14 DIAGNOSIS — L97.422 ULCER OF LEFT HEEL, WITH FAT LAYER EXPOSED (HCC): ICD-10-CM

## 2019-08-14 DIAGNOSIS — L97.909 ATHEROSCLEROSIS OF ARTERY OF EXTREMITY WITH ULCERATION (HCC): ICD-10-CM

## 2019-08-14 DIAGNOSIS — L97.412 SKIN ULCER OF RIGHT HEEL WITH FAT LAYER EXPOSED (HCC): Chronic | ICD-10-CM

## 2019-08-14 DIAGNOSIS — T82.599A: ICD-10-CM

## 2019-08-14 PROCEDURE — 97597 DBRDMT OPN WND 1ST 20 CM/<: CPT

## 2019-08-14 PROCEDURE — 97597 DBRDMT OPN WND 1ST 20 CM/<: CPT | Performed by: SURGERY

## 2019-08-14 PROCEDURE — 93922 UPR/L XTREMITY ART 2 LEVELS: CPT

## 2019-08-14 PROCEDURE — 93925 LOWER EXTREMITY STUDY: CPT

## 2019-08-14 PROCEDURE — 97598 DBRDMT OPN WND ADDL 20CM/<: CPT | Performed by: SURGERY

## 2019-08-14 ASSESSMENT — PAIN DESCRIPTION - ORIENTATION: ORIENTATION: RIGHT

## 2019-08-14 ASSESSMENT — PAIN DESCRIPTION - DESCRIPTORS: DESCRIPTORS: ACHING;NAGGING;SORE

## 2019-08-14 ASSESSMENT — PAIN DESCRIPTION - PAIN TYPE: TYPE: ACUTE PAIN;CHRONIC PAIN

## 2019-08-14 ASSESSMENT — PAIN SCALES - GENERAL: PAINLEVEL_OUTOF10: 6

## 2019-08-14 ASSESSMENT — PAIN DESCRIPTION - FREQUENCY: FREQUENCY: CONTINUOUS

## 2019-08-14 ASSESSMENT — PAIN - FUNCTIONAL ASSESSMENT: PAIN_FUNCTIONAL_ASSESSMENT: PREVENTS OR INTERFERES SOME ACTIVE ACTIVITIES AND ADLS

## 2019-08-14 ASSESSMENT — PAIN DESCRIPTION - LOCATION: LOCATION: LEG

## 2019-08-14 ASSESSMENT — PAIN DESCRIPTION - ONSET: ONSET: ON-GOING

## 2019-08-14 ASSESSMENT — PAIN DESCRIPTION - PROGRESSION: CLINICAL_PROGRESSION: NOT CHANGED

## 2019-08-14 NOTE — LETTER
1. [facility] (go in the front door and to the left) on Friday, 19 at 7:00 am.  2. Nothing to eat or drink after midnight the night before the procedure. 3. Please take all heart and blood pressure medicines with a sip of water. 4. Do not take Lasix, insulin, or any diabetic medicine the morning of the procedure. If you take insulin, you may only take 1/2 of any scheduled nightly dose, and none the morning of the  procedure. You may take all  heart, cholesterol, and blood pressure medicines with a sip of water. 5. If you take Glucophage, Metformin, Actos Plus Met, or Glucovance,please tell our nurse. it may interfere with some of the medications given during surgery. 6. Hold Plavix/Coumadin NOW  prior to surgery. 7. If you have sleep apnea and require C-PAP, please bring this with you to the hospital.  8. Bring a list of all of your allergies and medications with you to the hospital.  9. Please let our nurse know if you have had an allergy to iodine, shellfish, or x-ray dye. 10. Let the nurse know if you take any of the followin. Over the counter herbal supplements  2. Diclofenec, indomethacin, ketoprofen, Caridopa/levadopa, naproxen, sulindac, piroxicam, glucosamine, Chondrotin, cocchine, or methotrexate. 11. Plan to stay at the hospital for 4 - 6 hours before being released  by the physician. You will need someone to drive you home after the procedure. 12. Medications instructed to hold: See above  13. Please stop at your local [employer] or pharmacy and buy a bottle of Hibiclens. Wash thoroughly with this the night before and the morning of the procedure, paying special attention to the area  that will be operated on. Make sure you rinse very well. The Hibiclens should only be used prior to surgery.       PLEASE NOTE:  If the patient is unable to sign his/her own paperwork, the appointed

## 2019-08-14 NOTE — PROGRESS NOTES
syndrome of dialysis vascular access [facility]) T82.898A    Atherosclerosis of artery of extremity with ulceration (HCC) I70.299, L97.909    Atherosclerosis of artery of extremity with rest pain (HCC) I70.229    Ulcer of left heel, with fat layer exposed ([address] L97.422    Anemia, chronic disease D63.8    Iatrogenic complication of vascular surgery T81. 9XXA    Wound infection after surgery T81.49XA    Pacemaker Z95.0    Hepatitis C, chronic (HCC) B18.2    PVD (peripheral vascular disease) (HCC) I73.9    Hyponatremia E87.1    Normocytic anemia D64.9    Thrombocytopenia (HCC) D69.6    Palliative care patient Z51.5    Sepsis ([address] A41.9    Wound infection T14. 8XXA, J35.2    Alcoholic cirrhosis of liver with ascites ([address] K70.31    Anticoagulated by anticoagulation treatment Z79.01    CHF (congestive heart failure) (HCC) I50.9    Colon polyps K63.5    Dialysis patient ([address] Z99.2    Hepatocellular carcinoma ([address] C22.0    Mixed hyperlipidemia E78.2    Spontaneous bacterial peritonitis ([address] K65.2    Tobacco abuse Z72.0    Non-healing non-surgical wound T14. 8XXA    Knee pain, right M25.561    Effusion of knee joint right M25.461       Zoila Timmons is a 61 y.o. male with the following history reviewed and recorded in [facility]:  Patient Active Problem List    Diagnosis Date Noted    Non-healing non-surgical wound 07/25/2019    Knee pain, right 07/25/2019    Effusion of knee joint right     Sepsis ([address]     Wound infection     Palliative care patient 07/09/2019    Iatrogenic complication of vascular surgery 07/08/2019    Wound infection after surgery 07/08/2019    Hyponatremia 07/08/2019    Normocytic anemia 07/08/2019    Thrombocytopenia ([address] 07/08/2019    Pacemaker     Hepatitis C, chronic (HCC)     PVD (peripheral vascular disease) ([address]     Anemia, chronic disease 06/26/2019    Anticoagulated by anticoagulation treatment 07/16/2018     He is on Plavix which is on hold due to TACE. Wound Surface Area (cm^2) 54.25 cm^2 8/14/2019 12:37 PM   Change in Wound Size % (l*w) -20.56 8/14/2019 12:37 PM   Wound Volume (cm^3) 86.8 cm^3 8/14/2019 12:37 PM   Wound Healing % -21 8/14/2019 12:37 PM   Post-Procedure Length (cm) 14.5 cm 8/8/2019  9:16 AM   Post-Procedure Width (cm) 3.5 cm 8/8/2019  9:16 AM   Post-Procedure Depth (cm) 1.5 cm 8/8/2019  9:16 AM   Post-Procedure Surface Area (cm^2) 50.75 cm^2 8/8/2019  9:16 AM   Post-Procedure Volume (cm^3) 76.12 cm^3 8/8/2019  9:16 AM   Distance Tunneling (cm) 0 cm 8/14/2019 12:37 PM   Tunneling Position ___ O'Clock 0 8/14/2019 12:37 PM   Undermining Starts ___ O'Clock 0 8/14/2019 12:37 PM   Undermining Ends___ O'Clock 0 8/14/2019 12:37 PM   Undermining Maxium Distance (cm) 0 8/14/2019 12:37 PM   Wound Assessment Pink;Red;[place]; Tan 8/14/2019 12:37 PM   Drainage Amount Moderate 8/14/2019 12:37 PM   Drainage Description Harvey;Yellow 8/14/2019 12:37 PM   Odor None 8/14/2019 12:37 PM   Margins Attached edges 8/14/2019 12:37 PM   Exposed structure Muscle 8/14/2019 12:37 PM   Shaila-wound Assessment Hyperpigmented 8/14/2019 12:37 PM   Non-staged Wound Description Full thickness 8/14/2019 12:37 PM   [place]%Wound Bed 40 8/14/2019 12:37 PM   Red%Wound Bed 40 8/14/2019 12:37 PM   Yellow%Wound Bed 20 8/14/2019 12:37 PM   Black%Wound Bed 0 8/14/2019 12:37 PM   Purple%Wound Bed 0 8/14/2019 12:37 PM   Number of days: 20       Wound 07/25/19 Leg Right;Mid;Proximal;Medial Wound 2.   Right medial leg- proximal (Active)   Wound Image   8/14/2019 12:37 PM   Wound Non-Healing Surgical 8/14/2019 12:37 PM   Dressing Status Old drainage 8/14/2019 12:37 PM   Dressing Changed Changed/New 8/8/2019 10:23 AM   Dressing/Treatment Wound gel;4x4 7/25/2019 10:57 AM   Wound Cleansed Rinsed/Irrigated with saline 8/14/2019 12:37 PM   Wound Length (cm) 0 cm 8/14/2019 12:37 PM   Wound Width (cm) 0 cm 8/14/2019 12:37 PM   Wound Depth (cm) 0 cm 8/14/2019 12:37 PM   Wound Surface Area (cm^2) 0 cm^2 8/14/2019 12:37 PM   Change in Wound Size % (l*w) 100 8/14/2019 12:37 PM   Wound Volume (cm^3) 0 cm^3 8/14/2019 12:37 PM   Wound Healing % 100 8/14/2019 12:37 PM   Post-Procedure Length (cm) 0 cm 8/14/2019 12:37 PM   Post-Procedure Width (cm) 0 cm 8/14/2019 12:37 PM   Post-Procedure Depth (cm) 0 cm 8/14/2019 12:37 PM   Post-Procedure Surface Area (cm^2) 0 cm^2 8/14/2019 12:37 PM   Post-Procedure Volume (cm^3) 0 cm^3 8/14/2019 12:37 PM   Distance Tunneling (cm) 0 cm 8/14/2019 12:37 PM   Tunneling Position ___ O'Clock 0 8/14/2019 12:37 PM   Undermining Starts ___ O'Clock 0 8/14/2019 12:37 PM   Undermining Ends___ O'Clock 0 8/14/2019 12:37 PM   Undermining Maxium Distance (cm) 0 8/14/2019 12:37 PM   Wound Assessment Epithelialization 8/14/2019 12:37 PM   Drainage Amount Scant 8/14/2019 12:37 PM   Drainage Description [place];Yellow 8/14/2019 12:37 PM   Odor None 8/14/2019 12:37 PM   Margins Attached edges 8/14/2019 12:37 PM   Exposed structure Muscle 8/1/2019  8:57 AM   Shaila-wound Assessment Dry; Intact 8/14/2019 12:37 PM   Non-staged Wound Description Full thickness 8/14/2019 12:37 PM   [place]%Wound Bed 0 8/14/2019 12:37 PM   Red%Wound Bed 0 8/14/2019 12:37 PM   Yellow%Wound Bed 0 8/14/2019 12:37 PM   Black%Wound Bed 0 8/14/2019 12:37 PM   Purple%Wound Bed 0 8/14/2019 12:37 PM   Other%Wound Bed 0 8/14/2019 12:37 PM   Number of days: 20       Wound 07/25/19 Foot Right;Plantar Wound 3.   Right plantar foot (Active)   Wound Image   8/14/2019 12:37 PM   Wound Diabetic [place] 1 8/14/2019 12:37 PM   Dressing Status Old drainage 8/14/2019 12:37 PM   Dressing Changed Changed/New 8/8/2019 10:23 AM   Dressing/Treatment Wound gel;4x4 8/8/2019 10:23 AM   Wound Cleansed Rinsed/Irrigated with saline 8/14/2019 12:37 PM   Wound Length (cm) 0.3 cm 8/14/2019 12:37 PM   Wound Width (cm) 0.5 cm 8/14/2019 12:37 PM   Wound Depth (cm) 0.1 cm 8/14/2019 12:37 PM   Wound Surface Area (cm^2) 0.15 cm^2 8/14/2019 12:37 PM   Change in Wound Size % (l*w) Position ___ O'Clock 0 8/14/2019 12:37 PM   Undermining Starts ___ O'Clock 0 8/14/2019 12:37 PM   Undermining Ends___ O'Clock 0 8/14/2019 12:37 PM   Undermining Maxium Distance (cm) 0 8/14/2019 12:37 PM   Wound Assessment Red 8/14/2019 12:37 PM   Drainage Amount Small 8/14/2019 12:37 PM   Drainage Description Sanguinous 8/14/2019 12:37 PM   Odor None 8/14/2019 12:37 PM   Margins Attached edges 8/14/2019 12:37 PM   Shaila-wound Assessment Dry; Intact 8/14/2019 12:37 PM   Non-staged Wound Description Full thickness 8/8/2019  8:23 AM   [place]%Wound Bed 0 8/14/2019 12:37 PM   Red%Wound Bed 100 8/14/2019 12:37 PM   Yellow%Wound Bed 0 8/14/2019 12:37 PM   Black%Wound Bed 0 8/14/2019 12:37 PM   Purple%Wound Bed 0 8/14/2019 12:37 PM   Other%Wound Bed 0 8/14/2019 12:37 PM   Number of days: 13       Wound 08/08/19 Heel Right;Posterior WOUND 5 RIGHT HEEL DIABETIC WAG 1 (Active)   Wound Image   8/14/2019 12:37 PM   Wound Diabetic [place] 1 8/14/2019 12:37 PM   Dressing Status Old drainage 8/14/2019 12:37 PM   Dressing Changed Changed/New 8/8/2019 10:23 AM   Dressing/Treatment Hydrocolloid 8/8/2019 10:23 AM   Wound Cleansed Rinsed/Irrigated with saline 8/14/2019 12:37 PM   Wound Length (cm) 2 cm 8/14/2019 12:37 PM   Wound Width (cm) 1 cm 8/14/2019 12:37 PM   Wound Depth (cm) 0.1 cm 8/14/2019 12:37 PM   Wound Surface Area (cm^2) 2 cm^2 8/14/2019 12:37 PM   Change in Wound Size % (l*w) 28.57 8/14/2019 12:37 PM   Wound Volume (cm^3) 0.2 cm^3 8/14/2019 12:37 PM   Wound Healing % 64 8/14/2019 12:37 PM   Post-Procedure Length (cm) 2 cm 8/8/2019  9:16 AM   Post-Procedure Width (cm) 1.4 cm 8/8/2019  9:16 AM   Post-Procedure Depth (cm) 0.2 cm 8/8/2019  9:16 AM   Post-Procedure Surface Area (cm^2) 2.8 cm^2 8/8/2019  9:16 AM   Post-Procedure Volume (cm^3) 0.56 cm^3 8/8/2019  9:16 AM   Distance Tunneling (cm) 0 cm 8/14/2019 12:37 PM   Tunneling Position ___ O'Clock 0 8/14/2019 12:37 PM   Undermining Starts ___ O'Clock 0 8/14/2019 12:37 PM

## 2019-08-16 ENCOUNTER — HOSPITAL ENCOUNTER (OUTPATIENT)
Dept: INTERVENTIONAL RADIOLOGY/VASCULAR | Age: 59
Discharge: HOME | End: 2019-08-16
Payer: MEDICARE

## 2019-08-16 ENCOUNTER — PREP FOR PROCEDURE (OUTPATIENT)
Dept: VASCULAR SURGERY | Age: 59
End: 2019-08-16

## 2019-08-16 VITALS
DIASTOLIC BLOOD PRESSURE: 61 MMHG | HEIGHT: 68 IN | RESPIRATION RATE: 18 BRPM | WEIGHT: 174 LBS | OXYGEN SATURATION: 95 % | TEMPERATURE: 98.9 F | SYSTOLIC BLOOD PRESSURE: 155 MMHG | HEART RATE: 83 BPM | BODY MASS INDEX: 26.37 KG/M2

## 2019-08-16 DIAGNOSIS — I73.9 PVD (PERIPHERAL VASCULAR DISEASE) (HCC): ICD-10-CM

## 2019-08-16 PROBLEM — L97.412 SKIN ULCER OF RIGHT HEEL WITH FAT LAYER EXPOSED (HCC): Chronic | Status: ACTIVE | Noted: 2019-08-16

## 2019-08-16 PROBLEM — L97.512 ULCER OF RIGHT FOOT, WITH FAT LAYER EXPOSED (HCC): Chronic | Status: ACTIVE | Noted: 2019-08-16

## 2019-08-16 LAB
ABO/RH: NORMAL
ALBUMIN SERPL-MCNC: 3.7 G/DL (ref 3.5–5.2)
ALP BLD-CCNC: 115 U/L (ref 40–130)
ALT SERPL-CCNC: 11 U/L (ref 5–41)
ANION GAP SERPL CALCULATED.3IONS-SCNC: 14 MMOL/L (ref 7–19)
ANTIBODY SCREEN: NORMAL
APTT: 30 SEC (ref 26–36.2)
AST SERPL-CCNC: 18 U/L (ref 5–40)
BILIRUB SERPL-MCNC: 0.5 MG/DL (ref 0.2–1.2)
BUN BLDV-MCNC: 42 MG/DL (ref 6–20)
CALCIUM SERPL-MCNC: 8.8 MG/DL (ref 8.6–10)
CHLORIDE BLD-SCNC: 92 MMOL/L (ref 98–111)
CO2: 31 MMOL/L (ref 22–29)
CREAT SERPL-MCNC: 5.9 MG/DL (ref 0.5–1.2)
GFR NON-AFRICAN AMERICAN: 10
GLUCOSE BLD-MCNC: 122 MG/DL (ref 74–109)
HCT VFR BLD CALC: 29.8 % (ref 42–52)
HEMOGLOBIN: 9.1 G/DL (ref 14–18)
INR BLD: 1.22 (ref 0.88–1.18)
MCH RBC QN AUTO: 28.3 PG (ref 27–31)
MCHC RBC AUTO-ENTMCNC: 30.5 G/DL (ref 33–37)
MCV RBC AUTO: 92.8 FL (ref 80–94)
PDW BLD-RTO: 17.6 % (ref 11.5–14.5)
PLATELET # BLD: 103 K/UL (ref 130–400)
PMV BLD AUTO: 11.8 FL (ref 9.4–12.4)
POTASSIUM SERPL-SCNC: 4.9 MMOL/L (ref 3.5–5)
PROTHROMBIN TIME: 14.8 SEC (ref 12–14.6)
RBC # BLD: 3.21 M/UL (ref 4.7–6.1)
SODIUM BLD-SCNC: 137 MMOL/L (ref 136–145)
TOTAL PROTEIN: 7.6 G/DL (ref 6.6–8.7)
WBC # BLD: 5.3 K/UL (ref 4.8–10.8)

## 2019-08-16 PROCEDURE — 2500000003 HC RX 250 WO HCPCS: Performed by: SURGERY

## 2019-08-16 PROCEDURE — 75625 CONTRAST EXAM ABDOMINL AORTA: CPT | Performed by: SURGERY

## 2019-08-16 PROCEDURE — 75716 ARTERY X-RAYS ARMS/LEGS: CPT | Performed by: SURGERY

## 2019-08-16 PROCEDURE — 36415 COLL VENOUS BLD VENIPUNCTURE: CPT

## 2019-08-16 PROCEDURE — 6370000000 HC RX 637 (ALT 250 FOR IP): Performed by: SURGERY

## 2019-08-16 PROCEDURE — 85027 COMPLETE CBC AUTOMATED: CPT

## 2019-08-16 PROCEDURE — 37224 HC PLASTY UNI FEMPOP: CPT | Performed by: SURGERY

## 2019-08-16 PROCEDURE — 6360000004 HC RX CONTRAST MEDICATION: Performed by: SURGERY

## 2019-08-16 PROCEDURE — 6370000000 HC RX 637 (ALT 250 FOR IP): Performed by: NURSE PRACTITIONER

## 2019-08-16 PROCEDURE — 6360000002 HC RX W HCPCS: Performed by: SURGERY

## 2019-08-16 PROCEDURE — 37224 PR REVSC OPN/PRG FEM/POP W/ANGIOPLASTY UNI: CPT | Performed by: SURGERY

## 2019-08-16 PROCEDURE — 85730 THROMBOPLASTIN TIME PARTIAL: CPT

## 2019-08-16 PROCEDURE — 85610 PROTHROMBIN TIME: CPT

## 2019-08-16 PROCEDURE — 86900 BLOOD TYPING SEROLOGIC ABO: CPT

## 2019-08-16 PROCEDURE — 6360000002 HC RX W HCPCS: Performed by: NURSE PRACTITIONER

## 2019-08-16 PROCEDURE — 86850 RBC ANTIBODY SCREEN: CPT

## 2019-08-16 PROCEDURE — 80053 COMPREHEN METABOLIC PANEL: CPT

## 2019-08-16 PROCEDURE — G0257 UNSCHED DIALYSIS ESRD PT HOS: HCPCS

## 2019-08-16 PROCEDURE — C1769 GUIDE WIRE: HCPCS

## 2019-08-16 PROCEDURE — 86901 BLOOD TYPING SEROLOGIC RH(D): CPT

## 2019-08-16 PROCEDURE — 75774 ARTERY X-RAY EACH VESSEL: CPT | Performed by: SURGERY

## 2019-08-16 RX ORDER — FENTANYL CITRATE 50 UG/ML
INJECTION, SOLUTION INTRAMUSCULAR; INTRAVENOUS
Status: COMPLETED | OUTPATIENT
Start: 2019-08-16 | End: 2019-08-16

## 2019-08-16 RX ORDER — SODIUM CHLORIDE 9 MG/ML
INJECTION, SOLUTION INTRAVENOUS CONTINUOUS
Status: DISCONTINUED | OUTPATIENT
Start: 2019-08-16 | End: 2019-08-18 | Stop reason: HOSPADM

## 2019-08-16 RX ORDER — SODIUM CHLORIDE 0.9 % (FLUSH) 0.9 %
10 SYRINGE (ML) INJECTION PRN
Status: DISCONTINUED | OUTPATIENT
Start: 2019-08-16 | End: 2019-08-18 | Stop reason: HOSPADM

## 2019-08-16 RX ORDER — LIDOCAINE HYDROCHLORIDE 20 MG/ML
INJECTION, SOLUTION INFILTRATION; PERINEURAL
Status: COMPLETED | OUTPATIENT
Start: 2019-08-16 | End: 2019-08-16

## 2019-08-16 RX ORDER — HYDROCODONE BITARTRATE AND ACETAMINOPHEN 5; 325 MG/1; MG/1
1 TABLET ORAL EVERY 4 HOURS PRN
Status: DISCONTINUED | OUTPATIENT
Start: 2019-08-16 | End: 2019-08-18 | Stop reason: HOSPADM

## 2019-08-16 RX ORDER — CLONIDINE HYDROCHLORIDE 0.1 MG/1
0.1 TABLET ORAL PRN
Status: DISCONTINUED | OUTPATIENT
Start: 2019-08-16 | End: 2019-08-18 | Stop reason: HOSPADM

## 2019-08-16 RX ORDER — ONDANSETRON 2 MG/ML
4 INJECTION INTRAMUSCULAR; INTRAVENOUS EVERY 8 HOURS PRN
Status: DISCONTINUED | OUTPATIENT
Start: 2019-08-16 | End: 2019-08-18 | Stop reason: HOSPADM

## 2019-08-16 RX ORDER — HYDROCODONE BITARTRATE AND ACETAMINOPHEN 5; 325 MG/1; MG/1
2 TABLET ORAL EVERY 4 HOURS PRN
Status: DISCONTINUED | OUTPATIENT
Start: 2019-08-16 | End: 2019-08-18 | Stop reason: HOSPADM

## 2019-08-16 RX ORDER — ASPIRIN 81 MG/1
81 TABLET ORAL ONCE
Status: CANCELLED | OUTPATIENT
Start: 2019-08-16 | End: 2019-08-16

## 2019-08-16 RX ORDER — MIDAZOLAM HYDROCHLORIDE 1 MG/ML
INJECTION INTRAMUSCULAR; INTRAVENOUS
Status: COMPLETED | OUTPATIENT
Start: 2019-08-16 | End: 2019-08-16

## 2019-08-16 RX ORDER — ASPIRIN 81 MG/1
81 TABLET ORAL ONCE
Status: COMPLETED | OUTPATIENT
Start: 2019-08-16 | End: 2019-08-16

## 2019-08-16 RX ORDER — HEPARIN SODIUM 5000 [USP'U]/ML
INJECTION, SOLUTION INTRAVENOUS; SUBCUTANEOUS
Status: COMPLETED | OUTPATIENT
Start: 2019-08-16 | End: 2019-08-16

## 2019-08-16 RX ORDER — SODIUM CHLORIDE 0.9 % (FLUSH) 0.9 %
10 SYRINGE (ML) INJECTION PRN
Status: CANCELLED | OUTPATIENT
Start: 2019-08-16

## 2019-08-16 RX ORDER — CLONIDINE HYDROCHLORIDE 0.1 MG/1
0.1 TABLET ORAL PRN
Status: CANCELLED | OUTPATIENT
Start: 2019-08-16

## 2019-08-16 RX ORDER — IODIXANOL 320 MG/ML
INJECTION, SOLUTION INTRAVASCULAR
Status: COMPLETED | OUTPATIENT
Start: 2019-08-16 | End: 2019-08-16

## 2019-08-16 RX ADMIN — LIDOCAINE HYDROCHLORIDE 15 ML: 20 INJECTION, SOLUTION INFILTRATION; PERINEURAL at 09:55

## 2019-08-16 RX ADMIN — MIDAZOLAM 0.5 MG: 1 INJECTION INTRAMUSCULAR; INTRAVENOUS at 09:55

## 2019-08-16 RX ADMIN — FENTANYL CITRATE 50 MCG: 50 INJECTION INTRAMUSCULAR; INTRAVENOUS at 10:13

## 2019-08-16 RX ADMIN — MIDAZOLAM 1 MG: 1 INJECTION INTRAMUSCULAR; INTRAVENOUS at 10:42

## 2019-08-16 RX ADMIN — FENTANYL CITRATE 50 MCG: 50 INJECTION INTRAMUSCULAR; INTRAVENOUS at 10:42

## 2019-08-16 RX ADMIN — ONDANSETRON 4 MG: 2 INJECTION INTRAMUSCULAR; INTRAVENOUS at 15:55

## 2019-08-16 RX ADMIN — ASPIRIN 81 MG: 81 TABLET, COATED ORAL at 08:19

## 2019-08-16 RX ADMIN — MIDAZOLAM 0.5 MG: 1 INJECTION INTRAMUSCULAR; INTRAVENOUS at 09:52

## 2019-08-16 RX ADMIN — HYDROCODONE BITARTRATE AND ACETAMINOPHEN 2 TABLET: 5; 325 TABLET ORAL at 15:23

## 2019-08-16 RX ADMIN — FENTANYL CITRATE 25 MCG: 50 INJECTION INTRAMUSCULAR; INTRAVENOUS at 09:55

## 2019-08-16 RX ADMIN — MIDAZOLAM 1 MG: 1 INJECTION INTRAMUSCULAR; INTRAVENOUS at 10:12

## 2019-08-16 RX ADMIN — HEPARIN SODIUM 3000 UNITS: 5000 INJECTION, SOLUTION INTRAVENOUS; SUBCUTANEOUS at 10:07

## 2019-08-16 RX ADMIN — MIDAZOLAM 1 MG: 1 INJECTION INTRAMUSCULAR; INTRAVENOUS at 10:27

## 2019-08-16 RX ADMIN — FENTANYL CITRATE 50 MCG: 50 INJECTION INTRAMUSCULAR; INTRAVENOUS at 10:37

## 2019-08-16 RX ADMIN — FENTANYL CITRATE 25 MCG: 50 INJECTION INTRAMUSCULAR; INTRAVENOUS at 09:52

## 2019-08-16 RX ADMIN — IODIXANOL 155 ML: 320 INJECTION, SOLUTION INTRAVASCULAR at 11:00

## 2019-08-16 RX ADMIN — HEPARIN SODIUM 5000 UNITS: 5000 INJECTION, SOLUTION INTRAVENOUS; SUBCUTANEOUS at 09:52

## 2019-08-16 RX ADMIN — FENTANYL CITRATE 50 MCG: 50 INJECTION INTRAMUSCULAR; INTRAVENOUS at 10:27

## 2019-08-16 RX ADMIN — MIDAZOLAM 1 MG: 1 INJECTION INTRAMUSCULAR; INTRAVENOUS at 10:37

## 2019-08-16 RX ADMIN — Medication 1000 MG: at 09:17

## 2019-08-16 ASSESSMENT — PAIN SCALES - GENERAL: PAINLEVEL_OUTOF10: 10

## 2019-08-16 NOTE — H&P (VIEW-ONLY)
Patient Care Team:  Karen Brooke MD as PCP - General ([facility])  NO TEAM MEMBERS  Daisy Vizcaino MD as Consulting Physician (Cardiology)     TODAY'S DATE:  8/14/2019Note: This will serve as H&P for procedure 8-16-19. CC- pain in right leg and multiple wounds   HISTORY of PRESENT ILLNESS HPI   Sunday Dailey is a 61 y.o. male who presents today for wound evaluation. Wound Type:arterial, diabetic and non-healing surgical  Wound Location:Right leg medial, Rt heel plantar, right distal, plantar foot  Modifying factors:edema, diabetes, poor glucose control, chronic pressure, decreased mobility, shear force and cirrhoccis, renal failure      Procedure:(6-25-19)  1.  Right common femoral endarterectomy with greater saphenous vein patch  2.  Right tibial peroneal trunk and distal below-knee popliteal artery endarterectomy with greater saphenous vein patch  3.  Right common femoral to below-knee popliteal artery bypass graft with reversed saphenous vein graft harvested from the left lower extremity  4.  Right lower extremity arteriograms     PROCEDURE PERFORMED: (7-12-19) Right leg wound exploration with washout of old  abscess and mobilization of vein bypass graft for muscle coverage.     He also complains of right leg pain, mainly knee. He underwent aspiration of knee a few days ago with some relief of pain. He is having difficulty walking, and he has foot pain ? Rest pain.          Patient Active Problem List   Diagnosis Code    Osteoarthritis M19.90    Chronic diastolic congestive heart failure (HCC) I50.32    Coronary artery disease involving native coronary artery of native heart without angina pectoris I25.10    Essential hypertension I10    Liver disease K76.9    Chronic deep vein thrombosis (DVT) of axillary vein of left upper extremity ([address] I82. A22    Steal syndrome as complication of dialysis access ([address] T82.898A    ESRD on dialysis ([address] N18.6, Z99.2 SJS-Right upper venograms, u/s guided cannulation left basilic vein, left upper venograms, balloon angioplasty left subclavian vein 10x40 conquest.    VASCULAR SURGERY   2017     SJS. Left brachial artery to axillary vein arteriovenous graft with 7 mm artegraft. Left upper extremity venograms. Left subclavian vein stent viabahn 13x50. Left subclavian vein stent balloon angioplasty 12x40 atlas.  VASCULAR SURGERY   2017     SJS. Removal of tunneled dialysis catheter right internal jugular vein.  VASCULAR SURGERY   2018     SJS. Arch aortogram, left upper arteriogram, AV graft angiogram/venogram.    VASCULAR SURGERY   2018     SJS. Right CFA 5f-6f-7f sheath, aortogram with bilateral lower arteriogram, left SFA/pop  balloon angioplasty 5x100 , 6x150 lutonix ( 2), left CFA  7f sheath, bilateral iliac kissing stents right 10x38 icast, left 9x59 icast expanded with 10x40 , completion aortogram, mynx left CFA , failed mynx right CFA.  VASCULAR SURGERY   2019     SJS left CFA 5f sheath, aortogram with bilateral lower arteriogram, mynx left CFA.  VASCULAR SURGERY   2019     SJS-VI. Femoral tibial/perineal bypass with reversed greater saphenous vein.         Family History         Family History   Problem Relation Age of Onset    High Blood Pressure Mother      High Blood Pressure Father      High Blood Pressure Sister           Social History            Tobacco Use    Smoking status: Former Smoker       Packs/day: 1.00       Years: 45.00       Pack years: 45.00       Types: Cigarettes       Last attempt to quit: 2017       Years since quittin.4    Smokeless tobacco: Never Used    Tobacco comment: [id number] smoked 45 years at 1 PPD   Substance Use Topics    Alcohol use: No            Review of Systems     Constitutional - no significant activity change, appetite change, or unexpected weight change. No fever or chills.   No diaphoresis or significant gel;4x4 7/25/2019 10:57 AM   Wound Cleansed Rinsed/Irrigated with saline 8/14/2019 12:37 PM   Wound Length (cm) 0 cm 8/14/2019 12:37 PM   Wound Width (cm) 0 cm 8/14/2019 12:37 PM   Wound Depth (cm) 0 cm 8/14/2019 12:37 PM   Wound Surface Area (cm^2) 0 cm^2 8/14/2019 12:37 PM   Change in Wound Size % (l*w) 100 8/14/2019 12:37 PM   Wound Volume (cm^3) 0 cm^3 8/14/2019 12:37 PM   Wound Healing % 100 8/14/2019 12:37 PM   Post-Procedure Length (cm) 0 cm 8/14/2019 12:37 PM   Post-Procedure Width (cm) 0 cm 8/14/2019 12:37 PM   Post-Procedure Depth (cm) 0 cm 8/14/2019 12:37 PM   Post-Procedure Surface Area (cm^2) 0 cm^2 8/14/2019 12:37 PM   Post-Procedure Volume (cm^3) 0 cm^3 8/14/2019 12:37 PM   Distance Tunneling (cm) 0 cm 8/14/2019 12:37 PM   Tunneling Position ___ O'Clock 0 8/14/2019 12:37 PM   Undermining Starts ___ O'Clock 0 8/14/2019 12:37 PM   Undermining Ends___ O'Clock 0 8/14/2019 12:37 PM   Undermining Maxium Distance (cm) 0 8/14/2019 12:37 PM   Wound Assessment Epithelialization 8/14/2019 12:37 PM   Drainage Amount Scant 8/14/2019 12:37 PM   Drainage Description [place];Yellow 8/14/2019 12:37 PM   Odor None 8/14/2019 12:37 PM   Margins Attached edges 8/14/2019 12:37 PM   Exposed structure Muscle 8/1/2019  8:57 AM   Shaila-wound Assessment Dry; Intact 8/14/2019 12:37 PM   Non-staged Wound Description Full thickness 8/14/2019 12:37 PM   [place]%Wound Bed 0 8/14/2019 12:37 PM   Red%Wound Bed 0 8/14/2019 12:37 PM   Yellow%Wound Bed 0 8/14/2019 12:37 PM   Black%Wound Bed 0 8/14/2019 12:37 PM   Purple%Wound Bed 0 8/14/2019 12:37 PM   Other%Wound Bed 0 8/14/2019 12:37 PM   Number of days: 20       Wound 07/25/19 Foot Right;Plantar Wound 3.   Right plantar foot (Active)     Wound Diabetic [place] 1 8/14/2019 12:37 PM   Dressing Status Old drainage 8/14/2019 12:37 PM   Dressing Changed Changed/New 8/8/2019 10:23 AM   Dressing/Treatment Wound gel;4x4 8/8/2019 10:23 AM   Wound Cleansed Rinsed/Irrigated with saline 8/14/2019 12:37 PM

## 2019-08-16 NOTE — INTERVAL H&P NOTE
H&P Update    Patient's History and Physical from August 14,  was reviewed. Patient examined. There has been no change. Patient notes slightly more right foot pain over last 2 days.     Deven Covarrubias

## 2019-08-16 NOTE — H&P
 Steal syndrome of dialysis vascular access [facility]) T82.898A    Atherosclerosis of artery of extremity with ulceration (HCC) I70.299, L97.909    Atherosclerosis of artery of extremity with rest pain (HCC) I70.229    Ulcer of left heel, with fat layer exposed ([address] L97.422    Anemia, chronic disease D63.8    Iatrogenic complication of vascular surgery T81. 9XXA    Wound infection after surgery T81.49XA    Pacemaker Z95.0    Hepatitis C, chronic (HCC) B18.2    PVD (peripheral vascular disease) (HCC) I73.9    Hyponatremia E87.1    Normocytic anemia D64.9    Thrombocytopenia (HCC) D69.6    Palliative care patient Z51.5    Sepsis ([address] A41.9    Wound infection T14. 8XXA, C66.4    Alcoholic cirrhosis of liver with ascites ([address] K70.31    Anticoagulated by anticoagulation treatment Z79.01    CHF (congestive heart failure) (HCC) I50.9    Colon polyps K63.5    Dialysis patient ([address] Z99.2    Hepatocellular carcinoma ([address] C22.0    Mixed hyperlipidemia E78.2    Spontaneous bacterial peritonitis ([address] K65.2    Tobacco abuse Z72.0    Non-healing non-surgical wound T14. 8XXA    Knee pain, right M25.561    Effusion of knee joint right M25.461         Ryann Shaw is a 61 y.o. male with the following history reviewed and recorded in [facility]:        Patient Active Problem List     Diagnosis Date Noted    Non-healing non-surgical wound 07/25/2019    Knee pain, right 07/25/2019    Effusion of knee joint right      Sepsis ([address]      Wound infection      Palliative care patient 07/09/2019    Iatrogenic complication of vascular surgery 07/08/2019    Wound infection after surgery 07/08/2019    Hyponatremia 07/08/2019    Normocytic anemia 07/08/2019    Thrombocytopenia ([address] 07/08/2019    Pacemaker      Hepatitis C, chronic (HCC)      PVD (peripheral vascular disease) (HCC)      Anemia, chronic disease 06/26/2019    Anticoagulated by anticoagulation treatment 07/16/2018       He is on Plavix which is on hold due to TACE.       Tobacco abuse 03/15/2018    Atherosclerosis of artery of extremity with rest pain ([address] 02/28/2018    Ulcer of left heel, with fat layer exposed ([address] 02/28/2018    Atherosclerosis of artery of extremity with ulceration ([address] 02/15/2018    Steal syndrome of dialysis vascular access ([address] 01/30/2018    Steal syndrome as complication of dialysis access ([address] 01/25/2018    ESRD on dialysis [facility])      Colon polyps 01/22/2018       Overview:   He has a history of colon polyps. Last colonoscopy was done September 2016 and was unremarkable from this point of view. Three-year recall was recommended due to preparation which could have been better which would be 9/2019       Hepatocellular carcinoma ([address] 01/22/2018       He had a liver biopsy at [address] [place] [address] that was normal, but has been sent to [facility] in [address] [place] [address] where they have confirmed the dx of hepatoma on bx. Now undergoing TACE. Started in mid July, 2018.       Chronic deep vein thrombosis (DVT) of axillary vein of left upper extremity ([address] 01/20/2017    Mixed hyperlipidemia 01/09/2017    Spontaneous bacterial peritonitis ([address] 12/12/2016       Overview:   He is on daily Cipro for prophylaxis due to previous history of SBP.       CHF (congestive heart failure) ([address] 11/18/2016    Dialysis patient ([facility] 11/18/2016       Overview:   Dialysis since 2010. He is being considered for a combined liver and kidney transplant. His dialysis means that I cannot treat his ascites with diuretics.       Alcoholic cirrhosis of liver with ascites ([address] 09/02/2016       The patient had a hx of Hep C treated he states years ago with Interferon and he was told this was cured. He also had a history of alcohol abuse and he stopped drinking 15 years ago.   He still has Hepatitis C GT 3 per labs in 8/2016.     He has been vaccinated for hepatitis A and B through the dialysis unit.     Being followed for combined going to [facility] for liver and kidney transplant list.    Hepatitis C, chronic ([facility]      History of blood transfusion      HTN (hypertension)      Hx of blood clots       arm/fistula    Mixed hyperlipidemia 1/9/2017    Osteoarthritis      Pacemaker      Palliative care patient 07/09/2019    PVD (peripheral vascular disease) ([facility]      Sleep apnea       no cpap at present.  Type II diabetes mellitus ([facility]       no meds.            Past Surgical History         Past Surgical History:   Procedure Laterality Date    ANGIOPLASTY   08/09/11 [facility]     LULEONARD cephalic vein balloon angioplasty with 7mm x 4cm balloon. coild embolization of 2 cephallic vein brances with 3mm x 5cm coils    CARDIAC CATHETERIZATION   04/04/11     cardiac cath and stent x1 by Dr. Namrata Berman   07/26/11 [facility]     HUMAIRA AV fistula. coild embolization of cephalic vein branch near the wrist with 3mm EMBO coils. balloon a'plasty left cephalic vein with [id number] balloon and then [id number] balloon    DIALYSIS FISTULA CREATION Left 2/16/2017     LEFT UPPER EXTREMITY BRACHIAL / AXILLARY GRAFT AND STENT LEFT SUBCLAVIAN VEIN  performed by Rosas Gaitan MD at [address] Right 7/12/2019     RIGHT LEG WOUND WASHOUT performed by Isacc Rudd MD at [facility] FEMORAL-TIBIAL BYPASS GRAFT Right 6/25/2019     FEMORAL TIBIAL/PERINEAL BYPASS WITH REVERSED GREATER SAPHENOUS VEIN performed by Rosas Gaitan MD at [facility]   12/26/2010     Right internal jugular vein tunneled dialysis catheter placement    OTHER SURGICAL HISTORY   [id number]     Redo of dialysis catheter    OTHER SURGICAL HISTORY   9/6/2011   [facility]     Removal of RT IJV tunneled dialysis cath    OTHER SURGICAL HISTORY   5/22/12   [facility]     Ultrasound-guided cannulation of left cephalic vein in the mid forearm toward the AV anastomosis, Left upper  ext.  fistulograms including venograms of the SVC, Left cephalic vein balloon angioplasty with a 4mm x 100mm Tod balloon, Completion fistulograms    PACEMAKER PLACEMENT         medtronic    PARACENTESIS         multiple/recent; per dr. Phyllis Hauser at [id number] [facility] 1/30/2018     UPPER EXTREMITY DRIL PROCEDURE WITH LEFT SAPHENOUS VEIN HARVESTING performed by Regina Verduzco MD at [address] [place] [address] Road   6/19/12     LUE arteriovenous fistulogram including venography of the superior vena cava. Balloon angioplasty, left forearm cephalic vein and upper arm cephalic vein with [id number] tod balloon.  VASCULAR SURGERY   7/10/2012 [facility]      Revision of left distal radial artery to cephalic vein arteriovenous fistula with 7mm Artegraft interposition.  VASCULAR SURGERY   [place] [facility]     Left upper extremity arteriovenous fistulograms including venography of the superior vena cava. Left cephalic vein balloon angioplasty with an 8 x 20 cm cutting balloon proximal cephalic vein. Balloon angioplasty of left cephalic vein/antecubital vein near the antecubital crease with 8 x 20 mm cutting balloon.  VASCULAR SURGERY   [place] [facility]     Balloon angioplasty proximal end distal upper arm cephalic vein with 9 x 40 cm  balloon. Completion fistulograms of the left upper extremity.  VASCULAR SURGERY   8/13/15 [facility]     Revision of left upper extremity arteriovenous fistula with excision of pseudoaneurysm and primary repair of cephalic vein.     VASCULAR SURGERY   5/3/16 [facility]     Left upper fistulograms/venograms, left cephalic balloon 8 x 20 cutter,9 x 40 conquest,left proximal cephalic vein stents (flair 2 9 x 50), balloon stents 9 x 40 conquest.    VASCULAR SURGERY   12/08/2016     [facility]- ultrasound guided cannulation of left distal cephalic vein ultrasound guided cannulation right internal jugular vein placement of right internal jugular vein tunneled dialysis catheter (Bard Equistream XK 23cm tip to cuff)     VASCULAR SURGERY   01/20/2017     Wound Length (cm) 0.3 cm 8/14/2019 12:37 PM   Wound Width (cm) 0.5 cm 8/14/2019 12:37 PM   Wound Depth (cm) 0.1 cm 8/14/2019 12:37 PM   Wound Surface Area (cm^2) 0.15 cm^2 8/14/2019 12:37 PM   Change in Wound Size % (l*w) 57.14 8/14/2019 12:37 PM   Wound Volume (cm^3) 0.02 cm^3 8/14/2019 12:37 PM   Wound Healing % 50 8/14/2019 12:37 PM   Post-Procedure Length (cm) 0.4 cm 8/8/2019  9:16 AM   Post-Procedure Width (cm) 0.6 cm 8/8/2019  9:16 AM   Post-Procedure Depth (cm) 0.1 cm 8/8/2019  9:16 AM   Post-Procedure Surface Area (cm^2) 0.24 cm^2 8/8/2019  9:16 AM   Post-Procedure Volume (cm^3) 0.02 cm^3 8/8/2019  9:16 AM   Distance Tunneling (cm) 0 cm 8/14/2019 12:37 PM   Tunneling Position ___ O'Clock 0 8/14/2019 12:37 PM   Undermining Starts ___ O'Clock 0 8/14/2019 12:37 PM   Undermining Ends___ O'Clock 0 8/14/2019 12:37 PM   Undermining Maxium Distance (cm) 0 8/14/2019 12:37 PM   Wound Assessment Pink;Red;Slough 8/14/2019 12:37 PM   Drainage Amount Small 8/14/2019 12:37 PM   Drainage Description Serosanguinous 8/14/2019 12:37 PM   Odor None 8/14/2019 12:37 PM   Margins Attached edges 8/14/2019 12:37 PM   Shaila-wound Assessment Dry; Intact 8/14/2019 12:37 PM   [place]%Wound Bed 45 8/14/2019 12:37 PM   Red%Wound Bed 45 8/14/2019 12:37 PM   Yellow%Wound Bed 10 8/14/2019 12:37 PM   Black%Wound Bed 0 8/14/2019 12:37 PM   Purple%Wound Bed 0 8/14/2019 12:37 PM   Number of days: 20       Wound 08/01/19 Groin Right WOUND 4; RIGHT GROIN (SURGICAL) (Active)     Wound Non-Healing Surgical 8/14/2019 12:37 PM   Dressing Status Old drainage 8/14/2019 12:37 PM   Dressing Changed Changed/New 8/8/2019 10:23 AM   Dressing/Treatment Wound gel;4x4;Medipore 8/8/2019 10:23 AM   Wound Cleansed Rinsed/Irrigated with saline 8/14/2019 12:37 PM   Wound Length (cm) 0.2 cm 8/14/2019 12:37 PM   Wound Width (cm) 0.2 cm 8/14/2019 12:37 PM   Wound Depth (cm) 0.1 cm 8/14/2019 12:37 PM   Wound Surface Area (cm^2) 0.04 cm^2 8/14/2019 12:37 PM   Change in infection, atheroembolic event, contrast nephropathy, possible dialysis, and need for possible surgery.              Assessment  1. Failing right leg bypass graft with ischemia of right foot and wounds  2. Cirrhosis (alcoholic)  3. Hepatitis C  4. ESRD  5. Malnutrition  6. Atherosclerosis with ulcer  7. Atherosclerosis with ischemic rest pain  8. History of CVA (CT 7-18-19)  Plan  1. Angiogram and possible angioplasty 8-16-19  Depending on result and wounds may need to be admitted, for a surgical debridement. Plan for wound - Dress per physician order  Treatment:                Compression : No              Offloading : Yes              Dressing : see avs              Additional Therapy : Angiogram scheduled for 8-16-19          Discussed appropriate home care of this wound. Wound redressed. Patient instructions were given. Follow up: to be arranged. week.   Recommend no smoking  Offloading instructions given                   Follow Up Appointment on 8/14/2019          Detailed Report

## 2019-08-29 ENCOUNTER — HOSPITAL ENCOUNTER (OUTPATIENT)
Dept: WOUND CARE | Age: 59
Discharge: HOME | End: 2019-08-29
Payer: MEDICARE

## 2019-08-29 VITALS
SYSTOLIC BLOOD PRESSURE: 183 MMHG | RESPIRATION RATE: 18 BRPM | WEIGHT: 174 LBS | TEMPERATURE: 98.3 F | HEIGHT: 68 IN | BODY MASS INDEX: 26.37 KG/M2 | HEART RATE: 81 BPM | DIASTOLIC BLOOD PRESSURE: 60 MMHG

## 2019-08-29 DIAGNOSIS — K76.9 LIVER DISEASE: Chronic | ICD-10-CM

## 2019-08-29 DIAGNOSIS — I50.32 CHRONIC DIASTOLIC CONGESTIVE HEART FAILURE (HCC): Chronic | ICD-10-CM

## 2019-08-29 DIAGNOSIS — L97.512 ULCER OF RIGHT FOOT, WITH FAT LAYER EXPOSED (HCC): Primary | Chronic | ICD-10-CM

## 2019-08-29 DIAGNOSIS — I25.10 CORONARY ARTERY DISEASE INVOLVING NATIVE CORONARY ARTERY OF NATIVE HEART WITHOUT ANGINA PECTORIS: Chronic | ICD-10-CM

## 2019-08-29 DIAGNOSIS — T81.49XA WOUND INFECTION AFTER SURGERY: ICD-10-CM

## 2019-08-29 DIAGNOSIS — L97.422 ULCER OF LEFT HEEL, WITH FAT LAYER EXPOSED (HCC): ICD-10-CM

## 2019-08-29 DIAGNOSIS — K70.31 ALCOHOLIC CIRRHOSIS OF LIVER WITH ASCITES (HCC): ICD-10-CM

## 2019-08-29 DIAGNOSIS — L97.412 SKIN ULCER OF RIGHT HEEL WITH FAT LAYER EXPOSED (HCC): Chronic | ICD-10-CM

## 2019-08-29 PROCEDURE — 97598 DBRDMT OPN WND ADDL 20CM/<: CPT

## 2019-08-29 PROCEDURE — 97597 DBRDMT OPN WND 1ST 20 CM/<: CPT

## 2019-08-29 PROCEDURE — 97597 DBRDMT OPN WND 1ST 20 CM/<: CPT | Performed by: SURGERY

## 2019-08-29 PROCEDURE — 97598 DBRDMT OPN WND ADDL 20CM/<: CPT | Performed by: SURGERY

## 2019-08-29 ASSESSMENT — PAIN DESCRIPTION - ORIENTATION: ORIENTATION: RIGHT

## 2019-08-29 ASSESSMENT — PAIN DESCRIPTION - DESCRIPTORS: DESCRIPTORS: ACHING;SORE

## 2019-08-29 ASSESSMENT — PAIN DESCRIPTION - PROGRESSION: CLINICAL_PROGRESSION: NOT CHANGED

## 2019-08-29 ASSESSMENT — PAIN DESCRIPTION - PAIN TYPE: TYPE: ACUTE PAIN

## 2019-08-29 ASSESSMENT — PAIN DESCRIPTION - ONSET: ONSET: ON-GOING

## 2019-08-29 ASSESSMENT — PAIN SCALES - GENERAL: PAINLEVEL_OUTOF10: 6

## 2019-08-29 ASSESSMENT — PAIN DESCRIPTION - LOCATION: LOCATION: LEG

## 2019-08-29 ASSESSMENT — PAIN DESCRIPTION - FREQUENCY: FREQUENCY: CONTINUOUS

## 2019-08-29 NOTE — PROGRESS NOTES
[facility])    Atherosclerosis of artery of extremity with rest pain ([address]    Ulcer of left heel, with fat layer exposed ([address]    Anemia, chronic disease    Iatrogenic complication of vascular surgery    Wound infection after surgery    Pacemaker    Hepatitis C, chronic (HCC)    PVD (peripheral vascular disease) (HCC)    Hyponatremia    Normocytic anemia    Thrombocytopenia (HCC)    Palliative care patient    Sepsis ([address]    Wound infection    Alcoholic cirrhosis of liver with ascites ([address]    Anticoagulated by anticoagulation treatment    CHF (congestive heart failure) (HCC)    Colon polyps    Dialysis patient ([address]    Hepatocellular carcinoma ([address]    Mixed hyperlipidemia    Spontaneous bacterial peritonitis ([address]    Tobacco abuse    Non-healing non-surgical wound    Knee pain, right    Effusion of knee joint right    Failing vascular bypass graft    Skin ulcer of right heel with fat layer exposed ([address]    Ulcer of right foot, with fat layer exposed ([address]          Plan:          Plan for wound - Dress per physician order  Treatment:     Compression : No   Offloading : Yes   Dressing : Dakin's   Additional Therapy : continue Plavix, smoking cessation. 1. Discussed appropriate home care of this wound. Wound redressed. 2. Patient instructions were given. 3. Follow up: 1 week(s).                            Electronically signed by Joseluis Leal DO on 8/29/2019 at 11:16 AM

## 2019-09-05 ENCOUNTER — HOSPITAL ENCOUNTER (OUTPATIENT)
Dept: WOUND CARE | Age: 59
Discharge: HOME | End: 2019-09-05
Payer: MEDICARE

## 2019-09-05 VITALS
HEIGHT: 68 IN | SYSTOLIC BLOOD PRESSURE: 133 MMHG | BODY MASS INDEX: 26.37 KG/M2 | WEIGHT: 174 LBS | TEMPERATURE: 98.1 F | HEART RATE: 81 BPM | RESPIRATION RATE: 18 BRPM | DIASTOLIC BLOOD PRESSURE: 56 MMHG

## 2019-09-05 DIAGNOSIS — I50.32 CHRONIC DIASTOLIC CONGESTIVE HEART FAILURE (HCC): Chronic | ICD-10-CM

## 2019-09-05 DIAGNOSIS — Z99.2 ESRD ON DIALYSIS (HCC): ICD-10-CM

## 2019-09-05 DIAGNOSIS — L97.512 ULCER OF RIGHT FOOT, WITH FAT LAYER EXPOSED (HCC): Chronic | ICD-10-CM

## 2019-09-05 DIAGNOSIS — L97.412 SKIN ULCER OF RIGHT HEEL WITH FAT LAYER EXPOSED (HCC): Primary | Chronic | ICD-10-CM

## 2019-09-05 DIAGNOSIS — N18.6 ESRD ON DIALYSIS (HCC): ICD-10-CM

## 2019-09-05 DIAGNOSIS — K76.9 LIVER DISEASE: Chronic | ICD-10-CM

## 2019-09-05 PROCEDURE — 97597 DBRDMT OPN WND 1ST 20 CM/<: CPT | Performed by: SURGERY

## 2019-09-05 PROCEDURE — 97598 DBRDMT OPN WND ADDL 20CM/<: CPT

## 2019-09-05 PROCEDURE — 97597 DBRDMT OPN WND 1ST 20 CM/<: CPT

## 2019-09-05 ASSESSMENT — PAIN DESCRIPTION - LOCATION: LOCATION: FOOT;KNEE

## 2019-09-05 ASSESSMENT — PAIN DESCRIPTION - ORIENTATION: ORIENTATION: RIGHT

## 2019-09-05 ASSESSMENT — PAIN DESCRIPTION - DESCRIPTORS: DESCRIPTORS: ACHING

## 2019-09-05 ASSESSMENT — PAIN DESCRIPTION - ONSET: ONSET: ON-GOING

## 2019-09-05 ASSESSMENT — PAIN DESCRIPTION - FREQUENCY: FREQUENCY: CONTINUOUS

## 2019-09-05 ASSESSMENT — PAIN DESCRIPTION - PAIN TYPE: TYPE: ACUTE PAIN

## 2019-09-05 ASSESSMENT — PAIN DESCRIPTION - PROGRESSION: CLINICAL_PROGRESSION: NOT CHANGED

## 2019-09-05 ASSESSMENT — PAIN SCALES - GENERAL: PAINLEVEL_OUTOF10: 5

## 2019-09-05 NOTE — PROGRESS NOTES
K65.2    Tobacco abuse Z72.0    Non-healing non-surgical wound T14. 8XXA    Knee pain, right M25.561    Effusion of knee joint right M25.461    Failing vascular bypass graft T82.599A    Skin ulcer of right heel with fat layer exposed ([address] L97.412    Ulcer of right foot, with fat layer exposed ([facility]) L97.512       ALLERGIES    No Known Allergies    Incision 06/25/19 Foot Right (Active)   Number of days: 71       Incision 06/25/19 Thigh Anterior;Right (Active)   Number of days: 72       Incision 06/25/19 Pretibial Right (Active)   Number of days: 72       Objective:      BP (!) 133/56   Pulse 81   Temp 98.1 °F (36.7 °C) (Temporal)   Resp 18   Ht 5' 8\" (1.727 m)   Wt 174 lb (78.9 kg)   BMI 26.46 kg/m²     Post Debridement Measurements and Assessment:  Wound 07/25/19 Leg Right;Medial;Distal Wound 1.   Right medial leg - distal (Active)   Wound Image   9/5/2019  9:09 AM   Wound Non-Healing Surgical 9/5/2019  9:09 AM   Dressing Status Old drainage 9/5/2019  9:09 AM   Dressing Changed Changed/New 8/29/2019 11:52 AM   Dressing/Treatment 4x4 8/29/2019 11:52 AM   Wound Cleansed Rinsed/Irrigated with saline 8/14/2019 12:37 PM   Wound Length (cm) 13.8 cm 9/5/2019  9:09 AM   Wound Width (cm) 2.8 cm 9/5/2019  9:09 AM   Wound Depth (cm) 0.5 cm 9/5/2019  9:09 AM   Wound Surface Area (cm^2) 38.64 cm^2 9/5/2019  9:09 AM   Change in Wound Size % (l*w) 14.13 9/5/2019  9:09 AM   Wound Volume (cm^3) 19.32 cm^3 9/5/2019  9:09 AM   Wound Healing % 73 9/5/2019  9:09 AM   Post-Procedure Length (cm) 13.8 cm 9/5/2019  9:19 AM   Post-Procedure Width (cm) 2.8 cm 9/5/2019  9:19 AM   Post-Procedure Depth (cm) 0.5 cm 9/5/2019  9:19 AM   Post-Procedure Surface Area (cm^2) 38.64 cm^2 9/5/2019  9:19 AM   Post-Procedure Volume (cm^3) 19.32 cm^3 9/5/2019  9:19 AM   Distance Tunneling (cm) 0 cm 9/5/2019  9:09 AM   Tunneling Position ___ O'Clock 0 9/5/2019  9:09 AM   Undermining Starts ___ O'Clock 0 9/5/2019  9:09 AM   Undermining Ends___ O'Clock 0 9/5/2019  9:09 AM   Undermining Ends___ O'Clock 0 9/5/2019  9:09 AM   Undermining Maxium Distance (cm) 0 9/5/2019  9:09 AM   Wound Assessment Pink;Drainage;Slough; Yellow 9/5/2019  9:09 AM   Drainage Amount Small 9/5/2019  9:09 AM   Drainage Description Serosanguinous 9/5/2019  9:09 AM   Odor None 9/5/2019  9:09 AM   Margins Unattached edges 9/5/2019  9:09 AM   Shaila-wound Assessment Calloused;Painful 9/5/2019  9:09 AM   Non-staged Wound Description Full thickness 9/5/2019  9:09 AM   [place]%Wound Bed 90 9/5/2019  9:09 AM   Red%Wound Bed 0 9/5/2019  9:09 AM   Yellow%Wound Bed 10 9/5/2019  9:09 AM   Black%Wound Bed 0 9/5/2019  9:09 AM   Purple%Wound Bed 0 9/5/2019  9:09 AM   Other%Wound Bed 0 9/5/2019  9:09 AM   Number of days: 41       Wound 08/08/19 Heel Right;Posterior WOUND 5 RIGHT HEEL (ARTERIAL) (Active)   Wound Image    9/5/2019  9:09 AM   Wound Arterial 9/5/2019  9:09 AM   Dressing Status Old drainage 9/5/2019  9:09 AM   Dressing Changed Changed/New 8/29/2019 11:52 AM   Dressing/Treatment Hydrocolloid 8/8/2019 10:23 AM   Wound Cleansed Rinsed/Irrigated with saline 8/14/2019 12:37 PM   Wound Length (cm) 1.4 cm 9/5/2019  9:09 AM   Wound Width (cm) 0.8 cm 9/5/2019  9:09 AM   Wound Depth (cm) 0.2 cm 9/5/2019  9:09 AM   Wound Surface Area (cm^2) 1.12 cm^2 9/5/2019  9:09 AM   Change in Wound Size % (l*w) 60 9/5/2019  9:09 AM   Wound Volume (cm^3) 0.22 cm^3 9/5/2019  9:09 AM   Wound Healing % 61 9/5/2019  9:09 AM   Post-Procedure Length (cm) 1.4 cm 9/5/2019  9:19 AM   Post-Procedure Width (cm) 0.8 cm 9/5/2019  9:19 AM   Post-Procedure Depth (cm) 0.8 cm 9/5/2019  9:19 AM   Post-Procedure Surface Area (cm^2) 1.12 cm^2 9/5/2019  9:19 AM   Post-Procedure Volume (cm^3) 0.9 cm^3 9/5/2019  9:19 AM   Distance Tunneling (cm) 0 cm 9/5/2019  9:09 AM   Tunneling Position ___ O'Clock 0 9/5/2019  9:09 AM   Undermining Starts ___ O'Clock 0 9/5/2019  9:09 AM   Undermining Ends___ O'Clock 0 9/5/2019  9:09 AM   Undermining

## 2019-09-12 ENCOUNTER — HOSPITAL ENCOUNTER (OUTPATIENT)
Dept: WOUND CARE | Age: 59
Discharge: HOME | End: 2019-09-12

## 2019-09-19 ENCOUNTER — HOSPITAL ENCOUNTER (OUTPATIENT)
Dept: WOUND CARE | Age: 59
Discharge: HOME | End: 2019-09-19
Payer: MEDICARE

## 2019-09-19 VITALS
RESPIRATION RATE: 18 BRPM | DIASTOLIC BLOOD PRESSURE: 72 MMHG | SYSTOLIC BLOOD PRESSURE: 137 MMHG | BODY MASS INDEX: 26.37 KG/M2 | HEIGHT: 68 IN | WEIGHT: 174 LBS | HEART RATE: 75 BPM | TEMPERATURE: 98 F

## 2019-09-19 PROCEDURE — 11045 DBRDMT SUBQ TISS EACH ADDL: CPT

## 2019-09-19 PROCEDURE — 11045 DBRDMT SUBQ TISS EACH ADDL: CPT | Performed by: NURSE PRACTITIONER

## 2019-09-19 PROCEDURE — 11042 DBRDMT SUBQ TIS 1ST 20SQCM/<: CPT

## 2019-09-19 PROCEDURE — 11042 DBRDMT SUBQ TIS 1ST 20SQCM/<: CPT | Performed by: NURSE PRACTITIONER

## 2019-09-19 PROCEDURE — 97597 DBRDMT OPN WND 1ST 20 CM/<: CPT | Performed by: NURSE PRACTITIONER

## 2019-09-19 PROCEDURE — 97597 DBRDMT OPN WND 1ST 20 CM/<: CPT

## 2019-09-19 ASSESSMENT — PAIN DESCRIPTION - ONSET: ONSET: ON-GOING

## 2019-09-19 ASSESSMENT — PAIN SCALES - GENERAL: PAINLEVEL_OUTOF10: 6

## 2019-09-19 ASSESSMENT — PAIN DESCRIPTION - ORIENTATION: ORIENTATION: RIGHT

## 2019-09-19 ASSESSMENT — PAIN DESCRIPTION - FREQUENCY: FREQUENCY: CONTINUOUS

## 2019-09-19 ASSESSMENT — PAIN DESCRIPTION - DESCRIPTORS: DESCRIPTORS: ACHING

## 2019-09-19 ASSESSMENT — PAIN DESCRIPTION - PROGRESSION: CLINICAL_PROGRESSION: NOT CHANGED

## 2019-09-19 ASSESSMENT — PAIN DESCRIPTION - PAIN TYPE: TYPE: ACUTE PAIN

## 2019-09-19 ASSESSMENT — PAIN DESCRIPTION - LOCATION: LOCATION: FOOT;KNEE

## 2019-09-19 NOTE — PLAN OF CARE
09/19/19 1153   Peripheral Vascular   RLE Edema None   LLE Edema None   Sensation RLE Full sensation   Sensation LLE Full sensation   RLE Neurovascular Assessment   Capillary Refill Less than/equal to 3 seconds   Color   (hyperpigmented)   Temperature Warm   R Popliteal Pulse Doppler   R Post Tibial Pulse Doppler   R Pedal Pulse Doppler   R Calf Tenderness  Negative   Claudication Assessment None   LLE Neurovascular Assessment   Capillary Refill Less than/equal to 3 seconds   Color   (hyperpigmented)   Temperature Warm   L Popliteal Pulse Doppler   L Post Tibial Pulse Doppler   L Pedal Pulse Doppler   L Calf Tenderness Negative   Claudication Assessment None   Left Foot Assessment   Other Deformity N   Prior Foot Ulcer N   Charcot Joint N   Prior Amputation N   Right Foot Assessment   Other Deformity N   Prior Foot Ulcer Y   Charcot Joint N   Prior Amputation N   Left Nail Assessment   Thick Y   Discolored Y   Deformed Y   Improper Length and Hygiene N   Right Nail Assessment   Thick Y   Discolored Y   Deformed Y   Improper Length and Hygiene N   Wound 08/08/19 Heel Right;Posterior WOUND 5 RIGHT HEEL (ARTERIAL)   Date First Assessed/Time First Assessed: 08/08/19 0850   Present on Hospital Admission: Yes  Wound Approximate Age at First Assessment (Weeks): 1 weeks  Primary Wound Type: Arterial Ulcer  Location: Heel  Wound Location Orientation: Right;Posterior  W... Wound Image    Wound Arterial   Dressing Status Old drainage   Wound Cleansed Rinsed/Irrigated with saline   Wound Length (cm) 0.5 cm   Wound Width (cm) 0.96 cm   Wound Depth (cm) 0.1 cm   Wound Surface Area (cm^2) 0.48 cm^2   Change in Wound Size % (l*w) 82.86   Wound Volume (cm^3) 0.05 cm^3   Wound Healing % 91   Distance Tunneling (cm) 0 cm   Tunneling Position ___ O'Clock 0   Undermining Starts ___ O'Clock 0   Undermining Ends___ O'Clock 0   Undermining Maxium Distance (cm) 0   Wound Assessment Slough; Yellow   Drainage Amount Moderate   Drainage

## 2019-09-19 NOTE — PLAN OF CARE
Problem: Pain:  Description  Pain management should include both nonpharmacologic and pharmacologic interventions.   Goal: Pain level will decrease  Description  Pain level will decrease  Outcome: Ongoing  Goal: Control of acute pain  Description  Control of acute pain  Outcome: Ongoing  Goal: Control of chronic pain  Description  Control of chronic pain  Outcome: Ongoing     Problem: Wound:  Goal: Will show signs of wound healing; wound closure and no evidence of infection  Description  Will show signs of wound healing; wound closure and no evidence of infection  Outcome: Ongoing     Problem: Arterial:  Goal: Optimize blood flow for wound healing  Description  Optimize blood flow for wound healing  Outcome: Ongoing

## 2019-09-20 ENCOUNTER — TELEPHONE (OUTPATIENT)
Dept: GASTROENTEROLOGY | Facility: CLINIC | Age: 59
End: 2019-09-20

## 2019-10-17 ENCOUNTER — HOSPITAL ENCOUNTER (OUTPATIENT)
Dept: WOUND CARE | Age: 59
Discharge: HOME | End: 2019-10-17
Payer: MEDICARE

## 2019-10-17 VITALS
TEMPERATURE: 97.6 F | HEIGHT: 68 IN | BODY MASS INDEX: 26.37 KG/M2 | RESPIRATION RATE: 16 BRPM | SYSTOLIC BLOOD PRESSURE: 181 MMHG | DIASTOLIC BLOOD PRESSURE: 74 MMHG | WEIGHT: 174 LBS | HEART RATE: 86 BPM

## 2019-10-17 DIAGNOSIS — I70.299 ATHEROSCLEROSIS OF ARTERY OF EXTREMITY WITH ULCERATION (HCC): ICD-10-CM

## 2019-10-17 DIAGNOSIS — L97.422 ULCER OF LEFT HEEL, WITH FAT LAYER EXPOSED (HCC): ICD-10-CM

## 2019-10-17 DIAGNOSIS — L97.412 SKIN ULCER OF RIGHT HEEL WITH FAT LAYER EXPOSED (HCC): Chronic | ICD-10-CM

## 2019-10-17 DIAGNOSIS — I70.213 ATHEROSCLEROSIS OF NATIVE ARTERY OF BOTH LOWER EXTREMITIES WITH INTERMITTENT CLAUDICATION (HCC): Primary | ICD-10-CM

## 2019-10-17 DIAGNOSIS — T81.49XA WOUND INFECTION AFTER SURGERY: ICD-10-CM

## 2019-10-17 DIAGNOSIS — L97.909 ATHEROSCLEROSIS OF ARTERY OF EXTREMITY WITH ULCERATION (HCC): ICD-10-CM

## 2019-10-17 DIAGNOSIS — I70.229 ATHEROSCLEROSIS OF ARTERY OF EXTREMITY WITH REST PAIN (HCC): ICD-10-CM

## 2019-10-17 DIAGNOSIS — M25.561 ACUTE PAIN OF RIGHT KNEE: ICD-10-CM

## 2019-10-17 DIAGNOSIS — S91.301A OPEN WOUND OF RIGHT FOOT, INITIAL ENCOUNTER: ICD-10-CM

## 2019-10-17 DIAGNOSIS — L97.512 ULCER OF RIGHT FOOT, WITH FAT LAYER EXPOSED (HCC): Chronic | ICD-10-CM

## 2019-10-17 DIAGNOSIS — T14.8XXA NON-HEALING NON-SURGICAL WOUND: Primary | ICD-10-CM

## 2019-10-17 PROCEDURE — 97597 DBRDMT OPN WND 1ST 20 CM/<: CPT | Performed by: SURGERY

## 2019-10-17 PROCEDURE — 97597 DBRDMT OPN WND 1ST 20 CM/<: CPT

## 2019-10-17 RX ORDER — SODIUM HYPOCHLORITE 1.25 MG/ML
SOLUTION TOPICAL
Qty: 1000 ML | Refills: 3 | Status: ON HOLD | OUTPATIENT
Start: 2019-10-17 | End: 2019-12-17 | Stop reason: HOSPADM

## 2019-10-17 RX ORDER — CEPHALEXIN 500 MG/1
500 CAPSULE ORAL 2 TIMES DAILY
Qty: 20 CAPSULE | Refills: 0 | Status: ON HOLD | OUTPATIENT
Start: 2019-10-17 | End: 2019-10-28 | Stop reason: HOSPADM

## 2019-10-18 ENCOUNTER — HOSPITAL ENCOUNTER (OUTPATIENT)
Dept: GENERAL RADIOLOGY | Age: 59
Discharge: HOME | End: 2019-10-18
Payer: MEDICARE

## 2019-10-18 ENCOUNTER — HOSPITAL ENCOUNTER (OUTPATIENT)
Dept: VASCULAR LAB | Age: 59
Discharge: HOME | End: 2019-10-18
Payer: MEDICARE

## 2019-10-18 DIAGNOSIS — L97.909 ATHEROSCLEROSIS OF ARTERY OF EXTREMITY WITH ULCERATION (HCC): ICD-10-CM

## 2019-10-18 DIAGNOSIS — L97.512 ULCER OF RIGHT FOOT, WITH FAT LAYER EXPOSED (HCC): Chronic | ICD-10-CM

## 2019-10-18 DIAGNOSIS — L97.412 SKIN ULCER OF RIGHT HEEL WITH FAT LAYER EXPOSED (HCC): Chronic | ICD-10-CM

## 2019-10-18 DIAGNOSIS — T81.49XA WOUND INFECTION AFTER SURGERY: ICD-10-CM

## 2019-10-18 DIAGNOSIS — I70.229 ATHEROSCLEROSIS OF ARTERY OF EXTREMITY WITH REST PAIN (HCC): ICD-10-CM

## 2019-10-18 DIAGNOSIS — S91.301A OPEN WOUND OF RIGHT FOOT, INITIAL ENCOUNTER: ICD-10-CM

## 2019-10-18 DIAGNOSIS — L97.422 ULCER OF LEFT HEEL, WITH FAT LAYER EXPOSED (HCC): ICD-10-CM

## 2019-10-18 DIAGNOSIS — I70.213 ATHEROSCLEROSIS OF NATIVE ARTERY OF BOTH LOWER EXTREMITIES WITH INTERMITTENT CLAUDICATION (HCC): ICD-10-CM

## 2019-10-18 DIAGNOSIS — T14.8XXA NON-HEALING NON-SURGICAL WOUND: ICD-10-CM

## 2019-10-18 DIAGNOSIS — I70.299 ATHEROSCLEROSIS OF ARTERY OF EXTREMITY WITH ULCERATION (HCC): ICD-10-CM

## 2019-10-18 DIAGNOSIS — M25.561 ACUTE PAIN OF RIGHT KNEE: ICD-10-CM

## 2019-10-18 PROCEDURE — 73552 X-RAY EXAM OF FEMUR 2/>: CPT

## 2019-10-18 PROCEDURE — 93925 LOWER EXTREMITY STUDY: CPT

## 2019-10-18 PROCEDURE — 73590 X-RAY EXAM OF LOWER LEG: CPT

## 2019-10-18 PROCEDURE — 93922 UPR/L XTREMITY ART 2 LEVELS: CPT

## 2019-10-22 ENCOUNTER — HOSPITAL ENCOUNTER (INPATIENT)
Dept: INTERVENTIONAL RADIOLOGY/VASCULAR | Age: 59
LOS: 5 days | Discharge: HOME HEALTH | DRG: 252 | End: 2019-10-28
Attending: SURGERY | Admitting: SURGERY
Payer: MEDICARE

## 2019-10-22 DIAGNOSIS — T81.718A PSEUDOANEURYSM FOLLOWING PROCEDURE (HCC): Primary | ICD-10-CM

## 2019-10-22 DIAGNOSIS — I73.9 PVD (PERIPHERAL VASCULAR DISEASE) (HCC): ICD-10-CM

## 2019-10-22 DIAGNOSIS — T14.8XXA HEMATOMA: ICD-10-CM

## 2019-10-22 DIAGNOSIS — I72.9 PSEUDOANEURYSM FOLLOWING PROCEDURE (HCC): Primary | ICD-10-CM

## 2019-10-22 LAB
ANION GAP SERPL CALCULATED.3IONS-SCNC: 14 MMOL/L (ref 7–19)
BUN BLDV-MCNC: 37 MG/DL (ref 6–20)
CALCIUM SERPL-MCNC: 9.3 MG/DL (ref 8.6–10)
CHLORIDE BLD-SCNC: 93 MMOL/L (ref 98–111)
CO2: 33 MMOL/L (ref 22–29)
CREAT SERPL-MCNC: 5.5 MG/DL (ref 0.5–1.2)
GFR NON-AFRICAN AMERICAN: 11
GLUCOSE BLD-MCNC: 113 MG/DL (ref 74–109)
HCT VFR BLD CALC: 38 % (ref 42–52)
HEMOGLOBIN: 11.7 G/DL (ref 14–18)
MCH RBC QN AUTO: 28.7 PG (ref 27–31)
MCHC RBC AUTO-ENTMCNC: 30.8 G/DL (ref 33–37)
MCV RBC AUTO: 93.4 FL (ref 80–94)
PDW BLD-RTO: 17 % (ref 11.5–14.5)
PLATELET # BLD: 130 K/UL (ref 130–400)
PMV BLD AUTO: 11.1 FL (ref 9.4–12.4)
POTASSIUM SERPL-SCNC: 5 MMOL/L (ref 3.5–5)
RBC # BLD: 4.07 M/UL (ref 4.7–6.1)
SODIUM BLD-SCNC: 140 MMOL/L (ref 136–145)
WBC # BLD: 4.5 K/UL (ref 4.8–10.8)

## 2019-10-22 PROCEDURE — 6360000002 HC RX W HCPCS: Performed by: SURGERY

## 2019-10-22 PROCEDURE — B41D1ZZ FLUOROSCOPY OF AORTA AND BILATERAL LOWER EXTREMITY ARTERIES USING LOW OSMOLAR CONTRAST: ICD-10-PCS | Performed by: SURGERY

## 2019-10-22 PROCEDURE — 36415 COLL VENOUS BLD VENIPUNCTURE: CPT

## 2019-10-22 PROCEDURE — G0379 DIRECT REFER HOSPITAL OBSERV: HCPCS

## 2019-10-22 PROCEDURE — 047K3ZZ DILATION OF RIGHT FEMORAL ARTERY, PERCUTANEOUS APPROACH: ICD-10-PCS | Performed by: SURGERY

## 2019-10-22 PROCEDURE — 99153 MOD SED SAME PHYS/QHP EA: CPT

## 2019-10-22 PROCEDURE — 6370000000 HC RX 637 (ALT 250 FOR IP): Performed by: SURGERY

## 2019-10-22 PROCEDURE — 75716 ARTERY X-RAYS ARMS/LEGS: CPT

## 2019-10-22 PROCEDURE — C1887 CATHETER, GUIDING: HCPCS

## 2019-10-22 PROCEDURE — 0HDKXZZ EXTRACTION OF RIGHT LOWER LEG SKIN, EXTERNAL APPROACH: ICD-10-PCS | Performed by: SURGERY

## 2019-10-22 PROCEDURE — 93926 LOWER EXTREMITY STUDY: CPT

## 2019-10-22 PROCEDURE — 37224 HC PLASTY UNI FEMPOP: CPT

## 2019-10-22 PROCEDURE — 6370000000 HC RX 637 (ALT 250 FOR IP): Performed by: NURSE PRACTITIONER

## 2019-10-22 PROCEDURE — 85027 COMPLETE CBC AUTOMATED: CPT

## 2019-10-22 PROCEDURE — 75625 CONTRAST EXAM ABDOMINL AORTA: CPT

## 2019-10-22 PROCEDURE — 80048 BASIC METABOLIC PNL TOTAL CA: CPT

## 2019-10-22 PROCEDURE — 6360000002 HC RX W HCPCS: Performed by: NURSE PRACTITIONER

## 2019-10-22 PROCEDURE — 99152 MOD SED SAME PHYS/QHP 5/>YRS: CPT

## 2019-10-22 PROCEDURE — 2500000003 HC RX 250 WO HCPCS: Performed by: SURGERY

## 2019-10-22 PROCEDURE — G0378 HOSPITAL OBSERVATION PER HR: HCPCS

## 2019-10-22 PROCEDURE — 2580000003 HC RX 258: Performed by: SURGERY

## 2019-10-22 PROCEDURE — 2580000003 HC RX 258: Performed by: NURSE PRACTITIONER

## 2019-10-22 RX ORDER — ASPIRIN 81 MG/1
81 TABLET ORAL ONCE
Status: COMPLETED | OUTPATIENT
Start: 2019-10-22 | End: 2019-10-22

## 2019-10-22 RX ORDER — CLONIDINE HYDROCHLORIDE 0.1 MG/1
0.1 TABLET ORAL PRN
Status: DISCONTINUED | OUTPATIENT
Start: 2019-10-22 | End: 2019-10-28 | Stop reason: HOSPADM

## 2019-10-22 RX ORDER — MIDAZOLAM HYDROCHLORIDE 1 MG/ML
INJECTION INTRAMUSCULAR; INTRAVENOUS
Status: COMPLETED | OUTPATIENT
Start: 2019-10-22 | End: 2019-10-22

## 2019-10-22 RX ORDER — CEPHALEXIN 500 MG/1
500 CAPSULE ORAL 2 TIMES DAILY
Status: DISCONTINUED | OUTPATIENT
Start: 2019-10-22 | End: 2019-10-28 | Stop reason: HOSPADM

## 2019-10-22 RX ORDER — SODIUM CHLORIDE 9 MG/ML
INJECTION, SOLUTION INTRAVENOUS CONTINUOUS
Status: DISCONTINUED | OUTPATIENT
Start: 2019-10-22 | End: 2019-10-22

## 2019-10-22 RX ORDER — SODIUM CHLORIDE 0.9 % (FLUSH) 0.9 %
10 SYRINGE (ML) INJECTION PRN
Status: DISCONTINUED | OUTPATIENT
Start: 2019-10-22 | End: 2019-10-28 | Stop reason: HOSPADM

## 2019-10-22 RX ORDER — HYDROCODONE BITARTRATE AND ACETAMINOPHEN 5; 325 MG/1; MG/1
2 TABLET ORAL EVERY 4 HOURS PRN
Status: DISCONTINUED | OUTPATIENT
Start: 2019-10-22 | End: 2019-10-25

## 2019-10-22 RX ORDER — LACTULOSE 10 G/15ML
SOLUTION ORAL
Refills: 0 | COMMUNITY
Start: 2019-08-02 | End: 2019-10-22

## 2019-10-22 RX ORDER — OXYCODONE AND ACETAMINOPHEN 10; 325 MG/1; MG/1
1 TABLET ORAL 3 TIMES DAILY PRN
Refills: 0 | Status: ON HOLD | COMMUNITY
Start: 2019-08-13 | End: 2019-10-28 | Stop reason: HOSPADM

## 2019-10-22 RX ORDER — OXYCODONE AND ACETAMINOPHEN 10; 325 MG/1; MG/1
1 TABLET ORAL EVERY 4 HOURS PRN
Status: DISCONTINUED | OUTPATIENT
Start: 2019-10-22 | End: 2019-10-28 | Stop reason: HOSPADM

## 2019-10-22 RX ORDER — ONDANSETRON 2 MG/ML
4 INJECTION INTRAMUSCULAR; INTRAVENOUS EVERY 6 HOURS PRN
Status: DISCONTINUED | OUTPATIENT
Start: 2019-10-22 | End: 2019-10-28 | Stop reason: HOSPADM

## 2019-10-22 RX ORDER — CLOPIDOGREL BISULFATE 75 MG/1
75 TABLET ORAL DAILY
Status: DISCONTINUED | OUTPATIENT
Start: 2019-10-22 | End: 2019-10-28 | Stop reason: HOSPADM

## 2019-10-22 RX ORDER — LISINOPRIL 10 MG/1
10 TABLET ORAL 2 TIMES DAILY
Refills: 0 | Status: ON HOLD | COMMUNITY
Start: 2019-08-02 | End: 2019-10-28 | Stop reason: HOSPADM

## 2019-10-22 RX ORDER — HEPARIN SODIUM 5000 [USP'U]/ML
INJECTION, SOLUTION INTRAVENOUS; SUBCUTANEOUS
Status: COMPLETED | OUTPATIENT
Start: 2019-10-22 | End: 2019-10-22

## 2019-10-22 RX ORDER — FENTANYL CITRATE 50 UG/ML
INJECTION, SOLUTION INTRAMUSCULAR; INTRAVENOUS
Status: COMPLETED | OUTPATIENT
Start: 2019-10-22 | End: 2019-10-22

## 2019-10-22 RX ORDER — AMLODIPINE BESYLATE 5 MG/1
10 TABLET ORAL DAILY
Status: DISCONTINUED | OUTPATIENT
Start: 2019-10-23 | End: 2019-10-28 | Stop reason: HOSPADM

## 2019-10-22 RX ORDER — ISOSORBIDE MONONITRATE 60 MG/1
30 TABLET, EXTENDED RELEASE ORAL DAILY
Status: DISCONTINUED | OUTPATIENT
Start: 2019-10-22 | End: 2019-10-28 | Stop reason: HOSPADM

## 2019-10-22 RX ORDER — CEFAZOLIN SODIUM 1 G/50ML
1 INJECTION, SOLUTION INTRAVENOUS ONCE
Status: COMPLETED | OUTPATIENT
Start: 2019-10-22 | End: 2019-10-22

## 2019-10-22 RX ORDER — HYDROCODONE BITARTRATE AND ACETAMINOPHEN 5; 325 MG/1; MG/1
1 TABLET ORAL EVERY 4 HOURS PRN
Status: DISCONTINUED | OUTPATIENT
Start: 2019-10-22 | End: 2019-10-25

## 2019-10-22 RX ORDER — MINOXIDIL 10 MG/1
10 TABLET ORAL DAILY
Status: DISCONTINUED | OUTPATIENT
Start: 2019-10-23 | End: 2019-10-28 | Stop reason: HOSPADM

## 2019-10-22 RX ORDER — LIDOCAINE HYDROCHLORIDE 20 MG/ML
INJECTION, SOLUTION INFILTRATION; PERINEURAL
Status: COMPLETED | OUTPATIENT
Start: 2019-10-22 | End: 2019-10-22

## 2019-10-22 RX ORDER — ASPIRIN 81 MG/1
81 TABLET ORAL DAILY
Status: DISCONTINUED | OUTPATIENT
Start: 2019-10-22 | End: 2019-10-22 | Stop reason: SDUPTHER

## 2019-10-22 RX ORDER — SODIUM CHLORIDE 0.9 % (FLUSH) 0.9 %
10 SYRINGE (ML) INJECTION PRN
Status: DISCONTINUED | OUTPATIENT
Start: 2019-10-22 | End: 2019-10-22 | Stop reason: SDUPTHER

## 2019-10-22 RX ORDER — LACTULOSE 10 G/15ML
10 SOLUTION ORAL 3 TIMES DAILY
Status: DISCONTINUED | OUTPATIENT
Start: 2019-10-22 | End: 2019-10-28 | Stop reason: HOSPADM

## 2019-10-22 RX ORDER — SODIUM CHLORIDE 0.9 % (FLUSH) 0.9 %
10 SYRINGE (ML) INJECTION EVERY 12 HOURS SCHEDULED
Status: DISCONTINUED | OUTPATIENT
Start: 2019-10-22 | End: 2019-10-28 | Stop reason: HOSPADM

## 2019-10-22 RX ORDER — ASPIRIN 81 MG/1
81 TABLET ORAL DAILY
Status: DISCONTINUED | OUTPATIENT
Start: 2019-10-22 | End: 2019-10-28 | Stop reason: HOSPADM

## 2019-10-22 RX ADMIN — FENTANYL CITRATE 25 MCG: 50 INJECTION INTRAMUSCULAR; INTRAVENOUS at 12:54

## 2019-10-22 RX ADMIN — MIDAZOLAM 1 MG: 1 INJECTION INTRAMUSCULAR; INTRAVENOUS at 12:54

## 2019-10-22 RX ADMIN — Medication 10 ML: at 19:50

## 2019-10-22 RX ADMIN — MIDAZOLAM 1 MG: 1 INJECTION INTRAMUSCULAR; INTRAVENOUS at 12:24

## 2019-10-22 RX ADMIN — ISOSORBIDE MONONITRATE 30 MG: 60 TABLET, EXTENDED RELEASE ORAL at 21:21

## 2019-10-22 RX ADMIN — HYDROCODONE BITARTRATE AND ACETAMINOPHEN 2 TABLET: 5; 325 TABLET ORAL at 19:50

## 2019-10-22 RX ADMIN — LIDOCAINE HYDROCHLORIDE 10 ML: 20 INJECTION, SOLUTION INFILTRATION; PERINEURAL at 12:17

## 2019-10-22 RX ADMIN — CLONIDINE HYDROCHLORIDE 0.1 MG: 0.1 TABLET ORAL at 19:20

## 2019-10-22 RX ADMIN — MIDAZOLAM 1 MG: 1 INJECTION INTRAMUSCULAR; INTRAVENOUS at 13:12

## 2019-10-22 RX ADMIN — HEPARIN SODIUM 1000 UNITS: 5000 INJECTION, SOLUTION INTRAVENOUS; SUBCUTANEOUS at 13:32

## 2019-10-22 RX ADMIN — HEPARIN SODIUM 5000 UNITS: 5000 INJECTION, SOLUTION INTRAVENOUS; SUBCUTANEOUS at 12:38

## 2019-10-22 RX ADMIN — CLOPIDOGREL BISULFATE 75 MG: 75 TABLET ORAL at 21:21

## 2019-10-22 RX ADMIN — MIDAZOLAM 1 MG: 1 INJECTION INTRAMUSCULAR; INTRAVENOUS at 13:30

## 2019-10-22 RX ADMIN — HYDROMORPHONE HYDROCHLORIDE 0.5 MG: 1 INJECTION, SOLUTION INTRAMUSCULAR; INTRAVENOUS; SUBCUTANEOUS at 16:53

## 2019-10-22 RX ADMIN — FENTANYL CITRATE 50 MCG: 50 INJECTION INTRAMUSCULAR; INTRAVENOUS at 12:20

## 2019-10-22 RX ADMIN — CLONIDINE HYDROCHLORIDE 0.1 MG: 0.1 TABLET ORAL at 15:16

## 2019-10-22 RX ADMIN — CEFAZOLIN SODIUM 1 G: 1 INJECTION, SOLUTION INTRAVENOUS at 11:48

## 2019-10-22 RX ADMIN — HYDROCODONE BITARTRATE AND ACETAMINOPHEN 2 TABLET: 5; 325 TABLET ORAL at 15:38

## 2019-10-22 RX ADMIN — CLONIDINE HYDROCHLORIDE 0.3 MG: 0.2 TABLET ORAL at 21:13

## 2019-10-22 RX ADMIN — CEPHALEXIN 500 MG: 500 CAPSULE ORAL at 21:14

## 2019-10-22 RX ADMIN — HYDROMORPHONE HYDROCHLORIDE 0.5 MG: 1 INJECTION, SOLUTION INTRAMUSCULAR; INTRAVENOUS; SUBCUTANEOUS at 21:13

## 2019-10-22 RX ADMIN — MIDAZOLAM 1 MG: 1 INJECTION INTRAMUSCULAR; INTRAVENOUS at 12:16

## 2019-10-22 RX ADMIN — HEPARIN SODIUM 5000 UNITS: 5000 INJECTION, SOLUTION INTRAVENOUS; SUBCUTANEOUS at 12:18

## 2019-10-22 RX ADMIN — ASPIRIN 81 MG: 81 TABLET, COATED ORAL at 09:00

## 2019-10-22 RX ADMIN — MIDAZOLAM 1 MG: 1 INJECTION INTRAMUSCULAR; INTRAVENOUS at 12:41

## 2019-10-22 RX ADMIN — FENTANYL CITRATE 50 MCG: 50 INJECTION INTRAMUSCULAR; INTRAVENOUS at 12:50

## 2019-10-22 RX ADMIN — FENTANYL CITRATE 50 MCG: 50 INJECTION INTRAMUSCULAR; INTRAVENOUS at 13:13

## 2019-10-22 RX ADMIN — FENTANYL CITRATE 25 MCG: 50 INJECTION INTRAMUSCULAR; INTRAVENOUS at 12:17

## 2019-10-22 RX ADMIN — SODIUM CHLORIDE: 9 INJECTION, SOLUTION INTRAVENOUS at 08:44

## 2019-10-22 RX ADMIN — FENTANYL CITRATE 25 MCG: 50 INJECTION INTRAMUSCULAR; INTRAVENOUS at 12:24

## 2019-10-22 RX ADMIN — FENTANYL CITRATE 50 MCG: 50 INJECTION INTRAMUSCULAR; INTRAVENOUS at 13:31

## 2019-10-22 RX ADMIN — FENTANYL CITRATE 25 MCG: 50 INJECTION INTRAMUSCULAR; INTRAVENOUS at 12:41

## 2019-10-22 ASSESSMENT — PAIN DESCRIPTION - PROGRESSION
CLINICAL_PROGRESSION: NOT CHANGED

## 2019-10-22 ASSESSMENT — PAIN DESCRIPTION - DESCRIPTORS
DESCRIPTORS: ACHING

## 2019-10-22 ASSESSMENT — PAIN - FUNCTIONAL ASSESSMENT
PAIN_FUNCTIONAL_ASSESSMENT: PREVENTS OR INTERFERES WITH ALL ACTIVE AND SOME PASSIVE ACTIVITIES

## 2019-10-22 ASSESSMENT — PAIN DESCRIPTION - ORIENTATION
ORIENTATION: LEFT

## 2019-10-22 ASSESSMENT — PAIN SCALES - GENERAL
PAINLEVEL_OUTOF10: 3
PAINLEVEL_OUTOF10: 10
PAINLEVEL_OUTOF10: 8
PAINLEVEL_OUTOF10: 8
PAINLEVEL_OUTOF10: 10
PAINLEVEL_OUTOF10: 10

## 2019-10-22 ASSESSMENT — PAIN DESCRIPTION - ONSET
ONSET: GRADUAL

## 2019-10-22 ASSESSMENT — PAIN DESCRIPTION - DIRECTION
RADIATING_TOWARDS: THIGH

## 2019-10-22 ASSESSMENT — PAIN DESCRIPTION - LOCATION
LOCATION: GROIN

## 2019-10-22 ASSESSMENT — PAIN DESCRIPTION - FREQUENCY
FREQUENCY: CONTINUOUS

## 2019-10-22 ASSESSMENT — PAIN DESCRIPTION - PAIN TYPE
TYPE: SURGICAL PAIN

## 2019-10-23 ENCOUNTER — APPOINTMENT (OUTPATIENT)
Dept: INTERVENTIONAL RADIOLOGY/VASCULAR | Age: 59
DRG: 252 | End: 2019-10-23
Attending: SURGERY
Payer: MEDICARE

## 2019-10-23 PROBLEM — I70.201: Status: ACTIVE | Noted: 2019-10-23

## 2019-10-23 PROCEDURE — 76942 ECHO GUIDE FOR BIOPSY: CPT | Performed by: SURGERY

## 2019-10-23 PROCEDURE — 93926 LOWER EXTREMITY STUDY: CPT

## 2019-10-23 PROCEDURE — 2500000003 HC RX 250 WO HCPCS: Performed by: SURGERY

## 2019-10-23 PROCEDURE — 75710 ARTERY X-RAYS ARM/LEG: CPT | Performed by: SURGERY

## 2019-10-23 PROCEDURE — 36002 PSEUDOANEURYSM INJECTION TRT: CPT

## 2019-10-23 PROCEDURE — 8010000000 HC HEMODIALYSIS ACUTE INPT

## 2019-10-23 PROCEDURE — 04HK3DZ INSERTION OF INTRALUMINAL DEVICE INTO RIGHT FEMORAL ARTERY, PERCUTANEOUS APPROACH: ICD-10-PCS | Performed by: SURGERY

## 2019-10-23 PROCEDURE — 37226 HC FEMPOP PLASTY & STENT: CPT

## 2019-10-23 PROCEDURE — 6360000002 HC RX W HCPCS: Performed by: SURGERY

## 2019-10-23 PROCEDURE — 5A1D70Z PERFORMANCE OF URINARY FILTRATION, INTERMITTENT, LESS THAN 6 HOURS PER DAY: ICD-10-PCS | Performed by: INTERNAL MEDICINE

## 2019-10-23 PROCEDURE — 96376 TX/PRO/DX INJ SAME DRUG ADON: CPT

## 2019-10-23 PROCEDURE — C1760 CLOSURE DEV, VASC: HCPCS

## 2019-10-23 PROCEDURE — 6370000000 HC RX 637 (ALT 250 FOR IP): Performed by: SURGERY

## 2019-10-23 PROCEDURE — 99152 MOD SED SAME PHYS/QHP 5/>YRS: CPT

## 2019-10-23 PROCEDURE — 1210000000 HC MED SURG R&B

## 2019-10-23 PROCEDURE — 36002 PSEUDOANEURYSM INJECTION TRT: CPT | Performed by: SURGERY

## 2019-10-23 PROCEDURE — 3E053GC INTRODUCTION OF OTHER THERAPEUTIC SUBSTANCE INTO PERIPHERAL ARTERY, PERCUTANEOUS APPROACH: ICD-10-PCS | Performed by: SURGERY

## 2019-10-23 PROCEDURE — 6360000004 HC RX CONTRAST MEDICATION: Performed by: SURGERY

## 2019-10-23 PROCEDURE — 99153 MOD SED SAME PHYS/QHP EA: CPT

## 2019-10-23 PROCEDURE — 75710 ARTERY X-RAYS ARM/LEG: CPT

## 2019-10-23 PROCEDURE — 2580000003 HC RX 258: Performed by: SURGERY

## 2019-10-23 PROCEDURE — 37226 PR REVSC OPN/PRQ FEM/POP W/STNT/ANGIOP SM VSL: CPT | Performed by: SURGERY

## 2019-10-23 RX ORDER — FENTANYL CITRATE 50 UG/ML
INJECTION, SOLUTION INTRAMUSCULAR; INTRAVENOUS
Status: COMPLETED | OUTPATIENT
Start: 2019-10-23 | End: 2019-10-23

## 2019-10-23 RX ORDER — HEPARIN SODIUM 5000 [USP'U]/ML
INJECTION, SOLUTION INTRAVENOUS; SUBCUTANEOUS
Status: COMPLETED | OUTPATIENT
Start: 2019-10-23 | End: 2019-10-23

## 2019-10-23 RX ORDER — MIDAZOLAM HYDROCHLORIDE 1 MG/ML
INJECTION INTRAMUSCULAR; INTRAVENOUS
Status: COMPLETED | OUTPATIENT
Start: 2019-10-23 | End: 2019-10-23

## 2019-10-23 RX ORDER — HYDRALAZINE HYDROCHLORIDE 20 MG/ML
INJECTION INTRAMUSCULAR; INTRAVENOUS
Status: COMPLETED | OUTPATIENT
Start: 2019-10-23 | End: 2019-10-23

## 2019-10-23 RX ORDER — IODIXANOL 320 MG/ML
INJECTION, SOLUTION INTRAVASCULAR
Status: COMPLETED | OUTPATIENT
Start: 2019-10-23 | End: 2019-10-23

## 2019-10-23 RX ORDER — LIDOCAINE HYDROCHLORIDE 20 MG/ML
INJECTION, SOLUTION INFILTRATION; PERINEURAL
Status: COMPLETED | OUTPATIENT
Start: 2019-10-23 | End: 2019-10-23

## 2019-10-23 RX ORDER — SODIUM CHLORIDE 0.9 % (FLUSH) 0.9 %
10 SYRINGE (ML) INJECTION PRN
Status: DISCONTINUED | OUTPATIENT
Start: 2019-10-23 | End: 2019-10-28 | Stop reason: HOSPADM

## 2019-10-23 RX ORDER — SODIUM CHLORIDE 0.9 % (FLUSH) 0.9 %
10 SYRINGE (ML) INJECTION EVERY 12 HOURS SCHEDULED
Status: DISCONTINUED | OUTPATIENT
Start: 2019-10-23 | End: 2019-10-28 | Stop reason: HOSPADM

## 2019-10-23 RX ORDER — HEPARIN SODIUM 5000 [USP'U]/ML
5000 INJECTION, SOLUTION INTRAVENOUS; SUBCUTANEOUS EVERY 8 HOURS SCHEDULED
Status: DISCONTINUED | OUTPATIENT
Start: 2019-10-23 | End: 2019-10-25

## 2019-10-23 RX ADMIN — ISOSORBIDE MONONITRATE 30 MG: 60 TABLET, EXTENDED RELEASE ORAL at 18:12

## 2019-10-23 RX ADMIN — CEPHALEXIN 500 MG: 500 CAPSULE ORAL at 18:12

## 2019-10-23 RX ADMIN — HYDROCODONE BITARTRATE AND ACETAMINOPHEN 2 TABLET: 5; 325 TABLET ORAL at 00:17

## 2019-10-23 RX ADMIN — FENTANYL CITRATE 50 MCG: 50 INJECTION INTRAMUSCULAR; INTRAVENOUS at 09:01

## 2019-10-23 RX ADMIN — CEPHALEXIN 500 MG: 500 CAPSULE ORAL at 21:39

## 2019-10-23 RX ADMIN — MIDAZOLAM 1 MG: 1 INJECTION INTRAMUSCULAR; INTRAVENOUS at 09:13

## 2019-10-23 RX ADMIN — LIDOCAINE HYDROCHLORIDE 10 ML: 20 INJECTION, SOLUTION INFILTRATION; PERINEURAL at 08:21

## 2019-10-23 RX ADMIN — FENTANYL CITRATE 50 MCG: 50 INJECTION INTRAMUSCULAR; INTRAVENOUS at 09:41

## 2019-10-23 RX ADMIN — FENTANYL CITRATE 50 MCG: 50 INJECTION INTRAMUSCULAR; INTRAVENOUS at 09:25

## 2019-10-23 RX ADMIN — HYDROMORPHONE HYDROCHLORIDE 0.5 MG: 1 INJECTION, SOLUTION INTRAMUSCULAR; INTRAVENOUS; SUBCUTANEOUS at 01:06

## 2019-10-23 RX ADMIN — THROMBIN, TOPICAL (BOVINE) 5000 UNITS: KIT at 08:28

## 2019-10-23 RX ADMIN — LIDOCAINE HYDROCHLORIDE 10 ML: 20 INJECTION, SOLUTION INFILTRATION; PERINEURAL at 09:05

## 2019-10-23 RX ADMIN — HEPARIN SODIUM 5000 UNITS: 5000 INJECTION INTRAVENOUS; SUBCUTANEOUS at 21:43

## 2019-10-23 RX ADMIN — MIDAZOLAM 1 MG: 1 INJECTION INTRAMUSCULAR; INTRAVENOUS at 09:29

## 2019-10-23 RX ADMIN — MINOXIDIL 10 MG: 10 TABLET ORAL at 18:12

## 2019-10-23 RX ADMIN — HEPARIN SODIUM 3000 UNITS: 5000 INJECTION, SOLUTION INTRAVENOUS; SUBCUTANEOUS at 09:12

## 2019-10-23 RX ADMIN — IODIXANOL 35 ML: 320 INJECTION, SOLUTION INTRAVASCULAR at 09:53

## 2019-10-23 RX ADMIN — FENTANYL CITRATE 50 MCG: 50 INJECTION INTRAMUSCULAR; INTRAVENOUS at 09:13

## 2019-10-23 RX ADMIN — HYDROMORPHONE HYDROCHLORIDE 0.5 MG: 1 INJECTION, SOLUTION INTRAMUSCULAR; INTRAVENOUS; SUBCUTANEOUS at 06:32

## 2019-10-23 RX ADMIN — MIDAZOLAM 1 MG: 1 INJECTION INTRAMUSCULAR; INTRAVENOUS at 09:05

## 2019-10-23 RX ADMIN — MIDAZOLAM 1 MG: 1 INJECTION INTRAMUSCULAR; INTRAVENOUS at 09:41

## 2019-10-23 RX ADMIN — HYDROMORPHONE HYDROCHLORIDE 0.5 MG: 1 INJECTION, SOLUTION INTRAMUSCULAR; INTRAVENOUS; SUBCUTANEOUS at 22:20

## 2019-10-23 RX ADMIN — FENTANYL CITRATE 50 MCG: 50 INJECTION INTRAMUSCULAR; INTRAVENOUS at 09:05

## 2019-10-23 RX ADMIN — ASPIRIN 81 MG: 81 TABLET, COATED ORAL at 18:11

## 2019-10-23 RX ADMIN — HYDRALAZINE HYDROCHLORIDE 10 MG: 20 INJECTION INTRAMUSCULAR; INTRAVENOUS at 09:04

## 2019-10-23 RX ADMIN — CLONIDINE HYDROCHLORIDE 0.3 MG: 0.2 TABLET ORAL at 21:40

## 2019-10-23 RX ADMIN — Medication 10 ML: at 21:42

## 2019-10-23 RX ADMIN — CLOPIDOGREL BISULFATE 75 MG: 75 TABLET ORAL at 18:11

## 2019-10-23 RX ADMIN — MIDAZOLAM 1 MG: 1 INJECTION INTRAMUSCULAR; INTRAVENOUS at 09:22

## 2019-10-23 RX ADMIN — HYDROCODONE BITARTRATE AND ACETAMINOPHEN 2 TABLET: 5; 325 TABLET ORAL at 05:15

## 2019-10-23 RX ADMIN — CLONIDINE HYDROCHLORIDE 0.3 MG: 0.2 TABLET ORAL at 18:11

## 2019-10-23 RX ADMIN — OXYCODONE HYDROCHLORIDE AND ACETAMINOPHEN 1 TABLET: 10; 325 TABLET ORAL at 19:50

## 2019-10-23 RX ADMIN — HEPARIN SODIUM 1000 UNITS: 5000 INJECTION, SOLUTION INTRAVENOUS; SUBCUTANEOUS at 09:32

## 2019-10-23 RX ADMIN — FENTANYL CITRATE 50 MCG: 50 INJECTION INTRAMUSCULAR; INTRAVENOUS at 09:29

## 2019-10-23 RX ADMIN — AMLODIPINE BESYLATE 10 MG: 5 TABLET ORAL at 18:11

## 2019-10-23 RX ADMIN — HYDROMORPHONE HYDROCHLORIDE 0.5 MG: 1 INJECTION, SOLUTION INTRAMUSCULAR; INTRAVENOUS; SUBCUTANEOUS at 13:04

## 2019-10-23 RX ADMIN — HYDROMORPHONE HYDROCHLORIDE 0.5 MG: 1 INJECTION, SOLUTION INTRAMUSCULAR; INTRAVENOUS; SUBCUTANEOUS at 18:11

## 2019-10-23 RX ADMIN — CEFAZOLIN SODIUM 1 G: 500 INJECTION, POWDER, FOR SOLUTION INTRAMUSCULAR; INTRAVENOUS at 09:04

## 2019-10-23 RX ADMIN — Medication 10 ML: at 13:14

## 2019-10-23 RX ADMIN — MIDAZOLAM 1 MG: 1 INJECTION INTRAMUSCULAR; INTRAVENOUS at 09:01

## 2019-10-23 ASSESSMENT — PAIN DESCRIPTION - LOCATION
LOCATION: BACK;GROIN
LOCATION: GROIN
LOCATION: GROIN
LOCATION: BACK;GROIN
LOCATION: GROIN
LOCATION: BACK;GROIN
LOCATION: GROIN

## 2019-10-23 ASSESSMENT — PAIN DESCRIPTION - PROGRESSION
CLINICAL_PROGRESSION: NOT CHANGED
CLINICAL_PROGRESSION: GRADUALLY IMPROVING
CLINICAL_PROGRESSION: NOT CHANGED

## 2019-10-23 ASSESSMENT — PAIN DESCRIPTION - DESCRIPTORS
DESCRIPTORS: THROBBING
DESCRIPTORS: ACHING
DESCRIPTORS: THROBBING
DESCRIPTORS: ACHING
DESCRIPTORS: ACHING;THROBBING
DESCRIPTORS: ACHING

## 2019-10-23 ASSESSMENT — PAIN DESCRIPTION - DIRECTION
RADIATING_TOWARDS: THIGH

## 2019-10-23 ASSESSMENT — PAIN DESCRIPTION - FREQUENCY
FREQUENCY: CONTINUOUS

## 2019-10-23 ASSESSMENT — PAIN DESCRIPTION - ONSET
ONSET: ON-GOING
ONSET: GRADUAL
ONSET: ON-GOING
ONSET: GRADUAL

## 2019-10-23 ASSESSMENT — PAIN DESCRIPTION - ORIENTATION
ORIENTATION: LEFT

## 2019-10-23 ASSESSMENT — PAIN - FUNCTIONAL ASSESSMENT
PAIN_FUNCTIONAL_ASSESSMENT: PREVENTS OR INTERFERES WITH ALL ACTIVE AND SOME PASSIVE ACTIVITIES
PAIN_FUNCTIONAL_ASSESSMENT: PREVENTS OR INTERFERES WITH MANY ACTIVE NOT PASSIVE ACTIVITIES
PAIN_FUNCTIONAL_ASSESSMENT: PREVENTS OR INTERFERES WITH MANY ACTIVE NOT PASSIVE ACTIVITIES
PAIN_FUNCTIONAL_ASSESSMENT: PREVENTS OR INTERFERES SOME ACTIVE ACTIVITIES AND ADLS
PAIN_FUNCTIONAL_ASSESSMENT: PREVENTS OR INTERFERES WITH ALL ACTIVE AND SOME PASSIVE ACTIVITIES
PAIN_FUNCTIONAL_ASSESSMENT: PREVENTS OR INTERFERES WITH MANY ACTIVE NOT PASSIVE ACTIVITIES
PAIN_FUNCTIONAL_ASSESSMENT: PREVENTS OR INTERFERES WITH MANY ACTIVE NOT PASSIVE ACTIVITIES
PAIN_FUNCTIONAL_ASSESSMENT: PREVENTS OR INTERFERES WITH ALL ACTIVE AND SOME PASSIVE ACTIVITIES
PAIN_FUNCTIONAL_ASSESSMENT: PREVENTS OR INTERFERES WITH ALL ACTIVE AND SOME PASSIVE ACTIVITIES
PAIN_FUNCTIONAL_ASSESSMENT: PREVENTS OR INTERFERES WITH MANY ACTIVE NOT PASSIVE ACTIVITIES

## 2019-10-23 ASSESSMENT — PAIN DESCRIPTION - PAIN TYPE
TYPE: SURGICAL PAIN

## 2019-10-23 ASSESSMENT — PAIN SCALES - GENERAL
PAINLEVEL_OUTOF10: 4
PAINLEVEL_OUTOF10: 0
PAINLEVEL_OUTOF10: 8
PAINLEVEL_OUTOF10: 4
PAINLEVEL_OUTOF10: 8
PAINLEVEL_OUTOF10: 3
PAINLEVEL_OUTOF10: 7

## 2019-10-24 ENCOUNTER — HOSPITAL ENCOUNTER (OUTPATIENT)
Dept: WOUND CARE | Age: 59
Discharge: HOME | End: 2019-10-24

## 2019-10-24 LAB
HCT VFR BLD CALC: 26.7 % (ref 42–52)
HEMOGLOBIN: 8.2 G/DL (ref 14–18)
MCH RBC QN AUTO: 28.3 PG (ref 27–31)
MCHC RBC AUTO-ENTMCNC: 30.7 G/DL (ref 33–37)
MCV RBC AUTO: 92.1 FL (ref 80–94)
PDW BLD-RTO: 16.9 % (ref 11.5–14.5)
PLATELET # BLD: 73 K/UL (ref 130–400)
PMV BLD AUTO: 11.9 FL (ref 9.4–12.4)
RBC # BLD: 2.9 M/UL (ref 4.7–6.1)
WBC # BLD: 3.5 K/UL (ref 4.8–10.8)

## 2019-10-24 PROCEDURE — 6360000002 HC RX W HCPCS: Performed by: INTERNAL MEDICINE

## 2019-10-24 PROCEDURE — 36002 PSEUDOANEURYSM INJECTION TRT: CPT

## 2019-10-24 PROCEDURE — 85027 COMPLETE CBC AUTOMATED: CPT

## 2019-10-24 PROCEDURE — 36415 COLL VENOUS BLD VENIPUNCTURE: CPT

## 2019-10-24 PROCEDURE — 6360000002 HC RX W HCPCS: Performed by: SURGERY

## 2019-10-24 PROCEDURE — 97161 PT EVAL LOW COMPLEX 20 MIN: CPT

## 2019-10-24 PROCEDURE — 2580000003 HC RX 258: Performed by: SURGERY

## 2019-10-24 PROCEDURE — 6370000000 HC RX 637 (ALT 250 FOR IP): Performed by: SURGERY

## 2019-10-24 PROCEDURE — 1210000000 HC MED SURG R&B

## 2019-10-24 PROCEDURE — 6370000000 HC RX 637 (ALT 250 FOR IP): Performed by: INTERNAL MEDICINE

## 2019-10-24 PROCEDURE — 99231 SBSQ HOSP IP/OBS SF/LOW 25: CPT | Performed by: SURGERY

## 2019-10-24 PROCEDURE — 97116 GAIT TRAINING THERAPY: CPT

## 2019-10-24 RX ORDER — SODIUM HYPOCHLORITE 1.25 MG/ML
SOLUTION TOPICAL DAILY
Status: DISCONTINUED | OUTPATIENT
Start: 2019-10-24 | End: 2019-10-28 | Stop reason: HOSPADM

## 2019-10-24 RX ADMIN — HYDROCODONE BITARTRATE AND ACETAMINOPHEN 2 TABLET: 5; 325 TABLET ORAL at 00:24

## 2019-10-24 RX ADMIN — DARBEPOETIN ALFA 100 MCG: 100 SOLUTION INTRAVENOUS; SUBCUTANEOUS at 13:33

## 2019-10-24 RX ADMIN — HEPARIN SODIUM 5000 UNITS: 5000 INJECTION INTRAVENOUS; SUBCUTANEOUS at 13:38

## 2019-10-24 RX ADMIN — MINOXIDIL 10 MG: 10 TABLET ORAL at 08:12

## 2019-10-24 RX ADMIN — HYDROCODONE BITARTRATE AND ACETAMINOPHEN 2 TABLET: 5; 325 TABLET ORAL at 12:12

## 2019-10-24 RX ADMIN — CALCIUM ACETATE 1334 MG: 667 CAPSULE ORAL at 17:20

## 2019-10-24 RX ADMIN — HYDROMORPHONE HYDROCHLORIDE 0.5 MG: 1 INJECTION, SOLUTION INTRAMUSCULAR; INTRAVENOUS; SUBCUTANEOUS at 13:26

## 2019-10-24 RX ADMIN — CEPHALEXIN 500 MG: 500 CAPSULE ORAL at 08:13

## 2019-10-24 RX ADMIN — CLONIDINE HYDROCHLORIDE 0.3 MG: 0.2 TABLET ORAL at 08:12

## 2019-10-24 RX ADMIN — CALCIUM ACETATE 1334 MG: 667 CAPSULE ORAL at 12:15

## 2019-10-24 RX ADMIN — AMLODIPINE BESYLATE 10 MG: 5 TABLET ORAL at 08:12

## 2019-10-24 RX ADMIN — CLONIDINE HYDROCHLORIDE 0.3 MG: 0.2 TABLET ORAL at 20:47

## 2019-10-24 RX ADMIN — OXYCODONE HYDROCHLORIDE AND ACETAMINOPHEN 1 TABLET: 10; 325 TABLET ORAL at 10:16

## 2019-10-24 RX ADMIN — Medication 10 ML: at 08:11

## 2019-10-24 RX ADMIN — CLOPIDOGREL BISULFATE 75 MG: 75 TABLET ORAL at 08:12

## 2019-10-24 RX ADMIN — ISOSORBIDE MONONITRATE 30 MG: 60 TABLET, EXTENDED RELEASE ORAL at 08:12

## 2019-10-24 RX ADMIN — HYDROMORPHONE HYDROCHLORIDE 0.5 MG: 1 INJECTION, SOLUTION INTRAMUSCULAR; INTRAVENOUS; SUBCUTANEOUS at 02:36

## 2019-10-24 RX ADMIN — HYDROMORPHONE HYDROCHLORIDE 0.5 MG: 1 INJECTION, SOLUTION INTRAMUSCULAR; INTRAVENOUS; SUBCUTANEOUS at 22:37

## 2019-10-24 RX ADMIN — Medication 10 ML: at 08:13

## 2019-10-24 RX ADMIN — HYDROMORPHONE HYDROCHLORIDE 0.5 MG: 1 INJECTION, SOLUTION INTRAMUSCULAR; INTRAVENOUS; SUBCUTANEOUS at 17:24

## 2019-10-24 RX ADMIN — ASPIRIN 81 MG: 81 TABLET, COATED ORAL at 08:12

## 2019-10-24 RX ADMIN — DAKIN'S SOLUTION 0.125% (QUARTER STRENGTH): 0.12 SOLUTION at 13:33

## 2019-10-24 RX ADMIN — HYDROCODONE BITARTRATE AND ACETAMINOPHEN 2 TABLET: 5; 325 TABLET ORAL at 20:47

## 2019-10-24 RX ADMIN — OXYCODONE HYDROCHLORIDE AND ACETAMINOPHEN 1 TABLET: 10; 325 TABLET ORAL at 16:20

## 2019-10-24 RX ADMIN — CEPHALEXIN 500 MG: 500 CAPSULE ORAL at 20:47

## 2019-10-24 RX ADMIN — HYDROMORPHONE HYDROCHLORIDE 0.5 MG: 1 INJECTION, SOLUTION INTRAMUSCULAR; INTRAVENOUS; SUBCUTANEOUS at 08:11

## 2019-10-24 RX ADMIN — HEPARIN SODIUM 5000 UNITS: 5000 INJECTION INTRAVENOUS; SUBCUTANEOUS at 06:08

## 2019-10-24 ASSESSMENT — PAIN DESCRIPTION - ORIENTATION
ORIENTATION: LEFT

## 2019-10-24 ASSESSMENT — PAIN DESCRIPTION - DESCRIPTORS
DESCRIPTORS: BURNING;ACHING
DESCRIPTORS: BURNING;ACHING
DESCRIPTORS: BURNING;THROBBING
DESCRIPTORS: ACHING;BURNING
DESCRIPTORS: ACHING;BURNING
DESCRIPTORS: BURNING;ACHING
DESCRIPTORS: ACHING;BURNING
DESCRIPTORS: BURNING;ACHING;THROBBING
DESCRIPTORS: ACHING;BURNING
DESCRIPTORS: THROBBING
DESCRIPTORS: ACHING;BURNING

## 2019-10-24 ASSESSMENT — PAIN DESCRIPTION - ONSET
ONSET: ON-GOING

## 2019-10-24 ASSESSMENT — PAIN DESCRIPTION - PAIN TYPE
TYPE: ACUTE PAIN;SURGICAL PAIN
TYPE: ACUTE PAIN;SURGICAL PAIN
TYPE: SURGICAL PAIN
TYPE: ACUTE PAIN;SURGICAL PAIN
TYPE: SURGICAL PAIN
TYPE: ACUTE PAIN;SURGICAL PAIN

## 2019-10-24 ASSESSMENT — PAIN DESCRIPTION - PROGRESSION
CLINICAL_PROGRESSION: NOT CHANGED
CLINICAL_PROGRESSION: NOT CHANGED
CLINICAL_PROGRESSION: GRADUALLY IMPROVING
CLINICAL_PROGRESSION: NOT CHANGED
CLINICAL_PROGRESSION: GRADUALLY IMPROVING
CLINICAL_PROGRESSION: NOT CHANGED
CLINICAL_PROGRESSION: GRADUALLY IMPROVING
CLINICAL_PROGRESSION: NOT CHANGED
CLINICAL_PROGRESSION: NOT CHANGED
CLINICAL_PROGRESSION: GRADUALLY IMPROVING
CLINICAL_PROGRESSION: NOT CHANGED
CLINICAL_PROGRESSION: GRADUALLY IMPROVING
CLINICAL_PROGRESSION: GRADUALLY IMPROVING

## 2019-10-24 ASSESSMENT — PAIN SCALES - GENERAL
PAINLEVEL_OUTOF10: 8
PAINLEVEL_OUTOF10: 6
PAINLEVEL_OUTOF10: 5
PAINLEVEL_OUTOF10: 0
PAINLEVEL_OUTOF10: 6
PAINLEVEL_OUTOF10: 9
PAINLEVEL_OUTOF10: 0
PAINLEVEL_OUTOF10: 10
PAINLEVEL_OUTOF10: 6
PAINLEVEL_OUTOF10: 8
PAINLEVEL_OUTOF10: 8
PAINLEVEL_OUTOF10: 6
PAINLEVEL_OUTOF10: 8
PAINLEVEL_OUTOF10: 6

## 2019-10-24 ASSESSMENT — PAIN - FUNCTIONAL ASSESSMENT
PAIN_FUNCTIONAL_ASSESSMENT: PREVENTS OR INTERFERES SOME ACTIVE ACTIVITIES AND ADLS
PAIN_FUNCTIONAL_ASSESSMENT: PREVENTS OR INTERFERES WITH ALL ACTIVE AND SOME PASSIVE ACTIVITIES
PAIN_FUNCTIONAL_ASSESSMENT: PREVENTS OR INTERFERES WITH MANY ACTIVE NOT PASSIVE ACTIVITIES
PAIN_FUNCTIONAL_ASSESSMENT: PREVENTS OR INTERFERES SOME ACTIVE ACTIVITIES AND ADLS
PAIN_FUNCTIONAL_ASSESSMENT: PREVENTS OR INTERFERES WITH MANY ACTIVE NOT PASSIVE ACTIVITIES
PAIN_FUNCTIONAL_ASSESSMENT: PREVENTS OR INTERFERES SOME ACTIVE ACTIVITIES AND ADLS

## 2019-10-24 ASSESSMENT — PAIN DESCRIPTION - LOCATION
LOCATION: GROIN

## 2019-10-24 ASSESSMENT — PAIN DESCRIPTION - DIRECTION
RADIATING_TOWARDS: THIGH

## 2019-10-24 ASSESSMENT — PAIN DESCRIPTION - FREQUENCY
FREQUENCY: CONTINUOUS

## 2019-10-25 LAB
ALBUMIN SERPL-MCNC: 3.6 G/DL (ref 3.5–5.2)
ALP BLD-CCNC: 92 U/L (ref 40–130)
ALT SERPL-CCNC: <5 U/L (ref 5–41)
ANION GAP SERPL CALCULATED.3IONS-SCNC: 14 MMOL/L (ref 7–19)
AST SERPL-CCNC: 17 U/L (ref 5–40)
BILIRUB SERPL-MCNC: 0.3 MG/DL (ref 0.2–1.2)
BUN BLDV-MCNC: 45 MG/DL (ref 6–20)
CALCIUM SERPL-MCNC: 8.6 MG/DL (ref 8.6–10)
CHLORIDE BLD-SCNC: 92 MMOL/L (ref 98–111)
CO2: 27 MMOL/L (ref 22–29)
CREAT SERPL-MCNC: 6.4 MG/DL (ref 0.5–1.2)
GFR NON-AFRICAN AMERICAN: 9
GLUCOSE BLD-MCNC: 89 MG/DL (ref 74–109)
HCT VFR BLD CALC: 26.5 % (ref 42–52)
HEMOGLOBIN: 8.3 G/DL (ref 14–18)
MCH RBC QN AUTO: 28.7 PG (ref 27–31)
MCHC RBC AUTO-ENTMCNC: 31.3 G/DL (ref 33–37)
MCV RBC AUTO: 91.7 FL (ref 80–94)
PDW BLD-RTO: 16.9 % (ref 11.5–14.5)
PLATELET # BLD: 74 K/UL (ref 130–400)
PMV BLD AUTO: 12.5 FL (ref 9.4–12.4)
POTASSIUM SERPL-SCNC: 6 MMOL/L (ref 3.5–5)
RBC # BLD: 2.89 M/UL (ref 4.7–6.1)
SODIUM BLD-SCNC: 133 MMOL/L (ref 136–145)
TOTAL PROTEIN: 6.6 G/DL (ref 6.6–8.7)
WBC # BLD: 4 K/UL (ref 4.8–10.8)

## 2019-10-25 PROCEDURE — 6370000000 HC RX 637 (ALT 250 FOR IP): Performed by: SURGERY

## 2019-10-25 PROCEDURE — 85027 COMPLETE CBC AUTOMATED: CPT

## 2019-10-25 PROCEDURE — 2580000003 HC RX 258: Performed by: SURGERY

## 2019-10-25 PROCEDURE — 6360000002 HC RX W HCPCS: Performed by: SURGERY

## 2019-10-25 PROCEDURE — 97165 OT EVAL LOW COMPLEX 30 MIN: CPT

## 2019-10-25 PROCEDURE — 36415 COLL VENOUS BLD VENIPUNCTURE: CPT

## 2019-10-25 PROCEDURE — 80053 COMPREHEN METABOLIC PANEL: CPT

## 2019-10-25 PROCEDURE — 8010000000 HC HEMODIALYSIS ACUTE INPT

## 2019-10-25 PROCEDURE — 99231 SBSQ HOSP IP/OBS SF/LOW 25: CPT | Performed by: SURGERY

## 2019-10-25 PROCEDURE — 1210000000 HC MED SURG R&B

## 2019-10-25 PROCEDURE — 6370000000 HC RX 637 (ALT 250 FOR IP): Performed by: INTERNAL MEDICINE

## 2019-10-25 RX ORDER — 0.9 % SODIUM CHLORIDE 0.9 %
100 INTRAVENOUS SOLUTION INTRAVENOUS PRN
Status: DISCONTINUED | OUTPATIENT
Start: 2019-10-25 | End: 2019-10-28 | Stop reason: HOSPADM

## 2019-10-25 RX ORDER — 0.9 % SODIUM CHLORIDE 0.9 %
500 INTRAVENOUS SOLUTION INTRAVENOUS ONCE
Status: DISCONTINUED | OUTPATIENT
Start: 2019-10-25 | End: 2019-10-28 | Stop reason: HOSPADM

## 2019-10-25 RX ADMIN — Medication 10 ML: at 12:09

## 2019-10-25 RX ADMIN — OXYCODONE HYDROCHLORIDE AND ACETAMINOPHEN 1 TABLET: 10; 325 TABLET ORAL at 06:33

## 2019-10-25 RX ADMIN — CLONIDINE HYDROCHLORIDE 0.3 MG: 0.2 TABLET ORAL at 21:50

## 2019-10-25 RX ADMIN — OXYCODONE HYDROCHLORIDE AND ACETAMINOPHEN 1 TABLET: 10; 325 TABLET ORAL at 21:51

## 2019-10-25 RX ADMIN — Medication 10 ML: at 12:07

## 2019-10-25 RX ADMIN — HYDROMORPHONE HYDROCHLORIDE 0.5 MG: 1 INJECTION, SOLUTION INTRAMUSCULAR; INTRAVENOUS; SUBCUTANEOUS at 02:42

## 2019-10-25 RX ADMIN — CLOPIDOGREL BISULFATE 75 MG: 75 TABLET ORAL at 12:04

## 2019-10-25 RX ADMIN — ASPIRIN 81 MG: 81 TABLET, COATED ORAL at 12:04

## 2019-10-25 RX ADMIN — DAKIN'S SOLUTION 0.125% (QUARTER STRENGTH) 473 ML: 0.12 SOLUTION at 11:56

## 2019-10-25 RX ADMIN — MINOXIDIL 10 MG: 10 TABLET ORAL at 12:03

## 2019-10-25 RX ADMIN — CALCIUM ACETATE 1334 MG: 667 CAPSULE ORAL at 17:28

## 2019-10-25 RX ADMIN — HYDROMORPHONE HYDROCHLORIDE 0.5 MG: 1 INJECTION, SOLUTION INTRAMUSCULAR; INTRAVENOUS; SUBCUTANEOUS at 13:15

## 2019-10-25 RX ADMIN — OXYCODONE HYDROCHLORIDE AND ACETAMINOPHEN 1 TABLET: 10; 325 TABLET ORAL at 17:28

## 2019-10-25 RX ADMIN — CALCIUM ACETATE 1334 MG: 667 CAPSULE ORAL at 12:04

## 2019-10-25 RX ADMIN — ISOSORBIDE MONONITRATE 30 MG: 60 TABLET, EXTENDED RELEASE ORAL at 12:04

## 2019-10-25 RX ADMIN — HYDROCODONE BITARTRATE AND ACETAMINOPHEN 2 TABLET: 5; 325 TABLET ORAL at 11:55

## 2019-10-25 RX ADMIN — LACTULOSE 20 G: 20 SOLUTION ORAL at 21:50

## 2019-10-25 RX ADMIN — OXYCODONE HYDROCHLORIDE AND ACETAMINOPHEN 1 TABLET: 10; 325 TABLET ORAL at 00:31

## 2019-10-25 RX ADMIN — CEPHALEXIN 500 MG: 500 CAPSULE ORAL at 21:51

## 2019-10-25 RX ADMIN — AMLODIPINE BESYLATE 10 MG: 5 TABLET ORAL at 12:04

## 2019-10-25 RX ADMIN — CEPHALEXIN 500 MG: 500 CAPSULE ORAL at 12:05

## 2019-10-25 RX ADMIN — Medication 10 ML: at 21:56

## 2019-10-25 ASSESSMENT — PAIN DESCRIPTION - LOCATION
LOCATION: GROIN

## 2019-10-25 ASSESSMENT — PAIN SCALES - GENERAL
PAINLEVEL_OUTOF10: 9
PAINLEVEL_OUTOF10: 7
PAINLEVEL_OUTOF10: 0
PAINLEVEL_OUTOF10: 8
PAINLEVEL_OUTOF10: 10
PAINLEVEL_OUTOF10: 6
PAINLEVEL_OUTOF10: 0
PAINLEVEL_OUTOF10: 7
PAINLEVEL_OUTOF10: 7
PAINLEVEL_OUTOF10: 8
PAINLEVEL_OUTOF10: 8
PAINLEVEL_OUTOF10: 0
PAINLEVEL_OUTOF10: 6

## 2019-10-25 ASSESSMENT — PAIN DESCRIPTION - PROGRESSION
CLINICAL_PROGRESSION: NOT CHANGED

## 2019-10-25 ASSESSMENT — PAIN DESCRIPTION - ONSET
ONSET: ON-GOING

## 2019-10-25 ASSESSMENT — PAIN DESCRIPTION - FREQUENCY
FREQUENCY: CONTINUOUS

## 2019-10-25 ASSESSMENT — PAIN DESCRIPTION - DIRECTION
RADIATING_TOWARDS: THIGH

## 2019-10-25 ASSESSMENT — PAIN - FUNCTIONAL ASSESSMENT
PAIN_FUNCTIONAL_ASSESSMENT: PREVENTS OR INTERFERES SOME ACTIVE ACTIVITIES AND ADLS
PAIN_FUNCTIONAL_ASSESSMENT: PREVENTS OR INTERFERES WITH MANY ACTIVE NOT PASSIVE ACTIVITIES
PAIN_FUNCTIONAL_ASSESSMENT: PREVENTS OR INTERFERES SOME ACTIVE ACTIVITIES AND ADLS

## 2019-10-25 ASSESSMENT — PAIN DESCRIPTION - DESCRIPTORS
DESCRIPTORS: ACHING;BURNING
DESCRIPTORS: ACHING;BURNING;CONSTANT

## 2019-10-25 ASSESSMENT — PAIN DESCRIPTION - PAIN TYPE
TYPE: ACUTE PAIN;SURGICAL PAIN
TYPE: ACUTE PAIN
TYPE: ACUTE PAIN;SURGICAL PAIN
TYPE: ACUTE PAIN;SURGICAL PAIN

## 2019-10-25 ASSESSMENT — PAIN DESCRIPTION - ORIENTATION
ORIENTATION: LEFT
ORIENTATION: RIGHT;LEFT
ORIENTATION: LEFT

## 2019-10-25 ASSESSMENT — PAIN SCALES - WONG BAKER: WONGBAKER_NUMERICALRESPONSE: 6

## 2019-10-26 LAB
ALBUMIN SERPL-MCNC: 3.7 G/DL (ref 3.5–5.2)
ALP BLD-CCNC: 100 U/L (ref 40–130)
ALT SERPL-CCNC: 5 U/L (ref 5–41)
ANION GAP SERPL CALCULATED.3IONS-SCNC: 16 MMOL/L (ref 7–19)
AST SERPL-CCNC: 24 U/L (ref 5–40)
BILIRUB SERPL-MCNC: 0.4 MG/DL (ref 0.2–1.2)
BUN BLDV-MCNC: 36 MG/DL (ref 6–20)
CALCIUM SERPL-MCNC: 9.1 MG/DL (ref 8.6–10)
CHLORIDE BLD-SCNC: 93 MMOL/L (ref 98–111)
CO2: 27 MMOL/L (ref 22–29)
CREAT SERPL-MCNC: 5.1 MG/DL (ref 0.5–1.2)
GFR NON-AFRICAN AMERICAN: 12
GLUCOSE BLD-MCNC: 183 MG/DL (ref 74–109)
HCT VFR BLD CALC: 26.5 % (ref 42–52)
HEMOGLOBIN: 8.4 G/DL (ref 14–18)
MCH RBC QN AUTO: 29.4 PG (ref 27–31)
MCHC RBC AUTO-ENTMCNC: 31.7 G/DL (ref 33–37)
MCV RBC AUTO: 92.7 FL (ref 80–94)
PDW BLD-RTO: 17.1 % (ref 11.5–14.5)
PLATELET # BLD: 114 K/UL (ref 130–400)
PMV BLD AUTO: 12.3 FL (ref 9.4–12.4)
POTASSIUM SERPL-SCNC: 5.2 MMOL/L (ref 3.5–5)
RBC # BLD: 2.86 M/UL (ref 4.7–6.1)
SODIUM BLD-SCNC: 136 MMOL/L (ref 136–145)
TOTAL PROTEIN: 7.6 G/DL (ref 6.6–8.7)
WBC # BLD: 4.3 K/UL (ref 4.8–10.8)

## 2019-10-26 PROCEDURE — 6370000000 HC RX 637 (ALT 250 FOR IP): Performed by: SURGERY

## 2019-10-26 PROCEDURE — 1210000000 HC MED SURG R&B

## 2019-10-26 PROCEDURE — 85027 COMPLETE CBC AUTOMATED: CPT

## 2019-10-26 PROCEDURE — 99232 SBSQ HOSP IP/OBS MODERATE 35: CPT | Performed by: SURGERY

## 2019-10-26 PROCEDURE — 2580000003 HC RX 258: Performed by: SURGERY

## 2019-10-26 PROCEDURE — 36415 COLL VENOUS BLD VENIPUNCTURE: CPT

## 2019-10-26 PROCEDURE — 6370000000 HC RX 637 (ALT 250 FOR IP): Performed by: INTERNAL MEDICINE

## 2019-10-26 PROCEDURE — 80053 COMPREHEN METABOLIC PANEL: CPT

## 2019-10-26 RX ADMIN — OXYCODONE HYDROCHLORIDE AND ACETAMINOPHEN 1 TABLET: 10; 325 TABLET ORAL at 06:08

## 2019-10-26 RX ADMIN — CLONIDINE HYDROCHLORIDE 0.3 MG: 0.2 TABLET ORAL at 09:50

## 2019-10-26 RX ADMIN — CALCIUM ACETATE 1334 MG: 667 CAPSULE ORAL at 16:56

## 2019-10-26 RX ADMIN — OXYCODONE HYDROCHLORIDE AND ACETAMINOPHEN 1 TABLET: 10; 325 TABLET ORAL at 01:54

## 2019-10-26 RX ADMIN — OXYCODONE HYDROCHLORIDE AND ACETAMINOPHEN 1 TABLET: 10; 325 TABLET ORAL at 14:57

## 2019-10-26 RX ADMIN — AMLODIPINE BESYLATE 10 MG: 5 TABLET ORAL at 09:50

## 2019-10-26 RX ADMIN — CLONIDINE HYDROCHLORIDE 0.3 MG: 0.2 TABLET ORAL at 20:29

## 2019-10-26 RX ADMIN — Medication 10 ML: at 20:30

## 2019-10-26 RX ADMIN — Medication 10 ML: at 09:52

## 2019-10-26 RX ADMIN — CALCIUM ACETATE 1334 MG: 667 CAPSULE ORAL at 12:14

## 2019-10-26 RX ADMIN — MINOXIDIL 10 MG: 10 TABLET ORAL at 09:50

## 2019-10-26 RX ADMIN — OXYCODONE HYDROCHLORIDE AND ACETAMINOPHEN 1 TABLET: 10; 325 TABLET ORAL at 20:30

## 2019-10-26 RX ADMIN — CLONIDINE HYDROCHLORIDE 0.3 MG: 0.2 TABLET ORAL at 14:57

## 2019-10-26 RX ADMIN — ASPIRIN 81 MG: 81 TABLET, COATED ORAL at 09:50

## 2019-10-26 RX ADMIN — CEPHALEXIN 500 MG: 500 CAPSULE ORAL at 09:50

## 2019-10-26 RX ADMIN — CEPHALEXIN 500 MG: 500 CAPSULE ORAL at 20:30

## 2019-10-26 RX ADMIN — DAKIN'S SOLUTION 0.125% (QUARTER STRENGTH): 0.12 SOLUTION at 14:57

## 2019-10-26 RX ADMIN — OXYCODONE HYDROCHLORIDE AND ACETAMINOPHEN 1 TABLET: 10; 325 TABLET ORAL at 10:33

## 2019-10-26 RX ADMIN — LACTULOSE 10 G: 20 SOLUTION ORAL at 20:29

## 2019-10-26 RX ADMIN — CLOPIDOGREL BISULFATE 75 MG: 75 TABLET ORAL at 09:50

## 2019-10-26 RX ADMIN — ISOSORBIDE MONONITRATE 30 MG: 60 TABLET, EXTENDED RELEASE ORAL at 09:51

## 2019-10-26 ASSESSMENT — PAIN DESCRIPTION - DESCRIPTORS
DESCRIPTORS: ACHING;THROBBING
DESCRIPTORS: ACHING;CRAMPING
DESCRIPTORS: ACHING

## 2019-10-26 ASSESSMENT — PAIN DESCRIPTION - LOCATION
LOCATION: GROIN

## 2019-10-26 ASSESSMENT — PAIN DESCRIPTION - ONSET
ONSET: ON-GOING

## 2019-10-26 ASSESSMENT — PAIN SCALES - GENERAL
PAINLEVEL_OUTOF10: 8
PAINLEVEL_OUTOF10: 7
PAINLEVEL_OUTOF10: 8
PAINLEVEL_OUTOF10: 8
PAINLEVEL_OUTOF10: 7
PAINLEVEL_OUTOF10: 7
PAINLEVEL_OUTOF10: 9

## 2019-10-26 ASSESSMENT — PAIN DESCRIPTION - PAIN TYPE
TYPE: ACUTE PAIN;SURGICAL PAIN
TYPE: SURGICAL PAIN
TYPE: ACUTE PAIN;SURGICAL PAIN
TYPE: ACUTE PAIN;SURGICAL PAIN
TYPE: SURGICAL PAIN;ACUTE PAIN

## 2019-10-26 ASSESSMENT — PAIN DESCRIPTION - DIRECTION
RADIATING_TOWARDS: THIGH
RADIATING_TOWARDS: THIGH
RADIATING_TOWARDS: KNEE
RADIATING_TOWARDS: THIGH

## 2019-10-26 ASSESSMENT — PAIN DESCRIPTION - ORIENTATION
ORIENTATION: LEFT
ORIENTATION: RIGHT;LEFT
ORIENTATION: LEFT

## 2019-10-26 ASSESSMENT — PAIN DESCRIPTION - FREQUENCY
FREQUENCY: CONTINUOUS

## 2019-10-26 ASSESSMENT — PAIN DESCRIPTION - PROGRESSION: CLINICAL_PROGRESSION: NOT CHANGED

## 2019-10-27 LAB
ALBUMIN SERPL-MCNC: 3.4 G/DL (ref 3.5–5.2)
ALP BLD-CCNC: 100 U/L (ref 40–130)
ALT SERPL-CCNC: 6 U/L (ref 5–41)
ANION GAP SERPL CALCULATED.3IONS-SCNC: 18 MMOL/L (ref 7–19)
AST SERPL-CCNC: 27 U/L (ref 5–40)
BILIRUB SERPL-MCNC: 0.5 MG/DL (ref 0.2–1.2)
BUN BLDV-MCNC: 50 MG/DL (ref 6–20)
CALCIUM SERPL-MCNC: 8.9 MG/DL (ref 8.6–10)
CHLORIDE BLD-SCNC: 92 MMOL/L (ref 98–111)
CO2: 20 MMOL/L (ref 22–29)
CREAT SERPL-MCNC: 6.5 MG/DL (ref 0.5–1.2)
GFR NON-AFRICAN AMERICAN: 9
GLUCOSE BLD-MCNC: 181 MG/DL (ref 74–109)
HCT VFR BLD CALC: 29.5 % (ref 42–52)
HEMOGLOBIN: 8.6 G/DL (ref 14–18)
MCH RBC QN AUTO: 29.3 PG (ref 27–31)
MCHC RBC AUTO-ENTMCNC: 29.2 G/DL (ref 33–37)
MCV RBC AUTO: 100.3 FL (ref 80–94)
PDW BLD-RTO: 17.6 % (ref 11.5–14.5)
PLATELET # BLD: 143 K/UL (ref 130–400)
PMV BLD AUTO: 10.8 FL (ref 9.4–12.4)
POTASSIUM SERPL-SCNC: 5.4 MMOL/L (ref 3.5–5)
RBC # BLD: 2.94 M/UL (ref 4.7–6.1)
SODIUM BLD-SCNC: 130 MMOL/L (ref 136–145)
TOTAL PROTEIN: 7.3 G/DL (ref 6.6–8.7)
WBC # BLD: 4.6 K/UL (ref 4.8–10.8)

## 2019-10-27 PROCEDURE — 36415 COLL VENOUS BLD VENIPUNCTURE: CPT

## 2019-10-27 PROCEDURE — 85027 COMPLETE CBC AUTOMATED: CPT

## 2019-10-27 PROCEDURE — 99232 SBSQ HOSP IP/OBS MODERATE 35: CPT | Performed by: SURGERY

## 2019-10-27 PROCEDURE — 1210000000 HC MED SURG R&B

## 2019-10-27 PROCEDURE — 6370000000 HC RX 637 (ALT 250 FOR IP): Performed by: SURGERY

## 2019-10-27 PROCEDURE — 6370000000 HC RX 637 (ALT 250 FOR IP): Performed by: INTERNAL MEDICINE

## 2019-10-27 PROCEDURE — 2580000003 HC RX 258: Performed by: SURGERY

## 2019-10-27 PROCEDURE — 80053 COMPREHEN METABOLIC PANEL: CPT

## 2019-10-27 RX ORDER — SODIUM POLYSTYRENE SULFONATE 15 G/60ML
15 SUSPENSION ORAL; RECTAL ONCE
Status: COMPLETED | OUTPATIENT
Start: 2019-10-27 | End: 2019-10-27

## 2019-10-27 RX ADMIN — OXYCODONE HYDROCHLORIDE AND ACETAMINOPHEN 1 TABLET: 10; 325 TABLET ORAL at 23:38

## 2019-10-27 RX ADMIN — SODIUM POLYSTYRENE SULFONATE 15 G: 15 SUSPENSION ORAL; RECTAL at 12:51

## 2019-10-27 RX ADMIN — CALCIUM ACETATE 1334 MG: 667 CAPSULE ORAL at 16:36

## 2019-10-27 RX ADMIN — OXYCODONE HYDROCHLORIDE AND ACETAMINOPHEN 1 TABLET: 10; 325 TABLET ORAL at 01:18

## 2019-10-27 RX ADMIN — ASPIRIN 81 MG: 81 TABLET, COATED ORAL at 09:05

## 2019-10-27 RX ADMIN — AMLODIPINE BESYLATE 10 MG: 5 TABLET ORAL at 09:05

## 2019-10-27 RX ADMIN — CALCIUM ACETATE 1334 MG: 667 CAPSULE ORAL at 12:52

## 2019-10-27 RX ADMIN — CEPHALEXIN 500 MG: 500 CAPSULE ORAL at 09:04

## 2019-10-27 RX ADMIN — MINOXIDIL 10 MG: 10 TABLET ORAL at 09:05

## 2019-10-27 RX ADMIN — OXYCODONE HYDROCHLORIDE AND ACETAMINOPHEN 1 TABLET: 10; 325 TABLET ORAL at 18:44

## 2019-10-27 RX ADMIN — CLONIDINE HYDROCHLORIDE 0.3 MG: 0.2 TABLET ORAL at 09:05

## 2019-10-27 RX ADMIN — CALCIUM ACETATE 1334 MG: 667 CAPSULE ORAL at 09:04

## 2019-10-27 RX ADMIN — ISOSORBIDE MONONITRATE 30 MG: 60 TABLET, EXTENDED RELEASE ORAL at 09:05

## 2019-10-27 RX ADMIN — CLONIDINE HYDROCHLORIDE 0.3 MG: 0.2 TABLET ORAL at 21:15

## 2019-10-27 RX ADMIN — CEPHALEXIN 500 MG: 500 CAPSULE ORAL at 21:16

## 2019-10-27 RX ADMIN — Medication 10 ML: at 09:06

## 2019-10-27 RX ADMIN — CLOPIDOGREL BISULFATE 75 MG: 75 TABLET ORAL at 09:05

## 2019-10-27 RX ADMIN — DAKIN'S SOLUTION 0.125% (QUARTER STRENGTH): 0.12 SOLUTION at 15:46

## 2019-10-27 RX ADMIN — OXYCODONE HYDROCHLORIDE AND ACETAMINOPHEN 1 TABLET: 10; 325 TABLET ORAL at 10:46

## 2019-10-27 RX ADMIN — Medication 10 ML: at 21:20

## 2019-10-27 RX ADMIN — OXYCODONE HYDROCHLORIDE AND ACETAMINOPHEN 1 TABLET: 10; 325 TABLET ORAL at 14:44

## 2019-10-27 RX ADMIN — OXYCODONE HYDROCHLORIDE AND ACETAMINOPHEN 1 TABLET: 10; 325 TABLET ORAL at 06:29

## 2019-10-27 RX ADMIN — CLONIDINE HYDROCHLORIDE 0.3 MG: 0.2 TABLET ORAL at 14:43

## 2019-10-27 ASSESSMENT — PAIN DESCRIPTION - PROGRESSION: CLINICAL_PROGRESSION: NOT CHANGED

## 2019-10-27 ASSESSMENT — PAIN SCALES - GENERAL
PAINLEVEL_OUTOF10: 7
PAINLEVEL_OUTOF10: 7
PAINLEVEL_OUTOF10: 0
PAINLEVEL_OUTOF10: 7
PAINLEVEL_OUTOF10: 5
PAINLEVEL_OUTOF10: 4

## 2019-10-27 ASSESSMENT — PAIN DESCRIPTION - ORIENTATION
ORIENTATION: LEFT

## 2019-10-27 ASSESSMENT — PAIN DESCRIPTION - DESCRIPTORS
DESCRIPTORS: ACHING;THROBBING
DESCRIPTORS: ACHING;THROBBING

## 2019-10-27 ASSESSMENT — PAIN DESCRIPTION - PAIN TYPE
TYPE: ACUTE PAIN;SURGICAL PAIN
TYPE: SURGICAL PAIN
TYPE: ACUTE PAIN;SURGICAL PAIN
TYPE: ACUTE PAIN;SURGICAL PAIN

## 2019-10-27 ASSESSMENT — PAIN DESCRIPTION - LOCATION
LOCATION: LEG
LOCATION: GROIN
LOCATION: LEG

## 2019-10-27 ASSESSMENT — PAIN DESCRIPTION - ONSET
ONSET: ON-GOING
ONSET: ON-GOING

## 2019-10-27 ASSESSMENT — PAIN DESCRIPTION - FREQUENCY
FREQUENCY: CONTINUOUS
FREQUENCY: CONTINUOUS

## 2019-10-28 VITALS
OXYGEN SATURATION: 99 % | DIASTOLIC BLOOD PRESSURE: 50 MMHG | TEMPERATURE: 97.9 F | RESPIRATION RATE: 12 BRPM | HEIGHT: 69 IN | BODY MASS INDEX: 25.86 KG/M2 | SYSTOLIC BLOOD PRESSURE: 115 MMHG | WEIGHT: 174.6 LBS | HEART RATE: 72 BPM

## 2019-10-28 LAB
ALBUMIN SERPL-MCNC: 3.5 G/DL (ref 3.5–5.2)
ALP BLD-CCNC: 101 U/L (ref 40–130)
ALT SERPL-CCNC: 6 U/L (ref 5–41)
ANION GAP SERPL CALCULATED.3IONS-SCNC: 18 MMOL/L (ref 7–19)
AST SERPL-CCNC: 20 U/L (ref 5–40)
BILIRUB SERPL-MCNC: 0.4 MG/DL (ref 0.2–1.2)
BUN BLDV-MCNC: 62 MG/DL (ref 6–20)
CALCIUM SERPL-MCNC: 9 MG/DL (ref 8.6–10)
CHLORIDE BLD-SCNC: 92 MMOL/L (ref 98–111)
CO2: 23 MMOL/L (ref 22–29)
CREAT SERPL-MCNC: 7.4 MG/DL (ref 0.5–1.2)
GFR NON-AFRICAN AMERICAN: 8
GLUCOSE BLD-MCNC: 114 MG/DL (ref 74–109)
HCT VFR BLD CALC: 24 % (ref 42–52)
HEMOGLOBIN: 7.7 G/DL (ref 14–18)
MCH RBC QN AUTO: 29.7 PG (ref 27–31)
MCHC RBC AUTO-ENTMCNC: 32.1 G/DL (ref 33–37)
MCV RBC AUTO: 92.7 FL (ref 80–94)
PDW BLD-RTO: 17.6 % (ref 11.5–14.5)
PLATELET # BLD: 114 K/UL (ref 130–400)
PMV BLD AUTO: 12.7 FL (ref 9.4–12.4)
POTASSIUM SERPL-SCNC: 6.4 MMOL/L (ref 3.5–5)
RBC # BLD: 2.59 M/UL (ref 4.7–6.1)
SODIUM BLD-SCNC: 133 MMOL/L (ref 136–145)
TOTAL PROTEIN: 7.2 G/DL (ref 6.6–8.7)
WBC # BLD: 5.6 K/UL (ref 4.8–10.8)

## 2019-10-28 PROCEDURE — 8010000000 HC HEMODIALYSIS ACUTE INPT

## 2019-10-28 PROCEDURE — 6360000002 HC RX W HCPCS: Performed by: INTERNAL MEDICINE

## 2019-10-28 PROCEDURE — 80053 COMPREHEN METABOLIC PANEL: CPT

## 2019-10-28 PROCEDURE — 36415 COLL VENOUS BLD VENIPUNCTURE: CPT

## 2019-10-28 PROCEDURE — 85027 COMPLETE CBC AUTOMATED: CPT

## 2019-10-28 PROCEDURE — 6370000000 HC RX 637 (ALT 250 FOR IP): Performed by: SURGERY

## 2019-10-28 PROCEDURE — 6370000000 HC RX 637 (ALT 250 FOR IP): Performed by: INTERNAL MEDICINE

## 2019-10-28 RX ORDER — ISOSORBIDE MONONITRATE 30 MG/1
30 TABLET, EXTENDED RELEASE ORAL DAILY
Qty: 30 TABLET | Refills: 3 | Status: ON HOLD | OUTPATIENT
Start: 2019-10-29 | End: 2020-04-10 | Stop reason: HOSPADM

## 2019-10-28 RX ORDER — OXYCODONE AND ACETAMINOPHEN 10; 325 MG/1; MG/1
1 TABLET ORAL EVERY 6 HOURS PRN
Qty: 10 TABLET | Refills: 0 | Status: SHIPPED | OUTPATIENT
Start: 2019-10-28 | End: 2019-10-31

## 2019-10-28 RX ORDER — CEPHALEXIN 500 MG/1
500 CAPSULE ORAL 2 TIMES DAILY
Qty: 6 CAPSULE | Refills: 0 | Status: SHIPPED | OUTPATIENT
Start: 2019-10-28 | End: 2019-10-31

## 2019-10-28 RX ORDER — ASPIRIN 81 MG/1
81 TABLET ORAL DAILY
Qty: 30 TABLET | Refills: 3 | Status: SHIPPED | OUTPATIENT
Start: 2019-10-29

## 2019-10-28 RX ADMIN — CALCIUM ACETATE 1334 MG: 667 CAPSULE ORAL at 12:23

## 2019-10-28 RX ADMIN — ASPIRIN 81 MG: 81 TABLET, COATED ORAL at 12:23

## 2019-10-28 RX ADMIN — OXYCODONE HYDROCHLORIDE AND ACETAMINOPHEN 1 TABLET: 10; 325 TABLET ORAL at 04:35

## 2019-10-28 RX ADMIN — AMLODIPINE BESYLATE 10 MG: 5 TABLET ORAL at 12:23

## 2019-10-28 RX ADMIN — CLOPIDOGREL BISULFATE 75 MG: 75 TABLET ORAL at 12:23

## 2019-10-28 RX ADMIN — DAKIN'S SOLUTION 0.125% (QUARTER STRENGTH) 473 ML: 0.12 SOLUTION at 14:49

## 2019-10-28 RX ADMIN — DARBEPOETIN ALFA 100 MCG: 100 SOLUTION INTRAVENOUS; SUBCUTANEOUS at 14:39

## 2019-10-28 RX ADMIN — ISOSORBIDE MONONITRATE 30 MG: 60 TABLET, EXTENDED RELEASE ORAL at 12:24

## 2019-10-28 RX ADMIN — CEPHALEXIN 500 MG: 500 CAPSULE ORAL at 12:24

## 2019-10-28 RX ADMIN — MINOXIDIL 10 MG: 10 TABLET ORAL at 12:24

## 2019-10-28 RX ADMIN — OXYCODONE HYDROCHLORIDE AND ACETAMINOPHEN 1 TABLET: 10; 325 TABLET ORAL at 12:24

## 2019-10-28 ASSESSMENT — PAIN DESCRIPTION - FREQUENCY: FREQUENCY: CONTINUOUS

## 2019-10-28 ASSESSMENT — PAIN DESCRIPTION - DESCRIPTORS: DESCRIPTORS: ACHING;THROBBING

## 2019-10-28 ASSESSMENT — PAIN SCALES - GENERAL
PAINLEVEL_OUTOF10: 8
PAINLEVEL_OUTOF10: 7

## 2019-10-28 ASSESSMENT — PAIN DESCRIPTION - PAIN TYPE: TYPE: ACUTE PAIN;SURGICAL PAIN

## 2019-10-28 ASSESSMENT — PAIN DESCRIPTION - PROGRESSION: CLINICAL_PROGRESSION: NOT CHANGED

## 2019-10-28 ASSESSMENT — PAIN DESCRIPTION - ORIENTATION: ORIENTATION: LEFT

## 2019-10-28 ASSESSMENT — PAIN DESCRIPTION - LOCATION: LOCATION: LEG

## 2019-10-28 ASSESSMENT — PAIN DESCRIPTION - ONSET: ONSET: ON-GOING

## 2019-11-07 ENCOUNTER — HOSPITAL ENCOUNTER (OUTPATIENT)
Dept: WOUND CARE | Age: 59
Discharge: HOME | End: 2019-11-07

## 2019-12-11 ENCOUNTER — HOSPITAL ENCOUNTER (INPATIENT)
Age: 59
LOS: 6 days | Discharge: HOME | DRG: 280 | End: 2019-12-17
Attending: EMERGENCY MEDICINE | Admitting: FAMILY MEDICINE
Payer: MEDICARE

## 2019-12-11 ENCOUNTER — APPOINTMENT (OUTPATIENT)
Dept: GENERAL RADIOLOGY | Age: 59
DRG: 280 | End: 2019-12-11
Payer: MEDICARE

## 2019-12-11 DIAGNOSIS — E87.5 HYPERKALEMIA: ICD-10-CM

## 2019-12-11 DIAGNOSIS — I21.4 NSTEMI (NON-ST ELEVATED MYOCARDIAL INFARCTION) (HCC): ICD-10-CM

## 2019-12-11 DIAGNOSIS — N18.9 CHRONIC RENAL FAILURE, UNSPECIFIED CKD STAGE: ICD-10-CM

## 2019-12-11 DIAGNOSIS — J10.1 INFLUENZA A: Primary | ICD-10-CM

## 2019-12-11 PROBLEM — R77.8 ELEVATED TROPONIN: Status: ACTIVE | Noted: 2019-12-11

## 2019-12-11 PROBLEM — R79.89 ELEVATED TROPONIN: Status: ACTIVE | Noted: 2019-12-11

## 2019-12-11 LAB
ALBUMIN SERPL-MCNC: 4.4 G/DL (ref 3.5–5.2)
ALP BLD-CCNC: 95 U/L (ref 40–130)
ALT SERPL-CCNC: 11 U/L (ref 5–41)
ANION GAP SERPL CALCULATED.3IONS-SCNC: 18 MMOL/L (ref 7–19)
APTT: 30.3 SEC (ref 26–36.2)
APTT: 46.5 SEC (ref 26–36.2)
AST SERPL-CCNC: 23 U/L (ref 5–40)
BILIRUB SERPL-MCNC: 0.7 MG/DL (ref 0.2–1.2)
BUN BLDV-MCNC: 31 MG/DL (ref 6–20)
CALCIUM SERPL-MCNC: 9.1 MG/DL (ref 8.6–10)
CHLORIDE BLD-SCNC: 88 MMOL/L (ref 98–111)
CO2: 30 MMOL/L (ref 22–29)
CREAT SERPL-MCNC: 6.3 MG/DL (ref 0.5–1.2)
GFR NON-AFRICAN AMERICAN: 9
GLUCOSE BLD-MCNC: 100 MG/DL (ref 74–109)
HCT VFR BLD CALC: 32.9 % (ref 42–52)
HEMOGLOBIN: 10.4 G/DL (ref 14–18)
LACTIC ACID: 1.1 MMOL/L (ref 0.5–1.9)
LIPASE: 11 U/L (ref 13–60)
MCH RBC QN AUTO: 31.9 PG (ref 27–31)
MCHC RBC AUTO-ENTMCNC: 31.6 G/DL (ref 33–37)
MCV RBC AUTO: 100.9 FL (ref 80–94)
PDW BLD-RTO: 17.7 % (ref 11.5–14.5)
PLATELET # BLD: 89 K/UL (ref 130–400)
PMV BLD AUTO: 12.5 FL (ref 9.4–12.4)
POTASSIUM SERPL-SCNC: 5.3 MMOL/L (ref 3.5–5)
RAPID INFLUENZA  B AGN: NEGATIVE
RAPID INFLUENZA A AGN: POSITIVE
RBC # BLD: 3.26 M/UL (ref 4.7–6.1)
S PYO AG THROAT QL: NEGATIVE
SODIUM BLD-SCNC: 136 MMOL/L (ref 136–145)
TOTAL PROTEIN: 8.3 G/DL (ref 6.6–8.7)
TROPONIN: 0.92 NG/ML (ref 0–0.03)
TROPONIN: 0.94 NG/ML (ref 0–0.03)
TROPONIN: 0.97 NG/ML (ref 0–0.03)
WBC # BLD: 4.3 K/UL (ref 4.8–10.8)

## 2019-12-11 PROCEDURE — 99223 1ST HOSP IP/OBS HIGH 75: CPT | Performed by: INTERNAL MEDICINE

## 2019-12-11 PROCEDURE — 99285 EMERGENCY DEPT VISIT HI MDM: CPT

## 2019-12-11 PROCEDURE — 87804 INFLUENZA ASSAY W/OPTIC: CPT

## 2019-12-11 PROCEDURE — 87081 CULTURE SCREEN ONLY: CPT

## 2019-12-11 PROCEDURE — 84484 ASSAY OF TROPONIN QUANT: CPT

## 2019-12-11 PROCEDURE — 80053 COMPREHEN METABOLIC PANEL: CPT

## 2019-12-11 PROCEDURE — 2580000003 HC RX 258: Performed by: FAMILY MEDICINE

## 2019-12-11 PROCEDURE — 6370000000 HC RX 637 (ALT 250 FOR IP): Performed by: FAMILY MEDICINE

## 2019-12-11 PROCEDURE — 85027 COMPLETE CBC AUTOMATED: CPT

## 2019-12-11 PROCEDURE — 36415 COLL VENOUS BLD VENIPUNCTURE: CPT

## 2019-12-11 PROCEDURE — 2140000000 HC CCU INTERMEDIATE R&B

## 2019-12-11 PROCEDURE — 93005 ELECTROCARDIOGRAM TRACING: CPT

## 2019-12-11 PROCEDURE — 6360000002 HC RX W HCPCS: Performed by: FAMILY MEDICINE

## 2019-12-11 PROCEDURE — 71046 X-RAY EXAM CHEST 2 VIEWS: CPT

## 2019-12-11 PROCEDURE — 87880 STREP A ASSAY W/OPTIC: CPT

## 2019-12-11 PROCEDURE — 83690 ASSAY OF LIPASE: CPT

## 2019-12-11 PROCEDURE — 6370000000 HC RX 637 (ALT 250 FOR IP): Performed by: EMERGENCY MEDICINE

## 2019-12-11 PROCEDURE — 83605 ASSAY OF LACTIC ACID: CPT

## 2019-12-11 PROCEDURE — 85730 THROMBOPLASTIN TIME PARTIAL: CPT

## 2019-12-11 RX ORDER — HEPARIN SODIUM 1000 [USP'U]/ML
4000 INJECTION, SOLUTION INTRAVENOUS; SUBCUTANEOUS ONCE
Status: COMPLETED | OUTPATIENT
Start: 2019-12-11 | End: 2019-12-11

## 2019-12-11 RX ORDER — SODIUM CHLORIDE 0.9 % (FLUSH) 0.9 %
10 SYRINGE (ML) INJECTION PRN
Status: DISCONTINUED | OUTPATIENT
Start: 2019-12-11 | End: 2019-12-12 | Stop reason: SDUPTHER

## 2019-12-11 RX ORDER — LANOLIN ALCOHOL/MO/W.PET/CERES
3 CREAM (GRAM) TOPICAL NIGHTLY PRN
Status: DISCONTINUED | OUTPATIENT
Start: 2019-12-11 | End: 2019-12-17 | Stop reason: HOSPADM

## 2019-12-11 RX ORDER — ASPIRIN 325 MG
325 TABLET ORAL ONCE
Status: COMPLETED | OUTPATIENT
Start: 2019-12-11 | End: 2019-12-11

## 2019-12-11 RX ORDER — HEPARIN SODIUM 1000 [USP'U]/ML
60 INJECTION, SOLUTION INTRAVENOUS; SUBCUTANEOUS ONCE
Status: DISCONTINUED | OUTPATIENT
Start: 2019-12-11 | End: 2019-12-11

## 2019-12-11 RX ORDER — MINOXIDIL 10 MG/1
10 TABLET ORAL DAILY
Status: DISCONTINUED | OUTPATIENT
Start: 2019-12-11 | End: 2019-12-17 | Stop reason: HOSPADM

## 2019-12-11 RX ORDER — HEPARIN SODIUM 1000 [USP'U]/ML
4000 INJECTION, SOLUTION INTRAVENOUS; SUBCUTANEOUS PRN
Status: DISCONTINUED | OUTPATIENT
Start: 2019-12-11 | End: 2019-12-16

## 2019-12-11 RX ORDER — SUCRALFATE 1 G/1
1 TABLET ORAL 3 TIMES DAILY PRN
Status: DISCONTINUED | OUTPATIENT
Start: 2019-12-11 | End: 2019-12-17 | Stop reason: HOSPADM

## 2019-12-11 RX ORDER — OSELTAMIVIR PHOSPHATE 30 MG/1
30 CAPSULE ORAL ONCE
Status: COMPLETED | OUTPATIENT
Start: 2019-12-11 | End: 2019-12-11

## 2019-12-11 RX ORDER — HEPARIN SODIUM 1000 [USP'U]/ML
60 INJECTION, SOLUTION INTRAVENOUS; SUBCUTANEOUS PRN
Status: DISCONTINUED | OUTPATIENT
Start: 2019-12-11 | End: 2019-12-11

## 2019-12-11 RX ORDER — ISOSORBIDE MONONITRATE 30 MG/1
30 TABLET, EXTENDED RELEASE ORAL DAILY
Status: DISCONTINUED | OUTPATIENT
Start: 2019-12-11 | End: 2019-12-17

## 2019-12-11 RX ORDER — ONDANSETRON 2 MG/ML
4 INJECTION INTRAMUSCULAR; INTRAVENOUS EVERY 6 HOURS PRN
Status: DISCONTINUED | OUTPATIENT
Start: 2019-12-11 | End: 2019-12-17 | Stop reason: HOSPADM

## 2019-12-11 RX ORDER — OXYCODONE HCL 10 MG/1
10 TABLET, FILM COATED, EXTENDED RELEASE ORAL EVERY 8 HOURS PRN
Status: DISCONTINUED | OUTPATIENT
Start: 2019-12-11 | End: 2019-12-17 | Stop reason: HOSPADM

## 2019-12-11 RX ORDER — AMLODIPINE BESYLATE 10 MG/1
10 TABLET ORAL DAILY
Status: DISCONTINUED | OUTPATIENT
Start: 2019-12-11 | End: 2019-12-17 | Stop reason: HOSPADM

## 2019-12-11 RX ORDER — SEVELAMER CARBONATE 800 MG/1
800 TABLET, FILM COATED ORAL
Status: DISCONTINUED | OUTPATIENT
Start: 2019-12-11 | End: 2019-12-17 | Stop reason: HOSPADM

## 2019-12-11 RX ORDER — SODIUM CHLORIDE 0.9 % (FLUSH) 0.9 %
10 SYRINGE (ML) INJECTION EVERY 12 HOURS SCHEDULED
Status: DISCONTINUED | OUTPATIENT
Start: 2019-12-11 | End: 2019-12-12 | Stop reason: SDUPTHER

## 2019-12-11 RX ORDER — HEPARIN SODIUM 1000 [USP'U]/ML
2000 INJECTION, SOLUTION INTRAVENOUS; SUBCUTANEOUS PRN
Status: DISCONTINUED | OUTPATIENT
Start: 2019-12-11 | End: 2019-12-16

## 2019-12-11 RX ORDER — OXYCODONE HCL 10 MG/1
10 TABLET, FILM COATED, EXTENDED RELEASE ORAL EVERY 8 HOURS PRN
Status: ON HOLD | COMMUNITY
End: 2020-04-10 | Stop reason: HOSPADM

## 2019-12-11 RX ORDER — ASPIRIN 81 MG/1
81 TABLET, CHEWABLE ORAL DAILY
Status: DISCONTINUED | OUTPATIENT
Start: 2019-12-12 | End: 2019-12-17 | Stop reason: HOSPADM

## 2019-12-11 RX ORDER — LACTULOSE 10 G/15ML
10 SOLUTION ORAL 3 TIMES DAILY
Status: DISCONTINUED | OUTPATIENT
Start: 2019-12-11 | End: 2019-12-17 | Stop reason: HOSPADM

## 2019-12-11 RX ORDER — LISINOPRIL 10 MG/1
10 TABLET ORAL DAILY
Status: ON HOLD | COMMUNITY
End: 2019-12-17 | Stop reason: HOSPADM

## 2019-12-11 RX ORDER — HEPARIN SODIUM 10000 [USP'U]/100ML
12 INJECTION, SOLUTION INTRAVENOUS CONTINUOUS
Status: DISCONTINUED | OUTPATIENT
Start: 2019-12-11 | End: 2019-12-16

## 2019-12-11 RX ADMIN — SODIUM CHLORIDE, PRESERVATIVE FREE 10 ML: 5 INJECTION INTRAVENOUS at 22:14

## 2019-12-11 RX ADMIN — ASPIRIN 325 MG: 325 TABLET, FILM COATED ORAL at 09:37

## 2019-12-11 RX ADMIN — MELATONIN 3 MG: at 22:14

## 2019-12-11 RX ADMIN — OSELTAMIVIR PHOSPHATE 30 MG: 30 CAPSULE ORAL at 10:55

## 2019-12-11 RX ADMIN — CLONIDINE HYDROCHLORIDE 0.3 MG: 0.1 TABLET ORAL at 22:13

## 2019-12-11 RX ADMIN — OXYCODONE HYDROCHLORIDE 10 MG: 10 TABLET, FILM COATED, EXTENDED RELEASE ORAL at 16:06

## 2019-12-11 RX ADMIN — HEPARIN SODIUM 12 UNITS/KG/HR: 10000 INJECTION, SOLUTION INTRAVENOUS at 13:54

## 2019-12-11 RX ADMIN — HEPARIN SODIUM 4000 UNITS: 1000 INJECTION INTRAVENOUS; SUBCUTANEOUS at 13:59

## 2019-12-11 ASSESSMENT — ENCOUNTER SYMPTOMS
COLOR CHANGE: 1
SORE THROAT: 0
VOMITING: 0
COUGH: 1
EYE PAIN: 0
ABDOMINAL PAIN: 0
NAUSEA: 0
NAUSEA: 1
SORE THROAT: 1
DIARRHEA: 0
SHORTNESS OF BREATH: 0
COUGH: 0
WHEEZING: 0
EYE REDNESS: 0
VOMITING: 1
TROUBLE SWALLOWING: 0

## 2019-12-11 ASSESSMENT — PAIN DESCRIPTION - LOCATION: LOCATION: THROAT

## 2019-12-11 ASSESSMENT — PAIN SCALES - GENERAL
PAINLEVEL_OUTOF10: 4
PAINLEVEL_OUTOF10: 7

## 2019-12-12 ENCOUNTER — APPOINTMENT (OUTPATIENT)
Dept: NUCLEAR MEDICINE | Age: 59
DRG: 280 | End: 2019-12-12
Payer: MEDICARE

## 2019-12-12 LAB
ALBUMIN SERPL-MCNC: 3.7 G/DL (ref 3.5–5.2)
ALP BLD-CCNC: 83 U/L (ref 40–130)
ALT SERPL-CCNC: 10 U/L (ref 5–41)
ANION GAP SERPL CALCULATED.3IONS-SCNC: 18 MMOL/L (ref 7–19)
APTT: 35.7 SEC (ref 26–36.2)
APTT: 36.6 SEC (ref 26–36.2)
APTT: 41.5 SEC (ref 26–36.2)
APTT: 47.6 SEC (ref 26–36.2)
AST SERPL-CCNC: 24 U/L (ref 5–40)
BILIRUB SERPL-MCNC: 0.5 MG/DL (ref 0.2–1.2)
BUN BLDV-MCNC: 45 MG/DL (ref 6–20)
CALCIUM SERPL-MCNC: 8.3 MG/DL (ref 8.6–10)
CHLORIDE BLD-SCNC: 87 MMOL/L (ref 98–111)
CO2: 27 MMOL/L (ref 22–29)
CREAT SERPL-MCNC: 7.5 MG/DL (ref 0.5–1.2)
GFR NON-AFRICAN AMERICAN: 7
GLUCOSE BLD-MCNC: 89 MG/DL (ref 74–109)
HCT VFR BLD CALC: 31.9 % (ref 42–52)
HEMOGLOBIN: 9.9 G/DL (ref 14–18)
HEPATITIS B SURFACE ANTIGEN INTERPRETATION: NORMAL
LACTIC ACID, SEPSIS: 1.1 MG/DL (ref 0.5–1.9)
MAGNESIUM: 2.3 MG/DL (ref 1.6–2.6)
MCH RBC QN AUTO: 31.5 PG (ref 27–31)
MCHC RBC AUTO-ENTMCNC: 31 G/DL (ref 33–37)
MCV RBC AUTO: 101.6 FL (ref 80–94)
PARATHYROID HORMONE INTACT: 317.8 PG/ML (ref 15–65)
PDW BLD-RTO: 17.4 % (ref 11.5–14.5)
PHOSPHORUS: 7.2 MG/DL (ref 2.5–4.5)
PLATELET # BLD: 106 K/UL (ref 130–400)
PMV BLD AUTO: 11.7 FL (ref 9.4–12.4)
POTASSIUM REFLEX MAGNESIUM: 5.6 MMOL/L (ref 3.5–5)
RBC # BLD: 3.14 M/UL (ref 4.7–6.1)
SODIUM BLD-SCNC: 132 MMOL/L (ref 136–145)
TOTAL PROTEIN: 7.3 G/DL (ref 6.6–8.7)
URIC ACID, SERUM: 5.6 MG/DL (ref 3.4–7)
VITAMIN D 25-HYDROXY: 21.2 NG/ML
WBC # BLD: 3.2 K/UL (ref 4.8–10.8)

## 2019-12-12 PROCEDURE — 83735 ASSAY OF MAGNESIUM: CPT

## 2019-12-12 PROCEDURE — A9500 TC99M SESTAMIBI: HCPCS | Performed by: INTERNAL MEDICINE

## 2019-12-12 PROCEDURE — 87040 BLOOD CULTURE FOR BACTERIA: CPT

## 2019-12-12 PROCEDURE — 93005 ELECTROCARDIOGRAM TRACING: CPT

## 2019-12-12 PROCEDURE — 82306 VITAMIN D 25 HYDROXY: CPT

## 2019-12-12 PROCEDURE — 6370000000 HC RX 637 (ALT 250 FOR IP): Performed by: FAMILY MEDICINE

## 2019-12-12 PROCEDURE — 85027 COMPLETE CBC AUTOMATED: CPT

## 2019-12-12 PROCEDURE — 80053 COMPREHEN METABOLIC PANEL: CPT

## 2019-12-12 PROCEDURE — 83970 ASSAY OF PARATHORMONE: CPT

## 2019-12-12 PROCEDURE — 83605 ASSAY OF LACTIC ACID: CPT

## 2019-12-12 PROCEDURE — 6360000002 HC RX W HCPCS: Performed by: INTERNAL MEDICINE

## 2019-12-12 PROCEDURE — 5A1D70Z PERFORMANCE OF URINARY FILTRATION, INTERMITTENT, LESS THAN 6 HOURS PER DAY: ICD-10-PCS | Performed by: INTERNAL MEDICINE

## 2019-12-12 PROCEDURE — 84100 ASSAY OF PHOSPHORUS: CPT

## 2019-12-12 PROCEDURE — 3430000000 HC RX DIAGNOSTIC RADIOPHARMACEUTICAL: Performed by: INTERNAL MEDICINE

## 2019-12-12 PROCEDURE — 8010000000 HC HEMODIALYSIS ACUTE INPT

## 2019-12-12 PROCEDURE — 2140000000 HC CCU INTERMEDIATE R&B

## 2019-12-12 PROCEDURE — 84550 ASSAY OF BLOOD/URIC ACID: CPT

## 2019-12-12 PROCEDURE — 87340 HEPATITIS B SURFACE AG IA: CPT

## 2019-12-12 PROCEDURE — 78452 HT MUSCLE IMAGE SPECT MULT: CPT

## 2019-12-12 PROCEDURE — 36415 COLL VENOUS BLD VENIPUNCTURE: CPT

## 2019-12-12 PROCEDURE — 6360000002 HC RX W HCPCS: Performed by: FAMILY MEDICINE

## 2019-12-12 PROCEDURE — 93017 CV STRESS TEST TRACING ONLY: CPT

## 2019-12-12 PROCEDURE — 85730 THROMBOPLASTIN TIME PARTIAL: CPT

## 2019-12-12 RX ORDER — OSELTAMIVIR PHOSPHATE 30 MG/1
30 CAPSULE ORAL EVERY OTHER DAY
Status: DISCONTINUED | OUTPATIENT
Start: 2019-12-13 | End: 2019-12-14

## 2019-12-12 RX ORDER — ACETAMINOPHEN 325 MG/1
650 TABLET ORAL EVERY 6 HOURS PRN
Status: DISCONTINUED | OUTPATIENT
Start: 2019-12-12 | End: 2019-12-17 | Stop reason: HOSPADM

## 2019-12-12 RX ORDER — SODIUM CHLORIDE 0.9 % (FLUSH) 0.9 %
10 SYRINGE (ML) INJECTION EVERY 12 HOURS SCHEDULED
Status: DISCONTINUED | OUTPATIENT
Start: 2019-12-12 | End: 2019-12-17 | Stop reason: HOSPADM

## 2019-12-12 RX ORDER — SODIUM CHLORIDE 0.9 % (FLUSH) 0.9 %
10 SYRINGE (ML) INJECTION PRN
Status: DISCONTINUED | OUTPATIENT
Start: 2019-12-12 | End: 2019-12-17 | Stop reason: HOSPADM

## 2019-12-12 RX ADMIN — CLONIDINE HYDROCHLORIDE 0.3 MG: 0.1 TABLET ORAL at 14:02

## 2019-12-12 RX ADMIN — OXYCODONE HYDROCHLORIDE 10 MG: 10 TABLET, FILM COATED, EXTENDED RELEASE ORAL at 02:05

## 2019-12-12 RX ADMIN — CLONIDINE HYDROCHLORIDE 0.3 MG: 0.1 TABLET ORAL at 20:49

## 2019-12-12 RX ADMIN — AMLODIPINE BESYLATE 10 MG: 10 TABLET ORAL at 14:02

## 2019-12-12 RX ADMIN — MELATONIN 3 MG: at 20:49

## 2019-12-12 RX ADMIN — REGADENOSON 0.4 MG: 0.08 INJECTION, SOLUTION INTRAVENOUS at 12:55

## 2019-12-12 RX ADMIN — ISOSORBIDE MONONITRATE 30 MG: 30 TABLET, EXTENDED RELEASE ORAL at 14:02

## 2019-12-12 RX ADMIN — SEVELAMER CARBONATE 800 MG: 800 TABLET, FILM COATED ORAL at 14:01

## 2019-12-12 RX ADMIN — OXYCODONE HYDROCHLORIDE 10 MG: 10 TABLET, FILM COATED, EXTENDED RELEASE ORAL at 20:49

## 2019-12-12 RX ADMIN — ASPIRIN 81 MG 81 MG: 81 TABLET ORAL at 13:59

## 2019-12-12 RX ADMIN — TETRAKIS(2-METHOXYISOBUTYLISOCYANIDE)COPPER(I) TETRAFLUOROBORATE 30 MILLICURIE: 1 INJECTION, POWDER, LYOPHILIZED, FOR SOLUTION INTRAVENOUS at 13:32

## 2019-12-12 RX ADMIN — CALCIUM ACETATE 2001 MG: 667 CAPSULE ORAL at 13:58

## 2019-12-12 RX ADMIN — LACTULOSE 10 G: 20 SOLUTION ORAL at 13:57

## 2019-12-12 RX ADMIN — MINOXIDIL 10 MG: 10 TABLET ORAL at 13:59

## 2019-12-12 RX ADMIN — TETRAKIS(2-METHOXYISOBUTYLISOCYANIDE)COPPER(I) TETRAFLUOROBORATE 10 MILLICURIE: 1 INJECTION, POWDER, LYOPHILIZED, FOR SOLUTION INTRAVENOUS at 13:32

## 2019-12-12 RX ADMIN — SEVELAMER CARBONATE 800 MG: 800 TABLET, FILM COATED ORAL at 17:15

## 2019-12-12 RX ADMIN — HEPARIN SODIUM 13 UNITS/KG/HR: 10000 INJECTION, SOLUTION INTRAVENOUS at 17:25

## 2019-12-12 RX ADMIN — HEPARIN SODIUM 2000 UNITS: 1000 INJECTION INTRAVENOUS; SUBCUTANEOUS at 03:36

## 2019-12-12 RX ADMIN — OXYCODONE HYDROCHLORIDE 10 MG: 10 TABLET, FILM COATED, EXTENDED RELEASE ORAL at 12:19

## 2019-12-12 RX ADMIN — CALCIUM ACETATE 2001 MG: 667 CAPSULE ORAL at 17:14

## 2019-12-12 ASSESSMENT — ENCOUNTER SYMPTOMS
NAUSEA: 1
EYE REDNESS: 0
SHORTNESS OF BREATH: 1
COLOR CHANGE: 1
SORE THROAT: 0
WHEEZING: 1
VOMITING: 0
TROUBLE SWALLOWING: 0
ABDOMINAL PAIN: 0

## 2019-12-12 ASSESSMENT — PAIN DESCRIPTION - LOCATION
LOCATION: BACK

## 2019-12-12 ASSESSMENT — PAIN SCALES - GENERAL
PAINLEVEL_OUTOF10: 8
PAINLEVEL_OUTOF10: 5
PAINLEVEL_OUTOF10: 7
PAINLEVEL_OUTOF10: 5

## 2019-12-13 ENCOUNTER — CARE COORDINATION (OUTPATIENT)
Dept: CASE MANAGEMENT | Age: 59
End: 2019-12-13

## 2019-12-13 LAB
APTT: 44.3 SEC (ref 26–36.2)
APTT: 50.7 SEC (ref 26–36.2)
APTT: 56.1 SEC (ref 26–36.2)
APTT: 65.4 SEC (ref 26–36.2)
LACTIC ACID, SEPSIS: 1.4 MG/DL (ref 0.5–1.9)
LV EF: 38 %
LVEF MODALITY: NORMAL

## 2019-12-13 PROCEDURE — 36415 COLL VENOUS BLD VENIPUNCTURE: CPT

## 2019-12-13 PROCEDURE — 83605 ASSAY OF LACTIC ACID: CPT

## 2019-12-13 PROCEDURE — 2140000000 HC CCU INTERMEDIATE R&B

## 2019-12-13 PROCEDURE — 6360000002 HC RX W HCPCS: Performed by: FAMILY MEDICINE

## 2019-12-13 PROCEDURE — 6370000000 HC RX 637 (ALT 250 FOR IP): Performed by: HOSPITALIST

## 2019-12-13 PROCEDURE — 6370000000 HC RX 637 (ALT 250 FOR IP): Performed by: FAMILY MEDICINE

## 2019-12-13 PROCEDURE — 85730 THROMBOPLASTIN TIME PARTIAL: CPT

## 2019-12-13 RX ADMIN — OSELTAMIVIR PHOSPHATE 30 MG: 30 CAPSULE ORAL at 08:49

## 2019-12-13 RX ADMIN — MINOXIDIL 10 MG: 10 TABLET ORAL at 08:49

## 2019-12-13 RX ADMIN — OXYCODONE HYDROCHLORIDE 10 MG: 10 TABLET, FILM COATED, EXTENDED RELEASE ORAL at 17:40

## 2019-12-13 RX ADMIN — LACTULOSE 10 G: 20 SOLUTION ORAL at 08:49

## 2019-12-13 RX ADMIN — ISOSORBIDE MONONITRATE 30 MG: 30 TABLET, EXTENDED RELEASE ORAL at 08:50

## 2019-12-13 RX ADMIN — CALCIUM ACETATE 2001 MG: 667 CAPSULE ORAL at 09:00

## 2019-12-13 RX ADMIN — SEVELAMER CARBONATE 800 MG: 800 TABLET, FILM COATED ORAL at 17:40

## 2019-12-13 RX ADMIN — LACTULOSE 10 G: 20 SOLUTION ORAL at 21:54

## 2019-12-13 RX ADMIN — CALCIUM ACETATE 2001 MG: 667 CAPSULE ORAL at 17:40

## 2019-12-13 RX ADMIN — AMLODIPINE BESYLATE 10 MG: 10 TABLET ORAL at 08:50

## 2019-12-13 RX ADMIN — HEPARIN SODIUM 2000 UNITS: 1000 INJECTION INTRAVENOUS; SUBCUTANEOUS at 02:25

## 2019-12-13 RX ADMIN — ACETAMINOPHEN 650 MG: 325 TABLET ORAL at 04:26

## 2019-12-13 RX ADMIN — SEVELAMER CARBONATE 800 MG: 800 TABLET, FILM COATED ORAL at 08:50

## 2019-12-13 RX ADMIN — OXYCODONE HYDROCHLORIDE 10 MG: 10 TABLET, FILM COATED, EXTENDED RELEASE ORAL at 08:49

## 2019-12-13 RX ADMIN — ASPIRIN 81 MG 81 MG: 81 TABLET ORAL at 08:49

## 2019-12-13 RX ADMIN — SEVELAMER CARBONATE 800 MG: 800 TABLET, FILM COATED ORAL at 12:23

## 2019-12-13 RX ADMIN — CLONIDINE HYDROCHLORIDE 0.3 MG: 0.1 TABLET ORAL at 08:50

## 2019-12-13 RX ADMIN — CALCIUM ACETATE 2001 MG: 667 CAPSULE ORAL at 12:23

## 2019-12-13 ASSESSMENT — PAIN SCALES - GENERAL
PAINLEVEL_OUTOF10: 7
PAINLEVEL_OUTOF10: 3
PAINLEVEL_OUTOF10: 0
PAINLEVEL_OUTOF10: 7

## 2019-12-13 ASSESSMENT — ENCOUNTER SYMPTOMS
NAUSEA: 1
WHEEZING: 1
SORE THROAT: 0
VOMITING: 0
TROUBLE SWALLOWING: 0
SHORTNESS OF BREATH: 1
ABDOMINAL PAIN: 0
EYE REDNESS: 0
COLOR CHANGE: 1

## 2019-12-13 ASSESSMENT — PAIN DESCRIPTION - LOCATION
LOCATION: BACK
LOCATION: BACK

## 2019-12-14 LAB
ALBUMIN SERPL-MCNC: 3.7 G/DL (ref 3.5–5.2)
ALP BLD-CCNC: 77 U/L (ref 40–130)
ALT SERPL-CCNC: 12 U/L (ref 5–41)
ANION GAP SERPL CALCULATED.3IONS-SCNC: 23 MMOL/L (ref 7–19)
APTT: 44.5 SEC (ref 26–36.2)
APTT: 45.5 SEC (ref 26–36.2)
APTT: 46 SEC (ref 26–36.2)
APTT: 53 SEC (ref 26–36.2)
AST SERPL-CCNC: 23 U/L (ref 5–40)
BILIRUB SERPL-MCNC: 0.5 MG/DL (ref 0.2–1.2)
BUN BLDV-MCNC: 50 MG/DL (ref 6–20)
CALCIUM SERPL-MCNC: 8.1 MG/DL (ref 8.6–10)
CHLORIDE BLD-SCNC: 81 MMOL/L (ref 98–111)
CO2: 26 MMOL/L (ref 22–29)
CREAT SERPL-MCNC: 6.8 MG/DL (ref 0.5–1.2)
GFR NON-AFRICAN AMERICAN: 8
GLUCOSE BLD-MCNC: 180 MG/DL (ref 74–109)
HCT VFR BLD CALC: 32.4 % (ref 42–52)
HEMOGLOBIN: 10.2 G/DL (ref 14–18)
MCH RBC QN AUTO: 31 PG (ref 27–31)
MCHC RBC AUTO-ENTMCNC: 31.5 G/DL (ref 33–37)
MCV RBC AUTO: 98.5 FL (ref 80–94)
PDW BLD-RTO: 16.7 % (ref 11.5–14.5)
PLATELET # BLD: 108 K/UL (ref 130–400)
PMV BLD AUTO: 12.1 FL (ref 9.4–12.4)
POTASSIUM REFLEX MAGNESIUM: 4.6 MMOL/L (ref 3.5–5)
RBC # BLD: 3.29 M/UL (ref 4.7–6.1)
SODIUM BLD-SCNC: 130 MMOL/L (ref 136–145)
TOTAL PROTEIN: 7.2 G/DL (ref 6.6–8.7)
WBC # BLD: 4.7 K/UL (ref 4.8–10.8)

## 2019-12-14 PROCEDURE — 6370000000 HC RX 637 (ALT 250 FOR IP): Performed by: FAMILY MEDICINE

## 2019-12-14 PROCEDURE — 85730 THROMBOPLASTIN TIME PARTIAL: CPT

## 2019-12-14 PROCEDURE — 6370000000 HC RX 637 (ALT 250 FOR IP): Performed by: HOSPITALIST

## 2019-12-14 PROCEDURE — 6360000002 HC RX W HCPCS: Performed by: FAMILY MEDICINE

## 2019-12-14 PROCEDURE — 8010000000 HC HEMODIALYSIS ACUTE INPT

## 2019-12-14 PROCEDURE — 85027 COMPLETE CBC AUTOMATED: CPT

## 2019-12-14 PROCEDURE — 36415 COLL VENOUS BLD VENIPUNCTURE: CPT

## 2019-12-14 PROCEDURE — 80053 COMPREHEN METABOLIC PANEL: CPT

## 2019-12-14 PROCEDURE — 2140000000 HC CCU INTERMEDIATE R&B

## 2019-12-14 PROCEDURE — 2580000003 HC RX 258: Performed by: HOSPITALIST

## 2019-12-14 RX ORDER — OSELTAMIVIR PHOSPHATE 30 MG/1
30 CAPSULE ORAL
Status: DISCONTINUED | OUTPATIENT
Start: 2019-12-14 | End: 2019-12-16

## 2019-12-14 RX ORDER — SODIUM CHLORIDE 1000 MG
1 TABLET, SOLUBLE MISCELLANEOUS
Status: DISCONTINUED | OUTPATIENT
Start: 2019-12-14 | End: 2019-12-17 | Stop reason: HOSPADM

## 2019-12-14 RX ADMIN — HEPARIN SODIUM 17.04 UNITS/KG/HR: 10000 INJECTION, SOLUTION INTRAVENOUS at 11:01

## 2019-12-14 RX ADMIN — Medication 1 G: at 08:42

## 2019-12-14 RX ADMIN — Medication 10 ML: at 08:34

## 2019-12-14 RX ADMIN — SEVELAMER CARBONATE 800 MG: 800 TABLET, FILM COATED ORAL at 16:25

## 2019-12-14 RX ADMIN — SEVELAMER CARBONATE 800 MG: 800 TABLET, FILM COATED ORAL at 08:33

## 2019-12-14 RX ADMIN — OXYCODONE HYDROCHLORIDE 10 MG: 10 TABLET, FILM COATED, EXTENDED RELEASE ORAL at 09:08

## 2019-12-14 RX ADMIN — OSELTAMIVIR PHOSPHATE 30 MG: 30 CAPSULE ORAL at 16:25

## 2019-12-14 RX ADMIN — MELATONIN 3 MG: at 20:41

## 2019-12-14 RX ADMIN — CALCIUM ACETATE 2001 MG: 667 CAPSULE ORAL at 08:33

## 2019-12-14 RX ADMIN — Medication 10 ML: at 20:42

## 2019-12-14 RX ADMIN — ISOSORBIDE MONONITRATE 30 MG: 30 TABLET, EXTENDED RELEASE ORAL at 08:33

## 2019-12-14 RX ADMIN — HEPARIN SODIUM 2000 UNITS: 1000 INJECTION INTRAVENOUS; SUBCUTANEOUS at 15:13

## 2019-12-14 RX ADMIN — HEPARIN SODIUM 2000 UNITS: 1000 INJECTION INTRAVENOUS; SUBCUTANEOUS at 19:00

## 2019-12-14 RX ADMIN — CALCIUM ACETATE 2001 MG: 667 CAPSULE ORAL at 16:24

## 2019-12-14 RX ADMIN — Medication 1 G: at 16:25

## 2019-12-14 RX ADMIN — Medication 1 G: at 14:12

## 2019-12-14 RX ADMIN — OXYCODONE HYDROCHLORIDE 10 MG: 10 TABLET, FILM COATED, EXTENDED RELEASE ORAL at 00:53

## 2019-12-14 RX ADMIN — LACTULOSE 10 G: 20 SOLUTION ORAL at 08:33

## 2019-12-14 RX ADMIN — HEPARIN SODIUM 2000 UNITS: 1000 INJECTION INTRAVENOUS; SUBCUTANEOUS at 07:29

## 2019-12-14 RX ADMIN — OXYCODONE HYDROCHLORIDE 10 MG: 10 TABLET, FILM COATED, EXTENDED RELEASE ORAL at 17:17

## 2019-12-14 RX ADMIN — ONDANSETRON 4 MG: 2 INJECTION INTRAMUSCULAR; INTRAVENOUS at 15:16

## 2019-12-14 RX ADMIN — LACTULOSE 10 G: 20 SOLUTION ORAL at 14:10

## 2019-12-14 RX ADMIN — ACETAMINOPHEN 650 MG: 325 TABLET ORAL at 16:24

## 2019-12-14 RX ADMIN — ASPIRIN 81 MG 81 MG: 81 TABLET ORAL at 08:34

## 2019-12-14 RX ADMIN — HEPARIN SODIUM 17 UNITS/KG/HR: 10000 INJECTION, SOLUTION INTRAVENOUS at 07:15

## 2019-12-14 ASSESSMENT — PAIN DESCRIPTION - PAIN TYPE
TYPE: CHRONIC PAIN
TYPE: CHRONIC PAIN

## 2019-12-14 ASSESSMENT — ENCOUNTER SYMPTOMS
SORE THROAT: 0
EYE REDNESS: 0
ABDOMINAL PAIN: 0
TROUBLE SWALLOWING: 0
VOMITING: 0
WHEEZING: 1
NAUSEA: 1
COLOR CHANGE: 1
SHORTNESS OF BREATH: 1

## 2019-12-14 ASSESSMENT — PAIN DESCRIPTION - LOCATION
LOCATION: BACK

## 2019-12-14 ASSESSMENT — PAIN SCALES - GENERAL
PAINLEVEL_OUTOF10: 0
PAINLEVEL_OUTOF10: 2
PAINLEVEL_OUTOF10: 0
PAINLEVEL_OUTOF10: 7
PAINLEVEL_OUTOF10: 7
PAINLEVEL_OUTOF10: 6
PAINLEVEL_OUTOF10: 3

## 2019-12-14 ASSESSMENT — PAIN DESCRIPTION - DESCRIPTORS
DESCRIPTORS: ACHING
DESCRIPTORS: ACHING

## 2019-12-14 ASSESSMENT — PAIN DESCRIPTION - FREQUENCY
FREQUENCY: INTERMITTENT
FREQUENCY: INTERMITTENT

## 2019-12-15 LAB
ALBUMIN SERPL-MCNC: 4.1 G/DL (ref 3.5–5.2)
ALP BLD-CCNC: 81 U/L (ref 40–130)
ALT SERPL-CCNC: 14 U/L (ref 5–41)
ANION GAP SERPL CALCULATED.3IONS-SCNC: 16 MMOL/L (ref 7–19)
APTT: 49.4 SEC (ref 26–36.2)
APTT: 51.5 SEC (ref 26–36.2)
APTT: 62.3 SEC (ref 26–36.2)
APTT: 62.6 SEC (ref 26–36.2)
APTT: >200 SEC (ref 26–36.2)
AST SERPL-CCNC: 28 U/L (ref 5–40)
BILIRUB SERPL-MCNC: 0.5 MG/DL (ref 0.2–1.2)
BUN BLDV-MCNC: 23 MG/DL (ref 6–20)
CALCIUM SERPL-MCNC: 9.4 MG/DL (ref 8.6–10)
CHLORIDE BLD-SCNC: 88 MMOL/L (ref 98–111)
CO2: 29 MMOL/L (ref 22–29)
CREAT SERPL-MCNC: 4.8 MG/DL (ref 0.5–1.2)
GFR NON-AFRICAN AMERICAN: 12
GLUCOSE BLD-MCNC: 123 MG/DL (ref 74–109)
GLUCOSE BLD-MCNC: 160 MG/DL (ref 70–99)
GLUCOSE BLD-MCNC: 173 MG/DL (ref 70–99)
HCT VFR BLD CALC: 33.7 % (ref 42–52)
HEMOGLOBIN: 10.8 G/DL (ref 14–18)
MCH RBC QN AUTO: 31.8 PG (ref 27–31)
MCHC RBC AUTO-ENTMCNC: 32 G/DL (ref 33–37)
MCV RBC AUTO: 99.1 FL (ref 80–94)
PDW BLD-RTO: 16.6 % (ref 11.5–14.5)
PERFORMED ON: ABNORMAL
PERFORMED ON: ABNORMAL
PLATELET # BLD: 96 K/UL (ref 130–400)
PMV BLD AUTO: 13 FL (ref 9.4–12.4)
POTASSIUM REFLEX MAGNESIUM: 4.4 MMOL/L (ref 3.5–5)
RBC # BLD: 3.4 M/UL (ref 4.7–6.1)
S PYO THROAT QL CULT: NORMAL
SODIUM BLD-SCNC: 133 MMOL/L (ref 136–145)
TOTAL PROTEIN: 8.5 G/DL (ref 6.6–8.7)
WBC # BLD: 7.2 K/UL (ref 4.8–10.8)

## 2019-12-15 PROCEDURE — 36415 COLL VENOUS BLD VENIPUNCTURE: CPT

## 2019-12-15 PROCEDURE — 6370000000 HC RX 637 (ALT 250 FOR IP): Performed by: FAMILY MEDICINE

## 2019-12-15 PROCEDURE — 2140000000 HC CCU INTERMEDIATE R&B

## 2019-12-15 PROCEDURE — 6360000002 HC RX W HCPCS: Performed by: FAMILY MEDICINE

## 2019-12-15 PROCEDURE — 85027 COMPLETE CBC AUTOMATED: CPT

## 2019-12-15 PROCEDURE — 2580000003 HC RX 258: Performed by: HOSPITALIST

## 2019-12-15 PROCEDURE — 82948 REAGENT STRIP/BLOOD GLUCOSE: CPT

## 2019-12-15 PROCEDURE — 85730 THROMBOPLASTIN TIME PARTIAL: CPT

## 2019-12-15 PROCEDURE — 80053 COMPREHEN METABOLIC PANEL: CPT

## 2019-12-15 RX ADMIN — CLONIDINE HYDROCHLORIDE 0.3 MG: 0.1 TABLET ORAL at 10:20

## 2019-12-15 RX ADMIN — CLONIDINE HYDROCHLORIDE 0.3 MG: 0.1 TABLET ORAL at 13:39

## 2019-12-15 RX ADMIN — OXYCODONE HYDROCHLORIDE 10 MG: 10 TABLET, FILM COATED, EXTENDED RELEASE ORAL at 19:08

## 2019-12-15 RX ADMIN — SEVELAMER CARBONATE 800 MG: 800 TABLET, FILM COATED ORAL at 16:13

## 2019-12-15 RX ADMIN — MELATONIN 3 MG: at 19:08

## 2019-12-15 RX ADMIN — Medication 1 G: at 16:13

## 2019-12-15 RX ADMIN — OXYCODONE HYDROCHLORIDE 10 MG: 10 TABLET, FILM COATED, EXTENDED RELEASE ORAL at 01:48

## 2019-12-15 RX ADMIN — CALCIUM ACETATE 2001 MG: 667 CAPSULE ORAL at 16:13

## 2019-12-15 RX ADMIN — Medication 10 ML: at 19:09

## 2019-12-15 RX ADMIN — OXYCODONE HYDROCHLORIDE 10 MG: 10 TABLET, FILM COATED, EXTENDED RELEASE ORAL at 10:19

## 2019-12-15 RX ADMIN — SEVELAMER CARBONATE 800 MG: 800 TABLET, FILM COATED ORAL at 12:31

## 2019-12-15 RX ADMIN — HEPARIN SODIUM 19 UNITS/KG/HR: 10000 INJECTION, SOLUTION INTRAVENOUS at 05:39

## 2019-12-15 RX ADMIN — HEPARIN SODIUM 2000 UNITS: 1000 INJECTION INTRAVENOUS; SUBCUTANEOUS at 14:55

## 2019-12-15 RX ADMIN — CALCIUM ACETATE 2001 MG: 667 CAPSULE ORAL at 12:31

## 2019-12-15 RX ADMIN — ASPIRIN 81 MG 81 MG: 81 TABLET ORAL at 10:20

## 2019-12-15 RX ADMIN — ISOSORBIDE MONONITRATE 30 MG: 30 TABLET, EXTENDED RELEASE ORAL at 10:18

## 2019-12-15 RX ADMIN — LACTULOSE 10 G: 20 SOLUTION ORAL at 19:08

## 2019-12-15 RX ADMIN — MINOXIDIL 10 MG: 10 TABLET ORAL at 10:19

## 2019-12-15 RX ADMIN — CLONIDINE HYDROCHLORIDE 0.3 MG: 0.1 TABLET ORAL at 19:08

## 2019-12-15 RX ADMIN — AMLODIPINE BESYLATE 10 MG: 10 TABLET ORAL at 10:20

## 2019-12-15 ASSESSMENT — PAIN DESCRIPTION - FREQUENCY
FREQUENCY: INTERMITTENT

## 2019-12-15 ASSESSMENT — PAIN - FUNCTIONAL ASSESSMENT
PAIN_FUNCTIONAL_ASSESSMENT: ACTIVITIES ARE NOT PREVENTED
PAIN_FUNCTIONAL_ASSESSMENT: ACTIVITIES ARE NOT PREVENTED

## 2019-12-15 ASSESSMENT — PAIN DESCRIPTION - PAIN TYPE
TYPE: CHRONIC PAIN

## 2019-12-15 ASSESSMENT — ENCOUNTER SYMPTOMS
NAUSEA: 1
SORE THROAT: 0
ABDOMINAL PAIN: 0
TROUBLE SWALLOWING: 0
EYE REDNESS: 0
VOMITING: 0
SHORTNESS OF BREATH: 1
WHEEZING: 1
COLOR CHANGE: 1

## 2019-12-15 ASSESSMENT — PAIN DESCRIPTION - ONSET
ONSET: ON-GOING

## 2019-12-15 ASSESSMENT — PAIN DESCRIPTION - DESCRIPTORS
DESCRIPTORS: ACHING

## 2019-12-15 ASSESSMENT — PAIN SCALES - GENERAL
PAINLEVEL_OUTOF10: 6
PAINLEVEL_OUTOF10: 3
PAINLEVEL_OUTOF10: 5
PAINLEVEL_OUTOF10: 7
PAINLEVEL_OUTOF10: 7

## 2019-12-15 ASSESSMENT — PAIN DESCRIPTION - LOCATION
LOCATION: BACK;LEG
LOCATION: BACK;LEG
LOCATION: BACK
LOCATION: BACK;LEG
LOCATION: BACK

## 2019-12-15 ASSESSMENT — PAIN DESCRIPTION - ORIENTATION
ORIENTATION: RIGHT

## 2019-12-15 ASSESSMENT — PAIN DESCRIPTION - PROGRESSION
CLINICAL_PROGRESSION: GRADUALLY IMPROVING
CLINICAL_PROGRESSION: GRADUALLY IMPROVING

## 2019-12-16 LAB
ALBUMIN SERPL-MCNC: 3.7 G/DL (ref 3.5–5.2)
ALP BLD-CCNC: 78 U/L (ref 40–130)
ALT SERPL-CCNC: 11 U/L (ref 5–41)
ANION GAP SERPL CALCULATED.3IONS-SCNC: 17 MMOL/L (ref 7–19)
APTT: 61.7 SEC (ref 26–36.2)
AST SERPL-CCNC: 21 U/L (ref 5–40)
BILIRUB SERPL-MCNC: 0.4 MG/DL (ref 0.2–1.2)
BUN BLDV-MCNC: 44 MG/DL (ref 6–20)
CALCIUM SERPL-MCNC: 9.4 MG/DL (ref 8.6–10)
CHLORIDE BLD-SCNC: 85 MMOL/L (ref 98–111)
CO2: 29 MMOL/L (ref 22–29)
CREAT SERPL-MCNC: 6.4 MG/DL (ref 0.5–1.2)
GFR NON-AFRICAN AMERICAN: 9
GLUCOSE BLD-MCNC: 119 MG/DL (ref 74–109)
HCT VFR BLD CALC: 34.7 % (ref 42–52)
HEMOGLOBIN: 11.2 G/DL (ref 14–18)
MCH RBC QN AUTO: 30.9 PG (ref 27–31)
MCHC RBC AUTO-ENTMCNC: 32.3 G/DL (ref 33–37)
MCV RBC AUTO: 95.6 FL (ref 80–94)
PDW BLD-RTO: 15.8 % (ref 11.5–14.5)
PLATELET # BLD: 65 K/UL (ref 130–400)
PMV BLD AUTO: 12.1 FL (ref 9.4–12.4)
POTASSIUM REFLEX MAGNESIUM: 4.7 MMOL/L (ref 3.5–5)
RBC # BLD: 3.63 M/UL (ref 4.7–6.1)
SODIUM BLD-SCNC: 131 MMOL/L (ref 136–145)
TOTAL PROTEIN: 7.8 G/DL (ref 6.6–8.7)
WBC # BLD: 4.2 K/UL (ref 4.8–10.8)

## 2019-12-16 PROCEDURE — B2151ZZ FLUOROSCOPY OF LEFT HEART USING LOW OSMOLAR CONTRAST: ICD-10-PCS | Performed by: INTERNAL MEDICINE

## 2019-12-16 PROCEDURE — 93458 L HRT ARTERY/VENTRICLE ANGIO: CPT | Performed by: INTERNAL MEDICINE

## 2019-12-16 PROCEDURE — 6360000002 HC RX W HCPCS

## 2019-12-16 PROCEDURE — B2111ZZ FLUOROSCOPY OF MULTIPLE CORONARY ARTERIES USING LOW OSMOLAR CONTRAST: ICD-10-PCS | Performed by: INTERNAL MEDICINE

## 2019-12-16 PROCEDURE — 2709999900 HC NON-CHARGEABLE SUPPLY

## 2019-12-16 PROCEDURE — 2580000003 HC RX 258: Performed by: HOSPITALIST

## 2019-12-16 PROCEDURE — 6360000002 HC RX W HCPCS: Performed by: FAMILY MEDICINE

## 2019-12-16 PROCEDURE — 4A023N7 MEASUREMENT OF CARDIAC SAMPLING AND PRESSURE, LEFT HEART, PERCUTANEOUS APPROACH: ICD-10-PCS | Performed by: INTERNAL MEDICINE

## 2019-12-16 PROCEDURE — 85730 THROMBOPLASTIN TIME PARTIAL: CPT

## 2019-12-16 PROCEDURE — C1894 INTRO/SHEATH, NON-LASER: HCPCS

## 2019-12-16 PROCEDURE — C1769 GUIDE WIRE: HCPCS

## 2019-12-16 PROCEDURE — 6370000000 HC RX 637 (ALT 250 FOR IP): Performed by: FAMILY MEDICINE

## 2019-12-16 PROCEDURE — 6370000000 HC RX 637 (ALT 250 FOR IP): Performed by: HOSPITALIST

## 2019-12-16 PROCEDURE — 80053 COMPREHEN METABOLIC PANEL: CPT

## 2019-12-16 PROCEDURE — C1887 CATHETER, GUIDING: HCPCS

## 2019-12-16 PROCEDURE — 85027 COMPLETE CBC AUTOMATED: CPT

## 2019-12-16 PROCEDURE — 2140000000 HC CCU INTERMEDIATE R&B

## 2019-12-16 PROCEDURE — 99152 MOD SED SAME PHYS/QHP 5/>YRS: CPT | Performed by: INTERNAL MEDICINE

## 2019-12-16 PROCEDURE — 99024 POSTOP FOLLOW-UP VISIT: CPT | Performed by: INTERNAL MEDICINE

## 2019-12-16 PROCEDURE — 36415 COLL VENOUS BLD VENIPUNCTURE: CPT

## 2019-12-16 PROCEDURE — 2500000003 HC RX 250 WO HCPCS

## 2019-12-16 PROCEDURE — 8010000000 HC HEMODIALYSIS ACUTE INPT

## 2019-12-16 RX ORDER — SODIUM CHLORIDE 0.9 % (FLUSH) 0.9 %
10 SYRINGE (ML) INJECTION EVERY 12 HOURS SCHEDULED
Status: DISCONTINUED | OUTPATIENT
Start: 2019-12-16 | End: 2019-12-17 | Stop reason: HOSPADM

## 2019-12-16 RX ORDER — OSELTAMIVIR PHOSPHATE 30 MG/1
30 CAPSULE ORAL ONCE
Status: COMPLETED | OUTPATIENT
Start: 2019-12-16 | End: 2019-12-16

## 2019-12-16 RX ORDER — SODIUM CHLORIDE 0.9 % (FLUSH) 0.9 %
10 SYRINGE (ML) INJECTION PRN
Status: DISCONTINUED | OUTPATIENT
Start: 2019-12-16 | End: 2019-12-17 | Stop reason: HOSPADM

## 2019-12-16 RX ADMIN — MINOXIDIL 10 MG: 10 TABLET ORAL at 13:17

## 2019-12-16 RX ADMIN — SEVELAMER CARBONATE 800 MG: 800 TABLET, FILM COATED ORAL at 16:52

## 2019-12-16 RX ADMIN — CALCIUM ACETATE 2001 MG: 667 CAPSULE ORAL at 16:52

## 2019-12-16 RX ADMIN — OSELTAMIVIR PHOSPHATE 30 MG: 30 CAPSULE ORAL at 13:18

## 2019-12-16 RX ADMIN — HEPARIN SODIUM 19 UNITS/KG/HR: 10000 INJECTION, SOLUTION INTRAVENOUS at 01:50

## 2019-12-16 RX ADMIN — CLONIDINE HYDROCHLORIDE 0.3 MG: 0.1 TABLET ORAL at 13:17

## 2019-12-16 RX ADMIN — CLONIDINE HYDROCHLORIDE 0.3 MG: 0.1 TABLET ORAL at 20:00

## 2019-12-16 RX ADMIN — OXYCODONE HYDROCHLORIDE 10 MG: 10 TABLET, FILM COATED, EXTENDED RELEASE ORAL at 16:52

## 2019-12-16 RX ADMIN — MELATONIN 3 MG: at 20:02

## 2019-12-16 RX ADMIN — AMLODIPINE BESYLATE 10 MG: 10 TABLET ORAL at 13:17

## 2019-12-16 RX ADMIN — ASPIRIN 81 MG 81 MG: 81 TABLET ORAL at 13:18

## 2019-12-16 RX ADMIN — ISOSORBIDE MONONITRATE 30 MG: 30 TABLET, EXTENDED RELEASE ORAL at 13:17

## 2019-12-16 RX ADMIN — Medication 10 ML: at 20:00

## 2019-12-16 RX ADMIN — OXYCODONE HYDROCHLORIDE 10 MG: 10 TABLET, FILM COATED, EXTENDED RELEASE ORAL at 04:48

## 2019-12-16 ASSESSMENT — ENCOUNTER SYMPTOMS
SHORTNESS OF BREATH: 1
VOMITING: 0
EYE REDNESS: 0
ABDOMINAL PAIN: 0
TROUBLE SWALLOWING: 0
COLOR CHANGE: 1
SORE THROAT: 0
NAUSEA: 1
WHEEZING: 1

## 2019-12-16 ASSESSMENT — PAIN SCALES - GENERAL
PAINLEVEL_OUTOF10: 5
PAINLEVEL_OUTOF10: 6

## 2019-12-17 VITALS
BODY MASS INDEX: 24.04 KG/M2 | RESPIRATION RATE: 16 BRPM | TEMPERATURE: 97.5 F | OXYGEN SATURATION: 92 % | WEIGHT: 162.3 LBS | SYSTOLIC BLOOD PRESSURE: 143 MMHG | DIASTOLIC BLOOD PRESSURE: 70 MMHG | HEIGHT: 69 IN | HEART RATE: 73 BPM

## 2019-12-17 LAB
ALBUMIN SERPL-MCNC: 3.9 G/DL (ref 3.5–5.2)
ALP BLD-CCNC: 80 U/L (ref 40–130)
ALT SERPL-CCNC: 13 U/L (ref 5–41)
ANION GAP SERPL CALCULATED.3IONS-SCNC: 17 MMOL/L (ref 7–19)
AST SERPL-CCNC: 23 U/L (ref 5–40)
BILIRUB SERPL-MCNC: 0.4 MG/DL (ref 0.2–1.2)
BUN BLDV-MCNC: 29 MG/DL (ref 6–20)
CALCIUM SERPL-MCNC: 9.5 MG/DL (ref 8.6–10)
CHLORIDE BLD-SCNC: 91 MMOL/L (ref 98–111)
CO2: 27 MMOL/L (ref 22–29)
CREAT SERPL-MCNC: 4.8 MG/DL (ref 0.5–1.2)
EKG P AXIS: -53 DEGREES
EKG P AXIS: 4 DEGREES
EKG P-R INTERVAL: 202 MS
EKG P-R INTERVAL: 230 MS
EKG Q-T INTERVAL: 438 MS
EKG Q-T INTERVAL: 452 MS
EKG QRS DURATION: 94 MS
EKG QRS DURATION: 98 MS
EKG QTC CALCULATION (BAZETT): 438 MS
EKG QTC CALCULATION (BAZETT): 452 MS
EKG T AXIS: 114 DEGREES
EKG T AXIS: 116 DEGREES
GFR NON-AFRICAN AMERICAN: 12
GLUCOSE BLD-MCNC: 96 MG/DL (ref 74–109)
HCT VFR BLD CALC: 34 % (ref 42–52)
HEMOGLOBIN: 11.1 G/DL (ref 14–18)
LV EF: 38 %
LVEF MODALITY: NORMAL
MCH RBC QN AUTO: 31.6 PG (ref 27–31)
MCHC RBC AUTO-ENTMCNC: 32.6 G/DL (ref 33–37)
MCV RBC AUTO: 96.9 FL (ref 80–94)
PDW BLD-RTO: 15.9 % (ref 11.5–14.5)
PLATELET # BLD: 106 K/UL (ref 130–400)
PMV BLD AUTO: 12.5 FL (ref 9.4–12.4)
POTASSIUM REFLEX MAGNESIUM: 4.6 MMOL/L (ref 3.5–5)
RBC # BLD: 3.51 M/UL (ref 4.7–6.1)
SODIUM BLD-SCNC: 135 MMOL/L (ref 136–145)
TOTAL PROTEIN: 7.9 G/DL (ref 6.6–8.7)
WBC # BLD: 4.9 K/UL (ref 4.8–10.8)

## 2019-12-17 PROCEDURE — 36415 COLL VENOUS BLD VENIPUNCTURE: CPT

## 2019-12-17 PROCEDURE — 80053 COMPREHEN METABOLIC PANEL: CPT

## 2019-12-17 PROCEDURE — 85027 COMPLETE CBC AUTOMATED: CPT

## 2019-12-17 PROCEDURE — 2580000003 HC RX 258: Performed by: INTERNAL MEDICINE

## 2019-12-17 PROCEDURE — 99999 PR OFFICE/OUTPT VISIT,PROCEDURE ONLY: CPT | Performed by: THORACIC SURGERY (CARDIOTHORACIC VASCULAR SURGERY)

## 2019-12-17 PROCEDURE — 6370000000 HC RX 637 (ALT 250 FOR IP): Performed by: FAMILY MEDICINE

## 2019-12-17 RX ORDER — ATORVASTATIN CALCIUM 40 MG/1
40 TABLET, FILM COATED ORAL NIGHTLY
Status: DISCONTINUED | OUTPATIENT
Start: 2019-12-17 | End: 2019-12-17 | Stop reason: HOSPADM

## 2019-12-17 RX ORDER — SEVELAMER CARBONATE 800 MG/1
800 TABLET, FILM COATED ORAL
Qty: 90 TABLET | Refills: 3 | Status: SHIPPED | OUTPATIENT
Start: 2019-12-17

## 2019-12-17 RX ORDER — METOPROLOL SUCCINATE 25 MG/1
25 TABLET, EXTENDED RELEASE ORAL 2 TIMES DAILY
Qty: 30 TABLET | Refills: 3 | Status: SHIPPED | OUTPATIENT
Start: 2019-12-17

## 2019-12-17 RX ORDER — ISOSORBIDE MONONITRATE 30 MG/1
30 TABLET, EXTENDED RELEASE ORAL 2 TIMES DAILY
Status: DISCONTINUED | OUTPATIENT
Start: 2019-12-17 | End: 2019-12-17 | Stop reason: HOSPADM

## 2019-12-17 RX ORDER — ATORVASTATIN CALCIUM 40 MG/1
40 TABLET, FILM COATED ORAL NIGHTLY
Qty: 30 TABLET | Refills: 3 | Status: ON HOLD | OUTPATIENT
Start: 2019-12-17 | End: 2020-04-10 | Stop reason: HOSPADM

## 2019-12-17 RX ORDER — METOPROLOL SUCCINATE 25 MG/1
25 TABLET, EXTENDED RELEASE ORAL 2 TIMES DAILY
Status: DISCONTINUED | OUTPATIENT
Start: 2019-12-17 | End: 2019-12-17 | Stop reason: HOSPADM

## 2019-12-17 RX ORDER — SODIUM CHLORIDE 1000 MG
1 TABLET, SOLUBLE MISCELLANEOUS
Qty: 90 TABLET | Refills: 3 | Status: ON HOLD | OUTPATIENT
Start: 2019-12-17 | End: 2020-01-20 | Stop reason: ALTCHOICE

## 2019-12-17 RX ADMIN — SEVELAMER CARBONATE 800 MG: 800 TABLET, FILM COATED ORAL at 12:20

## 2019-12-17 RX ADMIN — OXYCODONE HYDROCHLORIDE 10 MG: 10 TABLET, FILM COATED, EXTENDED RELEASE ORAL at 12:20

## 2019-12-17 RX ADMIN — CALCIUM ACETATE 2001 MG: 667 CAPSULE ORAL at 08:53

## 2019-12-17 RX ADMIN — MINOXIDIL 10 MG: 10 TABLET ORAL at 08:53

## 2019-12-17 RX ADMIN — ASPIRIN 81 MG 81 MG: 81 TABLET ORAL at 08:54

## 2019-12-17 RX ADMIN — ISOSORBIDE MONONITRATE 30 MG: 30 TABLET, EXTENDED RELEASE ORAL at 08:54

## 2019-12-17 RX ADMIN — SODIUM CHLORIDE, PRESERVATIVE FREE 10 ML: 5 INJECTION INTRAVENOUS at 08:54

## 2019-12-17 RX ADMIN — CLONIDINE HYDROCHLORIDE 0.3 MG: 0.1 TABLET ORAL at 14:31

## 2019-12-17 RX ADMIN — CALCIUM ACETATE 2001 MG: 667 CAPSULE ORAL at 12:20

## 2019-12-17 RX ADMIN — CLONIDINE HYDROCHLORIDE 0.3 MG: 0.1 TABLET ORAL at 08:53

## 2019-12-17 RX ADMIN — AMLODIPINE BESYLATE 10 MG: 10 TABLET ORAL at 08:54

## 2019-12-17 RX ADMIN — SEVELAMER CARBONATE 800 MG: 800 TABLET, FILM COATED ORAL at 08:53

## 2019-12-17 RX ADMIN — OXYCODONE HYDROCHLORIDE 10 MG: 10 TABLET, FILM COATED, EXTENDED RELEASE ORAL at 02:27

## 2019-12-17 ASSESSMENT — PAIN SCALES - GENERAL
PAINLEVEL_OUTOF10: 7
PAINLEVEL_OUTOF10: 4
PAINLEVEL_OUTOF10: 5

## 2019-12-17 ASSESSMENT — PAIN DESCRIPTION - PAIN TYPE
TYPE: CHRONIC PAIN
TYPE: CHRONIC PAIN

## 2019-12-17 ASSESSMENT — PAIN DESCRIPTION - LOCATION
LOCATION: THROAT
LOCATION: THROAT

## 2019-12-18 LAB
BLOOD CULTURE, ROUTINE: NORMAL
CULTURE, BLOOD 2: NORMAL

## 2019-12-27 NOTE — PROGRESS NOTES
Vascular Surgery  Dr.Scott Yaniv Lua   Daily Progress Note    Pt Name: Jaron Silver  Medical Record Number: [id number]  Date of Birth 1960   Today's Date: 6/27/2019        SUBJECTIVE:     Patient was seen and examined. Sitting on side of bed eating breakfast. Stated pain was controlled. \"I'm sore. \"      OBJECTIVE:     Patient Vitals for the past 24 hrs:   BP Temp Temp src Pulse Resp SpO2 Weight   06/27/19 0650 (!) 179/56 98.2 °F (36.8 °C) Temporal 64 18 91 % --   06/27/19 0515 (!) 160/54 97.9 °F (36.6 °C) Temporal 60 20 94 % --   06/27/19 0505 -- -- -- -- -- -- 168 lb (76.2 kg)   06/27/19 0030 (!) 158/52 98.4 °F (36.9 °C) Temporal 66 18 91 % --   06/26/19 1947 (!) 182/56 97.6 °F (36.4 °C) Temporal 63 20 90 % --   06/26/19 1607 (!) 154/54 99.7 °F (37.6 °C) -- 71 16 99 % --   06/26/19 1050 -- -- -- 61 9 98 % --   06/26/19 1000 -- -- -- 64 (!) 7 98 % --   06/26/19 0922 (!) 111/43 -- -- -- -- -- --   06/26/19 0920 (!) 111/43 -- -- -- -- -- --   06/26/19 0901 -- -- -- 66 22 95 % --         Intake/Output Summary (Last 24 hours) at 6/27/2019 0825  Last data filed at 6/27/2019 2959  Gross per 24 hour   Intake 656 ml   Output --   Net 656 ml       In: 216 [I.V.:216]  Out: -     I/O last 3 completed shifts: In: 489 [P.O.:240; I.V.:416]  Out: -      Date 06/27/19 0000 - 06/27/19 2359   Shift 4926-5713 1568-1643 0699-0707 24 Hour Total   INTAKE   I.V.(mL/kg/hr) 216(0.4)   216   Shift Total(mL/kg) 216(2.8)   216(2.8)   OUTPUT   Shift Total(mL/kg)       Weight (kg) 76.2 76.2 76.2 76.2       Wt Readings from Last 3 Encounters:   06/27/19 168 lb (76.2 kg)   06/18/19 174 lb (78.9 kg)   06/13/19 174 lb (78.9 kg)        Body mass index is 24.81 kg/m².      Diet: DIET CARDIAC;        MEDS:     Scheduled Meds:   ketorolac  15 mg Intravenous Q8H    heparin (porcine)  5,000 Units Subcutaneous 3 times per day    amLODIPine  10 mg Oral Daily    Calcium Acetate (Phos Binder)  667 mg Oral TID WC    cloNIDine  0.3 mg Oral TID    isosorbide mononitrate  30 mg Oral Daily    lisinopril  10 mg Oral BID    minoxidil  10 mg Oral Daily    sodium chloride flush  10 mL Intravenous 2 times per day    aspirin  81 mg Oral Daily    lactulose  10 g Oral TID     Continuous Infusions:    PRN Meds:    oxyCODONE-acetaminophen 1 tablet Q4H PRN   sucralfate 1 g TID PRN   sodium chloride flush 10 mL PRN   acetaminophen 650 mg Q4H PRN   ondansetron 4 mg Q6H PRN   metoprolol tartrate 25 mg Q12H PRN   cloNIDine 0.1 mg Q2H PRN   HYDROmorphone 0.5 mg Q3H PRN   polyethylene glycol 17 g Daily PRN         PHYSICAL EXAM:     CONSTITUTIONAL: Alert and oriented times 3, no acute distress and cooperative to examination. LUNGS: Clear bilateral breath sounds without wheezes or rhonchi. CARDIOVASCULAR: Normal HT with RRR. 3/6 systolic murmur. No gallops or rubs. ABDOMEN: Soft, non-tender with active bowel sounds. NEUROLOGIC: Grossly intact. WOUND/INCISION:  Right groin, left groin, and LLE incisions X 3 with staples intact. Edges approximated. No drainage or erythema. EXTREMITY: Feet warm to touch. Monophasic doppler signals - bilateral DP and PT. LABS:     CBC:   Recent Labs     06/25/19  0802 06/25/19  1708 06/26/19  0400   WBC 3.6* 5.7 7.3   RBC 3.26* 3.18* 3.06*   HGB 9.5* 9.3* 8.9*   HCT 30.6* 29.1* 28.2*   MCV 93.9 91.5 92.2   MCH 29.1 29.2 29.1   MCHC 31.0* 32.0* 31.6*   RDW 17.0* 17.0* 17.4*   * 176 155   MPV 12.3 11.4 12.1      Last 3 CMP:   Recent Labs     06/25/19  0802      K 4.3   CL 93*   CO2 31*   BUN 21*   CREATININE 4.3*   GLUCOSE 134*   CALCIUM 9.0        Calcium:   Lab Results   Component Value Date    CALCIUM 9.0 06/25/2019    CALCIUM 9.0 06/13/2019      INR:   Recent Labs     06/25/19  0802   INR 1.19*         DVT prophylaxis: Heparin 5000 units sq every 8 hours          ASSESSMENT:     1.  Atherosclerosis of native arteries bilateral lower extremities with right toe ulcers and rest pain (critical limb ischemia) - S/P Right common femoral endarterectomy with greater saphenous vein patch, Right tibial peroneal trunk and distal below-knee popliteal artery endarterectomy with greater saphenous vein patch, Right common femoral to below-knee popliteal artery bypass graft with reversed saphenous vein graft harvested from the left lower extremity, Right lower extremity arteriograms on 06/25/2019  2. Chronic Diastolic CHF  3. Essential HTN  4. CAD  5. Liver Disease - Cirrhosis, Hepatitis C  6. ESRD on Hemodialysis  7. Anemia of Chronic Disease                 PLAN:     1. Check Post-op GABRIEL. 2. D/C Home this Afternoon.         Nyla Thakkar, APRN-BC Statement Selected

## 2020-01-10 PROBLEM — R77.8 ELEVATED TROPONIN: Status: RESOLVED | Noted: 2019-12-11 | Resolved: 2020-01-10

## 2020-01-10 PROBLEM — R79.89 ELEVATED TROPONIN: Status: RESOLVED | Noted: 2019-12-11 | Resolved: 2020-01-10

## 2020-01-18 ENCOUNTER — HOSPITAL ENCOUNTER (INPATIENT)
Age: 60
LOS: 13 days | Discharge: HOME HEALTH | DRG: 252 | End: 2020-01-31
Attending: EMERGENCY MEDICINE | Admitting: FAMILY MEDICINE
Payer: MEDICARE

## 2020-01-18 PROBLEM — L03.115 CELLULITIS OF RIGHT LOWER EXTREMITY WITHOUT FOOT: Status: ACTIVE | Noted: 2020-01-18

## 2020-01-18 LAB
ALBUMIN SERPL-MCNC: 4.3 G/DL (ref 3.5–5.2)
ALP BLD-CCNC: 104 U/L (ref 40–130)
ALT SERPL-CCNC: 13 U/L (ref 5–41)
ANION GAP SERPL CALCULATED.3IONS-SCNC: 17 MMOL/L (ref 7–19)
AST SERPL-CCNC: 23 U/L (ref 5–40)
BASOPHILS ABSOLUTE: 0 K/UL (ref 0–0.2)
BASOPHILS RELATIVE PERCENT: 0.5 % (ref 0–1)
BILIRUB SERPL-MCNC: 0.5 MG/DL (ref 0.2–1.2)
BUN BLDV-MCNC: 43 MG/DL (ref 6–20)
CALCIUM SERPL-MCNC: 9.1 MG/DL (ref 8.6–10)
CHLORIDE BLD-SCNC: 86 MMOL/L (ref 98–111)
CO2: 31 MMOL/L (ref 22–29)
CREAT SERPL-MCNC: 4.9 MG/DL (ref 0.5–1.2)
EOSINOPHILS ABSOLUTE: 0.4 K/UL (ref 0–0.6)
EOSINOPHILS RELATIVE PERCENT: 4.7 % (ref 0–5)
GFR NON-AFRICAN AMERICAN: 12
GLUCOSE BLD-MCNC: 94 MG/DL (ref 74–109)
HCT VFR BLD CALC: 32.6 % (ref 42–52)
HEMOGLOBIN: 10.4 G/DL (ref 14–18)
IMMATURE GRANULOCYTES #: 0 K/UL
LACTIC ACID: 1.1 MMOL/L (ref 0.5–1.9)
LYMPHOCYTES ABSOLUTE: 0.7 K/UL (ref 1.1–4.5)
LYMPHOCYTES RELATIVE PERCENT: 8.5 % (ref 20–40)
MCH RBC QN AUTO: 31.7 PG (ref 27–31)
MCHC RBC AUTO-ENTMCNC: 31.9 G/DL (ref 33–37)
MCV RBC AUTO: 99.4 FL (ref 80–94)
MONOCYTES ABSOLUTE: 0.6 K/UL (ref 0–0.9)
MONOCYTES RELATIVE PERCENT: 7.5 % (ref 0–10)
NEUTROPHILS ABSOLUTE: 6.5 K/UL (ref 1.5–7.5)
NEUTROPHILS RELATIVE PERCENT: 78.6 % (ref 50–65)
PDW BLD-RTO: 15.9 % (ref 11.5–14.5)
PLATELET # BLD: 151 K/UL (ref 130–400)
PMV BLD AUTO: 11.1 FL (ref 9.4–12.4)
POTASSIUM SERPL-SCNC: 5.5 MMOL/L (ref 3.5–5)
RBC # BLD: 3.28 M/UL (ref 4.7–6.1)
SODIUM BLD-SCNC: 134 MMOL/L (ref 136–145)
TOTAL PROTEIN: 8.5 G/DL (ref 6.6–8.7)
WBC # BLD: 8.2 K/UL (ref 4.8–10.8)

## 2020-01-18 PROCEDURE — 36415 COLL VENOUS BLD VENIPUNCTURE: CPT

## 2020-01-18 PROCEDURE — 93971 EXTREMITY STUDY: CPT

## 2020-01-18 PROCEDURE — 96374 THER/PROPH/DIAG INJ IV PUSH: CPT

## 2020-01-18 PROCEDURE — 83605 ASSAY OF LACTIC ACID: CPT

## 2020-01-18 PROCEDURE — 99284 EMERGENCY DEPT VISIT MOD MDM: CPT

## 2020-01-18 PROCEDURE — 93005 ELECTROCARDIOGRAM TRACING: CPT | Performed by: HOSPITALIST

## 2020-01-18 PROCEDURE — 87040 BLOOD CULTURE FOR BACTERIA: CPT

## 2020-01-18 PROCEDURE — 6360000002 HC RX W HCPCS: Performed by: NURSE PRACTITIONER

## 2020-01-18 PROCEDURE — 80053 COMPREHEN METABOLIC PANEL: CPT

## 2020-01-18 PROCEDURE — 1210000000 HC MED SURG R&B

## 2020-01-18 PROCEDURE — 85025 COMPLETE CBC W/AUTO DIFF WBC: CPT

## 2020-01-18 RX ORDER — ISOSORBIDE MONONITRATE 30 MG/1
30 TABLET, EXTENDED RELEASE ORAL DAILY
Status: DISCONTINUED | OUTPATIENT
Start: 2020-01-19 | End: 2020-01-31 | Stop reason: HOSPADM

## 2020-01-18 RX ORDER — CLOPIDOGREL BISULFATE 75 MG/1
75 TABLET ORAL DAILY
Status: DISCONTINUED | OUTPATIENT
Start: 2020-01-19 | End: 2020-01-31 | Stop reason: HOSPADM

## 2020-01-18 RX ORDER — SODIUM CHLORIDE 0.9 % (FLUSH) 0.9 %
10 SYRINGE (ML) INJECTION EVERY 12 HOURS SCHEDULED
Status: DISCONTINUED | OUTPATIENT
Start: 2020-01-19 | End: 2020-01-31 | Stop reason: HOSPADM

## 2020-01-18 RX ORDER — HEPARIN SODIUM 5000 [USP'U]/ML
5000 INJECTION, SOLUTION INTRAVENOUS; SUBCUTANEOUS EVERY 8 HOURS SCHEDULED
Status: DISCONTINUED | OUTPATIENT
Start: 2020-01-19 | End: 2020-01-22

## 2020-01-18 RX ORDER — SODIUM CHLORIDE 0.9 % (FLUSH) 0.9 %
10 SYRINGE (ML) INJECTION PRN
Status: DISCONTINUED | OUTPATIENT
Start: 2020-01-18 | End: 2020-01-31 | Stop reason: HOSPADM

## 2020-01-18 RX ORDER — ACETAMINOPHEN 325 MG/1
650 TABLET ORAL EVERY 4 HOURS PRN
Status: DISCONTINUED | OUTPATIENT
Start: 2020-01-18 | End: 2020-01-27 | Stop reason: SDUPTHER

## 2020-01-18 RX ORDER — SUCRALFATE 1 G/1
1 TABLET ORAL 3 TIMES DAILY PRN
Status: DISCONTINUED | OUTPATIENT
Start: 2020-01-18 | End: 2020-01-31 | Stop reason: HOSPADM

## 2020-01-18 RX ORDER — METOPROLOL SUCCINATE 25 MG/1
25 TABLET, EXTENDED RELEASE ORAL 2 TIMES DAILY
Status: DISCONTINUED | OUTPATIENT
Start: 2020-01-18 | End: 2020-01-31 | Stop reason: HOSPADM

## 2020-01-18 RX ORDER — LACTULOSE 10 G/15ML
10 SOLUTION ORAL 3 TIMES DAILY
Status: DISCONTINUED | OUTPATIENT
Start: 2020-01-18 | End: 2020-01-31 | Stop reason: HOSPADM

## 2020-01-18 RX ORDER — ONDANSETRON 2 MG/ML
4 INJECTION INTRAMUSCULAR; INTRAVENOUS EVERY 6 HOURS PRN
Status: DISCONTINUED | OUTPATIENT
Start: 2020-01-18 | End: 2020-01-27 | Stop reason: SDUPTHER

## 2020-01-18 RX ORDER — ATORVASTATIN CALCIUM 40 MG/1
40 TABLET, FILM COATED ORAL NIGHTLY
Status: DISCONTINUED | OUTPATIENT
Start: 2020-01-18 | End: 2020-01-31 | Stop reason: HOSPADM

## 2020-01-18 RX ORDER — SODIUM CHLORIDE 1000 MG
1 TABLET, SOLUBLE MISCELLANEOUS
Status: DISCONTINUED | OUTPATIENT
Start: 2020-01-19 | End: 2020-01-20

## 2020-01-18 RX ORDER — OXYCODONE HCL 10 MG/1
10 TABLET, FILM COATED, EXTENDED RELEASE ORAL EVERY 8 HOURS PRN
Status: DISCONTINUED | OUTPATIENT
Start: 2020-01-18 | End: 2020-01-31 | Stop reason: HOSPADM

## 2020-01-18 RX ORDER — MINOXIDIL 10 MG/1
10 TABLET ORAL DAILY
Status: DISCONTINUED | OUTPATIENT
Start: 2020-01-19 | End: 2020-01-31 | Stop reason: HOSPADM

## 2020-01-18 RX ORDER — LANOLIN ALCOHOL/MO/W.PET/CERES
3 CREAM (GRAM) TOPICAL NIGHTLY PRN
Status: DISCONTINUED | OUTPATIENT
Start: 2020-01-18 | End: 2020-01-31 | Stop reason: HOSPADM

## 2020-01-18 RX ORDER — SEVELAMER CARBONATE 800 MG/1
800 TABLET, FILM COATED ORAL
Status: DISCONTINUED | OUTPATIENT
Start: 2020-01-19 | End: 2020-01-31 | Stop reason: HOSPADM

## 2020-01-18 RX ORDER — AMLODIPINE BESYLATE 5 MG/1
10 TABLET ORAL DAILY
Status: DISCONTINUED | OUTPATIENT
Start: 2020-01-19 | End: 2020-01-31 | Stop reason: HOSPADM

## 2020-01-18 RX ORDER — ASPIRIN 81 MG/1
81 TABLET ORAL DAILY
Status: DISCONTINUED | OUTPATIENT
Start: 2020-01-19 | End: 2020-01-31 | Stop reason: HOSPADM

## 2020-01-18 RX ORDER — CLONIDINE HYDROCHLORIDE 0.1 MG/1
0.3 TABLET ORAL 3 TIMES DAILY
Status: DISCONTINUED | OUTPATIENT
Start: 2020-01-18 | End: 2020-01-31 | Stop reason: HOSPADM

## 2020-01-18 RX ADMIN — HYDROMORPHONE HYDROCHLORIDE 1 MG: 1 INJECTION, SOLUTION INTRAMUSCULAR; INTRAVENOUS; SUBCUTANEOUS at 21:33

## 2020-01-18 RX ADMIN — Medication 1000 MG: at 22:19

## 2020-01-18 ASSESSMENT — PAIN SCALES - GENERAL: PAINLEVEL_OUTOF10: 10

## 2020-01-19 ENCOUNTER — APPOINTMENT (OUTPATIENT)
Dept: CT IMAGING | Age: 60
DRG: 252 | End: 2020-01-19
Payer: MEDICARE

## 2020-01-19 LAB
ANION GAP SERPL CALCULATED.3IONS-SCNC: 17 MMOL/L (ref 7–19)
BASOPHILS ABSOLUTE: 0 K/UL (ref 0–0.2)
BASOPHILS RELATIVE PERCENT: 0.8 % (ref 0–1)
BUN BLDV-MCNC: 50 MG/DL (ref 6–20)
CALCIUM SERPL-MCNC: 8.5 MG/DL (ref 8.6–10)
CHLORIDE BLD-SCNC: 91 MMOL/L (ref 98–111)
CO2: 27 MMOL/L (ref 22–29)
CREAT SERPL-MCNC: 5.7 MG/DL (ref 0.5–1.2)
EOSINOPHILS ABSOLUTE: 0.4 K/UL (ref 0–0.6)
EOSINOPHILS RELATIVE PERCENT: 7.2 % (ref 0–5)
GFR NON-AFRICAN AMERICAN: 10
GLUCOSE BLD-MCNC: 94 MG/DL (ref 74–109)
HCT VFR BLD CALC: 28.3 % (ref 42–52)
HEMOGLOBIN: 9 G/DL (ref 14–18)
IMMATURE GRANULOCYTES #: 0 K/UL
INR BLD: 1.22 (ref 0.88–1.18)
LYMPHOCYTES ABSOLUTE: 1 K/UL (ref 1.1–4.5)
LYMPHOCYTES RELATIVE PERCENT: 18.5 % (ref 20–40)
MCH RBC QN AUTO: 31.7 PG (ref 27–31)
MCHC RBC AUTO-ENTMCNC: 31.8 G/DL (ref 33–37)
MCV RBC AUTO: 99.6 FL (ref 80–94)
MONOCYTES ABSOLUTE: 0.5 K/UL (ref 0–0.9)
MONOCYTES RELATIVE PERCENT: 10.1 % (ref 0–10)
NEUTROPHILS ABSOLUTE: 3.2 K/UL (ref 1.5–7.5)
NEUTROPHILS RELATIVE PERCENT: 63.2 % (ref 50–65)
PDW BLD-RTO: 15.9 % (ref 11.5–14.5)
PLATELET # BLD: 112 K/UL (ref 130–400)
PMV BLD AUTO: 12.2 FL (ref 9.4–12.4)
POTASSIUM REFLEX MAGNESIUM: 5.5 MMOL/L (ref 3.5–5)
PROTHROMBIN TIME: 14.8 SEC (ref 12–14.6)
RBC # BLD: 2.84 M/UL (ref 4.7–6.1)
SODIUM BLD-SCNC: 135 MMOL/L (ref 136–145)
WBC # BLD: 5.1 K/UL (ref 4.8–10.8)

## 2020-01-19 PROCEDURE — 6360000004 HC RX CONTRAST MEDICATION: Performed by: SURGERY

## 2020-01-19 PROCEDURE — 6370000000 HC RX 637 (ALT 250 FOR IP): Performed by: HOSPITALIST

## 2020-01-19 PROCEDURE — 1210000000 HC MED SURG R&B

## 2020-01-19 PROCEDURE — 85610 PROTHROMBIN TIME: CPT

## 2020-01-19 PROCEDURE — 6360000002 HC RX W HCPCS: Performed by: HOSPITALIST

## 2020-01-19 PROCEDURE — 36415 COLL VENOUS BLD VENIPUNCTURE: CPT

## 2020-01-19 PROCEDURE — 73702 CT LWR EXTREMITY W/O&W/DYE: CPT

## 2020-01-19 PROCEDURE — 2580000003 HC RX 258: Performed by: HOSPITALIST

## 2020-01-19 PROCEDURE — 80048 BASIC METABOLIC PNL TOTAL CA: CPT

## 2020-01-19 PROCEDURE — 6360000002 HC RX W HCPCS: Performed by: INTERNAL MEDICINE

## 2020-01-19 PROCEDURE — 6370000000 HC RX 637 (ALT 250 FOR IP): Performed by: INTERNAL MEDICINE

## 2020-01-19 PROCEDURE — 85025 COMPLETE CBC W/AUTO DIFF WBC: CPT

## 2020-01-19 PROCEDURE — 99221 1ST HOSP IP/OBS SF/LOW 40: CPT | Performed by: SURGERY

## 2020-01-19 RX ORDER — OXYCODONE HYDROCHLORIDE 5 MG/1
5 TABLET ORAL ONCE
Status: COMPLETED | OUTPATIENT
Start: 2020-01-19 | End: 2020-01-19

## 2020-01-19 RX ORDER — OXYCODONE HYDROCHLORIDE AND ACETAMINOPHEN 5; 325 MG/1; MG/1
1 TABLET ORAL EVERY 4 HOURS PRN
Status: DISCONTINUED | OUTPATIENT
Start: 2020-01-19 | End: 2020-01-30

## 2020-01-19 RX ORDER — CHOLECALCIFEROL (VITAMIN D3) 10 MCG
1 TABLET ORAL DAILY
Status: DISCONTINUED | OUTPATIENT
Start: 2020-01-19 | End: 2020-01-31 | Stop reason: HOSPADM

## 2020-01-19 RX ADMIN — CLONIDINE HYDROCHLORIDE 0.3 MG: 0.1 TABLET ORAL at 19:23

## 2020-01-19 RX ADMIN — DARBEPOETIN ALFA 25 MCG: 25 SOLUTION INTRAVENOUS; SUBCUTANEOUS at 19:24

## 2020-01-19 RX ADMIN — OXYCODONE HYDROCHLORIDE 5 MG: 5 TABLET ORAL at 05:31

## 2020-01-19 RX ADMIN — ATORVASTATIN CALCIUM 40 MG: 40 TABLET, FILM COATED ORAL at 19:23

## 2020-01-19 RX ADMIN — OXYCODONE HYDROCHLORIDE AND ACETAMINOPHEN 1 TABLET: 5; 325 TABLET ORAL at 19:22

## 2020-01-19 RX ADMIN — SEVELAMER CARBONATE 800 MG: 800 TABLET, FILM COATED ORAL at 17:07

## 2020-01-19 RX ADMIN — METOPROLOL SUCCINATE 25 MG: 25 TABLET, EXTENDED RELEASE ORAL at 07:59

## 2020-01-19 RX ADMIN — OXYCODONE HYDROCHLORIDE 10 MG: 10 TABLET, FILM COATED, EXTENDED RELEASE ORAL at 15:10

## 2020-01-19 RX ADMIN — AMLODIPINE BESYLATE 10 MG: 5 TABLET ORAL at 08:00

## 2020-01-19 RX ADMIN — CLONIDINE HYDROCHLORIDE 0.3 MG: 0.1 TABLET ORAL at 01:35

## 2020-01-19 RX ADMIN — Medication 10 ML: at 08:00

## 2020-01-19 RX ADMIN — OXYCODONE HYDROCHLORIDE 10 MG: 10 TABLET, FILM COATED, EXTENDED RELEASE ORAL at 23:21

## 2020-01-19 RX ADMIN — NEPHROCAP 1 MG: 1 CAP ORAL at 11:09

## 2020-01-19 RX ADMIN — METOPROLOL SUCCINATE 25 MG: 25 TABLET, EXTENDED RELEASE ORAL at 19:23

## 2020-01-19 RX ADMIN — METOPROLOL SUCCINATE 25 MG: 25 TABLET, EXTENDED RELEASE ORAL at 01:35

## 2020-01-19 RX ADMIN — Medication 10 ML: at 19:22

## 2020-01-19 RX ADMIN — ISOSORBIDE MONONITRATE 30 MG: 30 TABLET, EXTENDED RELEASE ORAL at 07:59

## 2020-01-19 RX ADMIN — ONDANSETRON 4 MG: 2 INJECTION INTRAMUSCULAR; INTRAVENOUS at 08:07

## 2020-01-19 RX ADMIN — Medication 1 G: at 07:59

## 2020-01-19 RX ADMIN — OXYCODONE HYDROCHLORIDE 10 MG: 10 TABLET, FILM COATED, EXTENDED RELEASE ORAL at 01:35

## 2020-01-19 RX ADMIN — ATORVASTATIN CALCIUM 40 MG: 40 TABLET, FILM COATED ORAL at 01:35

## 2020-01-19 RX ADMIN — ASPIRIN 81 MG: 81 TABLET, COATED ORAL at 08:00

## 2020-01-19 RX ADMIN — OXYCODONE HYDROCHLORIDE AND ACETAMINOPHEN 1 TABLET: 5; 325 TABLET ORAL at 11:09

## 2020-01-19 RX ADMIN — IOPAMIDOL 90 ML: 755 INJECTION, SOLUTION INTRAVENOUS at 10:30

## 2020-01-19 RX ADMIN — CLOPIDOGREL BISULFATE 75 MG: 75 TABLET ORAL at 07:59

## 2020-01-19 RX ADMIN — MINOXIDIL 10 MG: 10 TABLET ORAL at 08:00

## 2020-01-19 RX ADMIN — SEVELAMER CARBONATE 800 MG: 800 TABLET, FILM COATED ORAL at 08:00

## 2020-01-19 RX ADMIN — HEPARIN SODIUM 5000 UNITS: 5000 INJECTION INTRAVENOUS; SUBCUTANEOUS at 01:35

## 2020-01-19 RX ADMIN — SEVELAMER CARBONATE 800 MG: 800 TABLET, FILM COATED ORAL at 11:09

## 2020-01-19 ASSESSMENT — PAIN DESCRIPTION - PROGRESSION
CLINICAL_PROGRESSION: NOT CHANGED
CLINICAL_PROGRESSION: NOT CHANGED

## 2020-01-19 ASSESSMENT — PAIN SCALES - GENERAL
PAINLEVEL_OUTOF10: 8
PAINLEVEL_OUTOF10: 0
PAINLEVEL_OUTOF10: 10
PAINLEVEL_OUTOF10: 8
PAINLEVEL_OUTOF10: 8
PAINLEVEL_OUTOF10: 7
PAINLEVEL_OUTOF10: 8
PAINLEVEL_OUTOF10: 0
PAINLEVEL_OUTOF10: 2
PAINLEVEL_OUTOF10: 0

## 2020-01-19 ASSESSMENT — PAIN DESCRIPTION - LOCATION
LOCATION: LEG
LOCATION: LEG

## 2020-01-19 ASSESSMENT — PAIN DESCRIPTION - ONSET
ONSET: PROGRESSIVE
ONSET: PROGRESSIVE

## 2020-01-19 ASSESSMENT — PAIN DESCRIPTION - PAIN TYPE
TYPE: ACUTE PAIN
TYPE: ACUTE PAIN

## 2020-01-19 ASSESSMENT — PAIN DESCRIPTION - DESCRIPTORS
DESCRIPTORS: THROBBING
DESCRIPTORS: THROBBING

## 2020-01-19 ASSESSMENT — PAIN DESCRIPTION - ORIENTATION
ORIENTATION: RIGHT
ORIENTATION: RIGHT

## 2020-01-19 ASSESSMENT — PAIN DESCRIPTION - FREQUENCY
FREQUENCY: CONTINUOUS
FREQUENCY: CONTINUOUS

## 2020-01-19 NOTE — PROGRESS NOTES
98.2    Recent Labs     01/18/20 2031 01/19/20  0511   BUN 43* 50*       Recent Labs     01/18/20 2031 01/19/20  0511   CREATININE 4.9* 5.7*       Recent Labs     01/18/20 2031 01/19/20  0511   WBC 8.2 5.1         Intake/Output Summary (Last 24 hours) at 1/19/2020 1107  Last data filed at 1/19/2020 3890  Gross per 24 hour   Intake 490 ml   Output --   Net 490 ml       Culture Date Source Results   01/18/20 blood collected                 Ht Readings from Last 1 Encounters:   01/18/20 5' 9\" (1.753 m)        Wt Readings from Last 1 Encounters:   01/18/20 173 lb 4.8 oz (78.6 kg)         Body mass index is 25.59 kg/m². Estimated Creatinine Clearance: 14 mL/min (A) (based on SCr of 5.7 mg/dL (H)). Assessment/Plan:  Will initiate vancomycin 1000 mg IV once given on 01/18/20. Timing of trough level will be determined based on culture results, renal function, and clinical response. Thank you for the consult. Will continue to follow.     Electronically signed by Deyanira Mathias, [address] [place] [address] on 1/19/2020 at 11:07 AM

## 2020-01-19 NOTE — CONSULTS
Vascular Surgery Consultation    Chief complaint - Patient presents today for cellulitis of right leg    Location - right leg  Quality - dull  Severity - mild  Duration - 6days  Timing - constant  Context - in area of medial right leg scar, had surgery about 6 months ago. Modifying Factors - cirrhosis, dialysis(renal failure)  Associated signs and symptoms - \"cracking of scar\", some crusty eschar. , redness of skin medial right leg. Also some vague pain in right foot. Gurvinder Bautista is a 61 y.o. male with the following history reviewed and recorded in [facility]:  Patient Active Problem List    Diagnosis Date Noted    Cellulitis of right lower extremity without foot 01/18/2020    NSTEMI (non-ST elevated myocardial infarction) ([address] 12/11/2019    Stenosis of artery of right lower extremity ([address] 10/23/2019    Hematoma 10/22/2019    Skin ulcer of right heel with fat layer exposed ([address] 08/16/2019    Ulcer of right foot, with fat layer exposed ([address] 08/16/2019    Failing vascular bypass graft 08/14/2019    Non-healing non-surgical wound 07/25/2019    Knee pain, right 07/25/2019    Effusion of knee joint right     Sepsis ([address]     Wound infection     Palliative care patient 07/09/2019    Iatrogenic complication of vascular surgery 07/08/2019    Wound infection after surgery 07/08/2019    Hyponatremia 07/08/2019    Normocytic anemia 07/08/2019    Thrombocytopenia ([address] 07/08/2019    Pacemaker     Hepatitis C, chronic (HCC)     PVD (peripheral vascular disease) ([address]     Anemia, chronic disease 06/26/2019    Anticoagulated by anticoagulation treatment 07/16/2018     He is on Plavix which is on hold due to TACE.       Tobacco abuse 03/15/2018    Atherosclerosis of artery of extremity with rest pain ([address] 02/28/2018    Ulcer of left heel, with fat layer exposed ([address] 02/28/2018    Atherosclerosis of artery of extremity with ulceration ([address] 02/15/2018    Steal syndrome of dialysis vascular access [facility] 01/30/2018    Steal syndrome as complication of dialysis access [facility]) 01/25/2018    ESRD on dialysis [facility])     Colon polyps 01/22/2018     Overview:   He has a history of colon polyps. Last colonoscopy was done September 2016 and was unremarkable from this point of view. Three-year recall was recommended due to preparation which could have been better which would be 9/2019      Hepatocellular carcinoma ([facility] 01/22/2018     He had a liver biopsy at [address] [place] [address] that was normal, but has been sent to [facility] in [place] where they have confirmed the dx of hepatoma on bx. Now undergoing TACE. Started in mid July, 2018.  Chronic deep vein thrombosis (DVT) of axillary vein of left upper extremity ([facility] 01/20/2017    Mixed hyperlipidemia 01/09/2017    Spontaneous bacterial peritonitis ([facility] 12/12/2016     Overview:   He is on daily Cipro for prophylaxis due to previous history of SBP.  CHF (congestive heart failure) ([facility] 11/18/2016    Dialysis patient ([facility] 11/18/2016     Overview:   Dialysis since 2010. He is being considered for a combined liver and kidney transplant. His dialysis means that I cannot treat his ascites with diuretics.  Alcoholic cirrhosis of liver with ascites ([facility] 09/02/2016     The patient had a hx of Hep C treated he states years ago with Interferon and he was told this was cured. He also had a history of alcohol abuse and he stopped drinking 15 years ago. He still has Hepatitis C GT 3 per labs in 8/2016. He has been vaccinated for hepatitis A and B through the dialysis unit. Being followed for combined liver/kidney transplant at [facility]. Transplant Coordinator:  Estephanie Amaral  [phone] fax    Magaly Mcneil is his liver coordinator  [phone]    The patient was advised to stay active as possible and exercise as tolerated on a regular basis. This is to quevedo off muscle wasting.   Chronic liver disease is a catabolic condition that will likely  Chyloperitoneum determined by paracentesis     CKD (chronic kidney disease), stage V ([facility]     Dialysis patient ([facility]     mon wed fri at [place] GERD (gastroesophageal reflux disease)     Hemodialysis patient ([facility]     mon wed fri in [place] Hepatic cirrhosis ([facility]     dr. Ashley Orr; has been going to [facility] for liver and kidney transplant list.    Hepatitis C, chronic ([facility]     History of blood transfusion     HTN (hypertension)     Hx of blood clots     arm/fistula    Mixed hyperlipidemia 1/9/2017    Osteoarthritis     Pacemaker     Palliative care patient 07/09/2019    PVD (peripheral vascular disease) ([facility]     Sleep apnea     no cpap at present.  Type II diabetes mellitus (HCC)     no meds. Past Surgical History:   Procedure Laterality Date    ANGIOPLASTY  08/09/11 JAI GERARDO cephalic vein balloon angioplasty with 7mm x 4cm balloon. coild embolization of 2 cephallic vein brances with 3mm x 5cm coils    CARDIAC CATHETERIZATION  04/04/11    cardiac cath and stent x1 by Dr. Adriane Kilgore  07/26/11 [facility]    HUMAIRA AV fistula. coild embolization of cephalic vein branch near the wrist with 3mm EMBO coils.  balloon a'plasty left cephalic vein with [id number] balloon and then [id number] balloon    DIALYSIS FISTULA CREATION Left 2/16/2017    LEFT UPPER EXTREMITY BRACHIAL / AXILLARY GRAFT AND STENT LEFT SUBCLAVIAN VEIN  performed by Batool Cox MD at [address] Right 7/12/2019    RIGHT LEG WOUND WASHOUT performed by Jeremiah Otero MD at [address] [facility] FEMORAL-TIBIAL BYPASS GRAFT Right 6/25/2019    FEMORAL TIBIAL/PERINEAL BYPASS WITH REVERSED GREATER SAPHENOUS VEIN performed by Batool Cox MD at [facility]  12/26/2010    Right internal jugular vein tunneled dialysis catheter placement    OTHER SURGICAL HISTORY  [id number]    Redo of dialysis catheter    OTHER SURGICAL HISTORY  9/6/2011   [facility]    Removal of RT IJV tunneled dialysis cath    OTHER SURGICAL HISTORY  5/22/12   [facility]    Ultrasound-guided cannulation of left cephalic vein in the mid forearm toward the AV anastomosis, Left upper  ext. fistulograms including venograms of the SVC, Left cephalic vein balloon angioplasty with a 4mm x 100mm Tod balloon, Completion fistulograms    PACEMAKER PLACEMENT      medtronic    PARACENTESIS      multiple/recent; per dr. Mayra Duncan at [address] [facility] Left 1/30/2018    UPPER EXTREMITY DRIL PROCEDURE WITH LEFT SAPHENOUS VEIN HARVESTING performed by Ramesh Villasenor MD at [address] [place] [address]  6/19/12    LU arteriovenous fistulogram including venography of the superior vena cava. Balloon angioplasty, left forearm cephalic vein and upper arm cephalic vein with [id number] tod balloon.  VASCULAR SURGERY  7/10/2012 [facility]     Revision of left distal radial artery to cephalic vein arteriovenous fistula with 7mm Artegraft interposition.  VASCULAR SURGERY  7/30/15 [facility] & [address]    Left upper extremity arteriovenous fistulograms including venography of the superior vena cava. Left cephalic vein balloon angioplasty with an 8 x 20 cm cutting balloon proximal cephalic vein. Balloon angioplasty of left cephalic vein/antecubital vein near the antecubital crease with 8 x 20 mm cutting balloon.  VASCULAR SURGERY  7/30/15 [facility]    Balloon angioplasty proximal end distal upper arm cephalic vein with 9 x 40 cm  balloon. Completion fistulograms of the left upper extremity.  VASCULAR SURGERY  8/13/15 [facility]    Revision of left upper extremity arteriovenous fistula with excision of pseudoaneurysm and primary repair of cephalic vein.     VASCULAR SURGERY  5/3/16 [facility]    Left upper fistulograms/venograms, left cephalic balloon 8 x 20 cutter,9 x 40 conquest,left proximal cephalic vein stents (flair 2 9 x 50), balloon stents 9 x 40 conquest.    VASCULAR SURGERY  12/08/2016    [facility]- ultrasound guided cannulation of left distal deviation. Cardiovascular - Regular rate and rhythm. Heart sounds are normal.  No murmur, rub, or gallop. Carotid pulses are 2+ to palpation bilaterally without bruit. Extremities - Radial and brachial pulses are 2+ to palpation bilaterally. Right femoral pulse: present 2+; Right popliteal pulse: absent Right DP: absent; Right PT absent; Left femoral pulse: present 2+; Left popliteal pulse: absent; Left DP: absent; Left PT: absent Right leg with \"cracked\" scar medial leg. Redness of cellulitis. Brawny edema of right ankle and foot. Cap refill 2 seconds    No cyanosis, clubbing, or significant edema. No signs atheroembolic event. Pulmonary - effort appears normal.    No respiratory distress. Lungs - Breath sounds normal. No wheezes or rales. GI - Abdomen - soft, non tender, bowel sounds X 4 quadrants. No guarding or rebound tenderness. No distension or palpable mass. Genitourinary - deferred. Musculoskeletal - ROM appears normal.  No significant edema. Neurologic - alert and oriented X 3. Physiologic. Skin - warm, dry, and intact. No rash, +erythema right medial leg. no pallor. Psychiatric - mood, affect, and behavior appear normal.  Judgment and thought processes appear normal.  Impression LEV 1-        There is no evidence of deep venous thrombosis (DVT) in the right lower    extremity(ies).    There is no evidence of superficial thrombophlebitis of the right lower    extremity(ies).        Signature        ----------------------------------------------------------------    Electronically signed by John Ignacio MD(Interpreting     1- Lactic acid 1.1, WBC 8.2 with left shift, Hgb 10.4, Plt 151, blood cultures -Pnd    Assessment/plan- I do not think he has a \"deep \" infection of his popliteal area. It is soft and no \"masses's\" are palpable. This may be cellulitis do to cracks in the scar and lack of pliability.  Will use some bacitracin to soften scar, Will get CT scan to check for possible abscess-(He had this before in August 2019). Agree with antibiotics. Will follow. Note I re-reviewed venous study from yesterday, no signs of abscess in popliteal area. Also check A-scan and GABRIEL to assess his bypass graft. AssessmentPlan    1. Right leg pain    2. Cellulitis of right lower extremity    3. ESRD on dialysis ([address]    4. Cirrhosis  5. PVD of legs  Note: spoke with Dr. Sergo Del Valle use Vancomycin.

## 2020-01-19 NOTE — PROGRESS NOTES
Vascular lab preliminary. Right lower extremity venous duplex scan completed. No evidence of DVT, SVT, or reflux noted at this time. Final report pending.

## 2020-01-19 NOTE — PROGRESS NOTES
[facility]        Hospitalist Progress Note  1/19/2020 10:04 AM  Subjective:   Admit Date: 1/18/2020  PCP: Estelle Kilpatrick DO    Chief Complaint: Right lower extremity pain     Subjective: Patient seen and examined at bedside. Denies chest pain or shortness of breath. Asking for more pain medications for his right lower extremity. [facility] History: 51-year-old male presenting for right lower extremity pain, swelling and warmth admitted for cellulitis of the right lower extremity. Vascular surgery consulted on admission. Nephrology consulted for continuation of dialysis. ROS: 14 point review of systems is negative except as specifically addressed above.     DIET RENAL; Low Cholesterol    Intake/Output Summary (Last 24 hours) at 1/19/2020 1004  Last data filed at 1/19/2020 7760  Gross per 24 hour   Intake 490 ml   Output --   Net 490 ml     Medications:  Current Facility-Administered Medications   Medication Dose Route Frequency Provider Last Rate Last Dose    oxyCODONE-acetaminophen (PERCOCET) 5-325 MG per tablet 1 tablet  1 tablet Oral Q4H PRN Maryam Gallegos MD        amLODIPine (NORVASC) tablet 10 mg  10 mg Oral Daily Juanita Reynoso MD   10 mg at 01/19/20 0800    aspirin EC tablet 81 mg  81 mg Oral Daily Juanita Reynoso MD   81 mg at 01/19/20 0800    atorvastatin (LIPITOR) tablet 40 mg  40 mg Oral Nightly Juanita Reynoso MD   40 mg at 01/19/20 0135    cloNIDine (CATAPRES) tablet 0.3 mg  0.3 mg Oral TID Juanita Reynoso MD   0.3 mg at 01/19/20 0135    clopidogrel (PLAVIX) tablet 75 mg  75 mg Oral Daily Juanita Reynoso MD   75 mg at 01/19/20 0759    isosorbide mononitrate (IMDUR) extended release tablet 30 mg  30 mg Oral Daily Juanita Reynoso MD   30 mg at 01/19/20 0759    lactulose (CHRONULAC) 10 GM/15ML solution 10 g  10 g Oral TID Juanita Reynoso MD        melatonin tablet 3 mg  3 mg Oral Nightly PRN Juanita Reynoso MD        metoprolol succinate (TOPROL XL) extended release Pulse 66   Temp 97.2 °F (36.2 °C) (Temporal)   Resp 14   Ht 5' 9\" (1.753 m)   Wt 173 lb 4.8 oz (78.6 kg)   SpO2 91%   BMI 25.59 kg/m²   24HR INTAKE/OUTPUT:      Intake/Output Summary (Last 24 hours) at 1/19/2020 1004  Last data filed at 1/19/2020 0906  Gross per 24 hour   Intake 490 ml   Output --   Net 490 ml     General appearance: Alert  HEENT: AT/NC  Lungs: BLAE  Heart: RRR  Abdomen: Soft  Extremities: RLE warmth, erythema. Shallow open wound medial aspect of right knee without purulence  Neurologic: alert, gross motor and sensory function intact  Skin: warm    Assessment and Plan: Active Problems:    Liver disease    PVD (peripheral vascular disease) ([facility]    Stenosis of artery of right lower extremity (HCC)    Cellulitis of right lower extremity without foot    Right leg pain    Cellulitis of right lower extremity  Resolved Problems:    * No resolved hospital problems. *    Cellulitis: Pharmacy to dose vancomycin. Vascular surgery on board. Follow-up CT of the knee as per vascular. ESRD: Nephrology consulted for continuation of hemodialysis. CAD: Known severe stenosis of the left main artery-poor surgical candidate. DAPT/statin. Supportive management.     Advance Directive: Full Code    DVT prophylaxis: Heparin    Discharge planning: TBD      Signed:  Maryam Gallegos MD 1/19/2020 10:04 AM  Rounding Hospitalist

## 2020-01-19 NOTE — CONSULTS
11/18/2016    Colon polyps 01/22/2018    Dialysis patient ([address] 11/18/2016    Hepatocellular carcinoma ([address] 01/22/2018    Mixed hyperlipidemia 01/09/2017    Spontaneous bacterial peritonitis ([address] 12/12/2016    Tobacco abuse 03/15/2018    Non-healing non-surgical wound 07/25/2019    Knee pain, right 07/25/2019    Effusion of knee joint right     Failing vascular bypass graft 08/14/2019    Skin ulcer of right heel with fat layer exposed ([address] 08/16/2019    Ulcer of right foot, with fat layer exposed ([address] 08/16/2019    Hematoma 10/22/2019    Stenosis of artery of right lower extremity ([address] 10/23/2019    NSTEMI (non-ST elevated myocardial infarction) ([address] 12/11/2019     Resolved Ambulatory Problems     Diagnosis Date Noted    End stage renal disease ([address]     CKD (chronic kidney disease) stage V requiring chronic dialysis ([address]     Superficial venous thrombosis of left upper extremity 12/16/2016    Open wnd of finger 01/22/2018    Atherosclerosis of native artery of both lower extremities with intermittent claudication ([address] 06/19/2019    Acute encephalopathy     Hyperkalemia 07/08/2019    Elevated troponin 12/11/2019     Past Medical History:   Diagnosis Date    Anxiety     Blood circulation, collateral     Cancer ([address]     Chyloperitoneum determined by paracentesis     CKD (chronic kidney disease), stage V (HCC)     GERD (gastroesophageal reflux disease)     Hemodialysis patient ([address]     Hepatic cirrhosis ([address]     History of blood transfusion     HTN (hypertension)     Hx of blood clots     Sleep apnea     Type II diabetes mellitus ([address]          Review of Systems     Constitutional:  No weight loss, no fever/chills  Eyes:  No eye pain, no eye redness  Cardiovascular:  No chest pain, no worsening of edema  Respiratory:  No hemoptysis, no stidor  Gastrointestinal:  No blood in stool, no n/v, no diarrhea  Genitoruinary:  No hematuria, no difficulty with 0800    sodium chloride flush 0.9 % injection 10 mL, 10 mL, Intravenous, PRN, Juanita Reynoso MD    [facility]etron [facility]) injection 4 mg, 4 mg, Intravenous, Q6H PRN, Juanita Reynoso MD, 4 mg at 01/19/20 0807    heparin (porcine) injection 5,000 Units, 5,000 Units, Subcutaneous, 3 times per day, Juanita Reynoso MD, 5,000 Units at 01/19/20 0135    acetaminophen (TYLENOL) tablet 650 mg, 650 mg, Oral, Q4H PRN, Juanita Reynoso MD  Outpatient Medications Marked as Taking for the 1/18/20 encounter [facility] Encounter)   Medication Sig Dispense Refill    atorvastatin (LIPITOR) 40 MG tablet Take 1 tablet by mouth nightly 30 tablet 3    metoprolol succinate (TOPROL XL) 25 MG extended release tablet Take 1 tablet by mouth 2 times daily 30 tablet 3    sodium chloride 1 g tablet Take 1 tablet by mouth 3 times daily (with meals) 90 tablet 3    sevelamer (RENVELA) 800 MG tablet Take 1 tablet by mouth 3 times daily (with meals) 90 tablet 3    oxyCODONE (OXYCONTIN) 10 MG extended release tablet Take 10 mg by mouth every 8 hours as needed for Pain.       aspirin 81 MG EC tablet Take 1 tablet by mouth daily 30 tablet 3    isosorbide mononitrate (IMDUR) 30 MG extended release tablet Take 1 tablet by mouth daily 30 tablet 3    clopidogrel (PLAVIX) 75 MG tablet TAKE 1 TABLET EVERY DAY 90 tablet 1    melatonin 3 MG TABS tablet Take 1 tablet by mouth nightly as needed (insomnia) 30 tablet 0    sucralfate (CARAFATE) 1 GM tablet Take 1 tablet by mouth 3 times daily as needed (upset stomach) 120 tablet 3    lactulose (CEPHULAC) 10 G packet Take 10 g by mouth 3 times daily Indications: Disorder of the Liver Enzymes       minoxidil (LONITEN) 10 MG tablet Take 10 mg by mouth daily Indications: High Blood Pressure Disorder, SBP >160       cloNIDine (CATAPRES) 0.2 MG tablet Take 0.3 mg by mouth 3 times daily Indications: High Blood Pressure       amLODIPine (NORVASC) 10 MG tablet Take 10 mg by mouth daily Indications: High Blood Pressure          Allergies   Patient has no known allergies. Family History       Family History   Problem Relation Age of Onset    High Blood Pressure Mother     High Blood Pressure Father     High Blood Pressure Sister      Family history negative for kidney disease. Social History      Social History     Socioeconomic History    Marital status:      Spouse name: Monika Dinh    Number of children: 0    Years of education: 15    Highest education level: None   Occupational History    Occupation:    Social Needs    Financial resource strain: None    Food insecurity:     Worry: None     Inability: None    Transportation needs:     Medical: None     Non-medical: None   Tobacco Use    Smoking status: Current Every Day Smoker     Packs/day: 0.25     Years: 45.00     Pack years: 11.25     Types: Cigarettes    Smokeless tobacco: Never Used    Tobacco comment: about to  quit down to 1 a day cigarettes   Substance and Sexual Activity    Alcohol use: No    Drug use: Not Currently     Types: Marijuana, Cocaine, Methamphetamines, IV, Opiates , Other-see comments     Comment: in the 1970s, LSD    Sexual activity: Yes     Partners: Female     Comment: has a wife   Lifestyle    Physical activity:     Days per week: None     Minutes per session: None    Stress: None   Relationships    Social connections:     Talks on phone: None     Gets together: None     Attends Alevism service: None     Active member of club or organization: None     Attends meetings of clubs or organizations: None     Relationship status: None    Intimate partner violence:     Fear of current or ex partner: None     Emotionally abused: None     Physically abused: None     Forced sexual activity: None   Other Topics Concern    None   Social History Narrative    CODE STATUS: Full Code    HEALTH CARE PROXY: Mrs. Monika iDnh, [phone]    Back up: his Mother, Mrs. Vladislav Blanco, [place] AMBULATES: independantly    DOMICILED: lives in a private house with 2 steps to get in, has no stairs inside, lives with wife and step children, has 3 pets       Physical Exam     Blood pressure 129/63, pulse 66, temperature 97.2 °F (36.2 °C), temperature source Temporal, resp. rate 14, height 5' 9\" (1.753 m), weight 173 lb 4.8 oz (78.6 kg), SpO2 91 %. General:  NAD, A+Ox3, ill-appearing, normal body habitus  HEENT:  Atraumatic, normocephalic  Neck:  Supple, normal range of movement  Chest:  CTAB, good respiratory effort, good air movement  CV:  RRR, no murmurs  Abdomen:  NTND, soft, +BS, no hepatosplenomegaly  Extremities:  No peripheral edema  Neurological:  Moving all four extremities  Lymphatics:  No palpable lymph nodes   Skin:  Diffuse redness over lower extremities (right> left) with chronic skin discoloration  Psychiatric:  Normal insight and judgement, good recall    Data     Recent Labs     01/18/20 2031 01/19/20  0511   WBC 8.2 5.1   HGB 10.4* 9.0*   HCT 32.6* 28.3*   MCV 99.4* 99.6*    112*     Recent Labs     01/18/20 2031 01/19/20  0511   * 135*   K 5.5* 5.5*   CL 86* 91*   CO2 31* 27   GLUCOSE 94 94   BUN 43* 50*   CREATININE 4.9* 5.7*   LABGLOM 12* 10*       Assessment:  1. ESRD  2. Benign HTN  3. Hyperkalemia  4. Cellulitis of lower extremities  5. Anemia of CKD       Plan:  Agree with antibiotics. Will provide dialysis on 1/20. Provide renal die, low in K. Follow up labs. Will add Aranesp for anemia management.      Thank you for asking us to participate in the management of your patient, please do not hesitate to contact me for any concerns regarding my recommendations as outlined above.    -----------------------------  Electronically signed by Radha Pearson MD on 1/19/20 at 10:09 AM

## 2020-01-19 NOTE — H&P
 PVD (peripheral vascular disease) ([facility])     Sleep apnea     no cpap at present.  Type II diabetes mellitus (HCC)     no meds. Past Surgical History:   Procedure Laterality Date    ANGIOPLASTY  08/09/11 [facility]    LUE cephalic vein balloon angioplasty with 7mm x 4cm balloon. coild embolization of 2 cephallic vein brances with 3mm x 5cm coils    CARDIAC CATHETERIZATION  04/04/11    cardiac cath and stent x1 by Dr. Pablo Parker  07/26/11 [facility]    LUE AV fistula. coild embolization of cephalic vein branch near the wrist with 3mm EMBO coils. balloon a'plasty left cephalic vein with 5IDD2MD balloon and then 1iyx1yf balloon    DIALYSIS FISTULA CREATION Left 2/16/2017    LEFT UPPER EXTREMITY BRACHIAL / AXILLARY GRAFT AND STENT LEFT SUBCLAVIAN VEIN  performed by Toni Moreno MD at [facility] Right 7/12/2019    RIGHT LEG WOUND WASHOUT performed by Ulisses Johnson MD at [address] [place] [address] FEMORAL-TIBIAL BYPASS GRAFT Right 6/25/2019    FEMORAL TIBIAL/PERINEAL BYPASS WITH REVERSED GREATER SAPHENOUS VEIN performed by Toni Moreno MD at [address] [place] [address]  12/26/2010    Right internal jugular vein tunneled dialysis catheter placement    OTHER SURGICAL HISTORY  [id number]    Redo of dialysis catheter    OTHER SURGICAL HISTORY  9/6/2011   [facility]    Removal of RT IJV tunneled dialysis cath    OTHER SURGICAL HISTORY  5/22/12   [facility]    Ultrasound-guided cannulation of left cephalic vein in the mid forearm toward the AV anastomosis, Left upper  ext.  fistulograms including venograms of the SVC, Left cephalic vein balloon angioplasty with a 4mm x 100mm Darrell balloon, Completion fistulograms    PACEMAKER PLACEMENT      medtronic    PARACENTESIS      multiple/recent; per dr. Ghotra Cauhailey at [address] [facility] Left 1/30/2018    UPPER EXTREMITY DRIL PROCEDURE WITH LEFT SAPHENOUS VEIN HARVESTING performed by Toni Moreno MD at [address] [place] [address] VASCULAR SURGERY  6/19/12    LUE arteriovenous fistulogram including venography of the superior vena cava. Balloon angioplasty, left forearm cephalic vein and upper arm cephalic vein with 7VYM0AT tod balloon.  VASCULAR SURGERY  7/10/2012 [facility]     Revision of left distal radial artery to cephalic vein arteriovenous fistula with 7mm Artegraft interposition.  VASCULAR SURGERY  7/30/15 [facility] [address]    Left upper extremity arteriovenous fistulograms including venography of the superior vena cava. Left cephalic vein balloon angioplasty with an 8 x 20 cm cutting balloon proximal cephalic vein. Balloon angioplasty of left cephalic vein/antecubital vein near the antecubital crease with 8 x 20 mm cutting balloon.  VASCULAR SURGERY  7/30/15 [facility]    Balloon angioplasty proximal end distal upper arm cephalic vein with 9 x 40 cm  balloon. Completion fistulograms of the left upper extremity.  VASCULAR SURGERY  8/13/15 [facility]    Revision of left upper extremity arteriovenous fistula with excision of pseudoaneurysm and primary repair of cephalic vein.  VASCULAR SURGERY  5/3/16 [facility]    Left upper fistulograms/venograms, left cephalic balloon 8 x 20 cutter,9 x 40 conquest,left proximal cephalic vein stents (flair 2 9 x 50), balloon stents 9 x 40 conquest.    VASCULAR SURGERY  12/08/2016    [facility]- ultrasound guided cannulation of left distal cephalic vein ultrasound guided cannulation right internal jugular vein placement of right internal jugular vein tunneled dialysis catheter (Bard Equistream XK 23cm tip to cuff)     VASCULAR SURGERY  01/20/2017    [facility]-Right upper venograms, u/s guided cannulation left basilic vein, left upper venograms, balloon angioplasty left subclavian vein 10x40 conquest.    VASCULAR SURGERY  02/16/2017    [facility]. Left brachial artery to axillary vein arteriovenous graft with 7 mm artegraft. Left upper extremity venograms. Left subclavian vein stent viabahn 13x50. Left subclavian vein stent balloon angioplasty 12x40 atlas.  VASCULAR SURGERY  04/11/2017    [facility]. Removal of tunneled dialysis catheter right internal jugular vein.  VASCULAR SURGERY  01/25/2018    [facility]. Arch aortogram, left upper arteriogram, AV graft angiogram/venogram.    VASCULAR SURGERY  02/28/2018    [facility]. Right CFA 5f-6f-7f sheath, aortogram with bilateral lower arteriogram, left SFA/pop  balloon angioplasty 5x100 , 6x150 lutonix ( 2), left CFA  7f sheath, bilateral iliac kissing stents right 10x38 icast, left 9x59 icast expanded with 10x40 , completion aortogram, mynx left CFA , failed mynx right CFA.  VASCULAR SURGERY  06/13/2019    [facility] left CFA 5f sheath, aortogram with bilateral lower arteriogram, mynx left CFA.  VASCULAR SURGERY  06/25/2019    [facility]. Femoral tibial/perineal bypass with reversed greater saphenous vein.  VASCULAR SURGERY  08/16/2019    [facility]. Aortogram and runoff, selective right CFA injections. PTA of fem-tp bypass with 4.0 x 220 mm tod balloon to 4.33 mm    VASCULAR SURGERY  10/23/2019    [facility]. Thrombin injection in the left SFA PSA, right common femoral artery us guided access, left SFA stent treatment of the flap.      Social History     Tobacco History     Smoking Status  Current Every Day Smoker Smoking Frequency  0.25 packs/day for 45 years (11.25 pk yrs) Smoking Tobacco Type  Cigarettes    Smokeless Tobacco Use  Never Used    Tobacco Comment  about to  quit down to 1 a day cigarettes          Alcohol History     Alcohol Use Status  No          Drug Use     Drug Use Status  Not Currently Types  Cocaine, IV, Marijuana, Methamphetamines, Opiates , Other-see comments Comment  in the 1970s, LSD          Sexual Activity     Sexually Active  Yes Partners  Female Comment  has a wife              Family History:  Denies any    Allergies:   No Known Allergies    Current Facility-Administered Medications   Medication Dose Route Frequency Provider Last Rate Last Dose    vancomycin (VANCOCIN) 1 g in dextrose 5% 250 mL IVPB  1,000 mg Intravenous Once Ibeth Esquivel, APRN   1,000 mg at 01/18/20 2219    [START ON 1/19/2020] amLODIPine (NORVASC) tablet 10 mg  10 mg Oral Daily Valentin Arambula MD        [START ON 1/19/2020] aspirin EC tablet 81 mg  81 mg Oral Daily Valentin Arambula MD        atorvastatin (LIPITOR) tablet 40 mg  40 mg Oral Nightly Valentin Arambula MD        cloNIDine (CATAPRES) tablet 0.3 mg  0.3 mg Oral TID Valentin Arambula MD        [START ON 1/19/2020] clopidogrel (PLAVIX) tablet 75 mg  75 mg Oral Daily Valentin Arambula MD        [START ON 1/19/2020] isosorbide mononitrate (IMDUR) extended release tablet 30 mg  30 mg Oral Daily Valentin Arambula MD        lactulose (CEPHULAC) packet 10 g  10 g Oral TID Valentin Arambula MD        [START ON 1/19/2020] melatonin tablet 3 mg  3 mg Oral Nightly PRN Valentin Arambula MD        metoprolol succinate (TOPROL XL) extended release tablet 25 mg  25 mg Oral BID Valentin Arambula MD        [START ON 1/19/2020] minoxidil (LONITEN) tablet 10 mg  10 mg Oral Daily Valentin Arambula MD        oxyCODONE (OXYCONTIN) extended release tablet 10 mg  10 mg Oral Q8H PRN Valentin Arabmula MD        [START ON 1/19/2020] sodium chloride tablet 1 g  1 g Oral TID  Valentin Arambula MD        sucralfate (CARAFATE) tablet 1 g  1 g Oral TID PRN Valentin Arambula MD        [START ON 1/19/2020] sevelamer (RENVELA) tablet 800 mg  800 mg Oral TID  Valentin Arambula MD         Current Outpatient Medications   Medication Sig Dispense Refill    atorvastatin (LIPITOR) 40 MG tablet Take 1 tablet by mouth nightly 30 tablet 3    metoprolol succinate (TOPROL XL) 25 MG extended release tablet Take 1 tablet by mouth 2 times daily 30 tablet 3    sodium chloride 1 g tablet Take 1 tablet by mouth 3 times daily (with meals) 90 tablet 3    sevelamer (RENVELA) 800 MG tablet Take 1 tablet by mouth 3 times daily (with meals) 90 tablet 3    oxyCODONE (OXYCONTIN) 10 MG extended release tablet Take 10 mg by mouth every

## 2020-01-19 NOTE — ED PROVIDER NOTES
garcia    GERD (gastroesophageal reflux disease)     Hemodialysis patient ([facility]     mon wed fri in [facility] Hepatic cirrhosis ([facility]     dr. Ole Ni; has been going to [facility] for liver and kidney transplant list.    Hepatitis C, chronic ([facility]     History of blood transfusion     HTN (hypertension)     Hx of blood clots     arm/fistula    Mixed hyperlipidemia 1/9/2017    Osteoarthritis     Pacemaker     Palliative care patient 07/09/2019    PVD (peripheral vascular disease) ([facility]     Sleep apnea     no cpap at present.  Type II diabetes mellitus (HCC)     no meds. SURGICALHISTORY       Past Surgical History:   Procedure Laterality Date    ANGIOPLASTY  08/09/11 [facility]    LULEONARD cephalic vein balloon angioplasty with 7mm x 4cm balloon. coild embolization of 2 cephallic vein brances with 3mm x 5cm coils    CARDIAC CATHETERIZATION  04/04/11    cardiac cath and stent x1 by Dr. Alberts   07/26/11 [facility]    LULEONARD AV fistula. coild embolization of cephalic vein branch near the wrist with 3mm EMBO coils.  balloon a'plasty left cephalic vein with [id number] balloon and then 3ras0uc balloon    DIALYSIS FISTULA CREATION Left 2/16/2017    LEFT UPPER EXTREMITY BRACHIAL / AXILLARY GRAFT AND STENT LEFT SUBCLAVIAN VEIN  performed by Anita Ren MD at [address] Right 7/12/2019    RIGHT LEG WOUND WASHOUT performed by Ирина Batista MD at [address] [facility] FEMORAL-TIBIAL BYPASS GRAFT Right 6/25/2019    FEMORAL TIBIAL/PERINEAL BYPASS WITH REVERSED GREATER SAPHENOUS VEIN performed by Anita Ren MD at [address] [facility]  12/26/2010    Right internal jugular vein tunneled dialysis catheter placement    OTHER SURGICAL HISTORY  [id number]    Redo of dialysis catheter    OTHER SURGICAL HISTORY  9/6/2011   [facility]    Removal of RT IJV tunneled dialysis cath    OTHER SURGICAL HISTORY  5/22/12   [facility]    Ultrasound-guided cannulation of left cephalic vein in MG CAPSULE    Take 2,001 mg by mouth 3 times daily (with meals)    CLONIDINE (CATAPRES) 0.2 MG TABLET    Take 0.3 mg by mouth 3 times daily Indications: High Blood Pressure     CLOPIDOGREL (PLAVIX) 75 MG TABLET    TAKE 1 TABLET EVERY DAY    ISOSORBIDE MONONITRATE (IMDUR) 30 MG EXTENDED RELEASE TABLET    Take 1 tablet by mouth daily    LACTULOSE (CEPHULAC) 10 G PACKET    Take 10 g by mouth 3 times daily Indications: Disorder of the Liver Enzymes     MELATONIN 3 MG TABS TABLET    Take 1 tablet by mouth nightly as needed (insomnia)    METOPROLOL SUCCINATE (TOPROL XL) 25 MG EXTENDED RELEASE TABLET    Take 1 tablet by mouth 2 times daily    MINOXIDIL (LONITEN) 10 MG TABLET    Take 10 mg by mouth daily Indications: High Blood Pressure Disorder, SBP >160     OXYCODONE (OXYCONTIN) 10 MG EXTENDED RELEASE TABLET    Take 10 mg by mouth every 8 hours as needed for Pain. SEVELAMER (RENVELA) 800 MG TABLET    Take 1 tablet by mouth 3 times daily (with meals)    SODIUM CHLORIDE 1 G TABLET    Take 1 tablet by mouth 3 times daily (with meals)    SUCRALFATE (CARAFATE) 1 GM TABLET    Take 1 tablet by mouth 3 times daily as needed (upset stomach)       ALLERGIES     Patient has no known allergies.     FAMILY HISTORY       Family History   Problem Relation Age of Onset    High Blood Pressure Mother     High Blood Pressure Father     High Blood Pressure Sister           SOCIAL HISTORY       Social History     Socioeconomic History    Marital status:      Spouse name: Jayleen Chau    Number of children: 0    Years of education: 15    Highest education level: Not on file   Occupational History    Occupation:    Social Needs    Financial resource strain: Not on file    Food insecurity:     Worry: Not on file     Inability: Not on file    Transportation needs:     Medical: Not on file     Non-medical: Not on file   Tobacco Use    Smoking status: Current Every Day Smoker     Packs/day: 0.25     Years: 45.00

## 2020-01-20 LAB
ALBUMIN SERPL-MCNC: 3.8 G/DL (ref 3.5–5.2)
ALP BLD-CCNC: 101 U/L (ref 40–130)
ALT SERPL-CCNC: 10 U/L (ref 5–41)
ANION GAP SERPL CALCULATED.3IONS-SCNC: 18 MMOL/L (ref 7–19)
AST SERPL-CCNC: 17 U/L (ref 5–40)
BASOPHILS ABSOLUTE: 0 K/UL (ref 0–0.2)
BASOPHILS RELATIVE PERCENT: 0.5 % (ref 0–1)
BILIRUB SERPL-MCNC: 0.4 MG/DL (ref 0.2–1.2)
BUN BLDV-MCNC: 67 MG/DL (ref 6–20)
CALCIUM SERPL-MCNC: 8.2 MG/DL (ref 8.6–10)
CHLORIDE BLD-SCNC: 86 MMOL/L (ref 98–111)
CO2: 27 MMOL/L (ref 22–29)
CREAT SERPL-MCNC: 7.5 MG/DL (ref 0.5–1.2)
EKG P AXIS: -21 DEGREES
EKG P AXIS: -44 DEGREES
EKG P-R INTERVAL: 208 MS
EKG P-R INTERVAL: 216 MS
EKG Q-T INTERVAL: 436 MS
EKG Q-T INTERVAL: 452 MS
EKG QRS DURATION: 102 MS
EKG QRS DURATION: 96 MS
EKG QTC CALCULATION (BAZETT): 459 MS
EKG QTC CALCULATION (BAZETT): 462 MS
EKG T AXIS: 107 DEGREES
EKG T AXIS: 122 DEGREES
EOSINOPHILS ABSOLUTE: 0.5 K/UL (ref 0–0.6)
EOSINOPHILS RELATIVE PERCENT: 8.4 % (ref 0–5)
GFR NON-AFRICAN AMERICAN: 7
GLUCOSE BLD-MCNC: 130 MG/DL (ref 74–109)
HCT VFR BLD CALC: 29.6 % (ref 42–52)
HEMOGLOBIN: 9.1 G/DL (ref 14–18)
IMMATURE GRANULOCYTES #: 0 K/UL
IRON SATURATION: 29 % (ref 14–50)
IRON: 71 UG/DL (ref 59–158)
LYMPHOCYTES ABSOLUTE: 1 K/UL (ref 1.1–4.5)
LYMPHOCYTES RELATIVE PERCENT: 16.6 % (ref 20–40)
MCH RBC QN AUTO: 30.5 PG (ref 27–31)
MCHC RBC AUTO-ENTMCNC: 30.7 G/DL (ref 33–37)
MCV RBC AUTO: 99.3 FL (ref 80–94)
MONOCYTES ABSOLUTE: 0.5 K/UL (ref 0–0.9)
MONOCYTES RELATIVE PERCENT: 9.4 % (ref 0–10)
NEUTROPHILS ABSOLUTE: 3.7 K/UL (ref 1.5–7.5)
NEUTROPHILS RELATIVE PERCENT: 64.9 % (ref 50–65)
PDW BLD-RTO: 16.1 % (ref 11.5–14.5)
PHOSPHORUS: 6.4 MG/DL (ref 2.5–4.5)
PLATELET # BLD: 95 K/UL (ref 130–400)
PMV BLD AUTO: 12.5 FL (ref 9.4–12.4)
POTASSIUM SERPL-SCNC: 6.6 MMOL/L (ref 3.5–5)
RBC # BLD: 2.98 M/UL (ref 4.7–6.1)
SODIUM BLD-SCNC: 131 MMOL/L (ref 136–145)
TOTAL IRON BINDING CAPACITY: 241 UG/DL (ref 250–400)
TOTAL PROTEIN: 7.2 G/DL (ref 6.6–8.7)
VANCOMYCIN RANDOM: 6 UG/ML
WBC # BLD: 5.7 K/UL (ref 4.8–10.8)

## 2020-01-20 PROCEDURE — 93926 LOWER EXTREMITY STUDY: CPT

## 2020-01-20 PROCEDURE — 93010 ELECTROCARDIOGRAM REPORT: CPT | Performed by: INTERNAL MEDICINE

## 2020-01-20 PROCEDURE — 2580000003 HC RX 258: Performed by: HOSPITALIST

## 2020-01-20 PROCEDURE — 6370000000 HC RX 637 (ALT 250 FOR IP): Performed by: INTERNAL MEDICINE

## 2020-01-20 PROCEDURE — 83550 IRON BINDING TEST: CPT

## 2020-01-20 PROCEDURE — 80053 COMPREHEN METABOLIC PANEL: CPT

## 2020-01-20 PROCEDURE — 5A1D70Z PERFORMANCE OF URINARY FILTRATION, INTERMITTENT, LESS THAN 6 HOURS PER DAY: ICD-10-PCS | Performed by: INTERNAL MEDICINE

## 2020-01-20 PROCEDURE — 80202 ASSAY OF VANCOMYCIN: CPT

## 2020-01-20 PROCEDURE — 2580000003 HC RX 258: Performed by: INTERNAL MEDICINE

## 2020-01-20 PROCEDURE — 6370000000 HC RX 637 (ALT 250 FOR IP): Performed by: SURGERY

## 2020-01-20 PROCEDURE — 1210000000 HC MED SURG R&B

## 2020-01-20 PROCEDURE — 36415 COLL VENOUS BLD VENIPUNCTURE: CPT

## 2020-01-20 PROCEDURE — 99231 SBSQ HOSP IP/OBS SF/LOW 25: CPT | Performed by: SURGERY

## 2020-01-20 PROCEDURE — 6360000002 HC RX W HCPCS: Performed by: INTERNAL MEDICINE

## 2020-01-20 PROCEDURE — 6370000000 HC RX 637 (ALT 250 FOR IP): Performed by: HOSPITALIST

## 2020-01-20 PROCEDURE — 83540 ASSAY OF IRON: CPT

## 2020-01-20 PROCEDURE — 93005 ELECTROCARDIOGRAM TRACING: CPT | Performed by: NURSE PRACTITIONER

## 2020-01-20 PROCEDURE — 8010000000 HC HEMODIALYSIS ACUTE INPT

## 2020-01-20 PROCEDURE — 93922 UPR/L XTREMITY ART 2 LEVELS: CPT

## 2020-01-20 PROCEDURE — 85025 COMPLETE CBC W/AUTO DIFF WBC: CPT

## 2020-01-20 PROCEDURE — 84100 ASSAY OF PHOSPHORUS: CPT

## 2020-01-20 RX ORDER — IBUPROFEN 200 MG
TABLET ORAL 2 TIMES DAILY
Status: DISCONTINUED | OUTPATIENT
Start: 2020-01-20 | End: 2020-01-31 | Stop reason: HOSPADM

## 2020-01-20 RX ADMIN — OXYCODONE HYDROCHLORIDE AND ACETAMINOPHEN 1 TABLET: 5; 325 TABLET ORAL at 20:59

## 2020-01-20 RX ADMIN — ASPIRIN 81 MG: 81 TABLET, COATED ORAL at 12:52

## 2020-01-20 RX ADMIN — CLONIDINE HYDROCHLORIDE 0.3 MG: 0.1 TABLET ORAL at 20:59

## 2020-01-20 RX ADMIN — BACITRACIN ZINC NEOMYCIN SULFATE POLYMYXIN B SULFATE: 400; 3.5; 5 OINTMENT TOPICAL at 21:03

## 2020-01-20 RX ADMIN — OXYCODONE HYDROCHLORIDE AND ACETAMINOPHEN 1 TABLET: 5; 325 TABLET ORAL at 08:17

## 2020-01-20 RX ADMIN — SEVELAMER CARBONATE 800 MG: 800 TABLET, FILM COATED ORAL at 16:50

## 2020-01-20 RX ADMIN — METOPROLOL SUCCINATE 25 MG: 25 TABLET, EXTENDED RELEASE ORAL at 20:59

## 2020-01-20 RX ADMIN — METOPROLOL SUCCINATE 25 MG: 25 TABLET, EXTENDED RELEASE ORAL at 12:52

## 2020-01-20 RX ADMIN — Medication 10 ML: at 12:53

## 2020-01-20 RX ADMIN — OXYCODONE HYDROCHLORIDE AND ACETAMINOPHEN 1 TABLET: 5; 325 TABLET ORAL at 16:50

## 2020-01-20 RX ADMIN — MELATONIN 3 MG: at 20:59

## 2020-01-20 RX ADMIN — AMLODIPINE BESYLATE 10 MG: 5 TABLET ORAL at 12:52

## 2020-01-20 RX ADMIN — SEVELAMER CARBONATE 800 MG: 800 TABLET, FILM COATED ORAL at 12:52

## 2020-01-20 RX ADMIN — ATORVASTATIN CALCIUM 40 MG: 40 TABLET, FILM COATED ORAL at 20:59

## 2020-01-20 RX ADMIN — OXYCODONE HYDROCHLORIDE AND ACETAMINOPHEN 1 TABLET: 5; 325 TABLET ORAL at 12:51

## 2020-01-20 RX ADMIN — VANCOMYCIN HYDROCHLORIDE 1250 MG: 10 INJECTION, POWDER, LYOPHILIZED, FOR SOLUTION INTRAVENOUS at 15:13

## 2020-01-20 RX ADMIN — MINOXIDIL 10 MG: 10 TABLET ORAL at 12:52

## 2020-01-20 RX ADMIN — CLOPIDOGREL BISULFATE 75 MG: 75 TABLET ORAL at 12:52

## 2020-01-20 RX ADMIN — ISOSORBIDE MONONITRATE 30 MG: 30 TABLET, EXTENDED RELEASE ORAL at 12:52

## 2020-01-20 RX ADMIN — OXYCODONE HYDROCHLORIDE AND ACETAMINOPHEN 1 TABLET: 5; 325 TABLET ORAL at 04:10

## 2020-01-20 RX ADMIN — NEPHROCAP 1 MG: 1 CAP ORAL at 12:52

## 2020-01-20 RX ADMIN — Medication 10 ML: at 21:03

## 2020-01-20 ASSESSMENT — PAIN DESCRIPTION - DIRECTION: RADIATING_TOWARDS: TOES

## 2020-01-20 ASSESSMENT — PAIN SCALES - GENERAL
PAINLEVEL_OUTOF10: 0
PAINLEVEL_OUTOF10: 6
PAINLEVEL_OUTOF10: 7
PAINLEVEL_OUTOF10: 8
PAINLEVEL_OUTOF10: 7
PAINLEVEL_OUTOF10: 0
PAINLEVEL_OUTOF10: 1
PAINLEVEL_OUTOF10: 1
PAINLEVEL_OUTOF10: 7
PAINLEVEL_OUTOF10: 8

## 2020-01-20 ASSESSMENT — PAIN DESCRIPTION - ONSET
ONSET: ON-GOING
ONSET: PROGRESSIVE

## 2020-01-20 ASSESSMENT — PAIN DESCRIPTION - LOCATION
LOCATION: LEG
LOCATION: LEG

## 2020-01-20 ASSESSMENT — PAIN DESCRIPTION - PROGRESSION
CLINICAL_PROGRESSION: NOT CHANGED
CLINICAL_PROGRESSION: NOT CHANGED

## 2020-01-20 ASSESSMENT — PAIN DESCRIPTION - PAIN TYPE
TYPE: ACUTE PAIN
TYPE: ACUTE PAIN

## 2020-01-20 ASSESSMENT — PAIN DESCRIPTION - DESCRIPTORS
DESCRIPTORS: THROBBING
DESCRIPTORS: THROBBING

## 2020-01-20 ASSESSMENT — PAIN DESCRIPTION - FREQUENCY
FREQUENCY: CONTINUOUS
FREQUENCY: CONTINUOUS

## 2020-01-20 ASSESSMENT — PAIN DESCRIPTION - ORIENTATION
ORIENTATION: RIGHT
ORIENTATION: RIGHT

## 2020-01-20 NOTE — PROGRESS NOTES
Critical lab- K+ 6.6. Dr. Gaurav Harrell notified via voalte. Waiting response.     Electronically signed by Migdalia Thomson RN on 1/20/2020 at 5:50 AM

## 2020-01-20 NOTE — PROGRESS NOTES
Pharmacy Vancomycin Consult     Vancomycin Day: 3  Current Dosing: Pulse dosing    Temp max:  98.2    Recent Labs     01/19/20  0511 01/20/20  0403   BUN 50* 67*       Recent Labs     01/19/20  0511 01/20/20  0403   CREATININE 5.7* 7.5*       Recent Labs     01/19/20  0511 01/20/20  0403   WBC 5.1 5.7         Intake/Output Summary (Last 24 hours) at 1/20/2020 1350  Last data filed at 1/20/2020 1254  Gross per 24 hour   Intake 340 ml   Output 3500 ml   Net -3160 ml       Culture Date Source Results   01/18/20 Blood No growth                 Ht Readings from Last 1 Encounters:   01/18/20 5' 9\" (1.753 m)        Wt Readings from Last 1 Encounters:   01/18/20 173 lb 4.8 oz (78.6 kg)         Body mass index is 25.59 kg/m². Estimated Creatinine Clearance: 11 mL/min (A) (based on SCr of 7.5 mg/dL (H)). Random: 6    Assessment/Plan: Give Vancomycin 1250 mg x 1 dose today; Pharmacy will continue to follow and make adjustments as needed.     Electronically signed by Ladonna Alvarenga, [address] [place] [address] on 1/20/2020 at 1:50 PM

## 2020-01-20 NOTE — PLAN OF CARE
Problem: Falls - Risk of:  Goal: Will remain free from falls  Description  Will remain free from falls  1/20/2020 1104 by Vandana Polk RN  Outcome: Ongoing  1/20/2020 0249 by Mike Kim RN  Outcome: Ongoing  Goal: Absence of physical injury  Description  Absence of physical injury  1/20/2020 1104 by Vandana Polk RN  Outcome: Ongoing  1/20/2020 0249 by Mike Kim RN  Outcome: Ongoing     Problem:  Activity:  Goal: Fatigue will decrease  Description  Fatigue will decrease  1/20/2020 1104 by Vandana Polk RN  Outcome: Ongoing  1/20/2020 0249 by Mike Kim RN  Outcome: Ongoing  Goal: Risk for activity intolerance will decrease  Description  Risk for activity intolerance will decrease  1/20/2020 1104 by Vandana Polk RN  Outcome: Ongoing  1/20/2020 0249 by Mike Kim RN  Outcome: Ongoing     Problem: Coping:  Goal: Ability to cope will improve  Description  Ability to cope will improve  1/20/2020 1104 by Vandana Polk RN  Outcome: Ongoing  1/20/2020 0249 by Mike Kim RN  Outcome: Ongoing     Problem: Fluid Volume:  Goal: Will show no signs or symptoms of fluid imbalance  Description  Will show no signs or symptoms of fluid imbalance  1/20/2020 1104 by Vandana Polk RN  Outcome: Ongoing  1/20/2020 0249 by Mike Kim RN  Outcome: Ongoing     Problem: Health Behavior:  Goal: Ability to manage health-related needs will improve  Description  Ability to manage health-related needs will improve  1/20/2020 1104 by Vandana Polk RN  Outcome: Ongoing  1/20/2020 0249 by Mike Kim RN  Outcome: Ongoing  Goal: Identification of resources available to assist in meeting health care needs will improve  Description  Identification of resources available to assist in meeting health care needs will improve  1/20/2020 1104 by Vandana Polk RN  Outcome: Ongoing  1/20/2020 0249 by Mike Kim RN  Outcome: Ongoing     Problem: Nutritional:  Goal: Ability to identify appropriate dietary choices will Ongoing     Problem: Safety:  Goal: Free from accidental physical injury  Description  Free from accidental physical injury  1/20/2020 1104 by Keyshawn Harris RN  Outcome: Ongoing  1/20/2020 0249 by Raymond Montes De Oca RN  Outcome: Ongoing  Goal: Free from intentional harm  Description  Free from intentional harm  1/20/2020 1104 by Keyshawn Harris RN  Outcome: Ongoing  1/20/2020 0249 by Raymond Montes De Oca RN  Outcome: Ongoing     Problem: Daily Care:  Goal: Daily care needs are met  Description  Daily care needs are met  1/20/2020 1104 by Keyshawn Harris RN  Outcome: Ongoing  1/20/2020 0249 by Raymond Montes De Oca RN  Outcome: Ongoing     Problem: Pain:  Description  Pain management should include both nonpharmacologic and pharmacologic interventions.   Goal: Patient's pain/discomfort is manageable  Description  Patient's pain/discomfort is manageable  1/20/2020 1104 by Keyshawn Harris RN  Outcome: Ongoing  1/20/2020 0249 by Raymond Montes De Oca RN  Outcome: Ongoing  Goal: Pain level will decrease  Description  Pain level will decrease  1/20/2020 1104 by Keyshawn Harris RN  Outcome: Ongoing  1/20/2020 0249 by Raymond Montes De Oca RN  Outcome: Ongoing  Goal: Control of acute pain  Description  Control of acute pain  1/20/2020 1104 by Keyshawn Harris RN  Outcome: Ongoing  1/20/2020 0249 by Raymond Montes De Oca RN  Outcome: Ongoing  Goal: Control of chronic pain  Description  Control of chronic pain  1/20/2020 1104 by Keyshawn Harris RN  Outcome: Ongoing  1/20/2020 0249 by Raymond Montes De Oca RN  Outcome: Ongoing     Problem: Skin Integrity:  Goal: Skin integrity will stabilize  Description  Skin integrity will stabilize  1/20/2020 1104 by Keyshawn Harris RN  Outcome: Ongoing  1/20/2020 0249 by Raymond Montes De Oca RN  Outcome: Ongoing     Problem: Discharge Planning:  Goal: Patients continuum of care needs are met  Description  Patients continuum of care needs are met  1/20/2020 1104 by Keyshawn Harris RN  Outcome: Ongoing  1/20/2020 0249 by Raymond Montes De Oca RN  Outcome: Ongoing

## 2020-01-20 NOTE — PROGRESS NOTES
SURGICAL HISTORY  9/6/2011   [facility]    Removal of RT IJV tunneled dialysis cath    OTHER SURGICAL HISTORY  5/22/12   [facility]    Ultrasound-guided cannulation of left cephalic vein in the mid forearm toward the AV anastomosis, Left upper  ext. fistulograms including venograms of the SVC, Left cephalic vein balloon angioplasty with a 4mm x 100mm Tod balloon, Completion fistulograms    PACEMAKER PLACEMENT      [employer]    PARACENTESIS      multiple/recent; per dr. [facility] at [address] [place] [address] 1/30/2018    UPPER EXTREMITY DRIL PROCEDURE WITH LEFT SAPHENOUS VEIN HARVESTING performed by Rebecca Arevalo MD at [address] [place] [address]  6/19/12    LU arteriovenous fistulogram including venography of the superior vena cava. Balloon angioplasty, left forearm cephalic vein and upper arm cephalic vein with [id number] tod balloon.  VASCULAR SURGERY  7/10/2012 S     Revision of left distal radial artery to cephalic vein arteriovenous fistula with 7mm Artegraft interposition.  VASCULAR SURGERY  7/30/15 [facility] & [address]    Left upper extremity arteriovenous fistulograms including venography of the superior vena cava. Left cephalic vein balloon angioplasty with an 8 x 20 cm cutting balloon proximal cephalic vein. Balloon angioplasty of left cephalic vein/antecubital vein near the antecubital crease with 8 x 20 mm cutting balloon.  VASCULAR SURGERY  7/30/15 [facility]    Balloon angioplasty proximal end distal upper arm cephalic vein with 9 x 40 cm  balloon. Completion fistulograms of the left upper extremity.  VASCULAR SURGERY  8/13/15 [facility]    Revision of left upper extremity arteriovenous fistula with excision of pseudoaneurysm and primary repair of cephalic vein.     VASCULAR SURGERY  5/3/16 [facility]    Left upper fistulograms/venograms, left cephalic balloon 8 x 20 cutter,9 x 40 conquest,left proximal cephalic vein stents (flair 2 9 x 50), balloon stents 9 x 40 conquest.    VASCULAR SURGERY with wife and step children, has 3 pets         Review of Systems:  History obtained from chart review and the patient  General ROS: No fever or chills  Respiratory ROS: No cough, shortness of breath, wheezing  Cardiovascular ROS: No chest pain or palpitations  Gastrointestinal ROS: No abdominal pain or melena  Genito-Urinary ROS: No dysuria or hematuria  Musculoskeletal ROS: No joint pain or swelling         Objective:  Patient Vitals for the past 24 hrs:   BP Temp Temp src Pulse Resp SpO2   01/20/20 0643 (!) 151/64 97.1 °F (36.2 °C) Temporal 63 16 96 %   01/20/20 0108 (!) 154/66 97.1 °F (36.2 °C) Temporal 60 18 90 %   01/20/20 0107 (!) 154/66 97.1 °F (36.2 °C) Temporal 60 18 (!) 86 %   01/19/20 1845 (!) 131/59 96.9 °F (36.1 °C) Temporal 60 18 92 %   01/19/20 1044 (!) 118/52 98.2 °F (36.8 °C) Temporal 61 16 94 %       Intake/Output Summary (Last 24 hours) at 1/20/2020 0929  Last data filed at 1/19/2020 1516  Gross per 24 hour   Intake 340 ml   Output --   Net 340 ml     General: awake/alert   Chest:  clear to auscultation bilaterally without respiratory distress  CVS: regular rate and rhythm  Abdominal: soft, nontender, normal bowel sounds  Extremities: no cyanosis or edema  Skin: warm/dry    Labs:  BMP:   Recent Labs     01/18/20 2031 01/19/20  0511 01/20/20  0403   * 135* 131*   K 5.5* 5.5* 6.6*   CL 86* 91* 86*   CO2 31* 27 27   PHOS  --   --  6.4*   BUN 43* 50* 67*   CREATININE 4.9* 5.7* 7.5*   CALCIUM 9.1 8.5* 8.2*     CBC:   Recent Labs     01/18/20 2031 01/19/20  0511 01/20/20  0403   WBC 8.2 5.1 5.7   HGB 10.4* 9.0* 9.1*   HCT 32.6* 28.3* 29.6*   MCV 99.4* 99.6* 99.3*    112* 95*     LIVER PROFILE:   Recent Labs     01/18/20  2031 01/20/20  0403   AST 23 17   ALT 13 10   BILITOT 0.5 0.4   ALKPHOS 104 101     PT/INR:   Recent Labs     01/19/20  0511   PROTIME 14.8*   INR 1.22*     APTT: No results for input(s): APTT in the last 72 hours.   BNP:  No results for input(s): BNP in the last 72 Right    Result Date: 1/19/2020  Vascular Lower Extremities DVT Study Procedure  Demographics   Patient Name     Kushal Jesus Age                    61   Patient Number   [id number]       Gender                 Male   Visit Number     [id number]    Interpreting Physician Martita Oliveira MD   Date of Birth    1960   Referring Physician    Billie Britton   Accession Number [id number]    [id number] [facility]  Procedure Type of Study:   Veins:Lower Extremities DVT Study, VL EXTREMITY VENOUS DUPLEX RIGHT. Indications for Study:Swelling and Pain, right lower extremity. Allergies   - No known allergies. Impression   There is no evidence of deep venous thrombosis (DVT) in the right lower  extremity(ies). There is no evidence of superficial thrombophlebitis of the right lower  extremity(ies). Signature   ----------------------------------------------------------------  Electronically signed by Martita Oliveira MD(Interpreting  physician) on 01/19/2020 06:59 AM  ----------------------------------------------------------------  Velocities are measured in cm/s ; Diameters are measured in mm Right Lower Extremities DVT Study Measurements Right 2D Measurements +------------------------------------+----------+---------------+----------+ ! Location                            ! Visualized! Compressibility! Thrombosis! +------------------------------------+----------+---------------+----------+ ! Sapheno Femoral Junction            ! Yes       ! Yes            ! None      ! +------------------------------------+----------+---------------+----------+ ! Common Femoral                      !Yes       ! Yes            ! None      ! +------------------------------------+----------+---------------+----------+ ! Prox Femoral                        !Yes       ! Yes            ! None      ! +------------------------------------+----------+---------------+----------+ ! Mid Femoral                         !Yes       ! Yes            ! None

## 2020-01-20 NOTE — PROGRESS NOTES
Vascular lab preliminary. Right arterial duplex scan and bilateral GABRIEL's completed. Severely diminished flow in right leg. Moderate to severely diminished flow to left leg. Right GABRIEL:  0.30  Left GABRIEL: non compressible  Final report pending.

## 2020-01-20 NOTE — PROGRESS NOTES
Nutrition Assessment    Type and Reason for Visit: Initial, Positive Nutrition Screen    Nutrition Recommendations: modify current diet with slightly larger protein portions    Nutrition Assessment: Pt appears adequately nourished AEB fat and muscle mass. Pt does not appear to be at nutritional risk at this time d/t good po intake (%) and adequate weight. Even with cirhosis, pt has increased protein needs for heling will increase protein intake. Malnutrition Assessment:  · Malnutrition Status: No malnutrition  · Context: Acute illness or injury  · Findings of the 6 clinical characteristics of malnutrition (Minimum of 2 out of 6 clinical characteristics is required to make the diagnosis of moderate or severe Protein Calorie Malnutrition based on AND/ASPEN Guidelines):  1. Energy Intake-Greater than 75% of estimated energy requirement, (2 days)    2. Weight Loss-No significant weight loss,    3. Fat Loss-No significant subcutaneous fat loss,    4. Muscle Loss-No significant muscle mass loss,    5. Fluid Accumulation-Mild fluid accumulation, Extremities  6.   Strength-Not measured    Nutrition Risk Level: Low    Nutrition Diagnosis:   · Problem: Increased nutrient needs  · Etiology: related to Increased demand for energy/nutrients     Signs and symptoms:  as evidenced by Presence of wounds    Objective Information:  · Nutrition-Focused Physical Findings:    · Wound Type: Surgical Wound, Open Wounds  · Current Nutrition Therapies:  · Oral Diet Orders: Renal(low cholesterol)   · Oral Diet intake: %  · Oral Nutrition Supplement (ONS) Orders: None    · Anthropometric Measures:  · Ht: 5' 9\" (175.3 cm)   · Current Body Wt: 173 lb 4 oz (78.6 kg)  · Admission Body Wt: 162 lb (73.5 kg)(stated)  · Usual Body Wt: 174 lb 9 oz (79.2 kg)(10/2019)  · Ideal Body Wt: 160 lb (72.6 kg), % Ideal Body 108.3%  · BMI Classification: BMI 25.0 - 29.9 Overweight    Nutrition Interventions:   Modify current

## 2020-01-20 NOTE — CARE COORDINATION
Pt is from home with spouse; established with [facility] MWF.   Electronically signed by ISAC Mansfield on 1/20/2020 at 2:09 PM

## 2020-01-20 NOTE — PLAN OF CARE
Problem: Falls - Risk of:  Goal: Will remain free from falls  Description  Will remain free from falls  Outcome: Ongoing  Goal: Absence of physical injury  Description  Absence of physical injury  Outcome: Ongoing     Problem:  Activity:  Goal: Fatigue will decrease  Description  Fatigue will decrease  Outcome: Ongoing  Goal: Risk for activity intolerance will decrease  Description  Risk for activity intolerance will decrease  Outcome: Ongoing     Problem: Coping:  Goal: Ability to cope will improve  Description  Ability to cope will improve  Outcome: Ongoing     Problem: Fluid Volume:  Goal: Will show no signs or symptoms of fluid imbalance  Description  Will show no signs or symptoms of fluid imbalance  Outcome: Ongoing     Problem: Health Behavior:  Goal: Ability to manage health-related needs will improve  Description  Ability to manage health-related needs will improve  Outcome: Ongoing  Goal: Identification of resources available to assist in meeting health care needs will improve  Description  Identification of resources available to assist in meeting health care needs will improve  Outcome: Ongoing     Problem: Nutritional:  Goal: Ability to identify appropriate dietary choices will improve  Description  Ability to identify appropriate dietary choices will improve  Outcome: Ongoing     Problem: Physical Regulation:  Goal: Ability to maintain clinical measurements within normal limits will improve  Description  Ability to maintain clinical measurements within normal limits will improve  Outcome: Ongoing  Goal: Complications related to the disease process, condition or treatment will be avoided or minimized  Description  Complications related to the disease process, condition or treatment will be avoided or minimized  Outcome: Ongoing     Problem: Sensory:  Goal: General experience of comfort will improve  Description  General experience of comfort will improve  Outcome: Ongoing     Problem: Skin

## 2020-01-20 NOTE — PROGRESS NOTES
Vascular Surgery Rounding Note    1/20/2020  2:51 PM  Antibiotic day 2, Antibiotic Type vancomycin    Post Hosp day - 2    Subjective- feels better,     Objective-   Invalid input(s): 24H    Intake/Output Summary (Last 24 hours) at 1/20/2020 1451  Last data filed at 1/20/2020 1254  Gross per 24 hour   Intake 340 ml   Output 3500 ml   Net -3160 ml     In: -   Out: 3500     CBC:   Recent Labs     01/18/20 2031 01/19/20  0511 01/20/20  0403   WBC 8.2 5.1 5.7   RBC 3.28* 2.84* 2.98*   HGB 10.4* 9.0* 9.1*   HCT 32.6* 28.3* 29.6*   MCV 99.4* 99.6* 99.3*   MCH 31.7* 31.7* 30.5   MCHC 31.9* 31.8* 30.7*   RDW 15.9* 15.9* 16.1*    112* 95*   MPV 11.1 12.2 12.5*      Last 3 CMP:   Recent Labs     01/18/20 2031 01/19/20  0511 01/20/20  0403   * 135* 131*   K 5.5* 5.5* 6.6*   CL 86* 91* 86*   CO2 31* 27 27   BUN 43* 50* 67*   CREATININE 4.9* 5.7* 7.5*   GLUCOSE 94 94 130*   CALCIUM 9.1 8.5* 8.2*   PROT 8.5  --  7.2   LABALBU 4.3  --  3.8   BILITOT 0.5  --  0.4   ALKPHOS 104  --  101   AST 23  --  17   ALT 13  --  10      ROS- No fever no chills  MS- decreased leg pain  No other new complaints. Physical Exam:  Awake, alert, appropriate Yes  BP (!) 151/65   Pulse 67   Temp 97.8 °F (36.6 °C) (Temporal)   Resp 12   Ht 5' 9\" (1.753 m)   Wt 173 lb 4.8 oz (78.6 kg)   SpO2 93%   BMI 25.59 kg/m²   Heart-Normal S1 and S2.  Regular rhythm. No murmurs, gallops, or rubs. Lung-negative  Neck-suppleno adenopathy, no carotid bruit, no JVD, supple, symmetrical, trachea midline and thyroid not enlarged, symmetric, no tenderness/mass/nodules  Abdomen-Abdomen soft, non-tender. BS normal. No masses,  No organomegaly  Extremities-Right leg a little less red, still with breaks in scar. Assessment/Plan  1. Cellulitis improving  2. PVD- ordered a scan, I am concerned his graft could have occluded  3. continue antibiotics. Antibiotics continued after surgery due to:   Infection -  []  Abscess    []  Pneumonia    []

## 2020-01-21 LAB
ALBUMIN SERPL-MCNC: 3.7 G/DL (ref 3.5–5.2)
ALP BLD-CCNC: 92 U/L (ref 40–130)
ALT SERPL-CCNC: 10 U/L (ref 5–41)
ANION GAP SERPL CALCULATED.3IONS-SCNC: 15 MMOL/L (ref 7–19)
AST SERPL-CCNC: 17 U/L (ref 5–40)
BASOPHILS ABSOLUTE: 0 K/UL (ref 0–0.2)
BASOPHILS RELATIVE PERCENT: 0.7 % (ref 0–1)
BILIRUB SERPL-MCNC: 0.4 MG/DL (ref 0.2–1.2)
BUN BLDV-MCNC: 39 MG/DL (ref 6–20)
CALCIUM SERPL-MCNC: 8.6 MG/DL (ref 8.6–10)
CHLORIDE BLD-SCNC: 91 MMOL/L (ref 98–111)
CO2: 26 MMOL/L (ref 22–29)
CREAT SERPL-MCNC: 5.1 MG/DL (ref 0.5–1.2)
EOSINOPHILS ABSOLUTE: 0.5 K/UL (ref 0–0.6)
EOSINOPHILS RELATIVE PERCENT: 8 % (ref 0–5)
GFR NON-AFRICAN AMERICAN: 12
GLUCOSE BLD-MCNC: 95 MG/DL (ref 74–109)
HCT VFR BLD CALC: 27.6 % (ref 42–52)
HEMOGLOBIN: 8.6 G/DL (ref 14–18)
IMMATURE GRANULOCYTES #: 0 K/UL
LYMPHOCYTES ABSOLUTE: 0.9 K/UL (ref 1.1–4.5)
LYMPHOCYTES RELATIVE PERCENT: 15.7 % (ref 20–40)
MCH RBC QN AUTO: 31.4 PG (ref 27–31)
MCHC RBC AUTO-ENTMCNC: 31.2 G/DL (ref 33–37)
MCV RBC AUTO: 100.7 FL (ref 80–94)
MONOCYTES ABSOLUTE: 0.5 K/UL (ref 0–0.9)
MONOCYTES RELATIVE PERCENT: 9.3 % (ref 0–10)
NEUTROPHILS ABSOLUTE: 3.7 K/UL (ref 1.5–7.5)
NEUTROPHILS RELATIVE PERCENT: 66.1 % (ref 50–65)
PDW BLD-RTO: 16.1 % (ref 11.5–14.5)
PLATELET # BLD: 103 K/UL (ref 130–400)
PMV BLD AUTO: 12.3 FL (ref 9.4–12.4)
POTASSIUM SERPL-SCNC: 5.7 MMOL/L (ref 3.5–5)
RBC # BLD: 2.74 M/UL (ref 4.7–6.1)
SODIUM BLD-SCNC: 132 MMOL/L (ref 136–145)
TOTAL PROTEIN: 7.1 G/DL (ref 6.6–8.7)
WBC # BLD: 5.6 K/UL (ref 4.8–10.8)

## 2020-01-21 PROCEDURE — 6370000000 HC RX 637 (ALT 250 FOR IP): Performed by: INTERNAL MEDICINE

## 2020-01-21 PROCEDURE — 6360000002 HC RX W HCPCS: Performed by: HOSPITALIST

## 2020-01-21 PROCEDURE — 85025 COMPLETE CBC W/AUTO DIFF WBC: CPT

## 2020-01-21 PROCEDURE — 6370000000 HC RX 637 (ALT 250 FOR IP): Performed by: HOSPITALIST

## 2020-01-21 PROCEDURE — 1210000000 HC MED SURG R&B

## 2020-01-21 PROCEDURE — 36415 COLL VENOUS BLD VENIPUNCTURE: CPT

## 2020-01-21 PROCEDURE — 80053 COMPREHEN METABOLIC PANEL: CPT

## 2020-01-21 PROCEDURE — 6370000000 HC RX 637 (ALT 250 FOR IP): Performed by: SURGERY

## 2020-01-21 PROCEDURE — 99223 1ST HOSP IP/OBS HIGH 75: CPT | Performed by: INTERNAL MEDICINE

## 2020-01-21 PROCEDURE — 2580000003 HC RX 258: Performed by: HOSPITALIST

## 2020-01-21 PROCEDURE — 93970 EXTREMITY STUDY: CPT

## 2020-01-21 RX ORDER — SODIUM POLYSTYRENE SULFONATE 15 G/60ML
15 SUSPENSION ORAL; RECTAL EVERY 6 HOURS
Status: COMPLETED | OUTPATIENT
Start: 2020-01-21 | End: 2020-01-21

## 2020-01-21 RX ADMIN — OXYCODONE HYDROCHLORIDE 10 MG: 10 TABLET, FILM COATED, EXTENDED RELEASE ORAL at 21:02

## 2020-01-21 RX ADMIN — OXYCODONE HYDROCHLORIDE AND ACETAMINOPHEN 1 TABLET: 5; 325 TABLET ORAL at 21:56

## 2020-01-21 RX ADMIN — ISOSORBIDE MONONITRATE 30 MG: 30 TABLET, EXTENDED RELEASE ORAL at 09:41

## 2020-01-21 RX ADMIN — AMLODIPINE BESYLATE 10 MG: 5 TABLET ORAL at 09:42

## 2020-01-21 RX ADMIN — NEPHROCAP 1 MG: 1 CAP ORAL at 09:42

## 2020-01-21 RX ADMIN — OXYCODONE HYDROCHLORIDE 10 MG: 10 TABLET, FILM COATED, EXTENDED RELEASE ORAL at 04:15

## 2020-01-21 RX ADMIN — ATORVASTATIN CALCIUM 40 MG: 40 TABLET, FILM COATED ORAL at 21:02

## 2020-01-21 RX ADMIN — MINOXIDIL 10 MG: 10 TABLET ORAL at 10:08

## 2020-01-21 RX ADMIN — METOPROLOL SUCCINATE 25 MG: 25 TABLET, EXTENDED RELEASE ORAL at 09:42

## 2020-01-21 RX ADMIN — Medication 10 ML: at 21:02

## 2020-01-21 RX ADMIN — BACITRACIN ZINC NEOMYCIN SULFATE POLYMYXIN B SULFATE: 400; 3.5; 5 OINTMENT TOPICAL at 21:02

## 2020-01-21 RX ADMIN — BACITRACIN ZINC NEOMYCIN SULFATE POLYMYXIN B SULFATE: 400; 3.5; 5 OINTMENT TOPICAL at 19:24

## 2020-01-21 RX ADMIN — Medication 10 ML: at 10:09

## 2020-01-21 RX ADMIN — SODIUM POLYSTYRENE SULFONATE 15 G: 15 SUSPENSION ORAL; RECTAL at 17:12

## 2020-01-21 RX ADMIN — CLONIDINE HYDROCHLORIDE 0.3 MG: 0.1 TABLET ORAL at 21:02

## 2020-01-21 RX ADMIN — SEVELAMER CARBONATE 800 MG: 800 TABLET, FILM COATED ORAL at 12:35

## 2020-01-21 RX ADMIN — ASPIRIN 81 MG: 81 TABLET, COATED ORAL at 09:42

## 2020-01-21 RX ADMIN — OXYCODONE HYDROCHLORIDE 10 MG: 10 TABLET, FILM COATED, EXTENDED RELEASE ORAL at 12:39

## 2020-01-21 RX ADMIN — OXYCODONE HYDROCHLORIDE AND ACETAMINOPHEN 1 TABLET: 5; 325 TABLET ORAL at 16:46

## 2020-01-21 RX ADMIN — SEVELAMER CARBONATE 800 MG: 800 TABLET, FILM COATED ORAL at 17:12

## 2020-01-21 RX ADMIN — HEPARIN SODIUM 5000 UNITS: 5000 INJECTION INTRAVENOUS; SUBCUTANEOUS at 16:46

## 2020-01-21 RX ADMIN — CLONIDINE HYDROCHLORIDE 0.3 MG: 0.1 TABLET ORAL at 09:42

## 2020-01-21 RX ADMIN — SODIUM POLYSTYRENE SULFONATE 15 G: 15 SUSPENSION ORAL; RECTAL at 09:41

## 2020-01-21 RX ADMIN — OXYCODONE HYDROCHLORIDE AND ACETAMINOPHEN 1 TABLET: 5; 325 TABLET ORAL at 10:08

## 2020-01-21 RX ADMIN — CLOPIDOGREL BISULFATE 75 MG: 75 TABLET ORAL at 09:42

## 2020-01-21 RX ADMIN — METOPROLOL SUCCINATE 25 MG: 25 TABLET, EXTENDED RELEASE ORAL at 21:02

## 2020-01-21 RX ADMIN — CLONIDINE HYDROCHLORIDE 0.3 MG: 0.1 TABLET ORAL at 16:46

## 2020-01-21 ASSESSMENT — PAIN DESCRIPTION - PROGRESSION
CLINICAL_PROGRESSION: NOT CHANGED
CLINICAL_PROGRESSION: NOT CHANGED

## 2020-01-21 ASSESSMENT — PAIN DESCRIPTION - LOCATION
LOCATION: LEG
LOCATION: LEG

## 2020-01-21 ASSESSMENT — PAIN DESCRIPTION - FREQUENCY
FREQUENCY: CONTINUOUS
FREQUENCY: CONTINUOUS

## 2020-01-21 ASSESSMENT — PAIN DESCRIPTION - ONSET
ONSET: ON-GOING
ONSET: ON-GOING

## 2020-01-21 ASSESSMENT — PAIN DESCRIPTION - ORIENTATION
ORIENTATION: RIGHT
ORIENTATION: RIGHT

## 2020-01-21 ASSESSMENT — PAIN SCALES - GENERAL
PAINLEVEL_OUTOF10: 8
PAINLEVEL_OUTOF10: 7
PAINLEVEL_OUTOF10: 7
PAINLEVEL_OUTOF10: 8
PAINLEVEL_OUTOF10: 8
PAINLEVEL_OUTOF10: 5
PAINLEVEL_OUTOF10: 9

## 2020-01-21 ASSESSMENT — PAIN DESCRIPTION - DESCRIPTORS
DESCRIPTORS: THROBBING
DESCRIPTORS: THROBBING

## 2020-01-21 ASSESSMENT — PAIN DESCRIPTION - PAIN TYPE
TYPE: ACUTE PAIN
TYPE: ACUTE PAIN

## 2020-01-21 NOTE — PROGRESS NOTES
Vancomycin Day: 4  Current Dosing: pulse dosing    Temp max:  98.3    Recent Labs     01/20/20  0403 01/21/20  0419   BUN 67* 39*       Recent Labs     01/20/20  0403 01/21/20  0419   CREATININE 7.5* 5.1*       Recent Labs     01/20/20  0403 01/21/20  0419   WBC 5.7 5.6         Intake/Output Summary (Last 24 hours) at 1/21/2020 1110  Last data filed at 1/20/2020 1501  Gross per 24 hour   Intake 320 ml   Output 3500 ml   Net -3180 ml       Culture Date Source Results   01/18/20 Blood x 2 No growth                 Ht Readings from Last 1 Encounters:   01/18/20 5' 9\" (1.753 m)        Wt Readings from Last 1 Encounters:   01/21/20 175 lb 3.2 oz (79.5 kg)         Body mass index is 25.87 kg/m². Estimated Creatinine Clearance: 16 mL/min (A) (based on SCr of 5.1 mg/dL (H)). Level ordered for: 01/22/2020 @ 1400    Assessment/Plan:  Await level, pharmacy will pulse dose.     Electronically signed by Carol Jon, [facility] on 1/21/2020 at 11:10 AM

## 2020-01-21 NOTE — PROGRESS NOTES
Vascular lab preliminary. Bilateral lower extremity vein mapping and marking completed. Right GSV: Zone 1:  mm    Zone 2:  mm    Zone 3:  mm    Zone 4:  mm    Zone 5:  mm  LSV    Zone 6:  2.9mm 2.9mm    Zone 7:  3.0mm 3.7mm     Zone 8:  4.4mm 3.0mm    Left GSV: Zone 1:  mm    Zone 2:  mm    Zone 3:  mm    Zone 4:  mm    Zone 5:  mm  LSV    Zone 6:  mm  3.2mm    Zone 7:  mm  2.1mm    Zone 8:  4.3mm 1.8mm  Final report pending.

## 2020-01-21 NOTE — PLAN OF CARE
Problem: Falls - Risk of:  Goal: Will remain free from falls  Description  Will remain free from falls  Outcome: Ongoing  Goal: Absence of physical injury  Description  Absence of physical injury  Outcome: Ongoing     Problem:  Activity:  Goal: Fatigue will decrease  Description  Fatigue will decrease  Outcome: Ongoing  Goal: Risk for activity intolerance will decrease  Description  Risk for activity intolerance will decrease  Outcome: Ongoing     Problem: Coping:  Goal: Ability to cope will improve  Description  Ability to cope will improve  Outcome: Ongoing     Problem: Fluid Volume:  Goal: Will show no signs or symptoms of fluid imbalance  Description  Will show no signs or symptoms of fluid imbalance  Outcome: Ongoing     Problem: Health Behavior:  Goal: Ability to manage health-related needs will improve  Description  Ability to manage health-related needs will improve  Outcome: Ongoing  Goal: Identification of resources available to assist in meeting health care needs will improve  Description  Identification of resources available to assist in meeting health care needs will improve  Outcome: Ongoing     Problem: Nutritional:  Goal: Ability to identify appropriate dietary choices will improve  Description  Ability to identify appropriate dietary choices will improve  Outcome: Ongoing     Problem: Physical Regulation:  Goal: Ability to maintain clinical measurements within normal limits will improve  Description  Ability to maintain clinical measurements within normal limits will improve  Outcome: Ongoing  Goal: Complications related to the disease process, condition or treatment will be avoided or minimized  Description  Complications related to the disease process, condition or treatment will be avoided or minimized  Outcome: Ongoing     Problem: Sensory:  Goal: General experience of comfort will improve  Description  General experience of comfort will improve  Outcome: Ongoing     Problem: Skin Integrity:  Goal: Status of oral mucous membranes will improve  Description  Status of oral mucous membranes will improve  Outcome: Ongoing  Goal: Skin integrity will be maintained  Description  Skin integrity will be maintained  Outcome: Ongoing  Goal: Will show no infection signs and symptoms  Description  Will show no infection signs and symptoms  Outcome: Ongoing  Goal: Absence of new skin breakdown  Description  Absence of new skin breakdown  Outcome: Ongoing     Problem: Infection:  Goal: Will remain free from infection  Description  Will remain free from infection  Outcome: Ongoing     Problem: Safety:  Goal: Free from accidental physical injury  Description  Free from accidental physical injury  Outcome: Ongoing  Goal: Free from intentional harm  Description  Free from intentional harm  Outcome: Ongoing     Problem: Daily Care:  Goal: Daily care needs are met  Description  Daily care needs are met  Outcome: Ongoing     Problem: Pain:  Goal: Patient's pain/discomfort is manageable  Description  Patient's pain/discomfort is manageable  Outcome: Ongoing  Goal: Pain level will decrease  Description  Pain level will decrease  Outcome: Ongoing  Goal: Control of acute pain  Description  Control of acute pain  Outcome: Ongoing  Goal: Control of chronic pain  Description  Control of chronic pain  Outcome: Ongoing     Problem: Skin Integrity:  Goal: Skin integrity will stabilize  Description  Skin integrity will stabilize  Outcome: Ongoing     Problem: Discharge Planning:  Goal: Patients continuum of care needs are met  Description  Patients continuum of care needs are met  Outcome: Ongoing

## 2020-01-21 NOTE — PROGRESS NOTES
Palliative Care/Spiritual Care: Met with pt and pt's mom. Pt says he was having severe pain in his right leg. Pt says he has a collapsed vein in his right leg. Also pt says he is supposed to have surgery on Thursday to repair the vein. Advance Directives: Pt does not have an AD/LW, says his wife Eddie Suarez and his mom Torin Malik will make medical decisions if he is unable. Pt not interested in completing AD/LW at this time. Pain/other symptoms: Pt is having pain saying he would rate it at an 8. Notified Keyonna Garrido pt's nurse about pt's pain. Social/Spiritual: Pt says he attends a [facility] and [facility] and calls himself [facility].         Pt/family discussion r/t goals: Pt has a wife, and a mom. Pt's goal is to have vascular surgery which is scheduled for Thursday to repair a collapsed vein. Goal is to return home to previous quality of life. Pt says he drives, and cares for himself and does what he is able to do. Pt's goal is to return to previous DLS. Provided spiritual care with sustaining presence, nurtured hope, and prayer. Pt and mom expressed gratitude for spiritual care.      Electronically signed by Alice Guan on 1/21/2020 at 11:49 AM

## 2020-01-21 NOTE — PROGRESS NOTES
Oral Daily Polina Han MD   10 mg at 01/21/20 0512    aspirin EC tablet 81 mg  81 mg Oral Daily Polina Han MD   81 mg at 01/21/20 1707    atorvastatin (LIPITOR) tablet 40 mg  40 mg Oral Nightly Polina Han MD   40 mg at 01/20/20 2059    cloNIDine (CATAPRES) tablet 0.3 mg  0.3 mg Oral TID Polina Han MD   0.3 mg at 01/21/20 3416    clopidogrel (PLAVIX) tablet 75 mg  75 mg Oral Daily Polina Han MD   75 mg at 01/21/20 0363    isosorbide mononitrate (IMDUR) extended release tablet 30 mg  30 mg Oral Daily Polina Han MD   30 mg at 01/21/20 0941    lactulose (CHRONULAC) 10 GM/15ML solution 10 g  10 g Oral TID Polina Han MD        melatonin tablet 3 mg  3 mg Oral Nightly PRN Polina Han MD   3 mg at 01/20/20 2059    metoprolol succinate (TOPROL XL) extended release tablet 25 mg  25 mg Oral BID Polina Han MD   25 mg at 01/21/20 3939    minoxidil (LONITEN) tablet 10 mg  10 mg Oral Daily Polina Han MD   10 mg at 01/21/20 1008    oxyCODONE (OXYCONTIN) extended release tablet 10 mg  10 mg Oral Q8H PRN Polina Han MD   10 mg at 01/21/20 0415    sucralfate (CARAFATE) tablet 1 g  1 g Oral TID PRN Polina Han MD        sevelamer (RENVELA) tablet 800 mg  800 mg Oral TID WC Polina Han MD   800 mg at 01/20/20 1650    sodium chloride flush 0.9 % injection 10 mL  10 mL Intravenous 2 times per day Polina Han MD   10 mL at 01/21/20 1009    sodium chloride flush 0.9 % injection 10 mL  10 mL Intravenous PRN Polina Han MD        ondansetron [facility]) injection 4 mg  4 mg Intravenous Q6H PRN Polina Han MD   4 mg at 01/19/20 0393    heparin (porcine) injection 5,000 Units  5,000 Units Subcutaneous 3 times per day Polina Han MD   Stopped at 01/21/20 0523    acetaminophen (TYLENOL) tablet 650 mg  650 mg Oral Q4H PRN Polina Han MD           Past Medical History:  Past Medical History:   Diagnosis Date    Anxiety     Blood circulation, collateral     CAD (coronary artery disease)     stents x 2; per dr. Alejandro Aldridge [facility])     liver cancer    CHF (congestive heart failure) ([address]     Chyloperitoneum determined by paracentesis     CKD (chronic kidney disease), stage V ([address]     Dialysis patient ([address]     mon wed fri at [place] GERD (gastroesophageal reflux disease)     Hemodialysis patient ([address]     mon wed fri in [place] Hepatic cirrhosis ([address]     dr. Isi Nina; has been going to [facility] for liver and kidney transplant list.    Hepatitis C, chronic ([address]     History of blood transfusion     HTN (hypertension)     Hx of blood clots     arm/fistula    Mixed hyperlipidemia 1/9/2017    Osteoarthritis     Pacemaker     Palliative care patient 07/09/2019    PVD (peripheral vascular disease) ([address]     Sleep apnea     no cpap at present.  Type II diabetes mellitus (HCC)     no meds. Past Surgical History:  Past Surgical History:   Procedure Laterality Date    ANGIOPLASTY  08/09/11 SUKHWINDER GERARDO cephalic vein balloon angioplasty with 7mm x 4cm balloon. coild embolization of 2 cephallic vein brances with 3mm x 5cm coils    CARDIAC CATHETERIZATION  04/04/11    cardiac cath and stent x1 by Dr. Kali Lu  07/26/11 SUKHWINDER GERARDO AV fistula. coild embolization of cephalic vein branch near the wrist with 3mm EMBO coils.  balloon a'plasty left cephalic vein with [id number] balloon and then [id number] balloon    DIALYSIS FISTULA CREATION Left 2/16/2017    LEFT UPPER EXTREMITY BRACHIAL / AXILLARY GRAFT AND STENT LEFT SUBCLAVIAN VEIN  performed by Donna Yee MD at [address] Right 7/12/2019    RIGHT LEG WOUND WASHOUT performed by Keke Li MD at [address] [facility] FEMORAL-TIBIAL BYPASS GRAFT Right 6/25/2019    FEMORAL TIBIAL/PERINEAL BYPASS WITH REVERSED GREATER SAPHENOUS VEIN performed by Donna Yee MD at [address] HISTORY  12/26/2010    Right internal jugular vein tunneled dialysis catheter cephalic vein stents (flair 2 9 x 50), balloon stents 9 x 40 conquest.    VASCULAR SURGERY  12/08/2016    SJS- ultrasound guided cannulation of left distal cephalic vein ultrasound guided cannulation right internal jugular vein placement of right internal jugular vein tunneled dialysis catheter (Bard Equistream XK 23cm tip to cuff)     VASCULAR SURGERY  01/20/2017    SJS-Right upper venograms, u/s guided cannulation left basilic vein, left upper venograms, balloon angioplasty left subclavian vein 10x40 conquest.    VASCULAR SURGERY  02/16/2017    SJS. Left brachial artery to axillary vein arteriovenous graft with 7 mm artegraft. Left upper extremity venograms. Left subclavian vein stent viabahn 13x50. Left subclavian vein stent balloon angioplasty 12x40 atlas.  VASCULAR SURGERY  04/11/2017    SJS. Removal of tunneled dialysis catheter right internal jugular vein.  VASCULAR SURGERY  01/25/2018    SJS. Arch aortogram, left upper arteriogram, AV graft angiogram/venogram.    VASCULAR SURGERY  02/28/2018    SJS. Right CFA 5f-6f-7f sheath, aortogram with bilateral lower arteriogram, left SFA/pop  balloon angioplasty 5x100 , 6x150 lutonix ( 2), left CFA  7f sheath, bilateral iliac kissing stents right 10x38 icast, left 9x59 icast expanded with 10x40 , completion aortogram, mynx left CFA , failed mynx right CFA.  VASCULAR SURGERY  06/13/2019    SJS left CFA 5f sheath, aortogram with bilateral lower arteriogram, mynx left CFA.  VASCULAR SURGERY  06/25/2019    SJS-VI. Femoral tibial/perineal bypass with reversed greater saphenous vein.  VASCULAR SURGERY  08/16/2019    TJR. Aortogram and runoff, selective right CFA injections. PTA of fem-tp bypass with 4.0 x 220 mm tod balloon to 4.33 mm    VASCULAR SURGERY  10/23/2019    VI. Thrombin injection in the left SFA PSA, right common femoral artery us guided access, left SFA stent treatment of the flap.        Family History  Family History   Problem Relation Age of Onset    High Blood Pressure Mother     High Blood Pressure Father     High Blood Pressure Sister        Social History  Social History     Socioeconomic History    Marital status:      Spouse name: Jennie Bailon    Number of children: 0    Years of education: 15    Highest education level: Not on file   Occupational History    Occupation:    Social Needs    Financial resource strain: Not on file    Food insecurity:     Worry: Not on file     Inability: Not on file    Transportation needs:     Medical: Not on file     Non-medical: Not on file   Tobacco Use    Smoking status: Current Every Day Smoker     Packs/day: 0.25     Years: 45.00     Pack years: 11.25     Types: Cigarettes    Smokeless tobacco: Never Used    Tobacco comment: about to  quit down to 1 a day cigarettes   Substance and Sexual Activity    Alcohol use: No    Drug use: Not Currently     Types: Marijuana, Cocaine, Methamphetamines, IV, Opiates , Other-see comments     Comment: in the 1970s, LSD    Sexual activity: Yes     Partners: Female     Comment: has a wife   Lifestyle    Physical activity:     Days per week: Not on file     Minutes per session: Not on file    Stress: Not on file   Relationships    Social connections:     Talks on phone: Not on file     Gets together: Not on file     Attends [employer] service: Not on file     Active member of club or organization: Not on file     Attends meetings of clubs or organizations: Not on file     Relationship status: Not on file    Intimate partner violence:     Fear of current or ex partner: Not on file     Emotionally abused: Not on file     Physically abused: Not on file     Forced sexual activity: Not on file   Other Topics Concern    Not on file   Social History Narrative    CODE STATUS: Full Code    HEALTH CARE PROXY: Mrs. Jennie Bailon, [phone]    Back up: his Mother, Mrs. Connor Izquierdo, [phone]    AMBULATES: 01/19/20  0511   PROTIME 14.8*   INR 1.22*     APTT: No results for input(s): APTT in the last 72 hours. BNP:  No results for input(s): BNP in the last 72 hours. Ionized Calcium:No results for input(s): IONCA in the last 72 hours. Magnesium:No results for input(s): MG in the last 72 hours. Phosphorus:  Recent Labs     01/20/20  0403   PHOS 6.4*     HgbA1C: No results for input(s): LABA1C in the last 72 hours. Hepatic:   Recent Labs     01/18/20 2031 01/20/20 0403 01/21/20  0419   ALKPHOS 104 101 92   ALT 13 10 10   AST 23 17 17   PROT 8.5 7.2 7.1   BILITOT 0.5 0.4 0.4   LABALBU 4.3 3.8 3.7     Lactic Acid:   Recent Labs     01/18/20 2031   LACTA 1.1     Troponin: No results for input(s): CKTOTAL, CKMB, TROPONINT in the last 72 hours. ABGs: No results found for: PHART, PO2ART, [id number]  CRP:  No results for input(s): CRP in the last 72 hours. Sed Rate:  No results for input(s): SEDRATE in the last 72 hours. Culture Results:   Blood Culture Recent:   Recent Labs     01/18/20 2031   BC No Growth to date. Any change in status will be called. Urine Culture Recent : No results for input(s): LABURIN in the last 720 hours. Radiology reports as per the Radiologist  Radiology: Ct Knee Right W Wo Contrast    Result Date: 1/19/2020  EXAMINATION: CT KNEE RIGHT W WO CONTRAST 1/19/2020 10:46 AM HISTORY: Question abscess fluid collection Automatic exposure control was utilized reducing radiation dose. Radiation DLP 6.8 mCi centimeters. Axial images are obtained. Sagittal coronal reformatted images the right leg are also obtained. The tibia and fibula are intact. Vascular calcifications noted in the popliteal artery. Vascular calcifications present in the superficial femoral artery, popliteal artery. Edema is present in the subcutaneous tissues. A definite abscess is not identified. The medial head of the gastrocnemius is intact. Impression fluid is noted in the subcutaneous tissues.  This may represent !Yes       !Yes            ! None      ! +------------------------------------+----------+---------------+----------+ ! Mid Femoral                         !Yes       ! Yes            ! None      ! +------------------------------------+----------+---------------+----------+ ! Dist Femoral                        !Yes       ! Yes            ! None      ! +------------------------------------+----------+---------------+----------+ ! Deep Femoral                        !Yes       ! Yes            ! None      ! +------------------------------------+----------+---------------+----------+ ! Popliteal                           !Yes       ! Yes            ! None      ! +------------------------------------+----------+---------------+----------+ ! SSV                                 ! Yes       ! Yes            ! None      ! +------------------------------------+----------+---------------+----------+ ! Gastroc                             ! Yes       ! Yes            ! None      ! +------------------------------------+----------+---------------+----------+ ! PTV                                 ! Yes       ! Yes            ! None      ! +------------------------------------+----------+---------------+----------+ ! GSV                                 ! Yes       ! Yes            ! None      ! +------------------------------------+----------+---------------+----------+ ! ATV                                 ! Yes       ! Yes            ! None      ! +------------------------------------+----------+---------------+----------+ ! Peroneal                            !Yes       ! Yes            ! None      ! +------------------------------------+----------+---------------+----------+ Left Lower Extremities DVT Study Measurements Left 2D Measurements +------------------------------------+----------+---------------+----------+ ! Location                            ! Visualized! Compressibility! Thrombosis! +------------------------------------+----------+---------------+----------+ ! Sapheno Femoral Junction            ! Yes       ! Yes            ! None      ! +------------------------------------+----------+---------------+----------+ ! Common Femoral                      !Yes       ! Yes            ! None      ! +------------------------------------+----------+---------------+----------+       Assessment   End-stage renal disease  Diabetes type 2 with diabetic nephropathy  Hypertension  Anemia of chronic kidney disease  Chronic diastolic congestive heart failure  History of cirrhosis and hepatitis C  Secondary hyperparathyroidism  Peripheral arterial disease status post endarterectomies and bypasses  Hyperkalemia  Cellulitis of leg      Plan:  HD MWF  Low k diet recommended  Can release other phos binders as they are still on hold  Kayexalate ordered for hyperkalemia  Monitor labs  abx per primary    Judd Philip MD  01/21/20  10:47 AM

## 2020-01-21 NOTE — CONSULTS
[facility]       Cardiology Consultation             The observations documented in this note, including the assessment and plan are mine              Date of Admission:  1/18/2020  6:22 PM    Date of Initially Being Seen / Consultation:  1/21/20    Cardiologist:  Gio Watson MD     Cardiology Attending: [facility] Attending: Hospitalist Service      PCP:  Billy Campos DO    Reason for Admission / Chief Complaint or Consultation      1. Reason for Hospital Admission: Need for vascular surgery        2. Reason for Cardiology Consultation: Cardiac risk assessment prior to vascular surgery       SUBJECTIVE AND HISTORY OF PRESENT ILLNESS:    Source of the history:  Patient, family, previous inpatient and outpatient records in EPICEdmund is a 61 y.o. male who presents to [facility] ED / 3rd floor # [phone] with symptoms / signs / problem or diagnosis of need for vascular surgery. He is normal mood and affect, alert and orientated x 3, judgement and insight appear appropriate. Family present:  No   At the beginning of the interview he was watching the [address] PRESENT ILLNESS INCLUDE: (IF APPLICABLE):    Presentation:  He  presented with need for vascular surgery. Cardiology was asked to see him regarding these symptoms and findings and to consider those in relationship to possible optimal diagnosis and treatment options. FEATURES OF THIS HISTORY OF PRESENT ILLNESS (SYMPTOMS AND FINDINGS) INCLUDE:       1. Location: The right leg is in need of vascular surgery. 2. Duration: The cellulitis began recently and lasted since then   3. Quality: He has stable exertional chest pain. He has no dyspnea, presyncope or syncope   4. Severity: It was described as mild but progessive   5. Timing The symptoms began prior to the cath in December 2019   6.  Modifying Factors: Modifying features included:   Rest relieves the chest score 4, Trina Mills MD)   12/16/19  Cath  70% left main, 70% mid circumflex, 80% OM2, o/w luminals, normal LVFX      Past Medical History:    Past Medical History:   Diagnosis Date    Anxiety     Blood circulation, collateral     CAD (coronary artery disease)     stents x 2; per dr. Pete Pereira [facility])     liver cancer    CH[facility]estive heart failure) [facility]     Chyloperitoneum determined by paracentesis     CKD (chronic kidney disease), stage V ([address]     Dialysis patient ([address]     mon wed fri at [place] GERD (gastroesophageal reflux disease)     Hemodialysis patient ([facility]     mon wed fri in [place] Hepatic cirrhosis ([facility]     dr. Brandin Berry; has been going to [facility] for liver and kidney transplant list.    Hepatitis C, chronic ([facility]     History of blood transfusion     HTN (hypertension)     Hx of blood clots     arm/fistula    Mixed hyperlipidemia 1/9/2017    Osteoarthritis     Pacemaker     Palliative care patient 07/09/2019    PVD (peripheral vascular disease) ([facility]     Sleep apnea     no cpap at present.  Type II diabetes mellitus (HCC)     no meds. Past Surgical History:    Past Surgical History:   Procedure Laterality Date    ANGIOPLASTY  08/09/11 SUKHWINDER GERARDO cephalic vein balloon angioplasty with 7mm x 4cm balloon. coild embolization of 2 cephallic vein brances with 3mm x 5cm coils    CARDIAC CATHETERIZATION  04/04/11    cardiac cath and stent x1 by Dr. Pablo Parker  07/26/11 SUKHWINDER    LUE AV fistula. coild embolization of cephalic vein branch near the wrist with 3mm EMBO coils.  balloon a'plasty left cephalic vein with [id number] balloon and then [id number] balloon    DIALYSIS FISTULA CREATION Left 2/16/2017    LEFT UPPER EXTREMITY BRACHIAL / AXILLARY GRAFT AND STENT LEFT SUBCLAVIAN VEIN  performed by Toni Moreno MD at [address] Right 7/12/2019    RIGHT LEG WOUND WASHOUT performed by Ulisses Johnson MD at [address] [facility] FEMORAL-TIBIAL BYPASS GRAFT Right 6/25/2019    FEMORAL TIBIAL/PERINEAL BYPASS WITH REVERSED GREATER SAPHENOUS VEIN performed by Sheila Covarrubias MD at [facility]  12/26/2010    Right internal jugular vein tunneled dialysis catheter placement    OTHER SURGICAL HISTORY  [id number]    Redo of dialysis catheter    OTHER SURGICAL HISTORY  9/6/2011   [facility]    Removal of RT IJV tunneled dialysis cath    OTHER SURGICAL HISTORY  5/22/12   [facility]    Ultrasound-guided cannulation of left cephalic vein in the mid forearm toward the AV anastomosis, Left upper  ext. fistulograms including venograms of the SVC, Left cephalic vein balloon angioplasty with a 4mm x 100mm Tod balloon, Completion fistulograms    PACEMAKER PLACEMENT      medtronic    PARACENTESIS      multiple/recent; per dr. Roldan Cronin at [address] [facility] 1/30/2018    UPPER EXTREMITY DRIL PROCEDURE WITH LEFT SAPHENOUS VEIN HARVESTING performed by Sheila Covarrubias MD at [facility]  6/19/12    LUE arteriovenous fistulogram including venography of the superior vena cava. Balloon angioplasty, left forearm cephalic vein and upper arm cephalic vein with [id number] tod balloon.  VASCULAR SURGERY  7/10/2012 S     Revision of left distal radial artery to cephalic vein arteriovenous fistula with 7mm Artegraft interposition.  VASCULAR SURGERY  7/30/15 [facility] & [address]    Left upper extremity arteriovenous fistulograms including venography of the superior vena cava. Left cephalic vein balloon angioplasty with an 8 x 20 cm cutting balloon proximal cephalic vein. Balloon angioplasty of left cephalic vein/antecubital vein near the antecubital crease with 8 x 20 mm cutting balloon.  VASCULAR SURGERY  7/30/15 [facility]    Balloon angioplasty proximal end distal upper arm cephalic vein with 9 x 40 cm  balloon. Completion fistulograms of the left upper extremity.     VASCULAR SURGERY  8/13/15 S    Revision of left upper extremity arteriovenous fistula with excision of pseudoaneurysm and primary repair of cephalic vein.  VASCULAR SURGERY  5/3/16 [facility]    Left upper fistulograms/venograms, left cephalic balloon 8 x 20 cutter,9 x 40 conquest,left proximal cephalic vein stents (flair 2 9 x 50), balloon stents 9 x 40 conquest.    VASCULAR SURGERY  12/08/2016    SJS- ultrasound guided cannulation of left distal cephalic vein ultrasound guided cannulation right internal jugular vein placement of right internal jugular vein tunneled dialysis catheter (Bard Equistream XK 23cm tip to cuff)     VASCULAR SURGERY  01/20/2017    [facility]-Right upper venograms, u/s guided cannulation left basilic vein, left upper venograms, balloon angioplasty left subclavian vein 10x40 conquest.    VASCULAR SURGERY  02/16/2017    [facility]. Left brachial artery to axillary vein arteriovenous graft with 7 mm artegraft. Left upper extremity venograms. Left subclavian vein stent viabahn 13x50. Left subclavian vein stent balloon angioplasty 12x40 atlas.  VASCULAR SURGERY  04/11/2017    [facility]. Removal of tunneled dialysis catheter right internal jugular vein.  VASCULAR SURGERY  01/25/2018    [facility]. Arch aortogram, left upper arteriogram, AV graft angiogram/venogram.    VASCULAR SURGERY  02/28/2018    [facility]. Right CFA 5f-6f-7f sheath, aortogram with bilateral lower arteriogram, left SFA/pop  balloon angioplasty 5x100 , 6x150 lutonix ( 2), left CFA  7f sheath, bilateral iliac kissing stents right 10x38 icast, left 9x59 icast expanded with 10x40 , completion aortogram, mynx left CFA , failed mynx right CFA.  VASCULAR SURGERY  06/13/2019    SJS left CFA 5f sheath, aortogram with bilateral lower arteriogram, mynx left CFA.  VASCULAR SURGERY  06/25/2019    SJS-VI. Femoral tibial/perineal bypass with reversed greater saphenous vein.  VASCULAR SURGERY  08/16/2019    TJR. Aortogram and runoff, selective right CFA injections. PTA of fem-tp bypass with 4.0 Minutes per session: Not on file    Stress: Not on file   Relationships    Social connections:     Talks on phone: Not on file     Gets together: Not on file     Attends [employer] service: Not on file     Active member of club or organization: Not on file     Attends meetings of clubs or organizations: Not on file     Relationship status: Not on file    Intimate partner violence:     Fear of current or ex partner: Not on file     Emotionally abused: Not on file     Physically abused: Not on file     Forced sexual activity: Not on file   Other Topics Concern    Not on file   Social History Narrative    CODE STATUS: Full Code    HEALTH CARE PROXY: Mrs. Eddie Suarez, [phone]    Back up: his Mother, Mrs. Torin Malik, [phone]    AMBULATES: independantly    DOMICILED: lives in a private house with 2 steps to get in, has no stairs inside, lives with wife and step children, has 3 pets       Family History:     Family History   Problem Relation Age of Onset    High Blood Pressure Mother     High Blood Pressure Father     High Blood Pressure Sister          REVIEW OF SYSTEMS:     Except as noted in the HPI, all other systems are negative        PHYSICAL EXAMINATION:     BP (!) 152/62   Pulse 63   Temp 97.8 °F (36.6 °C) (Temporal)   Resp 16   Ht 5' 9\" (1.753 m)   Wt 175 lb 3.2 oz (79.5 kg)   SpO2 94%   BMI 25.87 kg/m²     GENERAL - well developed and well nourished, in no amount of generalized distress; is an active participant in this examination  HEENT -  PERRLA, Hearing appears normal, conjunctiva and lids are normal, ears and nose appear normal  NECK - no thyromegaly, no JVD, trachea is in the midline  CARDIOVASCULAR - PMI is in the left mid line clavicular position, Normal S1 and S2 with a grade 1/6 systolic murmur. No S3 or S4    PULMONARY - No respiratory distress. scattered wheezes and rales.   Breath sounds in both  lung fields are Decreased  ABDOMEN  - soft, non tender, no rebound, no hepatomegaly or splenomegaly  MUSCULOSKELETAL  - Prone/Supine, digitals and nails are without clubbing or cyanosis  EXTREMITIES - trace edema  NEUROLOGIC - cranial nerves, II-XII, are normal  SKIN - turgor is normal, no rash  PSYCHIATRIC - was normal mood and affect, alert and orientated x 3, judgement and insight appear appropriate      LABORATORY EVALUATION & TESTING:    I have personally reviewed and interpreted the results of the following diagnostic testing      EKG and or Telemetry:  which was personally reviewed me:  Sinus rhythm,   Pulse Readings from Last 1 Encounters:   01/21/20 63    bpm,  without Acute changes    Troponin:  Not obtained; the creatinine is abnormal 5.1    CBC:   Recent Labs     01/19/20  0511 01/20/20  0403 01/21/20  0419   WBC 5.1 5.7 5.6   HGB 9.0* 9.1* 8.6*   HCT 28.3* 29.6* 27.6*   MCV 99.6* 99.3* 100.7*   * 95* 103*     BMP:   Recent Labs     01/19/20  0511 01/20/20  0403 01/21/20  0419   * 131* 132*   K 5.5* 6.6* 5.7*   CL 91* 86* 91*   CO2 27 27 26   PHOS  --  6.4*  --    BUN 50* 67* 39*   CREATININE 5.7* 7.5* 5.1*     Cardiac Enzymes: No results for input(s): CKTOTAL, CKMB, CKMBINDEX, TROPONINI in the last 72 hours. PT/INR:   Recent Labs     01/19/20 0511   PROTIME 14.8*   INR 1.22*     APTT: No results for input(s): APTT in the last 72 hours.   Liver Profile:  Lab Results   Component Value Date    AST 17 01/21/2020    ALT 10 01/21/2020    BILITOT 0.4 01/21/2020    ALKPHOS 92 01/21/2020     Lab Results   Component Value Date    CHOL 148 01/17/2014    HDL 32 01/17/2014    TRIG 95 01/17/2014     TSH:  Lab Results   Component Value Date    TSH 1.43 01/17/2014     UA: No results found for: NITRITE, COLORU, PHUR, LABCAST, WBCUA, RBCUA, MUCUS, TRICHOMONAS, YEAST, BACTERIA, CLARITYU, SPECGRAV, LEUKOCYTESUR, UROBILINOGEN, BILIRUBINUR, BLOODU, GLUCOSEU, AMORPHOUS          ALL THE CARDIOLOGY PROBLEMS ARE LISTED ABOVE; HOWEVER, THE FOLLOWING SPECIFIC CARDIAC PROBLEMS WERE

## 2020-01-21 NOTE — PROGRESS NOTES
amLODIPine (NORVASC) tablet 10 mg  10 mg Oral Daily Yandy Kumar MD   10 mg at 01/21/20 0548    aspirin EC tablet 81 mg  81 mg Oral Daily Yandy Kumar MD   81 mg at 01/21/20 0743    atorvastatin (LIPITOR) tablet 40 mg  40 mg Oral Nightly Yandy Kumar MD   40 mg at 01/20/20 2059    cloNIDine (CATAPRES) tablet 0.3 mg  0.3 mg Oral TID Yandy Kumar MD   0.3 mg at 01/21/20 6318    clopidogrel (PLAVIX) tablet 75 mg  75 mg Oral Daily Yandy Kumar MD   75 mg at 01/21/20 1162    isosorbide mononitrate (IMDUR) extended release tablet 30 mg  30 mg Oral Daily Yandy Kumar MD   30 mg at 01/21/20 0941    lactulose (CHRONULAC) 10 GM/15ML solution 10 g  10 g Oral TID Yandy Kumar MD        melatonin tablet 3 mg  3 mg Oral Nightly PRN Yandy Kumar MD   3 mg at 01/20/20 2059    metoprolol succinate (TOPROL XL) extended release tablet 25 mg  25 mg Oral BID Yandy Kumar MD   25 mg at 01/21/20 7915    minoxidil (LONITEN) tablet 10 mg  10 mg Oral Daily Yandy Kumar MD   10 mg at 01/20/20 1252    oxyCODONE (OXYCONTIN) extended release tablet 10 mg  10 mg Oral Q8H PRN Yandy Kumar MD   10 mg at 01/21/20 0415    sucralfate (CARAFATE) tablet 1 g  1 g Oral TID PRN Yandy Kumar MD        sevelamer (RENVELA) tablet 800 mg  800 mg Oral TID WC Yandy Kumar MD   800 mg at 01/20/20 1650    sodium chloride flush 0.9 % injection 10 mL  10 mL Intravenous 2 times per day Yandy Kumar MD   10 mL at 01/20/20 2103    sodium chloride flush 0.9 % injection 10 mL  10 mL Intravenous PRN Yandy Kumar MD        ondansetron [facility]) injection 4 mg  4 mg Intravenous Q6H PRN Yandy Kumar MD   4 mg at 01/19/20 5338    heparin (porcine) injection 5,000 Units  5,000 Units Subcutaneous 3 times per day Yandy Kumar MD   Stopped at 01/21/20 0523    acetaminophen (TYLENOL) tablet 650 mg  650 mg Oral Q4H PRN Yandy Kumar MD            Labs:     Recent Labs     01/19/20  0511 01/20/20  0403 01/21/20  0419   WBC 5.1 5.7 surgery with likely revision of previous graft planned for 1/23/2020 - occlusions on vascular study. ESRD/Hyperkalemia: Nephrology consulted for continuation of hemodialysis. CAD: Known severe stenosis of the left main artery-poor surgical candidate. DAPT/statin. Cardiology consulted today for pre-op. Supportive management.     Advance Directive: Full Code    DVT prophylaxis: Heparin    Discharge planning: TBD      Signed:  Dora Smith MD 1/21/2020 9:53 AM  Rounding Hospitalist

## 2020-01-22 PROBLEM — I70.221 ATHEROSCLEROSIS OF NATIVE ARTERIES OF EXTREMITIES WITH REST PAIN, RIGHT LEG (HCC): Status: ACTIVE | Noted: 2020-01-22

## 2020-01-22 LAB
ALBUMIN SERPL-MCNC: 3.7 G/DL (ref 3.5–5.2)
ALP BLD-CCNC: 96 U/L (ref 40–130)
ALT SERPL-CCNC: 10 U/L (ref 5–41)
ANION GAP SERPL CALCULATED.3IONS-SCNC: 17 MMOL/L (ref 7–19)
AST SERPL-CCNC: 17 U/L (ref 5–40)
BASOPHILS ABSOLUTE: 0 K/UL (ref 0–0.2)
BASOPHILS RELATIVE PERCENT: 0.8 % (ref 0–1)
BILIRUB SERPL-MCNC: 0.4 MG/DL (ref 0.2–1.2)
BUN BLDV-MCNC: 54 MG/DL (ref 6–20)
CALCIUM SERPL-MCNC: 8.7 MG/DL (ref 8.6–10)
CHLORIDE BLD-SCNC: 86 MMOL/L (ref 98–111)
CO2: 27 MMOL/L (ref 22–29)
CREAT SERPL-MCNC: 6.3 MG/DL (ref 0.5–1.2)
EOSINOPHILS ABSOLUTE: 0.4 K/UL (ref 0–0.6)
EOSINOPHILS RELATIVE PERCENT: 8.8 % (ref 0–5)
GFR NON-AFRICAN AMERICAN: 9
GLUCOSE BLD-MCNC: 99 MG/DL (ref 74–109)
HCT VFR BLD CALC: 29.1 % (ref 42–52)
HEMOGLOBIN: 9.1 G/DL (ref 14–18)
IMMATURE GRANULOCYTES #: 0 K/UL
LYMPHOCYTES ABSOLUTE: 0.9 K/UL (ref 1.1–4.5)
LYMPHOCYTES RELATIVE PERCENT: 17.1 % (ref 20–40)
MCH RBC QN AUTO: 30.8 PG (ref 27–31)
MCHC RBC AUTO-ENTMCNC: 31.3 G/DL (ref 33–37)
MCV RBC AUTO: 98.6 FL (ref 80–94)
MONOCYTES ABSOLUTE: 0.5 K/UL (ref 0–0.9)
MONOCYTES RELATIVE PERCENT: 9.8 % (ref 0–10)
NEUTROPHILS ABSOLUTE: 3.2 K/UL (ref 1.5–7.5)
NEUTROPHILS RELATIVE PERCENT: 63.1 % (ref 50–65)
PDW BLD-RTO: 16 % (ref 11.5–14.5)
PLATELET # BLD: 116 K/UL (ref 130–400)
PMV BLD AUTO: 12.6 FL (ref 9.4–12.4)
POTASSIUM SERPL-SCNC: 5.8 MMOL/L (ref 3.5–5)
RBC # BLD: 2.95 M/UL (ref 4.7–6.1)
SODIUM BLD-SCNC: 130 MMOL/L (ref 136–145)
TOTAL PROTEIN: 7.5 G/DL (ref 6.6–8.7)
VANCOMYCIN RANDOM: 12.2 UG/ML
WBC # BLD: 5 K/UL (ref 4.8–10.8)

## 2020-01-22 PROCEDURE — 6370000000 HC RX 637 (ALT 250 FOR IP): Performed by: INTERNAL MEDICINE

## 2020-01-22 PROCEDURE — 8010000000 HC HEMODIALYSIS ACUTE INPT

## 2020-01-22 PROCEDURE — 2580000003 HC RX 258: Performed by: SURGERY

## 2020-01-22 PROCEDURE — 80202 ASSAY OF VANCOMYCIN: CPT

## 2020-01-22 PROCEDURE — 80053 COMPREHEN METABOLIC PANEL: CPT

## 2020-01-22 PROCEDURE — 85025 COMPLETE CBC W/AUTO DIFF WBC: CPT

## 2020-01-22 PROCEDURE — 2580000003 HC RX 258: Performed by: HOSPITALIST

## 2020-01-22 PROCEDURE — 99232 SBSQ HOSP IP/OBS MODERATE 35: CPT | Performed by: SURGERY

## 2020-01-22 PROCEDURE — 1210000000 HC MED SURG R&B

## 2020-01-22 PROCEDURE — 36415 COLL VENOUS BLD VENIPUNCTURE: CPT

## 2020-01-22 PROCEDURE — 99232 SBSQ HOSP IP/OBS MODERATE 35: CPT | Performed by: INTERNAL MEDICINE

## 2020-01-22 PROCEDURE — 6370000000 HC RX 637 (ALT 250 FOR IP): Performed by: HOSPITALIST

## 2020-01-22 PROCEDURE — 6370000000 HC RX 637 (ALT 250 FOR IP): Performed by: SURGERY

## 2020-01-22 PROCEDURE — 6360000002 HC RX W HCPCS: Performed by: SURGERY

## 2020-01-22 RX ORDER — HEPARIN SODIUM 5000 [USP'U]/ML
INJECTION, SOLUTION INTRAVENOUS; SUBCUTANEOUS
Status: DISPENSED
Start: 2020-01-22 | End: 2020-01-23

## 2020-01-22 RX ORDER — CHLORHEXIDINE GLUCONATE 4 G/100ML
SOLUTION TOPICAL ONCE
Status: COMPLETED | OUTPATIENT
Start: 2020-01-22 | End: 2020-01-22

## 2020-01-22 RX ORDER — HEPARIN SODIUM 5000 [USP'U]/ML
5000 INJECTION, SOLUTION INTRAVENOUS; SUBCUTANEOUS EVERY 8 HOURS SCHEDULED
Status: COMPLETED | OUTPATIENT
Start: 2020-01-22 | End: 2020-01-22

## 2020-01-22 RX ADMIN — MINOXIDIL 10 MG: 10 TABLET ORAL at 12:11

## 2020-01-22 RX ADMIN — BACITRACIN ZINC NEOMYCIN SULFATE POLYMYXIN B SULFATE: 400; 3.5; 5 OINTMENT TOPICAL at 20:26

## 2020-01-22 RX ADMIN — OXYCODONE HYDROCHLORIDE 10 MG: 10 TABLET, FILM COATED, EXTENDED RELEASE ORAL at 07:29

## 2020-01-22 RX ADMIN — METOPROLOL SUCCINATE 25 MG: 25 TABLET, EXTENDED RELEASE ORAL at 20:26

## 2020-01-22 RX ADMIN — AMLODIPINE BESYLATE 10 MG: 5 TABLET ORAL at 12:11

## 2020-01-22 RX ADMIN — OXYCODONE HYDROCHLORIDE 10 MG: 10 TABLET, FILM COATED, EXTENDED RELEASE ORAL at 23:41

## 2020-01-22 RX ADMIN — LACTULOSE 10 G: 20 SOLUTION ORAL at 20:26

## 2020-01-22 RX ADMIN — METOPROLOL SUCCINATE 25 MG: 25 TABLET, EXTENDED RELEASE ORAL at 12:12

## 2020-01-22 RX ADMIN — OXYCODONE HYDROCHLORIDE AND ACETAMINOPHEN 1 TABLET: 5; 325 TABLET ORAL at 02:26

## 2020-01-22 RX ADMIN — CHLORHEXIDINE GLUCONATE: 213 SOLUTION TOPICAL at 22:33

## 2020-01-22 RX ADMIN — SEVELAMER CARBONATE 800 MG: 800 TABLET, FILM COATED ORAL at 17:07

## 2020-01-22 RX ADMIN — OXYCODONE HYDROCHLORIDE AND ACETAMINOPHEN 1 TABLET: 5; 325 TABLET ORAL at 20:26

## 2020-01-22 RX ADMIN — OXYCODONE HYDROCHLORIDE AND ACETAMINOPHEN 1 TABLET: 5; 325 TABLET ORAL at 06:21

## 2020-01-22 RX ADMIN — CLONIDINE HYDROCHLORIDE 0.3 MG: 0.1 TABLET ORAL at 15:45

## 2020-01-22 RX ADMIN — ISOSORBIDE MONONITRATE 30 MG: 30 TABLET, EXTENDED RELEASE ORAL at 12:15

## 2020-01-22 RX ADMIN — CLOPIDOGREL BISULFATE 75 MG: 75 TABLET ORAL at 12:12

## 2020-01-22 RX ADMIN — HEPARIN SODIUM 5000 UNITS: 5000 INJECTION INTRAVENOUS; SUBCUTANEOUS at 20:27

## 2020-01-22 RX ADMIN — Medication 10 ML: at 20:26

## 2020-01-22 RX ADMIN — VANCOMYCIN HYDROCHLORIDE 1250 MG: 10 INJECTION, POWDER, LYOPHILIZED, FOR SOLUTION INTRAVENOUS at 20:26

## 2020-01-22 RX ADMIN — MELATONIN 3 MG: at 20:26

## 2020-01-22 RX ADMIN — NEPHROCAP 1 MG: 1 CAP ORAL at 12:12

## 2020-01-22 RX ADMIN — ATORVASTATIN CALCIUM 40 MG: 40 TABLET, FILM COATED ORAL at 20:26

## 2020-01-22 RX ADMIN — OXYCODONE HYDROCHLORIDE 10 MG: 10 TABLET, FILM COATED, EXTENDED RELEASE ORAL at 15:49

## 2020-01-22 RX ADMIN — SEVELAMER CARBONATE 800 MG: 800 TABLET, FILM COATED ORAL at 12:11

## 2020-01-22 RX ADMIN — ASPIRIN 81 MG: 81 TABLET, COATED ORAL at 12:11

## 2020-01-22 RX ADMIN — OXYCODONE HYDROCHLORIDE AND ACETAMINOPHEN 1 TABLET: 5; 325 TABLET ORAL at 12:12

## 2020-01-22 ASSESSMENT — PAIN DESCRIPTION - LOCATION
LOCATION: LEG
LOCATION: LEG

## 2020-01-22 ASSESSMENT — PAIN DESCRIPTION - ORIENTATION
ORIENTATION: RIGHT
ORIENTATION: RIGHT

## 2020-01-22 ASSESSMENT — PAIN DESCRIPTION - DESCRIPTORS
DESCRIPTORS: BURNING
DESCRIPTORS: BURNING

## 2020-01-22 ASSESSMENT — PAIN DESCRIPTION - ONSET
ONSET: ON-GOING
ONSET: ON-GOING

## 2020-01-22 ASSESSMENT — PAIN SCALES - GENERAL
PAINLEVEL_OUTOF10: 8
PAINLEVEL_OUTOF10: 8
PAINLEVEL_OUTOF10: 9
PAINLEVEL_OUTOF10: 8
PAINLEVEL_OUTOF10: 7
PAINLEVEL_OUTOF10: 8
PAINLEVEL_OUTOF10: 0
PAINLEVEL_OUTOF10: 7

## 2020-01-22 ASSESSMENT — PAIN DESCRIPTION - FREQUENCY
FREQUENCY: CONTINUOUS
FREQUENCY: CONTINUOUS

## 2020-01-22 ASSESSMENT — PAIN DESCRIPTION - PROGRESSION
CLINICAL_PROGRESSION: NOT CHANGED
CLINICAL_PROGRESSION: NOT CHANGED

## 2020-01-22 ASSESSMENT — PAIN DESCRIPTION - DIRECTION
RADIATING_TOWARDS: FOOT
RADIATING_TOWARDS: FOOT

## 2020-01-22 ASSESSMENT — PAIN DESCRIPTION - PAIN TYPE
TYPE: CHRONIC PAIN
TYPE: CHRONIC PAIN

## 2020-01-22 NOTE — PROGRESS NOTES
Progress Note    Date:1/22/2020       Room:0314/314-01  Patient Katie Garcia     Date of Birth:[birth year]     Age:59 y.o. Subjective   Interval History Status: not changed. Patient seen and examined this a.m. while currently undergoing dialysis. Patient in no acute distress. Complains of right lower extremity numbness. No adverse events overnight per nursing staff. Patient currently denies any headache, change in vision, chest pain, shortness of breath, abdominal pain, nausea vomiting, fevers or chills. Cumulative hospital course:  63-year-old male presenting for right lower extremity pain, swelling and warmth admitted for cellulitis of the right lower extremity. Vascular surgery consulted on admission. Nephrology consulted for continuation of dialysis. Patient found to have occlusion of previous vascular graft with planned revision with vascular surgery on Thursday, 1/23/2020. Patient has a history of left main disease - cardiology consulted -patient deemed high risk for intervention, patient completed dialysis session 1/22,    Review of Systems   Review of Systems   ROS: 14 point review of systems is negative except as specifically addressed above.     Medications   Scheduled Meds:    neomycin-bacitracin-polymyxin   Topical BID    darbepoetin sandrine-polysorbate  25 mcg Subcutaneous Weekly    b complex-C-folic acid  1 capsule Oral Daily    vancomycin (VANCOCIN) intermittent dosing (placeholder)   Other RX Placeholder    amLODIPine  10 mg Oral Daily    aspirin  81 mg Oral Daily    atorvastatin  40 mg Oral Nightly    cloNIDine  0.3 mg Oral TID    clopidogrel  75 mg Oral Daily    isosorbide mononitrate  30 mg Oral Daily    lactulose  10 g Oral TID    metoprolol succinate  25 mg Oral BID    minoxidil  10 mg Oral Daily    sevelamer  800 mg Oral TID WC    sodium chloride flush  10 mL Intravenous 2 times per day    heparin (porcine)  5,000 Units Subcutaneous 3 times per day Perry FRITZ                              Physician   Date of Birth  1960   Referring          Martita Oliveira MD                              Physician   Accession      [id number]    [id number]  Number                                         RVS, RCS  Procedure Type of Study:   Extremities Arteries:Lower Arterial Plethysmography, VL ANKLE / BRACHIAL  INDICES EXTREMITY COMPLETE . Indications for Study:Wound Lower Extremity (Right). Risk Factors   - The patient's risk factor(s) include: diabetes mellitus, arterial     hypertension and prior MI . Allergies   - No known allergies. Impression   The patient has moderately diminished ankle-brachial indices left lower  extremity(ies) which would be consistent with claudication level symptoms. The patient has severely diminished ankle-brachial indices right lower  extremity(ies) which would be consistent with rest pain symptoms. Signature   ----------------------------------------------------------------  Electronically signed by Martita Oliveira MD(Interpreting  physician) on 01/20/2020 03:39 PM  ----------------------------------------------------------------  Velocities are measured in cm/s ; Diameters are measured in mm Pressures +--------------------------------------++--------+-----+----+--------+-----+ ! ! !Right   ! ! Left!        !     ! +--------------------------------------++--------+-----+----+--------+-----+ ! Location                              ! !Pressure! Ratio! !Pressure! Ratio! +--------------------------------------++--------+-----+----+--------+-----+ ! Ankle PT                              !!39      !0.3  ! !255     ! 1.99 ! +--------------------------------------++--------+-----+----+--------+-----+ ! DP                                    !!255     !1.99 !    !255     ! 1.99 ! +--------------------------------------++--------+-----+----+--------+-----+ ! Great Toe on 01/21/2020 11:16 AM  ----------------------------------------------------------------  Velocities are measured in cm/s ; Diameters are measured in mm +--------------------------------------++--------+-----+----+--------+-----+ ! Superficial - Great Saphenous Vein    ! ! Right   ! ! Left!        !     ! +--------------------------------------++--------+-----+----+--------+-----+ ! Location                              ! !Diameter! Depth! !Diameter! Depth! +--------------------------------------++--------+-----+----+--------+-----+ ! GSV High Calf                         !!2.9     !     !    !        !     ! +--------------------------------------++--------+-----+----+--------+-----+ ! GSV Mid Calf                          !!3       !     !    !        !     ! +--------------------------------------++--------+-----+----+--------+-----+ ! GSV Ankle                             ! !4.4     !     !    !4.3     !     ! +--------------------------------------++--------+-----+----+--------+-----+ +--------------------------------------++--------+-----+----+--------+-----+ ! Superficial - Lesser Saphenous Vein   ! ! Right   ! ! Left!        !     ! +--------------------------------------++--------+-----+----+--------+-----+ ! Location                              ! !Diameter! Depth! !Diameter! Depth! +--------------------------------------++--------+-----+----+--------+-----+ ! SSV High Calf                         !!2.9     !     !    !3.2     !     ! +--------------------------------------++--------+-----+----+--------+-----+ ! SSV Mid Calf                          !!3.7     !     !    !2.1     !     ! +--------------------------------------++--------+-----+----+--------+-----+ ! SSV Ankle                             !!3       !     !    !1.8     !     ! +--------------------------------------++--------+-----+----+--------+-----+        Assessment        Hospital Problems           Last Modified POA    Liver disease (Chronic) 1/19/2020 Yes    Overview Signed 7/7/2011  2:48 PM by Dinora Carter PA-C     cirrosis, hep c         PVD (peripheral vascular disease) ([facility] 1/19/2020 Yes    Stenosis of artery of right lower extremity ([facility] 1/19/2020 Yes    Cellulitis of right lower extremity without foot 1/18/2020 Yes    Right leg pain 1/19/2020 Yes    Cellulitis of right lower extremity 1/19/2020 Yes          Plan:        Cellulitis: Evidence due to clinical nature of erythema as well as swelling of the right lower leg. Pharmacy to dose vancomycin. Vascular surgery on board, cruciate recommendations. Correlated by CT of the right knee obtained on 1/19/2020. Vascular studies have been completed on 1/20/2020, vein mapping completed 1/21/2020, patient deemed high risk for surgical intervention per cardiology.     PVD: Vascular surgery with likely revision of previous graft planned for 1/23/2020 - occlusions on vascular study.     ESRD/Hyperkalemia: Patient has completed session of dialysis today, nephrology consulted for continuation of hemodialysis.     CAD: Known severe stenosis of the left main artery-poor surgical candidate. DAPT/statin. Cardiology consulted today for pre-op. Electronically signed by   Brannon Fabry   Internal Medicine Hospitalist  On 1/22/2020  At 10:20 AM    EMR Dragon/Transcription disclaimer:   Much of this encounter note is an electronic transcription/translation of spoken language to printed text.  The electronic translation of spoken language may permit erroneous, or at times, nonsensical words or phrases to be inadvertently transcribed; although attempts have made to review the note for such errors, some may still exist.

## 2020-01-22 NOTE — PROGRESS NOTES
Nephrology ([address] [facility]) Progress Note    Patient:  Ronel Conroy  YOB: 1960  Date of Service: 1/22/2020  MRN: [id number]   Acct: [de-identified]   Primary Care Physician: Karla Jon DO  Advance Directive: Full Code  Admit Date: 1/18/2020       Hospital Day: 4  Referring Provider: Dominique Mohan MD    Patient independently seen and examined, Chart, Consults, Notes, Operative notes, Labs, Cardiology, and Radiology studies reviewed as able. Subjective:  Ronel Conroy is a 61 y.o. male  whom we were consulted for end-stage renal disease. Patient goes to [address] [place] [address] dialysis clinic on Monday Wednesday Friday. Patient has a known history of severe peripheral vascular disease, history of CABG, congestive heart failure, cirrhosis of liver, sleep apnea and hepatitis C. Admitted this time for cellulitis. Today, no new events. Tolerating HD. Denies cp/soa/n/v. Patient recalls no new dietary indiscretions. Dialysis   Pt was seen on RRT  Modality: Hemodialysis  Access: Arterial Venous Fistula  Location: left upper  QB: 450  QD: 700  UF: 1290              Allergies:  Patient has no known allergies.     Medicines:  Current Facility-Administered Medications   Medication Dose Route Frequency Provider Last Rate Last Dose    neomycin-bacitracin-polymyxin (NEOSPORIN) ointment   Topical BID Pebbles Lopez MD        oxyCODONE-acetaminophen (PERCOCET) 5-325 MG per tablet 1 tablet  1 tablet Oral Q4H PRN Skinny Crockett MD   1 tablet at 01/22/20 1212    darbepoetin sandrine-polysorbate (ARANESP) injection 25 mcg  25 mcg Subcutaneous Weekly Veronica Justin MD   25 mcg at 01/19/20 1924    b complex-C-folic acid (NEPHROCAPS) capsule 1 mg  1 capsule Oral Daily Veronica Justin MD   1 mg at 01/22/20 1212    vancomycin (VANCOCIN) intermittent dosing (placeholder)   Other RX Placeholder Skinny Crockett MD        amLODIPine (NORVASC) tablet 10 mg  10 mg Oral Daily Dionne Degroot MD   10 mg at 01/22/20 1211    aspirin EC tablet 81 mg  81 mg Oral Daily Kate Joya MD   81 mg at 01/22/20 1211    atorvastatin (LIPITOR) tablet 40 mg  40 mg Oral Nightly Kate Joya MD   40 mg at 01/21/20 2102    cloNIDine (CATAPRES) tablet 0.3 mg  0.3 mg Oral TID Kate Joya MD   0.3 mg at 01/21/20 2102    clopidogrel (PLAVIX) tablet 75 mg  75 mg Oral Daily Kate Joya MD   75 mg at 01/22/20 1212    isosorbide mononitrate (IMDUR) extended release tablet 30 mg  30 mg Oral Daily Kate Joya MD   30 mg at 01/22/20 1215    lactulose (CHRONULAC) 10 GM/15ML solution 10 g  10 g Oral TID Kate Joya MD        melatonin tablet 3 mg  3 mg Oral Nightly PRN Kate Joya MD   3 mg at 01/20/20 2059    metoprolol succinate (TOPROL XL) extended release tablet 25 mg  25 mg Oral BID Kate Joya MD   25 mg at 01/22/20 1212    minoxidil (LONITEN) tablet 10 mg  10 mg Oral Daily Kate Joya MD   10 mg at 01/22/20 1211    oxyCODONE (OXYCONTIN) extended release tablet 10 mg  10 mg Oral Q8H PRN Kate Joya MD   10 mg at 01/22/20 0729    sucralfate (CARAFATE) tablet 1 g  1 g Oral TID PRN Kate Joya MD        sevelamer (RENVELA) tablet 800 mg  800 mg Oral TID WC Kate Joya MD   800 mg at 01/22/20 1211    sodium chloride flush 0.9 % injection 10 mL  10 mL Intravenous 2 times per day Kate Joya MD   10 mL at 01/21/20 2102    sodium chloride flush 0.9 % injection 10 mL  10 mL Intravenous PRN Kate Joya MD        ondansetron [facility]) injection 4 mg  4 mg Intravenous Q6H PRN Kate Joya MD   4 mg at 01/19/20 1379    heparin (porcine) injection 5,000 Units  5,000 Units Subcutaneous 3 times per day Kate Joya MD   5,000 Units at 01/21/20 1646    acetaminophen (TYLENOL) tablet 650 mg  650 mg Oral Q4H PRN Kate Joya MD           Past Medical History:  Past Medical History:   Diagnosis Date    Anxiety     Blood circulation, collateral     CAD (coronary artery disease)     stents x 2; per dr. Yue [facility])     liver cancer    [facility]     Chyloperitoneum determined by paracentesis     CKD (chronic kidney disease), stage V ([address]     Dialysis patient ([address]     [facility] fri [facility] (gastroesophageal reflux disease)     Hemodialysis patient ([address]     mon wed fri in [facility] Hepatic cirrhosis ([address]     dr. Kentrell Araujo; has been going to [facility] for liver and kidney transplant list.    Hepatitis C, chronic ([address]     History of blood transfusion     HTN (hypertension)     Hx of blood clots     arm/fistula    Mixed hyperlipidemia 1/9/2017    Osteoarthritis     Pacemaker     Palliative care patient 07/09/2019    PVD (peripheral vascular disease) [facility]     Sleep apnea     no cpap at present.  Type II diabetes mellitus (HCC)     no meds. Past Surgical History:  Past Surgical History:   Procedure Laterality Date    ANGIOPLASTY  08/09/11 [facility] cephalic vein balloon angioplasty with 7mm x 4cm balloon. coild embolization of 2 cephallic vein brances with 3mm x 5cm coils    CARDIAC CATHETERIZATION  04/04/11    cardiac cath and stent x1 by Dr. Mis Simpson  07/26/11 [facility] AV fistula. coild embolization of cephalic vein branch near the wrist with 3mm EMBO coils.  balloon a'plasty left cephalic vein with [id number] balloon and then [id number] balloon    DIALYSIS FISTULA CREATION Left 2/16/2017    LEFT UPPER EXTREMITY BRACHIAL / AXILLARY GRAFT AND STENT LEFT SUBCLAVIAN VEIN  performed by Lorna Almonte MD at [address] Right 7/12/2019    RIGHT LEG WOUND WASHOUT performed by Lorraine Babb MD at [address] [facility] FEMORAL-TIBIAL BYPASS GRAFT Right 6/25/2019    FEMORAL TIBIAL/PERINEAL BYPASS WITH REVERSED GREATER SAPHENOUS VEIN performed by Lorna Almonte MD at [facility]  12/26/2010    Right internal jugular vein tunneled dialysis catheter placement    OTHER SURGICAL Vascular calcification is noted. There is limited flow to the right lower extremity Signed by Dr Navjot Hoffman on 1/19/2020 10:51 AM    Vl Extremity Venous Right    Result Date: 1/19/2020  Vascular Lower Extremities DVT Study Procedure  Demographics   Patient Name     Prem Bernal Age                    61   Patient Number   [id number]       Gender                 Male   Visit Number     [id number]    Interpreting Physician Ayaz Peterson MD   Date of Birth    1960   Referring Physician    Radha Resendez   Accession Number [id number]    [id number] [facility]  Procedure Type of Study:   Veins:Lower Extremities DVT Study, VL EXTREMITY VENOUS DUPLEX RIGHT. Indications for Study:Swelling and Pain, right lower extremity. Allergies   - No known allergies. Impression   There is no evidence of deep venous thrombosis (DVT) in the right lower  extremity(ies). There is no evidence of superficial thrombophlebitis of the right lower  extremity(ies). Signature   ----------------------------------------------------------------  Electronically signed by Ayaz Peterson MD(Interpreting  physician) on 01/19/2020 06:59 AM  ----------------------------------------------------------------  Velocities are measured in cm/s ; Diameters are measured in mm Right Lower Extremities DVT Study Measurements Right 2D Measurements +------------------------------------+----------+---------------+----------+ ! Location                            ! Visualized! Compressibility! Thrombosis! +------------------------------------+----------+---------------+----------+ ! Sapheno Femoral Junction            ! Yes       ! Yes            ! None      ! +------------------------------------+----------+---------------+----------+ ! Common Femoral                      !Yes       ! Yes            ! None      ! +------------------------------------+----------+---------------+----------+ ! Prox Femoral                        !Yes       ! Yes            ! None !Yes       !Yes            ! None      ! +------------------------------------+----------+---------------+----------+ ! Common Femoral                      !Yes       ! Yes            ! None      ! +------------------------------------+----------+---------------+----------+       Assessment   End-stage renal disease  Diabetes type 2 with diabetic nephropathy  Hypertension  Anemia of chronic kidney disease  Chronic diastolic congestive heart failure  History of cirrhosis and hepatitis C  Secondary hyperparathyroidism  Peripheral arterial disease status post endarterectomies and bypasses  Hyperkalemia  Cellulitis of leg      Plan:  HD MWF  Low k diet recommended to patient  Can release other phos binders as they are still on hold  Kayexalate ordered for hyperkalemia as needed  Monitor labs  abx per primary    Nanda Escoto MD  01/22/20  1:10 PM

## 2020-01-22 NOTE — PROGRESS NOTES
Pharmacy Vancomycin Consult     Vancomycin Day: 5  Current Dosing: pulse dosing with HD    Temp max: 97.4    Recent Labs     01/21/20  0419 01/22/20  0445   BUN 39* 54*       Recent Labs     01/21/20  0419 01/22/20  0445   CREATININE 5.1* 6.3*       Recent Labs     01/21/20  0419 01/22/20  0445   WBC 5.6 5.0         Intake/Output Summary (Last 24 hours) at 1/22/2020 1530  Last data filed at 1/22/2020 1230  Gross per 24 hour   Intake 720 ml   Output 4000 ml   Net -3280 ml       Culture Date Source Results   01/18/20 Blood X2 No growth                 Ht Readings from Last 1 Encounters:   01/18/20 5' 9\" (1.753 m)        Wt Readings from Last 1 Encounters:   01/22/20 175 lb (79.4 kg)         Body mass index is 25.84 kg/m². Estimated Creatinine Clearance: 13 mL/min (A) (based on SCr of 6.3 mg/dL (H)). Trough: 12.2    Assessment/Plan: Dr. Mackenzie Atkinson is performing a re-vasularization in early am. Will give Vancomycin 1250mg X1 tonight at 2100 so that level will be therapeutic prior to surgery in am. Will order follow up level after Friday Dialysis.     Electronically signed by Tapan Mayfield, [address] [place] [address] on 1/22/2020 at 3:30 PM

## 2020-01-22 NOTE — PROGRESS NOTES
map right leg, 1-        Bilateral lower extremity saphenous vein mapping performed. Veins are    patent with measurements noted.    Right greater saphenous vein harvested thigh and groin.    Left greater saphenous vein harvested all the way to the ankle.        Signature        ----------------------------------------------------------------    Electronically signed by Gifty Doyle MD(Interpreting    physician) on 01/21/2020 11:16 AM    ----------------------------------------------------------------     Impression FIV6-        The patient has moderately diminished ankle-brachial indices left lower    extremity(ies) which would be consistent with claudication level symptoms.    The patient has severely diminished ankle-brachial indices right lower    extremity(ies) which would be consistent with rest pain symptoms.        Signature    Rt GABRIEL 0.3 (DP pressure is falsely elevated) Toe pressure 0; Left GABRIEL \"1.99, Toe pressure 37.    ----------------------------------------------------------------    Electronically signed by Concetta Merino MD(Interpreting    physician) on 01/20/2020 03:39 PM    ----------------------------------------------------------------     Impression Arterial Duplex right leg 1-        Right lower extremity arterial duplex exam performed. There is minimal    plaque in the right CFA. There is a severe stenosis in the profunda    femoral artery. The native SFA is occluded. The femoral to tibial peroneal    bypass is patent, however, it is diffusely narrowed. \"failing\" bypass.  No    flow is seen in the PTA, only very slow, minimal arterial flow is seen in    the anterior tibial and peroneal artery of the right leg.        Signature        ----------------------------------------------------------------    Electronically signed by Concetta Merino MD(Interpreting    [facility]) on 01/20/2020 03:45 PM       No radiation information found for this patient   Narrative   EXAMINATION: CT KNEE RIGHT W WO CONTRAST 1/19/2020 10:46 AM   HISTORY: Question abscess fluid collection   Automatic exposure control was utilized reducing radiation dose. Radiation DLP 6.8 mCi centimeters. Axial images are obtained. Sagittal coronal reformatted images the   right leg are also obtained. The tibia and fibula are intact. Vascular calcifications noted in the popliteal artery. Vascular   calcifications present in the superficial femoral artery, popliteal   artery. Edema is present in the subcutaneous tissues. A definite abscess is   not identified. The medial head of the gastrocnemius is intact. Impression fluid is noted in the subcutaneous tissues. This may   represent cellulitis or possibly edema. 2. Vascular calcification is noted. There is limited flow to the right   lower extremity   Signed by Dr Tara Soriano on 1/19/2020 10:51 AM       Impression LEV right leg 1-        There is no evidence of deep venous thrombosis (DVT) in the right lower    extremity(ies).    There is no evidence of superficial thrombophlebitis of the right lower    extremity(ies).        Signature        ----------------------------------------------------------------    Electronically signed by Bianka Roach MD(Interpreting    physician) on 01/19/2020 06:59 AM     I reviewed all the studies, images and reports. I also reviewed the angiograms from 10- and angiograms from 8-16-19. Reviewed labs, reviewed Dr. Alma Ferris note and assessment. I also discussed case with Dr. Gabriela Hernandez ([facility]) . I then discussed findings and results with Patient. Harmony Lopez rn was present during the discussion with patient. Essentially, Mr. Carri Gant has severe PVD and now with rest pain of right foot. This is essentially unremitting and requiring narcotics for some but incomplete relief. He has a failing fem tib bypass with virtually no flow in it. This has been angioplastied twice with relief of his rest pain.   He also has

## 2020-01-22 NOTE — PROGRESS NOTES
Intravenous PRN Baldo Florez MD        ondansetron [facility]) injection 4 mg  4 mg Intravenous Q6H PRN Baldo Florez MD   4 mg at 01/19/20 6750    acetaminophen (TYLENOL) tablet 650 mg  650 mg Oral Q4H PRN Baldo Florez MD         Allergies: Patient has no known allergies. Past Medical History:   Diagnosis Date    Anxiety     Blood circulation, collateral     CAD (coronary artery disease)     stents x 2; per dr. Spring Brasher [facility])     liver cancer    CHF (congestive heart failure) ([address]     Chyloperitoneum determined by paracentesis     CKD (chronic kidney disease), stage V ([address]     Dialysis patient ([address]     mon wed fri at [place] GERD (gastroesophageal reflux disease)     Hemodialysis patient ([address]     mon wed fri in [place] Hepatic cirrhosis ([facility]     dr. Ashley Orr; [facility].    Hepatitis C, chronic ([facility]     History of blood transfusion     HTN (hypertension)     Hx of blood clots     arm/fistula    Mixed hyperlipidemia 1/9/2017    Osteoarthritis     Pacemaker     Palliative care patient 07/09/2019    PVD (peripheral vascular disease) ([address]     Sleep apnea     no cpap at present.  Type II diabetes mellitus (HCC)     no meds. Past Surgical History:   Procedure Laterality Date    ANGIOPLASTY  08/09/11 [facility] cephalic vein balloon angioplasty with 7mm x 4cm balloon. coild embolization of 2 cephallic vein brances with 3mm x 5cm coils    CARDIAC CATHETERIZATION  04/04/11    cardiac cath and stent x1 by Dr. Adriane Kilgore  07/26/11 [facility] AV fistula. coild embolization of cephalic vein branch near the wrist with 3mm EMBO coils.  balloon a'plasty left cephalic vein with [id number] balloon and then [id number] balloon    DIALYSIS FISTULA CREATION Left 2/16/2017    LEFT UPPER EXTREMITY BRACHIAL / AXILLARY GRAFT AND STENT LEFT SUBCLAVIAN VEIN  performed by Batool Cox MD at [address] [facility] balloon. Completion fistulograms of the left upper extremity.  VASCULAR SURGERY  8/13/15 [facility]    Revision of left upper extremity arteriovenous fistula with excision of pseudoaneurysm and primary repair of cephalic vein.  VASCULAR SURGERY  5/3/16 [facility]    Left upper fistulograms/venograms, left cephalic balloon 8 x 20 cutter,9 x 40 conquest,left proximal cephalic vein stents (flair 2 9 x 50), balloon stents 9 x 40 conquest.    VASCULAR SURGERY  12/08/2016    [facility]- ultrasound guided cannulation of left distal cephalic vein ultrasound guided cannulation right internal jugular vein placement of right internal jugular vein tunneled dialysis catheter (Bard Equistream XK 23cm tip to cuff)     VASCULAR SURGERY  01/20/2017    [facility]-Right upper venograms, u/s guided cannulation left basilic vein, left upper venograms, balloon angioplasty left subclavian vein 10x40 conquest.    VASCULAR SURGERY  02/16/2017    [facility]. Left brachial artery to axillary vein arteriovenous graft with 7 mm artegraft. Left upper extremity venograms. Left subclavian vein stent viabahn 13x50. Left subclavian vein stent balloon angioplasty 12x40 atlas.  VASCULAR SURGERY  04/11/2017    [facility]. Removal of tunneled dialysis catheter right internal jugular vein.  VASCULAR SURGERY  01/25/2018    [facility]. Arch aortogram, left upper arteriogram, AV graft angiogram/venogram.    VASCULAR SURGERY  02/28/2018    [facility]. Right CFA 5f-6f-7f sheath, aortogram with bilateral lower arteriogram, left SFA/pop  balloon angioplasty 5x100 , 6x150 lutonix ( 2), left CFA  7f sheath, bilateral iliac kissing stents right 10x38 icast, left 9x59 icast expanded with 10x40 , completion aortogram, mynx left CFA , failed mynx right CFA.  VASCULAR SURGERY  06/13/2019    [facility] left CFA 5f sheath, aortogram with bilateral lower arteriogram, mynx left CFA.  VASCULAR SURGERY  06/25/2019    [facility]-VI. Femoral tibial/perineal bypass with reversed greater saphenous vein.     VASCULAR SURGERY  08/16/2019    [facility]. Aortogram and runoff, selective right CFA injections. PTA of fem-tp bypass with 4.0 x 220 mm tod balloon to 4.33 mm    VASCULAR SURGERY  10/23/2019    [facility]. Thrombin injection in the left SFA PSA, right common femoral artery us guided access, left SFA stent treatment of the flap. Family History   Problem Relation Age of Onset    High Blood Pressure Mother     High Blood Pressure Father     High Blood Pressure Sister      Social History     Tobacco Use    Smoking status: Current Every Day Smoker     Packs/day: 0.25     Years: 45.00     Pack years: 11.25     Types: Cigarettes    Smokeless tobacco: Never Used    Tobacco comment: about to  quit down to 1 a day cigarettes   Substance Use Topics    Alcohol use: No          Review of Systems:    General:      Complaint / Symptom Yes / No / Description if Yes       Fatigue Yes:  chronic   Weight gain NA   Insomnia NA       Respiratory:        Complaint / Symptom Yes / No / Description if Yes       Cough No   Horseness NA       Cardiovascular:    Complaint / Symptom Yes / No / Description if Yes       Chest Pain No   Shortness of Air / Orthopnea Yes: chronic and stable   Presyncope / Syncope No   Palpitations No         Objective:    /62   Pulse 68   Temp 97.4 °F (36.3 °C) (Temporal)   Resp 14   Ht 5' 9\" (1.753 m)   Wt 175 lb (79.4 kg)   SpO2 96%   BMI 25.84 kg/m² ,     Intake/Output Summary (Last 24 hours) at 1/22/2020 1714  Last data filed at 1/22/2020 1230  Gross per 24 hour   Intake 720 ml   Output 4000 ml   Net -3280 ml       GENERAL - well developed and well nourished, is an active participant in this examination  HEENT -  PERRLA, Hearing appears normal, conjunctiva and lids are normal, ears and nose appear normal  NECK - no thyromegaly, no JVD, trachea is in the midline  CARDIOVASCULAR - PMI is in the left mid line clavicular position, Normal S1 and S2 with a grade 1/6 systolic murmur.   No S3 or S4    PULMONARY - No

## 2020-01-23 LAB
ABO/RH: NORMAL
ALBUMIN SERPL-MCNC: 4.1 G/DL (ref 3.5–5.2)
ALP BLD-CCNC: 97 U/L (ref 40–130)
ALT SERPL-CCNC: 13 U/L (ref 5–41)
ANION GAP SERPL CALCULATED.3IONS-SCNC: 16 MMOL/L (ref 7–19)
ANION GAP SERPL CALCULATED.3IONS-SCNC: 18 MMOL/L (ref 7–19)
ANTIBODY SCREEN: NORMAL
AST SERPL-CCNC: 23 U/L (ref 5–40)
BASOPHILS ABSOLUTE: 0 K/UL (ref 0–0.2)
BASOPHILS RELATIVE PERCENT: 0.3 % (ref 0–1)
BILIRUB SERPL-MCNC: 0.5 MG/DL (ref 0.2–1.2)
BLOOD CULTURE, ROUTINE: NORMAL
BUN BLDV-MCNC: 18 MG/DL (ref 6–20)
BUN BLDV-MCNC: 35 MG/DL (ref 6–20)
CALCIUM SERPL-MCNC: 9.4 MG/DL (ref 8.6–10)
CALCIUM SERPL-MCNC: 9.4 MG/DL (ref 8.6–10)
CHLORIDE BLD-SCNC: 90 MMOL/L (ref 98–111)
CHLORIDE BLD-SCNC: 93 MMOL/L (ref 98–111)
CO2: 25 MMOL/L (ref 22–29)
CO2: 27 MMOL/L (ref 22–29)
CREAT SERPL-MCNC: 2.7 MG/DL (ref 0.5–1.2)
CREAT SERPL-MCNC: 4.7 MG/DL (ref 0.5–1.2)
CULTURE, BLOOD 2: NORMAL
EOSINOPHILS ABSOLUTE: 0.4 K/UL (ref 0–0.6)
EOSINOPHILS RELATIVE PERCENT: 5.8 % (ref 0–5)
GFR NON-AFRICAN AMERICAN: 13
GFR NON-AFRICAN AMERICAN: 24
GLUCOSE BLD-MCNC: 101 MG/DL (ref 74–109)
GLUCOSE BLD-MCNC: 146 MG/DL (ref 74–109)
HCT VFR BLD CALC: 34.6 % (ref 42–52)
HEMOGLOBIN: 10.7 G/DL (ref 14–18)
IMMATURE GRANULOCYTES #: 0 K/UL
LYMPHOCYTES ABSOLUTE: 0.2 K/UL (ref 1.1–4.5)
LYMPHOCYTES RELATIVE PERCENT: 3.5 % (ref 20–40)
MCH RBC QN AUTO: 30.8 PG (ref 27–31)
MCHC RBC AUTO-ENTMCNC: 30.9 G/DL (ref 33–37)
MCV RBC AUTO: 99.7 FL (ref 80–94)
MONOCYTES ABSOLUTE: 0.3 K/UL (ref 0–0.9)
MONOCYTES RELATIVE PERCENT: 5 % (ref 0–10)
NEUTROPHILS ABSOLUTE: 5.3 K/UL (ref 1.5–7.5)
NEUTROPHILS RELATIVE PERCENT: 84.9 % (ref 50–65)
PDW BLD-RTO: 16.7 % (ref 11.5–14.5)
PLATELET # BLD: 122 K/UL (ref 130–400)
PMV BLD AUTO: 12.1 FL (ref 9.4–12.4)
POTASSIUM REFLEX MAGNESIUM: 6.5 MMOL/L (ref 3.5–5)
POTASSIUM SERPL-SCNC: 4.2 MMOL/L (ref 3.5–5)
POTASSIUM SERPL-SCNC: 4.9 MMOL/L (ref 3.5–5)
POTASSIUM SERPL-SCNC: 6.5 MMOL/L (ref 3.5–5)
RBC # BLD: 3.47 M/UL (ref 4.7–6.1)
SODIUM BLD-SCNC: 131 MMOL/L (ref 136–145)
SODIUM BLD-SCNC: 138 MMOL/L (ref 136–145)
TOTAL PROTEIN: 8.7 G/DL (ref 6.6–8.7)
VANCOMYCIN RANDOM: 15.6 UG/ML
WBC # BLD: 6.2 K/UL (ref 4.8–10.8)

## 2020-01-23 PROCEDURE — 6370000000 HC RX 637 (ALT 250 FOR IP): Performed by: FAMILY MEDICINE

## 2020-01-23 PROCEDURE — 80202 ASSAY OF VANCOMYCIN: CPT

## 2020-01-23 PROCEDURE — 6360000002 HC RX W HCPCS: Performed by: INTERNAL MEDICINE

## 2020-01-23 PROCEDURE — 86901 BLOOD TYPING SEROLOGIC RH(D): CPT

## 2020-01-23 PROCEDURE — 85025 COMPLETE CBC W/AUTO DIFF WBC: CPT

## 2020-01-23 PROCEDURE — 84132 ASSAY OF SERUM POTASSIUM: CPT

## 2020-01-23 PROCEDURE — 6370000000 HC RX 637 (ALT 250 FOR IP): Performed by: INTERNAL MEDICINE

## 2020-01-23 PROCEDURE — 99232 SBSQ HOSP IP/OBS MODERATE 35: CPT | Performed by: INTERNAL MEDICINE

## 2020-01-23 PROCEDURE — 2580000003 HC RX 258: Performed by: INTERNAL MEDICINE

## 2020-01-23 PROCEDURE — 99231 SBSQ HOSP IP/OBS SF/LOW 25: CPT | Performed by: SURGERY

## 2020-01-23 PROCEDURE — 2580000003 HC RX 258: Performed by: HOSPITALIST

## 2020-01-23 PROCEDURE — 6360000002 HC RX W HCPCS: Performed by: HOSPITALIST

## 2020-01-23 PROCEDURE — 36415 COLL VENOUS BLD VENIPUNCTURE: CPT

## 2020-01-23 PROCEDURE — 80053 COMPREHEN METABOLIC PANEL: CPT

## 2020-01-23 PROCEDURE — 86900 BLOOD TYPING SEROLOGIC ABO: CPT

## 2020-01-23 PROCEDURE — 86850 RBC ANTIBODY SCREEN: CPT

## 2020-01-23 PROCEDURE — 8010000000 HC HEMODIALYSIS ACUTE INPT

## 2020-01-23 PROCEDURE — 1210000000 HC MED SURG R&B

## 2020-01-23 PROCEDURE — 6370000000 HC RX 637 (ALT 250 FOR IP): Performed by: HOSPITALIST

## 2020-01-23 RX ADMIN — Medication 10 ML: at 14:25

## 2020-01-23 RX ADMIN — ASPIRIN 81 MG: 81 TABLET, COATED ORAL at 14:24

## 2020-01-23 RX ADMIN — MELATONIN 3 MG: at 21:26

## 2020-01-23 RX ADMIN — ONDANSETRON 4 MG: 2 INJECTION INTRAMUSCULAR; INTRAVENOUS at 13:38

## 2020-01-23 RX ADMIN — MINOXIDIL 10 MG: 10 TABLET ORAL at 14:23

## 2020-01-23 RX ADMIN — ONDANSETRON 4 MG: 2 INJECTION INTRAMUSCULAR; INTRAVENOUS at 04:46

## 2020-01-23 RX ADMIN — OXYCODONE HYDROCHLORIDE AND ACETAMINOPHEN 1 TABLET: 5; 325 TABLET ORAL at 17:54

## 2020-01-23 RX ADMIN — CLONIDINE HYDROCHLORIDE 0.3 MG: 0.1 TABLET ORAL at 14:23

## 2020-01-23 RX ADMIN — SEVELAMER CARBONATE 800 MG: 800 TABLET, FILM COATED ORAL at 17:46

## 2020-01-23 RX ADMIN — CALCIUM ACETATE 2001 MG: 667 CAPSULE ORAL at 17:46

## 2020-01-23 RX ADMIN — AMLODIPINE BESYLATE 10 MG: 5 TABLET ORAL at 14:24

## 2020-01-23 RX ADMIN — Medication 10 ML: at 04:46

## 2020-01-23 RX ADMIN — NEPHROCAP 1 MG: 1 CAP ORAL at 14:24

## 2020-01-23 RX ADMIN — Medication 10 ML: at 21:27

## 2020-01-23 RX ADMIN — METOPROLOL SUCCINATE 25 MG: 25 TABLET, EXTENDED RELEASE ORAL at 14:24

## 2020-01-23 RX ADMIN — ATORVASTATIN CALCIUM 40 MG: 40 TABLET, FILM COATED ORAL at 21:26

## 2020-01-23 RX ADMIN — ISOSORBIDE MONONITRATE 30 MG: 30 TABLET, EXTENDED RELEASE ORAL at 14:23

## 2020-01-23 RX ADMIN — CLOPIDOGREL BISULFATE 75 MG: 75 TABLET ORAL at 14:23

## 2020-01-23 RX ADMIN — OXYCODONE HYDROCHLORIDE 10 MG: 10 TABLET, FILM COATED, EXTENDED RELEASE ORAL at 22:12

## 2020-01-23 RX ADMIN — OXYCODONE HYDROCHLORIDE AND ACETAMINOPHEN 1 TABLET: 5; 325 TABLET ORAL at 21:26

## 2020-01-23 RX ADMIN — BACITRACIN ZINC NEOMYCIN SULFATE POLYMYXIN B SULFATE: 400; 3.5; 5 OINTMENT TOPICAL at 14:27

## 2020-01-23 RX ADMIN — OXYCODONE HYDROCHLORIDE AND ACETAMINOPHEN 1 TABLET: 5; 325 TABLET ORAL at 06:32

## 2020-01-23 RX ADMIN — OXYCODONE HYDROCHLORIDE 10 MG: 10 TABLET, FILM COATED, EXTENDED RELEASE ORAL at 14:23

## 2020-01-23 RX ADMIN — OXYCODONE HYDROCHLORIDE AND ACETAMINOPHEN 1 TABLET: 5; 325 TABLET ORAL at 02:33

## 2020-01-23 RX ADMIN — OXYCODONE HYDROCHLORIDE AND ACETAMINOPHEN 1 TABLET: 5; 325 TABLET ORAL at 09:27

## 2020-01-23 RX ADMIN — VANCOMYCIN HYDROCHLORIDE 1250 MG: 1 INJECTION, POWDER, LYOPHILIZED, FOR SOLUTION INTRAVENOUS at 14:40

## 2020-01-23 RX ADMIN — BACITRACIN ZINC NEOMYCIN SULFATE POLYMYXIN B SULFATE: 400; 3.5; 5 OINTMENT TOPICAL at 21:26

## 2020-01-23 ASSESSMENT — PAIN - FUNCTIONAL ASSESSMENT
PAIN_FUNCTIONAL_ASSESSMENT: PREVENTS OR INTERFERES SOME ACTIVE ACTIVITIES AND ADLS
PAIN_FUNCTIONAL_ASSESSMENT: PREVENTS OR INTERFERES WITH ALL ACTIVE AND SOME PASSIVE ACTIVITIES
PAIN_FUNCTIONAL_ASSESSMENT: PREVENTS OR INTERFERES WITH ALL ACTIVE AND SOME PASSIVE ACTIVITIES
PAIN_FUNCTIONAL_ASSESSMENT: PREVENTS OR INTERFERES SOME ACTIVE ACTIVITIES AND ADLS
PAIN_FUNCTIONAL_ASSESSMENT: PREVENTS OR INTERFERES SOME ACTIVE ACTIVITIES AND ADLS
PAIN_FUNCTIONAL_ASSESSMENT: PREVENTS OR INTERFERES WITH MANY ACTIVE NOT PASSIVE ACTIVITIES

## 2020-01-23 ASSESSMENT — PAIN DESCRIPTION - LOCATION
LOCATION: LEG

## 2020-01-23 ASSESSMENT — PAIN DESCRIPTION - DESCRIPTORS
DESCRIPTORS: BURNING
DESCRIPTORS: BURNING;ACHING

## 2020-01-23 ASSESSMENT — PAIN DESCRIPTION - PROGRESSION
CLINICAL_PROGRESSION: NOT CHANGED

## 2020-01-23 ASSESSMENT — PAIN DESCRIPTION - ONSET
ONSET: ON-GOING

## 2020-01-23 ASSESSMENT — PAIN SCALES - GENERAL
PAINLEVEL_OUTOF10: 10
PAINLEVEL_OUTOF10: 7
PAINLEVEL_OUTOF10: 8
PAINLEVEL_OUTOF10: 0
PAINLEVEL_OUTOF10: 7
PAINLEVEL_OUTOF10: 0
PAINLEVEL_OUTOF10: 7
PAINLEVEL_OUTOF10: 8
PAINLEVEL_OUTOF10: 8
PAINLEVEL_OUTOF10: 0
PAINLEVEL_OUTOF10: 6
PAINLEVEL_OUTOF10: 5

## 2020-01-23 ASSESSMENT — PAIN DESCRIPTION - DIRECTION
RADIATING_TOWARDS: FOOT

## 2020-01-23 ASSESSMENT — PAIN DESCRIPTION - FREQUENCY
FREQUENCY: CONTINUOUS

## 2020-01-23 ASSESSMENT — PAIN DESCRIPTION - PAIN TYPE
TYPE: CHRONIC PAIN

## 2020-01-23 ASSESSMENT — PAIN DESCRIPTION - ORIENTATION
ORIENTATION: RIGHT

## 2020-01-23 NOTE — PLAN OF CARE
counseling participation  Description  Inpatient tobacco use cessation counseling participation  Outcome: Ongoing     Problem: OXYGENATION/RESPIRATORY FUNCTION  Goal: Patient will maintain patent airway  Outcome: Ongoing  Goal: Patient will achieve/maintain normal respiratory rate/effort  Description  Respiratory rate and effort will be within normal limits for the patient  Outcome: Ongoing     Problem: HEMODYNAMIC STATUS  Goal: Patient has stable vital signs and fluid balance  Outcome: Ongoing     Problem: FLUID AND ELECTROLYTE IMBALANCE  Goal: Fluid and electrolyte balance are achieved/maintained  Outcome: Ongoing     Problem: ACTIVITY INTOLERANCE/IMPAIRED MOBILITY  Goal: Mobility/activity is maintained at optimum level for patient  Outcome: Ongoing     Problem: Anxiety:  Goal: Level of anxiety will decrease  Description  Level of anxiety will decrease  Outcome: Ongoing     Problem: Respiratory:  Goal: Ability to maintain normal respiratory secretions will improve  Description  Ability to maintain normal respiratory secretions will improve  Outcome: Ongoing     Problem: Mobility - Impaired:  Goal: Mobility will improve  Description  Mobility will improve  Outcome: Ongoing

## 2020-01-23 NOTE — PROGRESS NOTES
Vascular Surgery Rounding Note                                                     Dr.Timothy Amador    1/23/2020  5:18 PM     Antibiotic- Vancomycin with pharmacy dosing    Subjective-stating has had multiple stools during night and has had nausea and vomiting several times. Still complaining of right foot/leg pain    Objective-   Invalid input(s): 24H    Intake/Output Summary (Last 24 hours) at 1/23/2020 1718  Last data filed at 1/23/2020 1130  Gross per 24 hour   Intake 740 ml   Output 2800 ml   Net -2060 ml     No intake/output data recorded. Physical Exam:  Awake, alert, appropriate Yes  BP (!) 143/54   Pulse 64   Temp 97.9 °F (36.6 °C) (Temporal)   Resp 16   Ht 5' 9\" (1.753 m)   Wt 176 lb 6.4 oz (80 kg)   SpO2 95%   BMI 26.05 kg/m²   Heart-Regular rate and rhythm, murmur noted  Lung-clear bilaterally anteriorly  Abdomen-Abdomen soft, non-tender. BS normal.   Extremities-RLE still with erythema, Mepilex dressing CDI to medial popliteal scar area  Neurologic- alert, oriented, normal speech    Assessment/Plan:   1. Severe PVD with rest pain of the right foot-plan was to attempt revascularization of right leg today, patient now with nausea/vomiting/diarrhea. - case postponed to resolve nausea, diarrhea, vomiting, Tentatively rescheduled for Monday 1- if problems resolve. 2.  Renal failure- support and dialysis  3. Liver-support  4. Heart- try to protect. 5.  Will reschedule RLE revasc-(Tenatively Monday 1-)  6. K+ 6.5, for extra dialysis treatment today      Antibiotics continued after surgery due to:   Infection -          [x]  Cellulitis        DVT prophylaxis: Heparin 5,000u SQ q8h    Beta Blocker:  continued

## 2020-01-23 NOTE — PROGRESS NOTES
determined by paracentesis     CKD (chronic kidney disease), stage V ([facility]     Dialysis patient ([facility]     mon wed fri at [place] GERD (gastroesophageal reflux disease)     Hemodialysis patient ([facility]     mon wed fri in [place] Hepatic cirrhosis ([facility]     dr. Elvis Chen; has been going to [facility] for liver and kidney transplant list.    Hepatitis C, chronic ([facility]     History of blood transfusion     HTN (hypertension)     Hx of blood clots     arm/fistula    Mixed hyperlipidemia 1/9/2017    Osteoarthritis     Pacemaker     Palliative care patient 07/09/2019    PVD (peripheral vascular disease) ([facility]     Sleep apnea     no cpap at present.  Type II diabetes mellitus (HCC)     no meds. Past Surgical History:  Past Surgical History:   Procedure Laterality Date    ANGIOPLASTY  08/09/11 SUKHWINDER GERARDO cephalic vein balloon angioplasty with 7mm x 4cm balloon. coild embolization of 2 cephallic vein brances with 3mm x 5cm coils    CARDIAC CATHETERIZATION  04/04/11    cardiac cath and stent x1 by Dr. Azul Melissa  07/26/11 JAI GERARDO AV fistula. coild embolization of cephalic vein branch near the wrist with 3mm EMBO coils.  balloon a'plasty left cephalic vein with [id number] balloon and then [id number] balloon    DIALYSIS FISTULA CREATION Left 2/16/2017    LEFT UPPER EXTREMITY BRACHIAL / AXILLARY GRAFT AND STENT LEFT SUBCLAVIAN VEIN  performed by Jodi Mcrae MD at [address] Right 7/12/2019    RIGHT LEG WOUND WASHOUT performed by Giselle Garcia MD at [address] [facility] FEMORAL-TIBIAL BYPASS GRAFT Right 6/25/2019    FEMORAL TIBIAL/PERINEAL BYPASS WITH REVERSED GREATER SAPHENOUS VEIN performed by Jodi Mcrae MD at [address] [facility] [address]  12/26/2010    Right internal jugular vein tunneled dialysis catheter placement    OTHER SURGICAL HISTORY  [id number]    Redo of dialysis catheter    OTHER [facility]  [facility]    Removal of RT IJV tunneled dialysis cath    OTHER SURGICAL HISTORY  5/22/12   [facility]    Ultrasound-guided cannulation of left cephalic vein in the mid forearm toward the AV anastomosis, Left upper  ext. fistulograms including venograms of the SVC, Left cephalic vein balloon angioplasty with a 4mm x 100mm Darrell balloon, Completion fistulograms    PACEMAKER PLACEMENT      medtr[employer]    PARACENTESIS      multiple/recent; per dr. Elyssa Maria at [address] [facility] 1/30/2018    UPPER EXTREMITY DRIL PROCEDURE WITH LEFT SAPHENOUS VEIN HARVESTING performed by Nakul Valle MD at [address] [place] [address]  6/19/12    LU arteriovenous fistulogram including venography of the superior vena cava. Balloon angioplasty, left forearm cephalic vein and upper arm cephalic vein with [id number] darrell balloon.  VASCULAR SURGERY  7/10/2012 [facility]     Revision of left distal radial artery to cephalic vein arteriovenous fistula with 7mm Artegraft interposition.  VASCULAR SURGERY  7/30/15 [facility] & [address]    Left upper extremity arteriovenous fistulograms including venography of the superior vena cava. Left cephalic vein balloon angioplasty with an 8 x 20 cm cutting balloon proximal cephalic vein. Balloon angioplasty of left cephalic vein/antecubital vein near the antecubital crease with 8 x 20 mm cutting balloon.  VASCULAR SURG[place]  7[place]/15 [facility]    Balloon angioplasty proximal end distal upper arm cephalic vein with 9 x 40 cm  balloon. Completion fistulograms of the left upper extremity.  VASCULAR SURGERY  8/13/15 [facility]    Revision of left upper extremity arteriovenous fistula with excision of pseudoaneurysm and primary repair of cephalic vein.     VASCULAR SURGERY  5/3/16 [facility]    Left upper fistulograms/venograms, left cephalic balloon 8 x 20 cutter,9 x 40 conquest,left proximal cephalic vein stents (flair 2 9 x 50), balloon stents 9 x 40 conquest.    VASCULAR SURGERY  12/08/2016    [facility]- ultrasound guided cannulation of History     Socioeconomic History    Marital status:      Spouse name: Benito Freeman    Number of children: 0    Years of education: 15    Highest education level: Not on file   Occupational History    Occupation:    Social Needs    Financial resource strain: Not on file    Food insecurity:     Worry: Not on file     Inability: Not on file    Transportation needs:     Medical: Not on file     Non-medical: Not on file   Tobacco Use    Smoking status: Current Every Day Smoker     Packs/day: 0.25     Years: 45.00     Pack years: 11.25     Types: Cigarettes    Smokeless tobacco: Never Used    Tobacco comment: about to  quit down to 1 a day cigarettes   Substance and Sexual Activity    Alcohol use: No    Drug use: Not Currently     Types: Marijuana, Cocaine, Methamphetamines, IV, Opiates , Other-see comments     Comment: in the 1970s, LSD    Sexual activity: Yes     Partners: Female     Comment: has a wife   Lifestyle    Physical activity:     Days per week: Not on file     Minutes per session: Not on file    Stress: Not on file   Relationships    Social connections:     Talks on phone: Not on file     Gets together: Not on file     Attends Taoism service: Not on file     Active member of club or organization: Not on file     Attends meetings of clubs or organizations: Not on file     Relationship status: Not on file    Intimate partner violence:     Fear of current or ex partner: Not on file     Emotionally abused: Not on file     Physically abused: Not on file     Forced sexual activity: Not on file   Other Topics Concern    Not on file   Social History Narrative    CODE STATUS: Full Code    HEALTH CARE PROXY: Mrs. Benito Freeman, [phone]    Back up: his Mother, Mrs. Chrissy Mercado, [phone]    AMBULATES: independantly    DOMICILED: lives in a private house with 2 steps to get in, has no stairs inside, lives with wife and step children, has 3 pets         Review Femoral                         !Yes       ! Yes            ! None      ! +------------------------------------+----------+---------------+----------+ ! Dist Femoral                        !Yes       ! Yes            ! None      ! +------------------------------------+----------+---------------+----------+ ! Deep Femoral                        !Yes       ! Yes            ! None      ! +------------------------------------+----------+---------------+----------+ ! Popliteal                           !Yes       ! Yes            ! None      ! +------------------------------------+----------+---------------+----------+ ! SSV                                 ! Yes       ! Yes            ! None      ! +------------------------------------+----------+---------------+----------+ ! Gastroc                             ! Yes       ! Yes            ! None      ! +------------------------------------+----------+---------------+----------+ ! PTV                                 ! Yes       ! Yes            ! None      ! +------------------------------------+----------+---------------+----------+ ! GSV                                 ! Yes       ! Yes            ! None      ! +------------------------------------+----------+---------------+----------+ ! ATV                                 ! Yes       ! Yes            ! None      ! +------------------------------------+----------+---------------+----------+ ! Peroneal                            !Yes       ! Yes            ! None      ! +------------------------------------+----------+---------------+----------+ Left Lower Extremities DVT Study Measurements Left 2D Measurements +------------------------------------+----------+---------------+----------+ ! Location                            ! Visualized! Compressibility! Thrombosis! +------------------------------------+----------+---------------+----------+ ! Sapheno Femoral Junction            ! Yes       ! Yes            ! None      !

## 2020-01-23 NOTE — PROGRESS NOTES
day     Continuous Infusions:   PRN Meds: oxyCODONE-acetaminophen, melatonin, oxyCODONE, sucralfate, sodium chloride flush, ondansetron, acetaminophen    Past History    Past Medical History:   has a past medical history of Anxiety, Blood circulation, collateral, CAD (coronary artery disease), Cancer ([facility] CHF (congestive heart failure) ([facility] Chyloperitoneum determined by paracentesis, CKD (chronic kidney disease), stage V ([facility] Dialysis patient ([facility] GERD (gastroesophageal reflux disease), Hemodialysis patient ([facility] Hepatic cirrhosis ([facility] Hepatitis C, chronic ([facility] History of blood transfusion, HTN (hypertension), Hx of blood clots, Mixed hyperlipidemia, Osteoarthritis, Pacemaker, Palliative care patient, PVD (peripheral vascular disease) ([facility] Sleep apnea, and Type II diabetes mellitus ([facility] Social History:   reports that he has been smoking cigarettes. He has a 11.25 pack-year smoking history. He has never used smokeless tobacco. He reports previous drug use. Drugs: Marijuana, Cocaine, Methamphetamines, IV, Opiates , and Other-see comments. He reports that he does not drink alcohol. Family History:   Family History   Problem Relation Age of Onset    High Blood Pressure Mother     High Blood Pressure Father     High Blood Pressure Sister        Physical Examination      Vitals:  BP (!) 143/54   Pulse 64   Temp 97.9 °F (36.6 °C) (Temporal)   Resp 16   Ht 5' 9\" (1.753 m)   Wt 176 lb 6.4 oz (80 kg)   SpO2 95%   BMI 26.05 kg/m²   Temp (24hrs), Av °F (36.1 °C), Min:95 °F (35 °C), Max:97.9 °F (36.6 °C)      I/O (24Hr): Intake/Output Summary (Last 24 hours) at 2020 1446  Last data filed at 2020 2311  Gross per 24 hour   Intake 740 ml   Output --   Net 740 ml       Physical Exam  Vitals signs and nursing note reviewed. Constitutional:       General: He is not in acute distress. Appearance: He is ill-appearing. He is not toxic-appearing.    HENT:      Head: Normocephalic and +--------------------------------------++--------+-----+----+--------+-----+ ! Great Toe                             !!0       !0    !    !37      !0.29 ! +--------------------------------------++--------+-----+----+--------+-----+   - Brachial Pressure:Right: 128.   - GABRIEL:Right: 1.99. Left: 1.99. Plethysmographic Digit Evaluation +---------++--------+-----+---------------++--------+-----+----------------+ ! ! !Right   ! ! Left           !!        !     !                ! +---------++--------+-----+---------------++--------+-----+----------------+ ! Location ! !Pressure! Ratio! PPG Wave Form  ! !Pressure! Ratio! PPG Wave Form   ! +---------++--------+-----+---------------++--------+-----+----------------+ ! Great Toe!!0       !0    !               !!37      !0.29 !                ! +---------++--------+-----+---------------++--------+-----+----------------+    Vl Dup Lower Extremity Arteries Right    Result Date: 1/20/2020  Vascular Lower Extremities Arterial Duplex Procedure  Demographics   Patient Name   Kushal Jesus Age                61   Patient Number [id number]       Gender             Male   Visit Number   [id number]    Interpreting       Martita Oliveira MD                              Physician   Date of Birth  1960   Referring          Martita Oliveira MD                              Physician   Accession      [id number]    [place] [address] [address] [place] [address]  Number                                         RVS, RCS  Procedure Type of Study:   Extremities Arteries:Lower Extremities Arterial Duplex, VL LOWER EXTREMITY  ARTERIES DUPLEX RIGHT. Indications for Study:Wound Lower Extremity (Right). Risk Factors   - The patient's risk factor(s) include: diabetes mellitus, arterial     hypertension and prior MI . Allergies   - No known allergies. Impression   Right lower extremity arterial duplex exam performed. There is minimal  plaque in the right CFA.  There is a severe stenosis in the profunda  femoral

## 2020-01-24 LAB
ALBUMIN SERPL-MCNC: 3.5 G/DL (ref 3.5–5.2)
ALP BLD-CCNC: 69 U/L (ref 40–130)
ALT SERPL-CCNC: 9 U/L (ref 5–41)
ANION GAP SERPL CALCULATED.3IONS-SCNC: 15 MMOL/L (ref 7–19)
AST SERPL-CCNC: 17 U/L (ref 5–40)
BASOPHILS ABSOLUTE: 0 K/UL (ref 0–0.2)
BASOPHILS RELATIVE PERCENT: 0.4 % (ref 0–1)
BILIRUB SERPL-MCNC: 0.5 MG/DL (ref 0.2–1.2)
BUN BLDV-MCNC: 29 MG/DL (ref 6–20)
CALCIUM SERPL-MCNC: 9 MG/DL (ref 8.6–10)
CHLORIDE BLD-SCNC: 94 MMOL/L (ref 98–111)
CO2: 26 MMOL/L (ref 22–29)
CREAT SERPL-MCNC: 4.2 MG/DL (ref 0.5–1.2)
EOSINOPHILS ABSOLUTE: 0.3 K/UL (ref 0–0.6)
EOSINOPHILS RELATIVE PERCENT: 5.7 % (ref 0–5)
GFR NON-AFRICAN AMERICAN: 15
GLUCOSE BLD-MCNC: 90 MG/DL (ref 74–109)
HCT VFR BLD CALC: 29.3 % (ref 42–52)
HEMOGLOBIN: 9.3 G/DL (ref 14–18)
IMMATURE GRANULOCYTES #: 0 K/UL
LYMPHOCYTES ABSOLUTE: 0.6 K/UL (ref 1.1–4.5)
LYMPHOCYTES RELATIVE PERCENT: 12.6 % (ref 20–40)
MCH RBC QN AUTO: 31.2 PG (ref 27–31)
MCHC RBC AUTO-ENTMCNC: 31.7 G/DL (ref 33–37)
MCV RBC AUTO: 98.3 FL (ref 80–94)
MONOCYTES ABSOLUTE: 0.4 K/UL (ref 0–0.9)
MONOCYTES RELATIVE PERCENT: 8.9 % (ref 0–10)
NEUTROPHILS ABSOLUTE: 3.6 K/UL (ref 1.5–7.5)
NEUTROPHILS RELATIVE PERCENT: 72.2 % (ref 50–65)
PDW BLD-RTO: 17.1 % (ref 11.5–14.5)
PLATELET # BLD: 137 K/UL (ref 130–400)
PMV BLD AUTO: 12.3 FL (ref 9.4–12.4)
POTASSIUM REFLEX MAGNESIUM: 5.3 MMOL/L (ref 3.5–5)
POTASSIUM SERPL-SCNC: 5.3 MMOL/L (ref 3.5–5)
RAPID INFLUENZA  B AGN: NEGATIVE
RAPID INFLUENZA A AGN: NEGATIVE
RBC # BLD: 2.98 M/UL (ref 4.7–6.1)
SODIUM BLD-SCNC: 135 MMOL/L (ref 136–145)
TOTAL PROTEIN: 6.6 G/DL (ref 6.6–8.7)
WBC # BLD: 4.9 K/UL (ref 4.8–10.8)

## 2020-01-24 PROCEDURE — 80053 COMPREHEN METABOLIC PANEL: CPT

## 2020-01-24 PROCEDURE — 6370000000 HC RX 637 (ALT 250 FOR IP): Performed by: HOSPITALIST

## 2020-01-24 PROCEDURE — 36415 COLL VENOUS BLD VENIPUNCTURE: CPT

## 2020-01-24 PROCEDURE — 6370000000 HC RX 637 (ALT 250 FOR IP): Performed by: INTERNAL MEDICINE

## 2020-01-24 PROCEDURE — 1210000000 HC MED SURG R&B

## 2020-01-24 PROCEDURE — 6370000000 HC RX 637 (ALT 250 FOR IP): Performed by: FAMILY MEDICINE

## 2020-01-24 PROCEDURE — 99231 SBSQ HOSP IP/OBS SF/LOW 25: CPT | Performed by: SURGERY

## 2020-01-24 PROCEDURE — 87804 INFLUENZA ASSAY W/OPTIC: CPT

## 2020-01-24 PROCEDURE — 2580000003 HC RX 258: Performed by: HOSPITALIST

## 2020-01-24 PROCEDURE — 85025 COMPLETE CBC W/AUTO DIFF WBC: CPT

## 2020-01-24 RX ORDER — SODIUM POLYSTYRENE SULFONATE 15 G/60ML
15 SUSPENSION ORAL; RECTAL ONCE
Status: COMPLETED | OUTPATIENT
Start: 2020-01-24 | End: 2020-01-24

## 2020-01-24 RX ADMIN — Medication 10 ML: at 21:25

## 2020-01-24 RX ADMIN — AMLODIPINE BESYLATE 10 MG: 5 TABLET ORAL at 08:45

## 2020-01-24 RX ADMIN — SODIUM POLYSTYRENE SULFONATE 15 G: 15 SUSPENSION ORAL; RECTAL at 13:00

## 2020-01-24 RX ADMIN — OXYCODONE HYDROCHLORIDE 10 MG: 10 TABLET, FILM COATED, EXTENDED RELEASE ORAL at 23:32

## 2020-01-24 RX ADMIN — OXYCODONE HYDROCHLORIDE AND ACETAMINOPHEN 1 TABLET: 5; 325 TABLET ORAL at 08:50

## 2020-01-24 RX ADMIN — BACITRACIN ZINC NEOMYCIN SULFATE POLYMYXIN B SULFATE: 400; 3.5; 5 OINTMENT TOPICAL at 08:44

## 2020-01-24 RX ADMIN — CALCIUM ACETATE 2001 MG: 667 CAPSULE ORAL at 08:45

## 2020-01-24 RX ADMIN — METOPROLOL SUCCINATE 25 MG: 25 TABLET, EXTENDED RELEASE ORAL at 08:46

## 2020-01-24 RX ADMIN — BACITRACIN ZINC NEOMYCIN SULFATE POLYMYXIN B SULFATE: 400; 3.5; 5 OINTMENT TOPICAL at 08:47

## 2020-01-24 RX ADMIN — OXYCODONE HYDROCHLORIDE 10 MG: 10 TABLET, FILM COATED, EXTENDED RELEASE ORAL at 15:17

## 2020-01-24 RX ADMIN — METOPROLOL SUCCINATE 25 MG: 25 TABLET, EXTENDED RELEASE ORAL at 21:24

## 2020-01-24 RX ADMIN — OXYCODONE HYDROCHLORIDE 10 MG: 10 TABLET, FILM COATED, EXTENDED RELEASE ORAL at 05:59

## 2020-01-24 RX ADMIN — LACTULOSE 10 G: 20 SOLUTION ORAL at 21:25

## 2020-01-24 RX ADMIN — OXYCODONE HYDROCHLORIDE AND ACETAMINOPHEN 1 TABLET: 5; 325 TABLET ORAL at 22:03

## 2020-01-24 RX ADMIN — MINOXIDIL 10 MG: 10 TABLET ORAL at 08:45

## 2020-01-24 RX ADMIN — CALCIUM ACETATE 2001 MG: 667 CAPSULE ORAL at 12:59

## 2020-01-24 RX ADMIN — CALCIUM ACETATE 2001 MG: 667 CAPSULE ORAL at 18:08

## 2020-01-24 RX ADMIN — CLOPIDOGREL BISULFATE 75 MG: 75 TABLET ORAL at 08:45

## 2020-01-24 RX ADMIN — BACITRACIN ZINC NEOMYCIN SULFATE POLYMYXIN B SULFATE: 400; 3.5; 5 OINTMENT TOPICAL at 21:25

## 2020-01-24 RX ADMIN — NEPHROCAP 1 MG: 1 CAP ORAL at 08:45

## 2020-01-24 RX ADMIN — ASPIRIN 81 MG: 81 TABLET, COATED ORAL at 08:45

## 2020-01-24 RX ADMIN — SEVELAMER CARBONATE 800 MG: 800 TABLET, FILM COATED ORAL at 08:45

## 2020-01-24 RX ADMIN — OXYCODONE HYDROCHLORIDE AND ACETAMINOPHEN 1 TABLET: 5; 325 TABLET ORAL at 13:03

## 2020-01-24 RX ADMIN — Medication 10 ML: at 08:46

## 2020-01-24 RX ADMIN — OXYCODONE HYDROCHLORIDE AND ACETAMINOPHEN 1 TABLET: 5; 325 TABLET ORAL at 18:08

## 2020-01-24 RX ADMIN — ATORVASTATIN CALCIUM 40 MG: 40 TABLET, FILM COATED ORAL at 21:24

## 2020-01-24 RX ADMIN — SEVELAMER CARBONATE 800 MG: 800 TABLET, FILM COATED ORAL at 18:08

## 2020-01-24 RX ADMIN — ISOSORBIDE MONONITRATE 30 MG: 30 TABLET, EXTENDED RELEASE ORAL at 08:45

## 2020-01-24 RX ADMIN — SEVELAMER CARBONATE 800 MG: 800 TABLET, FILM COATED ORAL at 12:59

## 2020-01-24 RX ADMIN — CLONIDINE HYDROCHLORIDE 0.3 MG: 0.1 TABLET ORAL at 21:24

## 2020-01-24 ASSESSMENT — PAIN DESCRIPTION - PROGRESSION: CLINICAL_PROGRESSION: NOT CHANGED

## 2020-01-24 ASSESSMENT — PAIN SCALES - GENERAL
PAINLEVEL_OUTOF10: 5
PAINLEVEL_OUTOF10: 8
PAINLEVEL_OUTOF10: 7
PAINLEVEL_OUTOF10: 8
PAINLEVEL_OUTOF10: 9
PAINLEVEL_OUTOF10: 8
PAINLEVEL_OUTOF10: 6
PAINLEVEL_OUTOF10: 8

## 2020-01-24 ASSESSMENT — PAIN DESCRIPTION - LOCATION: LOCATION: LEG

## 2020-01-24 ASSESSMENT — PAIN DESCRIPTION - DIRECTION: RADIATING_TOWARDS: FOOT

## 2020-01-24 ASSESSMENT — PAIN - FUNCTIONAL ASSESSMENT: PAIN_FUNCTIONAL_ASSESSMENT: PREVENTS OR INTERFERES WITH ALL ACTIVE AND SOME PASSIVE ACTIVITIES

## 2020-01-24 ASSESSMENT — PAIN DESCRIPTION - PAIN TYPE: TYPE: CHRONIC PAIN;SURGICAL PAIN

## 2020-01-24 ASSESSMENT — PAIN DESCRIPTION - ONSET: ONSET: ON-GOING

## 2020-01-24 ASSESSMENT — PAIN DESCRIPTION - ORIENTATION: ORIENTATION: RIGHT

## 2020-01-24 ASSESSMENT — PAIN DESCRIPTION - FREQUENCY: FREQUENCY: CONTINUOUS

## 2020-01-24 ASSESSMENT — PAIN DESCRIPTION - DESCRIPTORS: DESCRIPTORS: BURNING;ACHING

## 2020-01-24 NOTE — PLAN OF CARE
intentional harm  Outcome: Ongoing     Problem: Daily Care:  Goal: Daily care needs are met  Description  Daily care needs are met  Outcome: Ongoing     Problem: Pain:  Goal: Patient's pain/discomfort is manageable  Description  Patient's pain/discomfort is manageable  Outcome: Ongoing  Goal: Pain level will decrease  Description  Pain level will decrease  Outcome: Ongoing  Goal: Control of acute pain  Description  Control of acute pain  Outcome: Ongoing  Goal: Control of chronic pain  Description  Control of chronic pain  Outcome: Ongoing     Problem: Skin Integrity:  Goal: Skin integrity will stabilize  Description  Skin integrity will stabilize  Outcome: Ongoing     Problem: Discharge Planning:  Goal: Patients continuum of care needs are met  Description  Patients continuum of care needs are met  Outcome: Ongoing     Problem: Discharge Planning:  Goal: Discharged to appropriate level of care  Description  Discharged to appropriate level of care  Outcome: Ongoing     Problem: ABCDS Injury Assessment  Goal: Absence of physical injury  Outcome: Ongoing     Problem: Bleeding:  Goal: Will show no signs and symptoms of excessive bleeding  Description  Will show no signs and symptoms of excessive bleeding  Outcome: Ongoing     Problem: Tissue Perfusion - Renal, Altered:  Goal: Ability to achieve a balanced intake and output will improve  Description  Ability to achieve a balanced intake and output will improve  Outcome: Ongoing  Goal: Electrolytes within specified parameters  Description  Electrolytes within specified parameters  Outcome: Ongoing  Goal: Urine creatinine clearance will be within specified parameters  Description  Urine creatinine clearance will be within specified parameters  Outcome: Ongoing  Goal: Serum creatinine will be within specified parameters  Description  Serum creatinine will be within specified parameters  Outcome: Ongoing

## 2020-01-24 NOTE — PROGRESS NOTES
[address]    Daily HOSPITAL Progress Note                            Date:  1/23/20  Patient: Mejia Self  Admission:  1/18/2020  6:22 PM  Admit DX: Cellulitis of right lower extremity without foot [L03.115]  Age:  61 y.o., 1960        Date of Admission 1/18/2020  6:22 PM   Hospital Length of Stay  LOS: 5 days            I personally saw the patient and rounded with:  [facility] RN on 1/23/20      The observations documented in this note, including the assessment and plan are mine         Reason for initial evaluation or the patient's initial complaint    1. Reason for Hospital Admission: PVD        2. Reason for Cardiology Consultation: Risk assessment for surgery         SUBJECTIVE:      Chief Complaint / Reason for the Visit   Follow up of:  PVD and CAD and systemic arterial hypertension    Family present and in room during examination:  Yes: a female      Specialty Problems        Cardiology Problems    CAD (coronary artery disease)        Chronic diastolic congestive heart failure ([address]        Essential hypertension        CHF (congestive heart failure) (HCC)        Chronic deep vein thrombosis (DVT) of axillary vein of left upper extremity (HCC)        Steal syndrome as complication of dialysis access ([address]        Steal syndrome of dialysis vascular access [facility])        Atherosclerosis of artery of extremity with ulceration (HCC)        Atherosclerosis of artery of extremity with rest pain (HCC)        PVD (peripheral vascular disease) ([address]        Failing vascular bypass graft        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 Mejia Self is generally feeling unchanged.   Surgery postponed today    Mr. Mejia Self has the mcg Subcutaneous Weekly Ariel Kwong MD   25 mcg at 01/19/20 1924    b complex-C-folic acid (NEPHROCAPS) capsule 1 mg  1 capsule Oral Daily Ariel Kwong MD   1 mg at 01/23/20 1424    vancomycin (VANCOCIN) intermittent dosing (placeholder)   Other RX Dee Flores MD        amLODIPine (NORVASC) tablet 10 mg  10 mg Oral Daily Chanell Orr MD   10 mg at 01/23/20 1424    aspirin EC tablet 81 mg  81 mg Oral Daily Chanellmoira Orr MD   81 mg at 01/23/20 1424    atorvastatin (LIPITOR) tablet 40 mg  40 mg Oral Nightly Chanell Orr MD   40 mg at 01/22/20 2026    cloNIDine (CATAPRES) tablet 0.3 mg  0.3 mg Oral TID Chanell Orr MD   0.3 mg at 01/23/20 1423    clopidogrel (PLAVIX) tablet 75 mg  75 mg Oral Daily Chanell Orr MD   75 mg at 01/23/20 1423    isosorbide mononitrate (IMDUR) extended release tablet 30 mg  30 mg Oral Daily Chanell Orr MD   30 mg at 01/23/20 1423    lactulose (CHRONULAC) 10 GM/15ML solution 10 g  10 g Oral TID Chanell Orr MD   Stopped at 01/23/20 1200    melatonin tablet 3 mg  3 mg Oral Nightly PRN Chanell Orr MD   3 mg at 01/22/20 2026    metoprolol succinate (TOPROL XL) extended release tablet 25 mg  25 mg Oral BID Chanell Orr MD   25 mg at 01/23/20 1424    minoxidil (LONITEN) tablet 10 mg  10 mg Oral Daily Chanell Orr MD   10 mg at 01/23/20 1423    oxyCODONE (OXYCONTIN) extended release tablet 10 mg  10 mg Oral Q8H PRN Chanell Orr MD   10 mg at 01/23/20 1423    sucralfate (CARAFATE) tablet 1 g  1 g Oral TID PRN Chanell Orr MD        sevelamer (RENVELA) tablet 800 mg  800 mg Oral TID WC Chanell Orr MD   800 mg at 01/23/20 1746    sodium chloride flush 0.9 % injection 10 mL  10 mL Intravenous 2 times per day Chanell Orr MD   10 mL at 01/23/20 1425    sodium chloride flush 0.9 % injection 10 mL  10 mL Intravenous PRN Chanell Orr MD        ondansetron [facility] injection 4 mg  4 mg Intravenous Q6H PRN Chanell Orr MD   4 mg at 01/23/20 1338    acetaminophen (TYLENOL) tablet 650 mg  650 mg Oral Q4H PRN Kumar Rizo MD         Allergies: Patient has no known allergies. Past Medical History:   Diagnosis Date    Anxiety     Blood circulation, collateral     CAD (coronary artery disease)     stents x 2; per dr. Han [facility])     liver cancer    CHF (congestive heart failure) ([address]     Chyloperitoneum determined by paracentesis     CKD (chronic kidney disease), stage V ([address]     Dialysis patient ([address]     mon wed fri at [place] GERD (gastroesophageal reflux disease)     Hemodialysis patient ([address]     mon wed fri in [place] Hepatic cirrhosis ([facility]     dr. Mayra Duncan; has been going to [facility] for liver and kidney transplant list.    Hepatitis C, chronic ([facility]     History of blood transfusion     HTN (hypertension)     Hx of blood clots     arm/fistula    Mixed hyperlipidemia 1/9/2017    Osteoarthritis     Pacemaker     Palliative care patient 07/09/2019    PVD (peripheral vascular disease) ([address]     Sleep apnea     no cpap at present.  Type II diabetes mellitus (HCC)     no meds. Past Surgical History:   Procedure Laterality Date    ANGIOPLASTY  08/09/11 SUKHWINDER GERARDO cephalic vein balloon angioplasty with 7mm x 4cm balloon. coild embolization of 2 cephallic vein brances with 3mm x 5cm coils    CARDIAC CATHETERIZATION  04/04/11    cardiac cath and stent x1 by Dr. Caitlin William  07/26/11 SUKHWINDER GERARDO AV fistula. coild embolization of cephalic vein branch near the wrist with 3mm EMBO coils.  balloon a'plasty left cephalic vein with [id number] balloon and then [id number] balloon    DIALYSIS FISTULA CREATION Left 2/16/2017    LEFT UPPER EXTREMITY BRACHIAL / AXILLARY GRAFT AND STENT LEFT SUBCLAVIAN VEIN  performed by Ramesh Villasenor MD at [address] Right 7/12/2019    RIGHT LEG WOUND WASHOUT performed by Agus Darden MD at [address] [facility] FEMORAL-TIBIAL BYPASS GRAFT Right 6/25/2019    FEMORAL TIBIAL/PERINEAL BYPASS WITH REVERSED GREATER SAPHENOUS VEIN performed by Gage Martinez MD at [address]  12/26/2010    Right internal jugular vein tunneled dialysis catheter placement    OTHER SURGICAL HISTORY  [id number]    Redo of dialysis catheter    OTHER SURGICAL HISTORY  9/6/2011   [facility]    Removal of RT IJV tunneled dialysis cath    OTHER SURGICAL HISTORY  5/22/12   [facility]    Ultrasound-guided cannulation of left cephalic vein in the mid forearm toward the AV anastomosis, Left upper  ext. fistulograms including venograms of the SVC, Left cephalic vein balloon angioplasty with a 4mm x 100mm Tdo balloon, Completion fistulograms    PACEMAKER PLACEMENT      medtronic    PARACENTESIS      multiple/recent; per dr. Ruthann Carrillo at [address] [place] [address] Left 1/30/2018    UPPER EXTREMITY DRIL PROCEDURE WITH LEFT SAPHENOUS VEIN HARVESTING performed by Gage Martinez MD at [address]  6/19/12    LUE arteriovenous fistulogram including venography of the superior vena cava. Balloon angioplasty, left forearm cephalic vein and upper arm cephalic vein with [id number] tod balloon.  VASCULAR SURGERY  7/10/2012 [facility]     Revision of left distal radial artery to cephalic vein arteriovenous fistula with 7mm Artegraft interposition.  VASCULAR SURGERY  7/30/15 [facility] & [address]    Left upper extremity arteriovenous fistulograms including venography of the superior vena cava. Left cephalic vein balloon angioplasty with an 8 x 20 cm cutting balloon proximal cephalic vein. Balloon angioplasty of left cephalic vein/antecubital vein near the antecubital crease with 8 x 20 mm cutting balloon.  VASCULAR SURGERY  7/30/15 [facility] cont    Balloon angioplasty proximal end distal upper arm cephalic vein with 9 x 40 cm  balloon. Completion fistulograms of the left upper extremity.     VASCULAR SURGERY  8/13/15 [facility]    Revision of left upper extremity arteriovenous fistula with excision of pseudoaneurysm and primary repair of cephalic vein.  VASCULAR SURGERY  5/3/16 [facility]    Left upper fistulograms/venograms, left cephalic balloon 8 x 20 cutter,9 x 40 conquest,left proximal cephalic vein stents (flair 2 9 x 50), balloon stents 9 x 40 conquest.    VASCULAR SURGERY  12/08/2016    [facility]- ultrasound guided cannulation of left distal cephalic vein ultrasound guided cannulation right internal jugular vein placement of right internal jugular vein tunneled dialysis catheter (Bard Equistream XK 23cm tip to cuff)     VASCULAR SURGERY  01/20/2017    [facility]-Right upper venograms, u/s guided cannulation left basilic vein, left upper venograms, balloon angioplasty left subclavian vein 10x40 conquest.    VASCULAR SURGERY  02/16/2017    [facility]. Left brachial artery to axillary vein arteriovenous graft with 7 mm artegraft. Left upper extremity venograms. Left subclavian vein stent viabahn 13x50. Left subclavian vein stent balloon angioplasty 12x40 atlas.  VASCULAR SURGERY  04/11/2017    [facility]. Removal of tunneled dialysis catheter right internal jugular vein.  VASCULAR SURGERY  01/25/2018    [facility]. Arch aortogram, left upper arteriogram, AV graft angiogram/venogram.    VASCULAR SURGERY  02/28/2018    [facility]. Right CFA 5f-6f-7f sheath, aortogram with bilateral lower arteriogram, left SFA/pop  balloon angioplasty 5x100 , 6x150 lutonix ( 2), left CFA  7f sheath, bilateral iliac kissing stents right 10x38 icast, left 9x59 icast expanded with 10x40 , completion aortogram, mynx left CFA , failed mynx right CFA.  VASCULAR SURGERY  06/13/2019    [facility] left CFA 5f sheath, aortogram with bilateral lower arteriogram, mynx left CFA.  VASCULAR SURGERY  06/25/2019    [facility]. Femoral tibial/perineal bypass with reversed greater saphenous vein.  VASCULAR SURGERY  08/16/2019    [facility]. Aortogram and runoff, selective right CFA injections. PTA of fem-tp bypass with 4.0 x 220 mm tod balloon to 4.33 mm    VASCULAR SURGERY  10/23/2019    [facility]. Thrombin injection in the left SFA PSA, right common femoral artery us guided access, left SFA stent treatment of the flap. Family History   Problem Relation Age of Onset    High Blood Pressure Mother     High Blood Pressure Father     High Blood Pressure Sister      Social History     Tobacco Use    Smoking status: Current Every Day Smoker     Packs/day: 0.25     Years: 45.00     Pack years: 11.25     Types: Cigarettes    Smokeless tobacco: Never Used    Tobacco comment: about to  quit down to 1 a day cigarettes   Substance Use Topics    Alcohol use: No          Review of Systems:    General:      Complaint / Symptom Yes / No / Description if Yes       Fatigue Yes:  chronic   Weight gain NA   Insomnia NA       Respiratory:        Complaint / Symptom Yes / No / Description if Yes       Cough No   Horseness NA       Cardiovascular:    Complaint / Symptom Yes / No / Description if Yes       Chest Pain No   Shortness of Air / Orthopnea Yes: chronic and stable   Presyncope / Syncope No   Palpitations No         Objective:    BP (!) 143/54   Pulse 64   Temp 97.9 °F (36.6 °C) (Temporal)   Resp 16   Ht 5' 9\" (1.753 m)   Wt 176 lb 6.4 oz (80 kg)   SpO2 95%   BMI 26.05 kg/m² ,     Intake/Output Summary (Last 24 hours) at 1/23/2020 1830  Last data filed at 1/23/2020 1130  Gross per 24 hour   Intake 740 ml   Output 2800 ml   Net -2060 ml       GENERAL - well developed and well nourished, is an active participant in this examination  HEENT -  PERRLA, Hearing appears normal, conjunctiva and lids are normal, ears and nose appear normal  NECK - no thyromegaly, no JVD, trachea is in the midline  CARDIOVASCULAR - PMI is in the left mid line clavicular position, Normal S1 and S2 with a grade 1/6 systolic murmur. No S3 or S4    PULMONARY - No respiratory distress. scattered wheezes and rales.   Breath sounds in both  lung fields are Decreased  ABDOMEN  - PLANS:    1. Continue present medications except for changes as noted above  2. Continue to monitor rhythm  3. Further orders per clinical course. On peroperative beta blocker  Surgery postponed today and rescheduled for Monday, 1/27/2020  Will see then         Discussed with patient and family and nursing.     Electronically signed by Dimitri Trevino MD on 1/23/20        [facility] [place]

## 2020-01-24 NOTE — PROGRESS NOTES
Vascular Surgery Rounding Note                                                     Dr.Victoria Angulo    1/24/2020  12:10 PM     Antibiotic- Vancomycin with pharmacy dosing    Subjective-. Still complaining of right foot/leg pain, wants to have his surgery. States no stools or emesis today    Objective-   Invalid input(s): 24H    Intake/Output Summary (Last 24 hours) at 1/24/2020 1210  Last data filed at 1/24/2020 0945  Gross per 24 hour   Intake 240 ml   Output --   Net 240 ml     In: 240 [P.O.:240]  Out: -     Physical Exam:  Awake, alert, appropriate Yes  BP (!) 144/49   Pulse 63   Temp 98 °F (36.7 °C) (Temporal)   Resp 18   Ht 5' 9\" (1.753 m)   Wt 175 lb 3.2 oz (79.5 kg)   SpO2 93%   BMI 25.87 kg/m²   Heart-Regular rate and rhythm, murmur noted  Lung-clear bilaterally anteriorly  Abdomen-Abdomen soft, non-tender. BS normal.   Extremities-RLE still with erythema, Mepilex dressing CDI to medial popliteal scar area  Neurologic- alert, oriented, normal speech    Specimen Information: Nasopharyngeal; Nasal        Component Value Ref Range & Units Status Collected Lab   Rapid Influenza A Ag Negative  Negative Final 01/24/2020  5:57 AM  - [facility]   Rapid Influenza B Ag            Assessment/Plan:   1. Severe PVD with rest pain of the right foot-plan was to attempt revascularization of right leg today, patient now with nausea/vomiting/diarrhea. 2.  Renal failure- support and dialysis  3. Liver-support  4. Heart- try to protect. 5.  Rescheduled RLE revasc for Monday at 12:00  6. K+5.3 today      Antibiotics continued after surgery due to:   Infection -        [x]  Cellulitis        DVT prophylaxis: Heparin 5,000u SQ q8h    Beta Blocker:  continued

## 2020-01-24 NOTE — PROGRESS NOTES
Nutrition Assessment    Type and Reason for Visit: Reassess    Nutrition Recommendations: continue current POC    Nutrition Assessment: Remains adequately nourished. Aware had issues with n/v and diarrhea yesterday 1/23/2020. RN/pt believe nausea/vomiting d/t elevated K+ and diarrhea or losse stools d/t lactulose. PO remains good today with itake %    Malnutrition Assessment:  · Malnutrition Status: No malnutrition  · Context: Acute illness or injury  · Findings of the 6 clinical characteristics of malnutrition (Minimum of 2 out of 6 clinical characteristics is required to make the diagnosis of moderate or severe Protein Calorie Malnutrition based on AND/ASPEN Guidelines):  1. Energy Intake-Greater than 75% of estimated energy requirement, (2 days)    2. Weight Loss-No significant weight loss,    3. Fat Loss-No significant subcutaneous fat loss,    4. Muscle Loss-No significant muscle mass loss,    5. Fluid Accumulation-Mild fluid accumulation, Extremities  6.   Strength-Not measured    Nutrition Risk Level: Low    Nutrition Diagnosis:   · Problem: Increased nutrient needs  · Etiology: related to Increased demand for energy/nutrients     Signs and symptoms:  as evidenced by Presence of wounds    Objective Information:  · Nutrition-Focused Physical Findings:    · Wound Type: Surgical Wound, Open Wounds  · Current Nutrition Therapies:  · Oral Diet Orders: Renal   · Oral Diet intake: %  · Oral Nutrition Supplement (ONS) Orders: None    · Anthropometric Measures:  · Ht: 5' 9\" (175.3 cm)   · Current Body Wt: 175 lb 3 oz (79.5 kg)  · Admission Body Wt: 162 lb (73.5 kg)(stated)  · Usual Body Wt: 174 lb 9 oz (79.2 kg)(10/2019)  · Ideal Body Wt: 160 lb (72.6 kg), % Ideal Body 109.5%  · BMI Classification: BMI 25.0 - 29.9 Overweight    Nutrition Interventions:   Continue current diet  Continued Inpatient Monitoring    Nutrition Evaluation:   · Evaluation: Progressing toward goals   · Goals: pointake 75% or greater.   Wound improvement    · Monitoring: Meal Intake, Diet Tolerance, Skin Integrity, Wound Healing, Weight, Pertinent Labs      Electronically signed by Martha Read, MS, RD, LD on 1/24/20 at 2:30 PM    Contact Number: [phone]

## 2020-01-24 NOTE — PROGRESS NOTES
Nephrology ([facility]) Progress Note    Patient:  Riddhi Mccarthy  YOB: 1960  Date of Service: 1/24/2020  MRN: [id number]   Acct: [de-identified]   Primary Care Physician: Isabella Perez DO  Advance Directive: Full Code  Admit Date: 1/18/2020       Hospital Day: 6  Referring Provider: Reynaldo Rangel MD    Patient independently seen and examined, Chart, Consults, Notes, Operative notes, Labs, Cardiology, and Radiology studies reviewed as able. Subjective:  Riddhi Mccarthy is a 61 y.o. male  whom we were consulted for end-stage renal disease. Patient goes to [address] [place] [address] dialysis clinic on Monday Wednesday Friday. Patient has a known history of severe peripheral vascular disease, history of CABG, congestive heart failure, cirrhosis of liver, sleep apnea and hepatitis C. Admitted this time for cellulitis. Today, no new events. Tolerating HD. Denies cp/soa/n/v. Patient recalls no new dietary indiscretions. Extensive dietary review undertaken today. Allergies:  Patient has no known allergies.     Medicines:  Current Facility-Administered Medications   Medication Dose Route Frequency Provider Last Rate Last Dose    sodium polystyrene (KAYEXALATE) 15 GM/60ML suspension 15 g  15 g Oral Once Abby See MD        calcium acetate (PHOSLO) capsule 2,001 mg  2,001 mg Oral TID  Reynaldo Rangel MD   2,001 mg at 01/24/20 0845    neomycin-bacitracin-polymyxin (NEOSPORIN) ointment   Topical BID Jaylen Horta MD        oxyCODONE-acetaminophen (PERCOCET) 5-325 MG per tablet 1 tablet  1 tablet Oral Q4H PRN Kamryn Bernal MD   1 tablet at 01/24/20 0850    darbepoetin sandrine-polysorbate (ARANESP) injection 25 mcg  25 mcg Subcutaneous Weekly Merlinda Hawking, MD   25 mcg at 01/19/20 1924    b complex-C-folic acid (NEPHROCAPS) capsule 1 mg  1 capsule Oral Daily Merlinda Hawking, MD   1 mg at 01/24/20 0845    vancomycin [facility]) intermittent dosing (placeholder)   Other RX Placeholder Baylee Christian MD        amLODIPine (NORVASC) tablet 10 mg  10 mg Oral Daily Kristie Cheek MD   10 mg at 01/24/20 0845    aspirin EC tablet 81 mg  81 mg Oral Daily Kristie Cheek MD   81 mg at 01/24/20 0845    atorvastatin (LIPITOR) tablet 40 mg  40 mg Oral Nightly Kristie Cheek MD   40 mg at 01/23/20 2126    cloNIDine (CATAPRES) tablet 0.3 mg  0.3 mg Oral TID Kristie Cheek MD   Stopped at 01/23/20 2126    clopidogrel (PLAVIX) tablet 75 mg  75 mg Oral Daily Kristie Cheek MD   75 mg at 01/24/20 0845    isosorbide mononitrate (IMDUR) extended release tablet 30 mg  30 mg Oral Daily Kristie Cheek MD   30 mg at 01/24/20 0845    lactulose (CHRONULAC) 10 GM/15ML solution 10 g  10 g Oral TID Kristie Cheek MD   Stopped at 01/23/20 1200    melatonin tablet 3 mg  3 mg Oral Nightly PRN Kristie Cheek MD   3 mg at 01/23/20 2126    metoprolol succinate (TOPROL XL) extended release tablet 25 mg  25 mg Oral BID Kristie Cheek MD   25 mg at 01/24/20 0846    minoxidil (LONITEN) tablet 10 mg  10 mg Oral Daily Kristie Cheek MD   10 mg at 01/24/20 0845    oxyCODONE (OXYCONTIN) extended release tablet 10 mg  10 mg Oral Q8H PRN Kristie Cheek MD   10 mg at 01/24/20 0559    sucralfate (CARAFATE) tablet 1 g  1 g Oral TID PRN Kristie Cheek MD        sevelamer (RENVELA) tablet 800 mg  800 mg Oral TID WC Kristie Cheek MD   800 mg at 01/24/20 0845    sodium chloride flush 0.9 % injection 10 mL  10 mL Intravenous 2 times per day Kristie Cheek MD   10 mL at 01/24/20 0846    sodium chloride flush 0.9 % injection 10 mL  10 mL Intravenous PRN Kristie Cheek MD        [facility] injection 4 mg  4 mg Intravenous Q6H PRN Kristie Cheek MD   4 mg at 01/23/20 1338    acetaminophen (TYLENOL) tablet 650 mg  650 mg Oral Q4H PRN Kristie Cheek MD           Past Medical History:  Past Medical History:   Diagnosis Date    Anxiety     Blood circulation, collateral     CAD (coronary artery disease)     stents x 2; per dr. Charles Fowler [facility])     liver cancer    [facility]estive [facility] failure) [facility]     Chyloperitoneum determined by paracentesis     CKD (chronic kidney disease), stage V ([address]     Dialysis patient ([address]     mon wed fri at [facility] GERD (gastroesophageal reflux disease)     Hemodialysis patient ([address]     mon wed fri in [place] Hepatic cirrhosis ([facility]     dr. Elyssa Maria; has been going to [facility] for liver and kidney transplant list.    Hepatitis C, chronic ([facility]     History of blood transfusion     HTN (hypertension)     Hx of blood clots     arm/fistula    Mixed hyperlipidemia 1/9/2017    Osteoarthritis     Pacemaker     Palliative care patient 07/09/2019    PVD (peripheral vascular disease) ([facility]     Sleep apnea     no cpap at present.  Type II diabetes mellitus (HCC)     no meds. Past Surgical History:  Past Surgical History:   Procedure Laterality Date    ANGIOPLASTY  08/09/11 SUKHWINDER GERARDO cephalic vein balloon angioplasty with 7mm x 4cm balloon. coild embolization of 2 cephallic vein brances with 3mm x 5cm coils    CARDIAC CATHETERIZATION  04/04/11    cardiac cath and stent x1 by Dr. Amada Field  07/26/11 SUKHWINDER GERARDO AV fistula. coild embolization of cephalic vein branch near the wrist with 3mm EMBO coils.  balloon a'plasty left cephalic vein with [id number] balloon and then [id number] balloon    DIALYSIS FISTULA CREATION Left 2/16/2017    LEFT UPPER EXTREMITY BRACHIAL / AXILLARY GRAFT AND STENT LEFT SUBCLAVIAN VEIN  performed by Nakul Valle MD at [address] Right 7/12/2019    RIGHT LEG WOUND WASHOUT performed by Jr Patton MD at [address] [facility] FEMORAL-TIBIAL BYPASS GRAFT Right 6/25/2019    FEMORAL TIBIAL/PERINEAL BYPASS WITH REVERSED GREATER SAPHENOUS VEIN performed by Nakul Valle MD at [address] HISTORY  12/26/2010    Right internal jugular vein tunneled dialysis catheter placement    OTHER SURGICAL HISTORY  [id number]    Redo of dialysis catheter    OTHER SURGICAL HISTORY  9/6/2011   [facility]    Removal of RT IJV tunneled dialysis cath    OTHER SURGICAL HISTORY  5/22/12   [facility]    Ultrasound-guided cannulation of left cephalic vein in the mid forearm toward the AV anastomosis, Left upper  ext. fistulograms including venograms of the SVC, Left cephalic vein balloon angioplasty with a 4mm x 100mm Tod balloon, Completion fistulograms    PACEMAKER PLACEMENT      medtronic    PARACENTESIS      multiple/recent; per dr. Ole Ni at [address] [facility] 1/30/2018    UPPER EXTREMITY DRIL PROCEDURE WITH LEFT SAPHENOUS VEIN HARVESTING performed by Anita Ren MD at [address] [place] [address]  6/19/12    [facility] [place] fist[place]ogram including venography of the superior vena cava. Balloon angioplasty, left forearm cephalic vein and upper arm cephalic vein with [id number] tod balloon.  VASCULAR SURGERY  7/10/2012 [facility]     Revision of left distal radial artery to cephalic vein arteriovenous fistula with 7mm Artegraft interposition.  VASCULAR SURGERY  7/30/15 [facility] & [address]    Left upper extremity arteriovenous fistulograms including venography of the superior vena cava. Left cephalic vein balloon angioplasty with an 8 x 20 cm cutting balloon proximal cephalic vein. Balloon angioplasty of left cephalic vein/antecubital vein near the antecubital crease with 8 x 20 mm cutting balloon.  VASCULAR SURG[place]  [place] [facility]    Balloon angioplasty proximal end distal upper arm cephalic vein with 9 x 40 cm  balloon. Completion fistulograms of the left upper extremity.  VASCULAR SURGERY  8/13/15 [facility]    Revision of left upper extremity arteriovenous fistula with excision of pseudoaneurysm and primary repair of cephalic vein.     VASCULAR SURGERY  5/3/16 [facility]    Left upper fistulograms/venograms, left cephalic balloon 8 x 20 cutter,9 x 40 conquest,left proximal cephalic vein stents (flair 2 9 x 50), balloon stents 9 x 40 conquest.    VASCULAR SURGERY  12/08/2016    [facility]- ultrasound guided cannulation of left distal cephalic vein ultrasound guided cannulation right internal jugular vein placement of right internal jugular vein tunneled dialysis catheter (Bard Equistream XK 23cm tip to cuff)     VASCULAR SURGERY  01/20/2017    [facility]-Right upper venograms, u/s guided cannulation left basilic vein, left upper venograms, balloon angioplasty left subclavian vein 10x40 conquest.    VASCULAR SURGERY  02/16/2017    [facility]. Left brachial artery to axillary vein arteriovenous graft with 7 mm artegraft. Left upper extremity venograms. Left subclavian vein stent viabahn 13x50. Left subclavian vein stent balloon angioplasty 12x40 atlas.  VASCULAR SURGERY  04/11/2017    [facility]. Removal of tunneled dialysis catheter right internal jugular vein.  VASCULAR SURGERY  01/25/2018    [facility]. Arch aortogram, left upper arteriogram, AV graft angiogram/venogram.    VASCULAR SURGERY  02/28/2018    [facility]. Right CFA 5f-6f-7f sheath, aortogram with bilateral lower arteriogram, left SFA/pop  balloon angioplasty 5x100 , 6x150 lutonix ( 2), left CFA  7f sheath, bilateral iliac kissing stents right 10x38 icast, left 9x59 icast expanded with 10x40 , completion aortogram, mynx left CFA , failed mynx right CFA.  VASCULAR SURGERY  06/13/2019    [facility] left CFA 5f sheath, aortogram with bilateral lower arteriogram, mynx left CFA.  VASCULAR SURGERY  06/25/2019    [facility]. Femoral tibial/perineal bypass with reversed greater saphenous vein.  VASCULAR SURGERY  08/16/2019    TJR. Aortogram and runoff, selective right CFA injections. PTA of fem-tp bypass with 4.0 x 220 mm tod balloon to 4.33 mm    VASCULAR SURGERY  10/23/2019    [facility]. Thrombin injection in the left SFA PSA, right common femoral artery us guided access, left SFA stent treatment of the flap.        Family History  Family History   Problem Relation Age of Onset    High Blood Pressure Mother     High Blood Pressure Father     High Blood Pressure Sister        Social History  Social History     Socioeconomic History    Marital status:      Spouse name: Calin Cedeño    Number of children: 0    Years of education: 15    Highest education level: Not on file   Occupational History    Occupation:    Social Needs    Financial resource strain: Not on file    Food insecurity:     Worry: Not on file     Inability: Not on file    Transportation needs:     Medical: Not on file     Non-medical: Not on file   Tobacco Use    Smoking status: Current Every Day Smoker     Packs/day: 0.25     Years: 45.00     Pack years: 11.25     Types: Cigarettes    Smokeless tobacco: Never Used    Tobacco comment: about to  quit down to 1 a day cigarettes   Substance and Sexual Activity    Alcohol use: No    Drug use: Not Currently     Types: Marijuana, Cocaine, Methamphetamines, IV, Opiates , Other-see comments     Comment: in the 1970s, LSD    Sexual activity: Yes     Partners: Female     Comment: has a wife   Lifestyle    Physical activity:     Days per week: Not on file     Minutes per session: Not on file    Stress: Not on file   Relationships    Social connections:     Talks on phone: Not on file     Gets together: Not on file     Attends [employer] service: Not on file     Active member of club or organization: Not on file     Attends meetings of clubs or organizations: Not on file     Relationship status: Not on file    Intimate partner violence:     Fear of current or ex partner: Not on file     Emotionally abused: Not on file     Physically abused: Not on file     Forced sexual activity: Not on file   Other Topics Concern    Not on file   Social History Narrative    CODE STATUS: Full Code    HEALTH CARE PROXY: Mrs. Calin Cedeño, [phone]    Back up: his Mother, Mrs. Lady Garcia, [phone]    AMBULATES: results for input(s): PROTIME, INR in the last 72 hours. APTT: No results for input(s): APTT in the last 72 hours. BNP:  No results for input(s): BNP in the last 72 hours. Ionized Calcium:No results for input(s): IONCA in the last 72 hours. Magnesium:No results for input(s): MG in the last 72 hours. Phosphorus:  No results for input(s): PHOS in the last 72 hours. HgbA1C: No results for input(s): LABA1C in the last 72 hours. Hepatic:   Recent Labs     01/22/20  0445 01/23/20  0441 01/24/20  0510   ALKPHOS 96 97 69   ALT 10 13 9   AST 17 23 17   PROT 7.5 8.7 6.6   BILITOT 0.4 0.5 0.5   LABALBU 3.7 4.1 3.5     Lactic Acid:   No results for input(s): LACTA in the last 72 hours. Troponin: No results for input(s): CKTOTAL, CKMB, TROPONINT in the last 72 hours. ABGs: No results found for: PHART, PO2ART, BWR4WQF  CRP:  No results for input(s): CRP in the last 72 hours. Sed Rate:  No results for input(s): SEDRATE in the last 72 hours. Culture Results:   Blood Culture Recent:   Recent Labs     01/18/20 2031   BC No growth after 5 days of incubation. Urine Culture Recent : No results for input(s): LABURIN in the last 720 hours. Radiology reports as per the Radiologist  Radiology: Ct Knee Right W Wo Contrast    Result Date: 1/19/2020  EXAMINATION: CT KNEE RIGHT W WO CONTRAST 1/19/2020 10:46 AM HISTORY: Question abscess fluid collection Automatic exposure control was utilized reducing radiation dose. Radiation DLP 6.8 mCi centimeters. Axial images are obtained. Sagittal coronal reformatted images the right leg are also obtained. The tibia and fibula are intact. Vascular calcifications noted in the popliteal artery. Vascular calcifications present in the superficial femoral artery, popliteal artery. Edema is present in the subcutaneous tissues. A definite abscess is not identified. The medial head of the gastrocnemius is intact. Impression fluid is noted in the subcutaneous tissues.  This may represent !Yes       !Yes            ! None      ! +------------------------------------+----------+---------------+----------+ ! Mid Femoral                         !Yes       ! Yes            ! None      ! +------------------------------------+----------+---------------+----------+ ! Dist Femoral                        !Yes       ! Yes            ! None      ! +------------------------------------+----------+---------------+----------+ ! Deep Femoral                        !Yes       ! Yes            ! None      ! +------------------------------------+----------+---------------+----------+ ! Popliteal                           !Yes       ! Yes            ! None      ! +------------------------------------+----------+---------------+----------+ ! SSV                                 ! Yes       ! Yes            ! None      ! +------------------------------------+----------+---------------+----------+ ! Gastroc                             ! Yes       ! Yes            ! None      ! +------------------------------------+----------+---------------+----------+ ! PTV                                 ! Yes       ! Yes            ! None      ! +------------------------------------+----------+---------------+----------+ ! GSV                                 ! Yes       ! Yes            ! None      ! +------------------------------------+----------+---------------+----------+ ! ATV                                 ! Yes       ! Yes            ! None      ! +------------------------------------+----------+---------------+----------+ ! Peroneal                            !Yes       ! Yes            ! None      ! +------------------------------------+----------+---------------+----------+ Left Lower Extremities DVT Study Measurements Left 2D Measurements +------------------------------------+----------+---------------+----------+ ! Location                            ! Visualized! Compressibility! Thrombosis!

## 2020-01-24 NOTE — PROGRESS NOTES
Progress Note    Date:1/24/2020       Room:0314/314-01  Patient Brinda Kelley     Date of Birth:[birth year]     Age:59 y.o. Subjective   Interval History Status: not changed. Patient seen and examined this a.m., sitting up on bedside consuming breakfast.  Patient himself says he feels much stronger, nausea vomiting resolved. Currently denies any headache, chest pain, shortness of breath, abdominal pain, nausea vomiting. Continues to complain of right lower extremity foot pain. Cumulative hospital course:  63-year-old male presenting for right lower extremity pain, swelling and warmth admitted for cellulitis of the right lower extremity. Vascular surgery consulted on admission. Nephrology consulted for continuation of dialysis. Patient found to have occlusion of previous vascular graft with planned revision with vascular surgery on Thursday, 1/23/2020. Patient has a history of left main disease - cardiology consulted -patient deemed high risk for intervention, patient completed dialysis sessions 1/22 as well as 1/23. Vascular intervention postponed due to patient's potassium level as well as symptoms. Review of Systems   Review of Systems   ROS: 14 point review of systems is negative except as specifically addressed above.     Medications   Scheduled Meds:    sodium polystyrene  15 g Oral Once    calcium acetate  2,001 mg Oral TID WC    neomycin-bacitracin-polymyxin   Topical BID    darbepoetin sandrine-polysorbate  25 mcg Subcutaneous Weekly    b complex-C-folic acid  1 capsule Oral Daily    vancomycin (VANCOCIN) intermittent dosing (placeholder)   Other RX Placeholder    amLODIPine  10 mg Oral Daily    aspirin  81 mg Oral Daily    atorvastatin  40 mg Oral Nightly    cloNIDine  0.3 mg Oral TID    clopidogrel  75 mg Oral Daily    isosorbide mononitrate  30 mg Oral Daily    lactulose  10 g Oral TID    metoprolol succinate  25 mg Oral BID    minoxidil  10 mg Oral Daily    sevelamer Right lower extremity arterial duplex exam performed. There is minimal  plaque in the right CFA. There is a severe stenosis in the profunda  femoral artery. The native SFA is occluded. The femoral to tibial peroneal  bypass is patent, however, it is diffusely narrowed. \"failing\" bypass. No  flow is seen in the PTA, only very slow, minimal arterial flow is seen in  the anterior tibial and peroneal artery of the right leg. Signature   ----------------------------------------------------------------  Electronically signed by Ayaz LIMInterpreting  physician) on 01/20/2020 03:45 PM  ----------------------------------------------------------------  Grafts   - The graft originated from the Right Common Femoral to the Right     Popliteal. +--------------------------------------+----+---+-----+--------------------+ ! Location                              ! PSV ! EDV! Ratio!% Stenosis          ! +--------------------------------------+----+---+-----+--------------------+ ! Prox Anastomosis                      ! 240 ! 54 !     !                    ! +--------------------------------------+----+---+-----+--------------------+ ! Prox Graft                            ! 597 !152!2.49 !                    ! +--------------------------------------+----+---+-----+--------------------+ ! Mid Graft                             !10  !4  !0.02 !                    ! +--------------------------------------+----+---+-----+--------------------+ ! Dist Graft                            !10  !5  !1    !                    ! +--------------------------------------+----+---+-----+--------------------+ Comments: Distal anastomosis cannot be imaged due to open wound. . Velocities are measured in cm/s ; Diameters are measured in mm LE Duplex Measurements +---------------++-----+-----+----+-----------++---+-----+---+-------------+ ! ! !Right! ! Left!           !!   !     !   !             ! surgery on board, cruciate recommendations. Correlated by CT of the right knee obtained on 1/19/2020. Vascular studies have been completed on 1/20/2020, vein mapping completed 1/21/2020, patient deemed high risk for surgical intervention per cardiology.     PVD: Vascular surgery with likely revision of previous graft tentatively planned for this upcoming Monday as patient's nausea vomiting symptoms seem to be resolved.       ESRD/Hyperkalemia: Patient has completed session of dialysis today, nephrology consulted for continuation of hemodialysis. Potassium improved however still elevated, will continue to monitor with daily metabolic profile. Nausea/vomiting/diarrhea- currently improved, rapid influenza negative. Symptomatic control, antiemetic PRN, encourage oral hydration     CAD: Known severe stenosis of the left main artery-poor surgical candidate. DAPT/statin. Cardiology consulted and continues to follow, patient is deemed high risk due to multiple comorbidities. Electronically signed by   Ji Mustafa   Internal Medicine Hospitalist  On 1/24/2020  At 9:58 AM    EMR Dragon/Transcription disclaimer:   Much of this encounter note is an electronic transcription/translation of spoken language to printed text.  The electronic translation of spoken language may permit erroneous, or at times, nonsensical words or phrases to be inadvertently transcribed; although attempts have made to review the note for such errors, some may still exist.

## 2020-01-25 LAB
ALBUMIN SERPL-MCNC: 4.2 G/DL (ref 3.5–5.2)
ALP BLD-CCNC: 96 U/L (ref 40–130)
ALT SERPL-CCNC: 11 U/L (ref 5–41)
ANION GAP SERPL CALCULATED.3IONS-SCNC: 15 MMOL/L (ref 7–19)
AST SERPL-CCNC: 21 U/L (ref 5–40)
BASOPHILS ABSOLUTE: 0 K/UL (ref 0–0.2)
BASOPHILS RELATIVE PERCENT: 0.6 % (ref 0–1)
BILIRUB SERPL-MCNC: 0.4 MG/DL (ref 0.2–1.2)
BUN BLDV-MCNC: 12 MG/DL (ref 6–20)
CALCIUM SERPL-MCNC: 8.9 MG/DL (ref 8.6–10)
CHLORIDE BLD-SCNC: 94 MMOL/L (ref 98–111)
CO2: 26 MMOL/L (ref 22–29)
CREAT SERPL-MCNC: 1.9 MG/DL (ref 0.5–1.2)
EOSINOPHILS ABSOLUTE: 0.5 K/UL (ref 0–0.6)
EOSINOPHILS RELATIVE PERCENT: 9.7 % (ref 0–5)
GFR NON-AFRICAN AMERICAN: 36
GLUCOSE BLD-MCNC: 114 MG/DL (ref 74–109)
HCT VFR BLD CALC: 34.9 % (ref 42–52)
HEMOGLOBIN: 10.8 G/DL (ref 14–18)
IMMATURE GRANULOCYTES #: 0 K/UL
LYMPHOCYTES ABSOLUTE: 0.7 K/UL (ref 1.1–4.5)
LYMPHOCYTES RELATIVE PERCENT: 14.1 % (ref 20–40)
MCH RBC QN AUTO: 30.5 PG (ref 27–31)
MCHC RBC AUTO-ENTMCNC: 30.9 G/DL (ref 33–37)
MCV RBC AUTO: 98.6 FL (ref 80–94)
MONOCYTES ABSOLUTE: 0.5 K/UL (ref 0–0.9)
MONOCYTES RELATIVE PERCENT: 8.8 % (ref 0–10)
NEUTROPHILS ABSOLUTE: 3.5 K/UL (ref 1.5–7.5)
NEUTROPHILS RELATIVE PERCENT: 66.4 % (ref 50–65)
PDW BLD-RTO: 16.7 % (ref 11.5–14.5)
PLATELET # BLD: 132 K/UL (ref 130–400)
PMV BLD AUTO: 11.7 FL (ref 9.4–12.4)
POTASSIUM REFLEX MAGNESIUM: 3.9 MMOL/L (ref 3.5–5)
RBC # BLD: 3.54 M/UL (ref 4.7–6.1)
SODIUM BLD-SCNC: 135 MMOL/L (ref 136–145)
TOTAL PROTEIN: 7.5 G/DL (ref 6.6–8.7)
VANCOMYCIN RANDOM: 17.7 UG/ML
WBC # BLD: 5.3 K/UL (ref 4.8–10.8)

## 2020-01-25 PROCEDURE — 6360000002 HC RX W HCPCS: Performed by: INTERNAL MEDICINE

## 2020-01-25 PROCEDURE — 80053 COMPREHEN METABOLIC PANEL: CPT

## 2020-01-25 PROCEDURE — 80202 ASSAY OF VANCOMYCIN: CPT

## 2020-01-25 PROCEDURE — 85025 COMPLETE CBC W/AUTO DIFF WBC: CPT

## 2020-01-25 PROCEDURE — 2580000003 HC RX 258: Performed by: INTERNAL MEDICINE

## 2020-01-25 PROCEDURE — 2580000003 HC RX 258: Performed by: HOSPITALIST

## 2020-01-25 PROCEDURE — 1210000000 HC MED SURG R&B

## 2020-01-25 PROCEDURE — 8010000000 HC HEMODIALYSIS ACUTE INPT

## 2020-01-25 PROCEDURE — 6370000000 HC RX 637 (ALT 250 FOR IP): Performed by: HOSPITALIST

## 2020-01-25 PROCEDURE — 6370000000 HC RX 637 (ALT 250 FOR IP): Performed by: INTERNAL MEDICINE

## 2020-01-25 PROCEDURE — 6370000000 HC RX 637 (ALT 250 FOR IP): Performed by: FAMILY MEDICINE

## 2020-01-25 PROCEDURE — 36415 COLL VENOUS BLD VENIPUNCTURE: CPT

## 2020-01-25 RX ADMIN — CALCIUM ACETATE 2001 MG: 667 CAPSULE ORAL at 12:26

## 2020-01-25 RX ADMIN — OXYCODONE HYDROCHLORIDE AND ACETAMINOPHEN 1 TABLET: 5; 325 TABLET ORAL at 04:00

## 2020-01-25 RX ADMIN — LACTULOSE 10 G: 20 SOLUTION ORAL at 21:09

## 2020-01-25 RX ADMIN — OXYCODONE HYDROCHLORIDE AND ACETAMINOPHEN 1 TABLET: 5; 325 TABLET ORAL at 18:00

## 2020-01-25 RX ADMIN — AMLODIPINE BESYLATE 10 MG: 5 TABLET ORAL at 12:25

## 2020-01-25 RX ADMIN — CLONIDINE HYDROCHLORIDE 0.3 MG: 0.1 TABLET ORAL at 21:09

## 2020-01-25 RX ADMIN — OXYCODONE HYDROCHLORIDE 10 MG: 10 TABLET, FILM COATED, EXTENDED RELEASE ORAL at 16:34

## 2020-01-25 RX ADMIN — CLONIDINE HYDROCHLORIDE 0.3 MG: 0.1 TABLET ORAL at 16:34

## 2020-01-25 RX ADMIN — OXYCODONE HYDROCHLORIDE AND ACETAMINOPHEN 1 TABLET: 5; 325 TABLET ORAL at 12:25

## 2020-01-25 RX ADMIN — NEPHROCAP 1 MG: 1 CAP ORAL at 12:26

## 2020-01-25 RX ADMIN — OXYCODONE HYDROCHLORIDE AND ACETAMINOPHEN 1 TABLET: 5; 325 TABLET ORAL at 22:07

## 2020-01-25 RX ADMIN — ATORVASTATIN CALCIUM 40 MG: 40 TABLET, FILM COATED ORAL at 21:09

## 2020-01-25 RX ADMIN — CLONIDINE HYDROCHLORIDE 0.3 MG: 0.1 TABLET ORAL at 12:26

## 2020-01-25 RX ADMIN — BACITRACIN ZINC NEOMYCIN SULFATE POLYMYXIN B SULFATE: 400; 3.5; 5 OINTMENT TOPICAL at 21:12

## 2020-01-25 RX ADMIN — CLOPIDOGREL BISULFATE 75 MG: 75 TABLET ORAL at 12:26

## 2020-01-25 RX ADMIN — SEVELAMER CARBONATE 800 MG: 800 TABLET, FILM COATED ORAL at 18:00

## 2020-01-25 RX ADMIN — ASPIRIN 81 MG: 81 TABLET, COATED ORAL at 12:25

## 2020-01-25 RX ADMIN — CALCIUM ACETATE 2001 MG: 667 CAPSULE ORAL at 17:59

## 2020-01-25 RX ADMIN — SEVELAMER CARBONATE 800 MG: 800 TABLET, FILM COATED ORAL at 12:25

## 2020-01-25 RX ADMIN — ISOSORBIDE MONONITRATE 30 MG: 30 TABLET, EXTENDED RELEASE ORAL at 12:25

## 2020-01-25 RX ADMIN — MINOXIDIL 10 MG: 10 TABLET ORAL at 12:25

## 2020-01-25 RX ADMIN — OXYCODONE HYDROCHLORIDE 10 MG: 10 TABLET, FILM COATED, EXTENDED RELEASE ORAL at 07:54

## 2020-01-25 RX ADMIN — METOPROLOL SUCCINATE 25 MG: 25 TABLET, EXTENDED RELEASE ORAL at 21:09

## 2020-01-25 RX ADMIN — BACITRACIN ZINC NEOMYCIN SULFATE POLYMYXIN B SULFATE: 400; 3.5; 5 OINTMENT TOPICAL at 15:45

## 2020-01-25 RX ADMIN — Medication 10 ML: at 15:44

## 2020-01-25 RX ADMIN — METOPROLOL SUCCINATE 25 MG: 25 TABLET, EXTENDED RELEASE ORAL at 12:25

## 2020-01-25 RX ADMIN — VANCOMYCIN HYDROCHLORIDE 1000 MG: 10 INJECTION, POWDER, LYOPHILIZED, FOR SOLUTION INTRAVENOUS at 15:45

## 2020-01-25 RX ADMIN — LACTULOSE 10 G: 20 SOLUTION ORAL at 15:45

## 2020-01-25 ASSESSMENT — PAIN DESCRIPTION - ONSET
ONSET: ON-GOING

## 2020-01-25 ASSESSMENT — PAIN DESCRIPTION - ORIENTATION
ORIENTATION: RIGHT

## 2020-01-25 ASSESSMENT — PAIN - FUNCTIONAL ASSESSMENT
PAIN_FUNCTIONAL_ASSESSMENT: PREVENTS OR INTERFERES SOME ACTIVE ACTIVITIES AND ADLS

## 2020-01-25 ASSESSMENT — PAIN DESCRIPTION - FREQUENCY
FREQUENCY: CONTINUOUS

## 2020-01-25 ASSESSMENT — PAIN SCALES - GENERAL
PAINLEVEL_OUTOF10: 8
PAINLEVEL_OUTOF10: 5
PAINLEVEL_OUTOF10: 6
PAINLEVEL_OUTOF10: 7
PAINLEVEL_OUTOF10: 8
PAINLEVEL_OUTOF10: 6
PAINLEVEL_OUTOF10: 7
PAINLEVEL_OUTOF10: 8

## 2020-01-25 ASSESSMENT — PAIN DESCRIPTION - LOCATION
LOCATION: LEG;BACK
LOCATION: LEG;BACK
LOCATION: LEG
LOCATION: LEG;BACK
LOCATION: LEG

## 2020-01-25 ASSESSMENT — PAIN DESCRIPTION - PROGRESSION
CLINICAL_PROGRESSION: NOT CHANGED

## 2020-01-25 ASSESSMENT — PAIN DESCRIPTION - DESCRIPTORS
DESCRIPTORS: ACHING;BURNING
DESCRIPTORS: ACHING;BURNING
DESCRIPTORS: THROBBING
DESCRIPTORS: ACHING;BURNING
DESCRIPTORS: THROBBING
DESCRIPTORS: ACHING;BURNING

## 2020-01-25 ASSESSMENT — PAIN DESCRIPTION - DIRECTION
RADIATING_TOWARDS: FOOT

## 2020-01-25 ASSESSMENT — PAIN DESCRIPTION - PAIN TYPE
TYPE: CHRONIC PAIN;SURGICAL PAIN
TYPE: CHRONIC PAIN
TYPE: CHRONIC PAIN;SURGICAL PAIN
TYPE: CHRONIC PAIN
TYPE: CHRONIC PAIN;SURGICAL PAIN

## 2020-01-25 NOTE — PLAN OF CARE
Problem: Falls - Risk of:  Goal: Will remain free from falls  Description  Will remain free from falls  1/25/2020 0042 by Syeda Sethi RN  Outcome: Ongoing  1/24/2020 1647 by Stephenie Barton RN  Outcome: Ongoing  Goal: Absence of physical injury  Description  Absence of physical injury  1/25/2020 0042 by Syeda Sethi RN  Outcome: Ongoing  1/24/2020 1647 by Stephenie Barton RN  Outcome: Ongoing

## 2020-01-25 NOTE — PROGRESS NOTES
Progress Note    Date:1/25/2020       Room:0314/314-01  Patient Janine Osorio     Date of Birth:[birth year]     Age:59 y.o. Subjective   Interval History Status: not changed. Patient seen and examined this afternoon status post dialysis. No longer having episodes of emesis. Does continue to have some right lower leg discomfort. Currently denies any headache, chest pain, shortness of breath, abdominal pain. Cumulative hospital course:  59-year-old male presenting for right lower extremity pain, swelling and warmth admitted for cellulitis of the right lower extremity. Vascular surgery consulted on admission. Nephrology consulted for continuation of dialysis. Patient found to have occlusion of previous vascular graft with planned revision with vascular surgery on Thursday, 1/23/2020. Patient has a history of left main disease - cardiology consulted -patient deemed high risk for intervention, patient completed dialysis sessions 1/22 as well as 1/23. Vascular intervention postponed due to patient's potassium level as well as symptoms. Review of Systems   Review of Systems   ROS: 14 point review of systems is negative except as specifically addressed above.     Medications   Scheduled Meds:    calcium acetate  2,001 mg Oral TID WC    neomycin-bacitracin-polymyxin   Topical BID    darbepoetin sandrine-polysorbate  25 mcg Subcutaneous Weekly    b complex-C-folic acid  1 capsule Oral Daily    vancomycin (VANCOCIN) intermittent dosing (placeholder)   Other RX Placeholder    amLODIPine  10 mg Oral Daily    aspirin  81 mg Oral Daily    atorvastatin  40 mg Oral Nightly    cloNIDine  0.3 mg Oral TID    clopidogrel  75 mg Oral Daily    isosorbide mononitrate  30 mg Oral Daily    lactulose  10 g Oral TID    metoprolol succinate  25 mg Oral BID    minoxidil  10 mg Oral Daily    sevelamer  800 mg Oral TID WC    sodium chloride flush  10 mL Intravenous 2 times per day     Continuous Infusions:   PRN Meds: oxyCODONE-acetaminophen, melatonin, oxyCODONE, sucralfate, sodium chloride flush, ondansetron, acetaminophen    Past History    Past Medical History:   has a past medical history of Anxiety, Blood circulation, collateral, CAD (coronary artery disease), [facility] CHF (congestive heart failure) ([facility] Chyloperitoneum determined by paracentesis, CKD (chronic kidney disease), stage V ([facility] Dialysis patient ([facility] GERD (gastroesophageal reflux disease), Hemodialysis patient ([facility] Hepatic cirrhosis ([facility] Hepatitis C, chronic ([facility] History of blood transfusion, HTN (hypertension), Hx of blood clots, Mixed hyperlipidemia, Osteoarthritis, Pacemaker, Palliative care patient, PVD (peripheral vascular disease) ([facility] Sleep apnea, and Type II diabetes mellitus ([facility] Social History:   reports that he has been smoking cigarettes. He has a 11.25 pack-year smoking history. He has never used smokeless tobacco. He reports previous drug use. Drugs: Marijuana, Cocaine, Methamphetamines, IV, Opiates , and Other-see comments. He reports that he does not drink alcohol. Family History:   Family History   Problem Relation Age of Onset    High Blood Pressure Mother     High Blood Pressure Father     High Blood Pressure Sister        Physical Examination      Vitals:  BP (!) 230/86   Pulse 61   Temp 97.7 °F (36.5 °C) (Temporal)   Resp 18   Ht 5' 9\" (1.753 m)   Wt 179 lb 8 oz (81.4 kg)   SpO2 93%   BMI 26.51 kg/m²   Temp (24hrs), Av.3 °F (36.3 °C), Min:96.5 °F (35.8 °C), Max:97.7 °F (36.5 °C)      I/O (24Hr): Intake/Output Summary (Last 24 hours) at 2020 1259  Last data filed at 2020 1218  Gross per 24 hour   Intake 480 ml   Output --   Net 480 ml       Physical Exam  Vitals signs and nursing note reviewed. Constitutional:       General: He is not in acute distress. Appearance: He is not ill-appearing (improved tone this AM) or toxic-appearing.    HENT:      Head: Normocephalic and atraumatic. Nose: Nose normal.   Eyes:      General: No scleral icterus. Right eye: No discharge. Left eye: No discharge. Conjunctiva/sclera: Conjunctivae normal.   Neck:      Musculoskeletal: Normal range of motion. No neck rigidity. Cardiovascular:      Rate and Rhythm: Normal rate and regular rhythm. Pulses: Normal pulses. Heart sounds: Normal heart sounds. Pulmonary:      Effort: Pulmonary effort is normal.      Breath sounds: No wheezing or rales. Abdominal:      General: Bowel sounds are normal.      Tenderness: There is no abdominal tenderness. There is no guarding or rebound. Musculoskeletal:      Right lower leg: Edema present. Skin:     Findings: Erythema and lesion present. Comments: Medial right knee wound no signs of serous drainage   Neurological:      General: No focal deficit present. Mental Status: He is oriented to person, place, and time. No significant change in physical examination when compared to 1/24/2020    Labs/Imaging/Diagnostics   Labs:  CBC:  Recent Labs     01/23/20  0441 01/24/20  0510   WBC 6.2 4.9   RBC 3.47* 2.98*   HGB 10.7* 9.3*   HCT 34.6* 29.3*   MCV 99.7* 98.3*   RDW 16.7* 17.1*   * 137     CHEMISTRIES:  Recent Labs     01/23/20  1408 01/23/20  1650 01/24/20  0510 01/25/20  1140     --  135* 135*   K 4.2 4.9 5.3*  5.3*  --    CL 93*  --  94* 94*   CO2 27  --  26 26   BUN 18  --  29* 12   CREATININE 2.7*  --  4.2* 1.9*   GLUCOSE 146*  --  90 114*     PT/INR:No results for input(s): PROTIME, INR in the last 72 hours. APTT:No results for input(s): APTT in the last 72 hours.   LIVER PROFILE:  Recent Labs     01/23/20  0441 01/24/20  0510 01/25/20  1140   AST 23 17 21   ALT 13 9 11   BILITOT 0.5 0.5 0.4   ALKPHOS 97 69 96       Imaging Last 24 Hours:  Vl Madiha Bilateral Limited 1-2 Levels    Result Date: 1/20/2020  Vascular Lower Arterial Plethysmography Procedure  Demographics   Patient Name   Chan Peterson Age 61   Patient Number [id number]       Gender             Male   Visit Number   [id number]    Interpreting       Leonard Robbins MD                              Physician   Date of Birth  1960   Referring          Leonard Robbins MD                              Physician   Accession      [id number]    [facility] [address] [facility]  Number                                         RVS, RCS  Procedure Type of Study:   Extremities Arteries:Lower Arterial Plethysmography, VL ANKLE / BRACHIAL  INDICES EXTREMITY COMPLETE . Indications for Study:Wound Lower Extremity (Right). Risk Factors   - The patient's risk factor(s) include: diabetes mellitus, arterial     hypertension and prior MI . Allergies   - No known allergies. Impression   The patient has moderately diminished ankle-brachial indices left lower  extremity(ies) which would be consistent with claudication level symptoms. The patient has severely diminished ankle-brachial indices right lower  extremity(ies) which would be consistent with rest pain symptoms. Signature   ----------------------------------------------------------------  Electronically signed by Leonard Robbins MD(Interpreting  physician) on 01/20/2020 03:39 PM  ----------------------------------------------------------------  Velocities are measured in cm/s ; Diameters are measured in mm Pressures +--------------------------------------++--------+-----+----+--------+-----+ ! ! !Right   ! ! Left!        !     ! +--------------------------------------++--------+-----+----+--------+-----+ ! Location                              ! !Pressure! Ratio! !Pressure! Ratio! +--------------------------------------++--------+-----+----+--------+-----+ ! Ankle PT                              !!39      !0.3  ! !255     ! 1.99 ! +--------------------------------------++--------+-----+----+--------+-----+ ! DP                                    !!255 !1.99 !    !255     ! 1.99 ! +--------------------------------------++--------+-----+----+--------+-----+ ! Great Toe                             !!0       !0    !    !37      !0.29 ! +--------------------------------------++--------+-----+----+--------+-----+   - Brachial Pressure:Right: 128.   - GABRIEL:Right: 1.99. Left: 1.99. Plethysmographic Digit Evaluation +---------++--------+-----+---------------++--------+-----+----------------+ ! ! !Right   ! ! Left           !!        !     !                ! +---------++--------+-----+---------------++--------+-----+----------------+ ! Location ! !Pressure! Ratio! PPG Wave Form  ! !Pressure! Ratio! PPG Wave Form   ! +---------++--------+-----+---------------++--------+-----+----------------+ ! Great Toe!!0       !0    !               !!37      !0.29 !                ! +---------++--------+-----+---------------++--------+-----+----------------+    Vl Dup Lower Extremity Arteries Right    Result Date: 1/20/2020  Vascular Lower Extremities Arterial Duplex Procedure  Demographics   Patient Name   Elda Ansari Age                61   Patient Number [id number]       Gender             Male   Visit Number   [id number]    Interpreting       Yuri Armas MD                              Physician   Date of Birth  1960   Referring          Yuri Armas MD                              Physician   Accession      [id number]    [place] [address] [address] [facility]  Number                                         RVS, RCS  Procedure Type of Study:   Extremities Arteries:Lower Extremities Arterial Duplex, VL LOWER EXTREMITY  ARTERIES DUPLEX RIGHT. Indications for Study:Wound Lower Extremity (Right). Risk Factors   - The patient's risk factor(s) include: diabetes mellitus, arterial     hypertension and prior MI . Allergies   - No known allergies. Impression   Right lower extremity arterial duplex exam performed. There is minimal  plaque in the right CFA.  There is a severe stenosis +---------------++-----+-----+----+-----------++---+-----+---+-------------+ ! Common Femoral !!173  !     !14.4!           !!   !     !   !             ! +---------------++-----+-----+----+-----------++---+-----+---+-------------+ ! Prox PFA       !!402  !     !20.2!           !!   !     !   !             ! +---------------++-----+-----+----+-----------++---+-----+---+-------------+ ! Mid PTA        !!0    !     !    !           !!   !     !   !             ! +---------------++-----+-----+----+-----------++---+-----+---+-------------+ ! Prox GWYN       !!22.2 !     !11.6!           !!   !     !   !             ! +---------------++-----+-----+----+-----------++---+-----+---+-------------+ ! Prox Peroneal  !!12.6 ! !8.05!           !!   !     !   !             ! +---------------++-----+-----+----+-----------++---+-----+---+-------------+    Vl Vein Mapping Lower Bilateral    Result Date: 1/21/2020  Vascular Lower Extremity Vein Mapping Procedure  Demographics   Patient Name   Elda Ansari Age                61   Patient Number [id number]       Gender             Male   Visit Number   [id number]    Merrick Pedro MD                              Physician   Date of Birth  1960   Referring          Yuri Armas MD                              Physician   Accession      [id number]    [address]  Number                                         RVS, RCS  Procedure Type of Study:   [address] [place] [address]. Indications for Study:Pre-op vein mapping for revascularization. Risk Factors   - The patient's risk factor(s) include: diabetes mellitus, arterial     hypertension and prior MI . Allergies   - No known allergies. Impression   Bilateral lower extremity saphenous vein mapping performed. Veins are  patent with measurements noted. Right greater saphenous vein harvested thigh and groin. Left greater saphenous vein harvested all the way to the ankle. Signature   ----------------------------------------------------------------  Electronically signed by Darío LIMInterpreting  physician) on 01/21/2020 11:16 AM  ----------------------------------------------------------------  Velocities are measured in cm/s ; Diameters are measured in mm +--------------------------------------++--------+-----+----+--------+-----+ ! Superficial - Great Saphenous Vein    ! ! Right   ! ! Left!        !     ! +--------------------------------------++--------+-----+----+--------+-----+ ! Location                              ! !Diameter! Depth! !Diameter! Depth! +--------------------------------------++--------+-----+----+--------+-----+ ! GSV High Calf                         !!2.9     !     !    !        !     ! +--------------------------------------++--------+-----+----+--------+-----+ ! GSV Mid Calf                          !!3       !     !    !        !     ! +--------------------------------------++--------+-----+----+--------+-----+ ! GSV Ankle                             ! !4.4     !     !    !4.3     !     ! +--------------------------------------++--------+-----+----+--------+-----+ +--------------------------------------++--------+-----+----+--------+-----+ ! Superficial - Lesser Saphenous Vein   ! ! Right   ! ! Left!        !     ! +--------------------------------------++--------+-----+----+--------+-----+ ! Location                              ! !Diameter! Depth! !Diameter! Depth! +--------------------------------------++--------+-----+----+--------+-----+ ! SSV High Calf                         !!2.9     !     !    !3.2     !     ! +--------------------------------------++--------+-----+----+--------+-----+ ! SSV Mid Calf                          !!3.7     !     !    !2.1     !     ! +--------------------------------------++--------+-----+----+--------+-----+ ! SSV Ankle                             !!3       !     !    !1.8     ! !

## 2020-01-25 NOTE — PROGRESS NOTES
Nephrology ([facility]) Progress Note    Patient:  Lilian Harrell  YOB: 1960  Date of Service: 1/25/2020  MRN: [id number]   Acct: [de-identified]   Primary Care Physician: Sam Garcia DO  Advance Directive: Full Code  Admit Date: 1/18/2020       Hospital Day: 7  Referring Provider: Evelio Ribeiro MD    Patient independently seen and examined, Chart, Consults, Notes, Operative notes, Labs, Cardiology, and Radiology studies reviewed as able. Subjective:  Lilian Harrell is a 61 y.o. male  whom we were consulted for end-stage renal disease. Patient goes to [address] [place] [address] dialysis clinic on Monday Wednesday Friday. Patient has a known history of severe peripheral vascular disease, history of CABG, congestive heart failure, cirrhosis of liver, sleep apnea and hepatitis C. Admitted this time for cellulitis. Today, no new events. Tolerating HD. Denies cp/soa/n/v. Patient recalls no new dietary indiscretions. Extensive dietary review undertaken without high potassium foods identified    Dialysis   Pt was seen on RRT  Modality: Hemodialysis  Access: Arterial Venous Fistula  Location: left upper  QB: 500  QD: 700  UF: 1140              Allergies:  Patient has no known allergies.     Medicines:  Current Facility-Administered Medications   Medication Dose Route Frequency Provider Last Rate Last Dose    calcium acetate (PHOSLO) capsule 2,001 mg  2,001 mg Oral TID  Evelio Ribeiro MD   2,001 mg at 01/24/20 1808    neomycin-bacitracin-polymyxin (NEOSPORIN) ointment   Topical BID Manjinder Ladd MD        oxyCODONE-acetaminophen (PERCOCET) 5-325 MG per tablet 1 tablet  1 tablet Oral Q4H PRN Shabbir Vee MD   1 tablet at 01/25/20 0400    darbepoetin sandrine-polysorbate (ARANESP) injection 25 mcg  25 mcg Subcutaneous Weekly Patricia Arroyo MD   25 mcg at 01/19/20 1924    b complex-C-folic acid (NEPHROCAPS) capsule 1 mg  1 capsule Oral Daily Patricia Arroyo MD   1 mg at  Blood circulation, collateral     CAD (coronary artery disease)     stents x 2; per dr. Alejandro Aldridge [facility])     liver cancer    [facility]estive [facility] failure) [facility]     Chyloperitoneum determined by paracentesis     CKD (chronic kidney disease), stage V ([address]     Dialysis patient ([address]     mon wed fri at [place] GERD (gastroesophageal reflux disease)     Hemodialysis patient ([address]     mon wed fri in [place] Hepatic cirrhosis ([facility]     dr. Isi Nina; has been going to [facility] for liver and kidney transplant list.    Hepatitis C, chronic ([facility]     History of blood transfusion     HTN (hypertension)     Hx of blood clots     arm/fistula    Mixed hyperlipidemia 1/9/2017    Osteoarthritis     Pacemaker     Palliative care patient 07/09/2019    PVD (peripheral vascular disease) ([facility]     Sleep apnea     no cpap at present.  Type II diabetes mellitus (HCC)     no meds. Past Surgical History:  Past Surgical History:   Procedure Laterality Date    ANGIOPLASTY  08/09/11 SUKHWINDER GERARDO cephalic vein balloon angioplasty with 7mm x 4cm balloon. coild embolization of 2 cephallic vein brances with 3mm x 5cm coils    CARDIAC CATHETERIZATION  04/04/11    cardiac cath and stent x1 by Dr. Kali Lu  07/26/11 SUKHWINDER GERARDO AV fistula. coild embolization of cephalic vein branch near the wrist with 3mm EMBO coils.  balloon a'plasty left cephalic vein with [id number] balloon and then [id number] balloon    DIALYSIS FISTULA CREATION Left 2/16/2017    LEFT UPPER EXTREMITY BRACHIAL / AXILLARY GRAFT AND STENT LEFT SUBCLAVIAN VEIN  performed by Donna Yee MD at [address] Right 7/12/2019    RIGHT LEG WOUND WASHOUT performed by Keke Li MD at [address] [facility] FEMORAL-TIBIAL BYPASS GRAFT Right 6/25/2019    FEMORAL TIBIAL/PERINEAL BYPASS WITH REVERSED GREATER SAPHENOUS VEIN performed by Donna Yee MD at [address] HISTORY  12/26/2010 Right internal jugular vein tunneled dialysis catheter placement    OTHER SURGICAL HISTORY  [id number]    Redo of dialysis catheter    OTHER SURGICAL HISTORY  9/6/2011   [facility]    Removal of RT IJV tunneled dialysis cath    OTHER SURGICAL HISTORY  5/22/12   [facility]    Ultrasound-guided cannulation of left cephalic vein in the mid forearm toward the AV anastomosis, Left upper  ext. fistulograms including venograms of the SVC, Left cephalic vein balloon angioplasty with a 4mm x 100mm Tod balloon, Completion fistulograms    PACEMAKER PLACEMENT      medtronic    PARACENTESIS      multiple/recent; per dr. Guillermo Goldmann at [address] [facility] 1/30/2018    UPPER EXTREMITY DRIL PROCEDURE WITH LEFT SAPHENOUS VEIN HARVESTING performed by Holly Garcia MD at [address] [place] [address] Road  6/19/12    [facility] arteriovenous fistulogram including venography of the superior vena cava. Balloon angioplasty, left forearm cephalic vein and upper arm cephalic vein with [id number] tod balloon.  VASCULAR SURGERY  7/10/2012 [facility]     Revision of left distal radial artery to cephalic vein arteriovenous fistula with 7mm Artegraft interposition.  VASCULAR SURGERY  7/30/15 [facility] & [address]    Left upper extremity arteriovenous fistulograms including venography of the superior vena cava. Left cephalic vein balloon angioplasty with an 8 x 20 cm cutting balloon proximal cephalic vein. Balloon angioplasty of left cephalic vein/antecubital vein near the antecubital crease with 8 x 20 mm cutting balloon.  VASCULAR SURGERY  7/30/15 [facility]    Balloon angioplasty proximal end distal upper arm cephalic vein with 9 x 40 cm  balloon. Completion fistulograms of the left upper extremity.  VASCULAR SURGERY  8/13/15 [facility]    Revision of left upper extremity arteriovenous fistula with excision of pseudoaneurysm and primary repair of cephalic vein.     VASCULAR SURGERY  5/3/16 [facility]    Left upper fistulograms/venograms, left cephalic balloon 8 x 20 cutter,9 x 40 conquest,left proximal cephalic vein stents (flair 2 9 x 50), balloon stents 9 x 40 conquest.    VASCULAR SURGERY  12/08/2016    [facility]- ultrasound guided cannulation of left distal cephalic vein ultrasound guided cannulation right internal jugular vein placement of right internal jugular vein tunneled dialysis catheter (Bard Equistream XK 23cm tip to cuff)     VASCULAR SURGERY  01/20/2017    [facility]-Right upper venograms, u/s guided cannulation left basilic vein, left upper venograms, balloon angioplasty left subclavian vein 10x40 conquest.    VASCULAR SURGERY  02/16/2017    [facility]. Left brachial artery to axillary vein arteriovenous graft with 7 mm artegraft. Left upper extremity venograms. Left subclavian vein stent viabahn 13x50. Left subclavian vein stent balloon angioplasty 12x40 atlas.  VASCULAR SURGERY  04/11/2017    [facility]. Removal of tunneled dialysis catheter right internal jugular vein.  VASCULAR SURGERY  01/25/2018    [facility]. Arch aortogram, left upper arteriogram, AV graft angiogram/venogram.    VASCULAR SURGERY  02/28/2018    [facility]. Right CFA 5f-6f-7f sheath, aortogram with bilateral lower arteriogram, left SFA/pop  balloon angioplasty 5x100 , 6x150 lutonix ( 2), left CFA  7f sheath, bilateral iliac kissing stents right 10x38 icast, left 9x59 icast expanded with 10x40 , completion aortogram, mynx left CFA , failed mynx right CFA.  VASCULAR SURGERY  06/13/2019    [facility] left CFA 5f sheath, aortogram with bilateral lower arteriogram, mynx left CFA.  VASCULAR SURGERY  06/25/2019    [facility]. Femoral tibial/perineal bypass with reversed greater saphenous vein.  VASCULAR SURGERY  08/16/2019    [facility]. Aortogram and runoff, selective right CFA injections. PTA of fem-tp bypass with 4.0 x 220 mm tod balloon to 4.33 mm    VASCULAR SURGERY  10/23/2019    [facility]. Thrombin injection in the left SFA PSA, right common femoral artery us guided access, left SFA stent treatment of the flap.       Family History  Family History   Problem Relation Age of Onset    High Blood Pressure Mother     High Blood Pressure Father     High Blood Pressure Sister        Social History  Social History     Socioeconomic History    Marital status:      Spouse name: Len Sethi    Number of children: 0    Years of education: 15    Highest education level: Not on file   Occupational History    Occupation:    Social Needs    Financial resource strain: Not on file    Food insecurity:     Worry: Not on file     Inability: Not on file    Transportation needs:     Medical: Not on file     Non-medical: Not on file   Tobacco Use    Smoking status: Current Every Day Smoker     Packs/day: 0.25     Years: 45.00     Pack years: 11.25     Types: Cigarettes    Smokeless tobacco: Never Used    Tobacco comment: about to  quit down to 1 a day cigarettes   Substance and Sexual Activity    Alcohol use: No    Drug use: Not Currently     Types: Marijuana, Cocaine, Methamphetamines, IV, Opiates , Other-see comments     Comment: in the 1970s, LSD    Sexual activity: Yes     Partners: Female     Comment: has a wife   Lifestyle    Physical activity:     Days per week: Not on file     Minutes per session: Not on file    Stress: Not on file   Relationships    Social connections:     Talks on phone: Not on file     Gets together: Not on file     Attends Tenriism service: Not on file     Active member of club or organization: Not on file     Attends meetings of clubs or organizations: Not on file     Relationship status: Not on file    Intimate partner violence:     Fear of current or ex partner: Not on file     Emotionally abused: Not on file     Physically abused: Not on file     Forced sexual activity: Not on file   Other Topics Concern    Not on file   Social History Narrative    CODE STATUS: Full Code    HEALTH CARE PROXY: Mrs. Len Sethi, [phone]    Back up: his Mother,  Bailey Bonner, [phone]    AMBULATES: independantly    DOMICILED: lives in a private house with 2 steps to get in, has no stairs inside, lives with wife and step children, has 3 pets         Review of Systems:  History obtained from chart review and the patient  General ROS: No fever or chills  Respiratory ROS: No cough, shortness of breath, wheezing  Cardiovascular ROS: No chest pain or palpitations  Gastrointestinal ROS: No abdominal pain or melena  Genito-Urinary ROS: No dysuria or hematuria  Musculoskeletal ROS: No joint pain or swelling         Objective:  Patient Vitals for the past 24 hrs:   BP Temp Temp src Pulse Resp SpO2 Weight   01/25/20 0637 (!) 138/50 97.4 °F (36.3 °C) Temporal 65 18 93 % --   01/25/20 0403 (!) 156/69 96.5 °F (35.8 °C) Temporal 69 17 92 % 179 lb 8 oz (81.4 kg)   01/24/20 1947 (!) 147/54 97.6 °F (36.4 °C) Temporal 62 18 95 % --   01/24/20 1232 (!) 140/50 97.9 °F (36.6 °C) Temporal 63 18 97 % --       Intake/Output Summary (Last 24 hours) at 1/25/2020 1039  Last data filed at 1/24/2020 1947  Gross per 24 hour   Intake 240 ml   Output --   Net 240 ml     General: awake/alert   Chest:  clear to auscultation bilaterally without respiratory distress  CVS: regular rate and rhythm  Abdominal: soft, nontender, normal bowel sounds  Extremities: no cyanosis or edema  Skin: warm/dry    Labs:  BMP:   Recent Labs     01/23/20  0441 01/23/20  1408 01/23/20  1650 01/24/20  0510   * 138  --  135*   K 6.5*  6.5* 4.2 4.9 5.3*  5.3*   CL 90* 93*  --  94*   CO2 25 27  --  26   BUN 35* 18  --  29*   CREATININE 4.7* 2.7*  --  4.2*   CALCIUM 9.4 9.4  --  9.0     CBC:   Recent Labs     01/23/20  0441 01/24/20  0510   WBC 6.2 4.9   HGB 10.7* 9.3*   HCT 34.6* 29.3*   MCV 99.7* 98.3*   * 137     LIVER PROFILE:   Recent Labs     01/23/20  0441 01/24/20  0510   AST 23 17   ALT 13 9   BILITOT 0.5 0.5   ALKPHOS 97 69     PT/INR:   No results for input(s): PROTIME, INR in the last 72 hours.   APTT: No results for input(s): APTT in the last 72 hours. BNP:  No results for input(s): BNP in the last 72 hours. Ionized Calcium:No results for input(s): IONCA in the last 72 hours. Magnesium:No results for input(s): MG in the last 72 hours. Phosphorus:  No results for input(s): PHOS in the last 72 hours. HgbA1C: No results for input(s): LABA1C in the last 72 hours. Hepatic:   Recent Labs     01/23/20  0441 01/24/20  0510   ALKPHOS 97 69   ALT 13 9   AST 23 17   PROT 8.7 6.6   BILITOT 0.5 0.5   LABALBU 4.1 3.5     Lactic Acid:   No results for input(s): LACTA in the last 72 hours. Troponin: No results for input(s): CKTOTAL, CKMB, TROPONINT in the last 72 hours. ABGs: No results found for: PHART, PO2ART, [id number]  CRP:  No results for input(s): CRP in the last 72 hours. Sed Rate:  No results for input(s): SEDRATE in the last 72 hours. Culture Results:   Blood Culture Recent:   Recent Labs     01/18/20 2031   BC No growth after 5 days of incubation. Urine Culture Recent : No results for input(s): LABURIN in the last 720 hours. Radiology reports as per the Radiologist  Radiology: Ct Knee Right W Wo Contrast    Result Date: 1/19/2020  EXAMINATION: CT KNEE RIGHT W WO CONTRAST 1/19/2020 10:46 AM HISTORY: Question abscess fluid collection Automatic exposure control was utilized reducing radiation dose. Radiation DLP 6.8 mCi centimeters. Axial images are obtained. Sagittal coronal reformatted images the right leg are also obtained. The tibia and fibula are intact. Vascular calcifications noted in the popliteal artery. Vascular calcifications present in the superficial femoral artery, popliteal artery. Edema is present in the subcutaneous tissues. A definite abscess is not identified. The medial head of the gastrocnemius is intact. Impression fluid is noted in the subcutaneous tissues. This may represent cellulitis or possibly edema. 2. Vascular calcification is noted.  There is limited flow to the right lower extremity Signed by Dr Navjot Hoffman on 1/19/2020 10:51 AM    Vl Extremity Venous Right    Result Date: 1/19/2020  Vascular Lower Extremities DVT Study Procedure  Demographics   Patient Name     Prem Bernal Age                    61   Patient Number   [id number]       Gender                 Male   Visit Number     [id number]    Interpreting Physician Ayaz Peterson MD   Date of Birth    1960   Referring Physician    Radha Resendez   Accession Number [id number]    [id number] [facility] [place]  Procedure Type of Study:   Veins:Lower Extremities DVT Study, VL EXTREMITY VENOUS DUPLEX RIGHT. Indications for Study:Swelling and Pain, right lower extremity. Allergies   - No known allergies. Impression   There is no evidence of deep venous thrombosis (DVT) in the right lower  extremity(ies). There is no evidence of superficial thrombophlebitis of the right lower  extremity(ies). Signature   ----------------------------------------------------------------  Electronically signed by Ayaz Peterson MD(Interpreting  physician) on 01/19/2020 06:59 AM  ----------------------------------------------------------------  Velocities are measured in cm/s ; Diameters are measured in mm Right Lower Extremities DVT Study Measurements Right 2D Measurements +------------------------------------+----------+---------------+----------+ ! Location                            ! Visualized! Compressibility! Thrombosis! +------------------------------------+----------+---------------+----------+ ! Sapheno Femoral Junction            ! Yes       ! Yes            ! None      ! +------------------------------------+----------+---------------+----------+ ! Common Femoral                      !Yes       ! Yes            ! None      ! +------------------------------------+----------+---------------+----------+ ! Prox Femoral                        !Yes       ! Yes            ! None      ! !Yes       !Yes            ! None      ! +------------------------------------+----------+---------------+----------+ ! Common Femoral                      !Yes       ! Yes            ! None      ! +------------------------------------+----------+---------------+----------+       Assessment   End-stage renal disease  Diabetes type 2 with diabetic nephropathy  Hypertension  Anemia of chronic kidney disease  Chronic diastolic congestive heart failure  History of cirrhosis and hepatitis C  Secondary hyperparathyroidism  Peripheral arterial disease status post endarterectomies and bypasses  Hyperkalemia  Cellulitis of leg      Plan:  HD today given continued hyperkalemia, will plan next HD 1/27  Low k diet recommended to patient  Kayexalate ordered for hyperkalemia as needed  Monitor labs  abx per primary    A.m. labs appear to be pending despite ordered correctly. Have discussed with nursing and administration on multiple occasions about the potential dangers to the patient and delays in care about these repeated delays in lab work over the weekend.     Sherree Kawasaki, MD  01/25/20  10:39 AM

## 2020-01-25 NOTE — PROGRESS NOTES
Pharmacy Vancomycin Consult     Vancomycin Day: 8  Current Dosing: Pulse dose secondary to HD    Temp max:  97.7    Recent Labs     01/24/20  0510 01/25/20  1140   BUN 29* 12       Recent Labs     01/24/20  0510 01/25/20  1140   CREATININE 4.2* 1.9*       Recent Labs     01/23/20  0441 01/24/20  0510   WBC 6.2 4.9         Intake/Output Summary (Last 24 hours) at 1/25/2020 1404  Last data filed at 1/25/2020 1218  Gross per 24 hour   Intake 480 ml   Output --   Net 480 ml       Culture Date Source Results   01/18/20 Blood x 2 No growth to date                 Ht Readings from Last 1 Encounters:   01/18/20 5' 9\" (1.753 m)        Wt Readings from Last 1 Encounters:   01/25/20 179 lb 8 oz (81.4 kg)         Body mass index is 26.51 kg/m². Estimated Creatinine Clearance: 42 mL/min (A) (based on SCr of 1.9 mg/dL (H)). Random: 17.7    Assessment/Plan: Level therapeutic. Give Vancomycin 1 gm IV x 1. Draw Vancomycin random level after next HD. Thank you for the consult. Will continue to follow.       Electronically signed by CASSIA Camacho [facility] on 1/25/2020 at 2:04 PM

## 2020-01-26 LAB
ALBUMIN SERPL-MCNC: 3.6 G/DL (ref 3.5–5.2)
ALP BLD-CCNC: 100 U/L (ref 40–130)
ALT SERPL-CCNC: 12 U/L (ref 5–41)
ANION GAP SERPL CALCULATED.3IONS-SCNC: 12 MMOL/L (ref 7–19)
AST SERPL-CCNC: 21 U/L (ref 5–40)
BASOPHILS ABSOLUTE: 0 K/UL (ref 0–0.2)
BASOPHILS RELATIVE PERCENT: 0.5 % (ref 0–1)
BILIRUB SERPL-MCNC: 0.4 MG/DL (ref 0.2–1.2)
BUN BLDV-MCNC: 32 MG/DL (ref 6–20)
CALCIUM SERPL-MCNC: 8.9 MG/DL (ref 8.6–10)
CHLORIDE BLD-SCNC: 87 MMOL/L (ref 98–111)
CO2: 29 MMOL/L (ref 22–29)
CREAT SERPL-MCNC: 5 MG/DL (ref 0.5–1.2)
EOSINOPHILS ABSOLUTE: 0.6 K/UL (ref 0–0.6)
EOSINOPHILS RELATIVE PERCENT: 10.9 % (ref 0–5)
GFR NON-AFRICAN AMERICAN: 12
GLUCOSE BLD-MCNC: 124 MG/DL (ref 74–109)
HCT VFR BLD CALC: 28.4 % (ref 42–52)
HEMOGLOBIN: 9 G/DL (ref 14–18)
IMMATURE GRANULOCYTES #: 0 K/UL
LYMPHOCYTES ABSOLUTE: 1.2 K/UL (ref 1.1–4.5)
LYMPHOCYTES RELATIVE PERCENT: 20.9 % (ref 20–40)
MCH RBC QN AUTO: 30.8 PG (ref 27–31)
MCHC RBC AUTO-ENTMCNC: 31.7 G/DL (ref 33–37)
MCV RBC AUTO: 97.3 FL (ref 80–94)
MONOCYTES ABSOLUTE: 0.6 K/UL (ref 0–0.9)
MONOCYTES RELATIVE PERCENT: 10.9 % (ref 0–10)
NEUTROPHILS ABSOLUTE: 3.1 K/UL (ref 1.5–7.5)
NEUTROPHILS RELATIVE PERCENT: 56.6 % (ref 50–65)
PDW BLD-RTO: 16.4 % (ref 11.5–14.5)
PLATELET # BLD: 114 K/UL (ref 130–400)
PMV BLD AUTO: 12 FL (ref 9.4–12.4)
POTASSIUM SERPL-SCNC: 5.1 MMOL/L (ref 3.5–5)
RBC # BLD: 2.92 M/UL (ref 4.7–6.1)
SODIUM BLD-SCNC: 128 MMOL/L (ref 136–145)
TOTAL PROTEIN: 7.3 G/DL (ref 6.6–8.7)
WBC # BLD: 5.5 K/UL (ref 4.8–10.8)

## 2020-01-26 PROCEDURE — 6370000000 HC RX 637 (ALT 250 FOR IP): Performed by: INTERNAL MEDICINE

## 2020-01-26 PROCEDURE — 36415 COLL VENOUS BLD VENIPUNCTURE: CPT

## 2020-01-26 PROCEDURE — 6370000000 HC RX 637 (ALT 250 FOR IP): Performed by: HOSPITALIST

## 2020-01-26 PROCEDURE — 1210000000 HC MED SURG R&B

## 2020-01-26 PROCEDURE — 6370000000 HC RX 637 (ALT 250 FOR IP): Performed by: FAMILY MEDICINE

## 2020-01-26 PROCEDURE — 2580000003 HC RX 258: Performed by: HOSPITALIST

## 2020-01-26 PROCEDURE — 85025 COMPLETE CBC W/AUTO DIFF WBC: CPT

## 2020-01-26 PROCEDURE — 80053 COMPREHEN METABOLIC PANEL: CPT

## 2020-01-26 PROCEDURE — 6360000002 HC RX W HCPCS: Performed by: INTERNAL MEDICINE

## 2020-01-26 RX ADMIN — OXYCODONE HYDROCHLORIDE AND ACETAMINOPHEN 1 TABLET: 5; 325 TABLET ORAL at 21:14

## 2020-01-26 RX ADMIN — CLONIDINE HYDROCHLORIDE 0.3 MG: 0.1 TABLET ORAL at 21:14

## 2020-01-26 RX ADMIN — METOPROLOL SUCCINATE 25 MG: 25 TABLET, EXTENDED RELEASE ORAL at 21:14

## 2020-01-26 RX ADMIN — LACTULOSE 10 G: 20 SOLUTION ORAL at 07:37

## 2020-01-26 RX ADMIN — DARBEPOETIN ALFA 25 MCG: 25 SOLUTION INTRAVENOUS; SUBCUTANEOUS at 09:55

## 2020-01-26 RX ADMIN — Medication 10 ML: at 21:14

## 2020-01-26 RX ADMIN — OXYCODONE HYDROCHLORIDE AND ACETAMINOPHEN 1 TABLET: 5; 325 TABLET ORAL at 16:54

## 2020-01-26 RX ADMIN — LACTULOSE 10 G: 20 SOLUTION ORAL at 21:14

## 2020-01-26 RX ADMIN — CALCIUM ACETATE 2001 MG: 667 CAPSULE ORAL at 07:30

## 2020-01-26 RX ADMIN — OXYCODONE HYDROCHLORIDE 10 MG: 10 TABLET, FILM COATED, EXTENDED RELEASE ORAL at 09:54

## 2020-01-26 RX ADMIN — OXYCODONE HYDROCHLORIDE 10 MG: 10 TABLET, FILM COATED, EXTENDED RELEASE ORAL at 18:27

## 2020-01-26 RX ADMIN — ISOSORBIDE MONONITRATE 30 MG: 30 TABLET, EXTENDED RELEASE ORAL at 07:31

## 2020-01-26 RX ADMIN — CALCIUM ACETATE 2001 MG: 667 CAPSULE ORAL at 12:20

## 2020-01-26 RX ADMIN — CALCIUM ACETATE 2001 MG: 667 CAPSULE ORAL at 16:54

## 2020-01-26 RX ADMIN — CLONIDINE HYDROCHLORIDE 0.3 MG: 0.1 TABLET ORAL at 14:44

## 2020-01-26 RX ADMIN — SEVELAMER CARBONATE 800 MG: 800 TABLET, FILM COATED ORAL at 12:20

## 2020-01-26 RX ADMIN — OXYCODONE HYDROCHLORIDE AND ACETAMINOPHEN 1 TABLET: 5; 325 TABLET ORAL at 12:20

## 2020-01-26 RX ADMIN — OXYCODONE HYDROCHLORIDE 10 MG: 10 TABLET, FILM COATED, EXTENDED RELEASE ORAL at 01:00

## 2020-01-26 RX ADMIN — Medication 10 ML: at 07:37

## 2020-01-26 RX ADMIN — METOPROLOL SUCCINATE 25 MG: 25 TABLET, EXTENDED RELEASE ORAL at 07:31

## 2020-01-26 RX ADMIN — CLOPIDOGREL BISULFATE 75 MG: 75 TABLET ORAL at 07:31

## 2020-01-26 RX ADMIN — OXYCODONE HYDROCHLORIDE AND ACETAMINOPHEN 1 TABLET: 5; 325 TABLET ORAL at 07:31

## 2020-01-26 RX ADMIN — CLONIDINE HYDROCHLORIDE 0.3 MG: 0.1 TABLET ORAL at 07:31

## 2020-01-26 RX ADMIN — BACITRACIN ZINC NEOMYCIN SULFATE POLYMYXIN B SULFATE: 400; 3.5; 5 OINTMENT TOPICAL at 07:32

## 2020-01-26 RX ADMIN — ASPIRIN 81 MG: 81 TABLET, COATED ORAL at 07:30

## 2020-01-26 RX ADMIN — AMLODIPINE BESYLATE 10 MG: 5 TABLET ORAL at 07:30

## 2020-01-26 RX ADMIN — NEPHROCAP 1 MG: 1 CAP ORAL at 07:31

## 2020-01-26 RX ADMIN — MINOXIDIL 10 MG: 10 TABLET ORAL at 07:31

## 2020-01-26 RX ADMIN — ATORVASTATIN CALCIUM 40 MG: 40 TABLET, FILM COATED ORAL at 21:14

## 2020-01-26 RX ADMIN — SEVELAMER CARBONATE 800 MG: 800 TABLET, FILM COATED ORAL at 07:31

## 2020-01-26 RX ADMIN — SEVELAMER CARBONATE 800 MG: 800 TABLET, FILM COATED ORAL at 16:54

## 2020-01-26 ASSESSMENT — PAIN - FUNCTIONAL ASSESSMENT
PAIN_FUNCTIONAL_ASSESSMENT: PREVENTS OR INTERFERES SOME ACTIVE ACTIVITIES AND ADLS

## 2020-01-26 ASSESSMENT — PAIN DESCRIPTION - DIRECTION
RADIATING_TOWARDS: FOOT

## 2020-01-26 ASSESSMENT — PAIN DESCRIPTION - LOCATION
LOCATION: LEG

## 2020-01-26 ASSESSMENT — PAIN DESCRIPTION - ONSET
ONSET: ON-GOING

## 2020-01-26 ASSESSMENT — PAIN DESCRIPTION - DESCRIPTORS
DESCRIPTORS: ACHING;BURNING

## 2020-01-26 ASSESSMENT — PAIN SCALES - GENERAL
PAINLEVEL_OUTOF10: 8
PAINLEVEL_OUTOF10: 8
PAINLEVEL_OUTOF10: 0
PAINLEVEL_OUTOF10: 8
PAINLEVEL_OUTOF10: 7
PAINLEVEL_OUTOF10: 7
PAINLEVEL_OUTOF10: 8
PAINLEVEL_OUTOF10: 8
PAINLEVEL_OUTOF10: 7
PAINLEVEL_OUTOF10: 8
PAINLEVEL_OUTOF10: 7
PAINLEVEL_OUTOF10: 8

## 2020-01-26 ASSESSMENT — PAIN DESCRIPTION - FREQUENCY
FREQUENCY: CONTINUOUS

## 2020-01-26 ASSESSMENT — PAIN DESCRIPTION - PROGRESSION
CLINICAL_PROGRESSION: NOT CHANGED

## 2020-01-26 ASSESSMENT — PAIN DESCRIPTION - PAIN TYPE
TYPE: CHRONIC PAIN

## 2020-01-26 ASSESSMENT — PAIN DESCRIPTION - ORIENTATION
ORIENTATION: RIGHT

## 2020-01-26 NOTE — PLAN OF CARE
Problem: Falls - Risk of:  Goal: Will remain free from falls  Description  Will remain free from falls  1/26/2020 0028 by Bill Joya RN  Outcome: Ongoing  1/25/2020 1927 by Prieto Richardson RN  Outcome: Ongoing  Goal: Absence of physical injury  Description  Absence of physical injury  1/26/2020 0028 by Bill Joya RN  Outcome: Ongoing  1/25/2020 1927 by Prieto Richardson RN  Outcome: Ongoing

## 2020-01-26 NOTE — PROGRESS NOTES
Nephrology ([facility]) Progress Note    Patient:  Davida Heredia  YOB: 1960  Date of Service: 1/26/2020  MRN: [id number]   Acct: [de-identified]   Primary Care Physician: Godwin Hope DO  Advance Directive: Full Code  Admit Date: 1/18/2020       Hospital Day: 8  Referring Provider: Era Rosen MD    Patient independently seen and examined, Chart, Consults, Notes, Operative notes, Labs, Cardiology, and Radiology studies reviewed as able. Subjective:  Davida Heredia is a 61 y.o. male  whom we were consulted for end-stage renal disease. [facility] to [facility] dialysis clinic on Monday Wednesday Friday. Patient has a known history of severe peripheral vascular disease, history of CABG, congestive heart failure, cirrhosis of liver, sleep apnea and hepatitis C. Admitted this time for cellulitis. Today, no new events. Tolerating HD. Denies cp/soa/n/v. Patient recalls no new dietary indiscretions. Extensive dietary review undertaken without high potassium foods identified. Pain controlled. Allergies:  Patient has no known allergies.     Medicines:  Current Facility-Administered Medications   Medication Dose Route Frequency Provider Last Rate Last Dose    calcium acetate (PHOSLO) capsule 2,001 mg  2,001 mg Oral TID WC Era Rosen MD   2,001 mg at 01/26/20 0730    neomycin-bacitracin-polymyxin (NEOSPORIN) ointment   Topical BID Shi Deng MD        oxyCODONE-acetaminophen (PERCOCET) 5-325 MG per tablet 1 tablet  1 tablet Oral Q4H PRN Zachary Jiménez MD   1 tablet at 01/26/20 0731    darbepoetin sandrine-polysorbate (ARANESP) injection 25 mcg  25 mcg Subcutaneous Weekly Orest Severance, MD   25 mcg at 01/26/20 0955    b complex-C-folic acid (NEPHROCAPS) capsule 1 mg  1 capsule Oral Daily Orest Severance, MD   1 mg at 01/26/20 3259    vancomycin (VANCOCIN) intermittent dosing (placeholder)   Other RX Placeholder Zachary Jiménez MD       [facility] amLODIPine (NORVASC) tablet 10 mg  10 mg Oral Daily Kristie Cheek MD   10 mg at 01/26/20 0730    aspirin EC tablet 81 mg  81 mg Oral Daily Kristie Cheek MD   81 mg at 01/26/20 0730    atorvastatin (LIPITOR) tablet 40 mg  40 mg Oral Nightly Kristie Cheek MD   40 mg at 01/25/20 2109    cloNIDine (CATAPRES) tablet 0.3 mg  0.3 mg Oral TID Kristie Cheek MD   0.3 mg at 01/26/20 0731    clopidogrel (PLAVIX) tablet 75 mg  75 mg Oral Daily Kristie Cheek MD   75 mg at 01/26/20 0731    isosorbide mononitrate (IMDUR) extended release tablet 30 mg  30 mg Oral Daily Kristie Cheek MD   30 mg at 01/26/20 0731    lactulose (CHRONULAC) 10 GM/15ML solution 10 g  10 g Oral TID Kristie Cheek MD   10 g at 01/26/20 0737    melatonin tablet 3 mg  3 mg Oral Nightly PRN Kristie Cheek MD   3 mg at 01/23/20 2126    metoprolol succinate (TOPROL XL) extended release tablet 25 mg  25 mg Oral BID Kristie Cheek MD   25 mg at 01/26/20 0731    minoxidil (LONITEN) tablet 10 mg  10 mg Oral Daily Kristie Cheek MD   10 mg at 01/26/20 7987    oxyCODONE (OXYCONTIN) extended release tablet 10 mg  10 mg Oral Q8H PRN Kristie Cheek MD   10 mg at 01/26/20 0954    sucralfate (CARAFATE) tablet 1 g  1 g Oral TID PRN Kristie Cheek MD        sevelamer (RENVELA) tablet 800 mg  800 mg Oral TID WC Kristie Cheek MD   800 mg at 01/26/20 0731    sodium chloride flush 0.9 % injection 10 mL  10 mL Intravenous 2 times per day Kristie Cheek MD   10 mL at 01/26/20 0737    sodium chloride flush 0.9 % injection 10 mL  10 mL Intravenous PRN Kristie Cheek MD        ondansetron [facility]) injection 4 mg  4 mg Intravenous Q6H PRN Kristie Cheek MD   4 mg at 01/23/20 1338    acetaminophen (TYLENOL) tablet 650 mg  650 mg Oral Q4H PRN Kristie Cheek MD           Past Medical History:  Past Medical History:   Diagnosis Date    Anxiety     Blood circulation, collateral     CAD (coronary artery disease)     stents x 2; per dr. Alley [facility]) liver cancer    CHF (congestive heart failure) (HCC)     Chyloperitoneum determined by paracentesis     CKD (chronic kidney disease), stage V ([facility]     Dialysis patient ([facility]     mon wed fri at [place] GERD (gastroesophageal reflux disease)     Hemodialysis patient ([address]     mon wed fri in [place] Hepatic cirrhosis ([facility]     dr. Roldan Cronin; has been going [facility] for liver and kidney transplant list.    Hepatitis C, chronic ([facility]     History of blood transfusion     HTN (hypertension)     Hx of blood clots     arm/fistula    Mixed hyperlipidemia 1/9/2017    Osteoarthritis     Pacemaker     Palliative care patient 07/09/2019    PVD (peripheral vascular disease) ([facility]     Sleep apnea     no cpap at present.  Type II diabetes mellitus (HCC)     no meds. Past Surgical History:  Past Surgical History:   Procedure Laterality Date    ANGIOPLASTY  08/09/11 SUKHWINDER GERARDO cephalic vein balloon angioplasty with 7mm x 4cm balloon. coild embolization of 2 cephallic vein brances with 3mm x 5cm coils    CARDIAC CATHETERIZATION  04/04/11    cardiac cath and stent x1 by Dr. Fouzia Nolen  07/26/11 JAI GERARDO AV fistula. coild embolization of cephalic vein branch near the wrist with 3mm EMBO coils.  balloon a'plasty left cephalic vein with [id number] balloon and then [id number] balloon    DIALYSIS FISTULA CREATION Left 2/16/2017    LEFT UPPER EXTREMITY BRACHIAL / AXILLARY GRAFT AND STENT LEFT SUBCLAVIAN VEIN  performed by Sheila Covarrubias MD at [address] Right 7/12/2019    RIGHT LEG WOUND WASHOUT performed by Pebbles Lopez MD at [address] [facility] FEMORAL-TIBIAL BYPASS GRAFT Right 6/25/2019    FEMORAL TIBIAL/PERINEAL BYPASS WITH REVERSED GREATER SAPHENOUS VEIN performed by Sheila Covarrubias MD at [address]  12/26/2010    Right internal jugular vein tunneled dialysis catheter placement    OTHER SURGICAL HISTORY  [id number]    Redo of dialysis catheter    OTHER SURGICAL HISTORY  9/6/2011   [facility]    Removal of RT IJV tunneled dialysis cath    OTHER SURGICAL HISTORY  5/22/12   [facility]    Ultrasound-guided cannulation of left cephalic vein in the mid forearm toward the AV anastomosis, Left upper  ext. fistulograms including venograms of the SVC, Left cephalic vein balloon angioplasty with a 4mm x 100mm Tod balloon, Completion fistulograms    PACEMAKER PLACEMENT      medtronic    PARACENTESIS      multiple/recent; per dr. Tyler Farooq at [address] [facility] 1/30/2018    UPPER EXTREMITY DRIL PROCEDURE WITH LEFT SAPHENOUS VEIN HARVESTING performed by Jose Maria Beltran MD at [address] [place] [address]  6/19/12    LUE arteriovenous fistulogram including venography of the superior vena cava. Balloon angioplasty, left forearm cephalic vein and upper arm cephalic vein with [id number] tod balloon.  VASCULAR SURGERY  7/10/2012 [facility]     Revision of left distal radial artery to cephalic vein arteriovenous fistula with 7mm Artegraft interposition.  VASCULAR SURGERY  7/30/15 [facility] [address]    Left upper extremity arteriovenous fistulograms including venography of the superior vena cava. Left cephalic vein balloon angioplasty with an 8 x 20 cm cutting balloon proximal cephalic vein. Balloon angioplasty of left cephalic vein/antecubital vein near the antecubital crease with 8 x 20 mm cutting balloon.  VASCULAR SURGERY  7/30/15 [facility]    Balloon angioplasty proximal end distal upper arm cephalic vein with 9 x 40 cm  balloon. Completion fistulograms of the left upper extremity.  VASCULAR SURGERY  8/13/15 [facility]    Revision of left upper extremity arteriovenous fistula with excision of pseudoaneurysm and primary repair of cephalic vein.     VASCULAR SURGERY  5/3/16 [facility]    Left upper fistulograms/venograms, left cephalic balloon 8 x 20 cutter,9 x 40 conquest,left proximal cephalic vein stents (flair 2 9 x 50), balloon stents 9 x 40 conquest.    Father     High Blood Pressure Sister        Social History  Social History     Socioeconomic History    Marital status:      Spouse name: Michelle Ibarra    Number of children: 0    Years of education: 15    Highest education level: Not on file   Occupational History    Occupation:    Social Needs    Financial resource strain: Not on file    Food insecurity:     Worry: Not on file     Inability: Not on file    Transportation needs:     Medical: Not on file     Non-medical: Not on file   Tobacco Use    Smoking status: Current Every Day Smoker     Packs/day: 0.25     Years: 45.00     Pack years: 11.25     Types: Cigarettes    Smokeless tobacco: Never Used    Tobacco comment: about to  quit down to 1 a day cigarettes   Substance and Sexual Activity    Alcohol use: No    Drug use: Not Currently     Types: Marijuana, Cocaine, Methamphetamines, IV, Opiates , Other-see comments     Comment: in the 1970s, LSD    Sexual activity: Yes     Partners: Female     Comment: has a wife   Lifestyle    Physical activity:     Days per week: Not on file     Minutes per session: Not on file    Stress: Not on file   Relationships    Social connections:     Talks on phone: Not on file     Gets together: Not on file     Attends Religion service: Not on file     Active member of club or organization: Not on file     Attends meetings of clubs or organizations: Not on file     Relationship status: Not on file    Intimate partner violence:     Fear of current or ex partner: Not on file     Emotionally abused: Not on file     Physically abused: Not on file     Forced sexual activity: Not on file   Other Topics Concern    Not on file   Social History Narrative    CODE STATUS: Full Code    HEALTH CARE PROXY: Mrs. Michelle Ibarra, [phone]    Back up: his Mother, Mrs. Miriam Varma, [phone]    AMBULATES: independantly    DOMICILED: lives in a private house with 2 steps to get in, has no stairs inside, lives with wife and step children, has 3 pets         Review of Systems:  History obtained from chart review and the patient  General ROS: No fever or chills  Respiratory ROS: No cough, shortness of breath, wheezing  Cardiovascular ROS: No chest pain or palpitations  Gastrointestinal ROS: No abdominal pain or melena  Genito-Urinary ROS: No dysuria or hematuria  Musculoskeletal ROS: No joint pain or swelling         Objective:  Patient Vitals for the past 24 hrs:   BP Temp Temp src Pulse Resp SpO2 Weight   01/26/20 0658 (!) 153/69 97.4 °F (36.3 °C) Temporal 61 16 96 % --   01/26/20 0218 (!) 145/52 96.8 °F (36 °C) Temporal 58 14 97 % 170 lb 9.6 oz (77.4 kg)   01/25/20 2216 (!) 105/54 96.5 °F (35.8 °C) Temporal 67 18 92 % --   01/25/20 1531 (!) 123/55 97.4 °F (36.3 °C) Temporal 60 16 92 % --   01/25/20 1218 (!) 230/86 -- -- -- -- -- --   01/25/20 1206 (!) 209/67 97.7 °F (36.5 °C) Temporal 61 18 93 % --       Intake/Output Summary (Last 24 hours) at 1/26/2020 1037  Last data filed at 1/25/2020 1218  Gross per 24 hour   Intake 240 ml   Output 3400 ml   Net -3160 ml     General: awake/alert   Chest:  clear to auscultation bilaterally without respiratory distress  CVS: regular rate and rhythm  Abdominal: soft, nontender, normal bowel sounds  Extremities: no cyanosis or edema  Skin: warm/dry    Labs:  BMP:   Recent Labs     01/23/20  1408  01/24/20  0510 01/25/20  1140     --  135* 135*   K 4.2   < > 5.3*  5.3* 3.9   CL 93*  --  94* 94*   CO2 27  --  26 26   BUN 18  --  29* 12   CREATININE 2.7*  --  4.2* 1.9*   CALCIUM 9.4  --  9.0 8.9    < > = values in this interval not displayed.      CBC:   Recent Labs     01/24/20  0510 01/25/20  1405   WBC 4.9 5.3   HGB 9.3* 10.8*   HCT 29.3* 34.9*   MCV 98.3* 98.6*    132     LIVER PROFILE:   Recent Labs     01/24/20  0510 01/25/20  1140   AST 17 21   ALT 9 11   BILITOT 0.5 0.4   ALKPHOS 69 96     PT/INR:   No results for input(s): PROTIME, INR in the last 72 +------------------------------------+----------+---------------+----------+ ! Mid Femoral                         !Yes       ! Yes            ! None      ! +------------------------------------+----------+---------------+----------+ ! Dist Femoral                        !Yes       ! Yes            ! None      ! +------------------------------------+----------+---------------+----------+ ! Deep Femoral                        !Yes       ! Yes            ! None      ! +------------------------------------+----------+---------------+----------+ ! Popliteal                           !Yes       ! Yes            ! None      ! +------------------------------------+----------+---------------+----------+ ! SSV                                 ! Yes       ! Yes            ! None      ! +------------------------------------+----------+---------------+----------+ ! Gastroc                             ! Yes       ! Yes            ! None      ! +------------------------------------+----------+---------------+----------+ ! PTV                                 ! Yes       ! Yes            ! None      ! +------------------------------------+----------+---------------+----------+ ! GSV                                 ! Yes       ! Yes            ! None      ! +------------------------------------+----------+---------------+----------+ ! ATV                                 ! Yes       ! Yes            ! None      ! +------------------------------------+----------+---------------+----------+ ! Peroneal                            !Yes       ! Yes            ! None      ! +------------------------------------+----------+---------------+----------+ Left Lower Extremities DVT Study Measurements Left 2D Measurements +------------------------------------+----------+---------------+----------+ ! Location                            ! Visualized! Compressibility! Thrombosis! +------------------------------------+----------+---------------+----------+ ! Sapheno Femoral Junction

## 2020-01-26 NOTE — PROGRESS NOTES
+--------------------------------------++--------+-----+----+--------+-----+ ! DP                                    !!255     !1.99 !    !255     ! 1.99 ! +--------------------------------------++--------+-----+----+--------+-----+ ! Great Toe                             !!0       !0    !    !37      !0.29 ! +--------------------------------------++--------+-----+----+--------+-----+   - Brachial Pressure:Right: 128.   - GABRIEL:Right: 1.99. Left: 1.99. Plethysmographic Digit Evaluation +---------++--------+-----+---------------++--------+-----+----------------+ ! ! !Right   ! ! Left           !!        !     !                ! +---------++--------+-----+---------------++--------+-----+----------------+ ! Location ! !Pressure! Ratio! PPG Wave Form  ! !Pressure! Ratio! PPG Wave Form   ! +---------++--------+-----+---------------++--------+-----+----------------+ ! Great Toe!!0       !0    !               !!37      !0.29 !                ! +---------++--------+-----+---------------++--------+-----+----------------+    Vl Dup Lower Extremity Arteries Right    Result Date: 1/20/2020  Vascular Lower Extremities Arterial Duplex Procedure  Demographics   Patient Name   Huong Badillo Age                61   Patient Number [id number]       Gender             Male   Visit Number   [id number]    Interpreting       Quinn Brooks MD                              Physician   Date of Birth  1960   Referring          Quinn Brooks MD                              Physician   Accession      [id number]    [place] [address] 1959 [facility]  Number                                         RVS, RCS  Procedure Type of Study:   Extremities Arteries:Lower Extremities Arterial Duplex, VL LOWER EXTREMITY  ARTERIES DUPLEX RIGHT. Indications for Study:Wound Lower Extremity (Right). Risk Factors   - The patient's risk factor(s) include: diabetes mellitus, arterial     hypertension and prior MI . Allergies   - No known allergies.   Impression +---------------++-----+-----+----+-----------++---+-----+---+-------------+ ! Location       ! !PSV  ! Ratio! EDV ! Wave Desc. !!PSV! Ratio! EDV! Wave Desc.   ! +---------------++-----+-----+----+-----------++---+-----+---+-------------+ ! Common Femoral !!173  !     !14.4!           !!   !     !   !             ! +---------------++-----+-----+----+-----------++---+-----+---+-------------+ ! Prox PFA       !!402  !     !20.2!           !!   !     !   !             ! +---------------++-----+-----+----+-----------++---+-----+---+-------------+ ! Mid PTA        !!0    !     !    !           !!   !     !   !             ! +---------------++-----+-----+----+-----------++---+-----+---+-------------+ ! Prox GWYN       !!22.2 !     !11.6!           !!   !     !   !             ! +---------------++-----+-----+----+-----------++---+-----+---+-------------+ ! Jeancarlos Barraganal  !!12.6 ! !8.05!           !!   !     !   !             ! +---------------++-----+-----+----+-----------++---+-----+---+-------------+    Vl Vein Mapping Lower Bilateral    Result Date: 1/21/2020  Vascular Lower Extremity Vein Mapping Procedure  Demographics   Patient Name   Akilah Beam Age                61   Patient Number [id number]       Gender             Male   Visit Number   [id number]    Andres Marquez MD                              Physician   Date of Birth  1960   Referring          Cliff Cerda MD                              Physician   Accession      [id number]    [address]  Number                                         RVS, RCS  Procedure Type of Study:   [address] [place] [address]. Indications for Study:Pre-op vein mapping for revascularization. Risk Factors   - The patient's risk factor(s) include: diabetes mellitus, arterial     hypertension and prior MI . Allergies   - No known allergies. Impression   Bilateral lower extremity saphenous vein mapping performed.  Veins are  patent surgery on board, cruciate recommendations. Correlated by CT of the right knee obtained on 1/19/2020. Vascular studies have been completed on 1/20/2020, vein mapping completed 1/21/2020, patient deemed high risk for surgical intervention per cardiology. -Blood cultures revealed no growth to date     PVD: Vascular surgery with likely revision of previous graft tentatively planned for this upcoming Monday as patient's nausea vomiting symptoms seem to be resolved.       ESRD/Hyperkalemia: Currently awaiting a.m. labs, potassium has normalized as per yesterday's results. We will continue to monitor daily metabolic profile. Neurology consulted and following for routine dialysis needs. Nausea/vomiting/diarrhea- currently improved, rapid influenza negative. Symptomatic control, antiemetic PRN, encourage oral hydration     CAD: Known severe stenosis of the left main artery-poor surgical candidate. DAPT/statin. Cardiology consulted and continues to follow, patient is deemed high risk due to multiple comorbidities. Electronically signed by   Gio Ly   Internal Medicine Hospitalist  On 1/26/2020  At 10:00 AM    EMR Dragon/Transcription disclaimer:   Much of this encounter note is an electronic transcription/translation of spoken language to printed text.  The electronic translation of spoken language may permit erroneous, or at times, nonsensical words or phrases to be inadvertently transcribed; although attempts have made to review the note for such errors, some may still exist.

## 2020-01-27 ENCOUNTER — ANESTHESIA (OUTPATIENT)
Dept: OPERATING ROOM | Age: 60
DRG: 252 | End: 2020-01-27
Payer: MEDICARE

## 2020-01-27 ENCOUNTER — ANESTHESIA EVENT (OUTPATIENT)
Dept: OPERATING ROOM | Age: 60
DRG: 252 | End: 2020-01-27
Payer: MEDICARE

## 2020-01-27 ENCOUNTER — APPOINTMENT (OUTPATIENT)
Dept: INTERVENTIONAL RADIOLOGY/VASCULAR | Age: 60
DRG: 252 | End: 2020-01-27
Payer: MEDICARE

## 2020-01-27 VITALS — TEMPERATURE: 100.9 F | RESPIRATION RATE: 2 BRPM | OXYGEN SATURATION: 95 %

## 2020-01-27 LAB
ABO/RH: NORMAL
ALBUMIN SERPL-MCNC: 3.6 G/DL (ref 3.5–5.2)
ALP BLD-CCNC: 93 U/L (ref 40–130)
ALT SERPL-CCNC: 12 U/L (ref 5–41)
ANION GAP SERPL CALCULATED.3IONS-SCNC: 15 MMOL/L (ref 7–19)
ANION GAP SERPL CALCULATED.3IONS-SCNC: 16 MMOL/L (ref 7–19)
ANTIBODY SCREEN: NORMAL
AST SERPL-CCNC: 20 U/L (ref 5–40)
BASE EXCESS ARTERIAL: 1.7 MMOL/L (ref -2–2)
BASOPHILS ABSOLUTE: 0 K/UL (ref 0–0.2)
BASOPHILS RELATIVE PERCENT: 0.7 % (ref 0–1)
BILIRUB SERPL-MCNC: 0.4 MG/DL (ref 0.2–1.2)
BLOOD BANK DISPENSE STATUS: NORMAL
BLOOD BANK PRODUCT CODE: NORMAL
BPU ID: NORMAL
BUN BLDV-MCNC: 11 MG/DL (ref 6–20)
BUN BLDV-MCNC: 44 MG/DL (ref 6–20)
CALCIUM SERPL-MCNC: 8.8 MG/DL (ref 8.6–10)
CALCIUM SERPL-MCNC: 8.9 MG/DL (ref 8.6–10)
CARBOXYHEMOGLOBIN ARTERIAL: 1.7 % (ref 0–5)
CHLORIDE BLD-SCNC: 88 MMOL/L (ref 98–111)
CHLORIDE BLD-SCNC: 94 MMOL/L (ref 98–111)
CO2: 23 MMOL/L (ref 22–29)
CO2: 28 MMOL/L (ref 22–29)
CREAT SERPL-MCNC: 2 MG/DL (ref 0.5–1.2)
CREAT SERPL-MCNC: 6 MG/DL (ref 0.5–1.2)
DESCRIPTION BLOOD BANK: NORMAL
EOSINOPHILS ABSOLUTE: 0.5 K/UL (ref 0–0.6)
EOSINOPHILS RELATIVE PERCENT: 11.8 % (ref 0–5)
GFR NON-AFRICAN AMERICAN: 10
GFR NON-AFRICAN AMERICAN: 34
GLUCOSE BLD-MCNC: 103 MG/DL (ref 74–109)
GLUCOSE BLD-MCNC: 104 MG/DL (ref 74–109)
HCO3 ARTERIAL: 25.3 MMOL/L (ref 22–26)
HCT VFR BLD CALC: 30.8 % (ref 42–52)
HCT VFR BLD CALC: 35.7 % (ref 42–52)
HEMOGLOBIN, ART, EXTENDED: 7.1 G/DL (ref 14–18)
HEMOGLOBIN: 11.3 G/DL (ref 14–18)
HEMOGLOBIN: 9.7 G/DL (ref 14–18)
IMMATURE GRANULOCYTES #: 0 K/UL
LYMPHOCYTES ABSOLUTE: 1.2 K/UL (ref 1.1–4.5)
LYMPHOCYTES RELATIVE PERCENT: 25.3 % (ref 20–40)
MCH RBC QN AUTO: 30.6 PG (ref 27–31)
MCHC RBC AUTO-ENTMCNC: 31.5 G/DL (ref 33–37)
MCV RBC AUTO: 97.2 FL (ref 80–94)
METHEMOGLOBIN ARTERIAL: 1.4 %
MONOCYTES ABSOLUTE: 0.4 K/UL (ref 0–0.9)
MONOCYTES RELATIVE PERCENT: 9 % (ref 0–10)
NEUTROPHILS ABSOLUTE: 2.4 K/UL (ref 1.5–7.5)
NEUTROPHILS RELATIVE PERCENT: 52.8 % (ref 50–65)
O2 CONTENT ARTERIAL: 9.2 ML/DL
O2 SAT, ARTERIAL: 91.6 %
O2 THERAPY: ABNORMAL
PCO2 ARTERIAL: 34 MMHG (ref 35–45)
PDW BLD-RTO: 16.2 % (ref 11.5–14.5)
PH ARTERIAL: 7.48 (ref 7.35–7.45)
PLATELET # BLD: 91 K/UL (ref 130–400)
PMV BLD AUTO: 12.4 FL (ref 9.4–12.4)
PO2 ARTERIAL: 58 MMHG (ref 80–100)
POTASSIUM SERPL-SCNC: 3.4 MMOL/L (ref 3.5–5)
POTASSIUM SERPL-SCNC: 5.8 MMOL/L (ref 3.5–5)
POTASSIUM SERPL-SCNC: 6.1 MMOL/L (ref 3.5–5)
POTASSIUM, WHOLE BLOOD: 3.4
RBC # BLD: 3.17 M/UL (ref 4.7–6.1)
SODIUM BLD-SCNC: 127 MMOL/L (ref 136–145)
SODIUM BLD-SCNC: 137 MMOL/L (ref 136–145)
TOTAL PROTEIN: 7.2 G/DL (ref 6.6–8.7)
VANCOMYCIN RANDOM: 19 UG/ML
WBC # BLD: 4.6 K/UL (ref 4.8–10.8)

## 2020-01-27 PROCEDURE — 6370000000 HC RX 637 (ALT 250 FOR IP): Performed by: HOSPITALIST

## 2020-01-27 PROCEDURE — 2580000003 HC RX 258: Performed by: SURGERY

## 2020-01-27 PROCEDURE — 86901 BLOOD TYPING SEROLOGIC RH(D): CPT

## 2020-01-27 PROCEDURE — P9016 RBC LEUKOCYTES REDUCED: HCPCS

## 2020-01-27 PROCEDURE — 85018 HEMOGLOBIN: CPT

## 2020-01-27 PROCEDURE — 86900 BLOOD TYPING SEROLOGIC ABO: CPT

## 2020-01-27 PROCEDURE — 6360000002 HC RX W HCPCS: Performed by: ANESTHESIOLOGY

## 2020-01-27 PROCEDURE — 06BP0ZZ EXCISION OF RIGHT SAPHENOUS VEIN, OPEN APPROACH: ICD-10-PCS | Performed by: SURGERY

## 2020-01-27 PROCEDURE — 3700000000 HC ANESTHESIA ATTENDED CARE: Performed by: SURGERY

## 2020-01-27 PROCEDURE — 7100000001 HC PACU RECOVERY - ADDTL 15 MIN: Performed by: SURGERY

## 2020-01-27 PROCEDURE — 37799 UNLISTED PX VASCULAR SURGERY: CPT

## 2020-01-27 PROCEDURE — 3700000001 HC ADD 15 MINUTES (ANESTHESIA): Performed by: SURGERY

## 2020-01-27 PROCEDURE — 36430 TRANSFUSION BLD/BLD COMPNT: CPT

## 2020-01-27 PROCEDURE — 36415 COLL VENOUS BLD VENIPUNCTURE: CPT

## 2020-01-27 PROCEDURE — 85014 HEMATOCRIT: CPT

## 2020-01-27 PROCEDURE — 8010000000 HC HEMODIALYSIS ACUTE INPT

## 2020-01-27 PROCEDURE — 84132 ASSAY OF SERUM POTASSIUM: CPT

## 2020-01-27 PROCEDURE — 3600000005 HC SURGERY LEVEL 5 BASE: Performed by: SURGERY

## 2020-01-27 PROCEDURE — 2709999900 HC NON-CHARGEABLE SUPPLY: Performed by: SURGERY

## 2020-01-27 PROCEDURE — 7100000000 HC PACU RECOVERY - FIRST 15 MIN: Performed by: SURGERY

## 2020-01-27 PROCEDURE — 2580000003 HC RX 258: Performed by: HOSPITALIST

## 2020-01-27 PROCEDURE — 3600000015 HC SURGERY LEVEL 5 ADDTL 15MIN: Performed by: SURGERY

## 2020-01-27 PROCEDURE — 2580000003 HC RX 258: Performed by: ANESTHESIOLOGY

## 2020-01-27 PROCEDURE — 2580000003 HC RX 258: Performed by: NURSE ANESTHETIST, CERTIFIED REGISTERED

## 2020-01-27 PROCEDURE — 80202 ASSAY OF VANCOMYCIN: CPT

## 2020-01-27 PROCEDURE — 86923 COMPATIBILITY TEST ELECTRIC: CPT

## 2020-01-27 PROCEDURE — 85025 COMPLETE CBC W/AUTO DIFF WBC: CPT

## 2020-01-27 PROCEDURE — 35371 RECHANNELING OF ARTERY: CPT | Performed by: SURGERY

## 2020-01-27 PROCEDURE — 2000000000 HC ICU R&B

## 2020-01-27 PROCEDURE — 6360000002 HC RX W HCPCS: Performed by: SURGERY

## 2020-01-27 PROCEDURE — 6370000000 HC RX 637 (ALT 250 FOR IP): Performed by: SURGERY

## 2020-01-27 PROCEDURE — 2500000003 HC RX 250 WO HCPCS: Performed by: NURSE ANESTHETIST, CERTIFIED REGISTERED

## 2020-01-27 PROCEDURE — 82803 BLOOD GASES ANY COMBINATION: CPT

## 2020-01-27 PROCEDURE — 6360000002 HC RX W HCPCS: Performed by: NURSE ANESTHETIST, CERTIFIED REGISTERED

## 2020-01-27 PROCEDURE — 2700000000 HC OXYGEN THERAPY PER DAY

## 2020-01-27 PROCEDURE — 86850 RBC ANTIBODY SCREEN: CPT

## 2020-01-27 PROCEDURE — 80053 COMPREHEN METABOLIC PANEL: CPT

## 2020-01-27 PROCEDURE — 04CK0ZZ EXTIRPATION OF MATTER FROM RIGHT FEMORAL ARTERY, OPEN APPROACH: ICD-10-PCS | Performed by: SURGERY

## 2020-01-27 PROCEDURE — 04UK07Z SUPPLEMENT RIGHT FEMORAL ARTERY WITH AUTOLOGOUS TISSUE SUBSTITUTE, OPEN APPROACH: ICD-10-PCS | Performed by: SURGERY

## 2020-01-27 PROCEDURE — C1757 CATH, THROMBECTOMY/EMBOLECT: HCPCS | Performed by: SURGERY

## 2020-01-27 PROCEDURE — 2720000000 HC MISC SURG SUPPLY STERILE $0-50: Performed by: SURGERY

## 2020-01-27 PROCEDURE — 6370000000 HC RX 637 (ALT 250 FOR IP): Performed by: INTERNAL MEDICINE

## 2020-01-27 RX ORDER — ONDANSETRON 2 MG/ML
INJECTION INTRAMUSCULAR; INTRAVENOUS PRN
Status: DISCONTINUED | OUTPATIENT
Start: 2020-01-27 | End: 2020-01-27 | Stop reason: SDUPTHER

## 2020-01-27 RX ORDER — MORPHINE SULFATE 4 MG/ML
2 INJECTION, SOLUTION INTRAMUSCULAR; INTRAVENOUS
Status: DISCONTINUED | OUTPATIENT
Start: 2020-01-27 | End: 2020-01-30

## 2020-01-27 RX ORDER — MEPERIDINE HYDROCHLORIDE 50 MG/ML
12.5 INJECTION INTRAMUSCULAR; INTRAVENOUS; SUBCUTANEOUS EVERY 5 MIN PRN
Status: DISCONTINUED | OUTPATIENT
Start: 2020-01-27 | End: 2020-01-27 | Stop reason: HOSPADM

## 2020-01-27 RX ORDER — METOPROLOL TARTRATE 5 MG/5ML
INJECTION INTRAVENOUS PRN
Status: DISCONTINUED | OUTPATIENT
Start: 2020-01-27 | End: 2020-01-27 | Stop reason: SDUPTHER

## 2020-01-27 RX ORDER — ONDANSETRON 2 MG/ML
4 INJECTION INTRAMUSCULAR; INTRAVENOUS EVERY 6 HOURS PRN
Status: DISCONTINUED | OUTPATIENT
Start: 2020-01-27 | End: 2020-01-31 | Stop reason: HOSPADM

## 2020-01-27 RX ORDER — KETAMINE HYDROCHLORIDE 50 MG/ML
INJECTION, SOLUTION, CONCENTRATE INTRAMUSCULAR; INTRAVENOUS PRN
Status: DISCONTINUED | OUTPATIENT
Start: 2020-01-27 | End: 2020-01-27 | Stop reason: SDUPTHER

## 2020-01-27 RX ORDER — ACETAMINOPHEN 325 MG/1
650 TABLET ORAL EVERY 6 HOURS PRN
Status: DISCONTINUED | OUTPATIENT
Start: 2020-01-27 | End: 2020-01-31 | Stop reason: HOSPADM

## 2020-01-27 RX ORDER — FENTANYL CITRATE 50 UG/ML
50 INJECTION, SOLUTION INTRAMUSCULAR; INTRAVENOUS
Status: DISCONTINUED | OUTPATIENT
Start: 2020-01-27 | End: 2020-01-27 | Stop reason: HOSPADM

## 2020-01-27 RX ORDER — DIPHENHYDRAMINE HYDROCHLORIDE 50 MG/ML
12.5 INJECTION INTRAMUSCULAR; INTRAVENOUS
Status: DISCONTINUED | OUTPATIENT
Start: 2020-01-27 | End: 2020-01-27 | Stop reason: HOSPADM

## 2020-01-27 RX ORDER — DEXAMETHASONE SODIUM PHOSPHATE 10 MG/ML
INJECTION, SOLUTION INTRAMUSCULAR; INTRAVENOUS PRN
Status: DISCONTINUED | OUTPATIENT
Start: 2020-01-27 | End: 2020-01-27 | Stop reason: SDUPTHER

## 2020-01-27 RX ORDER — MORPHINE SULFATE 4 MG/ML
2 INJECTION, SOLUTION INTRAMUSCULAR; INTRAVENOUS EVERY 5 MIN PRN
Status: DISCONTINUED | OUTPATIENT
Start: 2020-01-27 | End: 2020-01-27 | Stop reason: HOSPADM

## 2020-01-27 RX ORDER — NITROGLYCERIN 20 MG/100ML
INJECTION INTRAVENOUS PRN
Status: DISCONTINUED | OUTPATIENT
Start: 2020-01-27 | End: 2020-01-27 | Stop reason: SDUPTHER

## 2020-01-27 RX ORDER — GLYCOPYRROLATE 0.2 MG/ML
INJECTION INTRAMUSCULAR; INTRAVENOUS PRN
Status: DISCONTINUED | OUTPATIENT
Start: 2020-01-27 | End: 2020-01-27 | Stop reason: SDUPTHER

## 2020-01-27 RX ORDER — HYDRALAZINE HYDROCHLORIDE 20 MG/ML
5 INJECTION INTRAMUSCULAR; INTRAVENOUS EVERY 10 MIN PRN
Status: DISCONTINUED | OUTPATIENT
Start: 2020-01-27 | End: 2020-01-27 | Stop reason: HOSPADM

## 2020-01-27 RX ORDER — SODIUM CHLORIDE 0.9 % (FLUSH) 0.9 %
10 SYRINGE (ML) INJECTION PRN
Status: DISCONTINUED | OUTPATIENT
Start: 2020-01-27 | End: 2020-01-27 | Stop reason: HOSPADM

## 2020-01-27 RX ORDER — SCOLOPAMINE TRANSDERMAL SYSTEM 1 MG/1
1 PATCH, EXTENDED RELEASE TRANSDERMAL ONCE
Status: DISCONTINUED | OUTPATIENT
Start: 2020-01-27 | End: 2020-01-27 | Stop reason: HOSPADM

## 2020-01-27 RX ORDER — MORPHINE SULFATE 4 MG/ML
4 INJECTION, SOLUTION INTRAMUSCULAR; INTRAVENOUS EVERY 5 MIN PRN
Status: DISCONTINUED | OUTPATIENT
Start: 2020-01-27 | End: 2020-01-27 | Stop reason: HOSPADM

## 2020-01-27 RX ORDER — MORPHINE SULFATE 4 MG/ML
4 INJECTION, SOLUTION INTRAMUSCULAR; INTRAVENOUS
Status: DISCONTINUED | OUTPATIENT
Start: 2020-01-27 | End: 2020-01-27 | Stop reason: HOSPADM

## 2020-01-27 RX ORDER — SODIUM CHLORIDE 450 MG/100ML
INJECTION, SOLUTION INTRAVENOUS CONTINUOUS
Status: DISCONTINUED | OUTPATIENT
Start: 2020-01-27 | End: 2020-01-27

## 2020-01-27 RX ORDER — PROMETHAZINE HYDROCHLORIDE 25 MG/ML
6.25 INJECTION, SOLUTION INTRAMUSCULAR; INTRAVENOUS
Status: DISCONTINUED | OUTPATIENT
Start: 2020-01-27 | End: 2020-01-27 | Stop reason: HOSPADM

## 2020-01-27 RX ORDER — 0.9 % SODIUM CHLORIDE 0.9 %
20 INTRAVENOUS SOLUTION INTRAVENOUS ONCE
Status: DISCONTINUED | OUTPATIENT
Start: 2020-01-27 | End: 2020-01-27

## 2020-01-27 RX ORDER — LIDOCAINE HYDROCHLORIDE 10 MG/ML
INJECTION, SOLUTION INFILTRATION; PERINEURAL PRN
Status: DISCONTINUED | OUTPATIENT
Start: 2020-01-27 | End: 2020-01-27 | Stop reason: SDUPTHER

## 2020-01-27 RX ORDER — FENTANYL CITRATE 50 UG/ML
INJECTION, SOLUTION INTRAMUSCULAR; INTRAVENOUS PRN
Status: DISCONTINUED | OUTPATIENT
Start: 2020-01-27 | End: 2020-01-27 | Stop reason: SDUPTHER

## 2020-01-27 RX ORDER — KETOROLAC TROMETHAMINE 30 MG/ML
INJECTION, SOLUTION INTRAMUSCULAR; INTRAVENOUS PRN
Status: DISCONTINUED | OUTPATIENT
Start: 2020-01-27 | End: 2020-01-27 | Stop reason: SDUPTHER

## 2020-01-27 RX ORDER — ROCURONIUM BROMIDE 10 MG/ML
INJECTION, SOLUTION INTRAVENOUS PRN
Status: DISCONTINUED | OUTPATIENT
Start: 2020-01-27 | End: 2020-01-27 | Stop reason: SDUPTHER

## 2020-01-27 RX ORDER — HEPARIN SODIUM 1000 [USP'U]/ML
INJECTION, SOLUTION INTRAVENOUS; SUBCUTANEOUS PRN
Status: DISCONTINUED | OUTPATIENT
Start: 2020-01-27 | End: 2020-01-27 | Stop reason: SDUPTHER

## 2020-01-27 RX ORDER — SODIUM CHLORIDE 0.9 % (FLUSH) 0.9 %
10 SYRINGE (ML) INJECTION EVERY 12 HOURS SCHEDULED
Status: DISCONTINUED | OUTPATIENT
Start: 2020-01-27 | End: 2020-01-27 | Stop reason: HOSPADM

## 2020-01-27 RX ORDER — LABETALOL 20 MG/4 ML (5 MG/ML) INTRAVENOUS SYRINGE
5 EVERY 10 MIN PRN
Status: DISCONTINUED | OUTPATIENT
Start: 2020-01-27 | End: 2020-01-27 | Stop reason: HOSPADM

## 2020-01-27 RX ORDER — LIDOCAINE HYDROCHLORIDE 10 MG/ML
1 INJECTION, SOLUTION EPIDURAL; INFILTRATION; INTRACAUDAL; PERINEURAL
Status: DISCONTINUED | OUTPATIENT
Start: 2020-01-27 | End: 2020-01-27 | Stop reason: HOSPADM

## 2020-01-27 RX ORDER — SODIUM CHLORIDE, SODIUM LACTATE, POTASSIUM CHLORIDE, CALCIUM CHLORIDE 600; 310; 30; 20 MG/100ML; MG/100ML; MG/100ML; MG/100ML
INJECTION, SOLUTION INTRAVENOUS CONTINUOUS
Status: DISCONTINUED | OUTPATIENT
Start: 2020-01-27 | End: 2020-01-27

## 2020-01-27 RX ORDER — SODIUM CHLORIDE 9 MG/ML
INJECTION, SOLUTION INTRAVENOUS CONTINUOUS
Status: DISCONTINUED | OUTPATIENT
Start: 2020-01-27 | End: 2020-01-31 | Stop reason: HOSPADM

## 2020-01-27 RX ORDER — METOCLOPRAMIDE HYDROCHLORIDE 5 MG/ML
10 INJECTION INTRAMUSCULAR; INTRAVENOUS
Status: DISCONTINUED | OUTPATIENT
Start: 2020-01-27 | End: 2020-01-27 | Stop reason: HOSPADM

## 2020-01-27 RX ORDER — SODIUM CHLORIDE 9 MG/ML
INJECTION, SOLUTION INTRAVENOUS CONTINUOUS PRN
Status: DISCONTINUED | OUTPATIENT
Start: 2020-01-27 | End: 2020-01-27 | Stop reason: SDUPTHER

## 2020-01-27 RX ORDER — MIDAZOLAM HYDROCHLORIDE 1 MG/ML
2 INJECTION INTRAMUSCULAR; INTRAVENOUS
Status: COMPLETED | OUTPATIENT
Start: 2020-01-27 | End: 2020-01-27

## 2020-01-27 RX ORDER — MIDAZOLAM HYDROCHLORIDE 1 MG/ML
INJECTION INTRAMUSCULAR; INTRAVENOUS
Status: DISPENSED
Start: 2020-01-27 | End: 2020-01-28

## 2020-01-27 RX ORDER — PROPOFOL 10 MG/ML
INJECTION, EMULSION INTRAVENOUS PRN
Status: DISCONTINUED | OUTPATIENT
Start: 2020-01-27 | End: 2020-01-27 | Stop reason: SDUPTHER

## 2020-01-27 RX ADMIN — ROCURONIUM BROMIDE 60 MG: 10 SOLUTION INTRAVENOUS at 14:53

## 2020-01-27 RX ADMIN — NITROGLYCERIN 50 MCG: 20 INJECTION INTRAVENOUS at 15:40

## 2020-01-27 RX ADMIN — FENTANYL CITRATE 50 MCG: 50 INJECTION INTRAMUSCULAR; INTRAVENOUS at 14:53

## 2020-01-27 RX ADMIN — NEOSTIGMINE METHYLSULFATE 3 MG: 1 INJECTION, SOLUTION INTRAMUSCULAR; INTRAVENOUS; SUBCUTANEOUS at 18:12

## 2020-01-27 RX ADMIN — METOPROLOL SUCCINATE 25 MG: 25 TABLET, EXTENDED RELEASE ORAL at 11:34

## 2020-01-27 RX ADMIN — MORPHINE SULFATE 2 MG: 4 INJECTION, SOLUTION INTRAMUSCULAR; INTRAVENOUS at 21:40

## 2020-01-27 RX ADMIN — NITROGLYCERIN 200 MCG: 20 INJECTION INTRAVENOUS at 18:18

## 2020-01-27 RX ADMIN — MINOXIDIL 10 MG: 10 TABLET ORAL at 11:33

## 2020-01-27 RX ADMIN — SODIUM CHLORIDE: 9 INJECTION, SOLUTION INTRAVENOUS at 16:46

## 2020-01-27 RX ADMIN — SODIUM CHLORIDE: 4.5 INJECTION, SOLUTION INTRAVENOUS at 13:26

## 2020-01-27 RX ADMIN — NITROGLYCERIN 100 MCG: 20 INJECTION INTRAVENOUS at 16:15

## 2020-01-27 RX ADMIN — METOPROLOL SUCCINATE 25 MG: 25 TABLET, EXTENDED RELEASE ORAL at 20:37

## 2020-01-27 RX ADMIN — PHENYLEPHRINE HYDROCHLORIDE 80 MCG: 10 INJECTION INTRAVENOUS at 14:54

## 2020-01-27 RX ADMIN — OXYCODONE HYDROCHLORIDE 10 MG: 10 TABLET, FILM COATED, EXTENDED RELEASE ORAL at 11:34

## 2020-01-27 RX ADMIN — HYDROMORPHONE HYDROCHLORIDE 1 MG: 1 INJECTION, SOLUTION INTRAMUSCULAR; INTRAVENOUS; SUBCUTANEOUS at 18:33

## 2020-01-27 RX ADMIN — METOPROLOL TARTRATE 2.5 MG: 5 INJECTION, SOLUTION INTRAVENOUS at 15:45

## 2020-01-27 RX ADMIN — FENTANYL CITRATE 100 MCG: 50 INJECTION INTRAMUSCULAR; INTRAVENOUS at 15:31

## 2020-01-27 RX ADMIN — SODIUM CHLORIDE: 9 INJECTION, SOLUTION INTRAVENOUS at 20:01

## 2020-01-27 RX ADMIN — PHENYLEPHRINE HYDROCHLORIDE 80 MCG: 10 INJECTION INTRAVENOUS at 16:04

## 2020-01-27 RX ADMIN — OXYCODONE HYDROCHLORIDE AND ACETAMINOPHEN 1 TABLET: 5; 325 TABLET ORAL at 20:37

## 2020-01-27 RX ADMIN — PHENYLEPHRINE HYDROCHLORIDE 50 MCG/MIN: 10 INJECTION INTRAVENOUS at 16:06

## 2020-01-27 RX ADMIN — Medication 10 ML: at 11:35

## 2020-01-27 RX ADMIN — NITROGLYCERIN 100 MCG: 20 INJECTION INTRAVENOUS at 16:10

## 2020-01-27 RX ADMIN — PROPOFOL 100 MG: 10 INJECTION, EMULSION INTRAVENOUS at 14:53

## 2020-01-27 RX ADMIN — DEXAMETHASONE SODIUM PHOSPHATE 10 MG: 10 INJECTION, SOLUTION INTRAMUSCULAR; INTRAVENOUS at 15:26

## 2020-01-27 RX ADMIN — METOPROLOL TARTRATE 1.5 MG: 5 INJECTION, SOLUTION INTRAVENOUS at 18:32

## 2020-01-27 RX ADMIN — OXYCODONE HYDROCHLORIDE AND ACETAMINOPHEN 1 TABLET: 5; 325 TABLET ORAL at 06:27

## 2020-01-27 RX ADMIN — NITROGLYCERIN 200 MCG: 20 INJECTION INTRAVENOUS at 18:20

## 2020-01-27 RX ADMIN — MIDAZOLAM 1 MG: 1 INJECTION INTRAMUSCULAR; INTRAVENOUS at 13:56

## 2020-01-27 RX ADMIN — KETOROLAC TROMETHAMINE 10 MG: 30 INJECTION, SOLUTION INTRAMUSCULAR; INTRAVENOUS at 17:45

## 2020-01-27 RX ADMIN — ROCURONIUM BROMIDE 20 MG: 10 SOLUTION INTRAVENOUS at 16:11

## 2020-01-27 RX ADMIN — Medication 10 ML: at 20:01

## 2020-01-27 RX ADMIN — OXYCODONE HYDROCHLORIDE 10 MG: 10 TABLET, FILM COATED, EXTENDED RELEASE ORAL at 03:15

## 2020-01-27 RX ADMIN — GLYCOPYRROLATE 0.6 MG: 1 INJECTION INTRAMUSCULAR; INTRAVENOUS at 18:12

## 2020-01-27 RX ADMIN — MORPHINE SULFATE 4 MG: 4 INJECTION, SOLUTION INTRAMUSCULAR; INTRAVENOUS at 19:10

## 2020-01-27 RX ADMIN — ATORVASTATIN CALCIUM 40 MG: 40 TABLET, FILM COATED ORAL at 20:37

## 2020-01-27 RX ADMIN — BACITRACIN ZINC NEOMYCIN SULFATE POLYMYXIN B SULFATE: 400; 3.5; 5 OINTMENT TOPICAL at 11:36

## 2020-01-27 RX ADMIN — LIDOCAINE HYDROCHLORIDE 50 MG: 10 INJECTION, SOLUTION INFILTRATION; PERINEURAL at 14:53

## 2020-01-27 RX ADMIN — Medication 30 MG: at 14:53

## 2020-01-27 RX ADMIN — OXYCODONE HYDROCHLORIDE 10 MG: 10 TABLET, FILM COATED, EXTENDED RELEASE ORAL at 21:41

## 2020-01-27 RX ADMIN — ONDANSETRON HYDROCHLORIDE 4 MG: 2 INJECTION, SOLUTION INTRAMUSCULAR; INTRAVENOUS at 17:45

## 2020-01-27 RX ADMIN — MORPHINE SULFATE 2 MG: 4 INJECTION, SOLUTION INTRAMUSCULAR; INTRAVENOUS at 23:25

## 2020-01-27 RX ADMIN — METOPROLOL TARTRATE 1 MG: 5 INJECTION, SOLUTION INTRAVENOUS at 17:56

## 2020-01-27 RX ADMIN — AMLODIPINE BESYLATE 10 MG: 5 TABLET ORAL at 11:33

## 2020-01-27 RX ADMIN — HEPARIN SODIUM 6000 UNITS: 1000 INJECTION, SOLUTION INTRAVENOUS; SUBCUTANEOUS at 15:59

## 2020-01-27 RX ADMIN — FENTANYL CITRATE 50 MCG: 50 INJECTION INTRAMUSCULAR; INTRAVENOUS at 18:19

## 2020-01-27 RX ADMIN — Medication 1000 MG: at 13:44

## 2020-01-27 RX ADMIN — MORPHINE SULFATE 2 MG: 4 INJECTION, SOLUTION INTRAMUSCULAR; INTRAVENOUS at 19:53

## 2020-01-27 RX ADMIN — NITROGLYCERIN 100 MCG: 20 INJECTION INTRAVENOUS at 15:44

## 2020-01-27 RX ADMIN — ASPIRIN 81 MG: 81 TABLET, COATED ORAL at 11:33

## 2020-01-27 RX ADMIN — CLONIDINE HYDROCHLORIDE 0.3 MG: 0.1 TABLET ORAL at 20:37

## 2020-01-27 RX ADMIN — Medication 10 MG: at 16:00

## 2020-01-27 ASSESSMENT — PAIN DESCRIPTION - ORIENTATION
ORIENTATION: RIGHT

## 2020-01-27 ASSESSMENT — PAIN DESCRIPTION - PAIN TYPE
TYPE: SURGICAL PAIN
TYPE: ACUTE PAIN
TYPE: SURGICAL PAIN

## 2020-01-27 ASSESSMENT — PAIN DESCRIPTION - PROGRESSION
CLINICAL_PROGRESSION: GRADUALLY WORSENING
CLINICAL_PROGRESSION: GRADUALLY IMPROVING
CLINICAL_PROGRESSION: GRADUALLY IMPROVING
CLINICAL_PROGRESSION: GRADUALLY WORSENING

## 2020-01-27 ASSESSMENT — PAIN DESCRIPTION - FREQUENCY
FREQUENCY: CONTINUOUS

## 2020-01-27 ASSESSMENT — PAIN SCALES - GENERAL
PAINLEVEL_OUTOF10: 5
PAINLEVEL_OUTOF10: 8
PAINLEVEL_OUTOF10: 10
PAINLEVEL_OUTOF10: 8
PAINLEVEL_OUTOF10: 10
PAINLEVEL_OUTOF10: 10
PAINLEVEL_OUTOF10: 7
PAINLEVEL_OUTOF10: 7
PAINLEVEL_OUTOF10: 9
PAINLEVEL_OUTOF10: 8

## 2020-01-27 ASSESSMENT — ENCOUNTER SYMPTOMS: SHORTNESS OF BREATH: 0

## 2020-01-27 ASSESSMENT — PAIN DESCRIPTION - DESCRIPTORS
DESCRIPTORS: ACHING;CRAMPING;DISCOMFORT
DESCRIPTORS: CONSTANT;DISCOMFORT;THROBBING
DESCRIPTORS: CRAMPING;DISCOMFORT
DESCRIPTORS: POUNDING;PRESSURE
DESCRIPTORS: ACHING;CONSTANT;DISCOMFORT;CRUSHING

## 2020-01-27 ASSESSMENT — PAIN - FUNCTIONAL ASSESSMENT

## 2020-01-27 ASSESSMENT — PAIN DESCRIPTION - LOCATION
LOCATION: LEG

## 2020-01-27 ASSESSMENT — PAIN DESCRIPTION - ONSET
ONSET: GRADUAL
ONSET: AWAKENED FROM SLEEP
ONSET: GRADUAL
ONSET: ON-GOING

## 2020-01-27 ASSESSMENT — PAIN DESCRIPTION - DIRECTION: RADIATING_TOWARDS: FOOT

## 2020-01-27 ASSESSMENT — LIFESTYLE VARIABLES: SMOKING_STATUS: 0

## 2020-01-27 NOTE — PROGRESS NOTES
Nephrology ([facility]) Progress Note    Patient:  Darcy Pinedo  YOB: 1960  Date of Service: 1/27/2020  MRN: [id number]   Acct: [de-identified]   Primary Care Physician: Dilan Chappell DO  Advance Directive: Full Code  Admit Date: 1/18/2020       Hospital Day: 9  Referring Provider: Gio Cronin MD    Patient independently seen and examined, Chart, Consults, Notes, Operative notes, Labs, Cardiology, and Radiology studies reviewed as able. Chief complaint: End-stage renal disease. Subjective:  Darcy Pinedo is a 61 y.o. male  whom we were consulted for end-stage renal disease. Patient goes to [address] [place] [address] dialysis clinic on Monday Wednesday Friday. Patient has a known history of severe peripheral vascular disease, history of CABG, congestive heart failure, cirrhosis of liver, sleep apnea and hepatitis C. Admitted this time for cellulitis. Patient presented with severe peripheral vascular disease and right foot pain. On January 24, he underwent bypass surgery of right lower extremity. Patient is scheduled to have another surgery today. Currently seen on hemodialysis  Hemodialysis access: AV fistula  Hemodialysis: 3-1/2-hour  Ultrafiltration: 3000 cc  2K bath  Blood flow rate is 450 cc/min. Allergies:  Patient has no known allergies.     Medicines:  Current Facility-Administered Medications   Medication Dose Route Frequency Provider Last Rate Last Dose    vancomycin (VANCOCIN) 1 g in dextrose 5% 250 mL IVPB  1,000 mg Intravenous On Call to [facility], MD        calcium acetate (PHOSLO) capsule 2,001 mg  2,001 mg Oral TID  Gio Cronin MD   2,001 mg at 01/26/20 6357    neomycin-bacitracin-polymyxin (NEOSPORIN) ointment   Topical BID J Carlos Tovar MD        oxyCODONE-acetaminophen (PERCOCET) 5-325 MG per tablet 1 tablet  1 tablet Oral Q4H PRN Jv Everett MD   1 tablet at 01/27/20 4935    darbepoetin sandrine-polysorbate (ARANESP) injection 25 mcg at 01/23/20 1338    acetaminophen (TYLENOL) tablet 650 mg  650 mg Oral Q4H PRN Sherry Metcalf MD           Past Medical History:  Past Medical History:   Diagnosis Date    Anxiety     Blood circulation, collateral     CAD (coronary artery disease)     stents x 2; per dr. Aundrea Lubin [facility])     liver cancer    CHF (congestive heart failure) ([address]     Chyloperitoneum determined by paracentesis     CKD (chronic kidney disease), stage V ([address]     Dialysis patient ([address]     mon wed fri at [facility] GERD (gastroesophageal reflux disease)     Hemodialysis patient ([address]     mon wed fri in [place] Hepatic cirrhosis ([facility]     dr. Helena Renee; has been going [facility] for liver and kidney transplant list.    Hepatitis C, chronic ([facility]     History of blood transfusion     HTN (hypertension)     Hx of blood clots     arm/fistula    Mixed hyperlipidemia 1/9/2017    Osteoarthritis     Pacemaker     Palliative care patient 07/09/2019    PVD (peripheral vascular disease) ([facility]     Sleep apnea     no cpap at present.  Type II diabetes mellitus (HCC)     no meds. Past Surgical History:  Past Surgical History:   Procedure Laterality Date    ANGIOPLASTY  08/09/11 [facility]    LUE cephalic vein balloon angioplasty with 7mm x 4cm balloon. coild embolization of 2 cephallic vein brances with 3mm x 5cm coils    CARDIAC CATHETERIZATION  04/04/11    cardiac cath and stent x1 by Dr. Medel Drilling  07/26/11 [facility]    LUE AV fistula. coild embolization of cephalic vein branch near the wrist with 3mm EMBO coils.  balloon a'plasty left cephalic vein with [id number] balloon and then [id number] balloon    DIALYSIS FISTULA CREATION Left 2/16/2017    LEFT UPPER EXTREMITY BRACHIAL / AXILLARY GRAFT AND STENT LEFT SUBCLAVIAN VEIN  performed by Nathaniel Menjivar MD at [address] Right 7/12/2019    RIGHT LEG WOUND WASHOUT performed by Manjinder Ladd MD at [facility] FEMORAL-TIBIAL BYPASS GRAFT Right 6/25/2019    FEMORAL TIBIAL/PERINEAL BYPASS WITH REVERSED GREATER SAPHENOUS VEIN performed by Gage Martinez MD at [facility]  12/26/2010    Right internal jugular vein tunneled dialysis catheter placement    OTHER SURGICAL HISTORY  [id number]    Redo of dialysis catheter    OTHER SURGICAL HISTORY  9/6/2011   [facility]    Removal of RT IJV tunneled dialysis cath    OTHER SURGICAL HISTORY  5/22/12   [facility]    Ultrasound-guided cannulation of left cephalic vein in the mid forearm toward the AV anastomosis, Left upper  ext. fistulograms including venograms of the SVC, Left cephalic vein balloon angioplasty with a 4mm x 100mm Tod balloon, Completion fistulograms    PACEMAKER PLACEMENT      medtronic    PARACENTESIS      multiple/recent; per dr. Ruthann Carrillo at [address] [place] [address] Left 1/30/2018    UPPER EXTREMITY DRIL PROCEDURE WITH LEFT SAPHENOUS VEIN HARVESTING performed by Gage Martinez MD at [address] [place] [address]  6/19/12    LUE arteriovenous fistulogram including venography of the superior vena cava. Balloon angioplasty, left forearm cephalic vein and upper arm cephalic vein with [id number] tod balloon.  VASCULAR SURGERY  7/10/2012 [facility]     Revision of left distal radial artery to cephalic vein arteriovenous fistula with 7mm Artegraft interposition.  VASCULAR SURGERY  7/30/15 [facility] & [address]    Left upper extremity arteriovenous fistulograms including venography of the superior vena cava. Left cephalic vein balloon angioplasty with an 8 x 20 cm cutting balloon proximal cephalic vein. Balloon angioplasty of left cephalic vein/antecubital vein near the antecubital crease with 8 x 20 mm cutting balloon.  VASCULAR SURGERY  7/30/15 [facility]    Balloon angioplasty proximal end distal upper arm cephalic vein with 9 x 40 cm  balloon. Completion fistulograms of the left upper extremity.     VASCULAR SURGERY  8/13/15 [facility]    Revision of left upper extremity arteriovenous fistula with excision of pseudoaneurysm and primary repair of cephalic vein.  VASCULAR SURGERY  5/3/16 [facility]    Left upper fistulograms/venograms, left cephalic balloon 8 x 20 cutter,9 x 40 conquest,left proximal cephalic vein stents (flair 2 9 x 50), balloon stents 9 x 40 conquest.    VASCULAR SURGERY  12/08/2016    [facility]- ultrasound guided cannulation of left distal cephalic vein ultrasound guided cannulation right internal jugular vein placement of right internal jugular vein tunneled dialysis catheter (Bard Equistream XK 23cm tip to cuff)     VASCULAR SURGERY  01/20/2017    [facility]-Right upper venograms, u/s guided cannulation left basilic vein, left upper venograms, balloon angioplasty left subclavian vein 10x40 conquest.    VASCULAR SURGERY  02/16/2017    [facility]. Left brachial artery to axillary vein arteriovenous graft with 7 mm artegraft. Left upper extremity venograms. Left subclavian vein stent viabahn 13x50. Left subclavian vein stent balloon angioplasty 12x40 atlas.  VASCULAR SURGERY  04/11/2017    [facility]. Removal of tunneled dialysis catheter right internal jugular vein.  VASCULAR SURGERY  01/25/2018    [facility]. Arch aortogram, left upper arteriogram, AV graft angiogram/venogram.    VASCULAR SURGERY  02/28/2018    [facility]. Right CFA 5f-6f-7f sheath, aortogram with bilateral lower arteriogram, left SFA/pop  balloon angioplasty 5x100 , 6x150 lutonix ( 2), left CFA  7f sheath, bilateral iliac kissing stents right 10x38 icast, left 9x59 icast expanded with 10x40 , completion aortogram, mynx left CFA , failed mynx right CFA.  VASCULAR SURGERY  06/13/2019    [facility] left CFA 5f sheath, aortogram with bilateral lower arteriogram, mynx left CFA.  VASCULAR SURGERY  06/25/2019    [facility]. Femoral tibial/perineal bypass with reversed greater saphenous vein.  VASCULAR SURGERY  08/16/2019    TJR. Aortogram and runoff, selective right CFA injections. PTA of fem-tp bypass with 4.0 x 220 mm Other Topics Concern    Not on file   Social History Narrative    CODE STATUS: Full Code    HEALTH CARE PROXY: Mrs. Narciso Carey, [phone]    Back up: his Mother, Mrs. Tj Sherwood, [phone]    AMBULATES: independantly    DOMICILED: lives in a private house with 2 steps to get in, has no stairs inside, lives with wife and step children, has 3 pets         Review of Systems:  History obtained from chart review and the patient  General ROS: No fever or chills  Respiratory ROS: No cough, shortness of breath, wheezing  Cardiovascular ROS: No chest pain or palpitations  Gastrointestinal ROS: No abdominal pain or melena  Genito-Urinary ROS: No dysuria or hematuria  Musculoskeletal ROS: No joint pain or swelling         Objective:  Patient Vitals for the past 24 hrs:   BP Temp Temp src Pulse Resp SpO2   01/27/20 0106 (!) 147/61 98.1 °F (36.7 °C) -- 69 17 94 %   01/26/20 1857 (!) 156/65 96.8 °F (36 °C) Temporal 67 18 95 %       Intake/Output Summary (Last 24 hours) at 1/27/2020 0932  Last data filed at 1/26/2020 0945  Gross per 24 hour   Intake 240 ml   Output --   Net 240 ml     General: awake/alert   HEENT: Normocephalic atraumatic head  Neck: Supple with no JVD or carotid bruits. Chest:  clear to auscultation bilaterally without respiratory distress  CVS: regular rate and rhythm  Abdominal: soft, nontender, normal bowel sounds  Extremities: Left lower extremity scar for recent surgery.   Skin: warm/dry    Labs:  BMP:   Recent Labs     01/25/20  1140 01/26/20  1155 01/27/20  0439   * 128* 127*   K 3.9 5.1* 6.1*   CL 94* 87* 88*   CO2 26 29 23   BUN 12 32* 44*   CREATININE 1.9* 5.0* 6.0*   CALCIUM 8.9 8.9 8.9     CBC:   Recent Labs     01/25/20  1405 01/26/20  1155 01/27/20  0439   WBC 5.3 5.5 4.6*   HGB 10.8* 9.0* 9.7*   HCT 34.9* 28.4* 30.8*   MCV 98.6* 97.3* 97.2*    114* 91*     LIVER PROFILE:   Recent Labs     01/25/20  1140 01/26/20  1155 01/27/20  0439   AST 21 21 20   ALT 11 12 12 BILITOT 0.4 0.4 0.4   ALKPHOS 96 100 93     PT/INR:   No results for input(s): PROTIME, INR in the last 72 hours. APTT: No results for input(s): APTT in the last 72 hours. BNP:  No results for input(s): BNP in the last 72 hours. Ionized Calcium:No results for input(s): IONCA in the last 72 hours. Magnesium:No results for input(s): MG in the last 72 hours. Phosphorus:  No results for input(s): PHOS in the last 72 hours. HgbA1C: No results for input(s): LABA1C in the last 72 hours. Hepatic:   Recent Labs     01/25/20  1140 01/26/20  1155 01/27/20  0439   ALKPHOS 96 100 93   ALT 11 12 12   AST 21 21 20   PROT 7.5 7.3 7.2   BILITOT 0.4 0.4 0.4   LABALBU 4.2 3.6 3.6     Lactic Acid:   No results for input(s): LACTA in the last 72 hours. Troponin: No results for input(s): CKTOTAL, CKMB, TROPONINT in the last 72 hours. ABGs: No results found for: PHART, PO2ART, GMC3VAK  CRP:  No results for input(s): CRP in the last 72 hours. Sed Rate:  No results for input(s): SEDRATE in the last 72 hours. Culture Results:   Blood Culture Recent:   Recent Labs     01/18/20 2031   BC No growth after 5 days of incubation. Urine Culture Recent : No results for input(s): LABURIN in the last 720 hours. Radiology reports as per the Radiologist  Radiology: Ct Knee Right W Wo Contrast    Result Date: 1/19/2020  EXAMINATION: CT KNEE RIGHT W WO CONTRAST 1/19/2020 10:46 AM HISTORY: Question abscess fluid collection Automatic exposure control was utilized reducing radiation dose. Radiation DLP 6.8 mCi centimeters. Axial images are obtained. Sagittal coronal reformatted images the right leg are also obtained. The tibia and fibula are intact. Vascular calcifications noted in the popliteal artery. Vascular calcifications present in the superficial femoral artery, popliteal artery. Edema is present in the subcutaneous tissues. A definite abscess is not identified. The medial head of the gastrocnemius is intact.  Impression fluid is noted in the subcutaneous tissues. This may represent cellulitis or possibly edema. 2. Vascular calcification is noted. There is limited flow to the right lower extremity Signed by Dr Alessandro Randhawa on 1/19/2020 10:51 AM    Vl Extremity Venous Right    Result Date: 1/19/2020  Vascular Lower Extremities DVT Study Procedure  Demographics   Patient Name     Daly Linton Age                    61   Patient Number   [id number]       Gender                 Male   Visit Number     [id number]    Interpreting Physician Ciera Alfaro MD   Date of Birth    1960   Referring Physician    Epi Grissom   Accession Number [id number]    [address] [facility], [facility]  Procedure Type of Study:   Veins:Lower Extremities DVT Study, VL EXTREMITY VENOUS DUPLEX RIGHT. Indications for Study:Swelling and Pain, right lower extremity. Allergies   - No known allergies. Impression   There is no evidence of deep venous thrombosis (DVT) in the right lower  extremity(ies). There is no evidence of superficial thrombophlebitis of the right lower  extremity(ies). Signature   ----------------------------------------------------------------  Electronically signed by Ciera Alfaro MD(Interpreting  physician) on 01/19/2020 06:59 AM  ----------------------------------------------------------------  Velocities are measured in cm/s ; Diameters are measured in mm Right Lower Extremities DVT Study Measurements Right 2D Measurements +------------------------------------+----------+---------------+----------+ ! Location                            ! Visualized! Compressibility! Thrombosis! +------------------------------------+----------+---------------+----------+ ! Sapheno Femoral Junction            ! Yes       ! Yes            ! None      ! +------------------------------------+----------+---------------+----------+ ! Common Femoral                      !Yes       ! Yes            ! None      ! +------------------------------------+----------+---------------+----------+ ! Prox Femoral                        !Yes       ! Yes            ! None      ! +------------------------------------+----------+---------------+----------+ ! Mid Femoral                         !Yes       ! Yes            ! None      ! +------------------------------------+----------+---------------+----------+ ! Dist Femoral                        !Yes       ! Yes            ! None      ! +------------------------------------+----------+---------------+----------+ ! Deep Femoral                        !Yes       ! Yes            ! None      ! +------------------------------------+----------+---------------+----------+ ! Popliteal                           !Yes       ! Yes            ! None      ! +------------------------------------+----------+---------------+----------+ ! SSV                                 ! Yes       ! Yes            ! None      ! +------------------------------------+----------+---------------+----------+ ! Gastroc                             ! Yes       ! Yes            ! None      ! +------------------------------------+----------+---------------+----------+ ! PTV                                 ! Yes       ! Yes            ! None      ! +------------------------------------+----------+---------------+----------+ ! GSV                                 ! Yes       ! Yes            ! None      ! +------------------------------------+----------+---------------+----------+ ! ATV                                 ! Yes       ! Yes            ! None      ! +------------------------------------+----------+---------------+----------+ ! Peroneal                            !Yes       ! Yes            ! None      ! +------------------------------------+----------+---------------+----------+ Left Lower Extremities DVT Study Measurements Left 2D Measurements +------------------------------------+----------+---------------+----------+ ! Location !Visualized! Compressibility! Thrombosis! +------------------------------------+----------+---------------+----------+ ! Sapheno Femoral Junction            ! Yes       ! Yes            ! None      ! +------------------------------------+----------+---------------+----------+ ! Common Femoral                      !Yes       ! Yes            ! None      ! +------------------------------------+----------+---------------+----------+       Assessment   1. End-stage renal disease/seen on hemodialysis. 2.  Severe peripheral vascular disease. 3.  Status post right lower extremity bypass surgery. 4.  Type II diabetic nephropathy. 5.  Cirrhosis of liver/hepatitis C.  6.  Anemia of chronic kidney disease. 7.  Chronic diastolic congestive heart failure. 8.  Hyperkalemia. 9.  Hyponatremia. 10.  Secondary hyperparathyroidism. Plan:  1. Tolerating hemodialysis very well. 2.  Counseling about low potassium diet. 3.  Possible another vascular surgery.       Monse Maldonado MD  01/27/20  9:32 AM

## 2020-01-27 NOTE — PROGRESS NOTES
Vascular Surgery Rounding Note                                                     Dr.Timothy Amador    1/27/2020  7:31 AM     Antibiotic- Vancomycin with pharmacy dosing    Subjective-Stating he hasn't eaten anything he shouldn't so he is not sure why his K+ is high. Ready for OR so his foot will stop hurting    Objective-   Invalid input(s): 24H    Intake/Output Summary (Last 24 hours) at 1/27/2020 0731  Last data filed at 1/26/2020 0907  Gross per 24 hour   Intake 240 ml   Output --   Net 240 ml     No intake/output data recorded. Physical Exam:  Awake, alert, appropriate Yes  BP (!) 147/61   Pulse 69   Temp 98.1 °F (36.7 °C)   Resp 17   Ht 5' 9\" (1.753 m)   Wt 170 lb 9.6 oz (77.4 kg)   SpO2 94%   BMI 25.19 kg/m²   Heart-Regular rate and rhythm, murmur noted  Lung-clear bilaterally anteriorly  Abdomen-Abdomen soft, non-tender. BS normal.   Extremities-RLE still with erythema, Mepilex dressing CDI to medial popliteal scar area  Neurologic- alert, oriented, normal speech    Assessment/Plan:   1. Severe PVD with rest pain of the right foot-plan  revascularization of right leg today  2. Renal failure- support and dialysis  3. Liver-support  4. Heart- try to protect. 5.  Will reschedule RLE revasc  6. K+ 6.1, for dialysis treatment today      Antibiotics continued after surgery due to:   Infection -          [x]  Cellulitis        Beta Blocker:  continued

## 2020-01-27 NOTE — PROGRESS NOTES
chloride flush  10 mL Intravenous 2 times per day     Continuous Infusions:   PRN Meds: oxyCODONE-acetaminophen, melatonin, oxyCODONE, sucralfate, sodium chloride flush, ondansetron, acetaminophen    Past History    Past Medical History:   has a past medical history of Anxiety, Blood circulation, collateral, CAD (coronary artery disease), Cancer ([facility] CHF (congestive heart failure) ([facility] Chyloperitoneum determined by paracentesis, CKD (chronic kidney disease), stage V ([facility] Dialysis patient ([facility] GERD (gastroesophageal reflux disease), Hemodialysis patient ([facility] Hepatic cirrhosis ([facility] Hepatitis C, chronic ([facility] History of blood transfusion, HTN (hypertension), Hx of blood clots, Mixed hyperlipidemia, Osteoarthritis, Pacemaker, Palliative care patient, PVD (peripheral vascular disease) ([facility] Sleep apnea, and Type II diabetes mellitus ([facility] Social History:   reports that he has been smoking cigarettes. He has a 11.25 pack-year smoking history. He has never used smokeless tobacco. He reports previous drug use. Drugs: Marijuana, Cocaine, Methamphetamines, IV, Opiates , and Other-see comments. He reports that he does not drink alcohol. Family History:   Family History   Problem Relation Age of Onset    High Blood Pressure Mother     High Blood Pressure Father     High Blood Pressure Sister        Physical Examination      Vitals:  BP (!) 147/61   Pulse 69   Temp 98.1 °F (36.7 °C)   Resp 17   Ht 5' 9\" (1.753 m)   Wt 170 lb 9.6 oz (77.4 kg)   SpO2 94%   BMI 25.19 kg/m²   Temp (24hrs), Av.5 °F (36.4 °C), Min:96.8 °F (36 °C), Max:98.1 °F (36.7 °C)      I/O (24Hr): Intake/Output Summary (Last 24 hours) at 2020 0953  Last data filed at 2020 0945  Gross per 24 hour   Intake 240 ml   Output --   Net 240 ml       Physical Exam  Vitals signs and nursing note reviewed. Constitutional:       General: He is not in acute distress. Appearance: He is not ill-appearing or toxic-appearing. Levels    Result Date: 1/20/2020  Vascular Lower Arterial Plethysmography Procedure  Demographics   Patient Name   Ludivina Laurent Age                61   Patient Number [id number]       Gender             Male   Visit Number   [id number]    Interpreting       Lexi Redmond MD                              Physician   Date of Birth  1960   Referring          Lexi Redmond MD                              Physician   Accession      [id number]    [address]  Number                                         RVS, RCS  Procedure Type of Study:   Extremities Arteries:Lower Arterial Plethysmography, VL ANKLE / BRACHIAL  INDICES EXTREMITY COMPLETE . Indications for Study:Wound Lower Extremity (Right). Risk Factors   - The patient's risk factor(s) include: diabetes mellitus, arterial     hypertension and prior MI . Allergies   - No known allergies. Impression   The patient has moderately diminished ankle-brachial indices left lower  extremity(ies) which would be consistent with claudication level symptoms. The patient has severely diminished ankle-brachial indices right lower  extremity(ies) which would be consistent with rest pain symptoms. Signature   ----------------------------------------------------------------  Electronically signed by Lexi Redmond MD(Interpreting  physician) on 01/20/2020 03:39 PM  ----------------------------------------------------------------  Velocities are measured in cm/s ; Diameters are measured in mm Pressures +--------------------------------------++--------+-----+----+--------+-----+ ! ! !Right   ! ! Left!        !     ! +--------------------------------------++--------+-----+----+--------+-----+ ! Location                              ! !Pressure! Ratio! !Pressure! Ratio! +--------------------------------------++--------+-----+----+--------+-----+ ! Ankle PT                              !!39      !0.3  !     !255 Impression   Right lower extremity arterial duplex exam performed. There is minimal  plaque in the right CFA. There is a severe stenosis in the profunda  femoral artery. The native SFA is occluded. The femoral to tibial peroneal  bypass is patent, however, it is diffusely narrowed. \"failing\" bypass. No  flow is seen in the PTA, only very slow, minimal arterial flow is seen in  the anterior tibial and peroneal artery of the right leg. Signature   ----------------------------------------------------------------  Electronically signed by Janet LIMInterpreting  physician) on 01/20/2020 03:45 PM  ----------------------------------------------------------------  Grafts   - The graft originated from the Right Common Femoral to the Right     Popliteal. +--------------------------------------+----+---+-----+--------------------+ ! Location                              ! PSV ! EDV! Ratio!% Stenosis          ! +--------------------------------------+----+---+-----+--------------------+ ! Prox Anastomosis                      ! 240 ! 54 !     !                    ! +--------------------------------------+----+---+-----+--------------------+ ! Prox Graft                            ! 597 !152!2.49 !                    ! +--------------------------------------+----+---+-----+--------------------+ ! Mid Graft                             !10  !4  !0.02 !                    ! +--------------------------------------+----+---+-----+--------------------+ ! Dist Graft                            !10  !5  !1    !                    ! +--------------------------------------+----+---+-----+--------------------+ Comments: Distal anastomosis cannot be imaged due to open wound. . Velocities are measured in cm/s ; Diameters are measured in mm LE Duplex Measurements +---------------++-----+-----+----+-----------++---+-----+---+-------------+ ! ! !Right! ! Left!           !!   !     !   !             ! +---------------++-----+-----+----+-----------++---+-----+---+-------------+ ! Location       ! !PSV  ! Ratio! EDV ! Wave Desc. !!PSV! Ratio! EDV! Wave Desc.   ! +---------------++-----+-----+----+-----------++---+-----+---+-------------+ ! Common Femoral !!173  !     !14.4!           !!   !     !   !             ! +---------------++-----+-----+----+-----------++---+-----+---+-------------+ ! Prox PFA       !!402  !     !20.2!           !!   !     !   !             ! +---------------++-----+-----+----+-----------++---+-----+---+-------------+ ! Mid PTA        !!0    !     !    !           !!   !     !   !             ! +---------------++-----+-----+----+-----------++---+-----+---+-------------+ ! Prox GWYN       !!22.2 !     !11.6!           !!   !     !   !             ! +---------------++-----+-----+----+-----------++---+-----+---+-------------+ ! Jeancarlos Barraganal  !!12.6 ! !8.05!           !!   !     !   !             ! +---------------++-----+-----+----+-----------++---+-----+---+-------------+    Vl Vein Mapping Lower Bilateral    Result Date: 1/21/2020  Vascular Lower Extremity Vein Mapping Procedure  Demographics   Patient Name   Annie Viera Age                61   Patient Number [id number]       Gender             Male   Visit Number   [id number]    Ludivina Thomas MD                              Physician   Date of Birth  1960   Referring          Petey Chacko MD                              Physician   Accession      [id number]    [address] [place], [place] [address]  Number                                         RVS, RCS  Procedure Type of Study:   [address] [address]. Indications for Study:Pre-op vein mapping for revascularization. Risk Factors   - The patient's risk factor(s) include: diabetes mellitus, arterial     hypertension and prior MI . Allergies   - No known allergies. Impression   Bilateral lower extremity saphenous vein mapping performed.  Veins are  patent with measurements noted. Right greater saphenous vein harvested thigh and groin. Left greater saphenous vein harvested all the way to the ankle. Signature   ----------------------------------------------------------------  Electronically signed by Farhana Morrow  physician) on 01/21/2020 11:16 AM  ----------------------------------------------------------------  Velocities are measured in cm/s ; Diameters are measured in mm +--------------------------------------++--------+-----+----+--------+-----+ ! Superficial - Great Saphenous Vein    ! ! Right   ! ! Left!        !     ! +--------------------------------------++--------+-----+----+--------+-----+ ! Location                              ! !Diameter! Depth! !Diameter! Depth! +--------------------------------------++--------+-----+----+--------+-----+ ! GSV High Calf                         !!2.9     !     !    !        !     ! +--------------------------------------++--------+-----+----+--------+-----+ ! GSV Mid Calf                          !!3       !     !    !        !     ! +--------------------------------------++--------+-----+----+--------+-----+ ! GSV Ankle                             ! !4.4     !     !    !4.3     !     ! +--------------------------------------++--------+-----+----+--------+-----+ +--------------------------------------++--------+-----+----+--------+-----+ ! Superficial - Lesser Saphenous Vein   ! ! Right   ! ! Left!        !     ! +--------------------------------------++--------+-----+----+--------+-----+ ! Location                              ! !Diameter! Depth! !Diameter! Depth! +--------------------------------------++--------+-----+----+--------+-----+ ! SSV High Calf                         !!2.9     !     !    !3.2     !     ! +--------------------------------------++--------+-----+----+--------+-----+ ! SSV Mid Calf                          !!3.7     !     !    !2.1     ! !

## 2020-01-27 NOTE — PLAN OF CARE
Problem: Falls - Risk of:  Goal: Will remain free from falls  Description  Will remain free from falls  1/27/2020 0028 by Jazmine Delgado RN  Outcome: Ongoing  1/26/2020 1505 by Eduardo Godwin RN  Outcome: Ongoing  Goal: Absence of physical injury  Description  Absence of physical injury  1/27/2020 0028 by Jazmine Delgado RN  Outcome: Ongoing  1/26/2020 1505 by Eduardo Godwin RN  Outcome: Ongoing

## 2020-01-27 NOTE — PROGRESS NOTES
Pharmacy Vancomycin Consult     Vancomycin Day: 10  Current Dosing: Pulse dose secondary to HD    Temp max:  98.4    Recent Labs     01/27/20  0439 01/27/20  1050   BUN 44* 11       Recent Labs     01/27/20  0439 01/27/20  1050   CREATININE 6.0* 2.0*       Recent Labs     01/26/20  1155 01/27/20  0439   WBC 5.5 4.6*       No intake or output data in the 24 hours ending 01/27/20 1324    Culture Date Source Results   01/18/20 Blood x 2 No growth to date                 Ht Readings from Last 1 Encounters:   01/18/20 5' 9\" (1.753 m)        Wt Readings from Last 1 Encounters:   01/26/20 170 lb 9.6 oz (77.4 kg)         Body mass index is 25.19 kg/m². Estimated Creatinine Clearance: 40 mL/min (A) (based on SCr of 2 mg/dL (H)). Random level post HD: 19.0    Assessment/Plan: Level therapeutic. Okay to give Vancomycin 1 gm IV x 1 pre-op today. Draw Vancomycin random level after dialysis on Wednesday. Thank you for the consult. Will continue to follow.      Electronically signed by Trish Cerrato [facility] on 1/27/2020 at 1:24 PM

## 2020-01-27 NOTE — ANESTHESIA PRE PROCEDURE
Department of Anesthesiology  Preprocedure Note       Name:  Robyn Lema   Age:  61 y.o.  :  1960                                          MRN:  [id number]         Date:  2020      Surgeon: Priyanka Hemphill):  Preethi Alexander MD    Procedure: REVISION OF RIGHT LEG BYPASS GRAFT (Right )    Medications prior to admission:   Prior to Admission medications    Medication Sig Start Date End Date Taking? Authorizing Provider   atorvastatin (LIPITOR) 40 MG tablet Take 1 tablet by mouth nightly 19  Yes Cristela Carl MD   metoprolol succinate (TOPROL XL) 25 MG extended release tablet Take 1 tablet by mouth 2 times daily 19  Yes Cristela Carl MD   sevelamer (RENVELA) 800 MG tablet Take 1 tablet by mouth 3 times daily (with meals) 19  Yes Cristela Carl MD   oxyCODONE (OXYCONTIN) 10 MG extended release tablet Take 10 mg by mouth every 8 hours as needed for Pain.    Yes Historical Provider, MD   aspirin 81 MG EC tablet Take 1 tablet by mouth daily 10/29/19  Yes Josh Garcia DO   isosorbide mononitrate (IMDUR) 30 MG extended release tablet Take 1 tablet by mouth daily 10/29/19  Yes Josh Garcia DO   clopidogrel (PLAVIX) 75 MG tablet TAKE 1 TABLET EVERY DAY 19  Yes Alon Marcus MD   melatonin 3 MG TABS tablet Take 1 tablet by mouth nightly as needed (insomnia) 19  Yes Lizbet Vuong DO   sucralfate (CARAFATE) 1 GM tablet Take 1 tablet by mouth 3 times daily as needed (upset stomach) 19  Yes TIFF Daily   lactulose (CEPHULAC) 10 G packet Take 10 g by mouth 3 times daily Indications: Disorder of the Liver Enzymes    Yes Historical Provider, MD   minoxidil (LONITEN) 10 MG tablet Take 10 mg by mouth daily Indications: High Blood Pressure Disorder, SBP >160    Yes Historical Provider, MD   cloNIDine (CATAPRES) 0.2 MG tablet Take 0.3 mg by mouth 3 times daily Indications: High Blood Pressure    Yes Historical Provider, MD   amLODIPine (NORVASC) 10 MG tablet Take 10 mg by mouth V ([facility]     Dialysis patient [facility])     mon wed fri at [place] GERD (gastroesophageal reflux disease)     Hemodialysis patient ([facility]     mon wed fri in [place] Hepatic cirrhosis ([facility]     dr. Hetal Quinones; has been going to [facility] for liver and kidney transplant list.    Hepatitis C, chronic ([facility]     History of blood transfusion     HTN (hypertension)     Hx of blood clots     arm/fistula    Mixed hyperlipidemia 1/9/2017    Osteoarthritis     Pacemaker     Palliative care patient 07/09/2019    PVD (peripheral vascular disease) ([facility]     Sleep apnea     no cpap at present.  Type II diabetes mellitus (HCC)     no meds. Past Surgical History:        Procedure Laterality Date    ANGIOPLASTY  08/09/11 S    LULEONARD cephalic vein balloon angioplasty with 7mm x 4cm balloon. coild embolization of 2 cephallic vein brances with 3mm x 5cm coils    CARDIAC CATHETERIZATION  04/04/11    cardiac cath and stent x1 by Dr. Lucia Anne  07/26/11 S    LULEONARD AV fistula. coild embolization of cephalic vein branch near the wrist with 3mm EMBO coils.  balloon a'plasty left cephalic vein with [id number] balloon and then [id number] balloon    DIALYSIS FISTULA CREATION Left 2/16/2017    LEFT UPPER EXTREMITY BRACHIAL / AXILLARY GRAFT AND STENT LEFT SUBCLAVIAN VEIN  performed by Janette Moss MD at [address] Right 7/12/2019    RIGHT LEG WOUND WASHOUT performed by Gillian Mc MD at [address] [facility] FEMORAL-TIBIAL BYPASS GRAFT Right 6/25/2019    FEMORAL TIBIAL/PERINEAL BYPASS WITH REVERSED GREATER SAPHENOUS VEIN performed by Janette Moss MD at [address]  12/26/2010    Right internal jugular vein tunneled dialysis catheter placement    OTHER SURGICAL HISTORY  [id number]    Redo of dialysis catheter    OTHER SURGICAL HISTORY  9/6/2011   [facility]    Removal of RT IJV tunneled dialysis cath    OTHER SURGICAL HISTORY  5/22/12   [facility]    Ultrasound-guided cannulation of left cephalic vein in the mid forearm toward the AV anastomosis, Left upper  ext. fistulograms including venograms of the SVC, Left cephalic vein balloon angioplasty with a 4mm x 100mm Darrell balloon, Completion fistulograms    PACEMAKER PLACEMENT      medtronic    PARACENTESIS      multiple/recent; per dr. Jason Michael at [address] [facility] Left 1/30/2018    UPPER EXTREMITY DRIL PROCEDURE WITH LEFT SAPHENOUS VEIN HARVESTING performed by Quentin Albarran MD at [address]  6/19/12    LUE arteriovenous fistulogram including venography of the superior vena cava. Balloon angioplasty, left forearm cephalic vein and upper arm cephalic vein with [id number] darrell balloon.  VASCULAR SURGERY  7/10/2012 S     Revision of left distal radial artery to cephalic vein arteriovenous fistula with 7mm Artegraft interposition.  VASCULAR SURGERY  7/30/15 [facility] & [address]    Left upper extremity arteriovenous fistulograms including venography of the superior vena cava. Left cephalic vein balloon angioplasty with an 8 x 20 cm cutting balloon proximal cephalic vein. Balloon angioplasty of left cephalic vein/antecubital vein near the antecubital crease with 8 x 20 mm cutting balloon.  VASCULAR SURGERY  7/30/15 [facility]    Balloon angioplasty proximal end distal upper arm cephalic vein with 9 x 40 cm  balloon. Completion fistulograms of the left upper extremity.  VASCULAR SURGERY  8/13/15 S    Revision of left upper extremity arteriovenous fistula with excision of pseudoaneurysm and primary repair of cephalic vein.     VASCULAR SURGERY  5/3/16 [facility]    Left upper fistulograms/venograms, left cephalic balloon 8 x 20 cutter,9 x 40 conquest,left proximal cephalic vein stents (flair 2 9 x 50), balloon stents 9 x 40 conquest.    VASCULAR SURGERY  12/08/2016    [facility]- ultrasound guided cannulation of left distal cephalic vein ultrasound guided cannulation right internal jugular vein placement of right internal jugular vein tunneled dialysis catheter (Bard Equistream XK 23cm tip to cuff)     VASCULAR SURGERY  01/20/2017    [facility]-Right upper venograms, u/s guided cannulation left basilic vein, left upper venograms, balloon angioplasty left subclavian vein 10x40 conquest.    VASCULAR SURGERY  02/16/2017    [facility]. Left brachial artery to axillary vein arteriovenous graft with 7 mm artegraft. Left upper extremity venograms. Left subclavian vein stent viabahn 13x50. Left subclavian vein stent balloon angioplasty 12x40 atlas.  VASCULAR SURGERY  04/11/2017    [facility]. Removal of tunneled dialysis catheter right internal jugular vein.  VASCULAR SURGERY  01/25/2018    [facility]. Arch aortogram, left upper arteriogram, AV graft angiogram/venogram.    VASCULAR SURGERY  02/28/2018    [facility]. Right CFA 5f-6f-7f sheath, aortogram with bilateral lower arteriogram, left SFA/pop  balloon angioplasty 5x100 , 6x150 lutonix ( 2), left CFA  7f sheath, bilateral iliac kissing stents right 10x38 icast, left 9x59 icast expanded with 10x40 , completion aortogram, mynx left CFA , failed mynx right CFA.  VASCULAR SURGERY  06/13/2019    [facility] left CFA 5f sheath, aortogram with bilateral lower arteriogram, mynx left CFA.  VASCULAR SURGERY  06/25/2019    [facility]. Femoral tibial/perineal bypass with reversed greater saphenous vein.  VASCULAR SURGERY  08/16/2019    [facility]. Aortogram and runoff, selective right CFA injections. PTA of fem-tp bypass with 4.0 x 220 mm tod balloon to 4.33 mm    VASCULAR SURGERY  10/23/2019    [facility]. Thrombin injection in the left SFA PSA, right common femoral artery us guided access, left SFA stent treatment of the flap.        Social History:    Social History     Tobacco Use    Smoking status: Current Every Day Smoker     Packs/day: 0.25     Years: 45.00     Pack years: 11.25     Types: Cigarettes    Smokeless tobacco: Never Used    Tobacco comment: about to  quit down to 1 a day cigarettes All regional wall segments move appropriately. The estimated visual ejection fraction is > 50%. There is no mitral regurgitation nor is there a pull back gradient seen across the aortic valve.        Neuro/Psych:   Negative Neuro/Psych ROS     (-) seizures, CVA and depression/anxiety            GI/Hepatic/Renal: Neg GI/Hepatic/Renal ROS  (+) GERD:, hepatitis: C, liver disease:, renal disease: ESRD,      (-) hiatal hernia       Endo/Other: Negative Endo/Other ROS   (+) DiabetesType II DM, , blood dyscrasia: anticoagulation therapy, anemia and thrombocytopenia, arthritis:., electrolyte abnormalities, . Pt had PAT visit. Abdominal:       Abdomen: soft. Vascular:                                        Anesthesia Plan      general     ASA 4     (Iv zofran within 30 min of closing . Discussed with Dr Vicky Fatima very high cardiac risk given his degree of CAD, do not think any prologned surgeryi would be optimal, and the shorter the better for mitigating risk)  Induction: intravenous. BIS  MIPS: Postoperative opioids intended and Prophylactic antiemetics administered. Anesthetic plan and risks discussed with patient. Use of blood products discussed with patient whom. Plan discussed with CRNA.     Attending anesthesiologist reviewed and agrees with Pre Eval content              Marie Talley MD   1/27/2020

## 2020-01-27 NOTE — PROGRESS NOTES
Vascular Surgery Rounding Note                                                     Dr.Timothy Amador    1/27/2020  2:21 PM     Antibiotic- Vancomycin with pharmacy dosing    Subjective-Stating he hasn't eaten anything he shouldn't so he is not sure why his K+ is high. Ready for OR so his foot will stop hurting    Objective-   Invalid input(s): 24H  No intake or output data in the 24 hours ending 01/27/20 1421  No intake/output data recorded. Physical Exam:  Awake, alert, appropriate Yes  BP (!) 154/66   Pulse 67   Temp 98.4 °F (36.9 °C) (Temporal)   Resp 18   Ht 5' 9\" (1.753 m)   Wt 170 lb 9.6 oz (77.4 kg)   SpO2 93%   BMI 25.19 kg/m²   Heart-Regular rate and rhythm, murmur noted  Lung-clear bilaterally anteriorly  Abdomen-Abdomen soft, non-tender. BS normal.   Extremities-RLE still with erythema, Mepilex dressing CDI to medial popliteal scar area  Neurologic- alert, oriented, normal speech    Assessment/Plan:   1. Severe PVD with rest pain of the right foot-plan  revascularization of right leg today  2. Renal failure- support and dialysis  3. Liver-support  4. Heart- try to protect. 5.  Will reschedule RLE revasc  6. K+ 6.1, for dialysis treatment today      Antibiotics continued after surgery due to:   Infection -          [x]  Cellulitis        Beta Blocker:  continued

## 2020-01-28 LAB
ALBUMIN SERPL-MCNC: 3.5 G/DL (ref 3.5–5.2)
ALP BLD-CCNC: 76 U/L (ref 40–130)
ALT SERPL-CCNC: 14 U/L (ref 5–41)
ANION GAP SERPL CALCULATED.3IONS-SCNC: 13 MMOL/L (ref 7–19)
AST SERPL-CCNC: 22 U/L (ref 5–40)
BASOPHILS ABSOLUTE: 0 K/UL (ref 0–0.2)
BASOPHILS RELATIVE PERCENT: 0.3 % (ref 0–1)
BILIRUB SERPL-MCNC: 0.4 MG/DL (ref 0.2–1.2)
BUN BLDV-MCNC: 24 MG/DL (ref 6–20)
CALCIUM SERPL-MCNC: 8.8 MG/DL (ref 8.6–10)
CHLORIDE BLD-SCNC: 95 MMOL/L (ref 98–111)
CO2: 25 MMOL/L (ref 22–29)
CREAT SERPL-MCNC: 4.4 MG/DL (ref 0.5–1.2)
EOSINOPHILS ABSOLUTE: 0 K/UL (ref 0–0.6)
EOSINOPHILS RELATIVE PERCENT: 0.2 % (ref 0–5)
GFR NON-AFRICAN AMERICAN: 14
GLUCOSE BLD-MCNC: 111 MG/DL (ref 74–109)
HCT VFR BLD CALC: 31.4 % (ref 42–52)
HEMOGLOBIN: 10.1 G/DL (ref 14–18)
IMMATURE GRANULOCYTES #: 0 K/UL
LYMPHOCYTES ABSOLUTE: 1 K/UL (ref 1.1–4.5)
LYMPHOCYTES RELATIVE PERCENT: 14.7 % (ref 20–40)
MCH RBC QN AUTO: 30.8 PG (ref 27–31)
MCHC RBC AUTO-ENTMCNC: 32.2 G/DL (ref 33–37)
MCV RBC AUTO: 95.7 FL (ref 80–94)
MONOCYTES ABSOLUTE: 0.5 K/UL (ref 0–0.9)
MONOCYTES RELATIVE PERCENT: 7.4 % (ref 0–10)
NEUTROPHILS ABSOLUTE: 5.1 K/UL (ref 1.5–7.5)
NEUTROPHILS RELATIVE PERCENT: 77.2 % (ref 50–65)
PDW BLD-RTO: 16.4 % (ref 11.5–14.5)
PLATELET # BLD: 110 K/UL (ref 130–400)
PMV BLD AUTO: 11.7 FL (ref 9.4–12.4)
POTASSIUM REFLEX MAGNESIUM: 5.8 MMOL/L (ref 3.5–5)
RBC # BLD: 3.28 M/UL (ref 4.7–6.1)
SODIUM BLD-SCNC: 133 MMOL/L (ref 136–145)
TOTAL PROTEIN: 7.2 G/DL (ref 6.6–8.7)
VANCOMYCIN RANDOM: 18.6 UG/ML
WBC # BLD: 6.6 K/UL (ref 4.8–10.8)

## 2020-01-28 PROCEDURE — 6370000000 HC RX 637 (ALT 250 FOR IP): Performed by: SURGERY

## 2020-01-28 PROCEDURE — 6370000000 HC RX 637 (ALT 250 FOR IP): Performed by: INTERNAL MEDICINE

## 2020-01-28 PROCEDURE — 36415 COLL VENOUS BLD VENIPUNCTURE: CPT

## 2020-01-28 PROCEDURE — 2580000003 HC RX 258: Performed by: INTERNAL MEDICINE

## 2020-01-28 PROCEDURE — 85025 COMPLETE CBC W/AUTO DIFF WBC: CPT

## 2020-01-28 PROCEDURE — 99024 POSTOP FOLLOW-UP VISIT: CPT | Performed by: SURGERY

## 2020-01-28 PROCEDURE — 8010000000 HC HEMODIALYSIS ACUTE INPT

## 2020-01-28 PROCEDURE — 2580000003 HC RX 258: Performed by: SURGERY

## 2020-01-28 PROCEDURE — 80202 ASSAY OF VANCOMYCIN: CPT

## 2020-01-28 PROCEDURE — 51798 US URINE CAPACITY MEASURE: CPT

## 2020-01-28 PROCEDURE — 37799 UNLISTED PX VASCULAR SURGERY: CPT

## 2020-01-28 PROCEDURE — 1210000000 HC MED SURG R&B

## 2020-01-28 PROCEDURE — 6360000002 HC RX W HCPCS: Performed by: SURGERY

## 2020-01-28 PROCEDURE — 6360000002 HC RX W HCPCS: Performed by: INTERNAL MEDICINE

## 2020-01-28 PROCEDURE — 99232 SBSQ HOSP IP/OBS MODERATE 35: CPT | Performed by: INTERNAL MEDICINE

## 2020-01-28 PROCEDURE — 80053 COMPREHEN METABOLIC PANEL: CPT

## 2020-01-28 RX ADMIN — SEVELAMER CARBONATE 800 MG: 800 TABLET, FILM COATED ORAL at 11:57

## 2020-01-28 RX ADMIN — CALCIUM ACETATE 2001 MG: 667 CAPSULE ORAL at 16:06

## 2020-01-28 RX ADMIN — BACITRACIN ZINC NEOMYCIN SULFATE POLYMYXIN B SULFATE: 400; 3.5; 5 OINTMENT TOPICAL at 21:09

## 2020-01-28 RX ADMIN — METOPROLOL SUCCINATE 25 MG: 25 TABLET, EXTENDED RELEASE ORAL at 08:01

## 2020-01-28 RX ADMIN — CLOPIDOGREL BISULFATE 75 MG: 75 TABLET ORAL at 08:03

## 2020-01-28 RX ADMIN — MELATONIN 3 MG: at 00:05

## 2020-01-28 RX ADMIN — OXYCODONE HYDROCHLORIDE 10 MG: 10 TABLET, FILM COATED, EXTENDED RELEASE ORAL at 20:58

## 2020-01-28 RX ADMIN — OXYCODONE HYDROCHLORIDE AND ACETAMINOPHEN 1 TABLET: 5; 325 TABLET ORAL at 04:08

## 2020-01-28 RX ADMIN — ASPIRIN 81 MG: 81 TABLET, COATED ORAL at 08:03

## 2020-01-28 RX ADMIN — CALCIUM ACETATE 2001 MG: 667 CAPSULE ORAL at 08:01

## 2020-01-28 RX ADMIN — MORPHINE SULFATE 2 MG: 4 INJECTION, SOLUTION INTRAMUSCULAR; INTRAVENOUS at 02:48

## 2020-01-28 RX ADMIN — Medication 10 ML: at 21:06

## 2020-01-28 RX ADMIN — AMLODIPINE BESYLATE 10 MG: 5 TABLET ORAL at 08:02

## 2020-01-28 RX ADMIN — MORPHINE SULFATE 2 MG: 4 INJECTION, SOLUTION INTRAMUSCULAR; INTRAVENOUS at 04:39

## 2020-01-28 RX ADMIN — Medication 10 ML: at 09:00

## 2020-01-28 RX ADMIN — MINOXIDIL 10 MG: 10 TABLET ORAL at 08:03

## 2020-01-28 RX ADMIN — ATORVASTATIN CALCIUM 40 MG: 40 TABLET, FILM COATED ORAL at 20:58

## 2020-01-28 RX ADMIN — SEVELAMER CARBONATE 800 MG: 800 TABLET, FILM COATED ORAL at 08:01

## 2020-01-28 RX ADMIN — MORPHINE SULFATE 2 MG: 4 INJECTION, SOLUTION INTRAMUSCULAR; INTRAVENOUS at 17:37

## 2020-01-28 RX ADMIN — CALCIUM ACETATE 2001 MG: 667 CAPSULE ORAL at 11:57

## 2020-01-28 RX ADMIN — MELATONIN 3 MG: at 20:58

## 2020-01-28 RX ADMIN — MORPHINE SULFATE 2 MG: 4 INJECTION, SOLUTION INTRAMUSCULAR; INTRAVENOUS at 10:32

## 2020-01-28 RX ADMIN — OXYCODONE HYDROCHLORIDE AND ACETAMINOPHEN 1 TABLET: 5; 325 TABLET ORAL at 23:18

## 2020-01-28 RX ADMIN — SODIUM CHLORIDE: 9 INJECTION, SOLUTION INTRAVENOUS at 06:05

## 2020-01-28 RX ADMIN — OXYCODONE HYDROCHLORIDE AND ACETAMINOPHEN 1 TABLET: 5; 325 TABLET ORAL at 16:06

## 2020-01-28 RX ADMIN — ISOSORBIDE MONONITRATE 30 MG: 30 TABLET, EXTENDED RELEASE ORAL at 08:03

## 2020-01-28 RX ADMIN — OXYCODONE HYDROCHLORIDE 10 MG: 10 TABLET, FILM COATED, EXTENDED RELEASE ORAL at 06:04

## 2020-01-28 RX ADMIN — MORPHINE SULFATE 2 MG: 4 INJECTION, SOLUTION INTRAMUSCULAR; INTRAVENOUS at 08:03

## 2020-01-28 RX ADMIN — NEPHROCAP 1 MG: 1 CAP ORAL at 08:03

## 2020-01-28 RX ADMIN — METOPROLOL SUCCINATE 25 MG: 25 TABLET, EXTENDED RELEASE ORAL at 20:58

## 2020-01-28 RX ADMIN — OXYCODONE HYDROCHLORIDE AND ACETAMINOPHEN 1 TABLET: 5; 325 TABLET ORAL at 00:05

## 2020-01-28 RX ADMIN — SEVELAMER CARBONATE 800 MG: 800 TABLET, FILM COATED ORAL at 16:06

## 2020-01-28 RX ADMIN — Medication 1000 MG: at 20:58

## 2020-01-28 ASSESSMENT — PAIN SCALES - GENERAL
PAINLEVEL_OUTOF10: 6
PAINLEVEL_OUTOF10: 8
PAINLEVEL_OUTOF10: 7
PAINLEVEL_OUTOF10: 0
PAINLEVEL_OUTOF10: 6
PAINLEVEL_OUTOF10: 8
PAINLEVEL_OUTOF10: 8
PAINLEVEL_OUTOF10: 4
PAINLEVEL_OUTOF10: 9
PAINLEVEL_OUTOF10: 2
PAINLEVEL_OUTOF10: 9
PAINLEVEL_OUTOF10: 8
PAINLEVEL_OUTOF10: 6
PAINLEVEL_OUTOF10: 7
PAINLEVEL_OUTOF10: 8
PAINLEVEL_OUTOF10: 7

## 2020-01-28 ASSESSMENT — PAIN DESCRIPTION - PAIN TYPE
TYPE: SURGICAL PAIN

## 2020-01-28 ASSESSMENT — PAIN DESCRIPTION - FREQUENCY
FREQUENCY: CONTINUOUS

## 2020-01-28 ASSESSMENT — PAIN DESCRIPTION - DESCRIPTORS
DESCRIPTORS: DISCOMFORT;PRESSURE
DESCRIPTORS: CONSTANT;CRUSHING;DISCOMFORT
DESCRIPTORS: PRESSURE;RADIATING;SHOOTING
DESCRIPTORS: CONSTANT
DESCRIPTORS: DISCOMFORT;CRUSHING

## 2020-01-28 ASSESSMENT — PAIN DESCRIPTION - ONSET
ONSET: AWAKENED FROM SLEEP
ONSET: ON-GOING

## 2020-01-28 ASSESSMENT — PAIN DESCRIPTION - PROGRESSION
CLINICAL_PROGRESSION: NOT CHANGED
CLINICAL_PROGRESSION: GRADUALLY WORSENING

## 2020-01-28 ASSESSMENT — ENCOUNTER SYMPTOMS
BACK PAIN: 0
CONSTIPATION: 0
SHORTNESS OF BREATH: 0
NAUSEA: 0
DIARRHEA: 0
VOMITING: 0

## 2020-01-28 ASSESSMENT — PAIN DESCRIPTION - ORIENTATION
ORIENTATION: RIGHT

## 2020-01-28 ASSESSMENT — PAIN DESCRIPTION - LOCATION
LOCATION: LEG
LOCATION: GROIN;LEG

## 2020-01-28 ASSESSMENT — PAIN DESCRIPTION - DIRECTION
RADIATING_TOWARDS: FOOT
RADIATING_TOWARDS: RIGHT FOOT

## 2020-01-28 NOTE — PROGRESS NOTES
TID WC Mari Theodore MD   2,001 mg at 01/28/20 0801    neomycin-bacitracin-polymyxin (NEOSPORIN) ointment   Topical BID Mari Theodore MD        oxyCODONE-acetaminophen (PERCOCET) 5-325 MG per tablet 1 tablet  1 tablet Oral Q4H PRN Mari Theodore MD   1 tablet at 01/28/20 0408    darbepoetin sandrine-polysorbate (ARANESP) injection 25 mcg  25 mcg Subcutaneous Weekly Mari Theodore MD   25 mcg at 01/26/20 0955    b complex-C-folic acid (NEPHROCAPS) capsule 1 mg  1 capsule Oral Daily Mari Theodore MD   1 mg at 01/28/20 0803    amLODIPine (NORVASC) tablet 10 mg  10 mg Oral Daily Mari Theodore MD   10 mg at 01/28/20 0802    aspirin EC tablet 81 mg  81 mg Oral Daily Mari Theodore MD   81 mg at 01/28/20 0803    atorvastatin (LIPITOR) tablet 40 mg  40 mg Oral Nightly Mari Theodore MD   40 mg at 01/27/20 2037    cloNIDine (CATAPRES) tablet 0.3 mg  0.3 mg Oral TID Mari Theodore MD   0.3 mg at 01/27/20 2037    clopidogrel (PLAVIX) tablet 75 mg  75 mg Oral Daily Mari Theodore MD   75 mg at 01/28/20 0803    isosorbide mononitrate (IMDUR) extended release tablet 30 mg  30 mg Oral Daily Mari Theodore MD   30 mg at 01/28/20 0803    [MAR Hold] lactulose ([facility]) 10 GM/15ML solution 10 g  10 g Oral TID Tamela Maki MD   10 g at 01/26/20 2114    melatonin tablet 3 mg  3 mg Oral Nightly PRN Mari Theodore MD   3 mg at 01/28/20 0005    metoprolol succinate (TOPROL XL) extended release tablet 25 mg  25 mg Oral BID Mari Theodore MD   25 mg at 01/28/20 0801    minoxidil (LONITEN) tablet 10 mg  10 mg Oral Daily Mari Theodore MD   10 mg at 01/28/20 0803    oxyCODONE (OXYCONTIN) extended release tablet 10 mg  10 mg Oral Q8H PRN Mari Theodore MD   10 mg at 01/28/20 0604    sucralfate (CARAFATE) tablet 1 g  1 g Oral TID PRN Mari Theodore MD        sevelamer (RENVELA) tablet 800 mg  800 mg Oral TID WC Mari Theodore MD   800 mg at 01/28/20 0801    sodium chloride flush 0.9 % injection 10 mL  10 mL Intravenous 2 times per day Divina Dye MD   10 mL at 01/27/20 2001    sodium chloride flush 0.9 % injection 10 mL  10 mL Intravenous PRN Divina Dye MD           Past Medical History:  Past Medical History:   Diagnosis Date    Anxiety     Blood circulation, collateral     CAD (coronary artery disease)     stents x 2; per dr. Aparna Hall [facility])     liver cancer    CHF (congestive heart failure) ([address]     Chyloperitoneum determined by paracentesis     CKD (chronic kidney disease), stage V ([address]     Dialysis patient ([address]     mon wed fri at [place] GERD (gastroesophageal reflux disease)     Hemodialysis patient ([address]     mon wed fri in [place] Hepatic cirrhosis ([facility]     dr. Kirill Galindo; has been going [facility] for liver and kidney [facility] list.    Hepatitis C, chronic ([facility]     History of blood transfusion     HTN (hypertension)     Hx of blood clots     arm/fistula    Mixed hyperlipidemia 1/9/2017    Osteoarthritis     Pacemaker     Palliative care patient 07/09/2019    PVD (peripheral vascular disease) ([address]     Sleep apnea     no cpap at present.  Type II diabetes mellitus (HCC)     no meds. Past Surgical History:  Past Surgical History:   Procedure Laterality Date    ANGIOPLASTY  08/09/11 [facility]    HUMAIRA cephalic vein balloon angioplasty with 7mm x 4cm balloon. coild embolization of 2 cephallic vein brances with 3mm x 5cm coils    CARDIAC CATHETERIZATION  04/04/11    cardiac cath and stent x1 by Dr. Nataly Walsh  07/26/11 S    LUE AV fistula. coild embolization of cephalic vein branch near the wrist with 3mm EMBO coils.  balloon a'plasty left cephalic vein with [id number] balloon and then [id number] balloon    DIALYSIS FISTULA CREATION Left 2/16/2017    LEFT UPPER EXTREMITY BRACHIAL / AXILLARY GRAFT AND STENT LEFT SUBCLAVIAN VEIN  performed by Paige Reyes MD at [facility] Right Balloon angioplasty proximal end distal upper arm cephalic vein with 9 x 40 cm  balloon. Completion fistulograms of the left upper extremity.  VASCULAR SURGERY  8/13/15 S    Revision of left upper extremity arteriovenous fistula with excision of pseudoaneurysm and primary repair of cephalic vein.  VASCULAR SURGERY  5/3/16 [facility]    Left upper fistulograms/venograms, left cephalic balloon 8 x 20 cutter,9 x 40 conquest,left proximal cephalic vein stents (flair 2 9 x 50), balloon stents 9 x 40 conquest.    VASCULAR SURGERY  12/08/2016    [facility]- ultrasound guided cannulation of left distal cephalic vein ultrasound guided cannulation right internal jugular vein placement of right internal jugular vein tunneled dialysis catheter (Bard Equistream XK 23cm tip to cuff)     VASCULAR SURGERY  01/20/2017    [facility]-Right upper venograms, u/s guided cannulation left basilic vein, left upper venograms, balloon angioplasty left subclavian vein 10x40 conquest.    VASCULAR SURGERY  02/16/2017    [facility]. Left brachial artery to axillary vein arteriovenous graft with 7 mm artegraft. Left upper extremity venograms. Left subclavian vein stent viabahn 13x50. Left subclavian vein stent balloon angioplasty 12x40 atlas.  VASCULAR SURGERY  04/11/2017    [facility]. Removal of tunneled dialysis catheter right internal jugular vein.  VASCULAR SURGERY  01/25/2018    [facility]. Arch aortogram, left upper arteriogram, AV graft angiogram/venogram.    VASCULAR SURGERY  02/28/2018    [facility]. Right CFA 5f-6f-7f sheath, aortogram with bilateral lower arteriogram, left SFA/pop  balloon angioplasty 5x100 , 6x150 lutonix ( 2), left CFA  7f sheath, bilateral iliac kissing stents right 10x38 icast, left 9x59 icast expanded with 10x40 , completion aortogram, mynx left CFA , failed mynx right CFA.  VASCULAR SURGERY  06/13/2019    [facility] left CFA 5f sheath, aortogram with bilateral lower arteriogram, mynx left CFA.     VASCULAR SURGERY  06/25/2019 [facility]. Femoral tibial/perineal bypass with reversed greater saphenous vein.  VASCULAR SURGERY  08/16/2019    TJR. Aortogram and runoff, selective right CFA injections. PTA of fem-tp bypass with 4.0 x 220 mm tod balloon to 4.33 mm    VASCULAR SURGERY  10/23/2019    [facility]. Thrombin injection in the left SFA PSA, right common femoral artery us guided access, left SFA stent treatment of the flap.        Family History  Family History   Problem Relation Age of Onset    High Blood Pressure Mother     High Blood Pressure Father     High Blood Pressure Sister        Social History  Social History     Socioeconomic History    Marital status:      Spouse name: Wagner Arevalo    Number of children: 0    Years of education: 15    Highest education level: Not on file   Occupational History    Occupation:    Social Needs    Financial resource strain: Not on file    Food insecurity:     Worry: Not on file     Inability: Not on file    Transportation needs:     Medical: Not on file     Non-medical: Not on file   Tobacco Use    Smoking status: Current Every Day Smoker     Packs/day: 0.25     Years: 45.00     Pack years: 11.25     Types: Cigarettes    Smokeless tobacco: Never Used    Tobacco comment: about to  quit down to 1 a day cigarettes   Substance and Sexual Activity    Alcohol use: No    Drug use: Not Currently     Types: Marijuana, Cocaine, Methamphetamines, IV, Opiates , Other-see comments     Comment: in the 1970s, LSD    Sexual activity: Yes     Partners: Female     Comment: has a wife   Lifestyle    Physical activity:     Days per week: Not on file     Minutes per session: Not on file    Stress: Not on file   Relationships    Social connections:     Talks on phone: Not on file     Gets together: Not on file     Attends Scientologist service: Not on file     Active member of club or organization: Not on file     Attends meetings of clubs or organizations: Not on file     Relationship status: Not on file    Intimate partner violence:     Fear of current or ex partner: Not on file     Emotionally abused: Not on file     Physically abused: Not on file     Forced sexual activity: Not on file   Other Topics Concern    Not on file   Social History Narrative    CODE STATUS: Full Code    HEALTH CARE PROXY: Mrs. Steven Pineda, [phone]    Back up: his Mother, Mrs. Bryant Combs, [phone]    AMBULATES: independantly    DOMICILED: lives in a private house with 2 steps to get in, has no stairs inside, lives with wife and step children, has 3 pets         Review of Systems:  History obtained from chart review and the patient  General ROS: No fever or chills  Respiratory ROS: No cough, shortness of breath, wheezing  Cardiovascular ROS: No chest pain or palpitations  Gastrointestinal ROS: No abdominal pain or melena  Genito-Urinary ROS: No dysuria or hematuria  Musculoskeletal ROS: No joint pain or swelling         Objective:  Patient Vitals for the past 24 hrs:   BP Temp Temp src Pulse Resp SpO2 Weight   01/28/20 0600 -- -- -- 60 14 96 % --   01/28/20 0500 -- -- -- 60 15 96 % --   01/28/20 0400 -- 98.6 °F (37 °C) -- 60 20 95 % --   01/28/20 0300 -- -- -- 60 17 95 % --   01/28/20 0248 -- -- -- 60 17 97 % --   01/28/20 0200 -- -- -- 60 15 95 % --   01/28/20 0100 -- -- -- 60 18 96 % --   01/28/20 0000 -- 98.1 °F (36.7 °C) Temporal 60 17 96 % 165 lb 4.8 oz (75 kg)   01/27/20 2330 -- -- -- 60 28 95 % --   01/27/20 2300 -- -- -- 60 22 95 % --   01/27/20 2230 -- -- -- 60 13 93 % --   01/27/20 2200 -- -- -- 60 15 95 % --   01/27/20 2130 -- -- -- 60 15 96 % --   01/27/20 2100 (!) 162/40 -- -- 60 28 96 % --   01/27/20 2045 (!) 171/43 -- -- 60 10 94 % --   01/27/20 2030 (!) 169/42 -- -- 60 12 96 % --   01/27/20 2015 (!) 172/46 -- -- 60 16 97 % --   01/27/20 2000 (!) 182/46 98.3 °F (36.8 °C) Temporal 60 11 93 % --   01/27/20 1935 -- -- -- 60 9 98 % --   01/27/20 1930 -- -- -- 60 11 98 % --   01/27/20 extremity Signed by Dr Naa Cade on 1/19/2020 10:51 AM    Vl Extremity Venous Right    Result Date: 1/19/2020  Vascular Lower Extremities DVT Study Procedure  Demographics   Patient Name     Swapna Merino Age                    61   Patient Number   [id number]       Gender                 Male   Visit Number     [id number]    Interpreting Physician Marcel Olszewski MD   Date of Birth    1960   Referring Physician    Lazaro Preez   Accession Number [id number]    [id number] [address] [facility], RVT  Procedure Type of Study:   Veins:Lower Extremities DVT Study, VL EXTREMITY VENOUS DUPLEX RIGHT. Indications for Study:Swelling and Pain, right lower extremity. Allergies   - No known allergies. Impression   There is no evidence of deep venous thrombosis (DVT) in the right lower  extremity(ies). There is no evidence of superficial thrombophlebitis of the right lower  extremity(ies). Signature   ----------------------------------------------------------------  Electronically signed by Marcel Olszewski MD(Interpreting  physician) on 01/19/2020 06:59 AM  ----------------------------------------------------------------  Velocities are measured in cm/s ; Diameters are measured in mm Right Lower Extremities DVT Study Measurements Right 2D Measurements +------------------------------------+----------+---------------+----------+ ! Location                            ! Visualized! Compressibility! Thrombosis! +------------------------------------+----------+---------------+----------+ ! Sapheno Femoral Junction            ! Yes       ! Yes            ! None      ! +------------------------------------+----------+---------------+----------+ ! Common Femoral                      !Yes       ! Yes            ! None      ! +------------------------------------+----------+---------------+----------+ ! Prox Femoral                        !Yes       ! Yes            ! None      ! !Yes       !Yes            ! None      ! +------------------------------------+----------+---------------+----------+ ! Common Femoral                      !Yes       ! Yes            ! None      ! +------------------------------------+----------+---------------+----------+       Assessment   1. End-stage renal disease/maintenance hemodialysis. .  2.  Severe peripheral vascular disease. 3.  Status post right lower extremity bypass surgery. 4.  Type II diabetic nephropathy. 5.  Cirrhosis of liver/hepatitis C.  6.  Anemia of chronic kidney disease. 7.  Chronic diastolic congestive heart failure. 8.  Hyperkalemia. 9.  Hyponatremia. 10.  Secondary hyperparathyroidism. Plan:  1. Hemodialysis short treatment today for hyperkalemia  2. Counseling about low potassium diet. 3.  Possible move him back to regular floor.       Felicia Erickson MD  01/28/20  8:59 AM

## 2020-01-28 NOTE — PROGRESS NOTES
4 Eyes Skin Assessment    Bryant Grey is being assessed upon: Transfer to [facility]    I agree that I, Beto Childs, along with [facility], RN (either 2 RN's or 1 LPN and 1 RN) have performed a thorough Head to Toe Skin Assessment on the patient. ALL assessment sites listed below have been assessed. Areas assessed by both nurses:     [x]   Head, Face, and Ears   [x]   Shoulders, Back, and Chest  [x]   Arms, Elbows, and Hands   [x]   Coccyx, Sacrum, and Ischium  [x]   Legs, Feet, and Heels    Does the Patient have Skin Breakdown? Yes, wound(s) noted upon assessment. It is the responsibility of the Primary Nurse to assure that the following documentation, preventions, orders, and consults are complete on the above noted wound(s): Wound LDA initiated. LDA Flowsheet Documentation includes the Shaila-wound, Wound Assessment, Measurements, Dressing Treatment, Drainage, and Color.   (Surigcal wounds, see LDAs)  Jose Prevention initiated: Yes  Wound Care Orders initiated: NA    [facility] nurse consulted for Pressure Injury (Stage 3,4, Unstageable, DTI, NWPT, and Complex wounds) and New or Established Ostomies: NA        Primary Nurse eSignature: Beto Childs RN on 1/27/2020 at 8:52 PM      Co-Signer eSignature: Electronically signed by Lauryn Cleaning RN on 1/28/20 at 12:24 AM

## 2020-01-28 NOTE — PLAN OF CARE
Problem: Falls - Risk of:  Goal: Will remain free from falls  Description  Will remain free from falls  1/28/2020 1003 by Luisa Crisostomo RN  Outcome: Ongoing  1/28/2020 0131 by Beto Childs RN  Outcome: Ongoing  Goal: Absence of physical injury  Description  Absence of physical injury  1/28/2020 1003 by Luisa Crisostomo RN  Outcome: Ongoing  1/28/2020 0131 by Beto Childs RN  Outcome: Ongoing     Problem:  Activity:  Goal: Fatigue will decrease  Description  Fatigue will decrease  1/28/2020 1003 by Luisa Crisostomo RN  Outcome: Ongoing  1/28/2020 0131 by Beto Childs RN  Outcome: Ongoing  Goal: Risk for activity intolerance will decrease  Description  Risk for activity intolerance will decrease  1/28/2020 1003 by Luisa Crisostomo RN  Outcome: Ongoing  1/28/2020 0131 by Beto Childs RN  Outcome: Ongoing     Problem: Coping:  Goal: Ability to cope will improve  Description  Ability to cope will improve  1/28/2020 1003 by Luisa Crisostomo RN  Outcome: Ongoing  1/28/2020 0131 by Beto Childs RN  Outcome: Ongoing     Problem: Fluid Volume:  Goal: Will show no signs or symptoms of fluid imbalance  Description  Will show no signs or symptoms of fluid imbalance  1/28/2020 1003 by Luisa Crisostomo RN  Outcome: Ongoing  1/28/2020 0131 by Beto Childs RN  Outcome: Ongoing  Goal: Ability to achieve a balanced intake and output will improve  Description  Ability to achieve a balanced intake and output will improve  1/28/2020 1003 by Luisa Crisostomo RN  Outcome: Ongoing  1/28/2020 0131 by Beto Childs RN  Outcome: Ongoing     Problem: Health Behavior:  Goal: Ability to manage health-related needs will improve  Description  Ability to manage health-related needs will improve  1/28/2020 1003 by Luisa Crisostomo RN  Outcome: Ongoing  1/28/2020 0131 by Beto Childs RN  Outcome: Ongoing  Goal: Identification of resources available to assist in meeting health care needs will Ongoing  1/28/2020 0131 by Jimmie Craen RN  Outcome: Ongoing  Goal: Ability to maintain vital signs within normal range will improve  Description  Ability to maintain vital signs within normal range will improve  1/28/2020 1003 by Arthur Rizo RN  Outcome: Ongoing  1/28/2020 0131 by Jimmie Crane RN  Outcome: Ongoing     Problem: Sensory:  Goal: General experience of comfort will improve  Description  General experience of comfort will improve  1/28/2020 1003 by Arthur Rizo RN  Outcome: Ongoing  1/28/2020 0131 by iJmmie Crane RN  Outcome: Ongoing     Problem: Skin Integrity:  Goal: Status of oral mucous membranes will improve  Description  Status of oral mucous membranes will improve  1/28/2020 1003 by Arthur Rizo RN  Outcome: Ongoing  1/28/2020 0131 by Jimmie Crane RN  Outcome: Ongoing  Goal: Skin integrity will be maintained  Description  Skin integrity will be maintained  1/28/2020 1003 by Arthur Rizo RN  Outcome: Ongoing  1/28/2020 0131 by Jimmie Crane RN  Outcome: Ongoing  Goal: Will show no infection signs and symptoms  Description  Will show no infection signs and symptoms  1/28/2020 1003 by Arthur Rizo RN  Outcome: Ongoing  1/28/2020 0131 by Jimmie Crane RN  Outcome: Ongoing  Goal: Absence of new skin breakdown  Description  Absence of new skin breakdown  1/28/2020 1003 by Arthur Rizo RN  Outcome: Ongoing  1/28/2020 0131 by Jimmie Crane RN  Outcome: Ongoing  Goal: Demonstration of wound healing without infection will improve  Description  Demonstration of wound healing without infection will improve  1/28/2020 1003 by Arthur Rizo RN  Outcome: Ongoing  1/28/2020 0131 by Jimmie Crane RN  Outcome: Ongoing  Goal: Complications related to intravenous access or infusion will be avoided or minimized  Description  Complications related to intravenous access or infusion will be avoided or minimized  1/28/2020 1003 by Arthur Rizo RN  Outcome: Planning:  Goal: Patients continuum of care needs are met  Description  Patients continuum of care needs are met  1/28/2020 1003 by Kory Saul RN  Outcome: Ongoing  1/28/2020 0131 by Yolanda Clemons RN  Outcome: Ongoing     Problem: Discharge Planning:  Goal: Discharged to appropriate level of care  Description  Discharged to appropriate level of care  1/28/2020 1003 by Kory Saul RN  Outcome: Ongoing  1/28/2020 0131 by Yolanda Clemons RN  Outcome: Ongoing     Problem: ABCDS Injury Assessment  Goal: Absence of physical injury  1/28/2020 1003 by Kory Saul RN  Outcome: Ongoing  1/28/2020 0131 by Yolanda Clemons RN  Outcome: Ongoing     Problem: Bleeding:  Goal: Will show no signs and symptoms of excessive bleeding  Description  Will show no signs and symptoms of excessive bleeding  1/28/2020 1003 by Kory Saul RN  Outcome: Ongoing  1/28/2020 0131 by Yolanda Clemons RN  Outcome: Ongoing     Problem: Tissue Perfusion - Renal, Altered:  Goal: Ability to achieve a balanced intake and output will improve  Description  Ability to achieve a balanced intake and output will improve  1/28/2020 1003 by Kory Saul RN  Outcome: Ongoing  1/28/2020 0131 by Yolanda Clemons RN  Outcome: Ongoing  Goal: Electrolytes within specified parameters  Description  Electrolytes within specified parameters  1/28/2020 1003 by Kory Saul RN  Outcome: Ongoing  1/28/2020 0131 by Yolanda Clemons RN  Outcome: Ongoing  Goal: Urine creatinine clearance will be within specified parameters  Description  Urine creatinine clearance will be within specified parameters  1/28/2020 1003 by Kory Saul RN  Outcome: Ongoing  1/28/2020 0131 by Yolanda Clemons RN  Outcome: Ongoing  Goal: Serum creatinine will be within specified parameters  Description  Serum creatinine will be within specified parameters  1/28/2020 1003 by Kory Saul RN  Outcome: Ongoing  1/28/2020 0131 by Yolanda Clemons RN  Outcome: Ongoing

## 2020-01-28 NOTE — PROGRESS NOTES
Vascular Surgery Rounding Note                                                     Dr.Victoria Angulo    1/28/2020  8:14 AM     Surgical Procedure-#1 Endarterectomy of right Common femoral artery, Proximal superficial femoral artery and profunda femoris artery. Subjective-Stating he is hungry, where is his breakfast    Objective-   Invalid input(s): 24H    Intake/Output Summary (Last 24 hours) at 1/28/2020 0814  Last data filed at 1/28/2020 0600  Gross per 24 hour   Intake 2000.42 ml   Output 350 ml   Net 1650.42 ml     No intake/output data recorded. Physical Exam:  Awake, alert, appropriate Yes  BP (!) 162/40   Pulse 60   Temp 98.6 °F (37 °C)   Resp 14   Ht 5' 9\" (1.753 m)   Wt 165 lb 4.8 oz (75 kg)   SpO2 96%   BMI 24.41 kg/m²   Heart-Regular rate and rhythm, murmur noted  Lung-clear bilaterally anteriorly  Abdomen-Abdomen soft, non-tender. BS normal.   Extremities-RLE still with erythema, Mepilex dressing CDI to  popliteal area and groin  Neurologic- alert, oriented, normal speech    Assessment/Plan:  1. Stable POD #1 endartecetomy RLE  2. Doppler signal PT monophasic, foot slightly warmer and patient reporting foot feels better  3. Will transfer to 3rd floor today  4. Hemodialysis today, K+5.  5.   Okay OOB as tolerates        Antibiotics continued after surgery due to:   Infection -          [x]  Cellulitis        Beta Blocker:  continued

## 2020-01-28 NOTE — PLAN OF CARE
Problem: Falls - Risk of:  Goal: Will remain free from falls  Outcome: Ongoing  Goal: Absence of physical injury  Outcome: Ongoing     Problem:  Activity:  Goal: Fatigue will decrease  Outcome: Ongoing  Goal: Risk for activity intolerance will decrease  Outcome: Ongoing     Problem: Coping:  Goal: Ability to cope will improve  Outcome: Ongoing     Problem: Fluid Volume:  Goal: Will show no signs or symptoms of fluid imbalance  Outcome: Ongoing  Goal: Ability to achieve a balanced intake and output will improve  Outcome: Ongoing     Problem: Health Behavior:  Goal: Ability to manage health-related needs will improve  Outcome: Ongoing  Goal: Identification of resources available to assist in meeting health care needs will improve  Outcome: Ongoing     Problem: Physical Regulation:  Goal: Ability to maintain clinical measurements within normal limits will improve  Outcome: Ongoing  Goal: Complications related to the disease process, condition or treatment will be avoided or minimized  Outcome: Ongoing  Goal: Will show no signs and symptoms of electrolyte imbalance  Outcome: Ongoing  Goal: Will remain free from infection  Outcome: Ongoing  Goal: Diagnostic test results will improve  Outcome: Ongoing  Goal: Ability to maintain vital signs within normal range will improve  Outcome: Ongoing     Problem: Skin Integrity:  Goal: Status of oral mucous membranes will improve  Outcome: Ongoing  Goal: Skin integrity will be maintained  Outcome: Ongoing  Goal: Will show no infection signs and symptoms  Outcome: Ongoing  Goal: Absence of new skin breakdown  Outcome: Ongoing  Goal: Demonstration of wound healing without infection will improve  Outcome: Ongoing  Goal: Complications related to intravenous access or infusion will be avoided or minimized  Outcome: Ongoing     Problem: Sensory:  Goal: General experience of comfort will improve  Outcome: Ongoing     Problem: Infection:  Goal: Will remain free from infection  Outcome: Ongoing     Problem: Pain:  Goal: Patient's pain/discomfort is manageable  Outcome: Ongoing  Goal: Pain level will decrease  Outcome: Ongoing  Goal: Control of acute pain  Outcome: Ongoing  Goal: Control of chronic pain  Outcome: Ongoing     Problem: Daily Care:  Goal: Daily care needs are met  Outcome: Ongoing     Problem: Skin Integrity:  Goal: Skin integrity will stabilize  Outcome: Ongoing     Problem: Discharge Planning:  Goal: Discharged to appropriate level of care  Outcome: Ongoing     Problem: ABCDS Injury Assessment  Goal: Absence of physical injury  Outcome: Ongoing     Problem: Bleeding:  Goal: Will show no signs and symptoms of excessive bleeding  Outcome: Ongoing     Problem: Tissue Perfusion - Renal, Altered:  Goal: Ability to achieve a balanced intake and output will improve  Outcome: Ongoing  Goal: Electrolytes within specified parameters  Outcome: Ongoing  Goal: Urine creatinine clearance will be within specified parameters  Outcome: Ongoing  Goal: Serum creatinine will be within specified parameters  Outcome: Ongoing

## 2020-01-28 NOTE — PROGRESS NOTES
Hospitalist Progress Note    Patient:  Bryant Grey  YOB: 1960  Date of Service: 1/28/2020  MRN: [id number]   Acct: [de-identified]   Primary Care Physician: Dylan Daugherty DO  Advance Directive: Full Code  Admit Date: 1/18/2020       Hospital Day: 10  Referring Provider: Alejandro Winchester DO    Patient Seen, Chart, Consults, Notes, Labs, Radiology studies reviewed. Subjective:  Bryant Grey is a 61 y.o. male  whom we are following for end-stage renal disease and peripheral arterial disease. He underwent endarterectomy of the right lower extremity yesterday. He still has some postoperative pain. Hemodynamics are stable. I feel he is stable for transfer out of ICU at this point. Allergies:  Patient has no known allergies.     Medicines:  Current Facility-Administered Medications   Medication Dose Route Frequency Provider Last Rate Last Dose    acetaminophen (TYLENOL) tablet 650 mg  650 mg Oral Q6H PRN Jeremiah Otero MD        ondansetron (ZOFRAN) injection 4 mg  4 mg Intravenous Q6H PRN Jeremiah Otero MD        0.9 % sodium chloride infusion   Intravenous Continuous Jeremiah Otero MD   Stopped at 01/28/20 0801    morphine injection 2 mg  2 mg Intravenous Q2H PRN Jeremiah Otero MD   2 mg at 01/28/20 0803    calcium acetate (PHOSLO) capsule 2,001 mg  2,001 mg Oral TID WC Jeremiah Otero MD   2,001 mg at 01/28/20 0801    neomycin-bacitracin-polymyxin (NEOSPORIN) ointment   Topical BID Jeremiah Otero MD        oxyCODONE-acetaminophen (PERCOCET) 5-325 MG per tablet 1 tablet  1 tablet Oral Q4H PRN Jeremiah Otero MD   1 tablet at 01/28/20 0408    darbepoetin sandrine-polysorbate (ARANESP) injection 25 mcg  25 mcg Subcutaneous Weekly Jeremiah Otero MD   25 mcg at 01/26/20 0955    b complex-C-folic acid (NEPHROCAPS) capsule 1 mg  1 capsule Oral Daily Jeremiah Otero MD   1 mg at 01/28/20 0803    amLODIPine (NORVASC) tablet 10 mg  10 mg Oral Daily Jeremiah Otero MD 10 mg at 01/28/20 0802    aspirin EC tablet 81 mg  81 mg Oral Daily Preethi Alexander MD   81 mg at 01/28/20 4934    atorvastatin (LIPITOR) tablet 40 mg  40 mg Oral Nightly Preethi Alexander MD   40 mg at 01/27/20 2037    cloNIDine (CATAPRES) tablet 0.3 mg  0.3 mg Oral TID Preethi Alexander MD   0.3 mg at 01/27/20 2037    clopidogrel (PLAVIX) tablet 75 mg  75 mg Oral Daily Preethi Alexander MD   75 mg at 01/28/20 0592    isosorbide mononitrate (IMDUR) extended release tablet 30 mg  30 mg Oral Daily Preethi Alexander MD   30 mg at 01/28/20 0803    [MAR Hold] lactulose (CHRONULAC) 10 GM/15ML solution 10 g  10 g Oral TID Loni Mckeon MD   10 g at 01/26/20 2114    melatonin tablet 3 mg  3 mg Oral Nightly PRN Preethi Alexander MD   3 mg at 01/28/20 0005    metoprolol succinate (TOPROL XL) extended release tablet 25 mg  25 mg Oral BID Preethi Alexander MD   25 mg at 01/28/20 0801    minoxidil (LONITEN) tablet 10 mg  10 mg Oral Daily Preethi Alexander MD   10 mg at 01/28/20 5936    oxyCODONE (OXYCONTIN) extended release tablet 10 mg  10 mg Oral Q8H PRN Preethi Alexander MD   10 mg at 01/28/20 0604    sucralfate (CARAFATE) tablet 1 g  1 g Oral TID PRN Preethi Alexander MD        sevelamer (RENVELA) tablet 800 mg  800 mg Oral TID WC Preethi Alexander MD   800 mg at 01/28/20 0801    sodium chloride flush 0.9 % injection 10 mL  10 mL Intravenous 2 times per day Preethi Alexander MD   10 mL at 01/27/20 2001    sodium chloride flush 0.9 % injection 10 mL  10 mL Intravenous PRN Preethi Alexander MD           Past Medical History:  Past Medical History:   Diagnosis Date    Anxiety     Blood circulation, collateral     CAD (coronary artery disease)     stents x 2; per dr. Sandor Merino [facility])     liver cancer    [facility]estive [facility] failure) [facility]     Chyloperitoneum determined by paracentesis     CKD (chronic kidney disease), stage V ([facility]     Dialysis patient ([facility]     mon wed fri at [place] GERD (gastroesophageal reflux disease)     Hemodialysis patient ([facility]     mon wed fri in [facility] Hepatic cirrhosis ([facility]     dr. Rossana Liu; has been going to [facility] for liver and kidney transplant list.    Hepatitis C, chronic ([facility]     History of blood transfusion     HTN (hypertension)     Hx of blood clots     arm/fistula    Mixed hyperlipidemia 1/9/2017    Osteoarthritis     Pacemaker     Palliative care patient 07/09/2019    PVD (peripheral vascular disease) ([facility]     Sleep apnea     no cpap at present.  Type II diabetes mellitus (HCC)     no meds. Past Surgical History:  Past Surgical History:   Procedure Laterality Date    ANGIOPLASTY  08/09/11 S    LUE cephalic vein balloon angioplasty with 7mm x 4cm balloon. coild embolization of 2 cephallic vein brances with 3mm x 5cm coils    CARDIAC CATHETERIZATION  04/04/11    cardiac cath and stent x1 by Dr. Celena Gutierrez  07/26/11 [facility]    LUE AV fistula. coild embolization of cephalic vein branch near the wrist with 3mm EMBO coils.  balloon a'plasty left cephalic vein with [id number] balloon and then [id number] balloon    DIALYSIS FISTULA CREATION Left 2/16/2017    LEFT UPPER EXTREMITY BRACHIAL / AXILLARY GRAFT AND STENT LEFT SUBCLAVIAN VEIN  performed by Kaylan Nuno MD at [address] Right 7/12/2019    RIGHT LEG WOUND WASHOUT performed by Ilene Torres MD at [address] [facility] Right 1/27/2020    REVISION OF RIGHT LEG BYPASS GRAFT performed by Ilene Torres MD at [address] [facility] FEMORAL-TIBIAL BYPASS GRAFT Right 6/25/2019    FEMORAL TIBIAL/PERINEAL BYPASS WITH REVERSED GREATER SAPHENOUS VEIN performed by Kaylan Nuno MD at [address] [facility] [address]  12/26/2010    Right internal jugular vein tunneled dialysis catheter placement    OTHER SURGICAL HISTORY  [id number]    Redo of dialysis catheter    OTHER SURGICAL HISTORY  9/6/2011   [facility]    Removal of RT IJV tunneled dialysis cath   Russ Auguste ultrasound guided cannulation right internal jugular vein placement of right internal jugular vein tunneled dialysis catheter (Bard Equistream XK 23cm tip to cuff)     VASCULAR SURGERY  01/20/2017    [facility]-Right upper venograms, u/s guided cannulation left basilic vein, left upper venograms, balloon angioplasty left subclavian vein 10x40 conquest.    VASCULAR SURGERY  02/16/2017    [facility]. Left brachial artery to axillary vein arteriovenous graft with 7 mm artegraft. Left upper extremity venograms. Left subclavian vein stent viabahn 13x50. Left subclavian vein stent balloon angioplasty 12x40 atlas.  VASCULAR SURGERY  04/11/2017    [facility]. Removal of tunneled dialysis catheter right internal jugular vein.  VASCULAR SURGERY  01/25/2018    [facility]. Arch aortogram, left upper arteriogram, AV graft angiogram/venogram.    VASCULAR SURGERY  02/28/2018    [facility]. Right CFA 5f-6f-7f sheath, aortogram with bilateral lower arteriogram, left SFA/pop  balloon angioplasty 5x100 , 6x150 lutonix ( 2), left CFA  7f sheath, bilateral iliac kissing stents right 10x38 icast, left 9x59 icast expanded with 10x40 , completion aortogram, mynx left CFA , failed mynx right CFA.  VASCULAR SURGERY  06/13/2019    SJS left CFA 5f sheath, aortogram with bilateral lower arteriogram, mynx left CFA.  VASCULAR SURGERY  06/25/2019    SJS-VI. Femoral tibial/perineal bypass with reversed greater saphenous vein.  VASCULAR SURGERY  08/16/2019    TJR. Aortogram and runoff, selective right CFA injections. PTA of fem-tp bypass with 4.0 x 220 mm tod balloon to 4.33 mm    VASCULAR SURGERY  10/23/2019    VI. Thrombin injection in the left SFA PSA, right common femoral artery us guided access, left SFA stent treatment of the flap.        Family History  Family History   Problem Relation Age of Onset    High Blood Pressure Mother     High Blood Pressure Father     High Blood Pressure Sister        Social History  Social History     Socioeconomic History    Marital status:      Spouse name: Len Sethi    Number of children: 0    Years of education: 15    Highest education level: Not on file   Occupational History    Occupation:    Social Needs    Financial resource strain: Not on file    Food insecurity:     Worry: Not on file     Inability: Not on file    Transportation needs:     Medical: Not on file     Non-medical: Not on file   Tobacco Use    Smoking status: Current Every Day Smoker     Packs/day: 0.25     Years: 45.00     Pack years: 11.25     Types: Cigarettes    Smokeless tobacco: Never Used    Tobacco comment: about to  quit down to 1 a day cigarettes   Substance and Sexual Activity    Alcohol use: No    Drug use: Not Currently     Types: Marijuana, Cocaine, Methamphetamines, IV, Opiates , Other-see comments     Comment: in the 1970s, LSD    Sexual activity: Yes     Partners: Female     Comment: has a wife   Lifestyle    Physical activity:     Days per week: Not on file     Minutes per session: Not on file    Stress: Not on file   Relationships    Social connections:     Talks on phone: Not on file     Gets together: Not on file     Attends Zoroastrian service: Not on file     Active member of club or organization: Not on file     Attends meetings of clubs or organizations: Not on file     Relationship status: Not on file    Intimate partner violence:     Fear of current or ex partner: Not on file     Emotionally abused: Not on file     Physically abused: Not on file     Forced sexual activity: Not on file   Other Topics Concern    Not on file   Social History Narrative    CODE STATUS: Full Code    HEALTH CARE PROXY: Mrs. Len Sethi, [phone]    Back up: his Mother, Mrs. Santos Gutierrez, [phone]    AMBULATES: independantly    DOMICILED: lives in a private house with 2 steps to get in, has no stairs inside, lives with wife and step children, has 3 pets         Review of Systems:    Review of 28.4* 30.8* 35.7* 31.4*   MCV 97.3* 97.2*  --  95.7*   * 91*  --  110*     LIVER PROFILE:   Recent Labs     01/26/20  1155 01/27/20  0439 01/28/20  0400   AST 21 20 22   ALT 12 12 14   BILITOT 0.4 0.4 0.4   ALKPHOS 100 93 76     PT/INR: No results for input(s): PROTIME, INR in the last 72 hours. APTT: No results for input(s): APTT in the last 72 hours. BNP:  No results for input(s): BNP in the last 72 hours. Ionized Calcium:No results for input(s): IONCA in the last 72 hours. Magnesium:No results for input(s): MG in the last 72 hours. Phosphorus:No results for input(s): PHOS in the last 72 hours. HgbA1C: No results for input(s): LABA1C in the last 72 hours. Hepatic:   Recent Labs     01/26/20  1155 01/27/20  0439 01/28/20  0400   ALKPHOS 100 93 76   ALT 12 12 14   AST 21 20 22   PROT 7.3 7.2 7.2   BILITOT 0.4 0.4 0.4   LABALBU 3.6 3.6 3.5     Lactic Acid: No results for input(s): LACTA in the last 72 hours. Troponin: No results for input(s): CKTOTAL, CKMB, TROPONINT in the last 72 hours. ABGs: No results for input(s): PH, PCO2, PO2, HCO3, O2SAT in the last 72 hours. CRP:  No results for input(s): CRP in the last 72 hours. Sed Rate:  No results for input(s): SEDRATE in the last 72 hours. Cultures:   No results for input(s): CULTURE in the last 72 hours. No results for input(s): BC, Norma Dusty in the last 72 hours. No results for input(s): CXSURG in the last 72 hours. Radiology reports as per the Radiologist  Radiology: Ct Knee Right W Wo Contrast    Result Date: 1/19/2020  EXAMINATION: CT KNEE RIGHT W WO CONTRAST 1/19/2020 10:46 AM HISTORY: Question abscess fluid collection Automatic exposure control was utilized reducing radiation dose. Radiation DLP 6.8 mCi centimeters. Axial images are obtained. Sagittal coronal reformatted images the right leg are also obtained. The tibia and fibula are intact. Vascular calcifications noted in the popliteal artery.  Vascular calcifications present in the

## 2020-01-28 NOTE — BRIEF OP NOTE
Brief Postoperative Note  ______________________________________________________________    Patient: Edmundo Richardson  YOB: 1960  MRN: [id number]  Date of Procedure: 1/27/2020    Pre-Op Diagnosis: I73.9, I70.201    Post-Op Diagnosis: Same       Procedure(s):  Redo right groin. Endarterectomy of right Common femoral artery, Proximal superficial femoral artery and profunda femoris artery. Anesthesia: General    Surgeon(s):  Ulisses Johnson MD    Assistant: Jerardo Bosch    Estimated Blood Loss (mL): 684     Complications: None    Specimens: none  Implants:none        Drains: none    Findings: Severe scarring in and around femoral vessels. Complete occlusion of main profund femoral artery branch. Occlusion of proximal superficial femoral artery. Intimal hyperplasia in Common femoral artery. Total occlusion of old femoral to popliteal bypass with complete scarring of saphenous vein.     Komal Monsivais MD  Date: 1/27/2020  Time: 6:50 PM

## 2020-01-28 NOTE — PROGRESS NOTES
Robyn Lema received from ICU to room # 327 . Mental Status: Patient is oriented, alert, coherent, logical, thought processes intact and able to concentrate and follow conversation. Vitals:    01/28/20 1144   BP: (!) 98/42   Pulse: 64   Resp: 14   Temp: 97.9 °F (36.6 °C)   SpO2: 97%     Placed on cardiac monitor: No.  Belongings: None location is not applicable . Family at bedside Yes. Oriented Patient to room. Call light within reach. Yes. Transfer was: Well tolerated by patient. .    Electronically signed by Marcia Leonard RN on 1/28/2020 at 11:45 AM

## 2020-01-29 LAB
ALBUMIN SERPL-MCNC: 3.4 G/DL (ref 3.5–5.2)
ALP BLD-CCNC: 76 U/L (ref 40–130)
ALT SERPL-CCNC: 11 U/L (ref 5–41)
ANION GAP SERPL CALCULATED.3IONS-SCNC: 12 MMOL/L (ref 7–19)
AST SERPL-CCNC: 20 U/L (ref 5–40)
BASOPHILS ABSOLUTE: 0 K/UL (ref 0–0.2)
BASOPHILS RELATIVE PERCENT: 0.9 % (ref 0–1)
BILIRUB SERPL-MCNC: 0.3 MG/DL (ref 0.2–1.2)
BUN BLDV-MCNC: 25 MG/DL (ref 6–20)
CALCIUM SERPL-MCNC: 8.4 MG/DL (ref 8.6–10)
CHLORIDE BLD-SCNC: 92 MMOL/L (ref 98–111)
CO2: 29 MMOL/L (ref 22–29)
CREAT SERPL-MCNC: 4.5 MG/DL (ref 0.5–1.2)
EOSINOPHILS ABSOLUTE: 0.3 K/UL (ref 0–0.6)
EOSINOPHILS RELATIVE PERCENT: 5.6 % (ref 0–5)
GFR NON-AFRICAN AMERICAN: 13
GLUCOSE BLD-MCNC: 91 MG/DL (ref 74–109)
HCT VFR BLD CALC: 26.8 % (ref 42–52)
HEMOGLOBIN: 8.4 G/DL (ref 14–18)
IMMATURE GRANULOCYTES #: 0 K/UL
LYMPHOCYTES ABSOLUTE: 1.3 K/UL (ref 1.1–4.5)
LYMPHOCYTES RELATIVE PERCENT: 28.6 % (ref 20–40)
MCH RBC QN AUTO: 30.8 PG (ref 27–31)
MCHC RBC AUTO-ENTMCNC: 31.3 G/DL (ref 33–37)
MCV RBC AUTO: 98.2 FL (ref 80–94)
MONOCYTES ABSOLUTE: 0.5 K/UL (ref 0–0.9)
MONOCYTES RELATIVE PERCENT: 11.2 % (ref 0–10)
NEUTROPHILS ABSOLUTE: 2.4 K/UL (ref 1.5–7.5)
NEUTROPHILS RELATIVE PERCENT: 53.5 % (ref 50–65)
PDW BLD-RTO: 16.3 % (ref 11.5–14.5)
PLATELET # BLD: 74 K/UL (ref 130–400)
PMV BLD AUTO: 12.2 FL (ref 9.4–12.4)
POTASSIUM SERPL-SCNC: 5 MMOL/L (ref 3.5–5)
RBC # BLD: 2.73 M/UL (ref 4.7–6.1)
SODIUM BLD-SCNC: 133 MMOL/L (ref 136–145)
TOTAL PROTEIN: 6.1 G/DL (ref 6.6–8.7)
VANCOMYCIN RANDOM: 20.4 UG/ML
WBC # BLD: 4.5 K/UL (ref 4.8–10.8)

## 2020-01-29 PROCEDURE — 6360000002 HC RX W HCPCS: Performed by: INTERNAL MEDICINE

## 2020-01-29 PROCEDURE — 93922 UPR/L XTREMITY ART 2 LEVELS: CPT

## 2020-01-29 PROCEDURE — 6370000000 HC RX 637 (ALT 250 FOR IP): Performed by: INTERNAL MEDICINE

## 2020-01-29 PROCEDURE — 85025 COMPLETE CBC W/AUTO DIFF WBC: CPT

## 2020-01-29 PROCEDURE — 2580000003 HC RX 258: Performed by: SURGERY

## 2020-01-29 PROCEDURE — 1210000000 HC MED SURG R&B

## 2020-01-29 PROCEDURE — 6360000002 HC RX W HCPCS: Performed by: SURGERY

## 2020-01-29 PROCEDURE — 2580000003 HC RX 258: Performed by: INTERNAL MEDICINE

## 2020-01-29 PROCEDURE — 8010000000 HC HEMODIALYSIS ACUTE INPT

## 2020-01-29 PROCEDURE — 36415 COLL VENOUS BLD VENIPUNCTURE: CPT

## 2020-01-29 PROCEDURE — 80053 COMPREHEN METABOLIC PANEL: CPT

## 2020-01-29 PROCEDURE — 80202 ASSAY OF VANCOMYCIN: CPT

## 2020-01-29 PROCEDURE — 99024 POSTOP FOLLOW-UP VISIT: CPT | Performed by: SURGERY

## 2020-01-29 RX ADMIN — OXYCODONE HYDROCHLORIDE 10 MG: 10 TABLET, FILM COATED, EXTENDED RELEASE ORAL at 07:25

## 2020-01-29 RX ADMIN — ASPIRIN 81 MG: 81 TABLET, COATED ORAL at 13:45

## 2020-01-29 RX ADMIN — ATORVASTATIN CALCIUM 40 MG: 40 TABLET, FILM COATED ORAL at 20:52

## 2020-01-29 RX ADMIN — CLOPIDOGREL BISULFATE 75 MG: 75 TABLET ORAL at 13:45

## 2020-01-29 RX ADMIN — MORPHINE SULFATE 2 MG: 4 INJECTION, SOLUTION INTRAMUSCULAR; INTRAVENOUS at 13:46

## 2020-01-29 RX ADMIN — MORPHINE SULFATE 2 MG: 4 INJECTION, SOLUTION INTRAMUSCULAR; INTRAVENOUS at 02:41

## 2020-01-29 RX ADMIN — BACITRACIN ZINC NEOMYCIN SULFATE POLYMYXIN B SULFATE: 400; 3.5; 5 OINTMENT TOPICAL at 20:53

## 2020-01-29 RX ADMIN — CALCIUM ACETATE 2001 MG: 667 CAPSULE ORAL at 17:24

## 2020-01-29 RX ADMIN — Medication 10 ML: at 20:53

## 2020-01-29 RX ADMIN — CLONIDINE HYDROCHLORIDE 0.1 MG: 0.1 TABLET ORAL at 20:52

## 2020-01-29 RX ADMIN — MORPHINE SULFATE 2 MG: 4 INJECTION, SOLUTION INTRAMUSCULAR; INTRAVENOUS at 18:57

## 2020-01-29 RX ADMIN — SEVELAMER CARBONATE 800 MG: 800 TABLET, FILM COATED ORAL at 13:25

## 2020-01-29 RX ADMIN — MINOXIDIL 10 MG: 10 TABLET ORAL at 13:45

## 2020-01-29 RX ADMIN — MORPHINE SULFATE 2 MG: 4 INJECTION, SOLUTION INTRAMUSCULAR; INTRAVENOUS at 00:40

## 2020-01-29 RX ADMIN — METOPROLOL SUCCINATE 25 MG: 25 TABLET, EXTENDED RELEASE ORAL at 13:45

## 2020-01-29 RX ADMIN — SEVELAMER CARBONATE 800 MG: 800 TABLET, FILM COATED ORAL at 17:24

## 2020-01-29 RX ADMIN — MELATONIN 3 MG: at 20:52

## 2020-01-29 RX ADMIN — ISOSORBIDE MONONITRATE 30 MG: 30 TABLET, EXTENDED RELEASE ORAL at 13:45

## 2020-01-29 RX ADMIN — OXYCODONE HYDROCHLORIDE AND ACETAMINOPHEN 1 TABLET: 5; 325 TABLET ORAL at 20:52

## 2020-01-29 RX ADMIN — METOPROLOL SUCCINATE 25 MG: 25 TABLET, EXTENDED RELEASE ORAL at 20:52

## 2020-01-29 RX ADMIN — AMLODIPINE BESYLATE 10 MG: 5 TABLET ORAL at 13:46

## 2020-01-29 RX ADMIN — NEPHROCAP 1 MG: 1 CAP ORAL at 13:45

## 2020-01-29 RX ADMIN — OXYCODONE HYDROCHLORIDE AND ACETAMINOPHEN 1 TABLET: 5; 325 TABLET ORAL at 10:29

## 2020-01-29 RX ADMIN — CALCIUM ACETATE 2001 MG: 667 CAPSULE ORAL at 13:25

## 2020-01-29 RX ADMIN — Medication 10 ML: at 13:49

## 2020-01-29 RX ADMIN — VANCOMYCIN HYDROCHLORIDE 750 MG: 1 INJECTION, POWDER, LYOPHILIZED, FOR SOLUTION INTRAVENOUS at 16:15

## 2020-01-29 RX ADMIN — SODIUM CHLORIDE, PRESERVATIVE FREE 10 ML: 5 INJECTION INTRAVENOUS at 00:41

## 2020-01-29 RX ADMIN — OXYCODONE HYDROCHLORIDE 10 MG: 10 TABLET, FILM COATED, EXTENDED RELEASE ORAL at 16:23

## 2020-01-29 RX ADMIN — MORPHINE SULFATE 2 MG: 4 INJECTION, SOLUTION INTRAMUSCULAR; INTRAVENOUS at 22:21

## 2020-01-29 ASSESSMENT — PAIN DESCRIPTION - ORIENTATION
ORIENTATION: RIGHT

## 2020-01-29 ASSESSMENT — PAIN DESCRIPTION - FREQUENCY
FREQUENCY: CONTINUOUS

## 2020-01-29 ASSESSMENT — PAIN DESCRIPTION - PAIN TYPE
TYPE: SURGICAL PAIN
TYPE: SURGICAL PAIN
TYPE: ACUTE PAIN;SURGICAL PAIN
TYPE: ACUTE PAIN;SURGICAL PAIN

## 2020-01-29 ASSESSMENT — PAIN SCALES - GENERAL
PAINLEVEL_OUTOF10: 9
PAINLEVEL_OUTOF10: 8
PAINLEVEL_OUTOF10: 6
PAINLEVEL_OUTOF10: 5
PAINLEVEL_OUTOF10: 9
PAINLEVEL_OUTOF10: 10
PAINLEVEL_OUTOF10: 9
PAINLEVEL_OUTOF10: 7
PAINLEVEL_OUTOF10: 9
PAINLEVEL_OUTOF10: 9
PAINLEVEL_OUTOF10: 8
PAINLEVEL_OUTOF10: 9

## 2020-01-29 ASSESSMENT — PAIN DESCRIPTION - DESCRIPTORS
DESCRIPTORS: BURNING;THROBBING
DESCRIPTORS: CONSTANT;SHARP;THROBBING
DESCRIPTORS: CONSTANT;THROBBING

## 2020-01-29 ASSESSMENT — PAIN DESCRIPTION - ONSET
ONSET: ON-GOING

## 2020-01-29 ASSESSMENT — PAIN DESCRIPTION - PROGRESSION
CLINICAL_PROGRESSION: NOT CHANGED
CLINICAL_PROGRESSION: GRADUALLY WORSENING

## 2020-01-29 ASSESSMENT — PAIN DESCRIPTION - LOCATION
LOCATION: GROIN;LEG
LOCATION: LEG;GROIN

## 2020-01-29 NOTE — PROGRESS NOTES
mg  4 mg Intravenous Q6H PRN [facility]        0.9 % sodium chloride infusion   Intravenous Continuous Margean Hilding, DO   Stopped at 01/28/20 8970    morphine injection 2 mg  2 mg Intravenous Q2H PRN [facility] DO   2 mg at 01/29/20 0241    calcium acetate (PHOSLO) capsule 2,001 mg  2,001 mg Oral TID WC [facility], DO   2,001 mg at 01/28/20 1606    neomycin-bacitracin-polymyxin (NEOSPORIN) ointment   Topical BID Orin Robbins MD        oxyCODONE-acetaminophen (PERCOCET) 5-325 MG per tablet 1 tablet  1 tablet Oral Q4H PRN [facility], DO   1 tablet at 01/28/20 2318    b complex-C-folic acid (NEPHROCAPS) capsule 1 mg  1 capsule Oral Daily [facility], DO   1 mg at 01/28/20 0803    amLODIPine (NORVASC) tablet 10 mg  10 mg Oral Daily [facility], DO   10 mg at 01/28/20 0802    aspirin EC tablet 81 mg  81 mg Oral Daily [facility], DO   81 mg at 01/28/20 9855    atorvastatin (LIPITOR) tablet 40 mg  40 mg Oral Nightly [facility]   40 mg at 01/28/20 2058    cloNIDine (CATAPRES) tablet 0.3 mg  0.3 mg Oral TID [facility], DO   Stopped at 01/28/20 1005    clopidogrel (PLAVIX) tablet 75 mg  75 mg Oral Daily [facility] DO   75 mg at 01/28/20 8298    isosorbide mononitrate (IMDUR) extended release tablet 30 mg  30 mg Oral Daily [facility] DO   30 mg at 01/28/20 0803    lactulose (CHRONULAC) 10 GM/15ML solution 10 g  10 g Oral TID [facility], DO   10 g at 01/26/20 2114    melatonin tablet 3 mg  3 mg Oral Nightly [facility], DO   3 mg at 01/28/20 2058    metoprolol succinate (TOPROL XL) extended release tablet 25 mg  25 mg Oral BID [facility] DO   25 mg at 01/28/20 2058    minoxidil (LONITEN) tablet 10 mg  10 mg Oral Daily Crystal Lockhart DO   10 mg at 01/28/20 2390    oxyCODONE (OXYCONTIN) extended release tablet 10 mg  10 mg Oral Q8H PRN Crystal Lockhart DO 10 mg at 01/29/20 0725    sucralfate (CARAFATE) tablet 1 g  1 g Oral TID PRN Anup BaronerDO        sevelamer (RENVELA) tablet 800 mg  800 mg Oral TID WC Arlie Fleischer, DO   800 mg at 01/28/20 1606    sodium chloride flush 0.9 % injection 10 mL  10 mL Intravenous 2 times per day Arlie Fleischer, DO   10 mL at 01/28/20 2106    sodium chloride flush 0.9 % injection 10 mL  10 mL Intravenous PRN Arlie Fleischer, DO   10 mL at 01/29/20 0041       Past Medical History:  Past Medical History:   Diagnosis Date    Anxiety     Blood circulation, collateral     CAD (coronary artery disease)     stents x 2; per dr. Faby Colon [facility])     liver cancer    CHF (congestive heart failure) ([address]     Chyloperitoneum determined by paracentesis     CKD (chronic kidney disease), stage V ([address]     Dialysis patient ([address]     mon wed fri at [place] GERD (gastroesophageal reflux disease)     Hemodialysis patient ([address]     mon wed fri in [place] Hepatic cirrhosis ([facility]     dr. Ollie Ace; has been going [facility] for liver and kidney transplant list.    Hepatitis C, chronic ([address]     History of blood transfusion     HTN (hypertension)     Hx of blood clots     arm/fistula    Mixed hyperlipidemia 1/9/2017    Osteoarthritis     Pacemaker     Palliative care patient 07/09/2019    PVD (peripheral vascular disease) ([address]     Sleep apnea     no cpap at present.  Type II diabetes mellitus (HCC)     no meds. Past Surgical History:  Past Surgical History:   Procedure Laterality Date    ANGIOPLASTY  08/09/11 JAI GERARDO cephalic vein balloon angioplasty with 7mm x 4cm balloon. coild embolization of 2 cephallic vein brances with 3mm x 5cm coils    CARDIAC CATHETERIZATION  04/04/11    cardiac cath and stent x1 by Dr. Sierra Dias  07/26/11 JAI GERARDO AV fistula. coild embolization of cephalic vein branch near the wrist with 3mm EMBO coils.  balloon a'plasty left cephalic vein with [id number] balloon and then [id number] balloon    DIALYSIS FISTULA CREATION Left 2/16/2017    LEFT UPPER EXTREMITY BRACHIAL / AXILLARY GRAFT AND STENT LEFT SUBCLAVIAN VEIN  performed by Jodi Mcrae MD at [address] Right 7/12/2019    RIGHT LEG WOUND WASHOUT performed by Giselle Garcia MD at [address] Right 1/27/2020    REVISION OF RIGHT LEG BYPASS GRAFT performed by Giselle Garcia MD at [address] FEMORAL-TIBIAL BYPASS GRAFT Right 6/25/2019    FEMORAL TIBIAL/PERINEAL BYPASS WITH REVERSED GREATER SAPHENOUS VEIN performed by Jodi Mcrae MD at [address] [facility]  12/26/2010    Right internal jugular vein tunneled dialysis catheter placement    OTHER SURGICAL HISTORY  [id number]    Redo of dialysis catheter    OTHER SURGICAL HISTORY  9/6/2011   S    Removal of RT IJV tunneled dialysis cath    OTHER SURGICAL HISTORY  5/22/12   [facility]    Ultrasound-guided cannulation of left cephalic vein in the mid forearm toward the AV anastomosis, Left upper  ext. fistulograms including venograms of the SVC, Left cephalic vein balloon angioplasty with a 4mm x 100mm Tod balloon, Completion fistulograms    PACEMAKER PLACEMENT      medtronic    PARACENTESIS      multiple/recent; per dr. Elvis Chen at [address] [facility] Left 1/30/2018    UPPER EXTREMITY DRIL PROCEDURE WITH LEFT SAPHENOUS VEIN HARVESTING performed by Jodi Mcrae MD at [facility]  6/19/12    LU arteriovenous fistulogram including venography of the superior vena cava. Balloon angioplasty, left forearm cephalic vein and upper arm cephalic vein with [id number] tod balloon.  VASCULAR SURGERY  7/10/2012 [facility]     Revision of left distal radial artery to cephalic vein arteriovenous fistula with 7mm Artegraft interposition.      VASCULAR SURGERY  7/30/15 [facility] [address]    Left upper extremity arteriovenous fistulograms including venography of the superior vena cava. Left cephalic vein balloon angioplasty with an 8 x 20 cm cutting balloon proximal cephalic vein. Balloon angioplasty of left cephalic vein/antecubital vein near the antecubital crease with 8 x 20 mm cutting balloon.  VASCULAR SURGERY  7/30/15 [facility]    Balloon angioplasty proximal end distal upper arm cephalic vein with 9 x 40 cm  balloon. Completion fistulograms of the left upper extremity.  VASCULAR SURGERY  8/13/15 [facility]    Revision of left upper extremity arteriovenous fistula with excision of pseudoaneurysm and primary repair of cephalic vein.  VASCULAR SURGERY  5/3/16 [facility]    Left upper fistulograms/venograms, left cephalic balloon 8 x 20 cutter,9 x 40 conquest,left proximal cephalic vein stents (flair 2 9 x 50), balloon stents 9 x 40 conquest.    VASCULAR SURGERY  12/08/2016    [facility]- ultrasound guided cannulation of left distal cephalic vein ultrasound guided cannulation right internal jugular vein placement of right internal jugular vein tunneled dialysis catheter (Bard Equistream XK 23cm tip to cuff)     VASCULAR SURGERY  01/20/2017    [facility]-Right upper venograms, u/s guided cannulation left basilic vein, left upper venograms, balloon angioplasty left subclavian vein 10x40 conquest.    VASCULAR SURGERY  02/16/2017    [facility]. Left brachial artery to axillary vein arteriovenous graft with 7 mm artegraft. Left upper extremity venograms. Left subclavian vein stent viabahn 13x50. Left subclavian vein stent balloon angioplasty 12x40 atlas.  VASCULAR SURGERY  04/11/2017    [facility]. Removal of tunneled dialysis catheter right internal jugular vein.  VASCULAR SURGERY  01/25/2018    [facility]. Arch aortogram, left upper arteriogram, AV graft angiogram/venogram.    VASCULAR SURGERY  02/28/2018    [facility]. Right CFA 5f-6f-7f sheath, aortogram with bilateral lower arteriogram, left SFA/pop  balloon angioplasty 5x100 , 6x150 lutonix ( 2), left CFA  7f sheath, bilateral iliac kissing stents right 10x38 Relationships    Social connections:     Talks on phone: Not on file     Gets together: Not on file     Attends Anabaptist service: Not on file     Active member of club or organization: Not on file     Attends meetings of clubs or organizations: Not on file     Relationship status: Not on file    Intimate partner violence:     Fear of current or ex partner: Not on file     Emotionally abused: Not on file     Physically abused: Not on file     Forced sexual activity: Not on file   Other Topics Concern    Not on file   Social History Narrative    CODE STATUS: Full Code    HEALTH CARE PROXY: Mrs. Wing Randhawa, [phone]    Back up: his Mother, Mrs. Lady Garcia, [phone]    AMBULATES: independantly    DOMICILED: lives in a private house with 2 steps to get in, has no stairs inside, lives with wife and step children, has 3 pets         Review of Systems:  History obtained from chart review and the patient  General ROS: No fever or chills  Respiratory ROS: No cough, shortness of breath, wheezing  Cardiovascular ROS: No chest pain or palpitations  Gastrointestinal ROS: No abdominal pain or melena  Genito-Urinary ROS: No dysuria or hematuria  Musculoskeletal ROS: No joint pain or swelling         Objective:  Patient Vitals for the past 24 hrs:   BP Temp Temp src Pulse Resp SpO2 Weight   01/29/20 0619 (!) 175/65 98.1 °F (36.7 °C) -- 63 16 92 % --   01/29/20 0315 -- -- -- -- -- -- 169 lb 3 oz (76.7 kg)   01/29/20 0234 (!) 172/65 98 °F (36.7 °C) Temporal 63 16 97 % --   01/28/20 2142 (!) 128/56 98.2 °F (36.8 °C) Temporal 59 16 93 % --   01/28/20 1825 (!) 106/47 98.7 °F (37.1 °C) Temporal 63 18 93 % --   01/28/20 1731 (!) 110/42 99 °F (37.2 °C) Temporal 64 16 94 % --   01/28/20 1502 (!) 117/40 97.7 °F (36.5 °C) Temporal 74 16 95 % --   01/28/20 1144 (!) 98/42 97.9 °F (36.6 °C) Temporal 64 14 97 % --   01/28/20 1000 -- -- -- 63 (!) 34 98 % --       Intake/Output Summary (Last 24 hours) at 1/29/2020 Study, VL EXTREMITY VENOUS DUPLEX RIGHT. Indications for Study:Swelling and Pain, right lower extremity. Allergies   - No known allergies. Impression   There is no evidence of deep venous thrombosis (DVT) in the right lower  extremity(ies). There is no evidence of superficial thrombophlebitis of the right lower  extremity(ies). Signature   ----------------------------------------------------------------  Electronically signed by Ayaz Peterson MD(Interpreting  physician) on 01/19/2020 06:59 AM  ----------------------------------------------------------------  Velocities are measured in cm/s ; Diameters are measured in mm Right Lower Extremities DVT Study Measurements Right 2D Measurements +------------------------------------+----------+---------------+----------+ ! Location                            ! Visualized! Compressibility! Thrombosis! +------------------------------------+----------+---------------+----------+ ! Sapheno Femoral Junction            ! Yes       ! Yes            ! None      ! +------------------------------------+----------+---------------+----------+ ! Common Femoral                      !Yes       ! Yes            ! None      ! +------------------------------------+----------+---------------+----------+ ! Prox Femoral                        !Yes       ! Yes            ! None      ! +------------------------------------+----------+---------------+----------+ ! Mid Femoral                         !Yes       ! Yes            ! None      ! +------------------------------------+----------+---------------+----------+ ! Dist Femoral                        !Yes       ! Yes            ! None      ! +------------------------------------+----------+---------------+----------+ ! Deep Femoral                        !Yes       ! Yes            ! None      ! +------------------------------------+----------+---------------+----------+ ! Popliteal                           !Yes       ! Yes            ! None      ! +------------------------------------+----------+---------------+----------+ ! SSV                                 ! Yes       ! Yes            ! None      ! +------------------------------------+----------+---------------+----------+ ! Gastroc                             ! Yes       ! Yes            ! None      ! +------------------------------------+----------+---------------+----------+ ! PTV                                 ! Yes       ! Yes            ! None      ! +------------------------------------+----------+---------------+----------+ ! GSV                                 ! Yes       ! Yes            ! None      ! +------------------------------------+----------+---------------+----------+ ! ATV                                 ! Yes       ! Yes            ! None      ! +------------------------------------+----------+---------------+----------+ ! Peroneal                            !Yes       ! Yes            ! None      ! +------------------------------------+----------+---------------+----------+ Left Lower Extremities DVT Study Measurements Left 2D Measurements +------------------------------------+----------+---------------+----------+ ! Location                            ! Visualized! Compressibility! Thrombosis! +------------------------------------+----------+---------------+----------+ ! Sapheno Femoral Junction            ! Yes       ! Yes            ! None      ! +------------------------------------+----------+---------------+----------+ ! Common Femoral                      !Yes       ! Yes            ! None      ! +------------------------------------+----------+---------------+----------+       Assessment   1. End-stage renal disease/seen on hemodialysis. 2.  Severe peripheral vascular disease. 3.  Status post right lower extremity bypass surgery. 4.  Type II diabetic nephropathy. 5.  Cirrhosis of liver/hepatitis C.  6.  Anemia of chronic kidney disease. 7.  Chronic diastolic congestive heart failure.   8.

## 2020-01-29 NOTE — PROGRESS NOTES
Bilateral Ankle Brachial Indices performed. RT GABRIEL:  0.25                   LT GABRIEL: NC    DIGIT:  0      DIGIT: 0.31        This is a preliminary report.  Final report pending

## 2020-01-29 NOTE — PROGRESS NOTES
Vascular Surgery Rounding Note                                                     Dr.Timothy Amador    1/29/2020  1:27 PM     Antibiotic- Vancomycin with pharmacy dosing    Surgical Procedure post op day #2 Endarterectomy of right Common femoral artery, Proximal superficial femoral artery and profunda femoris artery. Subjective-Stating his right foot feels better today    Objective-   Invalid input(s): 24H    Intake/Output Summary (Last 24 hours) at 1/29/2020 1327  Last data filed at 1/29/2020 1152  Gross per 24 hour   Intake 980 ml   Output 4000 ml   Net -3020 ml     In: -   Out: 3000     Physical Exam:  Awake, alert, appropriate Yes  BP (!) 175/65   Pulse 63   Temp 98.1 °F (36.7 °C)   Resp 16   Ht 5' 9\" (1.753 m)   Wt 169 lb 3 oz (76.7 kg)   SpO2 92%   BMI 24.98 kg/m²   Heart-Regular rate and rhythm, murmur noted  Lung-clear bilaterally anteriorly  Abdomen-Abdomen soft, non-tender. BS normal.   Extremities/ Incision-feet warm to touch/ right foot hyperemic. Doppler signals to right foot PT strong monophasic, DP soft monophasic. Right groin incision line with staples, has bloody oozing from distal end of incision. Quick Clot to area and pressure held, Mepilex applied. Mepilex dressing to lower leg incision line  Neurologic- alert, oriented, normal speech    Assessment/Plan:    1. Stable POD #2 RLE endarterectomy   2. Doppler signals improving, patient stating no rest pain  3. Quick Clot to distal right groin  4. Dialysis today  5. OOB with assist  6. Post op GABRIEL's today      Antibiotics continued after surgery due to: Infection -          [x]  Cellulitis        Beta Blocker:  continued      Note: I rounded with Shalom Ventura rn this am. Objective, assessment and plan are mine.  Enid Panchal md

## 2020-01-29 NOTE — CARE COORDINATION
Date / Time of Evaluation: 1/29/2020 2:28 PM  Assessment Completed by: Everton Betancourt    Patient Admission Status: inpatient     Current PCP:  Cele Graham DO    Initial Assessment Completed at bedside with:  Patient and spouse, Daquan kumar    Emergency Contacts:  Extended Emergency Contact Information  Primary Emergency Contact: KennyBonnie  Address: [address] [place] [address] [address] Phone: [phone]  Mobile Phone: [phone]  Relation: Spouse  Secondary Emergency Contact: Jaida Ospina [address] Phone: [phone]  Mobile Phone: [phone]  Relation: Parent    Advance Directives: Code Status:  Full Code    Financial:  Payor: MEDICARE / Plan: MEDICARE PART A AND B / Product Type: *No Product type* /     Pre-Cert required for SNF:  no    Pharmacy:   [address], [address] [address] [address] [facility]  Phone: [phone] Fax: [phone] [facility], [address] - P [phone] - [place] [address] [facility] [address]  Phone: [phone] Fax: [address],[facility], [address] [phone] [address] [facility] [address]  Phone: [phone] Fax: [phone]      Potential assistance purchasing medications?   no    ADLS:  Support System:  Spouse/Significant Other, Family Members    Current Home Environment:  Pt and spouse recently moved to a small apartment in [place]  Steps:  Yes, 3- lading to porch porch at entrance to apt    Plans to RETURN to current housing: yes  Barriers to RETURNING to current housing:  Physical capacity to ambulate    Currently ACTIVE with [address]:  no  [address]:      DME Provider:  Unknown,  Pt does already have a walker and wc in the home     Had INR checked by patient on home meter.     CURRENT COUMADIN DOSE:  8mg Tu, F and 7mg daily the rest of the week     CHANGES OR COMPLAINTS:  Stress, Aunt passed  Traveling    INR RESULTS TODAY:  2.7 ( in goal)     PLAN:  CPM coumadin   Next INR in one month lists were provided to pt and wife for reference. Will follow up with case in am.    [address] for Home Health Transition of Care is related to the following treatment goals: pt/ot/nursing    for continuation of therapy at home and monitoring of surgical wounds. Oly Lay and/or his family were provided with choice of provider.         Gio Velasquez RN  [facility]  Care Management Department  Ph:  [phone]   Fax:

## 2020-01-29 NOTE — PLAN OF CARE
Problem: Falls - Risk of:  Goal: Will remain free from falls  Description  Will remain free from falls  Outcome: Ongoing  Goal: Absence of physical injury  Description  Absence of physical injury  Outcome: Ongoing     Problem:  Activity:  Goal: Fatigue will decrease  Description  Fatigue will decrease  Outcome: Ongoing  Goal: Risk for activity intolerance will decrease  Description  Risk for activity intolerance will decrease  Outcome: Ongoing     Problem: Coping:  Goal: Ability to cope will improve  Description  Ability to cope will improve  Outcome: Ongoing     Problem: Fluid Volume:  Goal: Will show no signs or symptoms of fluid imbalance  Description  Will show no signs or symptoms of fluid imbalance  Outcome: Ongoing  Goal: Ability to achieve a balanced intake and output will improve  Description  Ability to achieve a balanced intake and output will improve  Outcome: Ongoing     Problem: Health Behavior:  Goal: Ability to manage health-related needs will improve  Description  Ability to manage health-related needs will improve  Outcome: Ongoing  Goal: Identification of resources available to assist in meeting health care needs will improve  Description  Identification of resources available to assist in meeting health care needs will improve  Outcome: Ongoing     Problem: Nutritional:  Goal: Ability to identify appropriate dietary choices will improve  Description  Ability to identify appropriate dietary choices will improve  Outcome: Ongoing  Goal: Nutritional status will improve  Description  Nutritional status will improve  Outcome: Ongoing     Problem: Physical Regulation:  Goal: Ability to maintain clinical measurements within normal limits will improve  Description  Ability to maintain clinical measurements within normal limits will improve  Outcome: Ongoing  Goal: Complications related to the disease process, condition or treatment will be avoided or minimized  Description  Complications related to the

## 2020-01-29 NOTE — PROGRESS NOTES
lactulose  10 g Oral TID    metoprolol succinate  25 mg Oral BID    minoxidil  10 mg Oral Daily    sevelamer  800 mg Oral TID WC    sodium chloride flush  10 mL Intravenous 2 times per day     Continuous Infusions:    sodium chloride Stopped (20 0801)     PRN Meds: acetaminophen, ondansetron, morphine, oxyCODONE-acetaminophen, melatonin, oxyCODONE, sucralfate, sodium chloride flush    Past History    Past Medical History:   has a past medical history of Anxiety, Blood circulation, collateral, CAD (coronary artery disease), Cancer ([facility] CHF (congestive heart failure) ([facility] Chyloperitoneum determined by paracentesis, CKD (chronic kidney disease), stage V ([facility] Dialysis patient ([facility] GERD (gastroesophageal reflux disease), Hemod[facility] patient ([facility] Hepatic cirrhosis ([facility] Hepatitis C, chronic ([facility] History of blood transfusion, HTN (hypertension), Hx of blood clots, Mixed hyperlipidemia, Osteoarthritis, Pacemaker, Palliative care patient, PVD (peripheral vascular disease) ([facility] Sleep apnea, and Type II diabetes mellitus ([facility] Social History:   reports that he has been smoking cigarettes. He has a 11.25 pack-year smoking history. He has never used smokeless tobacco. He reports previous drug use. Drugs: Marijuana, Cocaine, Methamphetamines, IV, Opiates , and Other-see comments. He reports that he does not drink alcohol. Family History:   Family History   Problem Relation Age of Onset    High Blood Pressure Mother     High Blood Pressure Father     High Blood Pressure Sister        Physical Examination      Vitals:  BP (!) 175/65   Pulse 63   Temp 98.1 °F (36.7 °C)   Resp 16   Ht 5' 9\" (1.753 m)   Wt 169 lb 3 oz (76.7 kg)   SpO2 92%   BMI 24.98 kg/m²   Temp (24hrs), Av.2 °F (36.8 °C), Min:97.7 °F (36.5 °C), Max:99 °F (37.2 °C)      I/O (24Hr):     Intake/Output Summary (Last 24 hours) at 2020 1139  Last data filed at 2020 0630  Gross per 24 hour   Intake 980 ml   Output input(s): PROTIME, INR in the last 72 hours. APTT:No results for input(s): APTT in the last 72 hours. LIVER PROFILE:  Recent Labs     01/27/20  0439 01/28/20  0400 01/29/20  0325   AST 20 22 20   ALT 12 14 11   BILITOT 0.4 0.4 0.3   ALKPHOS 93 76 76       Imaging Last 24 Hours:  Vl Madiha Bilateral Limited 1-2 Levels    Result Date: 1/20/2020  Vascular Lower Arterial Plethysmography Procedure  Demographics   Patient Name   Magdalena Ramírez Age                61   Patient Number [id number]       Gender             Male   Visit Number   [id number]    Interpreting       Bianka Roach MD                              Physician   Date of Birth  1960   Referring          Bianka Roach MD                              Physician   Accession      [id number]    [place] [phone] [facility]  Number                                         RVS, RCS  Procedure Type of Study:   Extremities Arteries:Lower Arterial Plethysmography, VL ANKLE / BRACHIAL  INDICES EXTREMITY COMPLETE . Indications for Study:Wound Lower Extremity (Right). Risk Factors   - The patient's risk factor(s) include: diabetes mellitus, arterial     hypertension and prior MI . Allergies   - No known allergies. Impression   The patient has moderately diminished ankle-brachial indices left lower  extremity(ies) which would be consistent with claudication level symptoms. The patient has severely diminished ankle-brachial indices right lower  extremity(ies) which would be consistent with rest pain symptoms. Signature   ----------------------------------------------------------------  Electronically signed by Bianka Roach MD(Interpreting  physician) on 01/20/2020 03:39 PM  ----------------------------------------------------------------  Velocities are measured in cm/s ; Diameters are measured in mm Pressures +--------------------------------------++--------+-----+----+--------+-----+ ! ! !Right   ! ! Left! !     ! +--------------------------------------++--------+-----+----+--------+-----+ ! Location                              ! !Pressure! Ratio! !Pressure! Ratio! +--------------------------------------++--------+-----+----+--------+-----+ ! Ankle PT                              !!39      !0.3  ! !255     ! 1.99 ! +--------------------------------------++--------+-----+----+--------+-----+ ! DP                                    !!255     !1.99 !    !255     ! 1.99 ! +--------------------------------------++--------+-----+----+--------+-----+ ! Great Toe                             !!0       !0    !    !37      !0.29 ! +--------------------------------------++--------+-----+----+--------+-----+   - Brachial Pressure:Right: 128.   - GABRIEL:Right: 1.99. Left: 1.99. Plethysmographic Digit Evaluation +---------++--------+-----+---------------++--------+-----+----------------+ ! ! !Right   ! ! Left           !!        !     !                ! +---------++--------+-----+---------------++--------+-----+----------------+ ! Location ! !Pressure! Ratio! PPG Wave Form  ! !Pressure! Ratio! PPG Wave Form   ! +---------++--------+-----+---------------++--------+-----+----------------+ ! Great Toe!!0       !0    !               !!37      !0.29 !                ! +---------++--------+-----+---------------++--------+-----+----------------+    Vl Dup Lower Extremity Arteries Right    Result Date: 1/20/2020  Vascular Lower Extremities Arterial Duplex Procedure  Demographics   Patient Name   Chavo Newton Age                61   Patient Number [id number]       Gender             Male   Visit Number   [id number]    Interpreting       Angelica Babb MD                              Physician   Date of Birth  1960   Referring          Angelica Babb MD                              Physician   Accession      [id number]    [place] [phone] [facility]  Number                                         RVS, RCS  Procedure Type of Study: cannot be imaged due to open wound. . Velocities are measured in cm/s ; Diameters are measured in mm LE Duplex Measurements +---------------++-----+-----+----+-----------++---+-----+---+-------------+ ! ! !Right! ! Left!           !!   !     !   !             ! +---------------++-----+-----+----+-----------++---+-----+---+-------------+ ! Location       ! !PSV  ! Ratio! EDV ! Wave Desc. !!PSV! Ratio! EDV! Wave Desc.   ! +---------------++-----+-----+----+-----------++---+-----+---+-------------+ ! Common Femoral !!173  !     !14.4!           !!   !     !   !             ! +---------------++-----+-----+----+-----------++---+-----+---+-------------+ ! Prox PFA       !!402  !     !20.2!           !!   !     !   !             ! +---------------++-----+-----+----+-----------++---+-----+---+-------------+ ! Mid PTA        !!0    !     !    !           !!   !     !   !             ! +---------------++-----+-----+----+-----------++---+-----+---+-------------+ ! Prox GWYN       !!22.2 !     !11.6!           !!   !     !   !             ! +---------------++-----+-----+----+-----------++---+-----+---+-------------+ ! Prox Peroneal  !!12.6 ! !8.05!           !!   !     !   !             ! +---------------++-----+-----+----+-----------++---+-----+---+-------------+    Vl Vein Mapping Lower Bilateral    Result Date: 1/21/2020  Vascular Lower Extremity Vein Mapping Procedure  Demographics   Patient Name   Edward Madden Age                61   Patient Number [id number]       Gender             Male   Visit Number   [id number]    Yoli Morris MD                              Physician   Date of Birth  1960   Referring          Leonard Robbins MD                              Physician   Accession      [id number]    [facility]  Number                                         RVS, RCS  Procedure Type of Study:   [address] [place] [address].   Indications for Study:Pre-op vein mapping for revascularization. Risk Factors   - The patient's risk factor(s) include: diabetes mellitus, arterial     hypertension and prior MI . Allergies   - No known allergies. Impression   Bilateral lower extremity saphenous vein mapping performed. Veins are  patent with measurements noted. Right greater saphenous vein harvested thigh and groin. Left greater saphenous vein harvested all the way to the ankle. Signature   ----------------------------------------------------------------  Electronically signed by Sunita Roa MD(Interpreting  physician) on 01/21/2020 11:16 AM  ----------------------------------------------------------------  Velocities are measured in cm/s ; Diameters are measured in mm +--------------------------------------++--------+-----+----+--------+-----+ ! Superficial - Great Saphenous Vein    ! ! Right   ! ! Left!        !     ! +--------------------------------------++--------+-----+----+--------+-----+ ! Location                              ! !Diameter! Depth! !Diameter! Depth! +--------------------------------------++--------+-----+----+--------+-----+ ! GSV High Calf                         !!2.9     !     !    !        !     ! +--------------------------------------++--------+-----+----+--------+-----+ ! GSV Mid Calf                          !!3       !     !    !        !     ! +--------------------------------------++--------+-----+----+--------+-----+ ! GSV Ankle                             ! !4.4     !     !    !4.3     !     ! +--------------------------------------++--------+-----+----+--------+-----+ +--------------------------------------++--------+-----+----+--------+-----+ ! Superficial - Lesser Saphenous Vein   ! ! Right   ! ! Left!        !     ! +--------------------------------------++--------+-----+----+--------+-----+ ! Location                              ! !Diameter! Depth! !Diameter! Depth! to review the note for such errors, some may still exist.

## 2020-01-29 NOTE — PROGRESS NOTES
Pharmacy Vancomycin Consult     Vancomycin Day: 11  Current Dosing: pulse dose secondary to HD    Temp max:  99    Recent Labs     01/27/20  1050 01/28/20  0400   BUN 11 24*       Recent Labs     01/27/20  1050 01/28/20  0400   CREATININE 2.0* 4.4*       Recent Labs     01/27/20  0439 01/28/20  0400   WBC 4.6* 6.6         Intake/Output Summary (Last 24 hours) at 1/28/2020 1801  Last data filed at 1/28/2020 1606  Gross per 24 hour   Intake 2450.42 ml   Output 1000 ml   Net 1450.42 ml       Culture Date Source Results   01/18/20 Blood x 2 No growth to date                 Ht Readings from Last 1 Encounters:   01/18/20 5' 9\" (1.753 m)        Wt Readings from Last 1 Encounters:   01/28/20 165 lb 4.8 oz (75 kg)         Body mass index is 24.41 kg/m². Estimated Creatinine Clearance: 18 mL/min (A) (based on SCr of 4.4 mg/dL (H)). Random: 18.6    Assessment/Plan: Give Vancomycin 1 gram, Level to be drawn after next dialysis.     Electronically signed by Noni Cameron, [address] [facility] on 1/28/2020 at 6:01 PM

## 2020-01-30 LAB
ALBUMIN SERPL-MCNC: 3.7 G/DL (ref 3.5–5.2)
ALP BLD-CCNC: 90 U/L (ref 40–130)
ALT SERPL-CCNC: 12 U/L (ref 5–41)
ANION GAP SERPL CALCULATED.3IONS-SCNC: 15 MMOL/L (ref 7–19)
AST SERPL-CCNC: 21 U/L (ref 5–40)
BASOPHILS ABSOLUTE: 0 K/UL (ref 0–0.2)
BASOPHILS RELATIVE PERCENT: 0.7 % (ref 0–1)
BILIRUB SERPL-MCNC: 0.4 MG/DL (ref 0.2–1.2)
BUN BLDV-MCNC: 19 MG/DL (ref 6–20)
CALCIUM SERPL-MCNC: 8.7 MG/DL (ref 8.6–10)
CHLORIDE BLD-SCNC: 92 MMOL/L (ref 98–111)
CO2: 28 MMOL/L (ref 22–29)
CREAT SERPL-MCNC: 3.8 MG/DL (ref 0.5–1.2)
EOSINOPHILS ABSOLUTE: 0.4 K/UL (ref 0–0.6)
EOSINOPHILS RELATIVE PERCENT: 7.5 % (ref 0–5)
GFR NON-AFRICAN AMERICAN: 16
GLUCOSE BLD-MCNC: 103 MG/DL (ref 74–109)
HCT VFR BLD CALC: 28 % (ref 42–52)
HEMOGLOBIN: 8.8 G/DL (ref 14–18)
IMMATURE GRANULOCYTES #: 0 K/UL
LYMPHOCYTES ABSOLUTE: 1.1 K/UL (ref 1.1–4.5)
LYMPHOCYTES RELATIVE PERCENT: 19.5 % (ref 20–40)
MCH RBC QN AUTO: 30.4 PG (ref 27–31)
MCHC RBC AUTO-ENTMCNC: 31.4 G/DL (ref 33–37)
MCV RBC AUTO: 96.9 FL (ref 80–94)
MONOCYTES ABSOLUTE: 0.5 K/UL (ref 0–0.9)
MONOCYTES RELATIVE PERCENT: 8.9 % (ref 0–10)
NEUTROPHILS ABSOLUTE: 3.6 K/UL (ref 1.5–7.5)
NEUTROPHILS RELATIVE PERCENT: 63.2 % (ref 50–65)
PDW BLD-RTO: 16 % (ref 11.5–14.5)
PLATELET # BLD: 125 K/UL (ref 130–400)
PMV BLD AUTO: 11.8 FL (ref 9.4–12.4)
POTASSIUM SERPL-SCNC: 4.4 MMOL/L (ref 3.5–5)
RBC # BLD: 2.89 M/UL (ref 4.7–6.1)
SODIUM BLD-SCNC: 135 MMOL/L (ref 136–145)
TOTAL PROTEIN: 6.5 G/DL (ref 6.6–8.7)
WBC # BLD: 5.6 K/UL (ref 4.8–10.8)

## 2020-01-30 PROCEDURE — 2580000003 HC RX 258: Performed by: INTERNAL MEDICINE

## 2020-01-30 PROCEDURE — 6370000000 HC RX 637 (ALT 250 FOR IP): Performed by: INTERNAL MEDICINE

## 2020-01-30 PROCEDURE — 99024 POSTOP FOLLOW-UP VISIT: CPT | Performed by: SURGERY

## 2020-01-30 PROCEDURE — 1210000000 HC MED SURG R&B

## 2020-01-30 PROCEDURE — 80053 COMPREHEN METABOLIC PANEL: CPT

## 2020-01-30 PROCEDURE — 85025 COMPLETE CBC W/AUTO DIFF WBC: CPT

## 2020-01-30 PROCEDURE — 36415 COLL VENOUS BLD VENIPUNCTURE: CPT

## 2020-01-30 PROCEDURE — 6360000002 HC RX W HCPCS: Performed by: INTERNAL MEDICINE

## 2020-01-30 PROCEDURE — 6370000000 HC RX 637 (ALT 250 FOR IP): Performed by: NURSE PRACTITIONER

## 2020-01-30 RX ORDER — OXYCODONE HYDROCHLORIDE AND ACETAMINOPHEN 5; 325 MG/1; MG/1
1 TABLET ORAL EVERY 8 HOURS PRN
Status: DISCONTINUED | OUTPATIENT
Start: 2020-01-30 | End: 2020-01-31 | Stop reason: HOSPADM

## 2020-01-30 RX ADMIN — CALCIUM ACETATE 2001 MG: 667 CAPSULE ORAL at 16:20

## 2020-01-30 RX ADMIN — OXYCODONE HYDROCHLORIDE AND ACETAMINOPHEN 1 TABLET: 5; 325 TABLET ORAL at 03:25

## 2020-01-30 RX ADMIN — SEVELAMER CARBONATE 800 MG: 800 TABLET, FILM COATED ORAL at 16:20

## 2020-01-30 RX ADMIN — OXYCODONE HYDROCHLORIDE AND ACETAMINOPHEN 1 TABLET: 5; 325 TABLET ORAL at 10:31

## 2020-01-30 RX ADMIN — CLONIDINE HYDROCHLORIDE 0.3 MG: 0.1 TABLET ORAL at 20:32

## 2020-01-30 RX ADMIN — MELATONIN 3 MG: at 20:35

## 2020-01-30 RX ADMIN — SEVELAMER CARBONATE 800 MG: 800 TABLET, FILM COATED ORAL at 08:33

## 2020-01-30 RX ADMIN — OXYCODONE HYDROCHLORIDE 10 MG: 10 TABLET, FILM COATED, EXTENDED RELEASE ORAL at 00:17

## 2020-01-30 RX ADMIN — NEPHROCAP 1 MG: 1 CAP ORAL at 08:31

## 2020-01-30 RX ADMIN — MORPHINE SULFATE 2 MG: 4 INJECTION, SOLUTION INTRAMUSCULAR; INTRAVENOUS at 12:12

## 2020-01-30 RX ADMIN — MORPHINE SULFATE 2 MG: 4 INJECTION, SOLUTION INTRAMUSCULAR; INTRAVENOUS at 06:11

## 2020-01-30 RX ADMIN — ATORVASTATIN CALCIUM 40 MG: 40 TABLET, FILM COATED ORAL at 20:32

## 2020-01-30 RX ADMIN — BACITRACIN ZINC NEOMYCIN SULFATE POLYMYXIN B SULFATE: 400; 3.5; 5 OINTMENT TOPICAL at 08:38

## 2020-01-30 RX ADMIN — Medication 10 ML: at 08:34

## 2020-01-30 RX ADMIN — SEVELAMER CARBONATE 800 MG: 800 TABLET, FILM COATED ORAL at 12:12

## 2020-01-30 RX ADMIN — METOPROLOL SUCCINATE 25 MG: 25 TABLET, EXTENDED RELEASE ORAL at 08:33

## 2020-01-30 RX ADMIN — OXYCODONE HYDROCHLORIDE 10 MG: 10 TABLET, FILM COATED, EXTENDED RELEASE ORAL at 08:32

## 2020-01-30 RX ADMIN — CLONIDINE HYDROCHLORIDE 0.3 MG: 0.1 TABLET ORAL at 08:34

## 2020-01-30 RX ADMIN — CALCIUM ACETATE 2001 MG: 667 CAPSULE ORAL at 08:31

## 2020-01-30 RX ADMIN — OXYCODONE HYDROCHLORIDE 10 MG: 10 TABLET, FILM COATED, EXTENDED RELEASE ORAL at 16:20

## 2020-01-30 RX ADMIN — OXYCODONE HYDROCHLORIDE AND ACETAMINOPHEN 1 TABLET: 5; 325 TABLET ORAL at 19:43

## 2020-01-30 RX ADMIN — CALCIUM ACETATE 2001 MG: 667 CAPSULE ORAL at 12:12

## 2020-01-30 RX ADMIN — AMLODIPINE BESYLATE 10 MG: 5 TABLET ORAL at 08:34

## 2020-01-30 RX ADMIN — CLOPIDOGREL BISULFATE 75 MG: 75 TABLET ORAL at 08:32

## 2020-01-30 RX ADMIN — ASPIRIN 81 MG: 81 TABLET, COATED ORAL at 08:31

## 2020-01-30 RX ADMIN — ISOSORBIDE MONONITRATE 30 MG: 30 TABLET, EXTENDED RELEASE ORAL at 08:33

## 2020-01-30 RX ADMIN — MINOXIDIL 10 MG: 10 TABLET ORAL at 08:33

## 2020-01-30 RX ADMIN — BACITRACIN ZINC NEOMYCIN SULFATE POLYMYXIN B SULFATE: 400; 3.5; 5 OINTMENT TOPICAL at 20:42

## 2020-01-30 RX ADMIN — METOPROLOL SUCCINATE 25 MG: 25 TABLET, EXTENDED RELEASE ORAL at 20:32

## 2020-01-30 ASSESSMENT — PAIN DESCRIPTION - ORIENTATION
ORIENTATION: RIGHT

## 2020-01-30 ASSESSMENT — PAIN DESCRIPTION - PAIN TYPE
TYPE: SURGICAL PAIN

## 2020-01-30 ASSESSMENT — PAIN SCALES - GENERAL
PAINLEVEL_OUTOF10: 8
PAINLEVEL_OUTOF10: 8
PAINLEVEL_OUTOF10: 9
PAINLEVEL_OUTOF10: 9
PAINLEVEL_OUTOF10: 7
PAINLEVEL_OUTOF10: 9
PAINLEVEL_OUTOF10: 0
PAINLEVEL_OUTOF10: 9
PAINLEVEL_OUTOF10: 8
PAINLEVEL_OUTOF10: 9
PAINLEVEL_OUTOF10: 8
PAINLEVEL_OUTOF10: 8
PAINLEVEL_OUTOF10: 6
PAINLEVEL_OUTOF10: 0
PAINLEVEL_OUTOF10: 9

## 2020-01-30 ASSESSMENT — PAIN DESCRIPTION - FREQUENCY
FREQUENCY: CONTINUOUS

## 2020-01-30 ASSESSMENT — PAIN DESCRIPTION - PROGRESSION
CLINICAL_PROGRESSION: NOT CHANGED

## 2020-01-30 ASSESSMENT — PAIN DESCRIPTION - LOCATION
LOCATION: GROIN;LEG

## 2020-01-30 ASSESSMENT — PAIN DESCRIPTION - ONSET
ONSET: ON-GOING

## 2020-01-30 ASSESSMENT — PAIN DESCRIPTION - DIRECTION
RADIATING_TOWARDS: RIGHT FOOT

## 2020-01-30 ASSESSMENT — PAIN DESCRIPTION - DESCRIPTORS
DESCRIPTORS: BURNING;THROBBING;TIGHTNESS
DESCRIPTORS: BURNING;THROBBING
DESCRIPTORS: BURNING;THROBBING;TIGHTNESS

## 2020-01-30 NOTE — PROGRESS NOTES
Nephrology ([address]) Progress Note    Patient:  Aniyah Pickens  YOB: 1960  Date of Service: 1/30/2020  MRN: [id number]   Acct: [de-identified]   Primary Care Physician: Raji Martines DO  Advance Directive: Full Code  Admit Date: 1/18/2020       Hospital Day: 12  Referring Provider: Hussain Berkowitz MD    Patient independently seen and examined, Chart, Consults, Notes, Operative notes, Labs, Cardiology, and Radiology studies reviewed as able. Chief complaint: End-stage renal disease. Subjective:  Aniyah Pickens is a 61 y.o. male  whom we were consulted for end-stage renal disease. Patient goes to [address] [place] [address] dialysis clinic on Monday Wednesday Friday. Patient has a known history of severe peripheral vascular disease, history of CABG, congestive heart failure, cirrhosis of liver, sleep apnea and hepatitis C. Admitted this time for cellulitis. Patient presented with severe peripheral vascular disease and right foot pain. On January 24, he underwent arteriogram and intervention. On January 27, he had repeat arteriogram and angioplasty/endarterectomy of multiple arteries to restore blood flow to his right leg. Postprocedure patient was moved to the ICU. He is now moved back to regular floor. This morning patient is feeling well and has no complaint. He is hoping to go home today. Allergies:  Patient has no known allergies.     Medicines:  Current Facility-Administered Medications   Medication Dose Route Frequency Provider Last Rate Last Dose    vancomycin (VANCOCIN) intermittent dosing (placeholder)   Other RX Placeholder DO Franchesca        [START ON 2/2/2020] darbepoetin sandrine-polysorbate (ARANESP) injection 25 mcg  25 mcg Subcutaneous Weekly Kacie Chaudhary MD        acetaminophen (TYLENOL) tablet 650 mg  650 mg Oral Q6H PRN Samson Osman DO        ondansetron [facility]) injection 4 mg  4 mg Intravenous Q6H PRN SamsonDO Matthew Manning 0.9 % sodium chloride infusion   Intravenous Continuous Zoila De DO   Stopped at 01/28/20 7542    morphine injection 2 mg  2 mg Intravenous Q2H PRN Zoila De DO   2 mg at 01/30/20 4334    calcium acetate (PHOSLO) capsule 2,001 mg  2,001 mg Oral TID WC Zoila De DO   2,001 mg at 01/30/20 0831    neomycin-bacitracin-polymyxin (NEOSPORIN) ointment   Topical BID J Carlos Tovar MD        oxyCODONE-acetaminophen (PERCOCET) 5-325 MG per tablet 1 tablet  1 tablet Oral Q4H PRN Zoila De DO   1 tablet at 01/30/20 8643    b complex-C-folic acid (NEPHROCAPS) capsule 1 mg  1 capsule Oral Daily Zoila De DO   1 mg at 01/30/20 0831    amLODIPine (NORVASC) tablet 10 mg  10 mg Oral Daily Zoila De DO   10 mg at 01/30/20 1412    aspirin EC tablet 81 mg  81 mg Oral Daily Zoila De DO   81 mg at 01/30/20 0831    atorvastatin (LIPITOR) tablet 40 mg  40 mg Oral Nightly Zoila De DO   40 mg at 01/29/20 2052    cloNIDine (CATAPRES) tablet 0.3 mg  0.3 mg Oral TID Zoila De DO   0.3 mg at 01/30/20 8290    clopidogrel (PLAVIX) tablet 75 mg  75 mg Oral Daily Zoila De DO   75 mg at 01/30/20 6695    isosorbide mononitrate (IMDUR) extended release tablet 30 mg  30 mg Oral Daily Zoila De DO   30 mg at 01/30/20 8072    lactulose (CHRONULAC) 10 GM/15ML solution 10 g  10 g Oral TID Zoila De DO   10 g at 01/26/20 2114    melatonin tablet 3 mg  3 mg Oral Nightly PRN Zoila De DO   3 mg at 01/29/20 2052    metoprolol succinate (TOPROL XL) extended release tablet 25 mg  25 mg Oral BID Zoila De DO   25 mg at 01/30/20 8451    minoxidil (LONITEN) tablet 10 mg  10 mg Oral Daily Zoila De DO   10 mg at 01/30/20 0316    oxyCODONE (OXYCONTIN) extended release tablet 10 mg  10 mg Oral Q8H PRN Zoila De DO   10 mg at 01/30/20 6613    sucralfate (CARAFATE) tablet 1 g 1 g Oral TID PRN Alejandro Notch, DO        sevelamer (RENVELA) tablet 800 mg  800 mg Oral TID WC Alejandro Notch, DO   800 mg at 01/30/20 5431    sodium chloride flush 0.9 % injection 10 mL  10 mL Intravenous 2 times per day Alejandro Notch, DO   10 mL at 01/30/20 0030    sodium chloride flush 0.9 % injection 10 mL  10 mL Intravenous PRN Alejandro Notch, DO   10 mL at 01/29/20 0041       Past Medical History:  Past Medical History:   Diagnosis Date    Anxiety     Blood circulation, collateral     CAD (coronary artery disease)     stents x 2; per dr. [facility])     liver cancer    [facility]estive [facility] failure) ([facility]     Chyloperitoneum determined by paracentesis     CKD (chronic kidney disease), stage V ([address]     Dialysis patient ([address]     mon wed fri at [place] GERD (gastroesophageal reflux disease)     Hemodialysis patient ([facility]     mon wed fri in [place] Hepatic cirrhosis ([facility]     dr. Ashley Orr; has been going [facility] for liver and kidney transplant list.    Hepatitis C, chronic ([facility]     History of blood transfusion     HTN (hypertension)     Hx of blood clots     arm/fistula    Mixed hyperlipidemia 1/9/2017    Osteoarthritis     Pacemaker     Palliative care patient 07/09/2019    PVD (peripheral vascular disease) ([address]     Sleep apnea     no cpap at present.  Type II diabetes mellitus (HCC)     no meds. Past Surgical History:  Past Surgical History:   Procedure Laterality Date    ANGIOPLASTY  08/09/11 SUKHWINDER GERARDO cephalic vein balloon angioplasty with 7mm x 4cm balloon. coild embolization of 2 cephallic vein brances with 3mm x 5cm coils    CARDIAC CATHETERIZATION  04/04/11    cardiac cath and stent x1 by Dr. Adriane Kilgore  07/26/11 SUKHWINDER GERARDO AV fistula. coild embolization of cephalic vein branch near the wrist with 3mm EMBO coils.  balloon a'plasty left cephalic vein with [id number] balloon and then [id number] balloon    DIALYSIS aortogram, mynx left CFA , failed mynx right CFA.  VASCULAR SURGERY  06/13/2019    SJS left CFA 5f sheath, aortogram with bilateral lower arteriogram, mynx left CFA.  VASCULAR SURGERY  06/25/2019    SJS-VI. Femoral tibial/perineal bypass with reversed greater saphenous vein.  VASCULAR SURGERY  08/16/2019    TJR. Aortogram and runoff, selective right CFA injections. PTA of fem-tp bypass with 4.0 x 220 mm tod balloon to 4.33 mm    VASCULAR SURGERY  10/23/2019    VI. Thrombin injection in the left SFA PSA, right common femoral artery us guided access, left SFA stent treatment of the flap.        Family History  Family History   Problem Relation Age of Onset    High Blood Pressure Mother     High Blood Pressure Father     High Blood Pressure Sister        Social History  Social History     Socioeconomic History    Marital status:      Spouse name: Kristyn Lindsay    Number of children: 0    Years of education: 15    Highest education level: Not on file   Occupational History    Occupation:    Social Needs    Financial resource strain: Not on file    Food insecurity:     Worry: Not on file     Inability: Not on file    Transportation needs:     Medical: Not on file     Non-medical: Not on file   Tobacco Use    Smoking status: Current Every Day Smoker     Packs/day: 0.25     Years: 45.00     Pack years: 11.25     Types: Cigarettes    Smokeless tobacco: Never Used    Tobacco comment: about to  quit down to 1 a day cigarettes   Substance and Sexual Activity    Alcohol use: No    Drug use: Not Currently     Types: Marijuana, Cocaine, Methamphetamines, IV, Opiates , Other-see comments     Comment: in the 1970s, LSD    Sexual activity: Yes     Partners: Female     Comment: has a wife   Lifestyle    Physical activity:     Days per week: Not on file     Minutes per session: Not on file    Stress: Not on file   Relationships    Social connections:     Talks on phone: Not on file Gets together: Not on file     Attends Christian service: Not on file     Active member of club or organization: Not on file     Attends meetings of clubs or organizations: Not on file     Relationship status: Not on file    Intimate partner violence:     Fear of current or ex partner: Not on file     Emotionally abused: Not on file     Physically abused: Not on file     Forced sexual activity: Not on file   Other Topics Concern    Not on file   Social History Narrative    CODE STATUS: Full Code    HEALTH CARE PROXY: Mrs. Leatha Cormier, [phone]    Back up: his Mother, Mrs. Connor Izquierdo, [phone]    AMBULATES: independantly    DOMICILED: lives in a private house with 2 steps to get in, has no stairs inside, lives with wife and step children, has 3 pets         Review of Systems:  History obtained from chart review and the patient  General ROS: No fever or chills  Respiratory ROS: No cough, shortness of breath, wheezing  Cardiovascular ROS: No chest pain or palpitations  Gastrointestinal ROS: No abdominal pain or melena  Genito-Urinary ROS: No dysuria or hematuria  Musculoskeletal ROS: No joint pain or swelling         Objective:  Patient Vitals for the past 24 hrs:   BP Temp Temp src Pulse Resp SpO2   01/30/20 0541 (!) 155/58 98.4 °F (36.9 °C) -- 59 18 97 %   01/30/20 0004 (!) 177/61 98.6 °F (37 °C) Temporal 60 16 93 %   01/29/20 1815 (!) 162/58 97.6 °F (36.4 °C) Temporal 68 16 96 %   01/29/20 1429 (!) 149/53 96.5 °F (35.8 °C) -- 63 18 91 %   01/29/20 1332 (!) 148/54 98.2 °F (36.8 °C) -- 60 12 95 %       Intake/Output Summary (Last 24 hours) at 1/30/2020 0902  Last data filed at 1/30/2020 0534  Gross per 24 hour   Intake 1640 ml   Output 3000 ml   Net -1360 ml     General: awake/alert   HEENT: Normocephalic atraumatic head  Neck: Supple with no JVD or carotid bruits.   Chest:  clear to auscultation bilaterally without respiratory distress  CVS: regular rate and rhythm  Abdominal: soft, nontender, ----------------------------------------------------------------  Velocities are measured in cm/s ; Diameters are measured in mm Right Lower Extremities DVT Study Measurements Right 2D Measurements +------------------------------------+----------+---------------+----------+ ! Location                            ! Visualized! Compressibility! Thrombosis! +------------------------------------+----------+---------------+----------+ ! Sapheno Femoral Junction            ! Yes       ! Yes            ! None      ! +------------------------------------+----------+---------------+----------+ ! Common Femoral                      !Yes       ! Yes            ! None      ! +------------------------------------+----------+---------------+----------+ ! Prox Femoral                        !Yes       ! Yes            ! None      ! +------------------------------------+----------+---------------+----------+ ! Mid Femoral                         !Yes       ! Yes            ! None      ! +------------------------------------+----------+---------------+----------+ ! Dist Femoral                        !Yes       ! Yes            ! None      ! +------------------------------------+----------+---------------+----------+ ! Deep Femoral                        !Yes       ! Yes            ! None      ! +------------------------------------+----------+---------------+----------+ ! Popliteal                           !Yes       ! Yes            ! None      ! +------------------------------------+----------+---------------+----------+ ! SSV                                 ! Yes       ! Yes            ! None      ! +------------------------------------+----------+---------------+----------+ ! Gastroc                             ! Yes       ! Yes            ! None      ! +------------------------------------+----------+---------------+----------+ ! PTV                                 ! Yes       ! Yes            ! None      ! +------------------------------------+----------+---------------+----------+ ! GSV                                 ! Yes       ! Yes            ! None      ! +------------------------------------+----------+---------------+----------+ ! ATV                                 ! Yes       ! Yes            ! None      ! +------------------------------------+----------+---------------+----------+ ! Peroneal                            !Yes       ! Yes            ! None      ! +------------------------------------+----------+---------------+----------+ Left Lower Extremities DVT Study Measurements Left 2D Measurements +------------------------------------+----------+---------------+----------+ ! Location                            ! Visualized! Compressibility! Thrombosis! +------------------------------------+----------+---------------+----------+ ! Sapheno Femoral Junction            ! Yes       ! Yes            ! None      ! +------------------------------------+----------+---------------+----------+ ! Common Femoral                      !Yes       ! Yes            ! None      ! +------------------------------------+----------+---------------+----------+       Assessment   1. End-stage renal disease/on maintenance hemodialysis. 2.  Severe peripheral vascular disease. 3.  Status post right lower extremity bypass surgery. 4.  Type II diabetic nephropathy. 5.  Cirrhosis of liver/hepatitis C.  6.  Anemia of chronic kidney disease. 7.  Chronic diastolic congestive heart failure. 8.  Hyperkalemia. 9.  Hyponatremia. 10.  Secondary hyperparathyroidism. Plan:  1. Tolerating hemodialysis very well continue wound care. 2.  Counseling about low potassium diet. 3. possible discharge soon.       Chandan Garcia MD  01/30/20  9:02 AM

## 2020-01-30 NOTE — PROGRESS NOTES
[address]    Daily HOSPITAL Progress Note                            Date:  1/28/20  Patient: Davida Heredia  Admission:  1/18/2020  6:22 PM  Admit DX: Cellulitis of right lower extremity without foot [L03.115]  Age:  61 y.o., 1960        Date of Admission 1/18/2020  6:22 PM   Hospital Length of Stay  LOS: 11 days            I personally saw the patient and rounded with:  Tony Trujillo RN on 1/28/20      The observations documented in this note, including the assessment and plan are mine         Reason for initial evaluation or the patient's initial complaint    1. Reason for Hospital Admission: PVD        2. Reason for Cardiology Consultation: Cardiology clearance         SUBJECTIVE:      Chief Complaint / Reason for the Visit   Follow up of:  PVD and severe CAD and systemic arterial hypertension    Family present and in room during examination:  Yes: a female      Specialty Problems        Cardiology Problems    CAD (coronary artery disease)        Chronic diastolic congestive heart failure ([address]        Essential hypertension        CHF (congestive heart failure) (HCC)        Chronic deep vein thrombosis (DVT) of axillary vein of left upper extremity (HCC)        Steal syndrome as complication of dialysis access ([address]        Steal syndrome of dialysis vascular access [facility])        Atherosclerosis of artery of extremity with ulceration (HCC)        Atherosclerosis of artery of extremity with rest pain (HCC)        PVD (peripheral vascular disease) ([address]        Failing vascular bypass graft        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 Davida Heredia is generally feeling unchanged.   S/p surgery on the right leg    Mr. Davida Heredia has the following cardiac complaints / symptoms today:    1. PVD, as noted    2. CAD, doing ok after the surgery    3.  Hypertension    The blood pressure for the lastr 36 hours has been:  Systolic (85PMK), PSX:351 , Min:98 , EZS:652    Diastolic (41OCS), UZS:15, Min:40, Max:65        Bryant Grey is a 61 y.o. male with the following history as recorded in [facility]:    Patient Active Problem List    Diagnosis Date Noted    Atherosclerosis of native arteries of extremities with rest pain, right leg ([address] 01/22/2020     Priority: Low    Right leg pain      Priority: Low    Cellulitis of right lower extremity      Priority: Low    Cellulitis of right lower extremity without foot 01/18/2020     Priority: Low    NSTEMI (non-ST elevated myocardial infarction) ([address] 12/11/2019     Priority: Low    Stenosis of artery of right lower extremity ([address] 10/23/2019     Priority: Low    Hematoma 10/22/2019     Priority: Low    Skin ulcer of right heel with fat layer exposed ([address] 08/16/2019     Priority: Low    Ulcer of right foot, with fat layer exposed ([address] 08/16/2019     Priority: Low    Failing vascular bypass graft 08/14/2019     Priority: Low    Non-healing non-surgical wound 07/25/2019     Priority: Low    Knee pain, right 07/25/2019     Priority: Low    Effusion of knee joint right      Priority: Low    Sepsis ([address]      Priority: Low    Wound infection      Priority: Low    Palliative care patient 07/09/2019     Priority: Low    Iatrogenic complication of vascular surgery 07/08/2019     Priority: Low    Wound infection after surgery 07/08/2019     Priority: Low    Hyponatremia 07/08/2019     Priority: Low    Normocytic anemia 07/08/2019     Priority: Low    Thrombocytopenia ([address] 07/08/2019     Priority: Low    Pacemaker      Priority: Low    Hepatitis C, chronic (HCC)      Priority: Low    PVD (peripheral vascular disease) ([address]      Priority: Low    Anemia, chronic disease 06/26/2019     Priority: Low  Anticoagulated by anticoagulation treatment 07/16/2018     Priority: Low    Tobacco abuse 03/15/2018     Priority: Low    Atherosclerosis of artery of extremity with rest pain ([address] 02/28/2018     Priority: Low    Ulcer of left heel, with fat layer exposed ([address] 02/28/2018     Priority: Low    Atherosclerosis of artery of extremity with ulceration ([address] 02/15/2018     Priority: Low    Steal syndrome of dialysis vascular access ([address] 01/30/2018     Priority: Low    Steal syndrome as complication of dialysis access ([address] 01/25/2018     Priority: Low    ESRD on dialysis [facility])      Priority: Low    Colon polyps 01/22/2018     Priority: Low    Hepatocellular carcinoma ([address] 01/22/2018     Priority: Low    Chronic deep vein thrombosis (DVT) of axillary vein of left upper extremity ([address] 01/20/2017     Priority: Low    Mixed hyperlipidemia 01/09/2017     Priority: Low    Spontaneous bacterial peritonitis ([address] 12/12/2016     Priority: Low    CHF (congestive heart failure) ([address] 11/18/2016     Priority: Low    Dialysis patient ([address] 11/18/2016     Priority: Low    Alcoholic cirrhosis of liver with ascites ([address] 09/02/2016     Priority: Low    Osteoarthritis      Priority: Low    Chronic diastolic congestive heart failure (HCC)      Priority: Low    CAD (coronary artery disease)      Priority: Low    Essential hypertension      Priority: Low    Liver disease      Priority: Low     Current Facility-Administered Medications   Medication Dose Route Frequency Provider Last Rate Last Dose    vancomycin (VANCOCIN) intermittent dosing (placeholder)   Other RX Placeholder Carmen Torres DO        [START ON 2/2/2020] darbepoetin sandrine-polysorbate (ARANESP) injection 25 mcg  25 mcg Subcutaneous Weekly Patricia Arroyo MD        acetaminophen (TYLENOL) tablet 650 mg  650 mg Oral Q6H PRN Kev Tello,         [facility]dansetron [facility]) injection 4 mg  4 mg Intravenous Q6H PRN Kev Barraganch, DO  0.9 % sodium chloride infusion   Intravenous Continuous Lenor Goodpasture, DO   Stopped at 01/28/20 2893    morphine injection 2 mg  2 mg Intravenous Q2H PRN Lenor Goodpasture, DO   2 mg at 01/29/20 1857    calcium acetate (PHOSLO) capsule 2,001 mg  2,001 mg Oral TID WC Lenor Goodpasture, DO   2,001 mg at 01/29/20 1724    neomycin-bacitracin-polymyxin (NEOSPORIN) ointment   Topical BID Kenan Simpson MD        oxyCODONE-acetaminophen (PERCOCET) 5-325 MG per tablet 1 tablet  1 tablet Oral Q4H PRN Lenor Goodpasture, DO   1 tablet at 01/29/20 1029    b complex-C-folic acid (NEPHROCAPS) capsule 1 mg  1 capsule Oral Daily Lenor Goodpasture, DO   1 mg at 01/29/20 1345    amLODIPine (NORVASC) tablet 10 mg  10 mg Oral Daily Lenor Goodpasture, DO   10 mg at 01/29/20 1346    aspirin EC tablet 81 mg  81 mg Oral Daily Lenor Goodpasture, DO   81 mg at 01/29/20 1345    atorvastatin (LIPITOR) tablet 40 mg  40 mg Oral Nightly Lenor Goodpasture, DO   40 mg at 01/28/20 2058    cloNIDine (CATAPRES) tablet 0.3 mg  0.3 mg Oral TID Lenor Goodpasture, DO   Stopped at 01/28/20 1005    clopidogrel (PLAVIX) tablet 75 mg  75 mg Oral Daily Lenor Goodpasture, DO   75 mg at 01/29/20 1345    isosorbide mononitrate (IMDUR) extended release tablet 30 mg  30 mg Oral Daily Lenor Goodpasture, DO   30 mg at 01/29/20 1345    lactulose (CHRONULAC) 10 GM/15ML solution 10 g  10 g Oral TID Lenor Goodpasture, DO   10 g at 01/26/20 2114    melatonin tablet 3 mg  3 mg Oral Nightly PRN Lenor Goodpasture, DO   3 mg at 01/28/20 2058    metoprolol succinate (TOPROL XL) extended release tablet 25 mg  25 mg Oral BID Lenor Goodpasture, DO   25 mg at 01/29/20 1345    minoxidil (LONITEN) tablet 10 mg  10 mg Oral Daily Lenor Goodpasture, DO   10 mg at 01/29/20 1345    oxyCODONE (OXYCONTIN) extended release tablet 10 mg  10 mg Oral Q8H PRN Lenor Goodpasture, DO   10 mg at 01/29/20 1623    sucralfate (CARAFATE) tablet 1 g  1 g Oral TID PRN Revonda Plate, DO        sevelamer (RENVELA) tablet 800 mg  800 mg Oral TID WC Revonda Plate, DO   800 mg at 01/29/20 1724    sodium chloride flush 0.9 % injection 10 mL  10 mL Intravenous 2 times per day Revonda Plate, DO   10 mL at 01/29/20 1349    sodium chloride flush 0.9 % injection 10 mL  10 mL Intravenous PRN Revonda Plate, DO   10 mL at 01/29/20 0041     Allergies: Patient has no known allergies. Past Medical History:   Diagnosis Date    Anxiety     Blood circulation, collateral     CAD (coronary artery disease)     stents x 2; per dr. Aparna [facility])     liver cancer    [facility]) [facility]     Chyloperitoneum determined by paracentesis     CKD (chronic kidney disease), stage V ([facility]     Dialysis patient ([facility]     mon wed fri at [place] GERD (gastroesophageal reflux disease)     Hemodialysis patient ([facility]     mon wed fri in [place] Hepatic cirrhosis ([facility]     dr. Kirill Galindo; has been going to [facility] for liver and kidney transplant list.    Hepatitis C, chronic ([facility]     History of blood transfusion     HTN (hypertension)     Hx of blood clots     arm/fistula    Mixed hyperlipidemia 1/9/2017    Osteoarthritis     Pacemaker     Palliative care patient 07/09/2019    PVD (peripheral vascular disease) ([facility]     Sleep apnea     no cpap at present.  Type II diabetes mellitus (HCC)     no meds. Past Surgical History:   Procedure Laterality Date    ANGIOPLASTY  08/09/11 SUKHWINDER GERARDO cephalic vein balloon angioplasty with 7mm x 4cm balloon. coild embolization of 2 cephallic vein brances with 3mm x 5cm coils    CARDIAC CATHETERIZATION  04/04/11    cardiac cath and stent x1 by Dr. Nataly Walsh  07/26/11 SUKHWINDER GERARDO AV fistula. coild embolization of cephalic vein branch near the wrist with 3mm EMBO coils.  balloon a'plasty left cephalic vein with [id number] balloon and then [id number] balloon    DIALYSIS FISTULA CREATION Left 2/16/2017    LEFT UPPER EXTREMITY BRACHIAL / AXILLARY GRAFT AND STENT LEFT SUBCLAVIAN VEIN  performed by Anita Ren MD at [address] Right 7/12/2019    RIGHT LEG WOUND WASHOUT performed by Ирина Batista MD at [address] [facility] Right 1/27/2020    REVISION OF RIGHT LEG BYPASS GRAFT performed by Ирина Batista MD at [address] [facility] FEMORAL-TIBIAL BYPASS GRAFT Right 6/25/2019    FEMORAL TIBIAL/PERINEAL BYPASS WITH REVERSED GREATER SAPHENOUS VEIN performed by Anita Ren MD at [address] [facility]  12/26/2010    Right internal jugular vein tunneled dialysis catheter placement    OTHER SURGICAL HISTORY  [id number]    Redo of dialysis catheter    OTHER SURGICAL HISTORY  9/6/2011   [facility]    Removal of RT IJV tunneled dialysis cath    OTHER SURGICAL HISTORY  5/22/12   [facility]    Ultrasound-guided cannulation of left cephalic vein in the mid forearm toward the AV anastomosis, Left upper  ext. fistulograms including venograms of the SVC, Left cephalic vein balloon angioplasty with a 4mm x 100mm Tod balloon, Completion fistulograms    PACEMAKER PLACEMENT      medtronic    PARACENTESIS      multiple/recent; [place] dr. [place] [address] [facility] Left 1/30/2018    UPPER EXTREMITY DRIL PROCEDURE WITH LEFT SAPHENOUS VEIN HARVESTING performed by Anita Ren MD at [address] [facility]  6/19/12    [facility] arteriovenous fistulogram including venography of the superior vena cava. Balloon angioplasty, left forearm cephalic vein and upper arm cephalic vein with [id number] tod balloon.  VASCULAR SURGERY  7/10/2012 [facility]     Revision of left distal radial artery to cephalic vein arteriovenous fistula with 7mm Artegraft interposition.  VASCULAR SURGERY  7/30/15 [facility] [address]    Left upper extremity arteriovenous fistulograms including venography of the superior vena cava. Left cephalic vein balloon angioplasty with completion aortogram, mynx left CFA , failed mynx right CFA.  VASCULAR SURGERY  06/13/2019    [facility] left CFA 5f sheath, aortogram with bilateral lower arteriogram, mynx left CFA.  VASCULAR SURGERY  06/25/2019    [facility]. Femoral tibial/perineal bypass with reversed greater saphenous vein.  VASCULAR SURGERY  08/16/2019    [facility]. Aortogram and runoff, selective right CFA injections. PTA of fem-tp bypass with 4.0 x 220 mm tod balloon to 4.33 mm    VASCULAR SURGERY  10/23/2019    VI. Thrombin injection in the left SFA PSA, right common femoral artery us guided access, left SFA stent treatment of the flap.      Family History   Problem Relation Age of Onset    High Blood Pressure Mother     High Blood Pressure Father     High Blood Pressure Sister      Social History     Tobacco Use    Smoking status: Current Every Day Smoker     Packs/day: 0.25     Years: 45.00     Pack years: 11.25     Types: Cigarettes    Smokeless tobacco: Never Used    Tobacco comment: about to  quit down to 1 a day cigarettes   Substance Use Topics    Alcohol use: No          Review of Systems:    General:      Complaint / Symptom Yes / No / Description if Yes       Fatigue Yes:  chronic   Weight gain NA   Insomnia NA       Respiratory:        Complaint / Symptom Yes / No / Description if Yes       Cough No   Horseness NA       Cardiovascular:    Complaint / Symptom Yes / No / Description if Yes       Chest Pain No   Shortness of Air / Orthopnea Yes: chronic and stable   Presyncope / Syncope No   Palpitations No         Objective:    BP (!) 162/58   Pulse 68   Temp 97.6 °F (36.4 °C) (Temporal)   Resp 16   Ht 5' 9\" (1.753 m)   Wt 169 lb 3 oz (76.7 kg)   SpO2 96%   BMI 24.98 kg/m² ,     Intake/Output Summary (Last 24 hours) at 1/29/2020 1901  Last data filed at 1/29/2020 1848  Gross per 24 hour   Intake 1640 ml   Output 3000 ml   Net -1360 ml       GENERAL - well developed and well nourished, is an active participant in this circumflex, 80% OM2, o/w luminals, normal LVFX    No Continue current medications:     Yes:                  3. Concern regarding systemic blood pressure Initial presentation during this evaluation The blood pressure for the lastr 36 hours has been:  Systolic (46WTJ), PSP:054 , Min:98 , CHK:268    Diastolic (96MAB), EOW:20, Min:40, Max:65             No Continue current medications:        yes         CONSIDERATIONS, THOUGHTS, AND PLANS:    1. Continue present medications except for changes as noted above  2. Continue to monitor rhythm  3. Further orders per clinical course. 4. Doing ok after surgery  5. Will sign off, please call if needed           Discussed with patient and family and nursing.     Electronically signed by Kat Cadet MD on 1/29/20        [facility]

## 2020-01-30 NOTE — PROGRESS NOTES
Progress Note    Date:1/30/2020       WRNL:[id number]  Patient Paco Tao     Date of Birth:[birth year]     Age:59 y.o. Subjective   Interval History Status: not changed. Patient seen and examined this afternoon, spouse present at the bedside. Patient resting well in bed states right lower foot pain has improved. Patient currently postop day #3 status post endarterectomy the right common femoral artery, proximal superficial femoral artery and profundus femoris artery. Currently denies any headache, chest pain, shortness of breath, abdominal pain. Cumulative hospital course:  80-year-old male presenting for right lower extremity pain, swelling and warmth admitted for cellulitis of the right lower extremity. Vascular surgery consulted on admission. Nephrology consulted for continuation of dialysis. Patient found to have occlusion of previous vascular graft with planned revision with vascular surgery on Thursday, 1/23/2020. Patient has a history of left main disease - cardiology consulted -patient deemed high risk for intervention,  status post endarterectomy the right common femoral artery, proximal superficial femoral artery and profundus femoris artery completed on 1/27/2020. Plans for patient to dialyze 1/31/2020 and discharged home after. Review of Systems   Review of Systems   ROS: 14 point review of systems is negative except as specifically addressed above.     Medications   Scheduled Meds:    vancomycin (VANCOCIN) intermittent dosing (placeholder)   Other RX Placeholder    [START ON 2/2/2020] darbepoetin sandrine-polysorbate  25 mcg Subcutaneous Weekly    calcium acetate  2,001 mg Oral TID WC    neomycin-bacitracin-polymyxin   Topical BID    b complex-C-folic acid  1 capsule Oral Daily    amLODIPine  10 mg Oral Daily    aspirin  81 mg Oral Daily    atorvastatin  40 mg Oral Nightly    cloNIDine  0.3 mg Oral TID    clopidogrel  75 mg Oral Daily    isosorbide mononitrate  30 mg Study:Wound Lower Extremity (Right). Risk Factors   - The patient's risk factor(s) include: diabetes mellitus, arterial     hypertension and prior MI . Allergies   - No known allergies. Impression   Right lower extremity arterial duplex exam performed. There is minimal  plaque in the right CFA. There is a severe stenosis in the profunda  femoral artery. The native SFA is occluded. The femoral to tibial peroneal  bypass is patent, however, it is diffusely narrowed. \"failing\" bypass. No  flow is seen in the PTA, only very slow, minimal arterial flow is seen in  the anterior tibial and peroneal artery of the right leg. Signature   ----------------------------------------------------------------  Electronically signed by Rosa LIMInterpreting  physician) on 01/20/2020 03:45 PM  ----------------------------------------------------------------  Grafts   - The graft originated from the Right Common Femoral to the Right     Popliteal. +--------------------------------------+----+---+-----+--------------------+ ! Location                              ! PSV ! EDV! Ratio!% Stenosis          ! +--------------------------------------+----+---+-----+--------------------+ ! Prox Anastomosis                      ! 240 ! 54 !     !                    ! +--------------------------------------+----+---+-----+--------------------+ ! Prox Graft                            ! 597 !152!2.49 !                    ! +--------------------------------------+----+---+-----+--------------------+ ! Mid Graft                             !10  !4  !0.02 !                    ! +--------------------------------------+----+---+-----+--------------------+ ! Dist Graft                            !10  !5  !1    !                    ! +--------------------------------------+----+---+-----+--------------------+ Comments: Distal anastomosis cannot be imaged due to open wound. . Velocities are measured in cm/s ; Diameters are measured in mm LE Duplex arterial     hypertension and prior MI . Allergies   - No known allergies. Impression   Bilateral lower extremity saphenous vein mapping performed. Veins are  patent with measurements noted. Right greater saphenous vein harvested thigh and groin. Left greater saphenous vein harvested all the way to the ankle. Signature   ----------------------------------------------------------------  Electronically signed by Renu Livingston MD(Interpreting  physician) on 01/21/2020 11:16 AM  ----------------------------------------------------------------  Velocities are measured in cm/s ; Diameters are measured in mm +--------------------------------------++--------+-----+----+--------+-----+ ! Superficial - Great Saphenous Vein    ! ! Right   ! ! Left!        !     ! +--------------------------------------++--------+-----+----+--------+-----+ ! Location                              ! !Diameter! Depth! !Diameter! Depth! +--------------------------------------++--------+-----+----+--------+-----+ ! GSV High Calf                         !!2.9     !     !    !        !     ! +--------------------------------------++--------+-----+----+--------+-----+ ! GSV Mid Calf                          !!3       !     !    !        !     ! +--------------------------------------++--------+-----+----+--------+-----+ ! GSV Ankle                             ! !4.4     !     !    !4.3     !     ! +--------------------------------------++--------+-----+----+--------+-----+ +--------------------------------------++--------+-----+----+--------+-----+ ! Superficial - Lesser Saphenous Vein   ! ! Right   ! ! Left!        !     ! +--------------------------------------++--------+-----+----+--------+-----+ ! Location                              ! !Diameter! Depth! !Diameter! Depth! +--------------------------------------++--------+-----+----+--------+-----+ ! SSV High Calf                         !!2.9     !     !    !3.2     ! ! +--------------------------------------++--------+-----+----+--------+-----+ ! SSV Mid Calf                          !!3.7     !     !    !2.1     !     ! +--------------------------------------++--------+-----+----+--------+-----+ ! SSV Ankle                             !!3       !     !    !1.8     !     ! +--------------------------------------++--------+-----+----+--------+-----+        Assessment        Hospital Problems           Last Modified POA    * (Principal) Atherosclerosis of native arteries of extremities with rest pain, right leg ([facility] 1/22/2020 Yes    CAD (coronary artery disease) (Chronic) 1/22/2020 Yes    Overview Addendum 1/21/2020  7:06 PM by Carson Espinoza MD     4/4/2011  cardiac cath and stent x1 by Dr. Lizzeth Mariano  1/17/2014  Echo  EF 35%  1/23/2014  lexiscan negative for myocardial ischemia, EF 37  %   12/12/2019  lexiscan Positive for inferior MI + myocardial ischemia, EF 38%, 2% ischemic myocardium on stress, low risk findings, AUC indication 15, AUC score 4, (MD Aly)   12/16/19  Cath  70% left main, 70% mid circumflex, 80% OM2, o/w luminals, normal LVFX             Liver disease (Chronic) 1/19/2020 Yes    Overview Signed 7/7/2011  2:48 PM by Kelsi Pelaez PA-C     cirrosis, hep c         ESRD on dialysis ([facility] 1/22/2020 Yes    Atherosclerosis of artery of extremity with rest pain ([address] 1/22/2020 Yes    Hepatitis C, chronic ([address] 1/22/2020 Yes    PVD (peripheral vascular disease) ([address] 1/19/2020 Yes    Dialysis patient ([address] 1/22/2020 Yes    Overview Signed 7/24/2019  8:19 AM by LETI Robles     Overview:   Dialysis since 2010. He is being considered for a combined liver and kidney transplant. His dialysis means that I cannot treat his ascites with diuretics.          Stenosis of artery of right lower extremity ([address] 1/19/2020 Yes    Cellulitis of right lower extremity without foot 1/18/2020 Yes    Right leg pain 1/19/2020 Yes    Cellulitis of right lower extremity 1/19/2020 Yes

## 2020-01-30 NOTE — PROGRESS NOTES
Groin incision continues to bleed  from distal end of suture line . Mepilex changed x2 this shift with quick clot applied . Iv bag applied for pressure and ice pack to groin site at 2010 but continues to ooze blood from groin site . Dressing changed with new quick clot and pressure dressing applied . Will continue to monitor for bleeding at site .

## 2020-01-30 NOTE — CARE COORDINATION
Spoke with patient regarding MD orders for [place] services. Pt agreeable and chose [facility]. Spoke with Bernadine and was told they will not take him back due to noncompliance. Spoke to patient again and he chose [facility]. Spoke with Esther. Referral accepted and faxed. [facility] [place]  [phone]   Fax  [phone]  Please notify [place] when patient discharges and Fax DC Summary,  DC med list and any new [place] orders. The Patient was provided with a choice of provider and agrees with the discharge plan. [x] Yes [] No    Freedom of choice list was provided with basic dialogue that supports the patient's individualized plan of care/goals, treatment preferences and shares the quality data associated with the providers.  [x] Yes [] No  Electronically signed by Tarun Stein RN on 1/30/20 at 11:28 AM

## 2020-01-31 VITALS
HEIGHT: 69 IN | HEART RATE: 61 BPM | DIASTOLIC BLOOD PRESSURE: 58 MMHG | RESPIRATION RATE: 18 BRPM | BODY MASS INDEX: 25.22 KG/M2 | SYSTOLIC BLOOD PRESSURE: 159 MMHG | TEMPERATURE: 98 F | WEIGHT: 170.3 LBS | OXYGEN SATURATION: 97 %

## 2020-01-31 LAB
ALBUMIN SERPL-MCNC: 3.1 G/DL (ref 3.5–5.2)
ALP BLD-CCNC: 82 U/L (ref 40–130)
ALT SERPL-CCNC: 10 U/L (ref 5–41)
ANION GAP SERPL CALCULATED.3IONS-SCNC: 13 MMOL/L (ref 7–19)
AST SERPL-CCNC: 17 U/L (ref 5–40)
BASOPHILS ABSOLUTE: 0 K/UL (ref 0–0.2)
BASOPHILS RELATIVE PERCENT: 0.8 % (ref 0–1)
BILIRUB SERPL-MCNC: 0.3 MG/DL (ref 0.2–1.2)
BUN BLDV-MCNC: 33 MG/DL (ref 6–20)
CALCIUM SERPL-MCNC: 8.6 MG/DL (ref 8.6–10)
CHLORIDE BLD-SCNC: 91 MMOL/L (ref 98–111)
CO2: 28 MMOL/L (ref 22–29)
CREAT SERPL-MCNC: 5.7 MG/DL (ref 0.5–1.2)
EOSINOPHILS ABSOLUTE: 0.4 K/UL (ref 0–0.6)
EOSINOPHILS RELATIVE PERCENT: 8.9 % (ref 0–5)
GFR NON-AFRICAN AMERICAN: 10
GLUCOSE BLD-MCNC: 112 MG/DL (ref 74–109)
HCT VFR BLD CALC: 25.4 % (ref 42–52)
HEMOGLOBIN: 7.8 G/DL (ref 14–18)
IMMATURE GRANULOCYTES #: 0 K/UL
LYMPHOCYTES ABSOLUTE: 1.4 K/UL (ref 1.1–4.5)
LYMPHOCYTES RELATIVE PERCENT: 29.9 % (ref 20–40)
MCH RBC QN AUTO: 29.9 PG (ref 27–31)
MCHC RBC AUTO-ENTMCNC: 30.7 G/DL (ref 33–37)
MCV RBC AUTO: 97.3 FL (ref 80–94)
MONOCYTES ABSOLUTE: 0.5 K/UL (ref 0–0.9)
MONOCYTES RELATIVE PERCENT: 10.2 % (ref 0–10)
NEUTROPHILS ABSOLUTE: 2.4 K/UL (ref 1.5–7.5)
NEUTROPHILS RELATIVE PERCENT: 50 % (ref 50–65)
PDW BLD-RTO: 15.9 % (ref 11.5–14.5)
PLATELET # BLD: 98 K/UL (ref 130–400)
PMV BLD AUTO: 12.2 FL (ref 9.4–12.4)
POTASSIUM SERPL-SCNC: 4.9 MMOL/L (ref 3.5–5)
RBC # BLD: 2.61 M/UL (ref 4.7–6.1)
SODIUM BLD-SCNC: 132 MMOL/L (ref 136–145)
TOTAL PROTEIN: 5.8 G/DL (ref 6.6–8.7)
WBC # BLD: 4.8 K/UL (ref 4.8–10.8)

## 2020-01-31 PROCEDURE — 8010000000 HC HEMODIALYSIS ACUTE INPT

## 2020-01-31 PROCEDURE — 6370000000 HC RX 637 (ALT 250 FOR IP): Performed by: INTERNAL MEDICINE

## 2020-01-31 PROCEDURE — 97162 PT EVAL MOD COMPLEX 30 MIN: CPT

## 2020-01-31 PROCEDURE — 85025 COMPLETE CBC W/AUTO DIFF WBC: CPT

## 2020-01-31 PROCEDURE — 36415 COLL VENOUS BLD VENIPUNCTURE: CPT

## 2020-01-31 PROCEDURE — 6370000000 HC RX 637 (ALT 250 FOR IP): Performed by: NURSE PRACTITIONER

## 2020-01-31 PROCEDURE — 2580000003 HC RX 258: Performed by: INTERNAL MEDICINE

## 2020-01-31 PROCEDURE — 80053 COMPREHEN METABOLIC PANEL: CPT

## 2020-01-31 PROCEDURE — 97530 THERAPEUTIC ACTIVITIES: CPT

## 2020-01-31 RX ORDER — OXYCODONE HYDROCHLORIDE AND ACETAMINOPHEN 5; 325 MG/1; MG/1
1 TABLET ORAL EVERY 8 HOURS PRN
Qty: 9 TABLET | Refills: 0 | Status: SHIPPED | OUTPATIENT
Start: 2020-01-31 | End: 2020-01-31 | Stop reason: HOSPADM

## 2020-01-31 RX ORDER — CHOLECALCIFEROL (VITAMIN D3) 10 MCG
1 TABLET ORAL DAILY
Qty: 30 CAPSULE | Refills: 3 | Status: ON HOLD | OUTPATIENT
Start: 2020-02-01 | End: 2020-04-10 | Stop reason: HOSPADM

## 2020-01-31 RX ADMIN — NEPHROCAP 1 MG: 1 CAP ORAL at 08:25

## 2020-01-31 RX ADMIN — CLOPIDOGREL BISULFATE 75 MG: 75 TABLET ORAL at 08:25

## 2020-01-31 RX ADMIN — ISOSORBIDE MONONITRATE 30 MG: 30 TABLET, EXTENDED RELEASE ORAL at 08:25

## 2020-01-31 RX ADMIN — BACITRACIN ZINC NEOMYCIN SULFATE POLYMYXIN B SULFATE: 400; 3.5; 5 OINTMENT TOPICAL at 08:26

## 2020-01-31 RX ADMIN — MINOXIDIL 10 MG: 10 TABLET ORAL at 08:25

## 2020-01-31 RX ADMIN — OXYCODONE HYDROCHLORIDE AND ACETAMINOPHEN 1 TABLET: 5; 325 TABLET ORAL at 04:22

## 2020-01-31 RX ADMIN — SEVELAMER CARBONATE 800 MG: 800 TABLET, FILM COATED ORAL at 08:25

## 2020-01-31 RX ADMIN — Medication 10 ML: at 08:25

## 2020-01-31 RX ADMIN — METOPROLOL SUCCINATE 25 MG: 25 TABLET, EXTENDED RELEASE ORAL at 08:25

## 2020-01-31 RX ADMIN — ASPIRIN 81 MG: 81 TABLET, COATED ORAL at 08:25

## 2020-01-31 RX ADMIN — AMLODIPINE BESYLATE 10 MG: 5 TABLET ORAL at 08:25

## 2020-01-31 RX ADMIN — OXYCODONE HYDROCHLORIDE 10 MG: 10 TABLET, FILM COATED, EXTENDED RELEASE ORAL at 08:42

## 2020-01-31 RX ADMIN — CALCIUM ACETATE 2001 MG: 667 CAPSULE ORAL at 08:25

## 2020-01-31 RX ADMIN — OXYCODONE HYDROCHLORIDE 10 MG: 10 TABLET, FILM COATED, EXTENDED RELEASE ORAL at 00:42

## 2020-01-31 ASSESSMENT — PAIN DESCRIPTION - FREQUENCY
FREQUENCY: CONTINUOUS
FREQUENCY: CONTINUOUS

## 2020-01-31 ASSESSMENT — PAIN DESCRIPTION - PROGRESSION
CLINICAL_PROGRESSION: NOT CHANGED
CLINICAL_PROGRESSION: NOT CHANGED

## 2020-01-31 ASSESSMENT — PAIN SCALES - GENERAL
PAINLEVEL_OUTOF10: 8
PAINLEVEL_OUTOF10: 9
PAINLEVEL_OUTOF10: 0
PAINLEVEL_OUTOF10: 8
PAINLEVEL_OUTOF10: 8
PAINLEVEL_OUTOF10: 0
PAINLEVEL_OUTOF10: 1

## 2020-01-31 ASSESSMENT — PAIN DESCRIPTION - ONSET
ONSET: ON-GOING
ONSET: ON-GOING

## 2020-01-31 ASSESSMENT — PAIN DESCRIPTION - PAIN TYPE
TYPE: ACUTE PAIN
TYPE: SURGICAL PAIN
TYPE: SURGICAL PAIN

## 2020-01-31 ASSESSMENT — PAIN DESCRIPTION - ORIENTATION
ORIENTATION: RIGHT

## 2020-01-31 ASSESSMENT — PAIN DESCRIPTION - DESCRIPTORS
DESCRIPTORS: BURNING
DESCRIPTORS: BURNING

## 2020-01-31 ASSESSMENT — PAIN DESCRIPTION - LOCATION
LOCATION: LEG
LOCATION: LEG
LOCATION: LEG;GROIN

## 2020-01-31 NOTE — PROGRESS NOTES
Physical Therapy    Facility/Department: [facility] 3 CHRISTA/VAS/MED  Initial Assessment    NAME: Rah Mosquera  : 1960  MRN: [id number]    Date of Service: 2020    Discharge Recommendations:  Continue to assess pending progress        Assessment   Body structures, Functions, Activity limitations: Decreased functional mobility ; Decreased ADL status; Decreased ROM; Decreased safe awareness; Increased pain;Decreased strength;Decreased balance  Assessment: STOOD AND STEPPED FROM RECLINER TO BED WITH SW. SLIGHTLY UNSTEADY OVERALL DUE TO RLE PAIN. DISCUSSED HOME SAFETY, ESPECIALLY THE 2 STEPS INTO HOUSE. Pt WAS LEAVING FOR DIALYSIS. PT Education: PT Role;General Safety  REQUIRES PT FOLLOW UP: Yes  Activity Tolerance  Activity Tolerance: Patient Tolerated treatment well;Patient limited by pain       Patient Diagnosis(es): The primary encounter diagnosis was Right leg pain. Diagnoses of Cellulitis of right lower extremity and ESRD on dialysis [facility]) were also pertinent to this visit. has a past medical history of Anxiety, Blood circulation, collateral, CAD (coronary artery disease), Cancer ([address] CHF (congestive heart failure) ([address] Chyloperitoneum determined by paracentesis, CKD (chronic kidney disease), stage V ([address] Dialysis patient ([address] GERD (gastroesophageal reflux disease), Hemodialysis patient ([address] Hepatic cirrhosis ([address] Hepatitis C, chronic ([address] History of blood transfusion, HTN (hypertension), Hx of blood clots, Mixed hyperlipidemia, Osteoarthritis, Pacemaker, Palliative care patient, PVD (peripheral vascular disease) ([address] Sleep apnea, and Type II diabetes mellitus ([address] has a past surgical history that includes other surgical history (2010); other surgical history ([id number]);  Dialysis fistula creation (11 SJS); angioplasty (11 SJS); other surgical history (2011   SJS); other surgical history (12   SJS); vascular surgery (12); vascular surgery (7/10/2012 SJS); vascular STEP)  Ambulation  Ambulation?: No     Balance  Sitting - Dynamic: Good  Standing - Dynamic: [address]  Times per week: AT LEAST 5-7  Current Treatment Recommendations: Strengthening, ROM, Balance Training, Functional Mobility Training, Transfer Training, Gait Training, Patient/Caregiver Education & Training, Safety Education & Training  Safety Devices  Type of devices: Call light within reach, Bed alarm in place    G-Code       OutComes Score                                                  AM-PAC Score             Goals  Short term goals  Time Frame for Short term goals: 14 DAYS  Short term goal 1: BED MOB MOD IND  Short term goal 2: TRANSFERS SUP-IND  Short term goal 3: ' AAD SUPERVISION       Therapy Time   Individual Concurrent Group Co-treatment   Time In           Time Out           Minutes                   Reno Faria, PT

## 2020-01-31 NOTE — OP NOTE
[facility]                                OPERATIVE REPORT    PATIENT NAME: Laura Lopez                       :        1960  MED REC NO:   [id number]                              ROOM:       [facility]  ACCOUNT NO:   [de-identified]                           ADMIT DATE: 2020  PROVIDER:     Concetta Merino MD    DATE OF PROCEDURE:  2020    PREOPERATIVE DIAGNOSIS:  Severe multilevel peripheral vascular occlusive  disease of both native arteries and bypass graft. POSTOPERATIVE DIAGNOSIS:  Severe multilevel peripheral vascular  occlusive disease of both native arteries and bypass graft. PROCEDURE PERFORMED:  Endarterectomy of the right common femoral artery,  superficial femoral artery, and profunda femoris artery. Redo right  groin x3. SURGEON:  Concetta Merino MD    FIRST ASSISTANT:  Lazara Moraes. ANESTHESIA:  General endotracheal anesthesia. BRIEF HISTORY:  The patient is a 78-year-old male who has severe  peripheral vascular occlusive disease in multiple levels. He has  developed unremitting rest pain. He has previously had stents and  bypasses performed, a right common femoral endarterectomy years ago. He  was found to have essentially lost his femoral to tibial bypass. On old  arteriograms, he had an occlusion of the descending branch of the  profunda femoris artery. He also had a short segment occlusion of the  superficial femoral artery and the superficial femoral artery although  occluded in its mid thigh did supply collaterals to his patent vessels  around the knee and in his tibia. It was felt preoperatively if the  proximal superficial femoral artery and the descending branch of the  profunda femoris artery could be opened, this would supply enough  collateral flow to keep the patient out of rest pain. Fortunately, he  had no tissue loss at this point.   In addition to this, there was I  worked towards the common femoral artery, it appeared that the original  origin of this branch of the profunda femoris artery had completely  scarred over with intimal hyperplasia. Therefore, I very carefully  resected this intimal hyperplasia and I now had a newly created origin  into the profunda femoris artery. Again, the plaque ended in the  descending branch about 1.5 cm into the artery. I also performed the endarterectomy of the distal portion of the common  femoral artery removing the hyperplastic intimal layer. This also  opened up a couple of small branches where again the origins have been  occluded by the intimal hyperplastic response from previous surgeries. I made an incision in the lower calf medial aspect and harvested a  residual piece of saphenous vein. I initially started by patching open  my profunda femoris artery. This was done with a small piece of reverse  greater saphenous vein and 6-0 Prolene suture. Once this arteriotomy  was performed, I then turned my attention to the superficial femoral and  the common femoral artery. The endpoint in the superficial femoral  artery was tacked down in my usual fashion with interrupted 6-0 Prolene. I then selected a segment of the saphenous vein remnant, opened this up,  reversed it. Although I did not see any valves in this segment, I did  sew it in in the reverse direction. This was done with a two-suture  technique of 6-0 Prolene squaring off the toe on the superficial femoral  artery, so this would not be narrowed and patching open the entire  superficial femoral artery endarterectomy and the common femoral  endarterectomy. Just prior to completing the patch closure, I allowed for backbleeding  and flushing and then tied our sutures. We opened up flow to the leg. At this point, we checked for and secured  hemostasis around the suture line. The wound was irrigated out with  copious amounts of saline.   I examined the graft, the old femoral distal  bypass. This was completely scarred in with a very dense sclerotic  reaction and I made the decision not to attempt to revise this. I felt  that with the endarterectomy of the profunda femoris artery and the  superficial femoral and common femoral artery, we had opened up much  better inflow to support the collaterals around his occluded vessels of  the distal thigh. The calf wound was then irrigated with copious amounts of saline and  closed with interrupted sutures of 4-0 nylon. The groin incision was  closed with a deep layer of interrupted 2-0 Vicryl followed by a running  2-0 Vicryl layer and skin staples. The patient was awakened and taken to the recovery room in stable  condition. There were signals in his foot. The estimated blood loss  was 300 mL. Blood replaced was none. Needle counts and sponge counts  were correct at the end of the case. There were no intraoperative  complications.         Kyle Balderas MD    D: 01/30/2020 18:25:45      T: 01/30/2020 18:33:56     [email or web address]  Job#: [id number]     Doc#: [id number]    CC:

## 2020-01-31 NOTE — PROGRESS NOTES
coils. balloon a'plasty left cephalic vein with [id number] balloon and then 2hye7sw balloon    DIALYSIS FISTULA CREATION Left 2/16/2017    LEFT UPPER EXTREMITY BRACHIAL / AXILLARY GRAFT AND STENT LEFT SUBCLAVIAN VEIN  performed by Jose Maria Beltran MD at [address] Right 7/12/2019    RIGHT LEG WOUND WASHOUT performed by Dexter Treviño MD at [address] [facility] Right 1/27/2020    REVISION OF RIGHT LEG BYPASS GRAFT performed by Dexter Treviño MD at [address] [facility] FEMORAL-TIBIAL BYPASS GRAFT Right 6/25/2019    FEMORAL TIBIAL/PERINEAL BYPASS WITH REVERSED GREATER SAPHENOUS VEIN performed by Jose Maria Beltran MD at [address] [facility]  12/26/2010    Right internal jugular vein tunneled dialysis catheter placement    OTHER SURGICAL HISTORY  [id number]    Redo of dialysis catheter    OTHER SURGICAL HISTORY  9/6/2011   SJS    Removal of RT IJV tunneled dialysis cath    OTHER SURGICAL HISTORY  5/22/12   SJS    Ultrasound-guided cannulation of left cephalic vein in the mid forearm toward the AV anastomosis, Left upper  ext. fistulograms including venograms of the SVC, Left cephalic vein balloon angioplasty with a 4mm x 100mm Tod balloon, Completion fistulograms    PACEMAKER PLACEMENT      [employer]    PARACENTESIS      multiple/recent; per dr. Tyler Farooq at [address] [facility] Left 1/30/2018    UPPER EXTREMITY DRIL PROCEDURE WITH LEFT SAPHENOUS VEIN HARVESTING performed by Jose Maria Beltran MD at [address] [facility]  6/19/12    LUE arteriovenous fistulogram including venography of the superior vena cava. Balloon angioplasty, left forearm cephalic vein and upper arm cephalic vein with [id number] tod balloon.  VASCULAR SURGERY  7/10/2012 SJS     Revision of left distal radial artery to cephalic vein arteriovenous fistula with 7mm Artegraft interposition.      VASCULAR SURGERY  7/30/15 [facility] [address]    Left upper extremity arteriovenous sheath, bilateral iliac kissing stents right 10x38 icast, left 9x59 icast expanded with 10x40 , completion aortogram, mynx left CFA , failed mynx right CFA.  VASCULAR SURGERY  06/13/2019    SJS left CFA 5f sheath, aortogram with bilateral lower arteriogram, mynx left CFA.  VASCULAR SURGERY  06/25/2019    SJS-VI. Femoral tibial/perineal bypass with reversed greater saphenous vein.  VASCULAR SURGERY  08/16/2019    TJR. Aortogram and runoff, selective right CFA injections. PTA of fem-tp bypass with 4.0 x 220 mm tod balloon to 4.33 mm    VASCULAR SURGERY  10/23/2019    VI. Thrombin injection in the left SFA PSA, right common femoral artery us guided access, left SFA stent treatment of the flap.        Family History  Family History   Problem Relation Age of Onset    High Blood Pressure Mother     High Blood Pressure Father     High Blood Pressure Sister        Social History  Social History     Socioeconomic History    Marital status:      Spouse name: Jocelin Agent    Number of children: 0    Years of education: 15    Highest education level: Not on file   Occupational History    Occupation:    Social Needs    Financial resource strain: Not on file    Food insecurity:     Worry: Not on file     Inability: Not on file    Transportation needs:     Medical: Not on file     Non-medical: Not on file   Tobacco Use    Smoking status: Current Every Day Smoker     Packs/day: 0.25     Years: 45.00     Pack years: 11.25     Types: Cigarettes    Smokeless tobacco: Never Used    Tobacco comment: about to  quit down to 1 a day cigarettes   Substance and Sexual Activity    Alcohol use: No    Drug use: Not Currently     Types: Marijuana, Cocaine, Methamphetamines, IV, Opiates , Other-see comments     Comment: in the 1970s, LSD    Sexual activity: Yes     Partners: Female     Comment: has a wife   Lifestyle    Physical activity:     Days per week: Not on file     Minutes per session: Not on file    Stress: Not on file   Relationships    Social connections:     Talks on phone: Not on file     Gets together: Not on file     Attends Mosque service: Not on file     Active member of club or organization: Not on file     Attends meetings of clubs or organizations: Not on file     Relationship status: Not on file    Intimate partner violence:     Fear of current or ex partner: Not on file     Emotionally abused: Not on file     Physically abused: Not on file     Forced sexual activity: Not on file   Other Topics Concern    Not on file   Social History Narrative    CODE STATUS: Full Code    HEALTH CARE PROXY: Mrs. Fortino Fuller, [phone]    Back up: his Mother, Mrs. Barak Navarrete, [phone]    AMBULATES: independantly    DOMICILED: lives in a private house with 2 steps to get in, has no stairs inside, lives with wife and step children, has 3 pets         Review of Systems:  History obtained from chart review and the patient  General ROS: No fever or chills  Respiratory ROS: No cough, shortness of breath, wheezing  Cardiovascular ROS: No chest pain or palpitations  Gastrointestinal ROS: No abdominal pain or melena  Genito-Urinary ROS: No dysuria or hematuria  Musculoskeletal ROS: No joint pain or swelling         Objective:  Patient Vitals for the past 24 hrs:   BP Temp Temp src Pulse Resp SpO2 Weight   01/31/20 0825 -- -- -- 62 -- -- --   01/31/20 0703 (!) 135/58 97.9 °F (36.6 °C) Temporal 59 18 93 % --   01/31/20 0450 -- -- -- -- -- -- 170 lb 4.8 oz (77.2 kg)   01/30/20 2338 (!) 110/47 97 °F (36.1 °C) Temporal 71 18 94 % --   01/30/20 1836 136/69 97.1 °F (36.2 °C) Temporal 68 20 96 % --   01/30/20 1115 (!) 122/59 97.8 °F (36.6 °C) -- 60 16 94 % --       Intake/Output Summary (Last 24 hours) at 1/31/2020 1010  Last data filed at 1/31/2020 0959  Gross per 24 hour   Intake 1170 ml   Output 0 ml   Net 1170 ml     General: awake/alert   HEENT: Normocephalic atraumatic head  Neck: Supple with no JVD or carotid bruits. Chest:  clear to auscultation bilaterally without respiratory distress  CVS: regular rate and rhythm  Abdominal: soft, nontender, normal bowel sounds  Extremities: Left lower extremity scar for recent surgery. Skin: Cold and clammy right leg. Labs:  BMP:   Recent Labs     01/29/20 0325 01/30/20 0318 01/31/20 0307   * 135* 132*   K 5.0 4.4 4.9   CL 92* 92* 91*   CO2 29 28 28   BUN 25* 19 33*   CREATININE 4.5* 3.8* 5.7*   CALCIUM 8.4* 8.7 8.6     CBC:   Recent Labs     01/29/20 0325 01/30/20 0318 01/31/20 0307   WBC 4.5* 5.6 4.8   HGB 8.4* 8.8* 7.8*   HCT 26.8* 28.0* 25.4*   MCV 98.2* 96.9* 97.3*   PLT 74* 125* 98*     LIVER PROFILE:   Recent Labs     01/29/20 0325 01/30/20 0318 01/31/20 0307   AST 20 21 17   ALT 11 12 10   BILITOT 0.3 0.4 0.3   ALKPHOS 76 90 82     PT/INR:   No results for input(s): PROTIME, INR in the last 72 hours. APTT: No results for input(s): APTT in the last 72 hours. BNP:  No results for input(s): BNP in the last 72 hours. Ionized Calcium:No results for input(s): IONCA in the last 72 hours. Magnesium:No results for input(s): MG in the last 72 hours. Phosphorus:  No results for input(s): PHOS in the last 72 hours. HgbA1C: No results for input(s): LABA1C in the last 72 hours. Hepatic:   Recent Labs     01/29/20 0325 01/30/20 0318 01/31/20 0307   ALKPHOS 76 90 82   ALT 11 12 10   AST 20 21 17   PROT 6.1* 6.5* 5.8*   BILITOT 0.3 0.4 0.3   LABALBU 3.4* 3.7 3.1*     Lactic Acid:   No results for input(s): LACTA in the last 72 hours. Troponin: No results for input(s): CKTOTAL, CKMB, TROPONINT in the last 72 hours. ABGs:   Lab Results   Component Value Date    PHART 7.480 01/27/2020    PO2ART 58.0 01/27/2020    PAH6YWP 34.0 01/27/2020     CRP:  No results for input(s): CRP in the last 72 hours. Sed Rate:  No results for input(s): SEDRATE in the last 72 hours.     Culture Results:   Blood Culture Recent:   Recent Labs 01/18/20 2031   BC No growth after 5 days of incubation. Urine Culture Recent : No results for input(s): LABURIN in the last 720 hours. Radiology reports as per the Radiologist  Radiology: Ct Knee Right W Wo Contrast    Result Date: 1/19/2020  EXAMINATION: CT KNEE RIGHT W WO CONTRAST 1/19/2020 10:46 AM HISTORY: Question abscess fluid collection Automatic exposure control was utilized reducing radiation dose. Radiation DLP 6.8 mCi centimeters. Axial images are obtained. Sagittal coronal reformatted images the right leg are also obtained. The tibia and fibula are intact. Vascular calcifications noted in the popliteal artery. Vascular calcifications present in the superficial femoral artery, popliteal artery. Edema is present in the subcutaneous tissues. A definite abscess is not identified. The medial head of the gastrocnemius is intact. Impression fluid is noted in the subcutaneous tissues. This may represent cellulitis or possibly edema. 2. Vascular calcification is noted. There is limited flow to the right lower extremity Signed by Dr Alina Brush on 1/19/2020 10:51 AM    Vl Extremity Venous Right    Result Date: 1/19/2020  Vascular Lower Extremities DVT Study Procedure  Demographics   Patient Name     James Gordon Age                    61   Patient Number   [id number]       Gender                 Male   Visit Number     [id number]    Interpreting Physician Sarah Blackbrun MD   Date of Birth    1960   Referring Physician    Jorge A Hope   Accession Number [id number]    [address] [facility]  Procedure Type of Study:   Veins:Lower Extremities DVT Study, VL EXTREMITY VENOUS DUPLEX RIGHT. Indications for Study:Swelling and Pain, right lower extremity. Allergies   - No known allergies. Impression   There is no evidence of deep venous thrombosis (DVT) in the right lower  extremity(ies). There is no evidence of superficial thrombophlebitis of the right lower  extremity(ies).    Signature +------------------------------------+----------+---------------+----------+ ! PTV                                 ! Yes       ! Yes            ! None      ! +------------------------------------+----------+---------------+----------+ ! GSV                                 ! Yes       ! Yes            ! None      ! +------------------------------------+----------+---------------+----------+ ! ATV                                 ! Yes       ! Yes            ! None      ! +------------------------------------+----------+---------------+----------+ ! Peroneal                            !Yes       ! Yes            ! None      ! +------------------------------------+----------+---------------+----------+ Left Lower Extremities DVT Study Measurements Left 2D Measurements +------------------------------------+----------+---------------+----------+ ! Location                            ! Visualized! Compressibility! Thrombosis! +------------------------------------+----------+---------------+----------+ ! Sapheno Femoral Junction            ! Yes       ! Yes            ! None      ! +------------------------------------+----------+---------------+----------+ ! Common Femoral                      !Yes       ! Yes            ! None      ! +------------------------------------+----------+---------------+----------+       Assessment   1. End-stage renal disease/I have seen him on hemodialysis today. 2.  Severe peripheral vascular disease. 3.  Status post right lower extremity bypass surgery. 4.  Type II diabetic nephropathy. 5.  Cirrhosis of liver/hepatitis C.  6.  Anemia of chronic kidney disease. 7.  Chronic diastolic congestive heart failure. 8.  Hyperkalemia. 9.  Hyponatremia. 10.  Secondary hyperparathyroidism. Plan:  1. Tolerating treatment very well. 2.  Discharge to home post hemodialysis today. 3.  He will be transferred to [facility] clinic.       Shahnaz Rey MD  01/31/20  10:10 AM

## 2020-01-31 NOTE — PROGRESS NOTES
Nutrition Assessment    Type and Reason for Visit: Reassess    Nutrition Recommendations: continue current POC    Nutrition Assessment: Pt is dialysis at time of visit. PO intake is slowly returning to usual intake. Aware of possible discharge. Pt is aware of dietary restrictions    Malnutrition Assessment:  · Malnutrition Status: At risk for malnutrition  · Context: Acute illness or injury  · Findings of the 6 clinical characteristics of malnutrition (Minimum of 2 out of 6 clinical characteristics is required to make the diagnosis of moderate or severe Protein Calorie Malnutrition based on AND/ASPEN Guidelines):  1. Energy Intake-Less than or equal to 75% of estimated energy requirement, (2 days)    2. Weight Loss-No significant weight loss,    3. Fat Loss-No significant subcutaneous fat loss,    4. Muscle Loss-No significant muscle mass loss,    5. Fluid Accumulation-No significant fluid accumulation, Extremities  6.  Strength-Not measured    Nutrition Risk Level:  Moderate    Nutrient Needs:  · Estimated Daily Total Kcal: 2780-5121 kcals/day  · Estimated Daily Protein (g):  g/PRO/day  · Estimated Daily Total Fluid (ml/day): 1000 mL/day    Nutrition Diagnosis:   · Problem: Increased nutrient needs  · Etiology: related to Increased demand for energy/nutrients     Signs and symptoms:  as evidenced by Known losses from dialysis    Objective Information:  · Nutrition-Focused Physical Findings:    · Wound Type: Surgical Wound, Open Wounds  · Current Nutrition Therapies:  · Oral Diet Orders: Renal, Low Potassium   · Oral Diet intake: 26-50%, 51-75%  · Oral Nutrition Supplement (ONS) Orders: None    · Anthropometric Measures:  · Ht: 5' 9\" (175.3 cm)   · Current Body Wt: 170 lb 4 oz (77.2 kg)  · Admission Body Wt: 162 lb (73.5 kg)(stated)  · Usual Body Wt: 174 lb 9 oz (79.2 kg)(10/2019)  · Ideal Body Wt: 160 lb (72.6 kg), % Ideal Body 106.5%  · BMI Classification: BMI 25.0 - 29.9 Overweight    Nutrition Interventions:   Continue current diet  Continued Inpatient Monitoring    Nutrition Evaluation:   · Evaluation: Progressing toward goals   · Goals: pointake 75% or greater.   Wound improvement    · Monitoring: Meal Intake, Diet Tolerance, Skin Integrity, Wound Healing, Weight, Pertinent Labs, I&O      Electronically signed by Roger Moore MS, RD, LD on 1/31/20 at 11:03 AM    Contact Number: [phone]

## 2020-01-31 NOTE — PLAN OF CARE
Problem: Falls - Risk of:  Goal: Will remain free from falls  Description  Will remain free from falls  1/30/2020 2331 by Blake Ward RN  Outcome: Ongoing  1/30/2020 2324 by Blake Ward RN  Outcome: Ongoing  1/30/2020 1403 by Petra Pugh RN  Outcome: Ongoing  Goal: Absence of physical injury  Description  Absence of physical injury  1/30/2020 2331 by Blake Ward RN  Outcome: Ongoing  1/30/2020 2324 by Blake Ward RN  Outcome: Ongoing  1/30/2020 1403 by Petra Pugh RN  Outcome: Ongoing     Problem:  Activity:  Goal: Fatigue will decrease  Description  Fatigue will decrease  1/30/2020 2331 by Blake Ward RN  Outcome: Ongoing  1/30/2020 2324 by Blake Ward RN  Outcome: Ongoing  1/30/2020 1403 by Petra Pugh RN  Outcome: Ongoing  Goal: Risk for activity intolerance will decrease  Description  Risk for activity intolerance will decrease  1/30/2020 2331 by Blake Ward RN  Outcome: Ongoing  1/30/2020 2324 by Blake Ward RN  Outcome: Ongoing  1/30/2020 1403 by Petra Pugh RN  Outcome: Ongoing     Problem: Coping:  Goal: Ability to cope will improve  Description  Ability to cope will improve  1/30/2020 2331 by Blake Ward RN  Outcome: Ongoing  1/30/2020 2324 by Blake Ward RN  Outcome: Ongoing  1/30/2020 1403 by Petra Pugh RN  Outcome: Ongoing     Problem: Fluid Volume:  Goal: Will show no signs or symptoms of fluid imbalance  Description  Will show no signs or symptoms of fluid imbalance  1/30/2020 2331 by Blake Ward RN  Outcome: Ongoing  1/30/2020 2324 by Blake Ward RN  Outcome: Ongoing  1/30/2020 1403 by Petra Pugh RN  Outcome: Ongoing  Goal: Ability to achieve a balanced intake and output will improve  Description  Ability to achieve a balanced intake and output will improve  1/30/2020 2331 by Blake Ward RN  Outcome: Ongoing  1/30/2020 2324 by Blake Ward RN  Outcome: Ongoing  1/30/2020 1403 by Petra Pugh RN  Outcome: Ongoing     Problem: Health Behavior:  Goal: Ability to manage health-related needs will improve  Description  Ability to manage health-related needs will improve  1/30/2020 2331 by Atul Maloney RN  Outcome: Ongoing  1/30/2020 2324 by Atul Maloney RN  Outcome: Ongoing  1/30/2020 1403 by Maritza Kern RN  Outcome: Ongoing  Goal: Identification of resources available to assist in meeting health care needs will improve  Description  Identification of resources available to assist in meeting health care needs will improve  1/30/2020 2331 by Atul Maloney RN  Outcome: Ongoing  1/30/2020 2324 by Atul Maloney RN  Outcome: Ongoing  1/30/2020 1403 by Maritza Kern RN  Outcome: Ongoing     Problem: Nutritional:  Goal: Ability to identify appropriate dietary choices will improve  Description  Ability to identify appropriate dietary choices will improve  1/30/2020 2331 by Atul Maloney RN  Outcome: Ongoing  1/30/2020 2324 by Atul Maloney RN  Outcome: Ongoing  1/30/2020 1403 by Maritza Kern RN  Outcome: Ongoing  Goal: Nutritional status will improve  Description  Nutritional status will improve  1/30/2020 2331 by Atul Maloney RN  Outcome: Ongoing  1/30/2020 2324 by Atul Maloney RN  Outcome: Ongoing  1/30/2020 1403 by Maritza Kern RN  Outcome: Ongoing     Problem: Physical Regulation:  Goal: Ability to maintain clinical measurements within normal limits will improve  Description  Ability to maintain clinical measurements within normal limits will improve  1/30/2020 2331 by Atul Maloney RN  Outcome: Ongoing  1/30/2020 2324 by Atul Maloney RN  Outcome: Ongoing  1/30/2020 1403 by Maritza Kern RN  Outcome: Ongoing  Goal: Complications related to the disease process, condition or treatment will be avoided or minimized  Description  Complications related to the disease process, condition or treatment will be avoided or minimized  1/30/2020 2331 by Atul Maloney RN  Outcome: Ongoing  1/30/2020 2324 by Atul Maloney RN  Outcome: Ongoing  1/30/2020 2324 by Iwona Macdonald RN  Outcome: Ongoing  1/30/2020 1403 by Cooper Bean RN  Outcome: Ongoing  Goal: Will show no infection signs and symptoms  Description  Will show no infection signs and symptoms  1/30/2020 2331 by Iwona Macdonald RN  Outcome: Ongoing  1/30/2020 2324 by Iwona Macdonald RN  Outcome: Ongoing  1/30/2020 1403 by Cooper Bean RN  Outcome: Ongoing  Goal: Absence of new skin breakdown  Description  Absence of new skin breakdown  1/30/2020 2331 by Iwona Macdonald RN  Outcome: Ongoing  1/30/2020 2324 by Iwona Macdonald RN  Outcome: Ongoing  1/30/2020 1403 by Cooper Bean RN  Outcome: Ongoing  Goal: Demonstration of wound healing without infection will improve  Description  Demonstration of wound healing without infection will improve  1/30/2020 2331 by Iwona Macdonald RN  Outcome: Ongoing  1/30/2020 2324 by Iwona Macdonald RN  Outcome: Ongoing  1/30/2020 1403 by Cooper Bean RN  Outcome: Ongoing  Goal: Complications related to intravenous access or infusion will be avoided or minimized  Description  Complications related to intravenous access or infusion will be avoided or minimized  1/30/2020 2331 by Iwona Macdonald RN  Outcome: Ongoing  1/30/2020 2324 by Iwona Macdonald RN  Outcome: Ongoing  1/30/2020 1403 by Cooper Bean RN  Outcome: Ongoing     Problem: Infection:  Goal: Will remain free from infection  Description  Will remain free from infection  1/30/2020 2331 by Iwona Macdonald RN  Outcome: Ongoing  1/30/2020 2324 by Iwona Macdonald RN  Outcome: Ongoing  1/30/2020 1403 by Cooper Bean RN  Outcome: Ongoing     Problem: Safety:  Goal: Free from accidental physical injury  Description  Free from accidental physical injury  1/30/2020 2331 by Iwona Macdonald RN  Outcome: Ongoing  1/30/2020 2324 by Iwona Macdonald RN  Outcome: Ongoing  1/30/2020 1403 by Cooper Bean RN  Outcome: Ongoing  Goal: Free from intentional harm  Description  Free from intentional harm  1/30/2020 Chon Palacios RN  Outcome: Ongoing     Problem: Discharge Planning:  Goal: Discharged to appropriate level of care  Description  Discharged to appropriate level of care  1/30/2020 2331 by Rosalia Walker RN  Outcome: Ongoing  1/30/2020 2324 by Rosalia Walker RN  Outcome: Ongoing  1/30/2020 1403 by Chon Palacios RN  Outcome: Ongoing     Problem: ABCDS Injury Assessment  Goal: Absence of physical injury  1/30/2020 2331 by Rosalia Walker RN  Outcome: Ongoing  1/30/2020 2324 by Rosalia Walker RN  Outcome: Ongoing  1/30/2020 1403 by Chon Palacios RN  Outcome: Ongoing     Problem: Bleeding:  Goal: Will show no signs and symptoms of excessive bleeding  Description  Will show no signs and symptoms of excessive bleeding  1/30/2020 2331 by Rosalia Walker RN  Outcome: Ongoing  1/30/2020 2324 by Rosalia Walker RN  Outcome: Ongoing  1/30/2020 1403 by Chon Palacios RN  Outcome: Ongoing     Problem: Tissue Perfusion - Renal, Altered:  Goal: Ability to achieve a balanced intake and output will improve  Description  Ability to achieve a balanced intake and output will improve  1/30/2020 2331 by Rosalia Walker RN  Outcome: Ongoing  1/30/2020 2324 by Rosalia Walker RN  Outcome: Ongoing  1/30/2020 1403 by Chon Palacios RN  Outcome: Ongoing  Goal: Electrolytes within specified parameters  Description  Electrolytes within specified parameters  1/30/2020 2331 by Rosalia Walker RN  Outcome: Ongoing  1/30/2020 2324 by Rosalia Walker RN  Outcome: Ongoing  1/30/2020 1403 by Chon Palacios RN  Outcome: Ongoing  Goal: Urine creatinine clearance will be within specified parameters  Description  Urine creatinine clearance will be within specified parameters  1/30/2020 2331 by Rosalia Walker RN  Outcome: Ongoing  1/30/2020 2324 by Rosalia Walker RN  Outcome: Ongoing  1/30/2020 1403 by Chon Palacios RN  Outcome: Ongoing  Goal: Serum creatinine will be within specified parameters  Description  Serum creatinine will be within specified

## 2020-01-31 NOTE — PLAN OF CARE
resources available to assist in meeting health care needs will improve  1/30/2020 2324 by Blake Ward RN  Outcome: Ongoing  1/30/2020 1403 by Petra Pugh RN  Outcome: Ongoing     Problem: Nutritional:  Goal: Ability to identify appropriate dietary choices will improve  Description  Ability to identify appropriate dietary choices will improve  1/30/2020 2324 by Blake Ward RN  Outcome: Ongoing  1/30/2020 1403 by Petra Pugh RN  Outcome: Ongoing  Goal: Nutritional status will improve  Description  Nutritional status will improve  1/30/2020 2324 by Blake Ward RN  Outcome: Ongoing  1/30/2020 1403 by Petra Pugh RN  Outcome: Ongoing     Problem: Physical Regulation:  Goal: Ability to maintain clinical measurements within normal limits will improve  Description  Ability to maintain clinical measurements within normal limits will improve  1/30/2020 2324 by Blake Ward RN  Outcome: Ongoing  1/30/2020 1403 by Petra Pugh RN  Outcome: Ongoing  Goal: Complications related to the disease process, condition or treatment will be avoided or minimized  Description  Complications related to the disease process, condition or treatment will be avoided or minimized  1/30/2020 2324 by Blake Ward RN  Outcome: Ongoing  1/30/2020 1403 by Petra Pugh RN  Outcome: Ongoing  Goal: Will show no signs and symptoms of electrolyte imbalance  Description  Will show no signs and symptoms of electrolyte imbalance  1/30/2020 2324 by Blake Ward RN  Outcome: Ongoing  1/30/2020 1403 by Petra Pugh RN  Outcome: Ongoing  Goal: Will remain free from infection  Description  Will remain free from infection  1/30/2020 2324 by Blake Ward RN  Outcome: Ongoing  1/30/2020 1403 by Petra Pugh RN  Outcome: Ongoing  Goal: Diagnostic test results will improve  Description  Diagnostic test results will improve  1/30/2020 2324 by Blake Ward RN  Outcome: Ongoing  1/30/2020 1403 by Petra Pugh

## 2020-01-31 NOTE — DISCHARGE SUMMARY
severely diminished GABRIEL RLE. Nephrology ordered [facility] for hyperkalemia as HD only slightly lowered his levels. Cardiology consulted to assess patient prior to his vascular surgery. 1/22/2020 Vascular will plan to attempt revascularization tomorrow. 1/23/2020 Pt continues to have nausea, vomiting, and diarrhea. Vascular sx on hold due to symptoms. Cardiology placed pt on preoperative beta blocker. 1/24/2020 N/V resolved. Vascular surgery scheduled for Monday 1/27/2020 at 12:00. 1/27/2020 no significant events. Hyperkalemia continues. Endarterectomy of right common femoral artery was performed after pt dialysis. Patient to ICU postoperatively. 1/28/2020 Patient stable to move to 3rd floor. 1/29/2020 Pt is recovering well and desires to return home upon discharge. Post operative ABIs ordered. Blood cultures continue to be negative. 1/30/2020 Wound care following and post operative GABRIEL completed. 1/31/2020 Patient is stable and ready for discharge per hospitalist, Cardiology, Vascular and Nephrology. Patient will continue with HD at [facility] on 2/1/2020 at 11:45am. Vascular providing pain medication for breakthrough pain. Significant Diagnostic Studies:     Ct Knee Right W Wo Contrast   Result Date: 1/19/2020   Impression fluid is noted in the subcutaneous tissues. This may represent cellulitis or possibly edema. 2. Vascular calcification is noted. There is limited flow to the right lower extremity Signed by Dr Alina Brush on 1/19/2020 10:51 AM    Vl Extremity Venous Right  Result Date: 1/19/2020  Impression   There is no evidence of deep venous thrombosis (DVT) in the right lower  extremity(ies). There is no evidence of superficial thrombophlebitis of the right lower  extremity(ies).      Signature  Electronically signed by Sarah Blackburn MD(Interpreting  physician) on 01/19/2020 06:59 AM     Pertinent Labs:  CBC:   Recent Labs     01/29/20  0325 01/30/20  0318 01/31/20  0307   WBC 4.5* 5.6 4.8   HGB 8.4* 8.8* 7.8*   PLT 74* 125* 98*     BMP:    Recent Labs     01/29/20  0325 01/30/20  0318 01/31/20  0307   * 135* 132*   K 5.0 4.4 4.9   CL 92* 92* 91*   CO2 29 28 28   BUN 25* 19 33*   CREATININE 4.5* 3.8* 5.7*   GLUCOSE 91 103 112*     INR: No results for input(s): INR in the last 72 hours. Physical Exam:  Vital Signs: BP (!) 100/50   Pulse 67   Temp 97.9 °F (36.6 °C) (Temporal)   Resp 18   Ht 5' 9\" (1.753 m)   Wt 170 lb 4.8 oz (77.2 kg)   SpO2 100%   BMI 25.15 kg/m²   General appearance:. Alert and Cooperative   HEENT: Normocephalic. Chest: clear to auscultation bilaterally without wheezes or rhonchi. Cardiac: Normal heart tones with regular rate and rhythm, S1, S2 normal. No murmurs, gallops, or rubs auscultated. Abdomen:soft, non-tender; non-distended normal bowel sounds no masses, no organomegaly. Extremities: No clubbing or cyanosis. Right lower leg edema with foot pain. + Erythema and lesion. Medial right knee wound with no drainage   Neurologic: Grossly intact.     Discharge Medications:       Ezcoriee Sep   Home Medication Instructions [id number]    Printed on:01/31/20 1347   Medication Information                      amLODIPine (NORVASC) 10 MG tablet  Take 10 mg by mouth daily Indications: High Blood Pressure              aspirin 81 MG EC tablet  Take 1 tablet by mouth daily             atorvastatin (LIPITOR) 40 MG tablet  Take 1 tablet by mouth nightly             b complex-C-folic acid (NEPHROCAPS) 1 MG capsule  Take 1 capsule by mouth daily             calcium acetate (PHOSLO) 667 MG capsule  Take 2,001 mg by mouth 3 times daily (with meals)             cloNIDine (CATAPRES) 0.2 MG tablet  Take 0.3 mg by mouth 3 times daily Indications: High Blood Pressure              clopidogrel (PLAVIX) 75 MG tablet  TAKE 1 TABLET EVERY DAY             isosorbide mononitrate (IMDUR) 30 MG extended release tablet  Take 1 tablet by mouth daily             lactulose (CEPHULAC) 10 G packet  Take 10 g by mouth 3 times daily Indications: Disorder of the Liver Enzymes              melatonin 3 MG TABS tablet  Take 1 tablet by mouth nightly as needed (insomnia)             metoprolol succinate (TOPROL XL) 25 MG extended release tablet  Take 1 tablet by mouth 2 times daily             minoxidil (LONITEN) 10 MG tablet  Take 10 mg by mouth daily Indications: High Blood Pressure Disorder, SBP >160              oxyCODONE (OXYCONTIN) 10 MG extended release tablet  Take 10 mg by mouth every 8 hours as needed for Pain. sevelamer (RENVELA) 800 MG tablet  Take 1 tablet by mouth 3 times daily (with meals)             sucralfate (CARAFATE) 1 GM tablet  Take 1 tablet by mouth 3 times daily as needed (upset stomach)                 Discharge Instructions: Follow up with Sam Garcia DO on 2/5/2020 at 11:00am, Todd Toussaint on 3/3/2020 at 10:30am, and [facility] wound care on 2/7/2020 on 10:30am.  Take medications as directed. Resume activity as tolerated. Diet: DIET RENAL; Low Potassium     Disposition: Patient is medically stable and will be discharged home with home health. Time spent on discharge 40 minutes spent in assessing patient, reviewing medications, discussion with nursing, confirming safe discharge plan and preparation of discharge summary.     Signed:  Wally Mike PA-C   1/31/2020  1:30 PM

## 2020-02-06 ENCOUNTER — HOSPITAL ENCOUNTER (EMERGENCY)
Age: 60
Discharge: HOME | End: 2020-02-06
Attending: EMERGENCY MEDICINE
Payer: MEDICARE

## 2020-02-06 ENCOUNTER — APPOINTMENT (OUTPATIENT)
Dept: GENERAL RADIOLOGY | Age: 60
End: 2020-02-06
Payer: MEDICARE

## 2020-02-06 ENCOUNTER — APPOINTMENT (OUTPATIENT)
Dept: CT IMAGING | Age: 60
End: 2020-02-06
Payer: MEDICARE

## 2020-02-06 VITALS
WEIGHT: 175 LBS | SYSTOLIC BLOOD PRESSURE: 127 MMHG | RESPIRATION RATE: 18 BRPM | HEART RATE: 71 BPM | TEMPERATURE: 97.8 F | OXYGEN SATURATION: 90 % | DIASTOLIC BLOOD PRESSURE: 52 MMHG | BODY MASS INDEX: 26.52 KG/M2 | HEIGHT: 68 IN

## 2020-02-06 LAB
ALBUMIN SERPL-MCNC: 4 G/DL (ref 3.5–5.2)
ALP BLD-CCNC: 91 U/L (ref 40–130)
ALT SERPL-CCNC: 20 U/L (ref 5–41)
ANION GAP SERPL CALCULATED.3IONS-SCNC: 15 MMOL/L (ref 7–19)
AST SERPL-CCNC: 83 U/L (ref 5–40)
BASOPHILS ABSOLUTE: 0.1 K/UL (ref 0–0.2)
BASOPHILS RELATIVE PERCENT: 1 % (ref 0–1)
BILIRUB SERPL-MCNC: 0.7 MG/DL (ref 0.2–1.2)
BUN BLDV-MCNC: 14 MG/DL (ref 6–20)
CALCIUM SERPL-MCNC: 9.1 MG/DL (ref 8.6–10)
CHLORIDE BLD-SCNC: 91 MMOL/L (ref 98–111)
CO2: 29 MMOL/L (ref 22–29)
CREAT SERPL-MCNC: 3.5 MG/DL (ref 0.5–1.2)
EOSINOPHILS ABSOLUTE: 0.2 K/UL (ref 0–0.6)
EOSINOPHILS RELATIVE PERCENT: 3.4 % (ref 0–5)
GFR NON-AFRICAN AMERICAN: 18
GLUCOSE BLD-MCNC: 132 MG/DL (ref 74–109)
HCT VFR BLD CALC: 31 % (ref 42–52)
HEMOGLOBIN: 9.8 G/DL (ref 14–18)
IMMATURE GRANULOCYTES #: 0 K/UL
LYMPHOCYTES ABSOLUTE: 0.7 K/UL (ref 1.1–4.5)
LYMPHOCYTES RELATIVE PERCENT: 9.2 % (ref 20–40)
MCH RBC QN AUTO: 30.6 PG (ref 27–31)
MCHC RBC AUTO-ENTMCNC: 31.6 G/DL (ref 33–37)
MCV RBC AUTO: 96.9 FL (ref 80–94)
MONOCYTES ABSOLUTE: 0.6 K/UL (ref 0–0.9)
MONOCYTES RELATIVE PERCENT: 8.2 % (ref 0–10)
NEUTROPHILS ABSOLUTE: 5.5 K/UL (ref 1.5–7.5)
NEUTROPHILS RELATIVE PERCENT: 77.9 % (ref 50–65)
PDW BLD-RTO: 16.4 % (ref 11.5–14.5)
PLATELET # BLD: 146 K/UL (ref 130–400)
PMV BLD AUTO: 11 FL (ref 9.4–12.4)
POTASSIUM REFLEX MAGNESIUM: 4.8 MMOL/L (ref 3.5–5)
RBC # BLD: 3.2 M/UL (ref 4.7–6.1)
SODIUM BLD-SCNC: 135 MMOL/L (ref 136–145)
TOTAL PROTEIN: 7.6 G/DL (ref 6.6–8.7)
WBC # BLD: 7.1 K/UL (ref 4.8–10.8)

## 2020-02-06 PROCEDURE — 6360000002 HC RX W HCPCS: Performed by: EMERGENCY MEDICINE

## 2020-02-06 PROCEDURE — 80053 COMPREHEN METABOLIC PANEL: CPT

## 2020-02-06 PROCEDURE — 73502 X-RAY EXAM HIP UNI 2-3 VIEWS: CPT

## 2020-02-06 PROCEDURE — 73610 X-RAY EXAM OF ANKLE: CPT

## 2020-02-06 PROCEDURE — 85025 COMPLETE CBC W/AUTO DIFF WBC: CPT

## 2020-02-06 PROCEDURE — 6370000000 HC RX 637 (ALT 250 FOR IP): Performed by: EMERGENCY MEDICINE

## 2020-02-06 PROCEDURE — 99284 EMERGENCY DEPT VISIT MOD MDM: CPT

## 2020-02-06 PROCEDURE — 73562 X-RAY EXAM OF KNEE 3: CPT

## 2020-02-06 PROCEDURE — 96372 THER/PROPH/DIAG INJ SC/IM: CPT

## 2020-02-06 PROCEDURE — 36415 COLL VENOUS BLD VENIPUNCTURE: CPT

## 2020-02-06 RX ORDER — MORPHINE SULFATE 4 MG/ML
4 INJECTION, SOLUTION INTRAMUSCULAR; INTRAVENOUS ONCE
Status: COMPLETED | OUTPATIENT
Start: 2020-02-06 | End: 2020-02-06

## 2020-02-06 RX ORDER — OXYCODONE HYDROCHLORIDE AND ACETAMINOPHEN 5; 325 MG/1; MG/1
1 TABLET ORAL ONCE
Status: COMPLETED | OUTPATIENT
Start: 2020-02-06 | End: 2020-02-06

## 2020-02-06 RX ADMIN — MORPHINE SULFATE 4 MG: 4 INJECTION, SOLUTION INTRAMUSCULAR; INTRAVENOUS at 22:01

## 2020-02-06 RX ADMIN — OXYCODONE HYDROCHLORIDE AND ACETAMINOPHEN 1 TABLET: 5; 325 TABLET ORAL at 20:10

## 2020-02-06 ASSESSMENT — PAIN DESCRIPTION - PAIN TYPE: TYPE: ACUTE PAIN

## 2020-02-06 ASSESSMENT — PAIN DESCRIPTION - ORIENTATION: ORIENTATION: LEFT

## 2020-02-06 ASSESSMENT — PAIN SCALES - GENERAL: PAINLEVEL_OUTOF10: 9

## 2020-02-06 ASSESSMENT — PAIN DESCRIPTION - LOCATION: LOCATION: LEG

## 2020-02-07 ENCOUNTER — HOSPITAL ENCOUNTER (OUTPATIENT)
Dept: WOUND CARE | Age: 60
Discharge: HOME | End: 2020-02-07
Payer: MEDICARE

## 2020-02-07 VITALS
WEIGHT: 175 LBS | TEMPERATURE: 98.4 F | DIASTOLIC BLOOD PRESSURE: 42 MMHG | RESPIRATION RATE: 18 BRPM | SYSTOLIC BLOOD PRESSURE: 123 MMHG | HEIGHT: 68 IN | HEART RATE: 72 BPM | BODY MASS INDEX: 26.52 KG/M2

## 2020-02-07 PROBLEM — L97.912 LEG ULCER, RIGHT, WITH FAT LAYER EXPOSED (HCC): Status: ACTIVE | Noted: 2020-02-07

## 2020-02-07 PROBLEM — L97.412 SKIN ULCER OF RIGHT HEEL WITH FAT LAYER EXPOSED (HCC): Chronic | Status: RESOLVED | Noted: 2019-08-16 | Resolved: 2020-02-07

## 2020-02-07 PROBLEM — L97.422 ULCER OF LEFT HEEL, WITH FAT LAYER EXPOSED (HCC): Status: RESOLVED | Noted: 2018-02-28 | Resolved: 2020-02-07

## 2020-02-07 PROBLEM — T81.49XA WOUND INFECTION AFTER SURGERY: Status: RESOLVED | Noted: 2019-07-08 | Resolved: 2020-02-07

## 2020-02-07 PROBLEM — L97.512 ULCER OF RIGHT FOOT, WITH FAT LAYER EXPOSED (HCC): Chronic | Status: RESOLVED | Noted: 2019-08-16 | Resolved: 2020-02-07

## 2020-02-07 PROCEDURE — 99214 OFFICE O/P EST MOD 30 MIN: CPT

## 2020-02-07 PROCEDURE — 99212 OFFICE O/P EST SF 10 MIN: CPT | Performed by: NURSE PRACTITIONER

## 2020-02-07 PROCEDURE — 97597 DBRDMT OPN WND 1ST 20 CM/<: CPT | Performed by: NURSE PRACTITIONER

## 2020-02-07 RX ORDER — LIDOCAINE HYDROCHLORIDE 20 MG/ML
JELLY TOPICAL PRN
Status: DISCONTINUED | OUTPATIENT
Start: 2020-02-07 | End: 2020-02-09 | Stop reason: HOSPADM

## 2020-02-07 ASSESSMENT — PAIN DESCRIPTION - PROGRESSION: CLINICAL_PROGRESSION: NOT CHANGED

## 2020-02-07 ASSESSMENT — PAIN DESCRIPTION - DESCRIPTORS: DESCRIPTORS: ACHING;BURNING

## 2020-02-07 ASSESSMENT — PAIN DESCRIPTION - ONSET: ONSET: ON-GOING

## 2020-02-07 ASSESSMENT — PAIN DESCRIPTION - PAIN TYPE: TYPE: ACUTE PAIN

## 2020-02-07 ASSESSMENT — PAIN DESCRIPTION - ORIENTATION: ORIENTATION: RIGHT;LEFT

## 2020-02-07 ASSESSMENT — PAIN DESCRIPTION - FREQUENCY: FREQUENCY: CONTINUOUS

## 2020-02-07 ASSESSMENT — PAIN DESCRIPTION - LOCATION: LOCATION: LEG

## 2020-02-07 ASSESSMENT — PAIN SCALES - GENERAL: PAINLEVEL_OUTOF10: 8

## 2020-02-07 NOTE — ED PROVIDER NOTES
dialysis catheter    OTHER SURGICAL HISTORY  9/6/2011   [facility]    Removal of RT IJV tunneled dialysis cath    OTHER SURGICAL HISTORY  5/22/12   [facility]    Ultrasound-guided cannulation of left cephalic vein in the mid forearm toward the AV anastomosis, Left upper  ext. fistulograms including venograms of the SVC, Left cephalic vein balloon angioplasty with a 4mm x 100mm Tod balloon, Completion fistulograms    PACEMAKER PLACEMENT      medtronic    PARACENTESIS      multiple/recent; per dr. Patrick Nixon at [address] [facility] 1/30/2018    UPPER EXTREMITY DRIL PROCEDURE WITH LEFT SAPHENOUS VEIN HARVESTING performed by Andressa Montague MD at [address] [place] [address]  6/19/12    LUE arteriovenous fistulogram including venography of the superior vena cava. Balloon angioplasty, left forearm cephalic vein and upper arm cephalic vein with 5PTI9QU tod balloon.  VASCULAR SURGERY  7/10/2012 S     Revision of left distal radial artery to cephalic vein arteriovenous fistula with 7mm Artegraft interposition.  VASCULAR SURGERY  7/30/15 [facility] & [address]    Left upper extremity arteriovenous fistulograms including venography of the superior vena cava. Left cephalic vein balloon angioplasty with an 8 x 20 cm cutting balloon proximal cephalic vein. Balloon angioplasty of left cephalic vein/antecubital vein near the antecubital crease with 8 x 20 mm cutting balloon.  VASCULAR SURGERY  7[place]30/15 [facility]    Balloon angioplasty proximal end distal upper arm cephalic vein with 9 x 40 cm  balloon. Completion fistulograms of the left upper extremity.  VASCULAR SURGERY  8/13/15 [facility]    Revision of left upper extremity arteriovenous fistula with excision of pseudoaneurysm and primary repair of cephalic vein.     VASCULAR SURGERY  5/3/16 [facility]    Left upper fistulograms/venograms, left cephalic balloon 8 x 20 cutter,9 x 40 conquest,left proximal cephalic vein stents (flair 2 9 x 50), balloon stents 9 x 40 conquest.   [facility] VASCULAR SURGERY  12/08/2016    SJS- ultrasound guided cannulation of left distal cephalic vein ultrasound guided cannulation right internal jugular vein placement of right internal jugular vein tunneled dialysis catheter (Bard Equistream XK 23cm tip to cuff)     VASCULAR SURGERY  01/20/2017    [facility]-Right upper venograms, u/s guided cannulation left basilic vein, left upper venograms, balloon angioplasty left subclavian vein 10x40 conquest.    VASCULAR SURGERY  02/16/2017    [facility]. Left brachial artery to axillary vein arteriovenous graft with 7 mm artegraft. Left upper extremity venograms. Left subclavian vein stent viabahn 13x50. Left subclavian vein stent balloon angioplasty 12x40 atlas.  VASCULAR SURGERY  04/11/2017    [facility]. Removal of tunneled dialysis catheter right internal jugular vein.  VASCULAR SURGERY  01/25/2018    [facility]. Arch aortogram, left upper arteriogram, AV graft angiogram/venogram.    VASCULAR SURGERY  02/28/2018    [facility]. Right CFA 5f-6f-7f sheath, aortogram with bilateral lower arteriogram, left SFA/pop  balloon angioplasty 5x100 , 6x150 lutonix ( 2), left CFA  7f sheath, bilateral iliac kissing stents right 10x38 icast, left 9x59 icast expanded with 10x40 , completion aortogram, mynx left CFA , failed mynx right CFA.  VASCULAR SURGERY  06/13/2019    [facility] left CFA 5f sheath, aortogram with bilateral lower arteriogram, mynx left CFA.  VASCULAR SURGERY  06/25/2019    [facility]. Femoral tibial/perineal bypass with reversed greater saphenous vein.  VASCULAR SURGERY  08/16/2019    [facility]. Aortogram and runoff, selective right CFA injections. PTA of fem-tp bypass with 4.0 x 220 mm tod balloon to 4.33 mm    VASCULAR SURGERY  10/23/2019    [facility]. Thrombin injection in the left SFA PSA, right common femoral artery us guided access, left SFA stent treatment of the flap.     VASCULAR SURGERY  01/30/2020    [facility] Endarterectomy of the right common femoral artery, superficial femoral allergies. FAMILY HISTORY       Family History   Problem Relation Age of Onset    High Blood Pressure Mother     High Blood Pressure Father     High Blood Pressure Sister           SOCIAL HISTORY       Social History     Socioeconomic History    Marital status:      Spouse name: Mallorie Saxena    Number of children: 0    Years of education: 15    Highest education level: None   Occupational History    Occupation:    Social Needs    Financial resource strain: None    Food insecurity:     Worry: None     Inability: None    Transportation needs:     Medical: None     Non-medical: None   Tobacco Use    Smoking status: Current Every Day Smoker     Packs/day: 0.25     Years: 45.00     Pack years: 11.25     Types: Cigarettes    Smokeless tobacco: Never Used    Tobacco comment: about to  quit down to 1 a day cigarettes   Substance and Sexual Activity    Alcohol use: No    Drug use: Not Currently     Types: Marijuana, Cocaine, Methamphetamines, IV, Opiates , Other-see comments     Comment: in the 1970s, LSD    Sexual activity: Yes     Partners: Female     Comment: has a wife   Lifestyle    Physical activity:     Days per week: None     Minutes per session: None    Stress: None   Relationships    Social connections:     Talks on phone: None     Gets together: None     Attends Druze service: None     Active member of club or organization: None     Attends meetings of clubs or organizations: None     Relationship status: None    Intimate partner violence:     Fear of current or ex partner: None     Emotionally abused: None     Physically abused: None     Forced sexual activity: None   Other Topics Concern    None   Social History Narrative    CODE STATUS: Full Code    HEALTH CARE PROXY: Mrs. Mallorie Saxena, [phone]    Back up: his Mother, Mrs. Gary Gamble, [phone]    AMBULATES: independantly    DOMICILED: lives in a private house with 2 steps to get in, has no stairs inside, lives with wife and step children, has 3 pets       SCREENINGS                PHYSICAL EXAM    (up to 7 for level 4, 8 or more for level 5)     ED Triage Vitals   BP Temp Temp Source Pulse Resp SpO2 Height Weight   02/06/20 1820 02/06/20 1819 02/06/20 1819 02/06/20 1819 02/06/20 1819 02/06/20 1819 02/06/20 1818 02/06/20 1818   (!) 127/52 97.8 °F (36.6 °C) Oral 71 18 90 % 5' 8\" (1.727 m) 175 lb (79.4 kg)       Physical Exam  Vitals signs reviewed. Constitutional:       Appearance: Normal appearance. HENT:      Head: Normocephalic and atraumatic. Nose: Nose normal.      Mouth/Throat:      Mouth: Mucous membranes are moist.   Eyes:      Extraocular Movements: Extraocular movements intact. Pupils: Pupils are equal, round, and reactive to light. Neck:      Musculoskeletal: Normal range of motion and neck supple. No muscular tenderness. Cardiovascular:      Rate and Rhythm: Normal rate. Pulses: Normal pulses. Heart sounds: Normal heart sounds. Pulmonary:      Effort: Pulmonary effort is normal.      Breath sounds: Normal breath sounds. Abdominal:      Palpations: Abdomen is soft. Musculoskeletal:         General: Swelling and tenderness present. Comments: Faiint dopplerable pulse. Skin:     General: Skin is dry. Capillary Refill: Capillary refill takes less than 2 seconds. Neurological:      General: No focal deficit present. Mental Status: He is alert and oriented to person, place, and time. Cranial Nerves: No cranial nerve deficit. Sensory: No sensory deficit. Motor: No weakness.       Coordination: Coordination normal.         DIAGNOSTIC RESULTS     EKG: All EKG's are interpreted by the Emergency Department Physician who either signs or Co-signs this chart in the absence of a cardiologist.        RADIOLOGY:   Non-plain film images such as CT, Ultrasound and MRI are read by the radiologist. Plain radiographic images are visualized and preliminarily interpreted by the emergency physician with the below findings:        Interpretation per the Radiologist below, if available at the time of this note:    XR KNEE RIGHT (3 VIEWS)   Final Result   1. No fracture is identified. Demineralized bones. 2. Moderate to large joint effusion. 3. Vascular calcification. Signed by Dr Del Beaulieu on 2/6/2020 8:35 PM      XR ANKLE RIGHT (MIN 3 VIEWS)   Final Result   1. Tiny bone fragment between the medial malleolus and talus may be a   small avulsion fracture fragment. It is age indeterminate. 2. No other evidence of acute ankle fracture. 3. Soft tissue swelling. Signed by Dr Del Beaulieu on 2/6/2020 8:34 PM      XR HIP 2-3 VW W PELVIS RIGHT   Final Result   1. No evidence of acute fracture. Demineralized bones. 2. Vascular disease, as described. Signed by Dr Del Beaulieu on 2/6/2020 8:37 PM            ED BEDSIDE ULTRASOUND:   Performed by ED Physician - none    LABS:  Labs Reviewed   CBC WITH AUTO DIFFERENTIAL - Abnormal; Notable for the following components:       Result Value    RBC 3.20 (*)     Hemoglobin 9.8 (*)     Hematocrit 31.0 (*)     MCV 96.9 (*)     MCHC 31.6 (*)     RDW 16.4 (*)     Neutrophils % 77.9 (*)     Lymphocytes % 9.2 (*)     Lymphocytes Absolute 0.7 (*)     All other components within normal limits   COMPREHENSIVE METABOLIC PANEL W/ REFLEX TO MG FOR LOW K - Abnormal; Notable for the following components:    Sodium 135 (*)     Chloride 91 (*)     Glucose 132 (*)     CREATININE 3.5 (*)     GFR Non- 18 (*)     AST 83 (*)     All other components within normal limits       All other labs were within normal range or not returned as of this dictation.     EMERGENCY DEPARTMENT COURSE and DIFFERENTIAL DIAGNOSIS/MDM:   Vitals:    Vitals:    02/06/20 1818 02/06/20 1819 02/06/20 1820   BP:   (!) 127/52   Pulse:  71    Resp:  18    Temp:  97.8 °F (36.6 °C)    TempSrc:  Oral    SpO2:  90%    Weight: 175 lb (79.4 kg)

## 2020-02-07 NOTE — ED TRIAGE NOTES
Pt here after falling at home states his leg tucked under him, fall happened this morning about 1130 states he had his veins cleaned out and transplanted. Pt has dressing to inner groin to right leg and lower leg. Clean and intact.

## 2020-02-07 NOTE — PROGRESS NOTES
vein stent viabahn 13x50. Left subclavian vein stent balloon angioplasty 12x40 atlas.  VASCULAR SURGERY  04/11/2017    [facility]. Removal of tunneled dialysis catheter right internal jugular vein.  VASCULAR SURGERY  01/25/2018    [facility]. Arch aortogram, left upper arteriogram, AV graft angiogram/venogram.    VASCULAR SURGERY  02/28/2018    [facility]. Right CFA 5f-6f-7f sheath, aortogram with bilateral lower arteriogram, left SFA/pop  balloon angioplasty 5x100 , 6x150 lutonix ( 2), left CFA  7f sheath, bilateral iliac kissing stents right 10x38 icast, left 9x59 icast expanded with 10x40 , completion aortogram, mynx left CFA , failed mynx right CFA.  VASCULAR SURGERY  06/13/2019    [facility] left CFA 5f sheath, aortogram with bilateral lower arteriogram, mynx left CFA.  VASCULAR SURGERY  06/25/2019    [facility]. Femoral tibial/perineal bypass with reversed greater saphenous vein.  VASCULAR SURGERY  08/16/2019    [facility]. Aortogram and runoff, selective right CFA injections. PTA of fem-tp bypass with 4.0 x 220 mm tod balloon to 4.33 mm    VASCULAR SURGERY  10/23/2019    [facility]. Thrombin injection in the left SFA PSA, right common femoral artery us guided access, left SFA stent treatment of the flap.  VASCULAR SURGERY  01/30/2020    [facility] Endarterectomy of the right common femoral artery, superficial femoral artery, and profunda femoris artery. Redo right groin x3. Family History   Problem Relation Age of Onset    High Blood Pressure Mother     High Blood Pressure Father     High Blood Pressure Sister      Social History     Tobacco Use    Smoking status: Current Every Day Smoker     Packs/day: 0.25     Years: 45.00     Pack years: 11.25     Types: Cigarettes    Smokeless tobacco: Never Used    Tobacco comment: about to  quit down to 1 a day cigarettes   Substance Use Topics    Alcohol use: No         Review of Systems    Review of Systems    All other review of systems are negative.     Physical Exam    BP (!) 123/42   Pulse 72   Temp 98.4 °F (36.9 °C) (Temporal)   Resp 18   Ht 5' 8\" (1.727 m)   Wt 175 lb (79.4 kg)   BMI 26.61 kg/m²     Physical Exam        Post Debridement Measurements and Assessment:  Wound 02/07/20 Knee Proximal;Right;Medial Wound 6. Right medial knee (Active)   Wound Image   2/7/2020 10:43 AM   Wound Non-Healing Surgical 2/7/2020 10:43 AM   Dressing Status Old drainage 2/7/2020 10:43 AM   Dressing Changed Changed/New 2/7/2020 10:43 AM   Dressing/Treatment Alginate with Ag 2/7/2020 10:43 AM   Wound Cleansed Rinsed/Irrigated with saline 2/7/2020 10:43 AM   Wound Length (cm) 9.5 cm 2/7/2020 10:43 AM   Wound Width (cm) 1 cm 2/7/2020 10:43 AM   Wound Depth (cm) 0.1 cm 2/7/2020 10:43 AM   Wound Surface Area (cm^2) 9.5 cm^2 2/7/2020 10:43 AM   Wound Volume (cm^3) 0.95 cm^3 2/7/2020 10:43 AM   Post-Procedure Length (cm) 9.5 cm 2/7/2020 10:43 AM   Post-Procedure Width (cm) 1 cm 2/7/2020 10:43 AM   Post-Procedure Depth (cm) 0.1 cm 2/7/2020 10:43 AM   Post-Procedure Surface Area (cm^2) 9.5 cm^2 2/7/2020 10:43 AM   Post-Procedure Volume (cm^3) 0.95 cm^3 2/7/2020 10:43 AM   Distance Tunneling (cm) 0 cm 2/7/2020 10:43 AM   Tunneling Position ___ O'Clock 0 2/7/2020 10:43 AM   Undermining Starts ___ O'Clock 0 2/7/2020 10:43 AM   Undermining Ends___ O'Clock 0 2/7/2020 10:43 AM   Undermining Maxium Distance (cm) 0 2/7/2020 10:43 AM   Wound Assessment Pink;Red;Yellow;Slough 2/7/2020 10:43 AM   Drainage Amount Moderate 2/7/2020 10:43 AM   Drainage Description Serosanguinous 2/7/2020 10:43 AM   Odor None 2/7/2020 10:43 AM   Margins Defined edges 2/7/2020 10:43 AM   [place]%Wound Bed 50 2/7/2020 10:43 AM   Red%Wound Bed 10 2/7/2020 10:43 AM   Yellow%Wound Bed 40 2/7/2020 10:43 AM   Number of days: 0       Wound 02/07/20 Toe (Comment  which one) Anterior;Right Wound 7.   Right 5th toe (Active)   Wound Image   2/7/2020 10:43 AM   Wound Traumatic 2/7/2020 10:43 AM   Wound Cleansed Not Cleansed 2/7/2020 10:43 AM   Wound Length (cm) 1 cm 2/7/2020 10:43 AM   Wound Width (cm) 1 cm 2/7/2020 10:43 AM   Wound Depth (cm) 0.1 cm 2/7/2020 10:43 AM   Wound Surface Area (cm^2) 1 cm^2 2/7/2020 10:43 AM   Wound Volume (cm^3) 0.1 cm^3 2/7/2020 10:43 AM   Distance Tunneling (cm) 0 cm 2/7/2020 10:43 AM   Tunneling Position ___ O'Clock 0 2/7/2020 10:43 AM   Undermining Starts ___ O'Clock 0 2/7/2020 10:43 AM   Undermining Ends___ O'Clock 0 2/7/2020 10:43 AM   Undermining Maxium Distance (cm) 0 2/7/2020 10:43 AM   Wound Assessment Fluid filled blister 2/7/2020 10:43 AM   Drainage Amount None 2/7/2020 10:43 AM   Purple%Wound Bed 100 2/7/2020 10:43 AM   Number of days: 0       Wound 02/07/20 Toe (Comment  which one) Anterior;Right Wound 8. Right 2nd toe (Active)   Wound Image   2/7/2020 10:43 AM   Wound Traumatic 2/7/2020 10:43 AM   Wound Cleansed Not Cleansed 2/7/2020 10:43 AM   Wound Length (cm) 1 cm 2/7/2020 10:43 AM   Wound Width (cm) 0.5 cm 2/7/2020 10:43 AM   Wound Depth (cm) 0.1 cm 2/7/2020 10:43 AM   Wound Surface Area (cm^2) 0.5 cm^2 2/7/2020 10:43 AM   Wound Volume (cm^3) 0.05 cm^3 2/7/2020 10:43 AM   Distance Tunneling (cm) 0 cm 2/7/2020 10:43 AM   Tunneling Position ___ O'Clock 0 2/7/2020 10:43 AM   Undermining Starts ___ O'Clock 0 2/7/2020 10:43 AM   Undermining Ends___ O'Clock 0 2/7/2020 10:43 AM   Undermining Maxium Distance (cm) 0 2/7/2020 10:43 AM   Wound Assessment Fluid filled blister 2/7/2020 10:43 AM   Drainage Amount None 2/7/2020 10:43 AM   Margins Defined edges 2/7/2020 10:43 AM   Purple%Wound Bed 100 2/7/2020 10:43 AM   Number of days: 0      The patientspain isPain Level: 8 Pain Type: Acute pain. Wound is {BETTER/WORSE:38853}. Please refer to nursing measurements and assessment regarding wound pre and postdebridement. Wound 02/07/20 Knee Proximal;Right;Medial Wound 6.   Right medial knee (Active)   Wound Image   2/7/2020 10:43 AM   Wound Non-Healing Surgical 2/7/2020 10:43 AM   Dressing Status Old drainage 2/7/2020 O'Clock 0 2/7/2020 10:43 AM   Undermining Maxium Distance (cm) 0 2/7/2020 10:43 AM   Wound Assessment Fluid filled blister 2/7/2020 10:43 AM   Drainage Amount None 2/7/2020 10:43 AM   Purple%Wound Bed 100 2/7/2020 10:43 AM   Number of days: 0       Wound 02/07/20 Toe (Comment  which one) Anterior;Right Wound 8. Right 2nd toe (Active)   Wound Image   2/7/2020 10:43 AM   Wound Traumatic 2/7/2020 10:43 AM   Wound Cleansed Not Cleansed 2/7/2020 10:43 AM   Wound Length (cm) 1 cm 2/7/2020 10:43 AM   Wound Width (cm) 0.5 cm 2/7/2020 10:43 AM   Wound Depth (cm) 0.1 cm 2/7/2020 10:43 AM   Wound Surface Area (cm^2) 0.5 cm^2 2/7/2020 10:43 AM   Wound Volume (cm^3) 0.05 cm^3 2/7/2020 10:43 AM   Distance Tunneling (cm) 0 cm 2/7/2020 10:43 AM   Tunneling Position ___ O'Clock 0 2/7/2020 10:43 AM   Undermining Starts ___ O'Clock 0 2/7/2020 10:43 AM   Undermining Ends___ O'Clock 0 2/7/2020 10:43 AM   Undermining Maxium Distance (cm) 0 2/7/2020 10:43 AM   Wound Assessment Fluid filled blister 2/7/2020 10:43 AM   Drainage Amount None 2/7/2020 10:43 AM   Margins Defined edges 2/7/2020 10:43 AM   Purple%Wound Bed 100 2/7/2020 10:43 AM   Number of days: 0     Incision 01/27/20 Groin Right (Active)   Wound Image   2/7/2020 10:43 AM   Wound Assessment Dry 2/7/2020 10:43 AM   Shaila-wound Assessment Dry; Intact 2/7/2020 10:43 AM   Wound Length (cm) 13 cm 2/7/2020 10:43 AM   Wound Width (cm) 0.1 cm 2/7/2020 10:43 AM   Wound Depth (cm) 0.1 cm 2/7/2020 10:43 AM   Wound Volume (cm^3) 0.13 cm^3 2/7/2020 10:43 AM   Closure [place]; Sutures 2/7/2020 10:43 AM   Drainage Amount Small 2/7/2020 10:43 AM   Drainage Description Serosanguinous 2/7/2020 10:43 AM   Odor None 2/7/2020 10:43 AM   Dressing/Treatment 4x4;Silicone dressing 8/70/7660  8:29 AM   Dressing Changed Changed/New 1/30/2020  8:29 AM   Dressing Status Clean;Dry; Intact 1/31/2020  8:28 AM   Number of days: 10       Incision 01/27/20 Leg Right (Active)   Wound Image   2/7/2020 10:43 AM   Wound Assessment Clean;Dry; Intact; Bleeding 2/7/2020 10:43 AM   Shaila-wound Assessment Clean;Dry; Intact 2/7/2020 10:43 AM   Wound Length (cm) 16 cm 2/7/2020 10:43 AM   Wound Width (cm) 0.2 cm 2/7/2020 10:43 AM   Wound Depth (cm) 0.1 cm 2/7/2020 10:43 AM   Wound Volume (cm^3) 0.32 cm^3 2/7/2020 10:43 AM   Closure Sutures 2/7/2020 10:43 AM   Drainage Amount Moderate 2/7/2020 10:43 AM   Drainage Description Serosanguinous 2/7/2020 10:43 AM   Odor None 2/7/2020 10:43 AM   Dressing/Treatment Silicone dressing 0/53/0380  8:29 AM   Dressing Changed Changed/New 1/30/2020  8:29 AM   Dressing Status Clean;Dry; Intact 1/31/2020  8:28 AM   Dressing Change Due 01/30/20 1/29/2020  1:00 AM   Number of days: 11         Debridement: {Debridement :39128}    Using {DEBRIDEMENT INSTRUMENTS:65576} the wound(s)/ulcer(s) was/were sharply debrided down through and including the removal of {WOUND CARE DEBRIDEMENT:837336219}. Devitalized Tissue Debrided:  {DEVITALIZED TISSUE DEBRIDED:935542941}    Pre Debridement Measurements:  Are located in the Wound/Ulcer Documentation Flow Sheet    Wound/Ulcer #: { WOUNDS NUMBERS:[id number]}    Post Debridement Measurements:  Wound/Ulcer Descriptions are Pre Debridement except measurements:          Percent of Wound(s)/Ulcer(s) Debrided: {0-100%:805907035}    Total Surface Area Debrided:  *** sq cm     Diabetic/Pressure/Non Pressure Ulcers only:  Ulcer: {Diabetic/Pressure/Non Pressure Ulcers only:66341}     Estimated Blood Loss:  {ESTIMATED BLOOD KKXF:652240106}    Hemostasis Achieved:  {CONTROLLED TWHV:086238822}    Procedural Pain:  {NUMBERS 0-10:98365}  / 10     Post Procedural Pain:  {NUMBERS 0-10:53733} / 10     Response to treatment:  [address] [place] [address],[address] TOLERATED:344761}       Risk factors for atherosclerosis of all vascular beds have been reviewed with the patient including:  Family history, tobacco abuse in all forms, elevated cholesterol, hyperlipidemia, and diabetes.       Options havebeen discussed with the patient including continued medical management vs. proceeding with ***. Patient has opted to proceed with continued medical management. Imaging: ***  Images reviewed with the patient. Assessment    1. Atherosclerosis of native arteries of extremities with rest pain, right leg (HCC)    2. Leg ulcer, right, with fat layer exposed ([address]          Plan    ***    Plan for wound - Dress per physician order  Treatment:     Compression : {YES / KM:67096}   Offloading : {YES / GP:17824}   Dressing : ***  Therapy : ***   Discussed appropriate home care of this wound. Wound redressed. Patient instructions were given. Follow up: 1 week. Recommend no smoking  Offloading instructions given    Mr. Gamaliel Shell has had a revision of right femoral bypass in the hospital 1/23/20 and 1/27/20. He fell and injured his knee, right ankle has a fracture which is limiting his mobility and causing intense pain. He has incisions to his groin and right lower extremity. His previous incision wound is still open which was debrided today. He is taking a break from chemo at this time. We will follow up in 2 weeks, he has home health and is to start physical therapy.

## 2020-02-10 ASSESSMENT — VISUAL ACUITY: OU: 1

## 2020-02-10 NOTE — PROGRESS NOTES
Patient Care Team:  Arlen Cortes DO as PCP - General (Family Medicine)  Aurora Chinchilla MD as Consulting Physician (Cardiology)  Piedad Montoya MD as Consulting Physician ([place] [id number])    TODAY'S DATE:  2/7/2020     HISTORY of PRESENTILLNESS HPI   Lois Austin is a 61 y.o. male who presents today for wound evaluation. Procedure performed: (1/23/20 and 1/27/20) Right leg fem pop bypass revision Dr. Jessica Perez: (7-12-19) Right leg wound exploration with washout of old abscess and mobilization of vein bypass graft for muscle coverage. Procedure:(6-25-19)  1.  Right common femoral endarterectomy with greater saphenous vein patch  2.  Right tibial peroneal trunk and distal below-knee popliteal artery endarterectomy with greater saphenous vein patch  3.  Right common femoral to below-knee popliteal artery bypass graft with reversed saphenous vein graft harvested from the left lower extremity  4.  Right lower extremity arteriograms     His wound today is the surgical non-healing wound from 7/12/2019  He has 2 incisions from recently surgical in the right groin and right lower extremity. Wound Type:non-healing surgical  Wound Location:right medial knee  Modifying factors:edema, smoking, arterial insufficiency and malnutrition    Patient Active Problem List   Diagnosis Code    Osteoarthritis M19.90    Chronic diastolic congestive heart failure (HCC) I50.32    CAD (coronary artery disease) I25.10    Essential hypertension I10    Liver disease K76.9    Chronic deep vein thrombosis (DVT) of axillary vein of left upper extremity (HCC) I82. A22    Steal syndrome as complication of dialysis access ([address] T82.898A    ESRD on dialysis ([address] N18.6, Z99.2    Steal syndrome of dialysis vascular access [facility]) T82.898A    Atherosclerosis of artery of extremity with ulceration ([address] I70.299, L97.909    Atherosclerosis of artery of extremity with rest pain (HCC) I70.229    Anemia, chronic disease D63.8    Iatrogenic complication of vascular surgery T81. 9XXA    Pacemaker Z95.0    Hepatitis C, chronic (HCC) B18.2    PVD (peripheral vascular disease) (HCC) I73.9    Hyponatremia E87.1    Normocytic anemia D64.9    Thrombocytopenia (HCC) D69.6    Palliative care patient I28.6    Alcoholic cirrhosis of liver with ascites ([address] K70.31    Anticoagulated by anticoagulation treatment Z79.01    CHF (congestive heart failure) (HCC) I50.9    Colon polyps K63.5    Dialysis patient ([address] Z99.2    Hepatocellular carcinoma ([address] C22.0    Mixed hyperlipidemia E78.2    Spontaneous bacterial peritonitis (HCC) K65.2    Tobacco abuse Z72.0    Non-healing non-surgical wound T14. 8XXA    Knee pain, right M25.561    Effusion of knee joint right M25.461    Failing vascular bypass graft T82.599A    Hematoma T14. 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 ([facility]) I70.201    NSTEMI (non-ST elevated myocardial infarction) ([address] I21.4    Cellulitis of right lower extremity without foot L03.115    Right leg pain M79.604    Cellulitis of right lower extremity L03.115    Atherosclerosis of native arteries of extremities with rest pain, right leg ([facility]) I70.221    Leg ulcer, right, with fat layer exposed ([address] L97.912       He reports he developed a wound on right leg. This started 7 month(s) ago. He believes this is  healing. He has been applying antibiotic ointment. He has not had  fever orchills. He has a history of peripheral arterial disease.     Robyn Lema is a 61 y.o. male with the following history reviewed and recorded in [facility]:    Current Outpatient Medications   Medication Sig Dispense Refill    b complex-C-folic acid (NEPHROCAPS) 1 MG capsule Take 1 capsule by mouth daily 30 capsule 3    atorvastatin (LIPITOR) 40 MG tablet Take 1 tablet by mouth nightly 30 tablet 3    metoprolol succinate (TOPROL XL) 25 MG extended release transplant list.    Hepatitis C, chronic ([address]     History of blood transfusion     HTN (hypertension)     Hx of blood clots     arm/fistula    Mixed hyperlipidemia 1/9/2017    Osteoarthritis     Pacemaker     Palliative care patient 07/09/2019    PVD (peripheral vascular disease) ([address]     Sepsis ([address]     Skin ulcer of right heel with fat layer exposed ([address] 8/16/2019    Sleep apnea     no cpap at present.  Type II diabetes mellitus (HCC)     no meds.  Ulcer of left heel, with fat layer exposed ([address] 2/28/2018    Ulcer of right foot, with fat layer exposed ([address] 8/16/2019    Wound infection     Wound infection after surgery 7/8/2019       Past Surgical History:   Procedure Laterality Date    ANGIOPLASTY  08/09/11 SUKHWINDER GERARDO cephalic vein balloon angioplasty with 7mm x 4cm balloon. coild embolization of 2 cephallic vein brances with 3mm x 5cm coils    CARDIAC CATHETERIZATION  04/04/11    cardiac cath and stent x1 by Dr. Kali Lu  07/26/11 SUKHWINDER GERARDO AV fistula. coild embolization of cephalic vein branch near the wrist with 3mm EMBO coils.  balloon a'plasty left cephalic vein with [id number] balloon and then [id number] balloon    DIALYSIS FISTULA CREATION Left 2/16/2017    LEFT UPPER EXTREMITY BRACHIAL / AXILLARY GRAFT AND STENT LEFT SUBCLAVIAN VEIN  performed by Donna Yee MD at [address] Right 7/12/2019    RIGHT LEG WOUND WASHOUT performed by Keke Li MD at [facility] Right 1/27/2020    REVISION OF RIGHT LEG BYPASS GRAFT performed by Keke Li MD at [address] FEMORAL-TIBIAL BYPASS GRAFT Right 6/25/2019    FEMORAL TIBIAL/PERINEAL BYPASS WITH REVERSED GREATER SAPHENOUS VEIN performed by Donna Yee MD at [address]  12/26/2010    Right internal jugular vein tunneled dialysis catheter placement    OTHER SURGICAL HISTORY  [id number]    Redo of dialysis catheter    OTHER 12/08/2016    SJS- ultrasound guided cannulation of left distal cephalic vein ultrasound guided cannulation right internal jugular vein placement of right internal jugular vein tunneled dialysis catheter (Bard Equistream XK 23cm tip to cuff)     VASCULAR SURGERY  01/20/2017    SJS-Right upper venograms, u/s guided cannulation left basilic vein, left upper venograms, balloon angioplasty left subclavian vein 10x40 conquest.    VASCULAR SURGERY  02/16/2017    SJS. Left brachial artery to axillary vein arteriovenous graft with 7 mm artegraft. Left upper extremity venograms. Left subclavian vein stent viabahn 13x50. Left subclavian vein stent balloon angioplasty 12x40 atlas.  VASCULAR SURGERY  04/11/2017    SJS. Removal of tunneled dialysis catheter right internal jugular vein.  VASCULAR SURGERY  01/25/2018    SJS. Arch aortogram, left upper arteriogram, AV graft angiogram/venogram.    VASCULAR SURGERY  02/28/2018    SJS. Right CFA 5f-6f-7f sheath, aortogram with bilateral lower arteriogram, left SFA/pop  balloon angioplasty 5x100 , 6x150 lutonix ( 2), left CFA  7f sheath, bilateral iliac kissing stents right 10x38 icast, left 9x59 icast expanded with 10x40 , completion aortogram, mynx left CFA , failed mynx right CFA.  VASCULAR SURGERY  06/13/2019    SJS left CFA 5f sheath, aortogram with bilateral lower arteriogram, mynx left CFA.  VASCULAR SURGERY  06/25/2019    SJS-VI. Femoral tibial/perineal bypass with reversed greater saphenous vein.  VASCULAR SURGERY  08/16/2019    TJR. Aortogram and runoff, selective right CFA injections. PTA of fem-tp bypass with 4.0 x 220 mm tod balloon to 4.33 mm    VASCULAR SURGERY  10/23/2019    VI. Thrombin injection in the left SFA PSA, right common femoral artery us guided access, left SFA stent treatment of the flap.     VASCULAR SURGERY  01/30/2020    TJR Endarterectomy of the right common femoral artery, superficial femoral artery, and profunda femoris artery. Redo right groin x3. Family History   Problem Relation Age of Onset    High Blood Pressure Mother     High Blood Pressure Father     High Blood Pressure Sister      Social History     Tobacco Use    Smoking status: Current Every Day Smoker     Packs/day: 0.25     Years: 45.00     Pack years: 11.25     Types: Cigarettes    Smokeless tobacco: Never Used    Tobacco comment: about to  quit down to 1 a day cigarettes   Substance Use Topics    Alcohol use: No         Review of Systems    Review of Systems   Musculoskeletal: Positive for arthralgias. Skin: Positive for wound. Neurological: Positive for weakness. All other systems reviewed and are negative. All other review of systems are negative. Physical Exam    BP (!) 123/42   Pulse 72   Temp 98.4 °F (36.9 °C) (Temporal)   Resp 18   Ht 5' 8\" (1.727 m)   Wt 175 lb (79.4 kg)   BMI 26.61 kg/m²     Physical Exam  Vitals signs reviewed. Exam conducted with a chaperone present. Constitutional:       Appearance: He is ill-appearing. HENT:      Head: Normocephalic and atraumatic. Right Ear: External ear normal.      Left Ear: External ear normal.      Nose: Nose normal.      Mouth/Throat:      Mouth: Mucous membranes are moist.      Pharynx: Oropharynx is clear. Eyes:      General: Lids are normal. Lids are everted, no foreign bodies appreciated. Vision grossly intact. Gaze aligned appropriately. Conjunctiva/sclera: Conjunctivae normal.      Pupils: Pupils are equal, round, and reactive to light. Neck:      Musculoskeletal: Normal range of motion. Thyroid: No thyroid mass, thyromegaly or thyroid tenderness. Cardiovascular:      Rate and Rhythm: Normal rate and regular rhythm. Pulses: Normal pulses. Heart sounds: Normal heart sounds. Pulmonary:      Effort: Pulmonary effort is normal.      Breath sounds: Normal breath sounds.    Abdominal:      General: Bowel sounds are normal.   Musculoskeletal: Undermining Ends___ O'Clock 0 2/7/2020 10:43 AM   Undermining Maxium Distance (cm) 0 2/7/2020 10:43 AM   Wound Assessment Pink;Red;Yellow;Slough 2/7/2020 10:43 AM   Drainage Amount Moderate 2/7/2020 10:43 AM   Drainage Description Serosanguinous 2/7/2020 10:43 AM   Odor None 2/7/2020 10:43 AM   Margins Defined edges 2/7/2020 10:43 AM   [place]%Wound Bed 50 2/7/2020 10:43 AM   Red%Wound Bed 10 2/7/2020 10:43 AM   Yellow%Wound Bed 40 2/7/2020 10:43 AM   Number of days: 2       Wound 02/07/20 Toe (Comment  which one) Anterior;Right Wound 7. Right 5th toe (Active)   Wound Image   2/7/2020 10:43 AM   Wound Traumatic 2/7/2020 10:43 AM   Wound Cleansed Not Cleansed 2/7/2020 10:43 AM   Wound Length (cm) 1 cm 2/7/2020 10:43 AM   Wound Width (cm) 1 cm 2/7/2020 10:43 AM   Wound Depth (cm) 0.1 cm 2/7/2020 10:43 AM   Wound Surface Area (cm^2) 1 cm^2 2/7/2020 10:43 AM   Wound Volume (cm^3) 0.1 cm^3 2/7/2020 10:43 AM   Distance Tunneling (cm) 0 cm 2/7/2020 10:43 AM   Tunneling Position ___ O'Clock 0 2/7/2020 10:43 AM   Undermining Starts ___ O'Clock 0 2/7/2020 10:43 AM   Undermining Ends___ O'Clock 0 2/7/2020 10:43 AM   Undermining Maxium Distance (cm) 0 2/7/2020 10:43 AM   Wound Assessment Fluid filled blister 2/7/2020 10:43 AM   Drainage Amount None 2/7/2020 10:43 AM   Purple%Wound Bed 100 2/7/2020 10:43 AM   Number of days: 2       Wound 02/07/20 Toe (Comment  which one) Anterior;Right Wound 8.   Right 2nd toe (Active)   Wound Image   2/7/2020 10:43 AM   Wound Traumatic 2/7/2020 10:43 AM   Wound Cleansed Not Cleansed 2/7/2020 10:43 AM   Wound Length (cm) 1 cm 2/7/2020 10:43 AM   Wound Width (cm) 0.5 cm 2/7/2020 10:43 AM   Wound Depth (cm) 0.1 cm 2/7/2020 10:43 AM   Wound Surface Area (cm^2) 0.5 cm^2 2/7/2020 10:43 AM   Wound Volume (cm^3) 0.05 cm^3 2/7/2020 10:43 AM   Distance Tunneling (cm) 0 cm 2/7/2020 10:43 AM   Tunneling Position ___ O'Clock 0 2/7/2020 10:43 AM   Undermining Starts ___ O'Clock 0 2/7/2020 10:43 AM Area Debrided:  9.5 sq cm     Estimated Blood Loss:  None    Hemostasis Achieved:  not needed    Procedural Pain: 10  / 10     Post Procedural Pain:  10 / 10     Response to treatment:  Well tolerated by patient. Assessment    1. Atherosclerosis of native arteries of extremities with rest pain, right leg (HCC)    2. Leg ulcer, right, with fat layer exposed ([address]          Plan    1. Continue wound care with home health  2. Follow up with Dr. Mirta Mccracken per physician order  Treatment:     Compression : No   Offloading : Yes   Dressing Orders: Right 2nd and fifth toes: Wash with soap and water. Apply dry dressing to toes. Secure with medipore tape. Right medial knee wound:   Wash with soap and water. Apply normal saline moistened Aquacel AG (cut to fit) to wound bed. Cover with gauze. Secure with medipore tape. Change daily . Right leg incision and groin: Soap and water wash, apply bordered gauze to cover daily. Treatment Orders:  Protein rich diet (unless restricted by your physician); Multivitamin daily; Elevate legs when sitting, avoid standing for long periods of time. Please continue to follow instructions given by the ER for broken ankle. Patient has an air cast on the right ankle. Discussed importance of smoking cessation, offloading, physical therapy, and wound care. Patient understanding and questions answered. I spent a total of  15 minutes face to face with the patient. Over 100% of that time was spent on counseling and care coordination. Patient was told that if symptoms worsen or new symptoms develop they are to go to the emergency department immediately. Patient was educated on diagnosis and treatment plan. All of patient's questions were answered, and the patient understands the discharge plan.

## 2020-02-13 ASSESSMENT — ENCOUNTER SYMPTOMS
GASTROINTESTINAL NEGATIVE: 1
RESPIRATORY NEGATIVE: 1

## 2020-02-14 ENCOUNTER — HOSPITAL ENCOUNTER (OUTPATIENT)
Dept: WOUND CARE | Age: 60
Discharge: HOME | End: 2020-02-14
Payer: MEDICARE

## 2020-02-14 VITALS
TEMPERATURE: 97.6 F | SYSTOLIC BLOOD PRESSURE: 141 MMHG | BODY MASS INDEX: 26.37 KG/M2 | DIASTOLIC BLOOD PRESSURE: 59 MMHG | HEART RATE: 76 BPM | HEIGHT: 68 IN | WEIGHT: 174 LBS

## 2020-02-14 PROBLEM — L97.512 RIGHT SECOND TOE ULCER, WITH FAT LAYER EXPOSED (HCC): Chronic | Status: ACTIVE | Noted: 2020-02-14

## 2020-02-14 PROBLEM — S81.001A OPEN WOUND OF RIGHT KNEE: Chronic | Status: ACTIVE | Noted: 2020-02-14

## 2020-02-14 PROBLEM — L97.912 LEG ULCER, RIGHT, WITH FAT LAYER EXPOSED (HCC): Chronic | Status: ACTIVE | Noted: 2020-02-07

## 2020-02-14 PROBLEM — D63.8 ANEMIA, CHRONIC DISEASE: Status: ACTIVE | Noted: 2019-06-26

## 2020-02-14 PROCEDURE — 97597 DBRDMT OPN WND 1ST 20 CM/<: CPT

## 2020-02-14 PROCEDURE — 97597 DBRDMT OPN WND 1ST 20 CM/<: CPT | Performed by: SURGERY

## 2020-02-14 ASSESSMENT — PAIN DESCRIPTION - ORIENTATION: ORIENTATION: RIGHT

## 2020-02-14 ASSESSMENT — PAIN - FUNCTIONAL ASSESSMENT: PAIN_FUNCTIONAL_ASSESSMENT: PREVENTS OR INTERFERES SOME ACTIVE ACTIVITIES AND ADLS

## 2020-02-14 ASSESSMENT — PAIN SCALES - GENERAL: PAINLEVEL_OUTOF10: 8

## 2020-02-14 ASSESSMENT — PAIN DESCRIPTION - DESCRIPTORS: DESCRIPTORS: SHARP

## 2020-02-14 ASSESSMENT — PAIN DESCRIPTION - FREQUENCY: FREQUENCY: CONTINUOUS

## 2020-02-14 ASSESSMENT — PAIN DESCRIPTION - ONSET: ONSET: ON-GOING

## 2020-02-14 ASSESSMENT — PAIN DESCRIPTION - PROGRESSION: CLINICAL_PROGRESSION: NOT CHANGED

## 2020-02-14 ASSESSMENT — PAIN DESCRIPTION - PAIN TYPE: TYPE: ACUTE PAIN

## 2020-02-14 ASSESSMENT — PAIN DESCRIPTION - LOCATION: LOCATION: KNEE;LEG;ANKLE

## 2020-02-14 NOTE — PROGRESS NOTES
[address]   Progress Note and Procedure Note      Jose Pereira  MEDICAL RECORD NUMBER:  [id number]  AGE: 61 y.o. GENDER: male  : 1960  EPISODE DATE:  2020    Subjective:     Chief Complaint   Patient presents with    Wound Check     Pt continues to have pain to R. Knee after fall 1 1/2 weeks ago, already had knee xrayed, pt hopes the rest of staples come out today         HISTORY of PRESENT ILLNESS HPI     Jose Pereira is a 61 y.o. male who presents today for wound/ulcer evaluation. Wound Context: Pt with Multiple incisions and wound of knee and R toes here for eval/treat  Wound/Ulcer Pain Timing/Severity: none  Quality of pain: N/A  Severity:  0 / 10   Modifying Factors: None  Associated Signs/Symptoms: none    Ulcer Identification:  Ulcer Type: arterial and non-healing surgical  Contributing Factors: edema    Wound: surgical        PAST MEDICAL HISTORY        Diagnosis Date    Anxiety     Blood circulation, collateral     CAD (coronary artery disease)     stents x 2; per dr. Yue [facility])     liver cancer    CHF (congestive heart failure) ([address]     Chyloperitoneum determined by paracentesis     CKD (chronic kidney disease), stage V ([address]     Dialysis patient ([address]      at [place] GERD (gastroesophageal reflux disease)     Hemodialysis patient ([address]      in [place] Hepatic cirrhosis ([address]     dr. Kentrell Araujo; has been going to [facility] for liver and kidney transplant list.    Hepatitis C, chronic ([address]     History of blood transfusion     HTN (hypertension)     Hx of blood clots     arm/fistula    Mixed hyperlipidemia 2017    Osteoarthritis     Pacemaker     Palliative care patient 2019    PVD (peripheral vascular disease) ([address]     Sepsis ([address]     Skin ulcer of right heel with fat layer exposed ([address] 2019    Sleep apnea     no cpap at present.  Type II diabetes mellitus (HCC)     no meds.     Ulcer of left heel, with fat layer exposed ([address] 2/28/2018    Ulcer of right foot, with fat layer exposed ([address] 8/16/2019    Wound infection     Wound infection after surgery 7/8/2019       PAST SURGICAL HISTORY    Past Surgical History:   Procedure Laterality Date    ANGIOPLASTY  08/09/11 [facility]    LUE cephalic vein balloon angioplasty with 7mm x 4cm balloon. coild embolization of 2 cephallic vein brances with 3mm x 5cm coils    CARDIAC CATHETERIZATION  04/04/11    cardiac cath and stent x1 by Dr. Gallito Canales  07/26/11 [facility]    LULEONARD AV fistula. coild embolization of cephalic vein branch near the wrist with 3mm EMBO coils. balloon a'plasty left cephalic vein with [id number] balloon and then [id number] balloon    DIALYSIS FISTULA CREATION Left 2/16/2017    LEFT UPPER EXTREMITY BRACHIAL / AXILLARY GRAFT AND STENT LEFT SUBCLAVIAN VEIN  performed by Gage Martinez MD at [address] Right 7/12/2019    RIGHT LEG WOUND WASHOUT performed by Wilfredo Fernandez MD at [address] [facility] Right 1/27/2020    REVISION OF RIGHT LEG BYPASS GRAFT performed by Wilfredo Fernandez MD at [address] [facility] FEMORAL-TIBIAL BYPASS GRAFT Right 6/25/2019    FEMORAL TIBIAL/PERINEAL BYPASS WITH REVERSED GREATER SAPHENOUS VEIN performed by Gage Martinez MD at [address] [facility]  12/26/2010    Right internal jugular vein tunneled dialysis catheter placement    OTHER SURGICAL HISTORY  [id number]    Redo of dialysis catheter    OTHER SURGICAL HISTORY  9/6/2011   [facility]    Removal of RT IJV tunneled dialysis cath    OTHER SURGICAL HISTORY  5/22/12   [facility]    Ultrasound-guided cannulation of left cephalic vein in the mid forearm toward the AV anastomosis, Left upper  ext.  fistulograms including venograms of the SVC, Left cephalic vein balloon angioplasty with a 4mm x 100mm Darrell balloon, Completion fistulograms    PACEMAKER PLACEMENT      medtronic    PARACENTESIS      multiple/recent; per  [facility] at [address] [address] [facility] Left 1/30/2018    UPPER EXTREMITY DRIL PROCEDURE WITH LEFT SAPHENOUS VEIN HARVESTING performed by Ramesh Villasenor MD at [address]  6/19/12    LUE arteriovenous fistulogram including venography of the superior vena cava. Balloon angioplasty, left forearm cephalic vein and upper arm cephalic vein with [id number] tod balloon.  VASCULAR SURGERY  7/10/2012 [facility]     Revision of left distal radial artery to cephalic vein arteriovenous fistula with 7mm Artegraft interposition.  VASCULAR SURGERY  7/30/15 [facility] & [address]    Left upper extremity arteriovenous fistulograms including venography of the superior vena cava. Left cephalic vein balloon angioplasty with an 8 x 20 cm cutting balloon proximal cephalic vein. Balloon angioplasty of left cephalic vein/antecubital vein near the antecubital crease with 8 x 20 mm cutting balloon.  VASCULAR SURGERY  7/30/15 [facility]    Balloon angioplasty proximal end distal upper arm cephalic vein with 9 x 40 cm  balloon. Completion fistulograms of the left upper extremity.  VASCULAR SURGERY  8/13/15 S    Revision of left upper extremity arteriovenous fistula with excision of pseudoaneurysm and primary repair of cephalic vein.     VASCULAR SURGERY  5/3/16 [facility]    Left upper fistulograms/venograms, left cephalic balloon 8 x 20 cutter,9 x 40 conquest,left proximal cephalic vein stents (flair 2 9 x 50), balloon stents 9 x 40 conquest.    VASCULAR SURGERY  12/08/2016    [facility]- ultrasound guided cannulation of left distal cephalic vein ultrasound guided cannulation right internal jugular vein placement of right internal jugular vein tunneled dialysis catheter (Bard Equistream XK 23cm tip to cuff)     VASCULAR SURGERY  01/20/2017    [facility]-Right upper venograms, u/s guided cannulation left basilic vein, left upper venograms, balloon angioplasty left subclavian vein 10x40 conquest.    VASCULAR SURGERY  02/16/2017 [facility].Left brachial artery to axillary vein arteriovenous graft with 7 mm artegraft. Left upper extremity venograms. Left subclavian vein stent viabahn 13x50. Left subclavian vein stent balloon angioplasty 12x40 atlas.  VASCULAR SURGERY  04/11/2017    [facility]. Removal of tunneled dialysis catheter right internal jugular vein.  VASCULAR SURGERY  01/25/2018    [facility]. Arch aortogram, left upper arteriogram, AV graft angiogram/venogram.    VASCULAR SURGERY  02/28/2018    [facility]. Right CFA 5f-6f-7f sheath, aortogram with bilateral lower arteriogram, left SFA/pop  balloon angioplasty 5x100 , 6x150 lutonix ( 2), left CFA  7f sheath, bilateral iliac kissing stents right 10x38 icast, left 9x59 icast expanded with 10x40 , completion aortogram, mynx left CFA , failed mynx right CFA.  VASCULAR SURGERY  06/13/2019    [facility] left CFA 5f sheath, aortogram with bilateral lower arteriogram, mynx left CFA.  VASCULAR SURGERY  06/25/2019    [facility]. Femoral tibial/perineal bypass with reversed greater saphenous vein.  VASCULAR SURGERY  08/16/2019    [facility]. Aortogram and runoff, selective right CFA injections. PTA of fem-tp bypass with 4.0 x 220 mm tod balloon to 4.33 mm    VASCULAR SURGERY  10/23/2019    [facility]. Thrombin injection in the left SFA PSA, right common femoral artery us guided access, left SFA stent treatment of the flap.  VASCULAR SURGERY  01/30/2020    [facility] Endarterectomy of the right common femoral artery, superficial femoral artery, and profunda femoris artery. Redo right groin x3.        FAMILY HISTORY    Family History   Problem Relation Age of Onset    High Blood Pressure Mother     High Blood Pressure Father     High Blood Pressure Sister        SOCIAL HISTORY    Social History     Tobacco Use    Smoking status: Current Every Day Smoker     Packs/day: 0.25     Years: 45.00     Pack years: 11.25     Types: Cigarettes    Smokeless tobacco: Never Used    Tobacco comment: about to  quit down to 1 - 3 was negative. Objective:      BP (!) 141/59   Pulse 76   Temp 97.6 °F (36.4 °C) (Temporal)   Ht 5' 8\" (1.727 m)   Wt 174 lb (78.9 kg)   BMI 26.46 kg/m²     Wt Readings from Last 3 Encounters:   02/14/20 174 lb (78.9 kg)   02/07/20 175 lb (79.4 kg)   02/06/20 175 lb (79.4 kg)       PHYSICAL EXAM    General Appearance: alert and oriented to person, place and time, well developed and well- nourished, in no acute distress  Skin: warm and dry, no rash or erythema  Head: normocephalic and atraumatic  Eyes: pupils equal, round, and reactive to light, extraocular eye movements intact, conjunctivae normal  ENT: tympanic membrane, external ear and ear canal normal bilaterally, nose without deformity, nasal mucosa and turbinates normal without polyps, lips teeth and gums normal  Neck: supple and non-tender without mass, no thyromegaly or thyroid nodules, no cervical lymphadenopathy  Pulmonary/Chest: clear to auscultation bilaterally- no wheezes, rales or rhonchi, normal air movement, no respiratory distress  Cardiovascular: normal rate, regular rhythm, normal S1 and S2, no murmurs, rubs, clicks, or gallops, distal pulses intact, no carotid bruits  Abdomen: soft, non-tender, non-distended, normal bowel sounds, no masses or organomegaly  Extremities: no cyanosis, clubbing or edema  Musculoskeletal: normal range of motion, no joint swelling, deformity or tenderness  Neurologic: reflexes normal and symmetric, no cranial nerve deficit, gait, coordination and speech normal, sensation of skin normal      Assessment:      Problem List Items Addressed This Visit     Leg ulcer, right, with fat layer exposed (HCC) (Chronic)    * (Principal) Open wound of right knee (Chronic)    Right second toe ulcer, with fat layer exposed (HCC) (Chronic)           Procedure Note  Indications:  Based on my examination of this patient's wound(s)/ulcer(s) today, debridement is required to promote healing and evaluate the wound base.     Performed by: Aileen Estrada MD    Consent obtained:  Yes    Time out taken:  Yes    Pain Control:         Debridement:Non-excisional Debridement    Using curette the wound(s)/ulcer(s) was/were sharply debrided down through and including the removal of epidermis and dermis. Devitalized Tissue Debrided:  fibrin, biofilm, slough and exudate      Pre Debridement Measurements:  Are located in the [facility]  Documentation Flow Sheet    Wound/Ulcer #: 6,6a,7,8    Percent of Wound(s)/Ulcer(s) Debrided: 100%    Total Surface Area Debrided:  6.5 sq cm       Diabetic/Pressure/Non Pressure Ulcers only:  Ulcer: Non-Pressure ulcer, fat layer exposed        Incision 01/27/20 Groin Right (Active)   Wound Image   2/14/2020  9:31 AM   Wound Assessment Dry; Intact 2/14/2020  9:31 AM   Wound Length (cm) 13 cm 2/14/2020  9:31 AM   Wound Width (cm) 0.1 cm 2/14/2020  9:31 AM   Wound Depth (cm) 0.1 cm 2/14/2020  9:31 AM   Wound Volume (cm^3) 0.13 cm^3 2/14/2020  9:31 AM   Wound Healing % 0 2/14/2020  9:31 AM   Closure Staples;Surface sutures 2/14/2020  9:31 AM   Drainage Amount None 2/14/2020  9:31 AM   Odor None 2/14/2020  9:31 AM   Dressing/Treatment 4x4;Silicone dressing 5/25/2282  8:29 AM   Dressing Changed Changed/New 1/30/2020  8:29 AM   Dressing Status Clean;Dry; Intact 1/31/2020  8:28 AM   Number of days: 17       Incision 01/27/20 Leg Right (Active)   Wound Image   2/14/2020  9:31 AM   Wound Assessment Drainage;[place] 2/14/2020  9:31 AM   Shaila-wound Assessment Swelling 2/14/2020  9:31 AM   Wound Length (cm) 16 cm 2/14/2020  9:31 AM   Wound Width (cm) 0.2 cm 2/14/2020  9:31 AM   Wound Depth (cm) 0.1 cm 2/14/2020  9:31 AM   Wound Volume (cm^3) 0.32 cm^3 2/14/2020  9:31 AM   Wound Healing % 0 2/14/2020  9:31 AM   Closure Surface sutures 2/14/2020  9:31 AM   Drainage Amount Moderate 2/14/2020  9:31 AM   Drainage Description Serosanguinous 2/14/2020  9:31 AM   Odor None 2/14/2020  9:31 AM   Dressing/Treatment Silicone dressing 5/61/2488 2/14/2020  9:31 AM   [place]%Wound Bed 10 2/14/2020  9:31 AM   Red%Wound Bed 70 2/14/2020  9:31 AM   Yellow%Wound Bed 20 2/14/2020  9:31 AM   Black%Wound Bed 0 2/14/2020  9:31 AM   Purple%Wound Bed 0 2/14/2020  9:31 AM   Other%Wound Bed 0 2/14/2020  9:31 AM   Number of days: 6       Wound 02/07/20 Toe (Comment  which one) Anterior;Right Wound 7.   Right 5th toe (Active)   Wound Image   2/14/2020  9:31 AM   Wound Traumatic 2/14/2020  9:31 AM   Dressing Status Old drainage 2/14/2020  9:31 AM   Wound Cleansed Rinsed/Irrigated with saline 2/14/2020  9:31 AM   Wound Length (cm) 0.5 cm 2/14/2020  9:31 AM   Wound Width (cm) 0.5 cm 2/14/2020  9:31 AM   Wound Depth (cm) 0.1 cm 2/14/2020  9:31 AM   Wound Surface Area (cm^2) 0.25 cm^2 2/14/2020  9:31 AM   Change in Wound Size % (l*w) 75 2/14/2020  9:31 AM   Wound Volume (cm^3) 0.02 cm^3 2/14/2020  9:31 AM   Wound Healing % 80 2/14/2020  9:31 AM   Post-Procedure Length (cm) 0.5 cm 2/14/2020 10:19 AM   Post-Procedure Width (cm) 0.5 cm 2/14/2020 10:19 AM   Post-Procedure Depth (cm) 0.1 cm 2/14/2020 10:19 AM   Post-Procedure Surface Area (cm^2) 0.25 cm^2 2/14/2020 10:19 AM   Post-Procedure Volume (cm^3) 0.02 cm^3 2/14/2020 10:19 AM   Distance Tunneling (cm) 0 cm 2/14/2020  9:31 AM   Tunneling Position ___ O'Clock 0 2/14/2020  9:31 AM   Undermining Starts ___ O'Clock 0 2/14/2020  9:31 AM   Undermining Ends___ O'Clock 0 2/14/2020  9:31 AM   Undermining Maxium Distance (cm) 0 2/14/2020  9:31 AM   Wound Assessment [place];Red;Drainage 2/14/2020  9:31 AM   Drainage Amount Small 2/14/2020  9:31 AM   Drainage Description Serosanguinous 2/14/2020  9:31 AM   Odor None 2/14/2020  9:31 AM   Margins Attached edges 2/14/2020  9:31 AM   Shaila-wound Assessment Pink 2/14/2020  9:31 AM   Non-staged Wound Description Full thickness 2/14/2020  9:31 AM   [place]%Wound Bed 50 2/14/2020  9:31 AM   Red%Wound Bed 45 2/14/2020  9:31 AM   Yellow%Wound Bed 5 2/14/2020  9:31 AM   Black%Wound Bed 0 2/14/2020  9:31 AM Instructions    In my professional opinion this patient would benefit from HBO Therapy: No    Written patient dismissal instructions given to patient and signed by patient or POA. Discharge Instructions       Visit Discharge/Physician Orders    Discharge condition: Stable    Discharge to: Home    Left via:Private automobile     Dressing Orders: Right foot toe wounds  Wash with soap and water. Apply dry dressing protective dressing    Dressing Orders Right medial knee wound:  Wash with soap and water. Apply normal saline moistened Aquacel AG (cut to fit) to wound bed. Cover with gauze. Secure with medipore tape. Change daily . Right leg incision and groin: Soap and water wash, apply bordered gauze to cover daily. Treatment Orders:  Protein rich diet (unless restricted by your physician); Multivitamin daily; Elevate legs when sitting, avoid standing for  long periods of time. Please continue to follow instructions given by the ER for broken ankle. Patient has an air cast on the right ankle    [address] [place] [address],[address] followup visit ____________1 week_________________  (Please note your next appointment above and if you are unable to keep, kindly give a 24 hour notice. Thank you.)    If you experience any of the following, please call the [facility] during business hours:    * Increase in Pain  * Temperature over 101  * Increase in drainage from your wound  * Drainage with a foul odor  * Bleeding  * Increase in swelling  * Need for compression bandage changes due to slippage, breakthrough drainage. If you need medical attention outside of the business hours of the [facility] please contact your PCP or go to the nearest emergency room.         Electronically signed by Marvel Mccracken MD on 2/14/2020 at 10:32 AM

## 2020-02-21 ENCOUNTER — HOSPITAL ENCOUNTER (OUTPATIENT)
Dept: WOUND CARE | Age: 60
Discharge: HOME | End: 2020-02-21
Payer: MEDICARE

## 2020-02-21 VITALS
HEIGHT: 68 IN | TEMPERATURE: 98.3 F | HEART RATE: 69 BPM | WEIGHT: 174 LBS | BODY MASS INDEX: 26.37 KG/M2 | RESPIRATION RATE: 20 BRPM | DIASTOLIC BLOOD PRESSURE: 49 MMHG | SYSTOLIC BLOOD PRESSURE: 146 MMHG

## 2020-02-21 PROCEDURE — 99214 OFFICE O/P EST MOD 30 MIN: CPT

## 2020-02-21 PROCEDURE — 97597 DBRDMT OPN WND 1ST 20 CM/<: CPT | Performed by: SURGERY

## 2020-02-21 PROCEDURE — 97597 DBRDMT OPN WND 1ST 20 CM/<: CPT

## 2020-02-21 RX ORDER — CLOPIDOGREL BISULFATE 75 MG/1
TABLET ORAL
Qty: 90 TABLET | Refills: 1 | Status: SHIPPED | OUTPATIENT
Start: 2020-02-21

## 2020-02-21 ASSESSMENT — PAIN DESCRIPTION - ORIENTATION: ORIENTATION: RIGHT

## 2020-02-21 ASSESSMENT — PAIN DESCRIPTION - ONSET: ONSET: ON-GOING

## 2020-02-21 ASSESSMENT — PAIN DESCRIPTION - PAIN TYPE: TYPE: ACUTE PAIN

## 2020-02-21 ASSESSMENT — PAIN DESCRIPTION - PROGRESSION: CLINICAL_PROGRESSION: NOT CHANGED

## 2020-02-21 ASSESSMENT — PAIN SCALES - GENERAL: PAINLEVEL_OUTOF10: 8

## 2020-02-21 ASSESSMENT — PAIN DESCRIPTION - DESCRIPTORS: DESCRIPTORS: ACHING

## 2020-02-21 ASSESSMENT — PAIN DESCRIPTION - FREQUENCY: FREQUENCY: CONTINUOUS

## 2020-02-21 ASSESSMENT — PAIN DESCRIPTION - LOCATION: LOCATION: FOOT;KNEE;BACK

## 2020-02-21 NOTE — PROGRESS NOTES
[address]   Progress Note and Procedure Note      Lakhwinder Miles  MEDICAL RECORD NUMBER:  [id number]  AGE: 61 y.o. GENDER: male  : 1960  EPISODE DATE:  2020    Subjective:     Chief Complaint   Patient presents with    Wound Check     Pt fell this week tryed to walk without his walker during the night, pt requesting staples to be removed from groin some of them have fell out         HISTORY of PRESENT ILLNESS HPI     Lakhwinder Miles is a 61 y.o. male who presents today for wound/ulcer evaluation. Wound Context: Pt with R knee,RLE, R toe wounds here for eval/treat  Wound/Ulcer Pain Timing/Severity: none  Quality of pain: N/A  Severity:  0 / 10   Modifying Factors: None  Associated Signs/Symptoms: none    Ulcer Identification:  Ulcer Type: non-healing surgical  Contributing Factors: edema and shear force    Wound: surgical        PAST MEDICAL HISTORY        Diagnosis Date    Anxiety     Blood circulation, collateral     CAD (coronary artery disease)     stents x 2; per dr. Noman Miller [facility])     liver cancer    CHF (congestive heart failure) ([address]     Chyloperitoneum determined by paracentesis     CKD (chronic kidney disease), stage V ([address]     Dialysis patient ([address]      at [place] GERD (gastroesophageal reflux disease)     Hemodialysis patient ([address]      in [place] Hepatic cirrhosis ([address]     dr. Elvis Chen; has been going to [facility] for liver and kidney transplant list.    Hepatitis C, chronic ([address]     History of blood transfusion     HTN (hypertension)     Hx of blood clots     arm/fistula    Mixed hyperlipidemia 2017    Osteoarthritis     Pacemaker     Palliative care patient 2019    PVD (peripheral vascular disease) ([address]     Sepsis ([address]     Skin ulcer of right heel with fat layer exposed ([address] 2019    Sleep apnea     no cpap at present.  Type II diabetes mellitus (HCC)     no meds.     Ulcer of left heel, with fat layer exposed ([facility] 2/28/2018    Ulcer of right foot, with fat layer exposed ([address] 8/16/2019    Wound infection     Wound infection after surgery 7/8/2019       PAST SURGICAL HISTORY    Past Surgical History:   Procedure Laterality Date    ANGIOPLASTY  08/09/11 [facility] [place]    LULEONARD cephalic vein balloon angioplasty with 7mm x 4cm balloon. coild embolization of 2 cephallic vein brances with 3mm x 5cm coils    CARDIAC CATHETERIZATION  04/04/11    cardiac cath and stent x1 by Dr. Adriane Kilgore  07/26/11 [facility] [place]    LULEONARD AV fistula. coild embolization of cephalic vein branch near the wrist with 3mm EMBO coils. balloon a'plasty left cephalic vein with [id number] balloon and then [id number] balloon    DIALYSIS FISTULA CREATION Left 2/16/2017    LEFT UPPER EXTREMITY BRACHIAL / AXILLARY GRAFT AND STENT LEFT SUBCLAVIAN VEIN  performed by Batool Cox MD at [address] Right 7/12/2019    RIGHT LEG WOUND WASHOUT performed by Jeremiah Otero MD at [address] Right 1/27/2020    REVISION OF RIGHT LEG BYPASS GRAFT performed by Jeremiah Otero MD at [address] [facility] FEMORAL-TIBIAL BYPASS GRAFT Right 6/25/2019    FEMORAL TIBIAL/PERINEAL BYPASS WITH REVERSED GREATER SAPHENOUS VEIN performed by Batool Cox MD at [address] [facility]  12/26/2010    Right internal jugular vein tunneled dialysis catheter placement    OTHER SURGICAL HISTORY  [id number]    Redo of dialysis catheter    OTHER SURGICAL HISTORY  9/6/2011   [facility]    Removal of RT IJV tunneled dialysis cath    OTHER SURGICAL HISTORY  5/22/12   [facility]    Ultrasound-guided cannulation of left cephalic vein in the mid forearm toward the AV anastomosis, Left upper  ext.  fistulograms including venograms of the SVC, Left cephalic vein balloon angioplasty with a 4mm x 100mm Darrell balloon, Completion fistulograms    PACEMAKER PLACEMENT      medtronic    PARACENTESIS      multiple/recent; per dr. Ashley Orr at daily   Substance Use Topics    Alcohol use: No    Drug use: Not Currently     Types: Marijuana, Cocaine, Methamphetamines, IV, Opiates , Other-see comments     Comment: in the 1970s, LSD       ALLERGIES    No Known Allergies    MEDICATIONS    Current Outpatient Medications on File Prior to Encounter   Medication Sig Dispense Refill    b complex-C-folic acid (NEPHROCAPS) 1 MG capsule Take 1 capsule by mouth daily 30 capsule 3    atorvastatin (LIPITOR) 40 MG tablet Take 1 tablet by mouth nightly 30 tablet 3    metoprolol succinate (TOPROL XL) 25 MG extended release tablet Take 1 tablet by mouth 2 times daily 30 tablet 3    sevelamer (RENVELA) 800 MG tablet Take 1 tablet by mouth 3 times daily (with meals) 90 tablet 3    oxyCODONE (OXYCONTIN) 10 MG extended release tablet Take 10 mg by mouth every 8 hours as needed for Pain.  aspirin 81 MG EC tablet Take 1 tablet by mouth daily 30 tablet 3    isosorbide mononitrate (IMDUR) 30 MG extended release tablet Take 1 tablet by mouth daily 30 tablet 3    calcium acetate (PHOSLO) 667 MG capsule Take 2,001 mg by mouth 3 times daily (with meals)      clopidogrel (PLAVIX) 75 MG tablet TAKE 1 TABLET EVERY DAY 90 tablet 1    sucralfate (CARAFATE) 1 GM tablet Take 1 tablet by mouth 3 times daily as needed (upset stomach) 120 tablet 3    lactulose (CEPHULAC) 10 G packet Take 10 g by mouth 3 times daily as needed Indications: Disorder of the Liver Enzymes Not using regular states stomach is tore up      minoxidil (LONITEN) 10 MG tablet Take 10 mg by mouth daily Indications: High Blood Pressure Disorder, SBP >160       cloNIDine (CATAPRES) 0.2 MG tablet Take 0.3 mg by mouth 3 times daily Indications: High Blood Pressure       amLODIPine (NORVASC) 10 MG tablet Take 10 mg by mouth daily Indications: High Blood Pressure        No current facility-administered medications on file prior to encounter.         REVIEW OF SYSTEMS    A comprehensive review of systems was negative. Objective:      BP (!) 146/49   Pulse 69   Temp 98.3 °F (36.8 °C)   Resp 20   Ht 5' 8\" (1.727 m)   Wt 174 lb (78.9 kg)   BMI 26.46 kg/m²     Wt Readings from Last 3 Encounters:   02/21/20 174 lb (78.9 kg)   02/14/20 174 lb (78.9 kg)   02/07/20 175 lb (79.4 kg)       PHYSICAL EXAM    General Appearance: alert and oriented to person, place and time, well developed and well- nourished, in no acute distress  Skin: warm and dry, no rash or erythema  Head: normocephalic and atraumatic  Eyes: pupils equal, round, and reactive to light, extraocular eye movements intact, conjunctivae normal  ENT: tympanic membrane, external ear and ear canal normal bilaterally, nose without deformity, nasal mucosa and turbinates normal without polyps, lips teeth and gums normal  Neck: supple and non-tender without mass, no thyromegaly or thyroid nodules, no cervical lymphadenopathy  Pulmonary/Chest: clear to auscultation bilaterally- no wheezes, rales or rhonchi, normal air movement, no respiratory distress  Cardiovascular: normal rate, regular rhythm, normal S1 and S2, no murmurs, rubs, clicks, or gallops, distal pulses intact, no carotid bruits  Abdomen: soft, non-tender, non-distended, normal bowel sounds, no masses or organomegaly  Extremities: no cyanosis, clubbing or edema  Musculoskeletal: normal range of motion, no joint swelling, deformity or tenderness  Neurologic: reflexes normal and symmetric, no cranial nerve deficit, gait, coordination and speech normal, sensation of skin normal      Assessment:      Problem List Items Addressed This Visit     Leg ulcer, right, with fat layer exposed (HCC) (Chronic)    Open wound of right knee (Chronic)    * (Principal) Right second toe ulcer, with fat layer exposed (HCC) (Chronic)           Procedure Note  Indications:  Based on my examination of this patient's wound(s)/ulcer(s) today, debridement is required to promote healing and evaluate the wound base.     Performed by: Odor None 2/21/2020  9:53 AM   Dressing/Treatment 4x4;Medipore 2/14/2020 10:55 AM   Dressing Changed Changed/New 2/14/2020 10:55 AM   Dressing Status Old drainage 2/21/2020  9:53 AM   Dressing Change Due 01/30/20 1/29/2020  1:00 AM   Number of days: 25            Post Debridement Measurements:    Wound/Ulcer Descriptions are Pre Debridement --EXCEPT MEASUREMENTS    Wound 02/07/20 Knee Proximal;Right;Medial Wound 6. Surgical Right medial knee(wound  on 2/14/20) (Active)   Wound Image    2/21/2020  9:53 AM   Wound Non-Healing Surgical 2/21/2020  9:53 AM   Dressing Status Old drainage 2/21/2020  9:53 AM   Dressing Changed Changed/New 2/14/2020 10:55 AM   Dressing/Treatment Alginate with Ag;4x4;Medipore 2/14/2020 10:55 AM   Wound Cleansed Rinsed/Irrigated with saline 2/21/2020  9:53 AM   Wound Length (cm) 4 cm 2/21/2020  9:53 AM   Wound Width (cm) 0.5 cm 2/21/2020  9:53 AM   Wound Depth (cm) 0.1 cm 2/21/2020  9:53 AM   Wound Surface Area (cm^2) 2 cm^2 2/21/2020  9:53 AM   Change in Wound Size % (l*w) 78.95 2/21/2020  9:53 AM   Wound Volume (cm^3) 0.2 cm^3 2/21/2020  9:53 AM   Wound Healing % 79 2/21/2020  9:53 AM   Post-Procedure Length (cm) 4 cm 2/21/2020 11:29 AM   Post-Procedure Width (cm) 0.5 cm 2/21/2020 11:29 AM   Post-Procedure Depth (cm) 0.1 cm 2/21/2020 11:29 AM   Post-Procedure Surface Area (cm^2) 2 cm^2 2/21/2020 11:29 AM   Post-Procedure Volume (cm^3) 0.2 cm^3 2/21/2020 11:29 AM   Distance Tunneling (cm) 0 cm 2/21/2020  9:53 AM   Tunneling Position ___ O'Clock 0 2/21/2020  9:53 AM   Undermining Starts ___ O'Clock 0 2/21/2020  9:53 AM   Undermining Ends___ O'Clock 0 2/21/2020  9:53 AM   Undermining Maxium Distance (cm) 0 2/21/2020  9:53 AM   Wound Assessment Red;Slough; Yellow;Drainage 2/14/2020  9:31 AM   Drainage Amount Small 2/21/2020  9:53 AM   Drainage Description Serosanguinous 2/21/2020  9:53 AM   Odor None 2/21/2020  9:53 AM   Margins Attached edges 2/21/2020  9:53 AM   Shaila-wound Assessment Pink 2/21/2020  9:53 AM   Non-staged Wound Description Full thickness 2/21/2020  9:53 AM   [place]%Wound Bed 40 2/21/2020  9:53 AM   Red%Wound Bed 55 2/21/2020  9:53 AM   Yellow%Wound Bed 5 2/21/2020  9:53 AM   Black%Wound Bed 0 2/21/2020  9:53 AM   Purple%Wound Bed 0 2/21/2020  9:53 AM   Other%Wound Bed 0 2/21/2020  9:53 AM   Number of days: 14       Wound 02/07/20 Toe (Comment  which one) Anterior;Right Wound 7.   Right 5th toe (Active)   Wound Image    2/21/2020  9:53 AM   Wound Traumatic 2/21/2020  9:53 AM   Dressing Status Old drainage 2/21/2020  9:53 AM   Wound Cleansed Rinsed/Irrigated with saline 2/21/2020  9:53 AM   Wound Length (cm) 0.6 cm 2/21/2020  9:53 AM   Wound Width (cm) 0.5 cm 2/21/2020  9:53 AM   Wound Depth (cm) 0.1 cm 2/21/2020  9:53 AM   Wound Surface Area (cm^2) 0.3 cm^2 2/21/2020  9:53 AM   Change in Wound Size % (l*w) 70 2/21/2020  9:53 AM   Wound Volume (cm^3) 0.03 cm^3 2/21/2020  9:53 AM   Wound Healing % 70 2/21/2020  9:53 AM   Post-Procedure Length (cm) 0.6 cm 2/21/2020 11:29 AM   Post-Procedure Width (cm) 0.5 cm 2/21/2020 11:29 AM   Post-Procedure Depth (cm) 0.1 cm 2/21/2020 11:29 AM   Post-Procedure Surface Area (cm^2) 0.3 cm^2 2/21/2020 11:29 AM   Post-Procedure Volume (cm^3) 0.03 cm^3 2/21/2020 11:29 AM   Distance Tunneling (cm) 0 cm 2/21/2020  9:53 AM   Tunneling Position ___ O'Clock 0 2/21/2020  9:53 AM   Undermining Starts ___ O'Clock 0 2/21/2020  9:53 AM   Undermining Ends___ O'Clock 0 2/21/2020  9:53 AM   Undermining Max[place] Distance (cm) 0 2/21/2020  9:53 AM   Wound Assessment Black 2/21/2020  9:53 AM   Drainage Amount None 2/21/2020  9:53 AM   Odor None 2/21/2020  9:53 AM   Margins Defined edges 2/21/2020  9:53 AM   Shaila-wound Assessment Swelling;Red 2/21/2020  9:53 AM   Non-staged Wound Description Full thickness 2/21/2020  9:53 AM   [place]%Wound Bed 0 2/21/2020  9:53 AM   Red%Wound Bed 0 2/21/2020  9:53 AM   Yellow%Wound Bed 0 2/21/2020  9:53 AM   Black%Wound Bed 100 2/21/2020  9:53 2/21/2020  9:53 AM   Wound Non-Healing Surgical 2/21/2020  9:53 AM   Dressing Status Old drainage 2/21/2020  9:53 AM   Dressing Changed Changed/New 2/14/2020 10:55 AM   Dressing/Treatment Alginate with Ag;4x4;Medipore 2/14/2020 10:55 AM   Wound Cleansed Rinsed/Irrigated with saline 2/21/2020  9:53 AM   Wound Length (cm) 1.8 cm 2/21/2020  9:53 AM   Wound Width (cm) 0.3 cm 2/21/2020  9:53 AM   Wound Depth (cm) 0.1 cm 2/21/2020  9:53 AM   Wound Surface Area (cm^2) 0.54 cm^2 2/21/2020  9:53 AM   Change in Wound Size % (l*w) 66.67 2/21/2020  9:53 AM   Wound Volume (cm^3) 0.05 cm^3 2/21/2020  9:53 AM   Wound Healing % 69 2/21/2020  9:53 AM   Post-Procedure Length (cm) 1.8 cm 2/21/2020 11:29 AM   Post-Procedure Width (cm) 0.3 cm 2/21/2020 11:29 AM   Post-Procedure Depth (cm) 0.1 cm 2/21/2020 11:29 AM   Post-Procedure Surface Area (cm^2) 0.54 cm^2 2/21/2020 11:29 AM   Post-Procedure Volume (cm^3) 0.05 cm^3 2/21/2020 11:29 AM   Distance Tunneling (cm) 0 cm 2/21/2020  9:53 AM   Tunneling Position ___ O'Clock 0 2/21/2020  9:53 AM   Undermining Starts ___ O'Clock 0 2/21/2020  9:53 AM   Undermining Ends___ O'Clock 0 2/21/2020  9:53 AM   Undermining Maxium Distance (cm) 0 2/21/2020  9:53 AM   Wound Assessment Red;Pink;Slough; Yellow;Drainage 2/21/2020  9:53 AM   Drainage Amount Small 2/21/2020  9:53 AM   Drainage Description Serosanguinous 2/21/2020  9:53 AM   Odor None 2/21/2020  9:53 AM   Margins Attached edges 2/21/2020  9:53 AM   Shaila-wound Assessment Swelling 2/21/2020  9:53 AM   Non-staged Wound Description Full thickness 2/21/2020  9:53 AM   [place]%Wound Bed 45 2/21/2020  9:53 AM   Red%Wound Bed 50 2/21/2020  9:53 AM   Yellow%Wound Bed 5 2/21/2020  9:53 AM   Black%Wound Bed 0 2/21/2020  9:53 AM   Purple%Wound Bed 0 2/21/2020  9:53 AM   Other%Wound Bed 0 2/21/2020  9:53 AM   Number of days: 7             Estimated Blood Loss:  Minimal    Hemostasis Achieved:  by pressure    Procedural Pain:  0  / 10     Post Procedural Pain: 0 / 10     Response to treatment:  Well tolerated by patient. Plan:     Problem List Items Addressed This Visit     Leg ulcer, right, with fat layer exposed ([address] (Chronic)    Open wound of right knee (Chronic)    * (Principal) Right second toe ulcer, with fat layer exposed ([address] (Chronic)          Treatment Note please see attached Discharge Instructions    In my professional opinion this patient would benefit from HBO Therapy: No    Written patient dismissal instructions given to patient and signed by patient or POA. Discharge Instructions       Visit Discharge/Physician Orders    Discharge condition: Stable    Discharge to: Home    Left via:Private automobile    Accompanied by: Mother    [facility]    Remove sutures and staples from right groin today in [facility]    Dressing Orders: Right Foot,Toes and Lower Ext Wounds  Wash with soap and water. Apply Xeroform to open areas, Cover with dry gauze, Secure with roll gauze and medipore tape  Change once daily  Elevate foot to level above heart as much as possible to reduce swelling    Treatment Orders:  Protein rich diet (unless restricted by your physician); Multivitamin daily;   Please continue to follow instructions given by the ER for broken ankle. [facility] followup visit _____________1 week________________  (Please note your next appointment above and if you are unable to keep, kindly give a 24 hour notice. Thank you.)    If you experience any of the following, please call the [facility] [address] [facility] during business hours:    * Increase in Pain  * Temperature over 101  * Increase in drainage from your wound  * Drainage with a foul odor  * Bleeding  * Increase in swelling  * Need for compression bandage changes due to slippage, breakthrough drainage. If you need medical attention outside of the business hours of the [facility] [address] please contact your PCP or go to the nearest emergency room.         Electronically signed by Naye Orellana Jennie Poon MD on 2/21/2020 at 11:31 AM

## 2020-02-28 ENCOUNTER — HOSPITAL ENCOUNTER (OUTPATIENT)
Dept: WOUND CARE | Age: 60
Discharge: HOME | End: 2020-02-28
Payer: MEDICARE

## 2020-02-28 VITALS
BODY MASS INDEX: 26.37 KG/M2 | WEIGHT: 174 LBS | SYSTOLIC BLOOD PRESSURE: 154 MMHG | DIASTOLIC BLOOD PRESSURE: 61 MMHG | TEMPERATURE: 97.8 F | RESPIRATION RATE: 20 BRPM | HEART RATE: 78 BPM | HEIGHT: 68 IN

## 2020-02-28 PROCEDURE — 99214 OFFICE O/P EST MOD 30 MIN: CPT

## 2020-02-28 PROCEDURE — 99213 OFFICE O/P EST LOW 20 MIN: CPT | Performed by: SURGERY

## 2020-02-28 ASSESSMENT — PAIN DESCRIPTION - ONSET: ONSET: ON-GOING

## 2020-02-28 ASSESSMENT — PAIN SCALES - GENERAL: PAINLEVEL_OUTOF10: 6

## 2020-02-28 ASSESSMENT — PAIN DESCRIPTION - ORIENTATION: ORIENTATION: RIGHT

## 2020-02-28 ASSESSMENT — PAIN DESCRIPTION - DIRECTION: RADIATING_TOWARDS: KNEE DOWN

## 2020-02-28 ASSESSMENT — PAIN DESCRIPTION - DESCRIPTORS: DESCRIPTORS: THROBBING

## 2020-02-28 ASSESSMENT — PAIN DESCRIPTION - PAIN TYPE: TYPE: ACUTE PAIN

## 2020-02-28 ASSESSMENT — PAIN DESCRIPTION - FREQUENCY: FREQUENCY: CONTINUOUS

## 2020-02-28 ASSESSMENT — PAIN DESCRIPTION - LOCATION: LOCATION: LEG

## 2020-02-28 NOTE — PROGRESS NOTES
[address]   Progress Note and Procedure Note      Darcy Pinedo  MEDICAL RECORD NUMBER:  [id number]  AGE: 61 y.o. GENDER: male  : 1960  EPISODE DATE:  2020    Subjective:     Chief Complaint   Patient presents with    Wound Check     Pt states his leg incision popped open on Thursday, patient thinks he needs an antibiotic for an infection         HISTORY of PRESENT ILLNESS HPI     Darcy Pinedo is a 61 y.o. male who presents today for wound/ulcer evaluation. History of Wound Context: Pt is s/p RLE re-vascularization by Dr. Britney Jack here for eval/treat  Wound/Ulcer Pain Timing/Severity: none  Quality of pain: sharp  Severity:  1 / 10   Modifying Factors: None  Associated Signs/Symptoms: edema    Ulcer Identification:  Ulcer Type: arterial and non-healing surgical  Contributing Factors: shear force and arterial insufficiency    Wound: surgical        PAST MEDICAL HISTORY        Diagnosis Date    Anxiety     Blood circulation, collateral     CAD (coronary artery disease)     stents x 2; per dr. Aris Culp [facility])     liver cancer    CHF (congestive heart failure) ([address]     Chyloperitoneum determined by paracentesis     CKD (chronic kidney disease), stage V ([address]     Dialysis patient ([address]      at [place] GERD (gastroesophageal reflux disease)     Hemodialysis patient ([address]      in [place] Hepatic cirrhosis ([address]     dr. Erik Archibald; has been going to [facility] for liver and kidney transplant list.    Hepatitis C, chronic ([address]     History of blood transfusion     HTN (hypertension)     Hx of blood clots     arm/fistula    Mixed hyperlipidemia 2017    Osteoarthritis     Pacemaker     Palliative care patient 2019    PVD (peripheral vascular disease) ([address]     Sepsis ([address]     Skin ulcer of right heel with fat layer exposed ([address] 2019    Sleep apnea     no cpap at present.  Type II diabetes mellitus (HCC)     no meds.     Ulcer of left heel, with fat layer exposed ([address] 2/28/2018    Ulcer of right foot, with fat layer exposed ([address] 8/16/2019    Wound infection     Wound infection after surgery 7/8/2019       PAST SURGICAL HISTORY    Past Surgical History:   Procedure Laterality Date    ANGIOPLASTY  08/09/11 [facility]    LU cephalic vein balloon angioplasty with 7mm x 4cm balloon. coild embolization of 2 cephallic vein brances with 3mm x 5cm coils    CARDIAC CATHETERIZATION  04/04/11    cardiac cath and stent x1 by Dr. Mickie Gauthier  07/26/11 [facility]    LU AV fistula. coild embolization of cephalic vein branch near the wrist with 3mm EMBO coils. balloon a'plasty left cephalic vein with [id number] balloon and then [id number] balloon    DIALYSIS FISTULA CREATION Left 2/16/2017    LEFT UPPER EXTREMITY BRACHIAL / AXILLARY GRAFT AND STENT LEFT SUBCLAVIAN VEIN  performed by Evangelina Arellano MD at [address] Right 7/12/2019    RIGHT LEG WOUND WASHOUT performed by Mari Theodore MD at [address] Right 1/27/2020    REVISION OF RIGHT LEG BYPASS GRAFT performed by Mari Theodore MD at [address] FEMORAL-TIBIAL BYPASS GRAFT Right 6/25/2019    FEMORAL TIBIAL/PERINEAL BYPASS WITH REVERSED GREATER SAPHENOUS VEIN performed by Evangelina Arellano MD at [address] [facility]  12/26/2010    Right internal jugular vein tunneled dialysis catheter placement    OTHER SURGICAL HISTORY  [id number]    Redo of dialysis catheter    OTHER SURGICAL HISTORY  9/6/2011   [facility]    Removal of RT IJV tunneled dialysis cath    OTHER SURGICAL HISTORY  5/22/12   S    Ultrasound-guided cannulation of left cephalic vein in the mid forearm toward the AV anastomosis, Left upper  ext.  fistulograms including venograms of the SVC, Left cephalic vein balloon angioplasty with a 4mm x 100mm Darrell balloon, Completion fistulograms    PACEMAKER PLACEMENT      medtronic    PARACENTESIS multiple/recent; per dr. Katie Woodruff at [address] [facility] Left 1/30/2018    UPPER EXTREMITY DRIL PROCEDURE WITH LEFT SAPHENOUS VEIN HARVESTING performed by Robbin Nguyễn MD at [address] [place] [address]  6/19/12    LUE arteriovenous fistulogram including venography of the superior vena cava. Balloon angioplasty, left forearm cephalic vein and upper arm cephalic vein with [id number] tod balloon.  VASCULAR SURGERY  7/10/2012 [facility]     Revision of left distal radial artery to cephalic vein arteriovenous fistula with 7mm Artegraft interposition.  VASCULAR SURGERY  7/30/15 [facility] & [address]    Left upper extremity arteriovenous fistulograms including venography of the superior vena cava. Left cephalic vein balloon angioplasty with an 8 x 20 cm cutting balloon proximal cephalic vein. Balloon angioplasty of left cephalic vein/antecubital vein near the antecubital crease with 8 x 20 mm cutting balloon.  VASCULAR SURGERY  7/30/15 [facility]    Balloon angioplasty proximal end distal upper arm cephalic vein with 9 x 40 cm  balloon. Completion fistulograms of the left upper extremity.  VASCULAR SURGERY  8/13/15 [facility]    Revision of left upper extremity arteriovenous fistula with excision of pseudoaneurysm and primary repair of cephalic vein.     VASCULAR SURGERY  5/3/16 [facility]    Left upper fistulograms/venograms, left cephalic balloon 8 x 20 cutter,9 x 40 conquest,left proximal cephalic vein stents (flair 2 9 x 50), balloon stents 9 x 40 conquest.    VASCULAR SURGERY  12/08/2016    [facility]- ultrasound guided cannulation of left distal cephalic vein ultrasound guided cannulation right internal jugular vein placement of right internal jugular vein tunneled dialysis catheter (Bard Equistream XK 23cm tip to cuff)     VASCULAR SURGERY  01/20/2017    [facility]-Right upper venograms, u/s guided cannulation left basilic vein, left upper venograms, balloon angioplasty left subclavian vein 10x40 conquest.    VASCULAR SURGERY  02/16/2017    [facility]. Left brachial artery to axillary vein arteriovenous graft with 7 mm artegraft. Left upper extremity venograms. Left subclavian vein stent viabahn 13x50. Left subclavian vein stent balloon angioplasty 12x40 atlas.  VASCULAR SURGERY  04/11/2017    [facility]. Removal of tunneled dialysis catheter right internal jugular vein.  VASCULAR SURGERY  01/25/2018    [facility]. Arch aortogram, left upper arteriogram, AV graft angiogram/venogram.    VASCULAR SURGERY  02/28/2018    [facility]. Right CFA 5f-6f-7f sheath, aortogram with bilateral lower arteriogram, left SFA/pop  balloon angioplasty 5x100 , 6x150 lutonix ( 2), left CFA  7f sheath, bilateral iliac kissing stents right 10x38 icast, left 9x59 icast expanded with 10x40 , completion aortogram, mynx left CFA , failed mynx right CFA.  VASCULAR SURGERY  06/13/2019    [facility] left CFA 5f sheath, aortogram with bilateral lower arteriogram, mynx left CFA.  VASCULAR SURGERY  06/25/2019    [facility]. Femoral tibial/perineal bypass with reversed greater saphenous vein.  VASCULAR SURGERY  08/16/2019    [facility]. Aortogram and runoff, selective right CFA injections. PTA of fem-tp bypass with 4.0 x 220 mm tod balloon to 4.33 mm    VASCULAR SURGERY  10/23/2019    [facility]. Thrombin injection in the left SFA PSA, right common femoral artery us guided access, left SFA stent treatment of the flap.  VASCULAR SURGERY  01/30/2020    [facility] Endarterectomy of the right common femoral artery, superficial femoral artery, and profunda femoris artery. Redo right groin x3.        FAMILY HISTORY    Family History   Problem Relation Age of Onset    High Blood Pressure Mother     High Blood Pressure Father     High Blood Pressure Sister        SOCIAL HISTORY    Social History     Tobacco Use    Smoking status: Current Every Day Smoker     Packs/day: 0.25     Years: 45.00     Pack years: 11.25     Types: Cigarettes    Smokeless tobacco: Never Used    Tobacco comment: about to comprehensive review of systems was negative. Objective:      BP (!) 154/61   Pulse 78   Temp 97.8 °F (36.6 °C) (Temporal)   Resp 20   Ht 5' 8\" (1.727 m)   Wt 174 lb (78.9 kg)   BMI 26.46 kg/m²     Wt Readings from Last 3 Encounters:   02/28/20 174 lb (78.9 kg)   02/21/20 174 lb (78.9 kg)   02/14/20 174 lb (78.9 kg)       PHYSICAL EXAM    General Appearance: alert and oriented to person, place and time, well developed and well- nourished, in no acute distress  Skin: warm and dry, no rash or erythema  Head: normocephalic and atraumatic  Eyes: pupils equal, round, and reactive to light, extraocular eye movements intact, conjunctivae normal  ENT: tympanic membrane, external ear and ear canal normal bilaterally, nose without deformity, nasal mucosa and turbinates normal without polyps, lips teeth and gums normal  Neck: supple and non-tender without mass, no thyromegaly or thyroid nodules, no cervical lymphadenopathy  Pulmonary/Chest: clear to auscultation bilaterally- no wheezes, rales or rhonchi, normal air movement, no respiratory distress  Cardiovascular: normal rate, regular rhythm, normal S1 and S2, no murmurs, rubs, clicks, or gallops, distal pulses intact, no carotid bruits  Abdomen: soft, non-tender, non-distended, normal bowel sounds, no masses or organomegaly  Extremities: no cyanosis, clubbing or edema  Musculoskeletal: normal range of motion, no joint swelling, deformity or tenderness  Neurologic: reflexes normal and symmetric, no cranial nerve deficit, gait, coordination and speech normal, sensation of skin normal      Assessment:      Patient Active Problem List   Diagnosis Code    Osteoarthritis M19.90    Chronic diastolic congestive heart failure (HCC) I50.32    CAD (coronary artery disease) I25.10    Essential hypertension I10    Liver disease K76.9    Chronic deep vein thrombosis (DVT) of axillary vein of left upper extremity (HCC) I82. A22    Steal syndrome as complication of dialysis 2/28/2020  8:57 AM   Wound Volume (cm^3) 0 cm^3 2/28/2020  8:57 AM   Wound Healing % 100 2/28/2020  8:57 AM   Closure Staples;Surface sutures 2/21/2020  9:53 AM   Drainage Amount None 2/28/2020  8:57 AM   Odor None 2/28/2020  8:57 AM   Dressing/Treatment 4x4;Medipore 2/14/2020 10:55 AM   Dressing Changed Changed/New 2/14/2020 10:55 AM   Number of days: 31       Incision 01/27/20 Leg Right (Active)   Wound Image    2/28/2020  8:57 AM   Wound Assessment Drainage;Red;Yellow; Swelling 2/28/2020  8:57 AM   Shaila-wound Assessment Swelling;Red 2/28/2020  8:57 AM   Wound Length (cm) 15.5 cm 2/28/2020  8:57 AM   Wound Width (cm) 1 cm 2/28/2020  8:57 AM   Wound Depth (cm) 0.2 cm 2/28/2020  8:57 AM   Wound Volume (cm^3) 3.1 cm^3 2/28/2020  8:57 AM   Wound Healing % -869 2/28/2020  8:57 AM   Closure Surface sutures 2/28/2020  8:57 AM   Drainage Amount Moderate 2/28/2020  8:57 AM   Drainage Description Serosanguinous 2/28/2020  8:57 AM   Odor None 2/28/2020  8:57 AM   Dressing/Treatment 4x4;Medipore 2/14/2020 10:55 AM   Dressing Changed Changed/New 2/14/2020 10:55 AM   Dressing Status Old drainage 2/28/2020  8:57 AM   Dressing Change Due 01/30/20 1/29/2020  1:00 AM   Number of days: 32       Wound 02/07/20 Knee Right;Medial Wound 6.  Surgical Right medial knee(wound  on 2/14/20) Inferior (Active)   Wound Image   2/28/2020  8:57 AM   Wound Non-Healing Surgical 2/28/2020  8:57 AM   Dressing Status Old drainage 2/28/2020  8:57 AM   Dressing Changed Changed/New 2/14/2020 10:55 AM   Dressing/Treatment Alginate with Ag;4x4;Medipore 2/14/2020 10:55 AM   Wound Cleansed Soap and water;Rinsed/Irrigated with saline 2/28/2020  8:57 AM   Wound Length (cm) 5 cm 2/28/2020  8:57 AM   Wound Width (cm) 1.1 cm 2/28/2020  8:57 AM   Wound Depth (cm) 0.1 cm 2/28/2020  8:57 AM   Wound Surface Area (cm^2) 5.5 cm^2 2/28/2020  8:57 AM   Change in Wound Size % (l*w) 42.11 2/28/2020  8:57 AM   Wound Volume (cm^3) 0.55 cm^3 2/28/2020  8:57 AM   Wound 11:29 AM   Post-Procedure Depth (cm) 0.1 cm 2/21/2020 11:29 AM   Post-Procedure Surface Area (cm^2) 0.3 cm^2 2/21/2020 11:29 AM   Post-Procedure Volume (cm^3) 0.03 cm^3 2/21/2020 11:29 AM   Distance Tunneling (cm) 0 cm 2/28/2020  8:57 AM   Tunneling Position ___ O'Clock 0 2/28/2020  8:57 AM   Undermining Starts ___ O'Clock 0 2/28/2020  8:57 AM   Undermining Ends___ O'Clock 0 2/28/2020  8:57 AM   Undermining Maxium Distance (cm) 0 2/28/2020  8:57 AM   Wound Assessment Black 2/28/2020  8:57 AM   Drainage Amount None 2/28/2020  8:57 AM   Odor None 2/28/2020  8:57 AM   Margins Attached edges 2/28/2020  8:57 AM   Shaila-wound Assessment Red; Swelling 2/28/2020  8:57 AM   Non-staged Wound Description Full thickness 2/28/2020  8:57 AM   [place]%Wound Bed 0 2/28/2020  8:57 AM   Red%Wound Bed 0 2/28/2020  8:57 AM   Yellow%Wound Bed 0 2/28/2020  8:57 AM   Black%Wound Bed 100 2/28/2020  8:57 AM   Purple%Wound Bed 0 2/28/2020  8:57 AM   Other%Wound Bed 0 2/28/2020  8:57 AM   Number of days: 21       Wound 02/07/20 Toe (Comment  which one) Anterior;Right Wound 8.   Right 2nd toe(2/28/20 change etiology to arterial) (Active)   Wound Image   2/28/2020  8:57 AM   Wound Arterial 2/28/2020  8:57 AM   Dressing Status Dry 2/28/2020  8:57 AM   Wound Cleansed Rinsed/Irrigated with saline 2/28/2020  8:57 AM   Wound Length (cm) 1.1 cm 2/28/2020  8:57 AM   Wound Width (cm) 1 cm 2/28/2020  8:57 AM   Wound Depth (cm) 0.1 cm 2/28/2020  8:57 AM   Wound Surface Area (cm^2) 1.1 cm^2 2/28/2020  8:57 AM   Change in Wound Size % (l*w) -120 2/28/2020  8:57 AM   Wound Volume (cm^3) 0.11 cm^3 2/28/2020  8:57 AM   Wound Healing % -120 2/28/2020  8:57 AM   Post-Procedure Length (cm) 1.5 cm 2/21/2020 11:29 AM   Post-Procedure Width (cm) 0.7 cm 2/21/2020 11:29 AM   Post-Procedure Depth (cm) 0.1 cm 2/21/2020 11:29 AM   Post-Procedure Surface Area (cm^2) 1.05 cm^2 2/21/2020 11:29 AM   Post-Procedure Volume (cm^3) 0.1 cm^3 2/21/2020 11:29 AM   Distance Tunneling 8: 62 AM   Undermining Starts ___ O'Clock 0 2/28/2020  8:57 AM   Undermining Ends___ O'Clock 0 2/28/2020  8:57 AM   Undermining Maxium Distance (cm) 0 2/28/2020  8:57 AM   Wound Assessment Brown;Jennette;Slough 2/28/2020  8:57 AM   Drainage Amount Small 2/28/2020  8:57 AM   Drainage Description Serosanguinous 2/28/2020  8:57 AM   Odor None 2/28/2020  8:57 AM   Margins Attached edges 2/28/2020  8:57 AM   Shaila-wound Assessment Pink;Swelling 2/28/2020  8:57 AM   Non-staged Wound Description Full thickness 2/28/2020  8:57 AM   Jennette%Wound Bed 2 2/28/2020  8:57 AM   Red%Wound Bed 0 2/28/2020  8:57 AM   Yellow%Wound Bed 0 2/28/2020  8:57 AM   Black%Wound Bed 0 2/28/2020  8:57 AM   Purple%Wound Bed 0 2/28/2020  8:57 AM   Other%Wound Bed 98 2/28/2020  8:57 AM   Number of days: 14       Wound 02/28/20 Leg Right; Lower;Distal Wound 9, Dehisced surgical wound, R. Lower Leg distal (Active)   Wound Image     2/28/2020  8:57 AM   Wound Non-Healing Surgical 2/28/2020  8:57 AM   Dressing Status Old drainage 2/28/2020  8:57 AM   Wound Cleansed Soap and water;Rinsed/Irrigated with saline 2/28/2020  8:57 AM   Wound Length (cm) 10.2 cm 2/28/2020  8:57 AM   Wound Width (cm) 1 cm 2/28/2020  8:57 AM   Wound Depth (cm) 0.2 cm 2/28/2020  8:57 AM   Wound Surface Area (cm^2) 10.2 cm^2 2/28/2020  8:57 AM   Wound Volume (cm^3) 2.04 cm^3 2/28/2020  8:57 AM   Distance Tunneling (cm) 0 cm 2/28/2020  8:57 AM   Tunneling Position ___ O'Clock 0 2/28/2020  8:57 AM   Undermining Starts ___ O'Clock 0 2/28/2020  8:57 AM   Undermining Ends___ O'Clock 0 2/28/2020  8:57 AM   Undermining Maxium Distance (cm) 0 2/28/2020  8:57 AM   Wound Assessment Red;Slough;Drainage;Yellow 2/28/2020  8:57 AM   Drainage Amount Moderate 2/28/2020  8:57 AM   Drainage Description Serosanguinous 2/28/2020  8:57 AM   Odor None 2/28/2020  8:57 AM   Margins Attached edges 2/28/2020  8:57 AM   Shaila-wound Assessment Red; Swelling 2/28/2020  8:57 AM   Non-staged Wound Description Full thickness 2/28/2020  8:57 AM   [place]%Wound Bed 0 2/28/2020  8:57 AM   Red%Wound Bed 70 2/28/2020  8:57 AM   Yellow%Wound Bed 30 2/28/2020  8:57 AM   Black%Wound Bed 0 2/28/2020  8:57 AM   Purple%Wound Bed 0 2/28/2020  8:57 AM   Other%Wound Bed 0 2/28/2020  8:57 AM   Number of days: 0             Plan:     Problem List Items Addressed This Visit     * (Principal) Leg ulcer, right, with fat layer exposed ([address] (Chronic)    Relevant Orders    VL DUP LOWER EXTREMITY ARTERIES BILATERAL    VL GABRIEL BILATERAL LIMITED 1-2 LEVELS    Open wound of right knee (Chronic)    Right second toe ulcer, with fat layer exposed ([address] (Chronic)    Relevant Orders    VL DUP LOWER EXTREMITY ARTERIES BILATERAL    VL GABRIEL BILATERAL LIMITED 1-2 LEVELS    Atherosclerosis of artery of extremity with ulceration ([address]    Relevant Orders    VL DUP LOWER EXTREMITY ARTERIES BILATERAL    VL GABRIEL BILATERAL LIMITED 1-2 LEVELS    Atherosclerosis of artery of extremity with rest pain ([address] - Primary    Relevant Orders    VL DUP LOWER EXTREMITY ARTERIES BILATERAL    VL GABRIEL BILATERAL LIMITED 1-2 LEVELS                                        Pt comes today with much increased edema stating he rode to [address] last week and since that time his leg has been more swollen and wounds opening up. On exam today his RLE was warm and a signal was found at the posterior tibial area however, this signal was airy. I spoke with Dr. Juan Manuel Pichardo and discussed his case and my concerns. Plan is to get a scan and GABRIEL prior to next visit and re-evaluate next Wed. If problems occur prior he needs to go to ER for eval. Pt understands plan. Pt not in extremis today. Treatment Note please see attached Discharge Instructions    In my professional opinion this patient would benefit from HBO Therapy: No    Written patient dismissal instructions given to patient and signed by patient or POA.          Discharge Instructions       Visit Discharge/Physician Orders    Discharge condition: Stable    Discharge to: Home    Left via:Private automobile    Accompanied by: Mother     ECF/HHA: [facility]     Dressing Orders: Right Foot,Toes and Lower Ext Wounds  Wash with soap and water. Apply Xeroform to open areas, Cover with dry gauze, Secure with roll gauze and medipore tape  Change once daily  Elevate foot to level above heart as much as possible to reduce swelling     Treatment Orders:  Protein rich diet (unless restricted by your physician); Multivitamin daily;   Please continue to follow instructions given by the ER for broken ankle. [facility] followup visit ______________1 week_______________  (Please note your next appointment above and if you are unable to keep, kindly give a 24 hour notice. Thank you.)    If you experience any of the following, please call the [facility] during business hours:    * Increase in Pain  * Temperature over 101  * Increase in drainage from your wound  * Drainage with a foul odor  * Bleeding  * Increase in swelling  * Need for compression bandage changes due to slippage, breakthrough drainage. If you need medical attention outside of the business hours of the [facility] please contact your PCP or go to the nearest emergency room.         Electronically signed by Alyce Piper MD on 2/28/2020 at 11:35 AM

## 2020-03-03 ENCOUNTER — TELEPHONE (OUTPATIENT)
Dept: CARDIOLOGY | Age: 60
End: 2020-03-03

## 2020-03-04 ENCOUNTER — HOSPITAL ENCOUNTER (OUTPATIENT)
Dept: WOUND CARE | Age: 60
Discharge: HOME | End: 2020-03-04
Payer: MEDICARE

## 2020-03-04 ENCOUNTER — HOSPITAL ENCOUNTER (OUTPATIENT)
Dept: NON INVASIVE DIAGNOSTICS | Age: 60
Discharge: HOME | End: 2020-03-04
Payer: MEDICARE

## 2020-03-04 ENCOUNTER — APPOINTMENT (OUTPATIENT)
Dept: NON INVASIVE DIAGNOSTICS | Age: 60
End: 2020-03-04
Payer: MEDICARE

## 2020-03-04 ENCOUNTER — HOSPITAL ENCOUNTER (OUTPATIENT)
Dept: NON INVASIVE DIAGNOSTICS | Age: 60
End: 2020-03-04
Payer: MEDICARE

## 2020-03-04 ENCOUNTER — TELEPHONE (OUTPATIENT)
Dept: CARDIOLOGY | Age: 60
End: 2020-03-04

## 2020-03-04 VITALS
HEART RATE: 79 BPM | BODY MASS INDEX: 26.37 KG/M2 | HEIGHT: 68 IN | RESPIRATION RATE: 16 BRPM | SYSTOLIC BLOOD PRESSURE: 138 MMHG | WEIGHT: 174 LBS | DIASTOLIC BLOOD PRESSURE: 61 MMHG | TEMPERATURE: 98.3 F

## 2020-03-04 PROCEDURE — 99213 OFFICE O/P EST LOW 20 MIN: CPT | Performed by: SURGERY

## 2020-03-04 PROCEDURE — 93925 LOWER EXTREMITY STUDY: CPT

## 2020-03-04 PROCEDURE — 93922 UPR/L XTREMITY ART 2 LEVELS: CPT

## 2020-03-04 PROCEDURE — 99214 OFFICE O/P EST MOD 30 MIN: CPT

## 2020-03-04 ASSESSMENT — PAIN DESCRIPTION - LOCATION: LOCATION: LEG

## 2020-03-04 ASSESSMENT — PAIN SCALES - GENERAL: PAINLEVEL_OUTOF10: 6

## 2020-03-04 ASSESSMENT — PAIN DESCRIPTION - ORIENTATION: ORIENTATION: RIGHT

## 2020-03-04 ASSESSMENT — PAIN DESCRIPTION - DESCRIPTORS: DESCRIPTORS: THROBBING

## 2020-03-04 ASSESSMENT — PAIN DESCRIPTION - PAIN TYPE: TYPE: ACUTE PAIN

## 2020-03-04 ASSESSMENT — PAIN DESCRIPTION - FREQUENCY: FREQUENCY: CONTINUOUS

## 2020-03-04 ASSESSMENT — PAIN DESCRIPTION - ONSET: ONSET: ON-GOING

## 2020-03-04 NOTE — PROGRESS NOTES
layer exposed ([address] 8/16/2019    Wound infection     Wound infection after surgery 7/8/2019       PAST SURGICAL HISTORY    Past Surgical History:   Procedure Laterality Date    ANGIOPLASTY  08/09/11 [facility]    LULEONARD cephalic vein balloon angioplasty with 7mm x 4cm balloon. coild embolization of 2 cephallic vein brances with 3mm x 5cm coils    CARDIAC CATHETERIZATION  04/04/11    cardiac cath and stent x1 by Dr. Alberts   07/26/11 SUKHWINDER    LULEONARD AV fistula. coild embolization of cephalic vein branch near the wrist with 3mm EMBO coils. balloon a'plasty left cephalic vein with [id number] balloon and then [id number] balloon    DIALYSIS FISTULA CREATION Left 2/16/2017    LEFT UPPER EXTREMITY BRACHIAL / AXILLARY GRAFT AND STENT LEFT SUBCLAVIAN VEIN  performed by Anita Ren MD at [address] Right 7/12/2019    RIGHT LEG WOUND WASHOUT performed by Ирина Batista MD at [facility] Right 1/27/2020    REVISION OF RIGHT LEG BYPASS GRAFT performed by Ирина Batista MD at [facility] FEMORAL-TIBIAL BYPASS GRAFT Right 6/25/2019    FEMORAL TIBIAL/PERINEAL BYPASS WITH REVERSED GREATER SAPHENOUS VEIN performed by Anita Ren MD at [address] [facility]  12/26/2010    Right internal jugular vein tunneled dialysis catheter placement    OTHER SURGICAL HISTORY  [id number]    Redo of dialysis catheter    OTHER SURGICAL HISTORY  9/6/2011   [facility]    Removal of RT IJV tunneled dialysis cath    OTHER SURGICAL HISTORY  5/22/12   [facility]    Ultrasound-guided cannulation of left cephalic vein in the mid forearm toward the AV anastomosis, Left upper  ext.  fistulograms including venograms of the SVC, Left cephalic vein balloon angioplasty with a 4mm x 100mm Darrell balloon, Completion fistulograms    PACEMAKER PLACEMENT      medtr[employer]    PARACENTESIS      multiple/recent; [place] dr. Ole Ni [place] [id number] [facility] Left 1/30/2018    UPPER EXTREMITY DRIL PROCEDURE WITH LEFT SAPHENOUS VEIN HARVESTING performed by Nathaniel Menjivar MD at [address] [place] [address]  6/19/12    LUE arteriovenous fistulogram including venography of the superior vena cava. Balloon angioplasty, left forearm cephalic vein and upper arm cephalic vein with [id number] tod balloon.  VASCULAR SURGERY  7/10/2012 [facility]     Revision of left distal radial artery to cephalic vein arteriovenous fistula with 7mm Artegraft interposition.  VASCULAR SURGERY  7/30/15 [facility] & [address]    Left upper extremity arteriovenous fistulograms including venography of the superior vena cava. Left cephalic vein balloon angioplasty with an 8 x 20 cm cutting balloon proximal cephalic vein. Balloon angioplasty of left cephalic vein/antecubital vein near the antecubital crease with 8 x 20 mm cutting balloon.  VASCULAR SURGERY  7/30/15 [facility]    Balloon angioplasty proximal end distal upper arm cephalic vein with 9 x 40 cm  balloon. Completion fistulograms of the left upper extremity.  VASCULAR SURGERY  8/13/15 [facility]    Revision of left upper extremity arteriovenous fistula with excision of pseudoaneurysm and primary repair of cephalic vein.  VASCULAR SURGERY  5/3/16 [facility]    Left upper fistulograms/venograms, left cephalic balloon 8 x 20 cutter,9 x 40 conquest,left proximal cephalic vein stents (flair 2 9 x 50), balloon stents 9 x 40 conquest.    VASCULAR SURGERY  12/08/2016    [facility]- ultrasound guided cannulation of left distal cephalic vein ultrasound guided cannulation right internal jugular vein placement of right internal jugular vein tunneled dialysis catheter (Bard Equistream XK 23cm tip to cuff)     VASCULAR SURGERY  01/20/2017    [facility]-Right upper venograms, u/s guided cannulation left basilic vein, left upper venograms, balloon angioplasty left subclavian vein 10x40 conquest.    VASCULAR SURGERY  02/16/2017    [facility]. Left brachial artery to axillary vein arteriovenous graft with 7 mm artegraft. Left upper extremity venograms. Left subclavian vein stent viabahn 13x50. Left subclavian vein stent balloon angioplasty 12x40 atlas.  VASCULAR SURGERY  04/11/2017    [facility]. Removal of tunneled dialysis catheter right internal jugular vein.  VASCULAR SURGERY  01/25/2018    [facility]. Arch aortogram, left upper arteriogram, AV graft angiogram/venogram.    VASCULAR SURGERY  02/28/2018    [facility]. Right CFA 5f-6f-7f sheath, aortogram with bilateral lower arteriogram, left SFA/pop  balloon angioplasty 5x100 , 6x150 lutonix ( 2), left CFA  7f sheath, bilateral iliac kissing stents right 10x38 icast, left 9x59 icast expanded with 10x40 , completion aortogram, mynx left CFA , failed mynx right CFA.  VASCULAR SURGERY  06/13/2019    [facility] left CFA 5f sheath, aortogram with bilateral lower arteriogram, mynx left CFA.  VASCULAR SURGERY  06/25/2019    [facility]. Femoral tibial/perineal bypass with reversed greater saphenous vein.  VASCULAR SURGERY  08/16/2019    [facility]. Aortogram and runoff, selective right CFA injections. PTA of fem-tp bypass with 4.0 x 220 mm tod balloon to 4.33 mm    VASCULAR SURGERY  10/23/2019    [facility]. Thrombin injection in the left SFA PSA, right common femoral artery us guided access, left SFA stent treatment of the flap.  VASCULAR SURGERY  01/30/2020    [facility] Endarterectomy of the right common femoral artery, superficial femoral artery, and profunda femoris artery. Redo right groin x3.        FAMILY HISTORY    Family History   Problem Relation Age of Onset    High Blood Pressure Mother     High Blood Pressure Father     High Blood Pressure Sister        SOCIAL HISTORY    Social History     Tobacco Use    Smoking status: Current Every Day Smoker     Packs/day: 0.25     Years: 45.00     Pack years: 11.25     Types: Cigarettes    Smokeless tobacco: Never Used    Tobacco comment: about to  quit down to 1 - 3 cigarettes daily   Substance Use Topics    Alcohol use: No    Drug use: Not Currently     Types: Marijuana, Cocaine, Methamphetamines, IV, Opiates , Other-see comments     Comment: in the 1970s, LSD       ALLERGIES    No Known Allergies    MEDICATIONS    Current Outpatient Medications on File Prior to Encounter   Medication Sig Dispense Refill    clopidogrel (PLAVIX) 75 MG tablet TAKE 1 TABLET EVERY DAY 90 tablet 1    atorvastatin (LIPITOR) 40 MG tablet Take 1 tablet by mouth nightly 30 tablet 3    metoprolol succinate (TOPROL XL) 25 MG extended release tablet Take 1 tablet by mouth 2 times daily 30 tablet 3    sevelamer (RENVELA) 800 MG tablet Take 1 tablet by mouth 3 times daily (with meals) 90 tablet 3    oxyCODONE (OXYCONTIN) 10 MG extended release tablet Take 10 mg by mouth every 8 hours as needed for Pain.  aspirin 81 MG EC tablet Take 1 tablet by mouth daily 30 tablet 3    isosorbide mononitrate (IMDUR) 30 MG extended release tablet Take 1 tablet by mouth daily 30 tablet 3    calcium acetate (PHOSLO) 667 MG capsule Take 2,001 mg by mouth 3 times daily (with meals)      sucralfate (CARAFATE) 1 GM tablet Take 1 tablet by mouth 3 times daily as needed (upset stomach) 120 tablet 3    lactulose (CEPHULAC) 10 G packet Take 10 g by mouth 3 times daily as needed Indications: Disorder of the Liver Enzymes Not using regular states stomach is tore up      minoxidil (LONITEN) 10 MG tablet Take 10 mg by mouth daily Indications: High Blood Pressure Disorder, SBP >160       cloNIDine (CATAPRES) 0.2 MG tablet Take 0.3 mg by mouth 3 times daily Indications: High Blood Pressure       amLODIPine (NORVASC) 10 MG tablet Take 10 mg by mouth daily Indications: High Blood Pressure       b complex-C-folic acid (NEPHROCAPS) 1 MG capsule Take 1 capsule by mouth daily 30 capsule 3     No current facility-administered medications on file prior to encounter. REVIEW OF SYSTEMS    A comprehensive review of systems was negative.     Objective:      /61   Pulse 79   Temp 98.3 °F (36.8 °C) (Temporal)   Resp 16   Ht 5' 8\" (1.727 m)   Wt 174 lb (78.9 kg)   BMI 26.46 kg/m²     Wt Readings from Last 3 Encounters:   03/04/20 174 lb (78.9 kg)   02/28/20 174 lb (78.9 kg)   02/21/20 174 lb (78.9 kg)       PHYSICAL EXAM    General Appearance: alert and oriented to person, place and time, well developed and well- nourished, in no acute distress  Skin: warm and dry, no rash or erythema  Head: normocephalic and atraumatic  Eyes: pupils equal, round, and reactive to light, extraocular eye movements intact, conjunctivae normal  ENT: tympanic membrane, external ear and ear canal normal bilaterally, nose without deformity, nasal mucosa and turbinates normal without polyps, lips teeth and gums normal  Neck: supple and non-tender without mass, no thyromegaly or thyroid nodules, no cervical lymphadenopathy  Pulmonary/Chest: clear to auscultation bilaterally- no wheezes, rales or rhonchi, normal air movement, no respiratory distress  Cardiovascular: normal rate, regular rhythm, normal S1 and S2, no murmurs, rubs, clicks, or gallops, distal pulses intact, no carotid bruits  Abdomen: soft, non-tender, non-distended, normal bowel sounds, no masses or organomegaly  Extremities: no cyanosis, clubbing or edema  Musculoskeletal: normal range of motion, no joint swelling, deformity or tenderness  Neurologic: reflexes normal and symmetric, no cranial nerve deficit, gait, coordination and speech normal, sensation of skin normal      Assessment:      Patient Active Problem List   Diagnosis Code    Osteoarthritis M19.90    Chronic diastolic congestive heart failure (HCC) I50.32    CAD (coronary artery disease) I25.10    Essential hypertension I10    Liver disease K76.9    Chronic deep vein thrombosis (DVT) of axillary vein of left upper extremity (HCC) I82. A22    Steal syndrome as complication of dialysis access ([address] T82.898A    ESRD on dialysis ([address] N18.6, Z99.2    Steal syndrome of dialysis vascular access [facility]) T82.898A    Atherosclerosis of artery of extremity with ulceration ([address] I70.299, L97.909    Atherosclerosis of artery of extremity with rest pain (HCC) I70.229    Anemia, chronic disease D63.8    Iatrogenic complication of vascular surgery T81. 9XXA    Pacemaker Z95.0    Hepatitis C, chronic (HCC) B18.2    PVD (peripheral vascular disease) (HCC) I73.9    Hyponatremia E87.1    Normocytic anemia D64.9    Thrombocytopenia (HCC) D69.6    Palliative care patient K77.9    Alcoholic cirrhosis of liver with ascites ([address] K70.31    Anticoagulated by anticoagulation treatment Z79.01    CHF (congestive heart failure) (HCC) I50.9    Colon polyps K63.5    Dialysis patient ([address] Z99.2    Hepatocellular carcinoma ([address] C22.0    Mixed hyperlipidemia E78.2    Spontaneous bacterial peritonitis (HCC) K65.2    Tobacco abuse Z72.0    Non-healing non-surgical wound T14. 8XXA    Knee pain, right M25.561    Effusion of knee joint right M25.461    Failing vascular bypass graft T82.599A    Hematoma T14. 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 ([facility]) I70.201    NSTEMI (non-ST elevated myocardial infarction) ([address] I21.4    Cellulitis of right lower extremity without foot L03.115    Right leg pain M79.604    Cellulitis of right lower extremity L03.115    Atherosclerosis of native arteries of extremities with rest pain, right leg (HCC) I70.221    Leg ulcer, right, with fat layer exposed ([address] L97.912    Open wound of right knee S81.001A    Right second toe ulcer, with fat layer exposed ([address] L97.512             Wound 02/07/20 Knee Right;Medial Wound 6.  Surgical Right medial knee(wound  on 2/14/20) Inferior (Active)   Wound Image   3/4/2020  4:41 PM   Wound Non-Healing Surgical 3/4/2020  4:41 PM   Dressing Status Old drainage 3/4/2020  4:41 PM   Dressing Changed Changed/New 2/14/2020 10:55 AM   Dressing/Treatment Alginate with Ag;4x4;Medipore 2/14/2020 10:55 AM   Wound Cleansed Soap and water 3/4/2020  4:41 PM   Wound Length (cm) 6 cm 3/4/2020  4:41 PM   Wound Width (cm) 1.3 cm 3/4/2020  4:41 PM   Wound Depth (cm) 0.4 cm 3/4/2020  4:41 PM   Wound Surface Area (cm^2) 7.8 cm^2 3/4/2020  4:41 PM   Change in Wound Size % (l*w) 17.89 3/4/2020  4:41 PM   Wound Volume (cm^3) 3.12 cm^3 3/4/2020  4:41 PM   Wound Healing % -228 3/4/2020  4:41 PM   Post-Procedure Length (cm) 4 cm 2/21/2020 11:29 AM   Post-Procedure Width (cm) 0.5 cm 2/21/2020 11:29 AM   Post-Procedure Depth (cm) 0.1 cm 2/21/2020 11:29 AM   Post-Procedure Surface Area (cm^2) 2 cm^2 2/21/2020 11:29 AM   Post-Procedure Volume (cm^3) 0.2 cm^3 2/21/2020 11:29 AM   Distance Tunneling (cm) 0 cm 3/4/2020  4:41 PM   Tunneling Position ___ O'Clock 0 3/4/2020  4:41 PM   Undermining Starts ___ O'Clock 0 3/4/2020  4:41 PM   Undermining Ends___ O'Clock 0 3/4/2020  4:41 PM   Undermining Maxium Distance (cm) 0 3/4/2020  4:41 PM   Wound Assessment Red;Slough; Yellow;Drainage 3/4/2020  4:41 PM   Drainage Amount Moderate 3/4/2020  4:41 PM   Drainage Description Serosanguinous 3/4/2020  4:41 PM   Odor None 3/4/2020  4:41 PM   Margins Attached edges 3/4/2020  4:41 PM   Shaila-wound Assessment Pink;Swelling 3/4/2020  4:41 PM   Non-staged Wound Description Full thickness 3/4/2020  4:41 PM   [place]%Wound Bed 50 3/4/2020  4:41 PM   Red%Wound Bed 0 3/4/2020  4:41 PM   Yellow%Wound Bed 50 3/4/2020  4:41 PM   Black%Wound Bed 0 3/4/2020  4:41 PM   Purple%Wound Bed 0 3/4/2020  4:41 PM   Other%Wound Bed 0 2/28/2020  8:57 AM   Number of days: 26       Wound 02/07/20 Toe (Comment  which one) Anterior;Right Wound 7.   Right 5th toe (2/28/20 change etiology to arterial) (Active)   Wound Image    3/4/2020  4:41 PM   Wound Arterial 3/4/2020  4:41 PM   Dressing Status Dry 3/4/2020  4:41 PM   Wound Cleansed Rinsed/Irrigated with saline 3/4/2020  4:41 PM   Wound Length (cm) 1 cm 3/4/2020  4:41 PM   Wound Width (cm) 1.3 cm 3/4/2020  4:41 PM   Wound Depth (cm) 0.1 cm 3/4/2020  4:41 PM 2/21/2020 11:29 AM   Post-Procedure Width (cm) 0.7 cm 2/21/2020 11:29 AM   Post-Procedure Depth (cm) 0.1 cm 2/21/2020 11:29 AM   Post-Procedure Surface Area (cm^2) 1.05 cm^2 2/21/2020 11:29 AM   Post-Procedure Volume (cm^3) 0.1 cm^3 2/21/2020 11:29 AM   Distance Tunneling (cm) 0 cm 3/4/2020  4:41 PM   Tunneling Position ___ O'Clock 0 3/4/2020  4:41 PM   Undermining Starts ___ O'Clock 0 3/4/2020  4:41 PM   Undermining Ends___ O'Clock 0 3/4/2020  4:41 PM   Undermining Maxium Distance (cm) 0 3/4/2020  4:41 PM   Wound Assessment Brown;Dry 3/4/2020  4:41 PM   Drainage Amount None 3/4/2020  4:41 PM   Odor None 3/4/2020  4:41 PM   Margins Attached edges 3/4/2020  4:41 PM   Shaila-wound Assessment Dry;Red;Swelling 3/4/2020  4:41 PM   Non-staged Wound Description Not applicable 8/7/3838  6:55 PM   [place]%Wound Bed 0 3/4/2020  4:41 PM   Red%Wound Bed 0 3/4/2020  4:41 PM   Yellow%Wound Bed 0 3/4/2020  4:41 PM   Black%Wound Bed 100 3/4/2020  4:41 PM   Purple%Wound Bed 0 3/4/2020  4:41 PM   Other%Wound Bed 0 3/4/2020  4:41 PM   Number of days: 26       Wound 02/14/20 Leg Right;Medial Wound 6a R. medial knee superior( 2/14/20 ) (Active)   Wound Image   3/4/2020  4:41 PM   Wound Non-Healing Surgical 3/4/2020  4:41 PM   Dressing Status Old drainage 3/4/2020  4:41 PM   Dressing Changed Changed/New 2/14/2020 10:55 AM   Dressing/Treatment Alginate with Ag;4x4;Medipore 2/14/2020 10:55 AM   Wound Cleansed Soap and water 3/4/2020  4:41 PM   Wound Length (cm) 3 cm 3/4/2020  4:41 PM   Wound Width (cm) 0.6 cm 3/4/2020  4:41 PM   Wound Depth (cm) 0.1 cm 3/4/2020  4:41 PM   Wound Surface Area (cm^2) 1.8 cm^2 3/4/2020  4:41 PM   Change in Wound Size % (l*w) -11.11 3/4/2020  4:41 PM   Wound Volume (cm^3) 0.18 cm^3 3/4/2020  4:41 PM   Wound Healing % -12 3/4/2020  4:41 PM   Post-Procedure Length (cm) 1.8 cm 2/21/2020 11:29 AM   Post-Procedure Width (cm) 0.3 cm 2/21/2020 11:29 AM   Post-Procedure Depth (cm) 0.1 cm 2/21/2020 11:29 AM Post-Procedure Surface Area (cm^2) 0.54 cm^2 2/21/2020 11:29 AM   Post-Procedure Volume (cm^3) 0.05 cm^3 2/21/2020 11:29 AM   Distance Tunneling (cm) 0 cm 3/4/2020  4:41 PM   Tunneling Position ___ O'Clock 0 3/4/2020  4:41 PM   Undermining Starts ___ O'Clock 0 3/4/2020  4:41 PM   Undermining Ends___ O'Clock 0 3/4/2020  4:41 PM   Undermining Maxium Distance (cm) 0 3/4/2020  4:41 PM   Wound Assessment Brown;[place];Slough 3/4/2020  4:41 PM   Drainage Amount Small 3/4/2020  4:41 PM   Drainage Description Serosanguinous 3/4/2020  4:41 PM   Odor None 3/4/2020  4:41 PM   Margins Attached edges 3/4/2020  4:41 PM   Shaila-wound Assessment Pink;Swelling 3/4/2020  4:41 PM   Non-staged Wound Description Full thickness 3/4/2020  4:41 PM   [place]%Wound Bed 50 3/4/2020  4:41 PM   Red%Wound Bed 0 3/4/2020  4:41 PM   Yellow%Wound Bed 50 3/4/2020  4:41 PM   Black%Wound Bed 0 3/4/2020  4:41 PM   Purple%Wound Bed 00 3/4/2020  4:41 PM   Other%Wound Bed 98 2/28/2020  8:57 AM   Number of days: 19       Wound 02/28/20 Leg Right; Lower;Distal Wound 9, Dehisced surgical wound, R. Lower Leg distal (Active)   Wound Image    3/4/2020  4:41 PM   Wound Non-Healing Surgical 3/4/2020  4:41 PM   Dressing Status Old drainage 3/4/2020  4:41 PM   Wound Cleansed Soap and water 3/4/2020  4:41 PM   Wound Length (cm) 16 cm 3/4/2020  4:41 PM   Wound Width (cm) 3 cm 3/4/2020  4:41 PM   Wound Depth (cm) 0.5 cm 3/4/2020  4:41 PM   Wound Surface Area (cm^2) 48 cm^2 3/4/2020  4:41 PM   Change in Wound Size % (l*w) -370.59 3/4/2020  4:41 PM   Wound Volume (cm^3) 24 cm^3 3/4/2020  4:41 PM   Wound Healing % -1076 3/4/2020  4:41 PM   Distance Tunneling (cm) 0 cm 3/4/2020  4:41 PM   Tunneling Position ___ O'Clock 0 3/4/2020  4:41 PM   Undermining Starts ___ O'Clock 0 3/4/2020  4:41 PM   Undermining Ends___ O'Clock 0 3/4/2020  4:41 PM   Undermining Maxium Distance (cm) 0 3/4/2020  4:41 PM   Wound Assessment Red;Slough;Drainage;Yellow 3/4/2020  4:41 PM   Drainage Amount instructions given by the ER for broken ankle. [facility] [facility] followup visit ____________1 week_with Dr Magallon________________  (Please note your next appointment above and if you are unable to keep, kindly give a 24 hour notice. Thank you.)    If you experience any of the following, please call the [facility] during business hours:    * Increase in Pain  * Temperature over 101  * Increase in drainage from your wound  * Drainage with a foul odor  * Bleeding  * Increase in swelling  * Need for compression bandage changes due to slippage, breakthrough drainage. If you need medical attention outside of the business hours of the [facility] please contact your PCP or go to the nearest emergency room.         Electronically signed by Hector Beauchamp MD on 3/4/2020 at 5:01 PM

## 2020-03-05 ENCOUNTER — TELEPHONE (OUTPATIENT)
Dept: WOUND CARE | Age: 60
End: 2020-03-05

## 2020-03-05 NOTE — PROGRESS NOTES
1/18/2020 Cardiology read ekg placed in anesthesia folder/office for them to review prior surgery added on for tomorrow 3/6/2020. Mago alicia rn, [facility] coordinator aware.

## 2020-03-05 NOTE — TELEPHONE ENCOUNTER
Spoke with patient regarding arteriogram date and time tomorrow with Dr. Lionel Lopez. Procedure will be done in the hybrid OR so patient can have general anesthesia. Patient states understanding that general anesthesia has risks such as heart attack, death, stroke. Faxed arteriogram instructions to Dannie Michael, an RN at [facility], she will review the written instructions with Taawnna Earlene Bae as well. Also, phoned his mother with date and time of procedure as she transports him to most appointments.

## 2020-03-06 ENCOUNTER — ANESTHESIA (OUTPATIENT)
Dept: OPERATING ROOM | Age: 60
End: 2020-03-06
Payer: MEDICARE

## 2020-03-06 ENCOUNTER — ANESTHESIA EVENT (OUTPATIENT)
Dept: OPERATING ROOM | Age: 60
End: 2020-03-06
Payer: MEDICARE

## 2020-03-06 ENCOUNTER — HOSPITAL ENCOUNTER (OUTPATIENT)
Age: 60
Setting detail: OUTPATIENT SURGERY
Discharge: HOME | End: 2020-03-06
Attending: SURGERY | Admitting: SURGERY
Payer: MEDICARE

## 2020-03-06 ENCOUNTER — APPOINTMENT (OUTPATIENT)
Dept: INTERVENTIONAL RADIOLOGY/VASCULAR | Age: 60
End: 2020-03-06
Attending: SURGERY
Payer: MEDICARE

## 2020-03-06 VITALS
DIASTOLIC BLOOD PRESSURE: 59 MMHG | SYSTOLIC BLOOD PRESSURE: 129 MMHG | OXYGEN SATURATION: 96 % | TEMPERATURE: 98.4 F | RESPIRATION RATE: 1 BRPM

## 2020-03-06 VITALS
DIASTOLIC BLOOD PRESSURE: 50 MMHG | SYSTOLIC BLOOD PRESSURE: 121 MMHG | OXYGEN SATURATION: 97 % | BODY MASS INDEX: 26.37 KG/M2 | HEIGHT: 68 IN | HEART RATE: 66 BPM | WEIGHT: 174 LBS | RESPIRATION RATE: 16 BRPM | TEMPERATURE: 100 F

## 2020-03-06 LAB
ABO/RH: NORMAL
ANION GAP SERPL CALCULATED.3IONS-SCNC: 16 MMOL/L (ref 7–19)
ANTIBODY SCREEN: NORMAL
APTT: 29.5 SEC (ref 26–36.2)
BASOPHILS ABSOLUTE: 0 K/UL (ref 0–0.2)
BASOPHILS RELATIVE PERCENT: 0.5 % (ref 0–1)
BUN BLDV-MCNC: 29 MG/DL (ref 6–20)
CALCIUM SERPL-MCNC: 9.5 MG/DL (ref 8.6–10)
CHLORIDE BLD-SCNC: 89 MMOL/L (ref 98–111)
CO2: 31 MMOL/L (ref 22–29)
CREAT SERPL-MCNC: 4.4 MG/DL (ref 0.5–1.2)
EOSINOPHILS ABSOLUTE: 0.4 K/UL (ref 0–0.6)
EOSINOPHILS RELATIVE PERCENT: 5.5 % (ref 0–5)
GFR NON-AFRICAN AMERICAN: 14
GLUCOSE BLD-MCNC: 138 MG/DL (ref 74–109)
HCT VFR BLD CALC: 33.6 % (ref 42–52)
HEMOGLOBIN: 10.2 G/DL (ref 14–18)
IMMATURE GRANULOCYTES #: 0 K/UL
INR BLD: 1.11 (ref 0.88–1.18)
LYMPHOCYTES ABSOLUTE: 0.7 K/UL (ref 1.1–4.5)
LYMPHOCYTES RELATIVE PERCENT: 10.7 % (ref 20–40)
MCH RBC QN AUTO: 29.7 PG (ref 27–31)
MCHC RBC AUTO-ENTMCNC: 30.4 G/DL (ref 33–37)
MCV RBC AUTO: 97.7 FL (ref 80–94)
MONOCYTES ABSOLUTE: 0.6 K/UL (ref 0–0.9)
MONOCYTES RELATIVE PERCENT: 9.3 % (ref 0–10)
NEUTROPHILS ABSOLUTE: 4.7 K/UL (ref 1.5–7.5)
NEUTROPHILS RELATIVE PERCENT: 73.7 % (ref 50–65)
PDW BLD-RTO: 16.7 % (ref 11.5–14.5)
PLATELET # BLD: 166 K/UL (ref 130–400)
PMV BLD AUTO: 11.2 FL (ref 9.4–12.4)
POTASSIUM SERPL-SCNC: 3.9 MMOL/L (ref 3.5–4.9)
PROTHROMBIN TIME: 14.3 SEC (ref 12–14.6)
RBC # BLD: 3.44 M/UL (ref 4.7–6.1)
SODIUM BLD-SCNC: 136 MMOL/L (ref 136–145)
WBC # BLD: 6.4 K/UL (ref 4.8–10.8)

## 2020-03-06 PROCEDURE — 3700000000 HC ANESTHESIA ATTENDED CARE: Performed by: SURGERY

## 2020-03-06 PROCEDURE — 36200 PLACE CATHETER IN AORTA: CPT | Performed by: SURGERY

## 2020-03-06 PROCEDURE — 6360000002 HC RX W HCPCS: Performed by: NURSE ANESTHETIST, CERTIFIED REGISTERED

## 2020-03-06 PROCEDURE — 85025 COMPLETE CBC W/AUTO DIFF WBC: CPT

## 2020-03-06 PROCEDURE — 2580000003 HC RX 258: Performed by: SURGERY

## 2020-03-06 PROCEDURE — 86900 BLOOD TYPING SEROLOGIC ABO: CPT

## 2020-03-06 PROCEDURE — 7100000001 HC PACU RECOVERY - ADDTL 15 MIN: Performed by: SURGERY

## 2020-03-06 PROCEDURE — 80048 BASIC METABOLIC PNL TOTAL CA: CPT

## 2020-03-06 PROCEDURE — 85730 THROMBOPLASTIN TIME PARTIAL: CPT

## 2020-03-06 PROCEDURE — 6360000002 HC RX W HCPCS: Performed by: ANESTHESIOLOGY

## 2020-03-06 PROCEDURE — 3600000017 HC SURGERY HYBRID ADDL 15MIN: Performed by: SURGERY

## 2020-03-06 PROCEDURE — C1887 CATHETER, GUIDING: HCPCS | Performed by: SURGERY

## 2020-03-06 PROCEDURE — 75710 ARTERY X-RAYS ARM/LEG: CPT

## 2020-03-06 PROCEDURE — 86850 RBC ANTIBODY SCREEN: CPT

## 2020-03-06 PROCEDURE — 6360000002 HC RX W HCPCS: Performed by: SURGERY

## 2020-03-06 PROCEDURE — 6370000000 HC RX 637 (ALT 250 FOR IP): Performed by: SURGERY

## 2020-03-06 PROCEDURE — 75710 ARTERY X-RAYS ARM/LEG: CPT | Performed by: SURGERY

## 2020-03-06 PROCEDURE — 86901 BLOOD TYPING SEROLOGIC RH(D): CPT

## 2020-03-06 PROCEDURE — C1769 GUIDE WIRE: HCPCS | Performed by: SURGERY

## 2020-03-06 PROCEDURE — 36415 COLL VENOUS BLD VENIPUNCTURE: CPT

## 2020-03-06 PROCEDURE — 3700000001 HC ADD 15 MINUTES (ANESTHESIA): Performed by: SURGERY

## 2020-03-06 PROCEDURE — 7100000010 HC PHASE II RECOVERY - FIRST 15 MIN: Performed by: SURGERY

## 2020-03-06 PROCEDURE — 85610 PROTHROMBIN TIME: CPT

## 2020-03-06 PROCEDURE — 7100000000 HC PACU RECOVERY - FIRST 15 MIN: Performed by: SURGERY

## 2020-03-06 PROCEDURE — 2500000003 HC RX 250 WO HCPCS: Performed by: NURSE ANESTHETIST, CERTIFIED REGISTERED

## 2020-03-06 PROCEDURE — 2709999900 HC NON-CHARGEABLE SUPPLY: Performed by: SURGERY

## 2020-03-06 PROCEDURE — C1894 INTRO/SHEATH, NON-LASER: HCPCS | Performed by: SURGERY

## 2020-03-06 PROCEDURE — 3600000007 HC SURGERY HYBRID BASE: Performed by: SURGERY

## 2020-03-06 RX ORDER — DEXAMETHASONE SODIUM PHOSPHATE 10 MG/ML
INJECTION, SOLUTION INTRAMUSCULAR; INTRAVENOUS PRN
Status: DISCONTINUED | OUTPATIENT
Start: 2020-03-06 | End: 2020-03-06 | Stop reason: SDUPTHER

## 2020-03-06 RX ORDER — MORPHINE SULFATE 4 MG/ML
4 INJECTION, SOLUTION INTRAMUSCULAR; INTRAVENOUS
Status: DISCONTINUED | OUTPATIENT
Start: 2020-03-06 | End: 2020-03-06 | Stop reason: HOSPADM

## 2020-03-06 RX ORDER — DIPHENHYDRAMINE HYDROCHLORIDE 50 MG/ML
12.5 INJECTION INTRAMUSCULAR; INTRAVENOUS
Status: DISCONTINUED | OUTPATIENT
Start: 2020-03-06 | End: 2020-03-06 | Stop reason: HOSPADM

## 2020-03-06 RX ORDER — SODIUM CHLORIDE 0.9 % (FLUSH) 0.9 %
10 SYRINGE (ML) INJECTION EVERY 12 HOURS SCHEDULED
Status: DISCONTINUED | OUTPATIENT
Start: 2020-03-06 | End: 2020-03-06 | Stop reason: HOSPADM

## 2020-03-06 RX ORDER — SODIUM CHLORIDE 450 MG/100ML
INJECTION, SOLUTION INTRAVENOUS CONTINUOUS
Status: DISCONTINUED | OUTPATIENT
Start: 2020-03-06 | End: 2020-03-06 | Stop reason: HOSPADM

## 2020-03-06 RX ORDER — SCOLOPAMINE TRANSDERMAL SYSTEM 1 MG/1
1 PATCH, EXTENDED RELEASE TRANSDERMAL ONCE
Status: DISCONTINUED | OUTPATIENT
Start: 2020-03-06 | End: 2020-03-06 | Stop reason: HOSPADM

## 2020-03-06 RX ORDER — EPHEDRINE SULFATE 50 MG/ML
INJECTION, SOLUTION INTRAVENOUS PRN
Status: DISCONTINUED | OUTPATIENT
Start: 2020-03-06 | End: 2020-03-06 | Stop reason: SDUPTHER

## 2020-03-06 RX ORDER — SODIUM CHLORIDE, SODIUM LACTATE, POTASSIUM CHLORIDE, CALCIUM CHLORIDE 600; 310; 30; 20 MG/100ML; MG/100ML; MG/100ML; MG/100ML
INJECTION, SOLUTION INTRAVENOUS CONTINUOUS
Status: DISCONTINUED | OUTPATIENT
Start: 2020-03-06 | End: 2020-03-06 | Stop reason: HOSPADM

## 2020-03-06 RX ORDER — OXYCODONE HYDROCHLORIDE 10 MG/1
10 TABLET ORAL EVERY 6 HOURS PRN
Qty: 28 TABLET | Refills: 0 | Status: SHIPPED | OUTPATIENT
Start: 2020-03-06 | End: 2020-03-13

## 2020-03-06 RX ORDER — MEPERIDINE HYDROCHLORIDE 50 MG/ML
12.5 INJECTION INTRAMUSCULAR; INTRAVENOUS; SUBCUTANEOUS EVERY 5 MIN PRN
Status: DISCONTINUED | OUTPATIENT
Start: 2020-03-06 | End: 2020-03-06 | Stop reason: HOSPADM

## 2020-03-06 RX ORDER — HYDROCODONE BITARTRATE AND ACETAMINOPHEN 5; 325 MG/1; MG/1
2 TABLET ORAL EVERY 4 HOURS PRN
Status: CANCELLED | OUTPATIENT
Start: 2020-03-06

## 2020-03-06 RX ORDER — METOCLOPRAMIDE HYDROCHLORIDE 5 MG/ML
10 INJECTION INTRAMUSCULAR; INTRAVENOUS
Status: COMPLETED | OUTPATIENT
Start: 2020-03-06 | End: 2020-03-06

## 2020-03-06 RX ORDER — FENTANYL CITRATE 50 UG/ML
INJECTION, SOLUTION INTRAMUSCULAR; INTRAVENOUS PRN
Status: DISCONTINUED | OUTPATIENT
Start: 2020-03-06 | End: 2020-03-06 | Stop reason: SDUPTHER

## 2020-03-06 RX ORDER — SODIUM CHLORIDE 0.9 % (FLUSH) 0.9 %
10 SYRINGE (ML) INJECTION EVERY 12 HOURS SCHEDULED
Status: CANCELLED | OUTPATIENT
Start: 2020-03-06

## 2020-03-06 RX ORDER — LABETALOL 20 MG/4 ML (5 MG/ML) INTRAVENOUS SYRINGE
5 EVERY 10 MIN PRN
Status: DISCONTINUED | OUTPATIENT
Start: 2020-03-06 | End: 2020-03-06 | Stop reason: HOSPADM

## 2020-03-06 RX ORDER — LISINOPRIL 10 MG/1
10 TABLET ORAL DAILY
Status: ON HOLD | COMMUNITY
End: 2020-04-10 | Stop reason: HOSPADM

## 2020-03-06 RX ORDER — FENTANYL CITRATE 50 UG/ML
50 INJECTION, SOLUTION INTRAMUSCULAR; INTRAVENOUS
Status: DISCONTINUED | OUTPATIENT
Start: 2020-03-06 | End: 2020-03-06 | Stop reason: HOSPADM

## 2020-03-06 RX ORDER — HYDRALAZINE HYDROCHLORIDE 20 MG/ML
5 INJECTION INTRAMUSCULAR; INTRAVENOUS EVERY 10 MIN PRN
Status: DISCONTINUED | OUTPATIENT
Start: 2020-03-06 | End: 2020-03-06 | Stop reason: HOSPADM

## 2020-03-06 RX ORDER — PROPOFOL 10 MG/ML
INJECTION, EMULSION INTRAVENOUS PRN
Status: DISCONTINUED | OUTPATIENT
Start: 2020-03-06 | End: 2020-03-06 | Stop reason: SDUPTHER

## 2020-03-06 RX ORDER — SODIUM CHLORIDE 0.9 % (FLUSH) 0.9 %
10 SYRINGE (ML) INJECTION PRN
Status: DISCONTINUED | OUTPATIENT
Start: 2020-03-06 | End: 2020-03-06 | Stop reason: HOSPADM

## 2020-03-06 RX ORDER — LIDOCAINE HYDROCHLORIDE 10 MG/ML
INJECTION, SOLUTION EPIDURAL; INFILTRATION; INTRACAUDAL; PERINEURAL PRN
Status: DISCONTINUED | OUTPATIENT
Start: 2020-03-06 | End: 2020-03-06 | Stop reason: SDUPTHER

## 2020-03-06 RX ORDER — MIDAZOLAM HYDROCHLORIDE 1 MG/ML
2 INJECTION INTRAMUSCULAR; INTRAVENOUS
Status: DISCONTINUED | OUTPATIENT
Start: 2020-03-06 | End: 2020-03-06 | Stop reason: HOSPADM

## 2020-03-06 RX ORDER — MORPHINE SULFATE 4 MG/ML
4 INJECTION, SOLUTION INTRAMUSCULAR; INTRAVENOUS EVERY 5 MIN PRN
Status: DISCONTINUED | OUTPATIENT
Start: 2020-03-06 | End: 2020-03-06 | Stop reason: HOSPADM

## 2020-03-06 RX ORDER — HEPARIN SODIUM 5000 [USP'U]/ML
5000 INJECTION, SOLUTION INTRAVENOUS; SUBCUTANEOUS EVERY 8 HOURS SCHEDULED
Status: CANCELLED | OUTPATIENT
Start: 2020-03-07

## 2020-03-06 RX ORDER — LIDOCAINE HYDROCHLORIDE 10 MG/ML
1 INJECTION, SOLUTION EPIDURAL; INFILTRATION; INTRACAUDAL; PERINEURAL
Status: DISCONTINUED | OUTPATIENT
Start: 2020-03-06 | End: 2020-03-06 | Stop reason: HOSPADM

## 2020-03-06 RX ORDER — SODIUM CHLORIDE 0.9 % (FLUSH) 0.9 %
10 SYRINGE (ML) INJECTION PRN
Status: CANCELLED | OUTPATIENT
Start: 2020-03-06

## 2020-03-06 RX ORDER — PROMETHAZINE HYDROCHLORIDE 25 MG/ML
6.25 INJECTION, SOLUTION INTRAMUSCULAR; INTRAVENOUS
Status: DISCONTINUED | OUTPATIENT
Start: 2020-03-06 | End: 2020-03-06 | Stop reason: HOSPADM

## 2020-03-06 RX ORDER — ROCURONIUM BROMIDE 10 MG/ML
INJECTION, SOLUTION INTRAVENOUS PRN
Status: DISCONTINUED | OUTPATIENT
Start: 2020-03-06 | End: 2020-03-06 | Stop reason: SDUPTHER

## 2020-03-06 RX ORDER — HYDROCODONE BITARTRATE AND ACETAMINOPHEN 5; 325 MG/1; MG/1
1 TABLET ORAL EVERY 4 HOURS PRN
Status: CANCELLED | OUTPATIENT
Start: 2020-03-06

## 2020-03-06 RX ORDER — OXYCODONE HCL 10 MG/1
10 TABLET, FILM COATED, EXTENDED RELEASE ORAL ONCE
Status: COMPLETED | OUTPATIENT
Start: 2020-03-06 | End: 2020-03-06

## 2020-03-06 RX ORDER — MIDAZOLAM HYDROCHLORIDE 1 MG/ML
INJECTION INTRAMUSCULAR; INTRAVENOUS PRN
Status: DISCONTINUED | OUTPATIENT
Start: 2020-03-06 | End: 2020-03-06 | Stop reason: SDUPTHER

## 2020-03-06 RX ORDER — MORPHINE SULFATE 4 MG/ML
2 INJECTION, SOLUTION INTRAMUSCULAR; INTRAVENOUS EVERY 5 MIN PRN
Status: DISCONTINUED | OUTPATIENT
Start: 2020-03-06 | End: 2020-03-06 | Stop reason: HOSPADM

## 2020-03-06 RX ORDER — ONDANSETRON 2 MG/ML
INJECTION INTRAMUSCULAR; INTRAVENOUS PRN
Status: DISCONTINUED | OUTPATIENT
Start: 2020-03-06 | End: 2020-03-06 | Stop reason: SDUPTHER

## 2020-03-06 RX ADMIN — LIDOCAINE HYDROCHLORIDE 50 MG: 10 INJECTION, SOLUTION EPIDURAL; INFILTRATION; INTRACAUDAL; PERINEURAL at 08:02

## 2020-03-06 RX ADMIN — MIDAZOLAM 2 MG: 1 INJECTION INTRAMUSCULAR; INTRAVENOUS at 07:55

## 2020-03-06 RX ADMIN — SODIUM CHLORIDE: 4.5 INJECTION, SOLUTION INTRAVENOUS at 08:45

## 2020-03-06 RX ADMIN — DEXAMETHASONE SODIUM PHOSPHATE 10 MG: 10 INJECTION, SOLUTION INTRAMUSCULAR; INTRAVENOUS at 08:10

## 2020-03-06 RX ADMIN — EPHEDRINE SULFATE 10 MG: 50 INJECTION INTRAMUSCULAR; INTRAVENOUS; SUBCUTANEOUS at 08:42

## 2020-03-06 RX ADMIN — EPHEDRINE SULFATE 10 MG: 50 INJECTION INTRAMUSCULAR; INTRAVENOUS; SUBCUTANEOUS at 08:16

## 2020-03-06 RX ADMIN — PROPOFOL 100 MG: 10 INJECTION, EMULSION INTRAVENOUS at 08:02

## 2020-03-06 RX ADMIN — ROCURONIUM BROMIDE 50 MG: 10 SOLUTION INTRAVENOUS at 08:02

## 2020-03-06 RX ADMIN — FENTANYL CITRATE 50 MCG: 50 INJECTION INTRAMUSCULAR; INTRAVENOUS at 08:02

## 2020-03-06 RX ADMIN — ONDANSETRON HYDROCHLORIDE 4 MG: 2 INJECTION, SOLUTION INTRAMUSCULAR; INTRAVENOUS at 08:44

## 2020-03-06 RX ADMIN — SUGAMMADEX 300 MG: 100 INJECTION, SOLUTION INTRAVENOUS at 08:49

## 2020-03-06 RX ADMIN — SODIUM CHLORIDE: 4.5 INJECTION, SOLUTION INTRAVENOUS at 06:27

## 2020-03-06 RX ADMIN — OXYCODONE HYDROCHLORIDE 10 MG: 10 TABLET, FILM COATED, EXTENDED RELEASE ORAL at 11:03

## 2020-03-06 RX ADMIN — WATER 1 G: 1 INJECTION INTRAMUSCULAR; INTRAVENOUS; SUBCUTANEOUS at 08:10

## 2020-03-06 RX ADMIN — METOCLOPRAMIDE 10 MG: 5 INJECTION, SOLUTION INTRAMUSCULAR; INTRAVENOUS at 11:03

## 2020-03-06 RX ADMIN — EPHEDRINE SULFATE 10 MG: 50 INJECTION INTRAMUSCULAR; INTRAVENOUS; SUBCUTANEOUS at 08:37

## 2020-03-06 RX ADMIN — EPHEDRINE SULFATE 20 MG: 50 INJECTION INTRAMUSCULAR; INTRAVENOUS; SUBCUTANEOUS at 08:47

## 2020-03-06 ASSESSMENT — PAIN SCALES - GENERAL
PAINLEVEL_OUTOF10: 0
PAINLEVEL_OUTOF10: 5
PAINLEVEL_OUTOF10: 4
PAINLEVEL_OUTOF10: 7
PAINLEVEL_OUTOF10: 5

## 2020-03-06 ASSESSMENT — LIFESTYLE VARIABLES: SMOKING_STATUS: 0

## 2020-03-06 ASSESSMENT — ENCOUNTER SYMPTOMS: SHORTNESS OF BREATH: 0

## 2020-03-06 NOTE — INTERVAL H&P NOTE
H&P Update    Patient's History and Physical from March 6, 2020 was reviewed. Patient examined. Patient was explained that he is high risk for intervention, however this is best to give him appropriate pain control. He remains poor prognosis for limb salvage. We will attempt to open possible proximal occlusions to get him out of rest pain. He and his mother understand and agree with proceeding with intervention.         Electronically signed by Latosha Kruger DO on 3/6/20 at 7:55 AM

## 2020-03-06 NOTE — DISCHARGE INSTR - ACTIVITY
Angiogram: What to Expect at Home  Your Recovery  An angiogram is an X-ray test that uses dye and a camera to take pictures of the blood flow in an artery or a vein. The doctor inserted a thin, flexible tube (catheter) into a blood vessel in your groin. In some cases, the catheter is placed in a blood vessel in the arm. An angiogram is done for many reasons. For example, you may have an angiogram to find the source of bleeding, such as an ulcer. Or it may be done to look for blocked blood vessels in your lungs. After an angiogram, your groin or arm may have a bruise and feel sore for a day or two. You can do light activities around the house but nothing strenuous for several days. Your doctor may give you specific instructions on when you can do your normal activities again, such as driving and going back to work. This care sheet gives you a general idea about how long it will take for you to recover. But each person recovers at a different pace. Follow the steps below to feel better as quickly as possible. How can you care for yourself at home? Activity    · Do not do strenuous exercise and do not lift, pull, or push anything heavy until your doctor says it is okay. This may be for a day or two. You can walk around the house and do light activity, such as cooking.     · If the catheter was placed in your groin, try not to walk up stairs for the first couple of days.     · If the catheter was placed in your arm near your wrist, do not bend your wrist deeply for the first couple of days. Be careful using your hand to get into and out of a chair or bed.     · If your doctor recommends it, get more exercise. Walking is a good choice. Bit by bit, increase the amount you walk every day. Try for at least 30 minutes on most days of the week. Diet    · Drink plenty of fluids to help your body flush out the dye.  If you have kidney, heart, or liver disease and have to limit fluids, talk with your doctor before you increase the amount of fluids you drink.     · You can eat your normal diet. If your stomach is upset, try bland, low-fat foods like plain rice, broiled chicken, toast, and yogurt. Medicines    · Be safe with medicines. Read and follow all instructions on the label. ? If the doctor gave you a prescription medicine for pain, take it as prescribed. ? If you are not taking a prescription pain medicine, ask your doctor if you can take an over-the-counter medicine.     · If you take aspirin or some other blood thinner, ask your doctor if and when to start taking it again. Make sure that you understand exactly what your doctor wants you to do.     · Your doctor will tell you if and when you can restart your medicines. He or she will also give you instructions about taking any new medicines.    Care of the catheter site    · You will have a dressing over the cut (incision). A dressing helps the incision heal and protects it. Your doctor will tell you how to take care of this.     · Put ice or a cold pack on the area for 10 to 20 minutes at a time to help with soreness or swelling. Put a thin cloth between the ice and your skin.     · You may shower 24 to 48 hours after the procedure, if your doctor okays it. Pat the incision dry.     · Do not soak the catheter site until it is healed. Don't take a bath for 1 week, or until your doctor tells you it is okay.     · Watch for bleeding from the site. A small amount of blood (up to the size of a quarter) on the bandage can be normal.     · If you are bleeding, lie down and press on the area for 15 minutes to try to make it stop. If the bleeding does not stop, call your doctor or seek immediate medical care. Follow-up care is a key part of your treatment and safety. Be sure to make and go to all appointments, and call your doctor if you are having problems. It's also a good idea to know your test results and keep a list of the medicines you take.   When should you call

## 2020-03-06 NOTE — H&P
[address]   Progress Note and Procedure Note      Harlan Howard  MEDICAL RECORD NUMBER:  [id number]  AGE: 61 y.o. GENDER: male  : 1960  EPISODE DATE:  3/5/2020    Subjective:     No chief complaint on file. HISTORY of PRESENT ILLNESS HPI     Harlan Howard is a 61 y.o. male who presents today for wound/ulcer evaluation. History of Wound Context: Pt with failing bypass RLE with multiple wounds here for eval/treat  Wound/Ulcer Pain Timing/Severity: none  Quality of pain: dull  Severity:  2 / 10   Modifying Factors: None  Associated Signs/Symptoms: edema and pain    Ulcer Identification:  Ulcer Type: arterial  Contributing Factors: diabetes, shear force and smoking    Wound: Surgical        PAST MEDICAL HISTORY        Diagnosis Date    Anxiety     Blood circulation, collateral     CAD (coronary artery disease)     stents x 2; per dr. Sayda Olivier [facility])     liver cancer    CHF (congestive heart failure) ([address]     Chyloperitoneum determined by paracentesis     CKD (chronic kidney disease), stage V ([address]     Dialysis patient ([address]      at [place] GERD (gastroesophageal reflux disease)     Hemodialysis patient ([address]      in [place] Hepatic cirrhosis ([address]     dr. Natalie Lugo; has been going to [facility] for liver and kidney transplant list.    Hepatitis C, chronic ([address]     History of blood transfusion     HTN (hypertension)     Hx of blood clots     arm/fistula    Mixed hyperlipidemia 2017    Osteoarthritis     Pacemaker     Pain management     Dr. Bette Crooks Palliative care patient 2019    PVD (peripheral vascular disease) ([address]     Sepsis ([address]     Skin ulcer of right heel with fat layer exposed ([address] 2019    Sleep apnea     no cpap at present.  Type II diabetes mellitus (HCC)     no meds.     Ulcer of left heel, with fat layer exposed ([address] 2018    Ulcer of right foot, with fat layer exposed ([address] 2019  Wound infection     Wound infection after surgery 7/8/2019       PAST SURGICAL HISTORY    Past Surgical History:   Procedure Laterality Date    ANGIOPLASTY  08/09/11 S    LUE cephalic vein balloon angioplasty with 7mm x 4cm balloon. coild embolization of 2 cephallic vein brances with 3mm x 5cm coils    CARDIAC CATHETERIZATION  04/04/11    cardiac cath and stent x1 by Dr. Gibson Lot  07/26/11 SUKHWINDER    LULEONARD AV fistula. coild embolization of cephalic vein branch near the wrist with 3mm EMBO coils. balloon a'plasty left cephalic vein with [id number] balloon and then [id number] balloon    DIALYSIS FISTULA CREATION Left 2/16/2017    LEFT UPPER EXTREMITY BRACHIAL / AXILLARY GRAFT AND STENT LEFT SUBCLAVIAN VEIN  performed by Elise Daigle MD at [address] Right 7/12/2019    RIGHT LEG WOUND WASHOUT performed by Karolee Leyden, MD at [address] [facility] Right 1/27/2020    REVISION OF RIGHT LEG BYPASS GRAFT performed by Karolee Leyden, MD at [address] [facility] FEMORAL-TIBIAL BYPASS GRAFT Right 6/25/2019    FEMORAL TIBIAL/PERINEAL BYPASS WITH REVERSED GREATER SAPHENOUS VEIN performed by Elise Daigle MD at [address]  12/26/2010    Right internal jugular vein tunneled dialysis catheter placement    OTHER SURGICAL HISTORY  [id number]    Redo of dialysis catheter    OTHER SURGICAL HISTORY  9/6/2011   [facility]    Removal of RT IJV tunneled dialysis cath    OTHER SURGICAL HISTORY  5/22/12   [facility]    Ultrasound-guided cannulation of left cephalic vein in the mid forearm toward the AV anastomosis, Left upper  ext.  fistulograms including venograms of the SVC, Left cephalic vein balloon angioplasty with a 4mm x 100mm Darrell balloon, Completion fistulograms    PACEMAKER PLACEMENT Right 2015    medtronic    PARACENTESIS      multiple/recent; per dr. Consuelo Goldmann at [address] [facility] Left 1/30/2018    UPPER EXTREMITY DRIL PROCEDURE WITH LEFT SAPHENOUS VEIN HARVESTING performed by Ajith Vaz MD at [address] [place] [address]  6/19/12    LUE arteriovenous fistulogram including venography of the superior vena cava. Balloon angioplasty, left forearm cephalic vein and upper arm cephalic vein with [id number] tod balloon.  VASCULAR SURGERY  7/10/2012 S     Revision of left distal radial artery to cephalic vein arteriovenous fistula with 7mm Artegraft interposition.  VASCULAR SURGERY  7/30/15 [facility] & [address]    Left upper extremity arteriovenous fistulograms including venography of the superior vena cava. Left cephalic vein balloon angioplasty with an 8 x 20 cm cutting balloon proximal cephalic vein. Balloon angioplasty of left cephalic vein/antecubital vein near the antecubital crease with 8 x 20 mm cutting balloon.  VASCULAR SURGERY  7/30/15 [facility] cont    Balloon angioplasty proximal end distal upper arm cephalic vein with 9 x 40 cm  balloon. Completion fistulograms of the left upper extremity.  VASCULAR SURGERY  8/13/15 [facility]    Revision of left upper extremity arteriovenous fistula with excision of pseudoaneurysm and primary repair of cephalic vein.  VASCULAR SURGERY  5/3/16 [facility]    Left upper fistulograms/venograms, left cephalic balloon 8 x 20 cutter,9 x 40 conquest,left proximal cephalic vein stents (flair 2 9 x 50), balloon stents 9 x 40 conquest.    VASCULAR SURGERY  12/08/2016    [facility]JS- ultrasound guided cannulation of left distal cephalic vein ultrasound guided cannulation right internal jugular vein placement of right internal jugular vein tunneled dialysis catheter (Bard Equistream XK 23cm tip to cuff)     VASCULAR SURGERY  01/20/2017    [facility]-Right upper venograms, u/s guided cannulation left basilic vein, left upper venograms, balloon angioplasty left subclavian vein 10x40 conquest.    VASCULAR SURGERY  02/16/2017    [facility]. Left brachial artery to axillary vein arteriovenous graft with 7 mm artegraft. Left upper extremity Steal syndrome as complication of dialysis access ([address] T82.898A    ESRD on dialysis ([address] N18.6, Z99.2    Steal syndrome of dialysis vascular access [facility]) T82.898A    Atherosclerosis of artery of extremity with ulceration (HCC) I70.299, L97.909    Atherosclerosis of artery of extremity with rest pain (HCC) I70.229    Anemia, chronic disease D63.8    Iatrogenic complication of vascular surgery T81. 9XXA    Pacemaker Z95.0    Hepatitis C, chronic (HCC) B18.2    PVD (peripheral vascular disease) (HCC) I73.9    Hyponatremia E87.1    Normocytic anemia D64.9    Thrombocytopenia (HCC) D69.6    Palliative care patient K64.5    Alcoholic cirrhosis of liver with ascites ([address] K70.31    Anticoagulated by anticoagulation treatment Z79.01    CHF (congestive heart failure) (HCC) I50.9    Colon polyps K63.5    Dialysis patient ([address] Z99.2    Hepatocellular carcinoma ([address] C22.0    Mixed hyperlipidemia E78.2    Spontaneous bacterial peritonitis (HCC) K65.2    Tobacco abuse Z72.0    Non-healing non-surgical wound T14. 8XXA    Knee pain, right M25.561    Effusion of knee joint right M25.461    Failing vascular bypass graft T82.599A    Hematoma T14. 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 (HCC) I70.201    NSTEMI (non-ST elevated myocardial infarction) ([address] I21.4    Cellulitis of right lower extremity without foot L03.115    Right leg pain M79.604    Cellulitis of right lower extremity L03.115    Atherosclerosis of native arteries of extremities with rest pain, right leg (HCC) I70.221    Leg ulcer, right, with fat layer exposed ([address] L97.912    Open wound of right knee S81.001A    Right second toe ulcer, with fat layer exposed ([address] L97.512             Wound 02/07/20 Knee Right;Medial Wound 6.  Surgical Right medial knee(wound  on 2/14/20) Inferior (Active)   Wound Image   3/4/2020  4:41 PM   Wound Non-Healing Surgical 3/4/2020  4:41 PM   Dressing Status Old drainage 3/4/2020  4:41 PM   Dressing Changed Changed/New 2/14/2020 10:55 AM   Dressing/Treatment Alginate with Ag;4x4;Medipore 2/14/2020 10:55 AM   Wound Cleansed Soap and water 3/4/2020  4:41 PM   Wound Length (cm) 6 cm 3/4/2020  4:41 PM   Wound Width (cm) 1.3 cm 3/4/2020  4:41 PM   Wound Depth (cm) 0.4 cm 3/4/2020  4:41 PM   Wound Surface Area (cm^2) 7.8 cm^2 3/4/2020  4:41 PM   Change in Wound Size % (l*w) 17.89 3/4/2020  4:41 PM   Wound Volume (cm^3) 3.12 cm^3 3/4/2020  4:41 PM   Wound Healing % -228 3/4/2020  4:41 PM   Post-Procedure Length (cm) 4 cm 2/21/2020 11:29 AM   Post-Procedure Width (cm) 0.5 cm 2/21/2020 11:29 AM   Post-Procedure Depth (cm) 0.1 cm 2/21/2020 11:29 AM   Post-Procedure Surface Area (cm^2) 2 cm^2 2/21/2020 11:29 AM   Post-Procedure Volume (cm^3) 0.2 cm^3 2/21/2020 11:29 AM   Distance Tunneling (cm) 0 cm 3/4/2020  4:41 PM   Tunneling Position ___ O'Clock 0 3/4/2020  4:41 PM   Undermining Starts ___ O'Clock 0 3/4/2020  4:41 PM   Undermining Ends___ O'Clock 0 3/4/2020  4:41 PM   Undermining Maxium Distance (cm) 0 3/4/2020  4:41 PM   Wound Assessment Red;Slough; Yellow;Drainage 3/4/2020  4:41 PM   Drainage Amount Moderate 3/4/2020  4:41 PM   Drainage Description Serosanguinous 3/4/2020  4:41 PM   Odor None 3/4/2020  4:41 PM   Margins Attached edges 3/4/2020  4:41 PM   Shaila-wound Assessment Pink;Swelling 3/4/2020  4:41 PM   Non-staged Wound Description Full thickness 3/4/2020  4:41 PM   [place]%Wound Bed 50 3/4/2020  4:41 PM   Red%Wound Bed 0 3/4/2020  4:41 PM   Yellow%Wound Bed 50 3/4/2020  4:41 PM   Black%Wound Bed 0 3/4/2020  4:41 PM   Purple%Wound Bed 0 3/4/2020  4:41 PM   Other%Wound Bed 0 2/28/2020  8:57 AM   Number of days: 27       Wound 02/07/20 Toe (Comment  which one) Anterior;Right Wound 7.   Right 5th toe (2/28/20 change etiology to arterial) (Active)   Wound Image    3/4/2020  4:41 PM   Wound Arterial 3/4/2020  4:41 PM   Dressing Status Dry 3/4/2020  4:41 PM   Wound Cleansed Rinsed/Irrigated with saline (cm) 1.8 cm 2/21/2020 11:29 AM   Post-Procedure Width (cm) 0.3 cm 2/21/2020 11:29 AM   Post-Procedure Depth (cm) 0.1 cm 2/21/2020 11:29 AM   Post-Procedure Surface Area (cm^2) 0.54 cm^2 2/21/2020 11:29 AM   Post-Procedure Volume (cm^3) 0.05 cm^3 2/21/2020 11:29 AM   Distance Tunneling (cm) 0 cm 3/4/2020  4:41 PM   Tunneling Position ___ O'Clock 0 3/4/2020  4:41 PM   Undermining Starts ___ O'Clock 0 3/4/2020  4:41 PM   Undermining Ends___ O'Clock 0 3/4/2020  4:41 PM   Undermining Maxium Distance (cm) 0 3/4/2020  4:41 PM   Wound Assessment Brown;[place];Slough 3/4/2020  4:41 PM   Drainage Amount Small 3/4/2020  4:41 PM   Drainage Description Serosanguinous 3/4/2020  4:41 PM   Odor None 3/4/2020  4:41 PM   Margins Attached edges 3/4/2020  4:41 PM   Shaila-wound Assessment Pink;Swelling 3/4/2020  4:41 PM   Non-staged Wound Description Full thickness 3/4/2020  4:41 PM   [place]%Wound Bed 50 3/4/2020  4:41 PM   Red%Wound Bed 0 3/4/2020  4:41 PM   Yellow%Wound Bed 50 3/4/2020  4:41 PM   Black%Wound Bed 0 3/4/2020  4:41 PM   Purple%Wound Bed 00 3/4/2020  4:41 PM   Other%Wound Bed 98 2/28/2020  8:57 AM   Number of days: 20       Wound 02/28/20 Leg Right; Lower;Distal Wound 9, Dehisced surgical wound, R. Lower Leg distal (Active)   Wound Image    3/4/2020  4:41 PM   Wound Non-Healing Surgical 3/4/2020  4:41 PM   Dressing Status Old drainage 3/4/2020  4:41 PM   Wound Cleansed Soap and water 3/4/2020  4:41 PM   Wound Length (cm) 16 cm 3/4/2020  4:41 PM   Wound Width (cm) 3 cm 3/4/2020  4:41 PM   Wound Depth (cm) 0.5 cm 3/4/2020  4:41 PM   Wound Surface Area (cm^2) 48 cm^2 3/4/2020  4:41 PM   Change in Wound Size % (l*w) -370.59 3/4/2020  4:41 PM   Wound Volume (cm^3) 24 cm^3 3/4/2020  4:41 PM   Wound Healing % -1076 3/4/2020  4:41 PM   Distance Tunneling (cm) 0 cm 3/4/2020  4:41 PM   Tunneling Position ___ O'Clock 0 3/4/2020  4:41 PM   Undermining Starts ___ O'Clock 0 3/4/2020  4:41 PM   Undermining Ends___ O'Clock 0 3/4/2020  4:41 PM

## 2020-03-06 NOTE — BRIEF OP NOTE
Brief Postoperative Note  ______________________________________________________________    Patient: Margret Aldridge  YOB: 1960  MRN: [id number]  Date of Procedure: 3/6/2020    Pre-Op Diagnosis: I70.221  I70.245  L97.912    Post-Op Diagnosis: Same       Procedure(s):  BILATERAL AORTAILLIAC AND RIGHT LEG ANGIOGRAM    Anesthesia: General    Surgeon(s):  Umesh Whittington DO    Assistant: Janteh Lowe    Estimated Blood Loss (mL): less than 50     Complications: None    Specimens:   * No specimens in log *    Implants:  * No implants in log *      Drains: * No LDAs found *    Findings: distal aorta with plaque on the right side with 20% stenosis, bilateral common iliac stents patent, interna;l iliac and external iliac arteries patent. Right common femoral, profunda and proximal SFA patent. Collateralized flow thereafter. Left common femoral plaque with 40% stenosis.     Umesh Whittington DO  Date: 3/6/2020  Time: 9:07 AM

## 2020-03-06 NOTE — ANESTHESIA PRE PROCEDURE
Historical Provider, MD   minoxidil (LONITEN) 10 MG tablet Take 10 mg by mouth daily Indications: High Blood Pressure Disorder, SBP >160     Historical Provider, MD   cloNIDine (CATAPRES) 0.2 MG tablet Take 0.3 mg by mouth 3 times daily Indications: High Blood Pressure     Historical Provider, MD   amLODIPine (NORVASC) 10 MG tablet Take 10 mg by mouth daily Indications: High Blood Pressure     Historical Provider, MD       Current medications:    No current facility-administered medications for this visit. No current outpatient medications on file. Facility-Administered Medications Ordered in Other Visits   Medication Dose Route Frequency Provider Last Rate Last Dose    0.45 % sodium chloride infusion   Intravenous Continuous Carmen Torres DO 40 mL/hr at 03/06/20 0627         Allergies:  No Known Allergies    Problem List:    Patient Active Problem List   Diagnosis Code    Osteoarthritis M19.90    Chronic diastolic congestive heart failure (HCC) I50.32    CAD (coronary artery disease) I25.10    Essential hypertension I10    Liver disease K76.9    Chronic deep vein thrombosis (DVT) of axillary vein of left upper extremity ([address] I82. A22    Steal syndrome as complication of dialysis access ([address] T82.898A    ESRD on dialysis ([address] N18.6, Z99.2    Steal syndrome of dialysis vascular access [facility]) T82.898A    Atherosclerosis of artery of extremity with ulceration ([address] I70.299, L97.909    Atherosclerosis of artery of extremity with rest pain (HCC) I70.229    Anemia, chronic disease D63.8    Iatrogenic complication of vascular surgery T81. 9XXA    Pacemaker Z95.0    Hepatitis C, chronic (HCC) B18.2    PVD (peripheral vascular disease) (HCC) I73.9    Hyponatremia E87.1    Normocytic anemia D64.9    Thrombocytopenia (HCC) D69.6    Palliative care patient F85.7    Alcoholic cirrhosis of liver with ascites ([address] K70.31    Anticoagulated by anticoagulation treatment Z79.01    CHF (congestive heart failure) (HCC) I50.9    Colon polyps K63.5    Dialysis patient ([address] Z99.2    Hepatocellular carcinoma ([address] C22.0    Mixed hyperlipidemia E78.2    Spontaneous bacterial peritonitis ([address] K65.2    Tobacco abuse Z72.0    Non-healing non-surgical wound T14. 8XXA    Knee pain, right M25.561    Effusion of knee joint right M25.461    Failing vascular bypass graft T82.599A    Hematoma T14. 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 (HCC) I70.201    NSTEMI (non-ST elevated myocardial infarction) ([address] I21.4    Cellulitis of right lower extremity without foot L03.115    Right leg pain M79.604    Cellulitis of right lower extremity L03.115    Atherosclerosis of native arteries of extremities with rest pain, right leg (HCC) I70.221    Leg ulcer, right, with fat layer exposed ([address] L97.912    Open wound of right knee S81.001A    Right second toe ulcer, with fat layer exposed ([address] L97.512       Past Medical History:        Diagnosis Date    Anxiety     Blood circulation, collateral     CAD (coronary artery disease)     stents x 2; per dr. Noni [facility])     liver cancer    CHF (congestive heart failure) ([address]     Chyloperitoneum determined by paracentesis     CKD (chronic kidney disease), stage V ([address]     Dialysis patient ([address]     mon wed fri at [place] GERD (gastroesophageal reflux disease)     Hemodialysis patient ([address]     mon wed fri in [place] Hepatic cirrhosis ([address]     dr. Roldan Cronin; has been going to [facility] for liver and kidney transplant list.    Hepatitis C, chronic ([address]     History of blood transfusion     HTN (hypertension)     Hx of blood clots     arm/fistula    Mixed hyperlipidemia 1/9/2017    Osteoarthritis     Pacemaker     Pain management     Dr. Dee Conner care patient 07/09/2019    PVD (peripheral vascular disease) ([address]     Sepsis ([address]     Skin ulcer of right heel with fat layer exposed ([address] 8/16/2019    Sleep apnea     no cpap at present.  Type II diabetes mellitus (HCC)     no meds.  Ulcer of left heel, with fat layer exposed ([address] 2/28/2018    Ulcer of right foot, with fat layer exposed ([address] 8/16/2019    Wound infection     Wound infection after surgery 7/8/2019       Past Surgical History:        Procedure Laterality Date    ANGIOPLASTY  08/09/11 [facility]    LUE cephalic vein balloon angioplasty with 7mm x 4cm balloon. coild embolization of 2 cephallic vein brances with 3mm x 5cm coils    CARDIAC CATHETERIZATION  04/04/11    cardiac cath and stent x1 by Dr. Azul Melissa  07/26/11 S    LUE AV fistula. coild embolization of cephalic vein branch near the wrist with 3mm EMBO coils. balloon a'plasty left cephalic vein with [id number] balloon and then [id number] balloon    DIALYSIS FISTULA CREATION Left 2/16/2017    LEFT UPPER EXTREMITY BRACHIAL / AXILLARY GRAFT AND STENT LEFT SUBCLAVIAN VEIN  performed by Jodi Mcrae MD at [address] Right 7/12/2019    RIGHT LEG WOUND WASHOUT performed by Giselle Garcia MD at [facility] Right 1/27/2020    REVISION OF RIGHT LEG BYPASS GRAFT performed by Giselle Garcia MD at [facility] FEMORAL-TIBIAL BYPASS GRAFT Right 6/25/2019    FEMORAL TIBIAL/PERINEAL BYPASS WITH REVERSED GREATER SAPHENOUS VEIN performed by Jodi Mcrae MD at [facility]  12/26/2010    Right internal jugular vein tunneled dialysis catheter placement    OTHER SURGICAL HISTORY  [id number]    Redo of dialysis catheter    OTHER SURGICAL HISTORY  9/6/2011   [facility]    Removal of RT IJV tunneled dialysis cath    OTHER SURGICAL HISTORY  5/22/12   S[facility]    Ultrasound-guided cannulation of left cephalic vein in the mid forearm toward the AV anastomosis, Left upper  ext.  fistulograms including venograms of the SVC, Left cephalic vein balloon angioplasty with a 4mm x 100mm Darrell balloon, Completion fistulograms    PACEMAKER PLACEMENT Right 2015    medtronic    PARACENTESIS      multiple/recent; per dr. Kirill Galindo at [address] [address] [facility] Left 1/30/2018    UPPER EXTREMITY DRIL PROCEDURE WITH LEFT SAPHENOUS VEIN HARVESTING performed by Paige Reyes MD at [address] [place] [address]  6/19/12    LUE arteriovenous fistulogram including venography of the superior vena cava. Balloon angioplasty, left forearm cephalic vein and upper arm cephalic vein with [id number] tod balloon.  VASCULAR SURGERY  7/10/2012 [facility]     Revision of left distal radial artery to cephalic vein arteriovenous fistula with 7mm Artegraft interposition.  VASCULAR SURGERY  7/30/15 [facility] & [address]    Left upper extremity arteriovenous fistulograms including venography of the superior vena cava. Left cephalic vein balloon angioplasty with an 8 x 20 cm cutting balloon proximal cephalic vein. Balloon angioplasty of left cephalic vein/antecubital vein near the antecubital crease with 8 x 20 mm cutting balloon.  VASCULAR SURGERY  7/30/15 [facility] cont    Balloon angioplasty proximal end distal upper arm cephalic vein with 9 x 40 cm  balloon. Completion fistulograms of the left upper extremity.  VASCULAR SURGERY  8/13/15 S    Revision of left upper extremity arteriovenous fistula with excision of pseudoaneurysm and primary repair of cephalic vein.     VASCULAR SURGERY  5/3/16 SJS    Left upper fistulograms/venograms, left cephalic balloon 8 x 20 cutter,9 x 40 conquest,left proximal cephalic vein stents (flair 2 9 x 50), balloon stents 9 x 40 conquest.    VASCULAR SURGERY  12/08/2016    SJS- ultrasound guided cannulation of left distal cephalic vein ultrasound guided cannulation right internal jugular vein placement of right internal jugular vein tunneled dialysis catheter (Bard Equistream XK 23cm tip to cuff)     VASCULAR SURGERY  01/20/2017    SJS-Right upper venograms, u/s guided cannulation left basilic vein, left upper venograms, balloon angioplasty left hiatal hernia       Endo/Other: Negative Endo/Other ROS   (+) DiabetesType II DM, , blood dyscrasia: anticoagulation therapy, anemia and thrombocytopenia, arthritis:., electrolyte abnormalities, . Pt had PAT visit. Abdominal:       Abdomen: soft. Vascular:                                          Anesthesia Plan      general     ASA 4     (Iv zofran within 30 min of closing .high periop cardiac risk )  Induction: intravenous. BIS  MIPS: Postoperative opioids intended and Prophylactic antiemetics administered. Anesthetic plan and risks discussed with patient. Use of blood products discussed with patient whom. Plan discussed with CRNA.     Attending anesthesiologist reviewed and agrees with Pre Eval content              Pacheco Campos MD   3/6/2020

## 2020-03-06 NOTE — OP NOTE
Patient Name: Margret Aldridge   YOB: 1960   MRN: [id number]     Procedure Date: 3/6/2020     Pre Op Diagnosis: right leg pain    Post Op Diagnosis: same    Procedure: Aortoiliac angiogram, right leg angiogram    Surgeon: Umesh Whittington DO    Anesthesia: General    Estimated Blood Loss: Minimal    Complications: None    Implants: none    Procedure Details: Patient was brought to the operating room and placed in supine position. He received preoperative antibiotic. His right leg and bilateral groins were prepped and draped in sterile fashion. Left common femoral artery was accessed under continuous ultrasound guidance using standard micropuncture technique. 5 Swedish glide sheath was inserted. I attempted to pass Bentson wire however it was held out left common iliac stent. It was changed to J-wire it was floated into the aorta without any difficulties followed by Omni Flush catheter that was also maneuvered through the stent. Omni Flush catheter was positioned in the distal aorta before the bifurcation. Using manual injection the bilateral aortoiliac angiogram was performed with mentioned below findings. Then  right common femoral area and thigh area and angiogram was performed. It appeared to be patent what was done in the previous surgery with endarterectomy profundoplasty and proximal SFA endarterectomy. There is no area of stenosis at that area. As previously known there was only collateralized there after. There is no intervention that was reasonable. The wire and the catheter were pulled and the sheath was removed and manual pressure held for 20 minutes. Findings: distal aorta with plaque on the right side with 20% stenosis, bilateral common iliac stents patent, interna;l iliac and external iliac arteries patent. Right common femoral, profunda and proximal SFA patent. Collateralized flow thereafter. Left common femoral plaque with 40% stenosis.     Disposition: patient was

## 2020-03-11 ENCOUNTER — HOSPITAL ENCOUNTER (OUTPATIENT)
Dept: WOUND CARE | Age: 60
Discharge: HOME | End: 2020-03-11
Payer: MEDICARE

## 2020-03-11 VITALS
HEART RATE: 93 BPM | DIASTOLIC BLOOD PRESSURE: 72 MMHG | WEIGHT: 174 LBS | RESPIRATION RATE: 16 BRPM | HEIGHT: 68 IN | TEMPERATURE: 98.2 F | BODY MASS INDEX: 26.37 KG/M2 | SYSTOLIC BLOOD PRESSURE: 122 MMHG

## 2020-03-11 LAB — HBA1C MFR BLD: 5 % (ref 4–6)

## 2020-03-11 PROCEDURE — 97598 DBRDMT OPN WND ADDL 20CM/<: CPT

## 2020-03-11 PROCEDURE — 97597 DBRDMT OPN WND 1ST 20 CM/<: CPT

## 2020-03-11 PROCEDURE — 97597 DBRDMT OPN WND 1ST 20 CM/<: CPT | Performed by: NURSE PRACTITIONER

## 2020-03-11 PROCEDURE — 97598 DBRDMT OPN WND ADDL 20CM/<: CPT | Performed by: NURSE PRACTITIONER

## 2020-03-11 RX ORDER — SODIUM HYPOCHLORITE 1.25 MG/ML
SOLUTION TOPICAL
Qty: 1 BOTTLE | Refills: 1 | Status: ON HOLD | OUTPATIENT
Start: 2020-03-11 | End: 2020-04-10 | Stop reason: HOSPADM

## 2020-03-11 RX ORDER — LIDOCAINE HYDROCHLORIDE 20 MG/ML
JELLY TOPICAL PRN
Status: DISCONTINUED | OUTPATIENT
Start: 2020-03-11 | End: 2020-03-13 | Stop reason: HOSPADM

## 2020-03-11 ASSESSMENT — PAIN DESCRIPTION - ORIENTATION: ORIENTATION: RIGHT

## 2020-03-11 ASSESSMENT — PAIN SCALES - GENERAL: PAINLEVEL_OUTOF10: 6

## 2020-03-11 ASSESSMENT — PAIN DESCRIPTION - PAIN TYPE: TYPE: ACUTE PAIN

## 2020-03-11 ASSESSMENT — PAIN DESCRIPTION - LOCATION: LOCATION: LEG

## 2020-03-11 NOTE — PLAN OF CARE
Problem: Wound:  Goal: Will show signs of wound healing; wound closure and no evidence of infection  Description: Will show signs of wound healing; wound closure and no evidence of infection  Outcome: Ongoing

## 2020-03-11 NOTE — PROGRESS NOTES
[address]   Progress Note and Procedure Note      Lashae Novak  MEDICAL RECORD NUMBER:  [id number]  AGE: 61 y.o. GENDER: male  : 1960  EPISODE DATE:  3/11/2020    Subjective:     Chief Complaint   Patient presents with    Wound Check     left leg wound       3/6/20 Dr. Corina Padilla    Findings: distal aorta with plaque on the right side with 20% stenosis, bilateral common iliac stents patent, interna;l iliac and external iliac arteries patent. Right common femoral, profunda and proximal SFA patent. Collateralized flow thereafter. Left common femoral plaque with 40% stenosis. HISTORY of PRESENT ILLNESS HPI     Lashae Novak is a 61 y.o. male who presents today for wound/ulcer evaluation.    History of Wound Context: right lower leg wounds from failing bypass   Wound/Ulcer Pain Timing/Severity: constant  Quality of pain: sharp, aching, throbbing, pressure  Severity:  6 / 10   Modifying Factors: constant  Associated Signs/Symptoms: edema, erythema, drainage and pain    Ulcer Identification:  Ulcer Type: arterial and non-healing surgical  Contributing Factors: edema, shear force, smoking and arterial insufficiency    Wound: Wound dehiscence        PAST MEDICAL HISTORY        Diagnosis Date    Anxiety     Blood circulation, collateral     CAD (coronary artery disease)     stents x 2; per dr. Aubrey Gipson [facility])     liver cancer    CHF (congestive heart failure) ([address]     Chyloperitoneum determined by paracentesis     CKD (chronic kidney disease), stage V ([address]     Dialysis patient ([address]      at [place] GERD (gastroesophageal reflux disease)     Hemodialysis patient ([address]      in [place] Hepatic cirrhosis ([address]     dr. Jason Michael; has been going to [facility] for liver and kidney transplant list.    Hepatitis C, chronic ([address]     History of blood transfusion     HTN (hypertension)     Hx of blood clots     arm/fistula    Mixed hyperlipidemia 1/9/2017    Osteoarthritis     Pacemaker     Pain management     Dr. Gomez [facility] care patient 07/09/2019    PVD (peripheral vascular disease) ([address]     Sepsis ([address]     Skin ulcer of right heel with fat layer exposed ([address] 8/16/2019    Sleep apnea     no cpap at present.  Type II diabetes mellitus (HCC)     no meds.  Ulcer of left heel, with fat layer exposed ([address] 2/28/2018    Ulcer of right foot, with fat layer exposed ([address] 8/16/2019    Wound infection     Wound infection after surgery 7/8/2019       PAST SURGICAL HISTORY    Past Surgical History:   Procedure Laterality Date    ANGIOPLASTY  08/09/11 SUKHWINDER    LULEONARD cephalic vein balloon angioplasty with 7mm x 4cm balloon. coild embolization of 2 cephallic vein brances with 3mm x 5cm coils    CARDIAC CATHETERIZATION  04/04/11    cardiac cath and stent x1 by Dr. Deepika Powell  07/26/11 SUKHWINDER GERARDO AV fistula. coild embolization of cephalic vein branch near the wrist with 3mm EMBO coils.  balloon a'plasty left cephalic vein with [id number] balloon and then [id number] balloon    DIALYSIS FISTULA CREATION Left 2/16/2017    LEFT UPPER EXTREMITY BRACHIAL / AXILLARY GRAFT AND STENT LEFT SUBCLAVIAN VEIN  performed by Prudence Ye MD at [address] Right 7/12/2019    RIGHT LEG WOUND WASHOUT performed by Jagdish Aponte MD at [address] Right 1/27/2020    REVISION OF RIGHT LEG BYPASS GRAFT performed by Jagdish Aponte MD at [address] FEMORAL-TIBIAL BYPASS GRAFT Right 6/25/2019    FEMORAL TIBIAL/PERINEAL BYPASS WITH REVERSED GREATER SAPHENOUS VEIN performed by Prudence Ye MD at [facility]  12/26/2010    Right internal jugular vein tunneled dialysis catheter placement    OTHER SURGICAL HISTORY  [id number]    Redo of dialysis catheter    OTHER SURGICAL HISTORY  9/6/2011   S    Removal of RT IJV tunneled dialysis cath    OTHER SURGICAL HISTORY  5/22/12   [facility]    Ultrasound-guided cannulation of left cephalic vein in the mid forearm toward the AV anastomosis, Left upper  ext. fistulograms including venograms of the SVC, Left cephalic vein balloon angioplasty with a 4mm x 100mm Tod balloon, Completion fistulograms    PACEMAKER PLACEMENT Right 2015    medtronic    PARACENTESIS      multiple/recent; per dr. Mayra Duncan at [address] [facility] Left 1/30/2018    UPPER EXTREMITY DRIL PROCEDURE WITH LEFT SAPHENOUS VEIN HARVESTING performed by Ramesh Villasenor MD at [address] [facility] [address] ANGIO N/A 3/6/2020    [facility] AND RIGHT LEG ANGIOGRAM performed by Elena Rodriguez DO at [address] [facility] VASCULAR SURGERY  6/19/12    [facility] arteriovenous fistulogram including venography of the superior vena cava. Balloon angioplasty, left forearm cephalic vein and upper arm cephalic vein with [id number] tod balloon.  VASCULAR SURGERY  7/10/2012 [facility]     Revision of left distal radial artery to cephalic vein arteriovenous fistula with 7mm Artegraft interposition.  VASCULAR SURGERY  7/30/15 [facility] & [address]    Left upper extremity arteriovenous fistulograms including venography of the superior vena cava. Left cephalic vein balloon angioplasty with an 8 x 20 cm cutting balloon proximal cephalic vein. Balloon angioplasty of left cephalic vein/antecubital vein near the antecubital crease with 8 x 20 mm cutting balloon.  VASCULAR SURGERY  7/30/15 [facility]    Balloon angioplasty proximal end distal upper arm cephalic vein with 9 x 40 cm  balloon. Completion fistulograms of the left upper extremity.  VASCULAR SURGERY  8/13/15 [facility]    Revision of left upper extremity arteriovenous fistula with excision of pseudoaneurysm and primary repair of cephalic vein.     VASCULAR SURGERY  5/3/16 [facility]    Left upper fistulograms/venograms, left cephalic balloon 8 x 20 cutter,9 x 40 conquest,left proximal needed (upset stomach) 120 tablet 3    lactulose (CEPHULAC) 10 G packet Take 10 g by mouth 3 times daily as needed Indications: Disorder of the Liver Enzymes Not using regular states stomach is tore up      minoxidil (LONITEN) 10 MG tablet Take 10 mg by mouth daily Indications: High Blood Pressure Disorder, SBP >160       cloNIDine (CATAPRES) 0.2 MG tablet Take 0.3 mg by mouth 3 times daily Indications: High Blood Pressure       amLODIPine (NORVASC) 10 MG tablet Take 10 mg by mouth daily Indications: High Blood Pressure       oxyCODONE (OXYCONTIN) 10 MG extended release tablet Take 10 mg by mouth every 8 hours as needed for Pain. Dr. Ruby Reid       No current facility-administered medications on file prior to encounter. REVIEW OF SYSTEMS    A comprehensive review of systems was negative.     Objective:      /72   Pulse 93   Temp 98.2 °F (36.8 °C) (Temporal)   Resp 16   Ht 5' 8\" (1.727 m)   Wt 174 lb (78.9 kg)   BMI 26.46 kg/m²     Wt Readings from Last 3 Encounters:   03/11/20 174 lb (78.9 kg)   03/06/20 174 lb (78.9 kg)   03/04/20 174 lb (78.9 kg)       PHYSICAL EXAM    General Appearance: alert and oriented to person, place and time, well developed and well- nourished, in no acute distress  Skin: warm and dry, no rash or erythema  Head: normocephalic and atraumatic  Eyes: pupils equal, round, and reactive to light, extraocular eye movements intact, conjunctivae normal  ENT: tympanic membrane, external ear and ear canal normal bilaterally, nose without deformity, nasal mucosa and turbinates normal without polyps  Neck: supple and non-tender without mass, no thyromegaly or thyroid nodules, no cervical lymphadenopathy  Pulmonary/Chest: clear to auscultation bilaterally- no wheezes, rales or rhonchi, normal air movement, no respiratory distress  Musculoskeletal: normal range of motion, no joint swelling, deformity or tenderness  Neurologic: reflexes normal and symmetric, no cranial nerve deficit, gait, coordination and speech normal      Assessment:      Patient Active Problem List   Diagnosis Code    Osteoarthritis M19.90    Chronic diastolic congestive heart failure (HCC) I50.32    CAD (coronary artery disease) I25.10    Essential hypertension I10    Liver disease K76.9    Chronic deep vein thrombosis (DVT) of axillary vein of left upper extremity ([facility] I82. A22    Steal syndrome as complication of dialysis access ([facility] T82.898A    ESRD on dialysis ([facility] N18.6, Z99.2    Steal syndrome of dialysis vascular access [facility]) T82.898A    Atherosclerosis of artery of extremity with ulceration ([facility] I70.299, L97.909    Atherosclerosis of artery of extremity with rest pain (HCC) I70.229    Anemia, chronic disease D63.8    Iatrogenic complication of vascular surgery T81. 9XXA    Pacemaker Z95.0    Hepatitis C, chronic (HCC) B18.2    PVD (peripheral vascular disease) (HCC) I73.9    Hyponatremia E87.1    Normocytic anemia D64.9    Thrombocytopenia (HCC) D69.6    Palliative care patient O84.2    Alcoholic cirrhosis of liver with ascites ([facility] K70.31    Anticoagulated by anticoagulation treatment Z79.01    CHF (congestive heart failure) (HCC) I50.9    Colon polyps K63.5    Dialysis patient ([facility] Z99.2    Hepatocellular carcinoma ([facility] C22.0    Mixed hyperlipidemia E78.2    Spontaneous bacterial peritonitis (HCC) K65.2    Tobacco abuse Z72.0    Non-healing non-surgical wound T14. 8XXA    Knee pain, right M25.561    Effusion of knee joint right M25.461    Failing vascular bypass graft T82.599A    Hematoma T14. 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 (HCC) I70.201    NSTEMI (non-ST elevated myocardial infarction) ([facility] I21.4    Cellulitis of right lower extremity without foot L03.115    Right leg pain M79.604    Cellulitis of right lower extremity L03.115    Atherosclerosis of native arteries of extremities with rest pain, right leg (HCC) I70.221    Leg ulcer, right, 3/4/2020  4:41 PM   Tunneling Position ___ O'Clock 0 3/4/2020  4:41 PM   Undermining Starts ___ O'Clock 0 3/4/2020  4:41 PM   Undermining Ends___ O'Clock 0 3/4/2020  4:41 PM   Undermining Maxium Distance (cm) 0 3/4/2020  4:41 PM   Wound Assessment Black 3/11/2020  1:55 PM   Drainage Amount None 3/11/2020  1:55 PM   Drainage Description Other (Comment) 3/11/2020  1:55 PM   Odor None 3/11/2020  1:55 PM   Margins Attached edges 3/11/2020  1:55 PM   Shaila-wound Assessment Red; Swelling 3/11/2020  1:55 PM   Non-staged Wound Description Not applicable 9/77/0266  2:22 PM   [place]%Wound Bed 0 3/4/2020  4:41 PM   Red%Wound Bed 0 3/4/2020  4:41 PM   Yellow%Wound Bed 0 3/4/2020  4:41 PM   Black%Wound Bed 100 3/11/2020  1:55 PM   Purple%Wound Bed 0 3/4/2020  4:41 PM   Other%Wound Bed 0 3/4/2020  4:41 PM   Number of days: 33       Wound 02/07/20 Toe (Comment  which one) Anterior;Right Wound 8.   Right 2nd toe(2/28/20 change etiology to arterial) (Active)   Wound Image   3/4/2020  4:41 PM   Wound Arterial 3/11/2020  1:55 PM   Dressing Status Dry 3/11/2020  1:55 PM   Dressing/Treatment Open to air 3/11/2020  1:55 PM   Wound Cleansed Rinsed/Irrigated with saline 3/4/2020  4:41 PM   Wound Length (cm) 1 cm 3/11/2020  1:55 PM   Wound Width (cm) 0.8 cm 3/11/2020  1:55 PM   Wound Depth (cm) 0.1 cm 3/11/2020  1:55 PM   Wound Surface Area (cm^2) 0.8 cm^2 3/11/2020  1:55 PM   Change in Wound Size % (l*w) -60 3/11/2020  1:55 PM   Wound Volume (cm^3) 0.08 cm^3 3/11/2020  1:55 PM   Wound Healing % -60 3/11/2020  1:55 PM   Post-Procedure Length (cm) 1.5 cm 2/21/2020 11:29 AM   Post-Procedure Width (cm) 0.7 cm 2/21/2020 11:29 AM   Post-Procedure Depth (cm) 0.1 cm 2/21/2020 11:29 AM   Post-Procedure Surface Area (cm^2) 1.05 cm^2 2/21/2020 11:29 AM   Post-Procedure Volume (cm^3) 0.1 cm^3 2/21/2020 11:29 AM   Distance Tunneling (cm) 0 cm 3/4/2020  4:41 PM   Tunneling Position ___ O'Clock 0 3/4/2020  4:41 PM   Undermining Starts ___ O'Clock 0 3/4/2020 4:41 PM   Undermining Ends___ O'Clock 0 3/4/2020  4:41 PM   Undermining Maxium Distance (cm) 0 3/4/2020  4:41 PM   Wound Assessment Black 3/11/2020  1:55 PM   Drainage Amount None 3/11/2020  1:55 PM   Odor None 3/11/2020  1:55 PM   Margins Attached edges 3/11/2020  1:55 PM   Shaila-wound Assessment Dry;Red;Swelling 3/11/2020  1:55 PM   Non-staged Wound Description Not applicable 0/95/9866  5:25 PM   [place]%Wound Bed 0 3/4/2020  4:41 PM   Red%Wound Bed 0 3/4/2020  4:41 PM   Yellow%Wound Bed 0 3/4/2020  4:41 PM   Black%Wound Bed 100 3/11/2020  1:55 PM   Purple%Wound Bed 0 3/4/2020  4:41 PM   Other%Wound Bed 0 3/4/2020  4:41 PM   Number of days: 33       Wound 02/14/20 Leg Right;Medial Wound 6a R. medial knee superior( 2/14/20 ) (Active)   Wound Image   3/4/2020  4:41 PM   Wound Non-Healing Surgical 3/11/2020  1:55 PM   Dressing Status Old drainage 3/11/2020  1:55 PM   Dressing Changed Changed/New 3/11/2020  1:55 PM   Dressing/Treatment Xeroform 3/11/2020  1:55 PM   Wound Cleansed Soap and water 3/11/2020  1:55 PM   Wound Length (cm) 2.5 cm 3/11/2020  1:55 PM   Wound Width (cm) 0.4 cm 3/11/2020  1:55 PM   Wound Depth (cm) 0.3 cm 3/11/2020  1:55 PM   Wound Surface Area (cm^2) 1 cm^2 3/11/2020  1:55 PM   Change in Wound Size % (l*w) 38.27 3/11/2020  1:55 PM   Wound Volume (cm^3) 0.3 cm^3 3/11/2020  1:55 PM   Wound Healing % -88 3/11/2020  1:55 PM   Post-Procedure Length (cm) 2.5 cm 3/11/2020  1:55 PM   Post-Procedure Width (cm) 0.4 cm 3/11/2020  1:55 PM   Post-Procedure Depth (cm) 0.3 cm 3/11/2020  1:55 PM   Post-Procedure Surface Area (cm^2) 1 cm^2 3/11/2020  1:55 PM   Post-Procedure Volume (cm^3) 0.3 cm^3 3/11/2020  1:55 PM   Distance Tunneling (cm) 0 cm 3/4/2020  4:41 PM   Tunneling Position ___ O'Clock 0 3/4/2020  4:41 PM   Undermining Starts ___ O'Clock 0 3/4/2020  4:41 PM   Undermining Ends___ O'Clock 0 3/4/2020  4:41 PM   Undermining Maxium Distance (cm) 0 3/4/2020  4:41 PM   Wound Assessment Yellow;Pink 3/11/2020  1:55 PM   Drainage Amount Small 3/11/2020  1:55 PM   Drainage Description Serosanguinous 3/11/2020  1:55 PM   Odor None 3/11/2020  1:55 PM   Margins Attached edges 3/11/2020  1:55 PM   Shaila-wound Assessment Pink;Swelling 3/11/2020  1:55 PM   Non-staged Wound Description Full thickness 3/11/2020  1:55 PM   [place]%Wound Bed 20 3/11/2020  1:55 PM   Red%Wound Bed 0 3/11/2020  1:55 PM   Yellow%Wound Bed 80 3/11/2020  1:55 PM   Black%Wound Bed 0 3/11/2020  1:55 PM   Purple%Wound Bed 0 3/11/2020  1:55 PM   Other%Wound Bed 0 3/11/2020  1:55 PM   Number of days: 26       Wound 02/28/20 Leg Right; Lower;Distal Wound 9, Dehisced surgical wound, R. Lower Leg distal (Active)   Wound Image    3/4/2020  4:41 PM   Wound Non-Healing Surgical 3/11/2020  1:55 PM   Dressing Status Old drainage 3/11/2020  1:55 PM   Dressing Changed Changed/New 3/11/2020  1:55 PM   Dressing/Treatment Moist to moist 3/11/2020  1:55 PM   Wound Cleansed Soap and water 3/11/2020  1:55 PM   Wound Length (cm) 16 cm 3/11/2020  1:55 PM   Wound Width (cm) 3.5 cm 3/11/2020  1:55 PM   Wound Depth (cm) 0.6 cm 3/11/2020  1:55 PM   Wound Surface Area (cm^2) 56 cm^2 3/11/2020  1:55 PM   Change in Wound Size % (l*w) -449.02 3/11/2020  1:55 PM   Wound Volume (cm^3) 33.6 cm^3 3/11/2020  1:55 PM   Wound Healing % -1547 3/11/2020  1:55 PM   Post-Procedure Length (cm) 16 cm 3/11/2020  1:55 PM   Post-Procedure Width (cm) 3.5 cm 3/11/2020  1:55 PM   Post-Procedure Depth (cm) 0.8 cm 3/11/2020  1:55 PM   Post-Procedure Surface Area (cm^2) 56 cm^2 3/11/2020  1:55 PM   Post-Procedure Volume (cm^3) 44.8 cm^3 3/11/2020  1:55 PM   Distance Tunneling (cm) 0 cm 3/4/2020  4:41 PM   Tunneling Position ___ O'Clock 0 3/4/2020  4:41 PM   Undermining Starts ___ O'Clock 0 3/4/2020  4:41 PM   Undermining Ends___ O'Clock 0 3/4/2020  4:41 PM   Undermining Maxium Distance (cm) 0 3/4/2020  4:41 PM   Wound Assessment Slough;Black; Fibrin;Drainage 3/11/2020  1:55 PM   Drainage Amount Large 3/11/2020  1:55 PM   Drainage Description Yellow 3/11/2020  1:55 PM   Odor Mild 3/11/2020  1:55 PM   Margins Attached edges 3/4/2020  4:41 PM   Exposed structure Tendon 3/11/2020  1:55 PM   Shaila-wound Assessment Swelling;Red 3/11/2020  1:55 PM   Non-staged Wound Description Full thickness 3/11/2020  1:55 PM   [place]%Wound Bed 0 3/11/2020  1:55 PM   Red%Wound Bed 0 3/11/2020  1:55 PM   Yellow%Wound Bed 50 3/11/2020  1:55 PM   Black%Wound Bed 40 3/11/2020  1:55 PM   Purple%Wound Bed 10 3/11/2020  1:55 PM   Other%Wound Bed 0 3/11/2020  1:55 PM   Number of days: 12     Incision 03/06/20 Groin Left (Active)   Wound Assessment Intact;Dry;Clean 3/11/2020  1:55 PM   Shaila-wound Assessment Intact 3/11/2020  1:55 PM   Wound Length (cm) 0.1 cm 3/11/2020  1:55 PM   Wound Width (cm) 0.1 cm 3/11/2020  1:55 PM   Wound Depth (cm) 0.1 cm 3/11/2020  1:55 PM   Wound Volume (cm^3) 0 cm^3 3/11/2020  1:55 PM   Closure Open to air 3/11/2020  1:55 PM   Drainage Amount None 3/11/2020  1:55 PM   Drainage Description Other (Comment) 3/11/2020  1:55 PM   Odor None 3/11/2020  1:55 PM   Dressing/Treatment Open to air 3/11/2020  1:55 PM   Dressing Status Clean;Dry; Intact 3/11/2020  1:55 PM   Number of days: 5         Percent of Wound/Ulcer Debrided: 100%    Total Surface Area Debrided:  63.05 sq cm       Diabetic/Pressure/Non Pressure Ulcers only:    Estimated Blood Loss:  None    Hemostasis Achieved:  not needed    Procedural Pain:  6  / 10     Post Procedural Pain:  6 / 10     Response to treatment:  Well tolerated by patient.        Plan:     Problem List Items Addressed This Visit     Leg ulcer, right, with fat layer exposed ([address] (Chronic)    Open wound of right knee (Chronic)    Right second toe ulcer, with fat layer exposed ([address] (Chronic)    Atherosclerosis of native arteries of extremities with rest pain, right leg ([address] - Primary          Treatment Note please see attached Discharge Instructions    In my professional opinion this patient foul odor  * Bleeding  * Increase in swelling  * Need for compression bandage changes due to slippage, breakthrough drainage. If you need medical attention outside of the business hours of the [address] [place] [address] Road please contact your PCP or go to the nearest emergency room.           Electronically signed by TIFF Starkey CNP on 3/11/2020 at 4:05 PM

## 2020-03-13 ENCOUNTER — TELEPHONE (OUTPATIENT)
Dept: WOUND CARE | Age: 60
End: 2020-03-13

## 2020-03-18 ENCOUNTER — HOSPITAL ENCOUNTER (OUTPATIENT)
Dept: WOUND CARE | Age: 60
Discharge: HOME | End: 2020-03-18
Payer: MEDICARE

## 2020-03-18 PROCEDURE — 97597 DBRDMT OPN WND 1ST 20 CM/<: CPT

## 2020-03-18 PROCEDURE — 97597 DBRDMT OPN WND 1ST 20 CM/<: CPT | Performed by: SURGERY

## 2020-03-18 NOTE — PROGRESS NOTES
[address]   Progress Note and Procedure Note      Rah Mosquera  MEDICAL RECORD NUMBER:  [id number]  AGE: 61 y.o. GENDER: male  : 1960  EPISODE DATE:  3/18/2020    Subjective:     Chief Complaint   Patient presents with    Wound Check     Wound Check         HISTORY of PRESENT ILLNESS HPI     Rah Mosquera is a 61 y.o. male who presents today for wound/ulcer evaluation. Wound Context: Pt with R LE woundas here for eval/treat  Wound/Ulcer Pain Timing/Severity: intermittent  Quality of pain: sharp  Severity:  2 / 10   Modifying Factors: None  Associated Signs/Symptoms: edema    Ulcer Identification:  Ulcer Type: arterial  Contributing Factors: edema    Wound: arterial        PAST MEDICAL HISTORY        Diagnosis Date    Anxiety     Blood circulation, collateral     CAD (coronary artery disease)     stents x 2; per dr. [facility])     liver cancer    CHF (congestive heart failure) ([address]     Chyloperitoneum determined by paracentesis     CKD (chronic kidney disease), stage V ([address]     Dialysis patient ([address]      at [place] GERD (gastroesophageal reflux disease)     Hemodialysis patient ([address]      in [place] Hepatic cirrhosis ([address]     dr. Rossana Liu; has been going [facility] for liver and kidney transplant list.    Hepatitis C, chronic ([address]     History of blood transfusion     HTN (hypertension)     Hx of blood clots     arm/fistula    Mixed hyperlipidemia 2017    Osteoarthritis     Pacemaker     Pain management     Dr. Nadja Hermosillo care patient 2019    PVD (peripheral vascular disease) ([address]     Sepsis ([address]     Skin ulcer of right heel with fat layer exposed ([address] 2019    Sleep apnea     no cpap at present.  Type II diabetes mellitus (HCC)     no meds.     Ulcer of left heel, with fat layer exposed ([address] 2018    Ulcer of right foot, with fat layer exposed ([address] 2019    Wound infection  Wound infection after surgery 7/8/2019       PAST SURGICAL HISTORY    Past Surgical History:   Procedure Laterality Date    ANGIOPLASTY  08/09/11 SJS    LUE cephalic vein balloon angioplasty with 7mm x 4cm balloon. coild embolization of 2 cephallic vein brances with 3mm x 5cm coils    CARDIAC CATHETERIZATION  04/04/11    cardiac cath and stent x1 by Dr. Parada Call  07/26/11 S[facility]    LUE AV fistula. coild embolization of cephalic vein branch near the wrist with 3mm EMBO coils. balloon a'plasty left cephalic vein with [id number] balloon and then [id number] balloon    DIALYSIS FISTULA CREATION Left 2/16/2017    LEFT UPPER EXTREMITY BRACHIAL / AXILLARY GRAFT AND STENT LEFT SUBCLAVIAN VEIN  performed by Robbin Nguyễn MD at [address] Right 7/12/2019    RIGHT LEG WOUND WASHOUT performed by Sharmon Mortimer, MD at [address] [facility] Right 1/27/2020    REVISION OF RIGHT LEG BYPASS GRAFT performed by Sharmon Mortimer, MD at [address] [facility] FEMORAL-TIBIAL BYPASS GRAFT Right 6/25/2019    FEMORAL TIBIAL/PERINEAL BYPASS WITH REVERSED GREATER SAPHENOUS VEIN performed by Robbin Nguyễn MD at [address]  12/26/2010    Right internal jugular vein tunneled dialysis catheter placement    OTHER SURGICAL HISTORY  [id number]    Redo of dialysis catheter    OTHER SURGICAL HISTORY  9/6/2011   SJS    Removal of RT IJV tunneled dialysis cath    OTHER SURGICAL HISTORY  5/22/12   SJS    Ultrasound-guided cannulation of left cephalic vein in the mid forearm toward the AV anastomosis, Left upper  ext.  fistulograms including venograms of the SVC, Left cephalic vein balloon angioplasty with a 4mm x 100mm Darrell balloon, Completion fistulograms    PACEMAKER PLACEMENT Right 2015    medtronic    PARACENTESIS      multiple/recent; per dr. Katie Woodruff at [address] [facility] Left 1/30/2018    UPPER EXTREMITY DRIL PROCEDURE WITH LEFT SAPHENOUS VEIN HARVESTING performed by Caryle Likes, MD at [address] [facility] ANGIO N/A 3/6/2020    BILATERAL AORTAILLIAC AND RIGHT LEG ANGIOGRAM performed by Jose Santana DO at [address] [facility] VASCULAR SURGERY  6/19/12    LUE arteriovenous fistulogram including venography of the superior vena cava. Balloon angioplasty, left forearm cephalic vein and upper arm cephalic vein with [id number] tod balloon.  VASCULAR SURGERY  7/10/2012 S     Revision of left distal radial artery to cephalic vein arteriovenous fistula with 7mm Artegraft interposition.  VASCULAR SURGERY  7/30/15 [facility] & [address]    Left upper extremity arteriovenous fistulograms including venography of the superior vena cava. Left cephalic vein balloon angioplasty with an 8 x 20 cm cutting balloon proximal cephalic vein. Balloon angioplasty of left cephalic vein/antecubital vein near the antecubital crease with 8 x 20 mm cutting balloon.  VASCULAR SURGERY  7/30/15 [facility] cont    Balloon angioplasty proximal end distal upper arm cephalic vein with 9 x 40 cm  balloon. Completion fistulograms of the left upper extremity.  VASCULAR SURGERY  8/13/15 S    Revision of left upper extremity arteriovenous fistula with excision of pseudoaneurysm and primary repair of cephalic vein.     VASCULAR SURGERY  5/3/16 S    Left upper fistulograms/venograms, left cephalic balloon 8 x 20 cutter,9 x 40 conquest,left proximal cephalic vein stents (flair 2 9 x 50), balloon stents 9 x 40 conquest.    VASCULAR SURGERY  12/08/2016    SJS- ultrasound guided cannulation of left distal cephalic vein ultrasound guided cannulation right internal jugular vein placement of right internal jugular vein tunneled dialysis catheter (Bard Equistream XK 23cm tip to cuff)     VASCULAR SURGERY  01/20/2017    SJS-Right upper venograms, u/s guided cannulation left basilic vein, left upper venograms, balloon angioplasty left subclavian vein 10x40 conquest.    VASCULAR SURGERY 02/16/2017    SJS. Left brachial artery to axillary vein arteriovenous graft with 7 mm artegraft. Left upper extremity venograms. Left subclavian vein stent viabahn 13x50. Left subclavian vein stent balloon angioplasty 12x40 atlas.  VASCULAR SURGERY  04/11/2017    SJS. Removal of tunneled dialysis catheter right internal jugular vein.  VASCULAR SURGERY  01/25/2018    SJS. Arch aortogram, left upper arteriogram, AV graft angiogram/venogram.    VASCULAR SURGERY  02/28/2018    SJS. Right CFA 5f-6f-7f sheath, aortogram with bilateral lower arteriogram, left SFA/pop  balloon angioplasty 5x100 , 6x150 lutonix ( 2), left CFA  7f sheath, bilateral iliac kissing stents right 10x38 icast, left 9x59 icast expanded with 10x40 , completion aortogram, mynx left CFA , failed mynx right CFA.  VASCULAR SURGERY  06/13/2019    SJS left CFA 5f sheath, aortogram with bilateral lower arteriogram, mynx left CFA.  VASCULAR SURGERY  06/25/2019    SJS-VI. Femoral tibial/perineal bypass with reversed greater saphenous vein.  VASCULAR SURGERY  08/16/2019    TJR. Aortogram and runoff, selective right CFA injections. PTA of fem-tp bypass with 4.0 x 220 mm tod balloon to 4.33 mm    VASCULAR SURGERY  10/23/2019    VI. Thrombin injection in the left SFA PSA, right common femoral artery us guided access, left SFA stent treatment of the flap.  VASCULAR SURGERY  01/30/2020    TJR Endarterectomy of the right common femoral artery, superficial femoral artery, and profunda femoris artery. Redo right groin x3.  VASCULAR SURGERY  03/06/2020    VI.  BILATERAL AORTAILLIAC AND RIGHT LEG ANGIOGRAM       FAMILY HISTORY    Family History   Problem Relation Age of Onset    High Blood Pressure Mother     High Blood Pressure Father     High Blood Pressure Sister        SOCIAL HISTORY    Social History     Tobacco Use    Smoking status: Former Smoker     Packs/day: 0.25     Years: 45.00     Pack years: 11.25     Types: Cigarettes  Smokeless tobacco: Never Used   Substance Use Topics    Alcohol use: No    Drug use: Not Currently     Types: Marijuana, Cocaine, Methamphetamines, IV, Opiates , Other-see comments     Comment: in the 1970s, LSD       ALLERGIES    No Known Allergies    MEDICATIONS    Current Outpatient Medications on File Prior to Encounter   Medication Sig Dispense Refill    sodium hypochlorite (DAKINS) 0.125 % SOLN external solution Apply topically as directed twice daily. 1 Bottle 1    collagenase (SANTYL) 250 UNIT/GM ointment Apply topically twice daily. Apply nickel thick as directed. 1 Tube 3    lisinopril (PRINIVIL;ZESTRIL) 10 MG tablet Take 10 mg by mouth daily      clopidogrel (PLAVIX) 75 MG tablet TAKE 1 TABLET EVERY DAY (Patient taking differently: Take 75 mg by mouth daily ) 90 tablet 1    b complex-C-folic acid (NEPHROCAPS) 1 MG capsule Take 1 capsule by mouth daily 30 capsule 3    atorvastatin (LIPITOR) 40 MG tablet Take 1 tablet by mouth nightly 30 tablet 3    metoprolol succinate (TOPROL XL) 25 MG extended release tablet Take 1 tablet by mouth 2 times daily 30 tablet 3    sevelamer (RENVELA) 800 MG tablet Take 1 tablet by mouth 3 times daily (with meals) 90 tablet 3    oxyCODONE (OXYCONTIN) 10 MG extended release tablet Take 10 mg by mouth every 8 hours as needed for Pain.  Dr. Caprice Babb aspirin 81 MG EC tablet Take 1 tablet by mouth daily 30 tablet 3    isosorbide mononitrate (IMDUR) 30 MG extended release tablet Take 1 tablet by mouth daily 30 tablet 3    minoxidil (LONITEN) 10 MG tablet Take 10 mg by mouth daily Indications: High Blood Pressure Disorder, SBP >160       cloNIDine (CATAPRES) 0.2 MG tablet Take 0.3 mg by mouth 3 times daily Indications: High Blood Pressure       amLODIPine (NORVASC) 10 MG tablet Take 10 mg by mouth daily Indications: High Blood Pressure       calcium acetate (PHOSLO) 667 MG capsule Take 2,001 mg by mouth 3 times daily (with meals)      sucralfate exposed ([address] (Chronic)    Open wound of right knee (Chronic)    Right second toe ulcer, with fat layer exposed ([address] (Chronic)           Procedure Note  Indications:  Based on my examination of this patient's wound(s)/ulcer(s) today, debridement is required to promote healing and evaluate the wound base. Performed by: Alyce Piper MD    Consent obtained:  Yes    Time out taken:  Yes    Pain Control:         Debridement:Non-excisional Debridement    Using curette the wound(s)/ulcer(s) was/were sharply debrided down through and including the removal of epidermis and dermis. Devitalized Tissue Debrided:  fibrin, biofilm, slough and exudate      Pre Debridement Measurements:  Are located in the Wound/Ulcer Documentation Flow Sheet    Wound/Ulcer #: 6,6A    Percent of Wound(s)/Ulcer(s) Debrided: 100%    Total Surface Area Debrided:  5.92 sq cm       Diabetic/Pressure/Non Pressure Ulcers only:  Ulcer: Non-Pressure ulcer, fat layer exposed           Post Debridement Measurements:    Wound/Ulcer Descriptions are Pre Debridement --EXCEPT MEASUREMENTS    Wound 02/07/20 Knee Right;Medial Wound 6.  Surgical Right medial knee(wound  on 2/14/20) Inferior (Active)   Wound Image   3/4/2020  4:41 PM   Wound Non-Healing Surgical 3/11/2020  1:55 PM   Dressing Status Old drainage 3/11/2020  1:55 PM   Dressing Changed Changed/New 3/11/2020  1:55 PM   Dressing/Treatment Xeroform 3/11/2020  1:55 PM   Wound Cleansed Soap and water 3/11/2020  1:55 PM   Wound Length (cm) 5 cm 3/18/2020  2:11 PM   Wound Width (cm) 1 cm 3/18/2020  2:11 PM   Wound Depth (cm) 0.2 cm 3/18/2020  2:11 PM   Wound Surface Area (cm^2) 5 cm^2 3/18/2020  2:11 PM   Change in Wound Size % (l*w) 47.37 3/18/2020  2:11 PM   Wound Volume (cm^3) 1 cm^3 3/18/2020  2:11 PM   Wound Healing % -5 3/18/2020  2:11 PM   Post-Procedure Length (cm) 5 cm 3/18/2020  2:13 PM   Post-Procedure Width (cm) 1 cm 3/18/2020  2:13 PM   Post-Procedure Depth (cm) 0.2 cm 3/18/2020  2:13 PM   Post-Procedure Surface Area (cm^2) 5 cm^2 3/18/2020  2:13 PM   Post-Procedure Volume (cm^3) 1 cm^3 3/18/2020  2:13 PM   Distance Tunneling (cm) 0 cm 3/4/2020  4:41 PM   Tunneling Position ___ O'Clock 0 3/4/2020  4:41 PM   Undermining Starts ___ O'Clock 0 3/4/2020  4:41 PM   Undermining Ends___ O'Clock 0 3/4/2020  4:41 PM   Undermining Maxium Distance (cm) 0 3/4/2020  4:41 PM   Wound Assessment Pink;Slough; Yellow 3/11/2020  1:55 PM   Drainage Amount Small 3/11/2020  1:55 PM   Drainage Description Serosanguinous 3/11/2020  1:55 PM   Odor None 3/11/2020  1:55 PM   Margins Attached edges 3/11/2020  1:55 PM   Shaila-wound Assessment Pink;Swelling 3/4/2020  4:41 PM   Non-staged Wound Description Full thickness 3/11/2020  1:55 PM   [place]%Wound Bed 10 3/11/2020  1:55 PM   Red%Wound Bed 0 3/11/2020  1:55 PM   Yellow%Wound Bed 90 3/11/2020  1:55 PM   Black%Wound Bed 0 3/11/2020  1:55 PM   Purple%Wound Bed 0 3/11/2020  1:55 PM   Other%Wound Bed 0 3/11/2020  1:55 PM   Number of days: 40       Wound 02/07/20 Toe (Comment  which one) Anterior;Right Wound 7.   Right 5th toe (2/28/20 change etiology to arterial) (Active)   Wound Image    3/4/2020  4:41 PM   Wound Arterial 3/11/2020  1:55 PM   Dressing Status Dry 3/11/2020  1:55 PM   Dressing/Treatment Open to air 3/11/2020  1:55 PM   Wound Cleansed Rinsed/Irrigated with saline 3/4/2020  4:41 PM   Wound Length (cm) 1.1 cm 3/18/2020  2:11 PM   Wound Width (cm) 0.6 cm 3/18/2020  2:11 PM   Wound Depth (cm) 0.1 cm 3/18/2020  2:11 PM   Wound Surface Area (cm^2) 0.66 cm^2 3/18/2020  2:11 PM   Change in Wound Size % (l*w) 34 3/18/2020  2:11 PM   Wound Volume (cm^3) 0.07 cm^3 3/18/2020  2:11 PM   Wound Healing % 30 3/18/2020  2:11 PM   Post-Procedure Length (cm) 0.6 cm 2/21/2020 11:29 AM   Post-Procedure Width (cm) 0.5 cm 2/21/2020 11:29 AM   Post-Procedure Depth (cm) 0.1 cm 2/21/2020 11:29 AM   Post-Procedure Surface Area (cm^2) 0.3 cm^2 2/21/2020 11:29 AM   Post-Procedure O'Clock 0 3/4/2020  4:41 PM   Undermining Starts ___ O'Clock 0 3/4/2020  4:41 PM   Undermining Ends___ O'Clock 0 3/4/2020  4:41 PM   Undermining Maxium Distance (cm) 0 3/4/2020  4:41 PM   Wound Assessment Black 3/11/2020  1:55 PM   Drainage Amount None 3/11/2020  1:55 PM   Odor None 3/11/2020  1:55 PM   Margins Attached edges 3/11/2020  1:55 PM   Shaila-wound Assessment Dry;Red;Swelling 3/11/2020  1:55 PM   Non-staged Wound Description Not applicable 0/78/5216  2:33 PM   [place]%Wound Bed 0 3/4/2020  4:41 PM   Red%Wound Bed 0 3/4/2020  4:41 PM   Yellow%Wound Bed 0 3/4/2020  4:41 PM   Black%Wound Bed 100 3/11/2020  1:55 PM   Purple%Wound Bed 0 3/4/2020  4:41 PM   Other%Wound Bed 0 3/4/2020  4:41 PM   Number of days: 40       Wound 02/14/20 Leg Right;Medial Wound 6a R. medial knee superior( 2/14/20 ) (Active)   Wound Image   3/4/2020  4:41 PM   Wound Non-Healing Surgical 3/11/2020  1:55 PM   Dressing Status Old drainage 3/11/2020  1:55 PM   Dressing Changed Changed/New 3/11/2020  1:55 PM   Dressing/Treatment Xeroform 3/11/2020  1:55 PM   Wound Cleansed Soap and water 3/11/2020  1:55 PM   Wound Length (cm) 2.3 cm 3/18/2020  2:11 PM   Wound Width (cm) 0.4 cm 3/18/2020  2:11 PM   Wound Depth (cm) 0.3 cm 3/18/2020  2:11 PM   Wound Surface Area (cm^2) 0.92 cm^2 3/18/2020  2:11 PM   Change in Wound Size % (l*w) 43.21 3/18/2020  2:11 PM   Wound Volume (cm^3) 0.28 cm^3 3/18/2020  2:11 PM   Wound Healing % -75 3/18/2020  2:11 PM   Post-Procedure Length (cm) 2.3 cm 3/18/2020  2:13 PM   Post-Procedure Width (cm) 0.4 cm 3/18/2020  2:13 PM   Post-Procedure Depth (cm) 0.3 cm 3/18/2020  2:13 PM   Post-Procedure Surface Area (cm^2) 0.92 cm^2 3/18/2020  2:13 PM   Post-Procedure Volume (cm^3) 0.28 cm^3 3/18/2020  2:13 PM   Distance Tunneling (cm) 0 cm 3/4/2020  4:41 PM   Tunneling Position ___ O'Clock 0 3/4/2020  4:41 PM   Undermining Starts ___ O'Clock 0 3/4/2020  4:41 PM   Undermining Ends___ O'Clock 0 3/4/2020  4:41 PM Undermining Maxium Distance (cm) 0 3/4/2020  4:41 PM   Wound Assessment Yellow;Pink 3/11/2020  1:55 PM   Drainage Amount Small 3/11/2020  1:55 PM   Drainage Description Serosanguinous 3/11/2020  1:55 PM   Odor None 3/11/2020  1:55 PM   Margins Attached edges 3/11/2020  1:55 PM   Shaila-wound Assessment Pink;Swelling 3/11/2020  1:55 PM   Non-staged Wound Description Full thickness 3/11/2020  1:55 PM   [place]%Wound Bed 20 3/11/2020  1:55 PM   Red%Wound Bed 0 3/11/2020  1:55 PM   Yellow%Wound Bed 80 3/11/2020  1:55 PM   Black%Wound Bed 0 3/11/2020  1:55 PM   Purple%Wound Bed 0 3/11/2020  1:55 PM   Other%Wound Bed 0 3/11/2020  1:55 PM   Number of days: 33       Wound 02/28/20 Leg Right; Lower;Distal Wound 9, Dehisced surgical wound, R. Lower Leg distal (Active)   Wound Image    3/4/2020  4:41 PM   Wound Non-Healing Surgical 3/11/2020  1:55 PM   Dressing Status Old drainage 3/11/2020  1:55 PM   Dressing Changed Changed/New 3/11/2020  1:55 PM   Dressing/Treatment Moist to moist 3/11/2020  1:55 PM   Wound Cleansed Soap and water 3/11/2020  1:55 PM   Wound Length (cm) 16 cm 3/18/2020  2:11 PM   Wound Width (cm) 3.5 cm 3/18/2020  2:11 PM   Wound Depth (cm) 0.6 cm 3/18/2020  2:11 PM   Wound Surface Area (cm^2) 56 cm^2 3/18/2020  2:11 PM   Change in Wound Size % (l*w) -449.02 3/18/2020  2:11 PM   Wound Volume (cm^3) 33.6 cm^3 3/18/2020  2:11 PM   Wound Healing % -1547 3/18/2020  2:11 PM   Post-Procedure Length (cm) 16 cm 3/11/2020  1:55 PM   Post-Procedure Width (cm) 3.5 cm 3/11/2020  1:55 PM   Post-Procedure Depth (cm) 0.8 cm 3/11/2020  1:55 PM   Post-Procedure Surface Area (cm^2) 56 cm^2 3/11/2020  1:55 PM   Post-Procedure Volume (cm^3) 44.8 cm^3 3/11/2020  1:55 PM   Distance Tunneling (cm) 0 cm 3/4/2020  4:41 PM   Tunneling Position ___ O'Clock 0 3/4/2020  4:41 PM   Undermining Starts ___ O'Clock 0 3/4/2020  4:41 PM   Undermining Ends___ O'Clock 0 3/4/2020  4:41 PM   Undermining Maxium Distance (cm) 0 3/4/2020  4:41 PM   Wound Assessment Slough;Black; Fibrin;Drainage 3/11/2020  1:55 PM   Drainage Amount Large 3/11/2020  1:55 PM   Drainage Description Yellow 3/11/2020  1:55 PM   Odor Mild 3/11/2020  1:55 PM   Margins Attached edges 3/4/2020  4:41 PM   Exposed structure Tendon 3/11/2020  1:55 PM   Shaila-wound Assessment Swelling;Red 3/11/2020  1:55 PM   Non-staged Wound Description Full thickness 3/11/2020  1:55 PM   [place]%Wound Bed 0 3/11/2020  1:55 PM   Red%Wound Bed 0 3/11/2020  1:55 PM   Yellow%Wound Bed 50 3/11/2020  1:55 PM   Black%Wound Bed 40 3/11/2020  1:55 PM   Purple%Wound Bed 10 3/11/2020  1:55 PM   Other%Wound Bed 0 3/11/2020  1:55 PM   Number of days: 19             Estimated Blood Loss:  Minimal    Hemostasis Achieved:  by pressure    Procedural Pain:  0  / 10     Post Procedural Pain:  0 / 10     Response to treatment:  Well tolerated by patient. Plan:     Problem List Items Addressed This Visit     * (Principal) Leg ulcer, right, with fat layer exposed ([address] (Chronic)    Open wound of right knee (Chronic)    Right second toe ulcer, with fat layer exposed ([address] (Chronic)          PT THINKING ABOUT AND DISCUSSING AMPUTATION AND NEEDS TO TALK TO dR. Deng FOR SCHEDULING/PAIN CONTROL    Treatment Note please see attached Discharge Instructions    In my professional opinion this patient would benefit from HBO Therapy: No    Written patient dismissal instructions given to patient and signed by patient or POA. Discharge Instructions       Visit Discharge/Physician Orders    Discharge condition: Stable    Discharge to: Home    Left via:Private automobile    Accompanied by: Mother      ECF/HHA: [facility]     Dressing Orders:     Right knee wounds:  Wash with soap and water.    Apply Xeroform to open areas, Cover with dry gauze, Secure with roll gauze and medipore tape change daily.     Right lower leg open wound: Soap and water wash;   Santyl to wound bed nickel thick, 1/4 Dakins moistened gauze loosely over

## 2020-03-23 ENCOUNTER — HOSPITAL ENCOUNTER (OUTPATIENT)
Dept: WOUND CARE | Age: 60
Discharge: HOME | DRG: 239 | End: 2020-03-23
Payer: MEDICARE

## 2020-03-23 VITALS
DIASTOLIC BLOOD PRESSURE: 64 MMHG | RESPIRATION RATE: 18 BRPM | WEIGHT: 174 LBS | HEIGHT: 68 IN | SYSTOLIC BLOOD PRESSURE: 115 MMHG | BODY MASS INDEX: 26.37 KG/M2 | TEMPERATURE: 98 F | HEART RATE: 80 BPM

## 2020-03-23 PROBLEM — I70.221 ATHEROSCLEROSIS OF NATIVE ARTERIES OF EXTREMITIES WITH REST PAIN, RIGHT LEG (HCC): Chronic | Status: ACTIVE | Noted: 2020-01-22

## 2020-03-23 PROCEDURE — 99212 OFFICE O/P EST SF 10 MIN: CPT | Performed by: SURGERY

## 2020-03-23 PROCEDURE — 99213 OFFICE O/P EST LOW 20 MIN: CPT

## 2020-03-23 ASSESSMENT — PAIN DESCRIPTION - ONSET: ONSET: ON-GOING

## 2020-03-23 ASSESSMENT — PAIN DESCRIPTION - DESCRIPTORS: DESCRIPTORS: THROBBING

## 2020-03-23 ASSESSMENT — PAIN DESCRIPTION - ORIENTATION: ORIENTATION: RIGHT

## 2020-03-23 ASSESSMENT — PAIN DESCRIPTION - PROGRESSION: CLINICAL_PROGRESSION: NOT CHANGED

## 2020-03-23 ASSESSMENT — PAIN DESCRIPTION - DIRECTION: RADIATING_TOWARDS: KNEE DOWN

## 2020-03-23 ASSESSMENT — PAIN - FUNCTIONAL ASSESSMENT: PAIN_FUNCTIONAL_ASSESSMENT: PREVENTS OR INTERFERES SOME ACTIVE ACTIVITIES AND ADLS

## 2020-03-23 ASSESSMENT — PAIN DESCRIPTION - LOCATION: LOCATION: LEG

## 2020-03-23 ASSESSMENT — PAIN SCALES - GENERAL: PAINLEVEL_OUTOF10: 6

## 2020-03-23 ASSESSMENT — PAIN DESCRIPTION - FREQUENCY: FREQUENCY: CONTINUOUS

## 2020-03-23 ASSESSMENT — PAIN DESCRIPTION - PAIN TYPE: TYPE: ACUTE PAIN

## 2020-03-23 NOTE — PROGRESS NOTES
2/28/2018    Ulcer of right foot, with fat layer exposed ([address] 8/16/2019    Wound infection     Wound infection after surgery 7/8/2019       PAST SURGICAL HISTORY    Past Surgical History:   Procedure Laterality Date    ANGIOPLASTY  08/09/11 SUKHWINDER GERARDO cephalic vein balloon angioplasty with 7mm x 4cm balloon. coild embolization of 2 cephallic vein brances with 3mm x 5cm coils    CARDIAC CATHETERIZATION  04/04/11    cardiac cath and stent x1 by Dr. Kerrie Cerrato  07/26/11 SUKHWINDER    LULEONARD AV fistula. coild embolization of cephalic vein branch near the wrist with 3mm EMBO coils. balloon a'plasty left cephalic vein with [id number] balloon and then [id number] balloon    DIALYSIS FISTULA CREATION Left 2/16/2017    LEFT UPPER EXTREMITY BRACHIAL / AXILLARY GRAFT AND STENT LEFT SUBCLAVIAN VEIN  performed by Millicent Pantoja MD at [address] Right 7/12/2019    RIGHT LEG WOUND WASHOUT performed by Misti Schmitz MD at [facility] Right 1/27/2020    REVISION OF RIGHT LEG BYPASS GRAFT performed by Misti Schmitz MD at [address] FEMORAL-TIBIAL BYPASS GRAFT Right 6/25/2019    FEMORAL TIBIAL/PERINEAL BYPASS WITH REVERSED GREATER SAPHENOUS VEIN performed by Millicent Pantoja MD at [address]  12/26/2010    Right internal jugular vein tunneled dialysis catheter placement    OTHER SURGICAL HISTORY  [id number]    Redo of dialysis catheter    OTHER SURGICAL HISTORY  9/6/2011   [facility]    Removal of RT IJV tunneled dialysis cath    OTHER SURGICAL HISTORY  5/22/12   [facility]    Ultrasound-guided cannulation of left cephalic vein in the mid forearm toward the AV anastomosis, Left upper  ext.  fistulograms including venograms of the SVC, Left cephalic vein balloon angioplasty with a 4mm x 100mm Darrell balloon, Completion fistulograms    PACEMAKER PLACEMENT Right 2015    medtronic    PARACENTESIS      multiple/recent; per dr. Merlene Thorne at [address] regular states stomach is tore up      minoxidil (LONITEN) 10 MG tablet Take 10 mg by mouth daily Indications: High Blood Pressure Disorder, SBP >160       cloNIDine (CATAPRES) 0.2 MG tablet Take 0.3 mg by mouth 3 times daily Indications: High Blood Pressure       amLODIPine (NORVASC) 10 MG tablet Take 10 mg by mouth daily Indications: High Blood Pressure        No current facility-administered medications on file prior to encounter. REVIEW OF SYSTEMS    A comprehensive review of systems was negative.     Objective:      /64   Pulse 80   Temp 98 °F (36.7 °C) (Temporal)   Resp 18   Ht 5' 8\" (1.727 m)   Wt 174 lb (78.9 kg)   BMI 26.46 kg/m²     Wt Readings from Last 3 Encounters:   03/23/20 174 lb (78.9 kg)   03/11/20 174 lb (78.9 kg)   03/06/20 174 lb (78.9 kg)       PHYSICAL EXAM    General Appearance: alert and oriented to person, place and time, well developed and well- nourished, in no acute distress  Skin: warm and dry, no rash or erythema  Head: normocephalic and atraumatic  Eyes: pupils equal, round, and reactive to light, extraocular eye movements intact, conjunctivae normal  ENT: tympanic membrane, external ear and ear canal normal bilaterally, nose without deformity, nasal mucosa and turbinates normal without polyps, lips teeth and gums normal  Neck: supple and non-tender without mass, no thyromegaly or thyroid nodules, no cervical lymphadenopathy  Pulmonary/Chest: clear to auscultation bilaterally- no wheezes, rales or rhonchi, normal air movement, no respiratory distress  Cardiovascular: normal rate, regular rhythm, normal S1 and S2, no murmurs, rubs, clicks, or gallops, distal pulses intact, no carotid bruits  Abdomen: soft, non-tender, non-distended, normal bowel sounds, no masses or organomegaly  Extremities: no cyanosis, clubbing or edema  Musculoskeletal: normal range of motion, no joint swelling, deformity or tenderness  Neurologic: reflexes normal and symmetric, no cranial [facility]) I70.221    Leg ulcer, right, with fat layer exposed ([address] L97.912    Open wound of right knee S81.001A    Right second toe ulcer, with fat layer exposed ([address] L97.512             Wound 02/07/20 Knee Right;Medial Wound 6.  Surgical Right medial knee(wound  on 2/14/20) Inferior (Active)   Wound Image   3/23/2020 10:40 AM   Wound Non-Healing Surgical 3/23/2020 10:40 AM   Dressing Status Old drainage 3/23/2020 10:40 AM   Dressing Changed Changed/New 3/18/2020  2:29 PM   Dressing/Treatment Xeroform 3/18/2020  2:29 PM   Wound Cleansed Not Cleansed 3/23/2020 10:40 AM   Wound Length (cm) 2.5 cm 3/23/2020 10:40 AM   Wound Width (cm) 1 cm 3/23/2020 10:40 AM   Wound Depth (cm) 0.3 cm 3/23/2020 10:40 AM   Wound Surface Area (cm^2) 2.5 cm^2 3/23/2020 10:40 AM   Change in Wound Size % (l*w) 73.68 3/23/2020 10:40 AM   Wound Volume (cm^3) 0.75 cm^3 3/23/2020 10:40 AM   Wound Healing % 21 3/23/2020 10:40 AM   Post-Procedure Length (cm) 5 cm 3/18/2020  2:13 PM   Post-Procedure Width (cm) 1 cm 3/18/2020  2:13 PM   Post-Procedure Depth (cm) 0.2 cm 3/18/2020  2:13 PM   Post-Procedure Surface Area (cm^2) 5 cm^2 3/18/2020  2:13 PM   Post-Procedure Volume (cm^3) 1 cm^3 3/18/2020  2:13 PM   Distance Tunneling (cm) 0 cm 3/4/2020  4:41 PM   Tunneling Position ___ O'Clock 0 3/4/2020  4:41 PM   Undermining Starts ___ O'Clock 0 3/4/2020  4:41 PM   Undermining Ends___ O'Clock 0 3/4/2020  4:41 PM   Undermining Maxium Distance (cm) 0 3/4/2020  4:41 PM   Wound Assessment Slough 3/23/2020 10:40 AM   Drainage Amount Small 3/23/2020 10:40 AM   Drainage Description Serous 3/23/2020 10:40 AM   Odor None 3/23/2020 10:40 AM   Margins Attached edges 3/23/2020 10:40 AM   Shaila-wound Assessment Yellow-brown 3/23/2020 10:40 AM   Non-staged Wound Description Full thickness 3/23/2020 10:40 AM   [place]%Wound Bed 10 3/23/2020 10:40 AM   Red%Wound Bed 0 3/23/2020 10:40 AM   Yellow%Wound Bed 90 3/23/2020 10:40 AM   Black%Wound Bed 0 3/11/2020  1:55 PM Purple%Wound Bed 0 3/11/2020  1:55 PM   Other%Wound Bed 0 3/11/2020  1:55 PM   Number of days: 44       Wound 02/07/20 Toe (Comment  which one) Anterior;Right Wound 7. Right 5th toe (2/28/20 change etiology to arterial) (Active)   Wound Image   3/23/2020 10:40 AM   Wound Arterial 3/23/2020 10:40 AM   Dressing Status Dry 3/23/2020 10:40 AM   Dressing Changed Changed/New 3/18/2020  2:29 PM   Dressing/Treatment Open to air 3/23/2020 10:40 AM   Wound Cleansed Not Cleansed 3/23/2020 10:40 AM   Wound Length (cm) 2 cm 3/23/2020 10:40 AM   Wound Width (cm) 2 cm 3/23/2020 10:40 AM   Wound Depth (cm) 0.1 cm 3/23/2020 10:40 AM   Wound Surface Area (cm^2) 4 cm^2 3/23/2020 10:40 AM   Change in Wound Size % (l*w) -300 3/23/2020 10:40 AM   Wound Volume (cm^3) 0.4 cm^3 3/23/2020 10:40 AM   Wound Healing % -300 3/23/2020 10:40 AM   Post-Procedure Length (cm) 0.6 cm 2/21/2020 11:29 AM   Post-Procedure Width (cm) 0.5 cm 2/21/2020 11:29 AM   Post-Procedure Depth (cm) 0.1 cm 2/21/2020 11:29 AM   Post-Procedure Surface Area (cm^2) 0.3 cm^2 2/21/2020 11:29 AM   Post-Procedure Volume (cm^3) 0.03 cm^3 2/21/2020 11:29 AM   Distance Tunneling (cm) 0 cm 3/4/2020  4:41 PM   Tunneling Position ___ O'Clock 0 3/4/2020  4:41 PM   Undermining Starts ___ O'Clock 0 3/4/2020  4:41 PM   Undermining Ends___ O'Clock 0 3/4/2020  4:41 PM   Undermining Maxium Distance (cm) 0 3/4/2020  4:41 PM   Wound Assessment Black 3/23/2020 10:40 AM   Drainage Amount None 3/23/2020 10:40 AM   Drainage Description Other (Comment) 3/11/2020  1:55 PM   Odor None 3/23/2020 10:40 AM   Margins Attached edges 3/23/2020 10:40 AM   Shaila-wound Assessment Red; Swelling 3/23/2020 10:40 AM   Non-staged Wound Description Not applicable 2/77/1287 60:20 AM   Ord%Wound Bed 0 3/4/2020  4:41 PM   Red%Wound Bed 0 3/4/2020  4:41 PM   Yellow%Wound Bed 50 3/23/2020 10:40 AM   Black%Wound Bed 50 3/23/2020 10:40 AM   Purple%Wound Bed 0 3/4/2020  4:41 PM   Other%Wound Bed 0 3/4/2020  4:41 PM 3/23/2020 10:40 AM   Wound Non-Healing Surgical 3/23/2020 10:40 AM   Offloading for Diabetic Foot Ulcers No offloading required 3/23/2020 10:40 AM   Dressing Status Old drainage 3/23/2020 10:40 AM   Dressing Changed Changed/New 3/18/2020  2:29 PM   Dressing/Treatment Moist to dry 3/23/2020 10:40 AM   Wound Cleansed Not Cleansed 3/23/2020 10:40 AM   Wound Length (cm) 3 cm 3/23/2020 10:40 AM   Wound Width (cm) 0.6 cm 3/23/2020 10:40 AM   Wound Depth (cm) 0.1 cm 3/23/2020 10:40 AM   Wound Surface Area (cm^2) 1.8 cm^2 3/23/2020 10:40 AM   Change in Wound Size % (l*w) -11.11 3/23/2020 10:40 AM   Wound Volume (cm^3) 0.18 cm^3 3/23/2020 10:40 AM   Wound Healing % -12 3/23/2020 10:40 AM   Post-Procedure Length (cm) 2.3 cm 3/18/2020  2:13 PM   Post-Procedure Width (cm) 0.4 cm 3/18/2020  2:13 PM   Post-Procedure Depth (cm) 0.3 cm 3/18/2020  2:13 PM   Post-Procedure Surface Area (cm^2) 0.92 cm^2 3/18/2020  2:13 PM   Post-Procedure Volume (cm^3) 0.28 cm^3 3/18/2020  2:13 PM   Distance Tunneling (cm) 0 cm 3/4/2020  4:41 PM   Tunneling Position ___ O'Clock 0 3/4/2020  4:41 PM   Undermining Starts ___ O'Clock 0 3/4/2020  4:41 PM   Undermining Ends___ O'Clock 0 3/4/2020  4:41 PM   Undermining Maxium Distance (cm) 0 3/4/2020  4:41 PM   Wound Assessment Yellow 3/23/2020 10:40 AM   Drainage Amount Small 3/23/2020 10:40 AM   Drainage Description Serous 3/23/2020 10:40 AM   Odor None 3/23/2020 10:40 AM   Margins Attached edges 3/23/2020 10:40 AM   Shaila-wound Assessment Tan 3/23/2020 10:40 AM   Non-staged Wound Description Full thickness 3/23/2020 10:40 AM   [place]%Wound Bed 20 3/23/2020 10:40 AM   Red%Wound Bed 0 3/23/2020 10:40 AM   Yellow%Wound Bed 80 3/23/2020 10:40 AM   Black%Wound Bed 0 3/11/2020  1:55 PM   Purple%Wound Bed 0 3/11/2020  1:55 PM   Other%Wound Bed 0 3/11/2020  1:55 PM   Number of days: 38       Wound 02/28/20 Leg Right; Lower;Distal Wound 9, Dehisced surgical wound, R. Lower Leg distal (Active)   Wound Image   3/23/2020

## 2020-03-25 ENCOUNTER — HOSPITAL ENCOUNTER (INPATIENT)
Age: 60
LOS: 6 days | Discharge: NURSING, REHAB OR LONG-TERM CARE | DRG: 239 | End: 2020-03-31
Attending: INTERNAL MEDICINE | Admitting: INTERNAL MEDICINE
Payer: MEDICARE

## 2020-03-25 LAB
ANION GAP SERPL CALCULATED.3IONS-SCNC: 17 MMOL/L (ref 7–19)
APTT: 30.6 SEC (ref 26–36.2)
BASOPHILS ABSOLUTE: 0 K/UL (ref 0–0.2)
BASOPHILS RELATIVE PERCENT: 0.4 % (ref 0–1)
BUN BLDV-MCNC: 40 MG/DL (ref 6–20)
CALCIUM SERPL-MCNC: 9.7 MG/DL (ref 8.6–10)
CHLORIDE BLD-SCNC: 88 MMOL/L (ref 98–111)
CO2: 29 MMOL/L (ref 22–29)
CREAT SERPL-MCNC: 5.2 MG/DL (ref 0.5–1.2)
EOSINOPHILS ABSOLUTE: 0.2 K/UL (ref 0–0.6)
EOSINOPHILS RELATIVE PERCENT: 2.5 % (ref 0–5)
GFR NON-AFRICAN AMERICAN: 11
GLUCOSE BLD-MCNC: 117 MG/DL (ref 74–109)
HCT VFR BLD CALC: 31.8 % (ref 42–52)
HEMOGLOBIN: 9.7 G/DL (ref 14–18)
IMMATURE GRANULOCYTES #: 0 K/UL
INR BLD: 1.24 (ref 0.88–1.18)
LYMPHOCYTES ABSOLUTE: 0.7 K/UL (ref 1.1–4.5)
LYMPHOCYTES RELATIVE PERCENT: 8.9 % (ref 20–40)
MCH RBC QN AUTO: 28.5 PG (ref 27–31)
MCHC RBC AUTO-ENTMCNC: 30.5 G/DL (ref 33–37)
MCV RBC AUTO: 93.5 FL (ref 80–94)
MONOCYTES ABSOLUTE: 0.6 K/UL (ref 0–0.9)
MONOCYTES RELATIVE PERCENT: 6.8 % (ref 0–10)
NEUTROPHILS ABSOLUTE: 6.6 K/UL (ref 1.5–7.5)
NEUTROPHILS RELATIVE PERCENT: 81 % (ref 50–65)
PDW BLD-RTO: 16.8 % (ref 11.5–14.5)
PLATELET # BLD: 161 K/UL (ref 130–400)
PMV BLD AUTO: 11.3 FL (ref 9.4–12.4)
POTASSIUM REFLEX MAGNESIUM: 4.4 MMOL/L (ref 3.5–5)
PROTHROMBIN TIME: 15.6 SEC (ref 12–14.6)
RBC # BLD: 3.4 M/UL (ref 4.7–6.1)
SODIUM BLD-SCNC: 134 MMOL/L (ref 136–145)
WBC # BLD: 8.1 K/UL (ref 4.8–10.8)

## 2020-03-25 PROCEDURE — 6370000000 HC RX 637 (ALT 250 FOR IP): Performed by: INTERNAL MEDICINE

## 2020-03-25 PROCEDURE — 85025 COMPLETE CBC W/AUTO DIFF WBC: CPT

## 2020-03-25 PROCEDURE — 2580000003 HC RX 258: Performed by: INTERNAL MEDICINE

## 2020-03-25 PROCEDURE — 85730 THROMBOPLASTIN TIME PARTIAL: CPT

## 2020-03-25 PROCEDURE — 1210000000 HC MED SURG R&B

## 2020-03-25 PROCEDURE — 6360000002 HC RX W HCPCS: Performed by: INTERNAL MEDICINE

## 2020-03-25 PROCEDURE — 80048 BASIC METABOLIC PNL TOTAL CA: CPT

## 2020-03-25 PROCEDURE — 36415 COLL VENOUS BLD VENIPUNCTURE: CPT

## 2020-03-25 PROCEDURE — 85610 PROTHROMBIN TIME: CPT

## 2020-03-25 RX ORDER — SEVELAMER CARBONATE 800 MG/1
800 TABLET, FILM COATED ORAL
Status: DISCONTINUED | OUTPATIENT
Start: 2020-03-25 | End: 2020-03-31 | Stop reason: HOSPADM

## 2020-03-25 RX ORDER — OXYCODONE HYDROCHLORIDE AND ACETAMINOPHEN 5; 325 MG/1; MG/1
1 TABLET ORAL EVERY 4 HOURS PRN
Status: DISCONTINUED | OUTPATIENT
Start: 2020-03-25 | End: 2020-03-27

## 2020-03-25 RX ORDER — ASPIRIN 81 MG/1
81 TABLET ORAL DAILY
Status: DISCONTINUED | OUTPATIENT
Start: 2020-03-26 | End: 2020-03-31 | Stop reason: HOSPADM

## 2020-03-25 RX ORDER — SODIUM CHLORIDE 0.9 % (FLUSH) 0.9 %
10 SYRINGE (ML) INJECTION EVERY 12 HOURS SCHEDULED
Status: DISCONTINUED | OUTPATIENT
Start: 2020-03-25 | End: 2020-03-31 | Stop reason: HOSPADM

## 2020-03-25 RX ORDER — POTASSIUM CHLORIDE 7.45 MG/ML
10 INJECTION INTRAVENOUS PRN
Status: DISCONTINUED | OUTPATIENT
Start: 2020-03-25 | End: 2020-03-31 | Stop reason: HOSPADM

## 2020-03-25 RX ORDER — POTASSIUM CHLORIDE 20 MEQ/1
40 TABLET, EXTENDED RELEASE ORAL PRN
Status: DISCONTINUED | OUTPATIENT
Start: 2020-03-25 | End: 2020-03-31 | Stop reason: HOSPADM

## 2020-03-25 RX ORDER — HEPARIN SODIUM 5000 [USP'U]/ML
5000 INJECTION, SOLUTION INTRAVENOUS; SUBCUTANEOUS EVERY 8 HOURS SCHEDULED
Status: DISCONTINUED | OUTPATIENT
Start: 2020-03-26 | End: 2020-03-29

## 2020-03-25 RX ORDER — SUCRALFATE 1 G/1
1 TABLET ORAL 3 TIMES DAILY PRN
Status: DISCONTINUED | OUTPATIENT
Start: 2020-03-26 | End: 2020-03-31 | Stop reason: HOSPADM

## 2020-03-25 RX ORDER — ACETAMINOPHEN 325 MG/1
650 TABLET ORAL EVERY 6 HOURS PRN
Status: DISCONTINUED | OUTPATIENT
Start: 2020-03-25 | End: 2020-03-31 | Stop reason: HOSPADM

## 2020-03-25 RX ORDER — ISOSORBIDE MONONITRATE 60 MG/1
30 TABLET, EXTENDED RELEASE ORAL DAILY
Status: DISCONTINUED | OUTPATIENT
Start: 2020-03-26 | End: 2020-03-29

## 2020-03-25 RX ORDER — SODIUM CHLORIDE 0.9 % (FLUSH) 0.9 %
10 SYRINGE (ML) INJECTION PRN
Status: DISCONTINUED | OUTPATIENT
Start: 2020-03-25 | End: 2020-03-31 | Stop reason: HOSPADM

## 2020-03-25 RX ORDER — CLONIDINE HYDROCHLORIDE 0.1 MG/1
0.1 TABLET ORAL 3 TIMES DAILY
Status: DISCONTINUED | OUTPATIENT
Start: 2020-03-25 | End: 2020-03-31 | Stop reason: HOSPADM

## 2020-03-25 RX ORDER — CLOPIDOGREL BISULFATE 75 MG/1
75 TABLET ORAL DAILY
Status: DISCONTINUED | OUTPATIENT
Start: 2020-03-25 | End: 2020-03-31 | Stop reason: HOSPADM

## 2020-03-25 RX ORDER — MORPHINE SULFATE 4 MG/ML
2 INJECTION, SOLUTION INTRAMUSCULAR; INTRAVENOUS EVERY 6 HOURS PRN
Status: DISCONTINUED | OUTPATIENT
Start: 2020-03-25 | End: 2020-03-26

## 2020-03-25 RX ORDER — AMLODIPINE BESYLATE 5 MG/1
10 TABLET ORAL DAILY
Status: DISCONTINUED | OUTPATIENT
Start: 2020-03-25 | End: 2020-03-31 | Stop reason: HOSPADM

## 2020-03-25 RX ORDER — ACETAMINOPHEN 650 MG/1
650 SUPPOSITORY RECTAL EVERY 6 HOURS PRN
Status: DISCONTINUED | OUTPATIENT
Start: 2020-03-25 | End: 2020-03-31 | Stop reason: HOSPADM

## 2020-03-25 RX ORDER — ATORVASTATIN CALCIUM 40 MG/1
40 TABLET, FILM COATED ORAL NIGHTLY
Status: DISCONTINUED | OUTPATIENT
Start: 2020-03-25 | End: 2020-03-28

## 2020-03-25 RX ORDER — MINOXIDIL 10 MG/1
10 TABLET ORAL DAILY
Status: DISCONTINUED | OUTPATIENT
Start: 2020-03-26 | End: 2020-03-31 | Stop reason: HOSPADM

## 2020-03-25 RX ADMIN — ATORVASTATIN CALCIUM 40 MG: 40 TABLET, FILM COATED ORAL at 20:23

## 2020-03-25 RX ADMIN — OXYCODONE HYDROCHLORIDE AND ACETAMINOPHEN 1 TABLET: 5; 325 TABLET ORAL at 21:45

## 2020-03-25 RX ADMIN — OXYCODONE HYDROCHLORIDE AND ACETAMINOPHEN 1 TABLET: 5; 325 TABLET ORAL at 17:45

## 2020-03-25 RX ADMIN — CLONIDINE HYDROCHLORIDE 0.1 MG: 0.1 TABLET ORAL at 20:23

## 2020-03-25 RX ADMIN — MORPHINE SULFATE 2 MG: 4 INJECTION, SOLUTION INTRAMUSCULAR; INTRAVENOUS at 18:56

## 2020-03-25 RX ADMIN — Medication 10 ML: at 20:25

## 2020-03-25 ASSESSMENT — PAIN SCALES - GENERAL
PAINLEVEL_OUTOF10: 6
PAINLEVEL_OUTOF10: 3
PAINLEVEL_OUTOF10: 5
PAINLEVEL_OUTOF10: 10

## 2020-03-25 ASSESSMENT — PAIN DESCRIPTION - FREQUENCY: FREQUENCY: CONTINUOUS

## 2020-03-25 ASSESSMENT — PAIN DESCRIPTION - DESCRIPTORS: DESCRIPTORS: THROBBING

## 2020-03-25 ASSESSMENT — PAIN DESCRIPTION - ONSET: ONSET: ON-GOING

## 2020-03-25 ASSESSMENT — PAIN - FUNCTIONAL ASSESSMENT: PAIN_FUNCTIONAL_ASSESSMENT: PREVENTS OR INTERFERES SOME ACTIVE ACTIVITIES AND ADLS

## 2020-03-25 ASSESSMENT — PAIN DESCRIPTION - DIRECTION: RADIATING_TOWARDS: DOWN LEG

## 2020-03-25 ASSESSMENT — PAIN DESCRIPTION - PAIN TYPE: TYPE: CHRONIC PAIN

## 2020-03-25 ASSESSMENT — PAIN DESCRIPTION - ORIENTATION: ORIENTATION: RIGHT

## 2020-03-25 ASSESSMENT — PAIN DESCRIPTION - LOCATION: LOCATION: LEG

## 2020-03-25 ASSESSMENT — PAIN DESCRIPTION - PROGRESSION: CLINICAL_PROGRESSION: NOT CHANGED

## 2020-03-25 NOTE — CONSULTS
1/9/2017    Osteoarthritis     Pacemaker     Pain management     Dr. Jason Solorzano care patient 07/09/2019    PVD (peripheral vascular disease) ([address]     Sepsis ([address]     Skin ulcer of right heel with fat layer exposed ([address] 8/16/2019    Sleep apnea     no cpap at present.  Type II diabetes mellitus (HCC)     no meds.  Ulcer of left heel, with fat layer exposed ([address] 2/28/2018    Ulcer of right foot, with fat layer exposed ([address] 8/16/2019    Wound infection     Wound infection after surgery 7/8/2019       Past Surgical History:  Past Surgical History:   Procedure Laterality Date    ANGIOPLASTY  08/09/11 BELLES    LULEONARD cephalic vein balloon angioplasty with 7mm x 4cm balloon. coild embolization of 2 cephallic vein brances with 3mm x 5cm coils    CARDIAC CATHETERIZATION  04/04/11    cardiac cath and stent x1 by Dr. Kyra Reese  07/26/11 SUKHWINDER GERARDO AV fistula. coild embolization of cephalic vein branch near the wrist with 3mm EMBO coils.  balloon a'plasty left cephalic vein with [id number] balloon and then [id number] balloon    DIALYSIS FISTULA CREATION Left 2/16/2017    LEFT UPPER EXTREMITY BRACHIAL / AXILLARY GRAFT AND STENT LEFT SUBCLAVIAN VEIN  performed by Padmini Mccarthy MD at [address] Right 7/12/2019    RIGHT LEG WOUND WASHOUT performed by Vicky Myles MD at [facility] Right 1/27/2020    REVISION OF RIGHT LEG BYPASS GRAFT performed by Vicky Myles MD at [address] [facility] FEMORAL-TIBIAL BYPASS GRAFT Right 6/25/2019    FEMORAL TIBIAL/PERINEAL BYPASS WITH REVERSED GREATER SAPHENOUS VEIN performed by Padmini Mccarthy MD at [facility]  12/26/2010    Right internal jugular vein tunneled dialysis catheter placement    OTHER SURGICAL HISTORY  [id number]    Redo of dialysis catheter    [facility]  [facility]    Removal of RT IJV tunneled dialysis cath    OTHER SURGICAL HISTORY stents 9 x 40 conquest.    VASCULAR SURGERY  12/08/2016    [facility]- ultrasound guided cannulation of left distal cephalic vein ultrasound guided cannulation right internal jugular vein placement of right internal jugular vein tunneled dialysis catheter (Bard Equistream XK 23cm tip to cuff)     VASCULAR SURGERY  01/20/2017    [facility]-Right upper venograms, u/s guided cannulation left basilic vein, left upper venograms, balloon angioplasty left subclavian vein 10x40 conquest.    VASCULAR SURGERY  02/16/2017    [facility]. Left brachial artery to axillary vein arteriovenous graft with 7 mm artegraft. Left upper extremity venograms. Left subclavian vein stent viabahn 13x50. Left subclavian vein stent balloon angioplasty 12x40 atlas.  VASCULAR SURGERY  04/11/2017    [facility]. Removal of tunneled dialysis catheter right internal jugular vein.  VASCULAR SURGERY  01/25/2018    [facility]. Arch aortogram, left upper arteriogram, AV graft angiogram/venogram.    VASCULAR SURGERY  02/28/2018    [facility]. Right CFA 5f-6f-7f sheath, aortogram with bilateral lower arteriogram, left SFA/pop  balloon angioplasty 5x100 , 6x150 lutonix ( 2), left CFA  7f sheath, bilateral iliac kissing stents right 10x38 icast, left 9x59 icast expanded with 10x40 , completion aortogram, mynx left CFA , failed mynx right CFA.  VASCULAR SURGERY  06/13/2019    SJS left CFA 5f sheath, aortogram with bilateral lower arteriogram, mynx left CFA.  VASCULAR SURGERY  06/25/2019    [facility]. Femoral tibial/perineal bypass with reversed greater saphenous vein.  VASCULAR SURGERY  08/16/2019    [facility]. Aortogram and runoff, selective right CFA injections. PTA of fem-tp bypass with 4.0 x 220 mm tod balloon to 4.33 mm    VASCULAR SURGERY  10/23/2019    VI. Thrombin injection in the left SFA PSA, right common femoral artery us guided access, left SFA stent treatment of the flap.     VASCULAR SURGERY  01/30/2020    [facility] Endarterectomy of the right common femoral artery,

## 2020-03-25 NOTE — H&P
times daily as needed (upset stomach) 6/28/19   TIFF Ross   lactulose (CEPHULAC) 10 G packet Take 10 g by mouth 3 times daily as needed Indications: Disorder of the Liver Enzymes Not using regular states stomach is tore up    Historical Provider, MD   minoxidil (LONITEN) 10 MG tablet Take 10 mg by mouth daily Indications: High Blood Pressure Disorder, SBP >160     Historical Provider, MD   cloNIDine (CATAPRES) 0.2 MG tablet Take 0.3 mg by mouth 3 times daily Indications: High Blood Pressure     Historical Provider, MD   amLODIPine (NORVASC) 10 MG tablet Take 10 mg by mouth daily Indications: High Blood Pressure     Historical Provider, MD        ALLERGIES:    Allergies:  Patient has no known allergies. REVIEW OF SYSTEMS :    Review of Systems  14 point review of systems is negative except as specifically addressed above. PHYSICAL EXAM:    Physical Exam:    Vitals:   /62   Pulse 65   Temp 98.3 °F (36.8 °C) (Temporal)   Resp 16   SpO2 97%     Physical Exam  Vitals signs and nursing note reviewed. Constitutional:       General: He is not in acute distress. Appearance: Normal appearance. He is not ill-appearing, toxic-appearing or diaphoretic. HENT:      Head: Normocephalic and atraumatic. Right Ear: External ear normal.      Left Ear: External ear normal.      Nose: Nose normal. No congestion or rhinorrhea. Mouth/Throat:      Mouth: Mucous membranes are moist.      Pharynx: No oropharyngeal exudate or posterior oropharyngeal erythema. Eyes:      General: No scleral icterus. Right eye: No discharge. Left eye: No discharge. Extraocular Movements: Extraocular movements intact. Conjunctiva/sclera: Conjunctivae normal.      Pupils: Pupils are equal, round, and reactive to light. Neck:      Musculoskeletal: Normal range of motion and neck supple. No neck rigidity or muscular tenderness. Vascular: No carotid bruit.    Cardiovascular: Rate and Rhythm: Normal rate and regular rhythm. Heart sounds: Normal heart sounds. No murmur. No friction rub. No gallop. Pulmonary:      Effort: Pulmonary effort is normal. No respiratory distress. Breath sounds: Normal breath sounds. No stridor. No wheezing, rhonchi or rales. Chest:      Chest wall: No tenderness. Abdominal:      General: Bowel sounds are normal. There is no distension. Palpations: Abdomen is soft. Tenderness: There is no abdominal tenderness. There is no guarding. Musculoskeletal: Normal range of motion. General: Tenderness ( Erythema chills, right lower extremity tenderness palpation.) present. No swelling. Comments: Trace bilateral lower extremity edema   Skin:     General: Skin is warm and dry. Coloration: Skin is not jaundiced or pale. Findings: Erythema ( Right lower extremity erythema) present. No bruising or rash. Neurological:      General: No focal deficit present. Mental Status: He is alert and oriented to person, place, and time. Cranial Nerves: No cranial nerve deficit. Sensory: No sensory deficit. Motor: No weakness. Coordination: Coordination normal.   Psychiatric:         Mood and Affect: Mood normal.         Behavior: Behavior normal.         Thought Content: Thought content normal.         Judgment: Judgment normal.                  DIAGNOSTIC STUDIES:    I have reviewedLaboratory and Imaging data report today. No results for input(s): WBC, HGB, PLT in the last 72 hours. No results for input(s): NA, K, CL, CO2, BUN, CREATININE, GLUCOSE, AST, ALT, ALB, BILITOT, ALKPHOS in the last 72 hours. Troponin T: No results for input(s): TROPONINI in the last 72 hours.   Pro-BNP:   Lab Results   Component Value Date    BNP >35,000 (H) 01/17/2014     Lactate:   Lab Results   Component Value Date    LACTA 1.1 01/18/2020         ABGs:   Lab Results   Component Value Date    PHART 7.480 01/27/2020    PO2ART 58.0 01/27/2020    CEC0QKY 34.0 01/27/2020     INR: No results for input(s): INR in the last 72 hours. TSH:   Lab Results   Component Value Date    TSH 1.43 01/17/2014     URINALYSIS:No results for input(s): NITRITE, COLORU, PHUR, LABCAST, WBCUA, RBCUA, MUCUS, TRICHOMONAS, YEAST, BACTERIA, CLARITYU, SPECGRAV, LEUKOCYTESUR, UROBILINOGEN, BILIRUBINUR, BLOODU, GLUCOSEU, AMORPHOUS in the last 72 hours. Invalid input(s): Sharon Money / IMAGING REPORTS:     No results found. PATIENT SUMMARY      ASSESSMENT / IMPRESSION & PLAN:        Hospital Problems           Last Modified POA    * (Principal) PVD (peripheral vascular disease) ([address] 3/25/2020 Yes    ESRD on dialysis ([facility] 3/25/2020 Yes    Chronic diastolic congestive heart failure ([address] 3/25/2020 Yes    Essential hypertension 3/25/2020 Yes    Liver disease 3/25/2020 Yes    Overview Signed 7/7/2011  2:48 PM by Amada Phipps PA-C     cirrosis, hep c               Principal Problem:    PVD (peripheral vascular disease) ([address]  Active Problems:    ESRD on dialysis ([address]    Chronic diastolic congestive heart failure ([address]    Essential hypertension    Liver disease  Resolved Problems:    * No resolved hospital problems. *      Severe peripheral vascular disease  · Vascular surgery on board  · N.p.o. after midnight, for planned right lower extremity amputation  · Wound care consult  · Continue symptomatic supportive management, including pain management PRN    History of ESRD, on scheduled HD   · - Nephrology consult  · - Continue scheduled HD  · - Continue management as per nephrology rec        Continue management of other chronic medical conditions - Please see orders above         CONSULTS:    IP CONSULT TO NEPHROLOGY  IP CONSULT TO VASCULAR SURGERY  IP CONSULT TO SOCIAL WORK        INPATIENT CHECKLIST:      Nutrition: DIET CARDIAC;   Diet NPO, After Midnight    Prophylaxis Orders:   VTE - Heparin     CODE STATUS: FULL  ISOLATION:       DISCHARGE PLAN:

## 2020-03-26 ENCOUNTER — APPOINTMENT (OUTPATIENT)
Dept: GENERAL RADIOLOGY | Age: 60
DRG: 239 | End: 2020-03-26
Attending: INTERNAL MEDICINE
Payer: MEDICARE

## 2020-03-26 ENCOUNTER — ANESTHESIA (OUTPATIENT)
Dept: OPERATING ROOM | Age: 60
DRG: 239 | End: 2020-03-26
Payer: MEDICARE

## 2020-03-26 ENCOUNTER — ANESTHESIA EVENT (OUTPATIENT)
Dept: OPERATING ROOM | Age: 60
DRG: 239 | End: 2020-03-26
Payer: MEDICARE

## 2020-03-26 ENCOUNTER — TRANSCRIBE ORDERS (OUTPATIENT)
Dept: ADMINISTRATIVE | Facility: HOSPITAL | Age: 60
End: 2020-03-26

## 2020-03-26 VITALS — OXYGEN SATURATION: 100 % | RESPIRATION RATE: 14 BRPM | TEMPERATURE: 97 F

## 2020-03-26 PROBLEM — T81.89XD SURGICAL WOUND, NON HEALING, SUBSEQUENT ENCOUNTER: Status: ACTIVE | Noted: 2020-03-26

## 2020-03-26 LAB
ABO/RH: NORMAL
ANION GAP SERPL CALCULATED.3IONS-SCNC: 15 MMOL/L (ref 7–19)
ANION GAP SERPL CALCULATED.3IONS-SCNC: 17 MMOL/L (ref 7–19)
ANTIBODY SCREEN: NORMAL
BASOPHILS ABSOLUTE: 0 K/UL (ref 0–0.2)
BASOPHILS ABSOLUTE: 0 K/UL (ref 0–0.2)
BASOPHILS RELATIVE PERCENT: 0.2 % (ref 0–1)
BASOPHILS RELATIVE PERCENT: 0.4 % (ref 0–1)
BUN BLDV-MCNC: 14 MG/DL (ref 6–20)
BUN BLDV-MCNC: 46 MG/DL (ref 6–20)
CALCIUM SERPL-MCNC: 9.3 MG/DL (ref 8.6–10)
CALCIUM SERPL-MCNC: 9.3 MG/DL (ref 8.6–10)
CHLORIDE BLD-SCNC: 89 MMOL/L (ref 98–111)
CHLORIDE BLD-SCNC: 96 MMOL/L (ref 98–111)
CO2: 27 MMOL/L (ref 22–29)
CO2: 29 MMOL/L (ref 22–29)
CREAT SERPL-MCNC: 2.3 MG/DL (ref 0.5–1.2)
CREAT SERPL-MCNC: 6 MG/DL (ref 0.5–1.2)
EOSINOPHILS ABSOLUTE: 0.1 K/UL (ref 0–0.6)
EOSINOPHILS ABSOLUTE: 0.3 K/UL (ref 0–0.6)
EOSINOPHILS RELATIVE PERCENT: 0.5 % (ref 0–5)
EOSINOPHILS RELATIVE PERCENT: 4.2 % (ref 0–5)
GFR NON-AFRICAN AMERICAN: 10
GFR NON-AFRICAN AMERICAN: 29
GLUCOSE BLD-MCNC: 104 MG/DL (ref 74–109)
GLUCOSE BLD-MCNC: 124 MG/DL (ref 74–109)
HCT VFR BLD CALC: 28.5 % (ref 42–52)
HCT VFR BLD CALC: 31.4 % (ref 42–52)
HEMOGLOBIN: 8.8 G/DL (ref 14–18)
HEMOGLOBIN: 9.7 G/DL (ref 14–18)
IMMATURE GRANULOCYTES #: 0 K/UL
IMMATURE GRANULOCYTES #: 0.1 K/UL
LYMPHOCYTES ABSOLUTE: 0.5 K/UL (ref 1.1–4.5)
LYMPHOCYTES ABSOLUTE: 0.9 K/UL (ref 1.1–4.5)
LYMPHOCYTES RELATIVE PERCENT: 13.5 % (ref 20–40)
LYMPHOCYTES RELATIVE PERCENT: 4.8 % (ref 20–40)
MAGNESIUM: 2.2 MG/DL (ref 1.6–2.6)
MCH RBC QN AUTO: 28.2 PG (ref 27–31)
MCH RBC QN AUTO: 28.8 PG (ref 27–31)
MCHC RBC AUTO-ENTMCNC: 30.9 G/DL (ref 33–37)
MCHC RBC AUTO-ENTMCNC: 30.9 G/DL (ref 33–37)
MCV RBC AUTO: 91.3 FL (ref 80–94)
MCV RBC AUTO: 93.1 FL (ref 80–94)
MONOCYTES ABSOLUTE: 0.2 K/UL (ref 0–0.9)
MONOCYTES ABSOLUTE: 0.6 K/UL (ref 0–0.9)
MONOCYTES RELATIVE PERCENT: 2.2 % (ref 0–10)
MONOCYTES RELATIVE PERCENT: 8 % (ref 0–10)
NEUTROPHILS ABSOLUTE: 5.1 K/UL (ref 1.5–7.5)
NEUTROPHILS ABSOLUTE: 8.9 K/UL (ref 1.5–7.5)
NEUTROPHILS RELATIVE PERCENT: 73.6 % (ref 50–65)
NEUTROPHILS RELATIVE PERCENT: 91.8 % (ref 50–65)
PARATHYROID HORMONE INTACT: 180.8 PG/ML (ref 15–65)
PDW BLD-RTO: 16.7 % (ref 11.5–14.5)
PDW BLD-RTO: 16.8 % (ref 11.5–14.5)
PHOSPHORUS: 4.2 MG/DL (ref 2.5–4.5)
PLATELET # BLD: 149 K/UL (ref 130–400)
PLATELET # BLD: 161 K/UL (ref 130–400)
PMV BLD AUTO: 11.6 FL (ref 9.4–12.4)
PMV BLD AUTO: 11.7 FL (ref 9.4–12.4)
POTASSIUM REFLEX MAGNESIUM: 4.7 MMOL/L (ref 3.5–5)
POTASSIUM SERPL-SCNC: 3.2 MMOL/L (ref 3.5–5)
RBC # BLD: 3.06 M/UL (ref 4.7–6.1)
RBC # BLD: 3.44 M/UL (ref 4.7–6.1)
SODIUM BLD-SCNC: 135 MMOL/L (ref 136–145)
SODIUM BLD-SCNC: 138 MMOL/L (ref 136–145)
VITAMIN D 25-HYDROXY: 19 NG/ML
WBC # BLD: 6.9 K/UL (ref 4.8–10.8)
WBC # BLD: 9.6 K/UL (ref 4.8–10.8)

## 2020-03-26 PROCEDURE — 6360000002 HC RX W HCPCS: Performed by: NURSE ANESTHETIST, CERTIFIED REGISTERED

## 2020-03-26 PROCEDURE — 36415 COLL VENOUS BLD VENIPUNCTURE: CPT

## 2020-03-26 PROCEDURE — 86901 BLOOD TYPING SEROLOGIC RH(D): CPT

## 2020-03-26 PROCEDURE — 6370000000 HC RX 637 (ALT 250 FOR IP): Performed by: INTERNAL MEDICINE

## 2020-03-26 PROCEDURE — 3600000013 HC SURGERY LEVEL 3 ADDTL 15MIN: Performed by: SURGERY

## 2020-03-26 PROCEDURE — 6360000002 HC RX W HCPCS: Performed by: SURGERY

## 2020-03-26 PROCEDURE — 83735 ASSAY OF MAGNESIUM: CPT

## 2020-03-26 PROCEDURE — 85025 COMPLETE CBC W/AUTO DIFF WBC: CPT

## 2020-03-26 PROCEDURE — 86900 BLOOD TYPING SEROLOGIC ABO: CPT

## 2020-03-26 PROCEDURE — 2580000003 HC RX 258: Performed by: ANESTHESIOLOGY

## 2020-03-26 PROCEDURE — 71045 X-RAY EXAM CHEST 1 VIEW: CPT

## 2020-03-26 PROCEDURE — 64447 NJX AA&/STRD FEMORAL NRV IMG: CPT | Performed by: NURSE ANESTHETIST, CERTIFIED REGISTERED

## 2020-03-26 PROCEDURE — 2700000000 HC OXYGEN THERAPY PER DAY

## 2020-03-26 PROCEDURE — 6370000000 HC RX 637 (ALT 250 FOR IP): Performed by: SURGERY

## 2020-03-26 PROCEDURE — 5A1D70Z PERFORMANCE OF URINARY FILTRATION, INTERMITTENT, LESS THAN 6 HOURS PER DAY: ICD-10-PCS | Performed by: INTERNAL MEDICINE

## 2020-03-26 PROCEDURE — 7100000000 HC PACU RECOVERY - FIRST 15 MIN: Performed by: SURGERY

## 2020-03-26 PROCEDURE — 3600000003 HC SURGERY LEVEL 3 BASE: Performed by: SURGERY

## 2020-03-26 PROCEDURE — 84100 ASSAY OF PHOSPHORUS: CPT

## 2020-03-26 PROCEDURE — 80048 BASIC METABOLIC PNL TOTAL CA: CPT

## 2020-03-26 PROCEDURE — 7100000001 HC PACU RECOVERY - ADDTL 15 MIN: Performed by: SURGERY

## 2020-03-26 PROCEDURE — 86850 RBC ANTIBODY SCREEN: CPT

## 2020-03-26 PROCEDURE — 6360000002 HC RX W HCPCS: Performed by: INTERNAL MEDICINE

## 2020-03-26 PROCEDURE — 1210000000 HC MED SURG R&B

## 2020-03-26 PROCEDURE — 2580000003 HC RX 258: Performed by: INTERNAL MEDICINE

## 2020-03-26 PROCEDURE — 3700000001 HC ADD 15 MINUTES (ANESTHESIA): Performed by: SURGERY

## 2020-03-26 PROCEDURE — 83970 ASSAY OF PARATHORMONE: CPT

## 2020-03-26 PROCEDURE — 3700000000 HC ANESTHESIA ATTENDED CARE: Performed by: SURGERY

## 2020-03-26 PROCEDURE — 27590 AMPUTATE LEG AT THIGH: CPT | Performed by: SURGERY

## 2020-03-26 PROCEDURE — 2580000003 HC RX 258: Performed by: SURGERY

## 2020-03-26 PROCEDURE — 88305 TISSUE EXAM BY PATHOLOGIST: CPT

## 2020-03-26 PROCEDURE — 2709999900 HC NON-CHARGEABLE SUPPLY: Performed by: SURGERY

## 2020-03-26 PROCEDURE — 0Y6C0Z3 DETACHMENT AT RIGHT UPPER LEG, LOW, OPEN APPROACH: ICD-10-PCS | Performed by: SURGERY

## 2020-03-26 PROCEDURE — 82306 VITAMIN D 25 HYDROXY: CPT

## 2020-03-26 PROCEDURE — 2500000003 HC RX 250 WO HCPCS: Performed by: ANESTHESIOLOGY

## 2020-03-26 PROCEDURE — 88311 DECALCIFY TISSUE: CPT

## 2020-03-26 PROCEDURE — 2500000003 HC RX 250 WO HCPCS: Performed by: NURSE ANESTHETIST, CERTIFIED REGISTERED

## 2020-03-26 PROCEDURE — 8010000000 HC HEMODIALYSIS ACUTE INPT

## 2020-03-26 PROCEDURE — 6360000002 HC RX W HCPCS: Performed by: ANESTHESIOLOGY

## 2020-03-26 PROCEDURE — 99024 POSTOP FOLLOW-UP VISIT: CPT | Performed by: NURSE PRACTITIONER

## 2020-03-26 RX ORDER — LIDOCAINE HYDROCHLORIDE 10 MG/ML
INJECTION, SOLUTION INFILTRATION; PERINEURAL PRN
Status: DISCONTINUED | OUTPATIENT
Start: 2020-03-26 | End: 2020-03-26 | Stop reason: SDUPTHER

## 2020-03-26 RX ORDER — DIPHENHYDRAMINE HYDROCHLORIDE 50 MG/ML
12.5 INJECTION INTRAMUSCULAR; INTRAVENOUS
Status: DISCONTINUED | OUTPATIENT
Start: 2020-03-26 | End: 2020-03-26 | Stop reason: HOSPADM

## 2020-03-26 RX ORDER — HYDRALAZINE HYDROCHLORIDE 20 MG/ML
5 INJECTION INTRAMUSCULAR; INTRAVENOUS EVERY 10 MIN PRN
Status: DISCONTINUED | OUTPATIENT
Start: 2020-03-26 | End: 2020-03-26 | Stop reason: HOSPADM

## 2020-03-26 RX ORDER — METOCLOPRAMIDE HYDROCHLORIDE 5 MG/ML
10 INJECTION INTRAMUSCULAR; INTRAVENOUS
Status: DISCONTINUED | OUTPATIENT
Start: 2020-03-26 | End: 2020-03-26 | Stop reason: HOSPADM

## 2020-03-26 RX ORDER — ONDANSETRON 2 MG/ML
INJECTION INTRAMUSCULAR; INTRAVENOUS PRN
Status: DISCONTINUED | OUTPATIENT
Start: 2020-03-26 | End: 2020-03-26 | Stop reason: SDUPTHER

## 2020-03-26 RX ORDER — ENALAPRILAT 2.5 MG/2ML
1.25 INJECTION INTRAVENOUS
Status: DISCONTINUED | OUTPATIENT
Start: 2020-03-26 | End: 2020-03-26 | Stop reason: HOSPADM

## 2020-03-26 RX ORDER — MORPHINE SULFATE 4 MG/ML
2 INJECTION, SOLUTION INTRAMUSCULAR; INTRAVENOUS EVERY 5 MIN PRN
Status: DISCONTINUED | OUTPATIENT
Start: 2020-03-26 | End: 2020-03-26 | Stop reason: HOSPADM

## 2020-03-26 RX ORDER — MEPERIDINE HYDROCHLORIDE 50 MG/ML
12.5 INJECTION INTRAMUSCULAR; INTRAVENOUS; SUBCUTANEOUS EVERY 5 MIN PRN
Status: DISCONTINUED | OUTPATIENT
Start: 2020-03-26 | End: 2020-03-26 | Stop reason: HOSPADM

## 2020-03-26 RX ORDER — SUCCINYLCHOLINE CHLORIDE 20 MG/ML
INJECTION INTRAMUSCULAR; INTRAVENOUS PRN
Status: DISCONTINUED | OUTPATIENT
Start: 2020-03-26 | End: 2020-03-26 | Stop reason: SDUPTHER

## 2020-03-26 RX ORDER — DEXAMETHASONE SODIUM PHOSPHATE 10 MG/ML
INJECTION, SOLUTION INTRAMUSCULAR; INTRAVENOUS PRN
Status: DISCONTINUED | OUTPATIENT
Start: 2020-03-26 | End: 2020-03-26 | Stop reason: SDUPTHER

## 2020-03-26 RX ORDER — FENTANYL CITRATE 50 UG/ML
INJECTION, SOLUTION INTRAMUSCULAR; INTRAVENOUS PRN
Status: DISCONTINUED | OUTPATIENT
Start: 2020-03-26 | End: 2020-03-26 | Stop reason: SDUPTHER

## 2020-03-26 RX ORDER — PROPOFOL 10 MG/ML
INJECTION, EMULSION INTRAVENOUS PRN
Status: DISCONTINUED | OUTPATIENT
Start: 2020-03-26 | End: 2020-03-26 | Stop reason: SDUPTHER

## 2020-03-26 RX ORDER — KETOROLAC TROMETHAMINE 30 MG/ML
INJECTION, SOLUTION INTRAMUSCULAR; INTRAVENOUS PRN
Status: DISCONTINUED | OUTPATIENT
Start: 2020-03-26 | End: 2020-03-26 | Stop reason: SDUPTHER

## 2020-03-26 RX ORDER — LIDOCAINE HYDROCHLORIDE 20 MG/ML
INJECTION, SOLUTION INFILTRATION; PERINEURAL
Status: COMPLETED | OUTPATIENT
Start: 2020-03-26 | End: 2020-03-26

## 2020-03-26 RX ORDER — MORPHINE SULFATE 4 MG/ML
4 INJECTION, SOLUTION INTRAMUSCULAR; INTRAVENOUS EVERY 5 MIN PRN
Status: DISCONTINUED | OUTPATIENT
Start: 2020-03-26 | End: 2020-03-26 | Stop reason: HOSPADM

## 2020-03-26 RX ORDER — SODIUM CHLORIDE 450 MG/100ML
INJECTION, SOLUTION INTRAVENOUS CONTINUOUS
Status: DISCONTINUED | OUTPATIENT
Start: 2020-03-26 | End: 2020-03-26

## 2020-03-26 RX ORDER — LABETALOL 20 MG/4 ML (5 MG/ML) INTRAVENOUS SYRINGE
5 EVERY 10 MIN PRN
Status: DISCONTINUED | OUTPATIENT
Start: 2020-03-26 | End: 2020-03-26 | Stop reason: HOSPADM

## 2020-03-26 RX ORDER — HYDRALAZINE HYDROCHLORIDE 20 MG/ML
INJECTION INTRAMUSCULAR; INTRAVENOUS PRN
Status: DISCONTINUED | OUTPATIENT
Start: 2020-03-26 | End: 2020-03-26 | Stop reason: SDUPTHER

## 2020-03-26 RX ORDER — MIDAZOLAM HYDROCHLORIDE 1 MG/ML
2 INJECTION INTRAMUSCULAR; INTRAVENOUS
Status: COMPLETED | OUTPATIENT
Start: 2020-03-26 | End: 2020-03-26

## 2020-03-26 RX ORDER — MORPHINE SULFATE 4 MG/ML
4 INJECTION, SOLUTION INTRAMUSCULAR; INTRAVENOUS
Status: DISCONTINUED | OUTPATIENT
Start: 2020-03-26 | End: 2020-03-27

## 2020-03-26 RX ORDER — ROCURONIUM BROMIDE 10 MG/ML
INJECTION, SOLUTION INTRAVENOUS PRN
Status: DISCONTINUED | OUTPATIENT
Start: 2020-03-26 | End: 2020-03-26 | Stop reason: SDUPTHER

## 2020-03-26 RX ORDER — PROMETHAZINE HYDROCHLORIDE 25 MG/ML
6.25 INJECTION, SOLUTION INTRAMUSCULAR; INTRAVENOUS
Status: DISCONTINUED | OUTPATIENT
Start: 2020-03-26 | End: 2020-03-26 | Stop reason: HOSPADM

## 2020-03-26 RX ADMIN — DEXAMETHASONE SODIUM PHOSPHATE 10 MG: 10 INJECTION, SOLUTION INTRAMUSCULAR; INTRAVENOUS at 15:41

## 2020-03-26 RX ADMIN — LIDOCAINE HYDROCHLORIDE 15 ML: 20 INJECTION, SOLUTION INFILTRATION; PERINEURAL at 15:18

## 2020-03-26 RX ADMIN — PHENYLEPHRINE HYDROCHLORIDE 100 MCG: 10 INJECTION INTRAVENOUS at 15:59

## 2020-03-26 RX ADMIN — MIDAZOLAM 2 MG: 1 INJECTION INTRAMUSCULAR; INTRAVENOUS at 15:05

## 2020-03-26 RX ADMIN — SODIUM CHLORIDE: 4.5 INJECTION, SOLUTION INTRAVENOUS at 14:39

## 2020-03-26 RX ADMIN — ATORVASTATIN CALCIUM 40 MG: 40 TABLET, FILM COATED ORAL at 20:26

## 2020-03-26 RX ADMIN — Medication 1000 MG: at 14:38

## 2020-03-26 RX ADMIN — HYDROMORPHONE HYDROCHLORIDE 0.25 MG: 1 INJECTION, SOLUTION INTRAMUSCULAR; INTRAVENOUS; SUBCUTANEOUS at 17:42

## 2020-03-26 RX ADMIN — FENTANYL CITRATE 50 MCG: 50 INJECTION INTRAMUSCULAR; INTRAVENOUS at 15:49

## 2020-03-26 RX ADMIN — HYDROMORPHONE HYDROCHLORIDE 0.25 MG: 1 INJECTION, SOLUTION INTRAMUSCULAR; INTRAVENOUS; SUBCUTANEOUS at 17:52

## 2020-03-26 RX ADMIN — ROCURONIUM BROMIDE 30 MG: 10 SOLUTION INTRAVENOUS at 15:42

## 2020-03-26 RX ADMIN — HYDRALAZINE HYDROCHLORIDE 10 MG: 20 INJECTION INTRAMUSCULAR; INTRAVENOUS at 17:25

## 2020-03-26 RX ADMIN — PHENYLEPHRINE HYDROCHLORIDE 100 MCG: 10 INJECTION INTRAVENOUS at 16:39

## 2020-03-26 RX ADMIN — LIDOCAINE HYDROCHLORIDE 50 MG: 10 INJECTION, SOLUTION INFILTRATION; PERINEURAL at 15:35

## 2020-03-26 RX ADMIN — OXYCODONE HYDROCHLORIDE AND ACETAMINOPHEN 1 TABLET: 5; 325 TABLET ORAL at 03:19

## 2020-03-26 RX ADMIN — CLONIDINE HYDROCHLORIDE 0.1 MG: 0.1 TABLET ORAL at 20:25

## 2020-03-26 RX ADMIN — SUGAMMADEX 150 MG: 100 INJECTION, SOLUTION INTRAVENOUS at 17:06

## 2020-03-26 RX ADMIN — HYDROMORPHONE HYDROCHLORIDE 0.25 MG: 1 INJECTION, SOLUTION INTRAMUSCULAR; INTRAVENOUS; SUBCUTANEOUS at 18:05

## 2020-03-26 RX ADMIN — HYDROMORPHONE HYDROCHLORIDE 0.25 MG: 1 INJECTION, SOLUTION INTRAMUSCULAR; INTRAVENOUS; SUBCUTANEOUS at 18:11

## 2020-03-26 RX ADMIN — OXYCODONE HYDROCHLORIDE AND ACETAMINOPHEN 1 TABLET: 5; 325 TABLET ORAL at 20:26

## 2020-03-26 RX ADMIN — Medication 10 ML: at 20:26

## 2020-03-26 RX ADMIN — MORPHINE SULFATE 2 MG: 4 INJECTION, SOLUTION INTRAMUSCULAR; INTRAVENOUS at 18:58

## 2020-03-26 RX ADMIN — PHENYLEPHRINE HYDROCHLORIDE 100 MCG: 10 INJECTION INTRAVENOUS at 16:30

## 2020-03-26 RX ADMIN — ROCURONIUM BROMIDE 10 MG: 10 SOLUTION INTRAVENOUS at 16:07

## 2020-03-26 RX ADMIN — MORPHINE SULFATE 2 MG: 4 INJECTION, SOLUTION INTRAMUSCULAR; INTRAVENOUS at 01:07

## 2020-03-26 RX ADMIN — PROPOFOL 120 MG: 10 INJECTION, EMULSION INTRAVENOUS at 15:35

## 2020-03-26 RX ADMIN — FENTANYL CITRATE 50 MCG: 50 INJECTION INTRAMUSCULAR; INTRAVENOUS at 16:07

## 2020-03-26 RX ADMIN — FENTANYL CITRATE 25 MCG: 50 INJECTION INTRAMUSCULAR; INTRAVENOUS at 17:25

## 2020-03-26 RX ADMIN — HEPARIN SODIUM 5000 UNITS: 5000 INJECTION INTRAVENOUS; SUBCUTANEOUS at 20:26

## 2020-03-26 RX ADMIN — MORPHINE SULFATE 4 MG: 4 INJECTION, SOLUTION INTRAMUSCULAR; INTRAVENOUS at 22:57

## 2020-03-26 RX ADMIN — OXYCODONE HYDROCHLORIDE AND ACETAMINOPHEN 1 TABLET: 5; 325 TABLET ORAL at 08:38

## 2020-03-26 RX ADMIN — MORPHINE SULFATE 2 MG: 4 INJECTION, SOLUTION INTRAMUSCULAR; INTRAVENOUS at 07:37

## 2020-03-26 RX ADMIN — ONDANSETRON HYDROCHLORIDE 4 MG: 2 INJECTION, SOLUTION INTRAMUSCULAR; INTRAVENOUS at 16:37

## 2020-03-26 RX ADMIN — SUCCINYLCHOLINE CHLORIDE 100 MG: 20 INJECTION, SOLUTION INTRAMUSCULAR; INTRAVENOUS at 15:35

## 2020-03-26 RX ADMIN — Medication 10 ML: at 08:39

## 2020-03-26 RX ADMIN — PHENYLEPHRINE HYDROCHLORIDE 100 MCG: 10 INJECTION INTRAVENOUS at 16:22

## 2020-03-26 RX ADMIN — FENTANYL CITRATE 50 MCG: 50 INJECTION INTRAMUSCULAR; INTRAVENOUS at 15:35

## 2020-03-26 RX ADMIN — KETOROLAC TROMETHAMINE 10 MG: 30 INJECTION, SOLUTION INTRAMUSCULAR; INTRAVENOUS at 16:37

## 2020-03-26 ASSESSMENT — PAIN SCALES - GENERAL
PAINLEVEL_OUTOF10: 5
PAINLEVEL_OUTOF10: 7
PAINLEVEL_OUTOF10: 5
PAINLEVEL_OUTOF10: 10
PAINLEVEL_OUTOF10: 4
PAINLEVEL_OUTOF10: 0
PAINLEVEL_OUTOF10: 9
PAINLEVEL_OUTOF10: 6
PAINLEVEL_OUTOF10: 10
PAINLEVEL_OUTOF10: 10
PAINLEVEL_OUTOF10: 6
PAINLEVEL_OUTOF10: 10
PAINLEVEL_OUTOF10: 10
PAINLEVEL_OUTOF10: 7
PAINLEVEL_OUTOF10: 10

## 2020-03-26 ASSESSMENT — PAIN DESCRIPTION - LOCATION: LOCATION: LEG

## 2020-03-26 ASSESSMENT — PAIN DESCRIPTION - DESCRIPTORS: DESCRIPTORS: THROBBING

## 2020-03-26 ASSESSMENT — LIFESTYLE VARIABLES: SMOKING_STATUS: 0

## 2020-03-26 ASSESSMENT — PAIN DESCRIPTION - PROGRESSION: CLINICAL_PROGRESSION: NOT CHANGED

## 2020-03-26 ASSESSMENT — PAIN DESCRIPTION - PAIN TYPE: TYPE: CHRONIC PAIN

## 2020-03-26 ASSESSMENT — PAIN DESCRIPTION - ONSET: ONSET: ON-GOING

## 2020-03-26 ASSESSMENT — PAIN DESCRIPTION - FREQUENCY: FREQUENCY: CONTINUOUS

## 2020-03-26 ASSESSMENT — ENCOUNTER SYMPTOMS: SHORTNESS OF BREATH: 0

## 2020-03-26 ASSESSMENT — PAIN DESCRIPTION - ORIENTATION: ORIENTATION: RIGHT

## 2020-03-26 NOTE — BRIEF OP NOTE
Brief Postoperative Note      Patient: Harmony Moss  YOB: 1960  MRN: [id number]    Date of Procedure: 3/26/2020    Pre-Op Diagnosis: Peripheral Vascular Disease    Post-Op Diagnosis: Same       Procedure(s):  RIGHT LEG AMPUTATION ABOVE KNEE    Surgeon(s):  Dave Ochoa DO    Assistant:  * No surgical staff found *    Anesthesia: General    Estimated Blood Loss (mL): 237    Complications: None    Specimens:   ID Type Source Tests Collected by Time Destination   A : right leg Tissue Leg SURGICAL PATHOLOGY Dave Ochoa DO 3/26/2020 1553        Implants:  * No implants in log *      Drains: * No LDAs found *    Findings: pulsatile bleeding at the level of amputation, healthy muscle, no signs of infection    Electronically signed by Dave Ochoa DO on 3/26/2020 at 5:11 PM

## 2020-03-26 NOTE — ANESTHESIA PRE PROCEDURE
Department of Anesthesiology  Preprocedure Note       Name:  Lois Austin   Age:  61 y.o.  :  1960                                          MRN:  [id number]         Date:  3/26/2020      Surgeon: Jorge Martinez):  Zulema Mckee DO    Procedure: LEG AMPUTATION ABOVE KNEE (Right )    Medications prior to admission:   Prior to Admission medications    Medication Sig Start Date End Date Taking? Authorizing Provider   sodium hypochlorite (DAKINS) 0.125 % SOLN external solution Apply topically as directed twice daily. 3/11/20   Rissa Lax, APRN - CNP   collagenase (SANTYL) 250 UNIT/GM ointment Apply topically twice daily. Apply nickel thick as directed. 3/11/20   Rissa Lax, APRN - CNP   lisinopril (PRINIVIL;ZESTRIL) 10 MG tablet Take 10 mg by mouth daily    Historical Provider, MD   clopidogrel (PLAVIX) 75 MG tablet TAKE 1 TABLET EVERY DAY  Patient taking differently: Take 75 mg by mouth daily  20   Jose Maria Beltran MD   b complex-C-folic acid (NEPHROCAPS) 1 MG capsule Take 1 capsule by mouth daily 20   Ji Mustafa MD   atorvastatin (LIPITOR) 40 MG tablet Take 1 tablet by mouth nightly 19   Ban Pickett MD   metoprolol succinate (TOPROL XL) 25 MG extended release tablet Take 1 tablet by mouth 2 times daily 19   Ban Pickett MD   sevelamer (RENVELA) 800 MG tablet Take 1 tablet by mouth 3 times daily (with meals) 19   Ban Pickett MD   oxyCODONE (OXYCONTIN) 10 MG extended release tablet Take 10 mg by mouth every 8 hours as needed for Pain.  Dr. Jessica Herrera MD   aspirin 81 MG EC tablet Take 1 tablet by mouth daily 10/29/19   Zulema Mckee DO   isosorbide mononitrate (IMDUR) 30 MG extended release tablet Take 1 tablet by mouth daily 10/29/19   Rogers Angulo DO   sucralfate (CARAFATE) 1 GM tablet Take 1 tablet by mouth 3 times daily as needed (upset stomach) 19   TIFF Maldonado   lactulose (CEPHULAC) 10 G packet Take 10 g by mouth 3 times daily as needed Indications: Disorder of the Liver Enzymes Not using regular states stomach is tore up    Historical Provider, MD   minoxidil (LONITEN) 10 MG tablet Take 10 mg by mouth daily Indications: High Blood Pressure Disorder, SBP >160     Historical Provider, MD   cloNIDine (CATAPRES) 0.2 MG tablet Take 0.3 mg by mouth 3 times daily Indications: High Blood Pressure     Historical Provider, MD   amLODIPine (NORVASC) 10 MG tablet Take 10 mg by mouth daily Indications: High Blood Pressure     Historical Provider, MD       Current medications:    No current facility-administered medications for this visit. No current outpatient medications on file.      Facility-Administered Medications Ordered in Other Visits   Medication Dose Route Frequency Provider Last Rate Last Dose    [MAR Hold] vancomycin (VANCOCIN) 1 g in dextrose 5% 250 mL IVPB  1,000 mg Intravenous On Call to OR Octavio Salmon, DO   1,000 mg at 03/26/20 1438    0.45 % sodium chloride infusion   Intravenous Continuous Jose Stevenson,  mL/hr at 03/26/20 1439      [MAR Hold] amLODIPine (NORVASC) tablet 10 mg  10 mg Oral Daily Dominique Valencia MD   Stopped at 03/26/20 0832    [MAR Hold] atorvastatin (LIPITOR) tablet 40 mg  40 mg Oral Nightly Dominique Valencia MD   40 mg at 03/25/20 2023    [MAR Hold] aspirin EC tablet 81 mg  81 mg Oral Daily Dominique Valencia MD   Stopped at 03/26/20 0832    [MAR Hold] clopidogrel (PLAVIX) tablet 75 mg  75 mg Oral Daily Dominique Valencia MD   Stopped at 03/26/20 0833    [MAR Hold] isosorbide mononitrate (IMDUR) extended release tablet 30 mg  30 mg Oral Daily Dominique Valencia MD   Stopped at 03/26/20 0833    [MAR Hold] cloNIDine (CATAPRES) tablet 0.1 mg  0.1 mg Oral TID Dominique Valencia MD   Stopped at 03/26/20 0832    [MAR Hold] minoxidil (LONITEN) tablet 10 mg  10 mg Oral Daily Dominique Valencia MD   Stopped at 03/26/20 0833    [MAR Hold] sevelamer (RENVELA) tablet 800 mg  800 mg Oral TID WC Kinga Lee MD   Stopped at 03/26/20 0833    [MAR Hold] sucralfate (CARAFATE) tablet 1 g  1 g Oral TID PRN Kinga Lee MD        [facility] Hold] sodium chloride flush 0.9 % injection 10 mL  10 mL Intravenous 2 times per day Kinga Lee MD   10 mL at 03/26/20 0839    [MAR Hold] sodium chloride flush 0.9 % injection 10 mL  10 mL Intravenous PRN Kinga Lee MD        [facility] Hold] potassium chloride (KLOR-CON M) extended release tablet 40 mEq  40 mEq Oral PRN Kinga Lee MD        Or   [facility] Hold] potassium bicarb-citric acid (EFFER-K) effervescent tablet 40 mEq  40 mEq Oral PRN Kinga Lee MD        Or   [facility] Hold] potassium chloride 10 mEq/100 mL IVPB (Peripheral Line)  10 mEq Intravenous PRN Kinga Lee MD        [facility] Hold] acetaminophen (TYLENOL) tablet 650 mg  650 mg Oral Q6H PRN Kinga Lee MD        Or   [facility] Hold] acetaminophen (TYLENOL) suppository 650 mg  650 mg Rectal Q6H PRN Kinga Lee MD        [facility] Hold] oxyCODONE-acetaminophen (PERCOCET) 5-325 MG per tablet 1 tablet  1 tablet Oral Q4H PRN Kinga Lee MD   1 tablet at 03/26/20 0838    [MAR Hold] morphine injection 2 mg  2 mg Intravenous Q6H PRN Kinga Lee MD   2 mg at 03/26/20 0737    [MAR Hold] heparin (porcine) injection 5,000 Units  5,000 Units Subcutaneous 3 times per day Kinga Lee MD   Stopped at 03/26/20 1076       Allergies:  No Known Allergies    Problem List:    Patient Active Problem List   Diagnosis Code    Osteoarthritis M19.90    Chronic diastolic congestive heart failure (HCC) I50.32    CAD (coronary artery disease) I25.10    Essential hypertension I10    Liver disease K76.9    Chronic deep vein thrombosis (DVT) of axillary vein of left upper extremity (HCC) I82. A22    Steal syndrome as complication of dialysis access ([address] T82.898A    ESRD on dialysis ([address] N18.6, Z99.2    Steal syndrome of dialysis [facility])     Dialysis patient [facility])     mon wed fri at [place] GERD (gastroesophageal reflux disease)     Hemodialysis patient ([address]     mon wed fri in [place] Hepatic cirrhosis ([address]     dr. Guillermo Goldmann; has been going [facility] for liver and kidney transplant list.    Hepatitis C, chronic ([address]     History of blood transfusion     HTN (hypertension)     Hx of blood clots     arm/fistula    Mixed hyperlipidemia 1/9/2017    Osteoarthritis     Pacemaker     Pain management     Dr. Medel [facility] care patient 07/09/2019    PVD (peripheral vascular disease) ([address]     Sepsis ([address]     Skin ulcer of right heel with fat layer exposed ([address] 8/16/2019    Sleep apnea     no cpap at present.  Type II diabetes mellitus (HCC)     no meds.  Ulcer of left heel, with fat layer exposed ([address] 2/28/2018    Ulcer of right foot, with fat layer exposed ([address] 8/16/2019    Wound infection     Wound infection after surgery 7/8/2019       Past Surgical History:        Procedure Laterality Date    ANGIOPLASTY  08/09/11 SUKHWINDER GERARDO cephalic vein balloon angioplasty with 7mm x 4cm balloon. coild embolization of 2 cephallic vein brances with 3mm x 5cm coils    CARDIAC CATHETERIZATION  04/04/11    cardiac cath and stent x1 by Dr. Neymar Coleman  07/26/11 SUKHWINDER GERARDO AV fistula. coild embolization of cephalic vein branch near the wrist with 3mm EMBO coils.  balloon a'plasty left cephalic vein with [id number] balloon and then [id number] balloon    DIALYSIS FISTULA CREATION Left 2/16/2017    LEFT UPPER EXTREMITY BRACHIAL / AXILLARY GRAFT AND STENT LEFT SUBCLAVIAN VEIN  performed by Holly Garcia MD at [address] Right 7/12/2019    RIGHT LEG WOUND WASHOUT performed by Claire Royal MD at [address] Right 1/27/2020    REVISION OF RIGHT LEG BYPASS GRAFT performed by Claire Royal MD at [address] [facility] FEMORAL-TIBIAL BYPASS GRAFT Right 6/25/2019 FEMORAL TIBIAL/PERINEAL BYPASS WITH REVERSED GREATER SAPHENOUS VEIN performed by Rebecca Arevalo MD at [address]  12/26/2010    Right internal jugular vein tunneled dialysis catheter placement    OTHER SURGICAL HISTORY  [id number]    Redo of dialysis catheter    OTHER SURGICAL HISTORY  9/6/2011   [facility]    Removal of RT IJV tunneled dialysis cath    OTHER SURGICAL HISTORY  5/22/12   [facility]    Ultrasound-guided cannulation of left cephalic vein in the mid forearm toward the AV anastomosis, Left upper  ext. fistulograms including venograms of the SVC, Left cephalic vein balloon angioplasty with a 4mm x 100mm Tod balloon, Completion fistulograms    PACEMAKER PLACEMENT Right 2015    medtr[employer]    PARACENTESIS      multiple/recent; per dr. Davies Holding at [address] [facility] Left 1/30/2018    UPPER EXTREMITY DRIL PROCEDURE WITH LEFT SAPHENOUS VEIN HARVESTING performed by Rebecca Arevalo MD at [address] [facility] [address] ANGIO N/A 3/6/2020    [facility] AND RIGHT LEG ANGIOGRAM performed by Carter Mcbride DO at [address] VASCULAR SURGERY  6/19/12    LUE arteriovenous fistulogram including venography of the superior vena cava. Balloon angioplasty, left forearm cephalic vein and upper arm cephalic vein with [id number] tod balloon.  VASCULAR SURGERY  7/10/2012 [facility]     Revision of left distal radial artery to cephalic vein arteriovenous fistula with 7mm Artegraft interposition.  VASCULAR SURGERY  7/30/15 [facility] & [address]    Left upper extremity arteriovenous fistulograms including venography of the superior vena cava. Left cephalic vein balloon angioplasty with an 8 x 20 cm cutting balloon proximal cephalic vein. Balloon angioplasty of left cephalic vein/antecubital vein near the antecubital crease with 8 x 20 mm cutting balloon.     VASCULAR SURGERY  7/30/15 [facility]    Balloon angioplasty proximal end distal upper arm cephalic vein with 9 x 40 cm  balloon. Completion fistulograms of the left upper extremity.  VASCULAR SURGERY  8/13/15 [facility]    Revision of left upper extremity arteriovenous fistula with excision of pseudoaneurysm and primary repair of cephalic vein.  VASCULAR SURGERY  5/3/16 [facility]    Left upper fistulograms/venograms, left cephalic balloon 8 x 20 cutter,9 x 40 conquest,left proximal cephalic vein stents (flair 2 9 x 50), balloon stents 9 x 40 conquest.    VASCULAR SURGERY  12/08/2016    [facility]- ultrasound guided cannulation of left distal cephalic vein ultrasound guided cannulation right internal jugular vein placement of right internal jugular vein tunneled dialysis catheter (Bard Equistream XK 23cm tip to cuff)     VASCULAR SURGERY  01/20/2017    [facility]-Right upper venograms, u/s guided cannulation left basilic vein, left upper venograms, balloon angioplasty left subclavian vein 10x40 conquest.    VASCULAR SURGERY  02/16/2017    [facility]. Left brachial artery to axillary vein arteriovenous graft with 7 mm artegraft. Left upper extremity venograms. Left subclavian vein stent viabahn 13x50. Left subclavian vein stent balloon angioplasty 12x40 atlas.  VASCULAR SURGERY  04/11/2017    [facility]. Removal of tunneled dialysis catheter right internal jugular vein.  VASCULAR SURGERY  01/25/2018    [facility]. Arch aortogram, left upper arteriogram, AV graft angiogram/venogram.    VASCULAR SURGERY  02/28/2018    [facility]. Right CFA 5f-6f-7f sheath, aortogram with bilateral lower arteriogram, left SFA/pop  balloon angioplasty 5x100 , 6x150 lutonix ( 2), left CFA  7f sheath, bilateral iliac kissing stents right 10x38 icast, left 9x59 icast expanded with 10x40 , completion aortogram, mynx left CFA , failed mynx right CFA.  VASCULAR SURGERY  06/13/2019    [facility] left CFA 5f sheath, aortogram with bilateral lower arteriogram, mynx left CFA.  VASCULAR SURGERY  06/25/2019    [facility]. Femoral tibial/perineal bypass with reversed greater saphenous vein.     VASCULAR BILITOT 0.7 02/06/2020    ALKPHOS 91 02/06/2020    AST 83 02/06/2020    ALT 20 02/06/2020       POC Tests: No results for input(s): POCGLU, POCNA, POCK, POCCL, POCBUN, POCHEMO, POCHCT in the last 72 hours. Coags:   Lab Results   Component Value Date    PROTIME 15.6 03/25/2020    INR 1.24 03/25/2020    APTT 30.6 03/25/2020       HCG (If Applicable): No results found for: PREGTESTUR, PREGSERUM, HCG, HCGQUANT     ABGs:   Lab Results   Component Value Date    PHART 7.480 01/27/2020    PO2ART 58.0 01/27/2020    KSC0KJK 34.0 01/27/2020    IKV0JZE 25.3 01/27/2020    BEART 1.7 01/27/2020    Z2SDVVEJ 91.6 01/27/2020        Type & Screen (If Applicable):  No results found for: [facility]    Anesthesia Evaluation  Patient summary reviewed and Nursing notes reviewed no history of anesthetic complications:   Airway: Mallampati: II  TM distance: >3 FB   Neck ROM: full  Mouth opening: > = 3 FB Dental: normal exam   (+) edentulous      Pulmonary:normal exam  breath sounds clear to auscultation  (+) sleep apnea:      (-) shortness of breath and not a current smoker          Patient did not smoke on day of surgery. ROS comment: Hypoxic on ABG from 1/27/2020    CXR:  No acute findings. Cardiovascular:  Exercise tolerance: poor (<4 METS),   (+) hypertension:, pacemaker: pacemaker, past MI:, CAD: obstructive, CABG/stent:, CHF:,     (-)  angina    NYHA Classification: I  ECG reviewed  Rhythm: regular  Rate: normal           Beta Blocker:  Not on Beta Blocker      ROS comment: Heart cath 12/2019:  Coronary Arteries:     Left Main Coronary Artery:  A large vessel which arises from the left sinus of Valsalva. It divides into the left anterior descending coronary artery and the left circumflex. There is a 70% stenosis in the mid portion portion of this vessel.     Left Anterior Coronary Artery:  The LAD is a moderate sized vessel with several diagonal branches.    There is mild diffuse disease throughout the entire length of the vessel. The stent in the proximal LAD is patent     Left Circumflex Coronary Artery:  The LCx is a moderate sized vessel with several marginal branches. There is a long 70% stenosis in the mid portion of this vessel. The OM2 has a 95% stenosis     Right Coronary Artery:  The RCA is a moderate sized dominant vessel which arises from the right sinus of Valsalva. There is mild diffuse disease throughout the entire length of the vessel.         Left Ventriculogram:  The left ventriculogram is obtained in the right oblique projection. All regional wall segments move appropriately. The estimated visual ejection fraction is > 50%. There is no mitral regurgitation nor is there a pull back gradient seen across the aortic valve.        Summary:       1. Successful femoral artery ultrasound  2. Successful femoral artery arteriogram  3. Supervision of the administration of moderate conscious sedation  4. Severe left main and circumflex coronary artery disease  5. Left ventricular function is normal  6. Patent stent(s) in the proximal to mid LAD        RECOMMENDATIONS:     1. Risk Factor Modification  2. On-going Medical Therapy  3. Consideration for open heart surgery        Neuro/Psych:   Negative Neuro/Psych ROS     (-) seizures, CVA and depression/anxiety            GI/Hepatic/Renal:   (+) GERD:, hepatitis: C, liver disease:, renal disease: ESRD,      (-) hiatal hernia       Endo/Other:    (+) DiabetesType II DM, , blood dyscrasia: anticoagulation therapy, anemia and thrombocytopenia, arthritis:., electrolyte abnormalities, . Pt had PAT visit. Abdominal:       Abdomen: soft. Vascular:                                          Anesthesia Plan      general and regional     ASA 4     (Iv zofran within 30 min of closing .high periop cardiac risk. Fem,oral block and swathi prior to OR.  )  Induction: intravenous.   BIS and arterial line  MIPS: Postoperative opioids intended and

## 2020-03-26 NOTE — CONSULTS
selective right CFA injections. PTA of fem-tp bypass with 4.0 x 220 mm tod balloon to 4.33 mm    VASCULAR SURGERY  10/23/2019    VI. Thrombin injection in the left SFA PSA, right common femoral artery us guided access, left SFA stent treatment of the flap.  VASCULAR SURGERY  01/30/2020    TJR Endarterectomy of the right common femoral artery, superficial femoral artery, and profunda femoris artery. Redo right groin x3.  VASCULAR SURGERY  03/06/2020    VI. BILATERAL AORTAILLIAC AND RIGHT LEG ANGIOGRAM       Allergies:  Patient has no known allergies.     Medications Current:   Current Facility-Administered Medications   Medication Dose Route Frequency Provider Last Rate Last Dose    vancomycin (VANCOCIN) 1 g in dextrose 5% 250 mL IVPB  1,000 mg Intravenous On Call to [email or web address] [facility],         amLODIPine (NORVASC) tablet 10 mg  10 mg Oral Daily Jamal Dinh MD   Stopped at 03/26/20 7129    atorvastatin (LIPITOR) tablet 40 mg  40 mg Oral Nightly Jamal Dinh MD   40 mg at 03/25/20 2023    aspirin EC tablet 81 mg  81 mg Oral Daily Jamal Dinh MD   Stopped at 03/26/20 2106    clopidogrel (PLAVIX) tablet 75 mg  75 mg Oral Daily Jamal Dinh MD   Stopped at 03/26/20 4402    isosorbide mononitrate (IMDUR) extended release tablet 30 mg  30 mg Oral Daily Jamal Dinh MD   Stopped at 03/26/20 7885    cloNIDine (CATAPRES) tablet 0.1 mg  0.1 mg Oral TID Jamal Dinh MD   Stopped at 03/26/20 9855    minoxidil (LONITEN) tablet 10 mg  10 mg Oral Daily Jamal Dinh MD   Stopped at 03/26/20 1122    sevelamer (RENVELA) tablet 800 mg  800 mg Oral TID WC Jamal Dinh MD   Stopped at 03/26/20 0231    sucralfate (CARAFATE) tablet 1 g  1 g Oral TID PRN Jamal Dinh MD        sodium chloride flush 0.9 % injection 10 mL  10 mL Intravenous 2 times per day Jamal Dinh MD   10 mL at 03/26/20 0839    sodium chloride flush 0.9 % injection 10 mL  10 mL Intravenous PRN Murray Mathew MD        potassium chloride (KLOR-CON M) extended release tablet 40 mEq  40 mEq Oral PRN Murray Mathew MD        Or    potassium bicarb-citric acid (EFFER-K) effervescent tablet 40 mEq  40 mEq Oral PRN Murray Mathew MD        Or    potassium chloride 10 mEq/100 mL IVPB (Peripheral Line)  10 mEq Intravenous PRN Murray Mathew MD        acetaminophen (TYLENOL) tablet 650 mg  650 mg Oral Q6H PRN Murray Mathew MD        Or    acetaminophen (TYLENOL) suppository 650 mg  650 mg Rectal Q6H PRN Murray Mathew MD        oxyCODONE-acetaminophen (PERCOCET) 5-325 MG per tablet 1 tablet  1 tablet Oral Q4H PRN Murray Mathew MD   1 tablet at 03/26/20 2232    morphine injection 2 mg  2 mg Intravenous Q6H PRN Murray Mathew MD   2 mg at 03/26/20 6447    heparin (porcine) injection 5,000 Units  5,000 Units Subcutaneous 3 times per day Murray Mathew MD   Stopped at 03/26/20 6500       Social History:   reports that he quit smoking 8 days ago. His smoking use included cigarettes. He has a 11.25 pack-year smoking history. He has never used smokeless tobacco. He reports previous drug use. Drugs: Marijuana, Cocaine, Methamphetamines, IV, Opiates , and Other-see comments. He reports that he does not drink alcohol. Family History:      Problem Relation Age of Onset    High Blood Pressure Mother     High Blood Pressure Father     High Blood Pressure Sister        REVIEW OF SYSTEMS:  General: Denies any fever or chills. Denies any unexplained weight loss or gain. Denies any change in activity or endurance. HEENT: Denies any headaches or visual changes. Respiratory: Denies any cough or hoarseness. Cardiac: Denies any chest pain or pressure. Denies any palpitations. Denies any presyncope or syncope. Denies any orthopnea or PND. Denies any lower extremity edema. GI: Denies any abdominal pain. Denies any nausea or vomiting.  Denies any change in

## 2020-03-26 NOTE — PROGRESS NOTES
mononitrate (IMDUR) extended release tablet 30 mg  30 mg Oral Daily Carla Magaña MD   Stopped at 03/26/20 3913    cloNIDine (CATAPRES) tablet 0.1 mg  0.1 mg Oral TID Carla Magaña MD   Stopped at 03/26/20 7458    minoxidil (LONITEN) tablet 10 mg  10 mg Oral Daily Carla Magaña MD   Stopped at 03/26/20 5473    sevelamer (RENVELA) tablet 800 mg  800 mg Oral TID WC Carla Magaña MD   Stopped at 03/26/20 2080    sucralfate (CARAFATE) tablet 1 g  1 g Oral TID PRN Carla Magaña MD        sodium chloride flush 0.9 % injection 10 mL  10 mL Intravenous 2 times per day Carla Magaña MD   10 mL at 03/26/20 0839    sodium chloride flush 0.9 % injection 10 mL  10 mL Intravenous PRN Carla Magaña MD        potassium chloride (KLOR-CON M) extended release tablet 40 mEq  40 mEq Oral PRN Carla Magaña MD        Or    potassium bicarb-citric acid (EFFER-K) effervescent tablet 40 mEq  40 mEq Oral PRN Carla Magaña MD        Or    potassium chloride 10 mEq/100 mL IVPB (Peripheral Line)  10 mEq Intravenous PRN Carla Magaña MD        acetaminophen (TYLENOL) tablet 650 mg  650 mg Oral Q6H PRN Carla Magaña MD        Or    acetaminophen (TYLENOL) suppository 650 mg  650 mg Rectal Q6H PRN Carla Magaña MD        oxyCODONE-acetaminophen (PERCOCET) 5-325 MG per tablet 1 tablet  1 tablet Oral Q4H PRN Carla Magaña MD   1 tablet at 03/26/20 0343    morphine injection 2 mg  2 mg Intravenous Q6H PRN Carla Magaña MD   2 mg at 03/26/20 4406    heparin (porcine) injection 5,000 Units  5,000 Units Subcutaneous 3 times per day Carla Magaña MD   Stopped at 03/26/20 4420       Past Medical History:  Past Medical History:   Diagnosis Date    Anxiety     Blood circulation, collateral     CAD (coronary artery disease)     stents x 2; per dr. Bauman [facility])     liver cancer    CH[facility]congestive heart failure) ([facility]     Chyloperitoneum determined by paracentesis     CKD (chronic kidney disease), stage V ([address]     Dialysis patient ([address]     mon wed fri at [place] GERD (gastroesophageal reflux disease)     Hemodialysis patient ([address]     mon wed fri in [place] Hepatic cirrhosis ([address]     dr. Leonel Urban; has been [facility] for liver and kidney transplant list.    Hepatitis C, chronic ([address]     History of blood transfusion     HTN (hypertension)     Hx of blood clots     arm/fistula    Mixed hyperlipidemia 1/9/2017    Osteoarthritis     Pacemaker     Pain management     Dr. John Hernandez care patient 07/09/2019    PVD (peripheral vascular disease) ([address]     Sepsis ([address]     Skin ulcer of right heel with fat layer exposed ([address] 8/16/2019    Sleep apnea     no cpap at present.  Type II diabetes mellitus (HCC)     no meds.  Ulcer of left heel, with fat layer exposed ([address] 2/28/2018    Ulcer of right foot, with fat layer exposed ([address] 8/16/2019    Wound infection     Wound infection after surgery 7/8/2019       Past Surgical History:  Past Surgical History:   Procedure Laterality Date    ANGIOPLASTY  08/09/11 SUKHWINDER GERARDO cephalic vein balloon angioplasty with 7mm x 4cm balloon. coild embolization of 2 cephallic vein brances with 3mm x 5cm coils    CARDIAC CATHETERIZATION  04/04/11    cardiac cath and stent x1 by Dr. Liana Bruno  07/26/11 SUKHWINDER GERARDO AV fistula. coild embolization of cephalic vein branch near the wrist with 3mm EMBO coils.  balloon a'plasty left cephalic vein with [id number] balloon and then [id number] balloon    DIALYSIS FISTULA CREATION Left 2/16/2017    LEFT UPPER EXTREMITY BRACHIAL / AXILLARY GRAFT AND STENT LEFT SUBCLAVIAN VEIN  performed by Luciana Kaye MD at [address] Right 7/12/2019    RIGHT LEG WOUND WASHOUT performed by Jatin Daley MD at [facility] Right 1/27/2020    REVISION OF RIGHT LEG BYPASS GRAFT performed angioplasty proximal end distal upper arm cephalic vein with 9 x 40 cm  balloon. Completion fistulograms of the left upper extremity.  VASCULAR SURGERY  8/13/15 S    Revision of left upper extremity arteriovenous fistula with excision of pseudoaneurysm and primary repair of cephalic vein.  VASCULAR SURGERY  5/3/16 [facility]    Left upper fistulograms/venograms, left cephalic balloon 8 x 20 cutter,9 x 40 conquest,left proximal cephalic vein stents (flair 2 9 x 50), balloon stents 9 x 40 conquest.    VASCULAR SURGERY  12/08/2016    [facility]- ultrasound guided cannulation of left distal cephalic vein ultrasound guided cannulation right internal jugular vein placement of right internal jugular vein tunneled dialysis catheter (Bard Equistream XK 23cm tip to cuff)     VASCULAR SURGERY  01/20/2017    [facility]-Right upper venograms, u/s guided cannulation left basilic vein, left upper venograms, balloon angioplasty left subclavian vein 10x40 conquest.    VASCULAR SURGERY  02/16/2017    [facility]. Left brachial artery to axillary vein arteriovenous graft with 7 mm artegraft. Left upper extremity venograms. Left subclavian vein stent viabahn 13x50. Left subclavian vein stent balloon angioplasty 12x40 atlas.  VASCULAR SURGERY  04/11/2017    [facility]. Removal of tunneled dialysis catheter right internal jugular vein.  VASCULAR SURGERY  01/25/2018    [facility]. Arch aortogram, left upper arteriogram, AV graft angiogram/venogram.    VASCULAR SURGERY  02/28/2018    [facility]. Right CFA 5f-6f-7f sheath, aortogram with bilateral lower arteriogram, left SFA/pop  balloon angioplasty 5x100 , 6x150 lutonix ( 2), left CFA  7f sheath, bilateral iliac kissing stents right 10x38 icast, left 9x59 icast expanded with 10x40 , completion aortogram, mynx left CFA , failed mynx right CFA.  VASCULAR SURGERY  06/13/2019    [facility] left CFA 5f sheath, aortogram with bilateral lower arteriogram, mynx left CFA.     VASCULAR SURGERY  06/25/2019 bilaterally without respiratory distress  CVS: regular rate and rhythm  Abdominal: soft, nontender, normal bowel sounds  Extremities: no cyanosis or edema  Skin: dry without rash      Labs:  BMP:   Recent Labs     03/25/20  1746 03/26/20  0200   * 135*   K 4.4 4.7   CL 88* 89*   CO2 29 29   PHOS  --  4.2   BUN 40* 46*   CREATININE 5.2* 6.0*   CALCIUM 9.7 9.3     CBC:   Recent Labs     03/25/20 1746 03/26/20  0200   WBC 8.1 6.9   HGB 9.7* 8.8*   HCT 31.8* 28.5*   MCV 93.5 93.1    149     LIVER PROFILE: No results for input(s): AST, ALT, LIPASE, BILIDIR, BILITOT, ALKPHOS in the last 72 hours. Invalid input(s): AMYLASE,  ALB  PT/INR:   Recent Labs     03/25/20 1746   PROTIME 15.6*   INR 1.24*     APTT:   Recent Labs     03/25/20 1746   APTT 30.6     BNP:  No results for input(s): BNP in the last 72 hours. Ionized Calcium:No results for input(s): IONCA in the last 72 hours. Magnesium:  Recent Labs     03/26/20  0200   MG 2.2     Phosphorus:  Recent Labs     03/26/20  0200   PHOS 4.2     HgbA1C: No results for input(s): LABA1C in the last 72 hours. Hepatic: No results for input(s): ALKPHOS, ALT, AST, PROT, BILITOT, BILIDIR, LABALBU in the last 72 hours. Lactic Acid: No results for input(s): LACTA in the last 72 hours. Troponin: No results for input(s): CKTOTAL, CKMB, TROPONINT in the last 72 hours. ABGs: No results for input(s): PH, PCO2, PO2, HCO3, O2SAT in the last 72 hours. CRP:  No results for input(s): CRP in the last 72 hours. Sed Rate:  No results for input(s): SEDRATE in the last 72 hours. Cultures:   No results for input(s): CULTURE in the last 72 hours. No results for input(s): BC, [place] in the last 72 hours. No results for input(s): CXSURG in the last 72 hours. Radiology reports as per the Radiologist  Radiology: No results found.      Assessment   End-stage renal disease  Diabetes type 2 with diabetic nephropathy  Hypertension  Anemia of chronic kidney

## 2020-03-26 NOTE — ANESTHESIA POSTPROCEDURE EVALUATION
Department of Anesthesiology  Postprocedure Note    Patient: Turner Cheatham  MRN: [id number]  YOB: 1960  Date of evaluation: 3/26/2020  Time:  5:26 PM     Procedure Summary     Date:  03/26/20 Room / Location:  [address]    Anesthesia Start:  5879 Anesthesia Stop:  1726    Procedure:  LEG AMPUTATION ABOVE KNEE (Right ) Diagnosis:  (Peripheral Vascular Disease)    Surgeon:  George Calderon DO Responsible Provider:  TIFF Partida CRNA    Anesthesia Type:  general, regional ASA Status:  4          Anesthesia Type: general, regional    Teja Phase I:      Teja Phase II:      Last vitals: Reviewed and per EMR flowsheets.        Anesthesia Post Evaluation    Patient location during evaluation: PACU  Patient participation: complete - patient participated  Level of consciousness: awake and alert  Pain score: 3  Airway patency: patent  Nausea & Vomiting: no nausea and no vomiting  Complications: no  Cardiovascular status: hemodynamically stable and hypertensive (hydralazine given)  Respiratory status: acceptable  Hydration status: euvolemic

## 2020-03-26 NOTE — PLAN OF CARE
Vascular:    Patient has been NPO since midnight for right AKA today around 1:00 per . Patient hibiclens bath ordered, is scheduled for hemodialysis today prior to OR. Patient stating the pain in RLE is unbearable at this point and he is ready for OR. Dressings intact to RLE.

## 2020-03-26 NOTE — ANESTHESIA PROCEDURE NOTES
Arterial Line:    An arterial line was placed using ultrasound guidance, in the pre-op for the following indication(s): continuous blood pressure monitoring and blood sampling needed. A 22 gauge (size), 1 and 3/4 inch (length), Arrow (type) catheter was placed, Seldinger technique used, into theradial artery and a [employer] Arterial Cannulation Support device was used for positioning, secured by tape and Tegaderm. Anesthesia type: Local  Local infiltration: Topical    Events:  patient tolerated procedure well with no complications.   3/26/2020 3:22 MA5/08/2710 3:22 PM  Resident/CRNA: TIFF Partida CRNA  Performed: Resident/CRNA   Preanesthetic Checklist  Completed: patient identified, site marked, surgical consent, pre-op evaluation, timeout performed, IV checked, risks and benefits discussed, monitors and equipment checked, anesthesia consent given, oxygen available and patient being monitored

## 2020-03-26 NOTE — PROGRESS NOTES
Daily Marcel Armenta MD        isosorbide mononitrate (IMDUR) extended release tablet 30 mg  30 mg Oral Daily Marcel Armenta MD        cloNIDine (CATAPRES) tablet 0.1 mg  0.1 mg Oral TID Marcel Armenta MD   0.1 mg at 03/25/20 2023    minoxidil (LONITEN) tablet 10 mg  10 mg Oral Daily Macrel Armenta MD        sevelamer (RENVELA) tablet 800 mg  800 mg Oral TID  Marcel Armenta MD        sucralfate (CARAFATE) tablet 1 g  1 g Oral TID PRN Marcel Armenta MD        sodium chloride flush 0.9 % injection 10 mL  10 mL Intravenous 2 times per day Marcel Armenta MD   10 mL at 03/25/20 2025    sodium chloride flush 0.9 % injection 10 mL  10 mL Intravenous PRN Marcel Armenta MD        potassium chloride (KLOR-CON M) extended release tablet 40 mEq  40 mEq Oral PRN Marcel Armenta MD        Or    potassium bicarb-citric acid (EFFER-K) effervescent tablet 40 mEq  40 mEq Oral PRN Marcel Armenta MD        Or    potassium chloride 10 mEq/100 mL IVPB (Peripheral Line)  10 mEq Intravenous PRN Marcel Armenta MD        acetaminophen (TYLENOL) tablet 650 mg  650 mg Oral Q6H PRN Marcel Armenta MD        Or    acetaminophen (TYLENOL) suppository 650 mg  650 mg Rectal Q6H PRN Marcel Armenta MD        oxyCODONE-acetaminophen (PERCOCET) 5-325 MG per tablet 1 tablet  1 tablet Oral Q4H PRN Marcel Armenta MD   1 tablet at 03/26/20 0319    morphine injection 2 mg  2 mg Intravenous Q6H PRN Marcel Armenta MD   2 mg at 03/26/20 9822    heparin (porcine) injection 5,000 Units  5,000 Units Subcutaneous 3 times per day Marcel Armenta MD               vancomycin  1,000 mg Intravenous On Call to OR    amLODIPine  10 mg Oral Daily    atorvastatin  40 mg Oral Nightly    aspirin  81 mg Oral Daily    clopidogrel  75 mg Oral Daily    isosorbide mononitrate  30 mg Oral Daily    cloNIDine  0.1 mg Oral TID    minoxidil  10 mg Oral Daily    sevelamer  800 mg Oral [id number]    Interpreting          Amelie Castorena MD                                Physician   Date of Birth    1960   Referring Physician   Augusto Wray   Accession Number [id number]    [address] RT, RVT  Procedure Type of Study:   Extremities Arteries:Lower Extremities Arterial Duplex, VL LOWER EXTREMITY  ARTERIES DUPLEX BILATERAL. Indications for Study:Wound Lower Extremity (Right). Risk Factors   - The patient's risk factor(s) include: dyslipidemia and arterial     hypertension.   - The patient has a former tobacco history. Allergies   - No known allergies. Impression   Bilateral lower extremity arterial duplex exam performed. The right lower extremity extremity arterial duplex exam performed. The  right lower extremtiy shows occlusion of the bypass and occlusion of the  native SFA and popliteal artery. Only collateral arterial waveforms are  seen in the tibial vessels. The left low extremity shows mild local velocity elevations in the CFA,  suggestive of a stenosis in the left External iliac artery and Common  femoral artery. The SFA and popliteal artery are patent, however,  significant plaque is seen. the anterior tibial and posterior tibial and  peroneal artery are patent. Signature   ----------------------------------------------------------------  Electronically signed by Amelie Castorena MD(Interpreting  physician) on 03/05/2020 07:03 AM  ----------------------------------------------------------------  Grafts   - The graft originated from the Right Dist Common Femoral to the Right     Dist Popliteal. +--------------------------------------+----+---+-----+--------------------+ ! Location                              ! PSV ! EDV! Ratio!% Stenosis          ! +--------------------------------------+----+---+-----+--------------------+ ! Prox Anastomosis                      ! 35  !   !     !                    ! !!0    ! 0    !    !           !!76 !0.79 !   !             ! +---------------++-----+-----+----+-----------++---+-----+---+-------------+ ! Dist Popliteal !!0    !     !    !           !!158!2.08 !   !             ! +---------------++-----+-----+----+-----------++---+-----+---+-------------+ ! Mid PTA        !!21   !     !    !           !!46 !0.29 !   !             ! +---------------++-----+-----+----+-----------++---+-----+---+-------------+ ! Prox GWYN       !!19   !     !    !           !!42 !0.27 !   !             ! +---------------++-----+-----+----+-----------++---+-----+---+-------------+ ! Mid Peroneal   !!19   !     !    !           !!53 !0.34 !   !             ! +---------------++-----+-----+----+-----------++---+-----+---+-------------      Objective:   Vitals: BP (!) 129/52   Pulse 64   Temp 97.7 °F (36.5 °C)   Resp 16   Wt 164 lb 12.8 oz (74.8 kg)   SpO2 90%   BMI 25.06 kg/m²   24HR INTAKE/OUTPUT:      Intake/Output Summary (Last 24 hours) at 3/26/2020 5153  Last data filed at 3/25/2020 2023  Gross per 24 hour   Intake 10 ml   Output --   Net 10 ml       Physical Exam  Vitals signs and nursing note reviewed. Constitutional:       General: He is not in acute distress. Appearance: Normal appearance. He is normal weight. He is not ill-appearing, toxic-appearing or diaphoretic. HENT:      Head: Normocephalic and atraumatic. Right Ear: External ear normal.      Left Ear: External ear normal.      Nose: Nose normal. No congestion or rhinorrhea. Mouth/Throat:      Mouth: Mucous membranes are moist.      Pharynx: No oropharyngeal exudate or posterior oropharyngeal erythema. Eyes:      General: No scleral icterus. Right eye: No discharge. Left eye: No discharge. Extraocular Movements: Extraocular movements intact. Conjunctiva/sclera: Conjunctivae normal.      Pupils: Pupils are equal, round, and reactive to light.    Neck:      Musculoskeletal: Normal range of motion

## 2020-03-27 ENCOUNTER — TRANSCRIBE ORDERS (OUTPATIENT)
Dept: ADMINISTRATIVE | Facility: HOSPITAL | Age: 60
End: 2020-03-27

## 2020-03-27 DIAGNOSIS — M50.21 DISPLACEMENT OF INTERVERTEBRAL DISC OF HIGH CERVICAL REGION: ICD-10-CM

## 2020-03-27 DIAGNOSIS — M51.17 INTERVERTEBRAL DISC DISORDER WITH RADICULOPATHY OF LUMBOSACRAL REGION: ICD-10-CM

## 2020-03-27 DIAGNOSIS — M50.31 DEGENERATION OF INTERVERTEBRAL DISC OF HIGH CERVICAL REGION: ICD-10-CM

## 2020-03-27 DIAGNOSIS — M47.812 SPONDYLOSIS WITHOUT MYELOPATHY OR RADICULOPATHY, CERVICAL REGION: ICD-10-CM

## 2020-03-27 DIAGNOSIS — M50.31 OTHER CERVICAL DISC DEGENERATION, HIGH CERVICAL REGION: ICD-10-CM

## 2020-03-27 DIAGNOSIS — M48.02 CERVICAL SPINAL STENOSIS: ICD-10-CM

## 2020-03-27 DIAGNOSIS — M51.36 DISC DEGENERATION, LUMBAR: ICD-10-CM

## 2020-03-27 DIAGNOSIS — M51.17 INTERVERTEBRAL DISC DISORDERS WITH RADICULOPATHY, LUMBOSACRAL REGION: ICD-10-CM

## 2020-03-27 DIAGNOSIS — M51.16 LUMBAR DISC DISEASE WITH RADICULOPATHY: ICD-10-CM

## 2020-03-27 DIAGNOSIS — M47.813 SPONDYLOSIS OF CERVICOTHORACIC REGION WITHOUT MYELOPATHY OR RADICULOPATHY: ICD-10-CM

## 2020-03-27 DIAGNOSIS — M48.02 SPINAL STENOSIS IN CERVICAL REGION: ICD-10-CM

## 2020-03-27 DIAGNOSIS — M51.37 DISC DEGENERATION, LUMBOSACRAL: ICD-10-CM

## 2020-03-27 DIAGNOSIS — M54.16 SPINAL STENOSIS OF LUMBAR REGION WITH RADICULOPATHY: ICD-10-CM

## 2020-03-27 DIAGNOSIS — M47.812 ARTHROPATHY OF CERVICAL FACET JOINT: Primary | ICD-10-CM

## 2020-03-27 DIAGNOSIS — M51.16 INTERVERTEBRAL DISC DISORDERS WITH RADICULOPATHY, LUMBAR REGION: Primary | ICD-10-CM

## 2020-03-27 DIAGNOSIS — M48.07 LUMBOSACRAL STENOSIS: ICD-10-CM

## 2020-03-27 DIAGNOSIS — M48.07 LUMBOSACRAL SPINAL STENOSIS: ICD-10-CM

## 2020-03-27 DIAGNOSIS — M48.061 LUMBAR STENOSIS WITHOUT NEUROGENIC CLAUDICATION: ICD-10-CM

## 2020-03-27 DIAGNOSIS — M48.061 SPINAL STENOSIS OF LUMBAR REGION WITH RADICULOPATHY: ICD-10-CM

## 2020-03-27 DIAGNOSIS — M51.37 DEGENERATION, INTERVERTEBRAL DISC, LUMBOSACRAL: ICD-10-CM

## 2020-03-27 LAB
ANION GAP SERPL CALCULATED.3IONS-SCNC: 15 MMOL/L (ref 7–19)
BASOPHILS ABSOLUTE: 0 K/UL (ref 0–0.2)
BASOPHILS RELATIVE PERCENT: 0.1 % (ref 0–1)
BUN BLDV-MCNC: 27 MG/DL (ref 6–20)
CALCIUM SERPL-MCNC: 9.3 MG/DL (ref 8.6–10)
CHLORIDE BLD-SCNC: 96 MMOL/L (ref 98–111)
CO2: 26 MMOL/L (ref 22–29)
CREAT SERPL-MCNC: 4.2 MG/DL (ref 0.5–1.2)
EOSINOPHILS ABSOLUTE: 0 K/UL (ref 0–0.6)
EOSINOPHILS RELATIVE PERCENT: 0 % (ref 0–5)
GFR NON-AFRICAN AMERICAN: 15
GLUCOSE BLD-MCNC: 166 MG/DL (ref 74–109)
HCT VFR BLD CALC: 28.8 % (ref 42–52)
HEMOGLOBIN: 9.1 G/DL (ref 14–18)
IMMATURE GRANULOCYTES #: 0 K/UL
LYMPHOCYTES ABSOLUTE: 0.4 K/UL (ref 1.1–4.5)
LYMPHOCYTES RELATIVE PERCENT: 5.8 % (ref 20–40)
MCH RBC QN AUTO: 28.7 PG (ref 27–31)
MCHC RBC AUTO-ENTMCNC: 31.6 G/DL (ref 33–37)
MCV RBC AUTO: 90.9 FL (ref 80–94)
MONOCYTES ABSOLUTE: 0.4 K/UL (ref 0–0.9)
MONOCYTES RELATIVE PERCENT: 5 % (ref 0–10)
NEUTROPHILS ABSOLUTE: 6.6 K/UL (ref 1.5–7.5)
NEUTROPHILS RELATIVE PERCENT: 88.8 % (ref 50–65)
PDW BLD-RTO: 16.6 % (ref 11.5–14.5)
PLATELET # BLD: 148 K/UL (ref 130–400)
PMV BLD AUTO: 12.2 FL (ref 9.4–12.4)
POTASSIUM REFLEX MAGNESIUM: 4.3 MMOL/L (ref 3.5–5)
RBC # BLD: 3.17 M/UL (ref 4.7–6.1)
SODIUM BLD-SCNC: 137 MMOL/L (ref 136–145)
WBC # BLD: 7.4 K/UL (ref 4.8–10.8)

## 2020-03-27 PROCEDURE — 6370000000 HC RX 637 (ALT 250 FOR IP): Performed by: SURGERY

## 2020-03-27 PROCEDURE — 97165 OT EVAL LOW COMPLEX 30 MIN: CPT

## 2020-03-27 PROCEDURE — 6360000002 HC RX W HCPCS: Performed by: NURSE PRACTITIONER

## 2020-03-27 PROCEDURE — 2580000003 HC RX 258: Performed by: SURGERY

## 2020-03-27 PROCEDURE — 97161 PT EVAL LOW COMPLEX 20 MIN: CPT

## 2020-03-27 PROCEDURE — 6360000002 HC RX W HCPCS: Performed by: SURGERY

## 2020-03-27 PROCEDURE — 85025 COMPLETE CBC W/AUTO DIFF WBC: CPT

## 2020-03-27 PROCEDURE — 6370000000 HC RX 637 (ALT 250 FOR IP): Performed by: INTERNAL MEDICINE

## 2020-03-27 PROCEDURE — 97530 THERAPEUTIC ACTIVITIES: CPT

## 2020-03-27 PROCEDURE — 80048 BASIC METABOLIC PNL TOTAL CA: CPT

## 2020-03-27 PROCEDURE — 6370000000 HC RX 637 (ALT 250 FOR IP): Performed by: NURSE PRACTITIONER

## 2020-03-27 PROCEDURE — 99024 POSTOP FOLLOW-UP VISIT: CPT | Performed by: SURGERY

## 2020-03-27 PROCEDURE — 36415 COLL VENOUS BLD VENIPUNCTURE: CPT

## 2020-03-27 PROCEDURE — 1210000000 HC MED SURG R&B

## 2020-03-27 RX ORDER — OXYCODONE HCL 10 MG/1
10 TABLET, FILM COATED, EXTENDED RELEASE ORAL EVERY 8 HOURS PRN
Status: DISCONTINUED | OUTPATIENT
Start: 2020-03-27 | End: 2020-03-27

## 2020-03-27 RX ORDER — MORPHINE SULFATE 4 MG/ML
1 INJECTION, SOLUTION INTRAMUSCULAR; INTRAVENOUS EVERY 6 HOURS PRN
Status: DISCONTINUED | OUTPATIENT
Start: 2020-03-27 | End: 2020-03-27

## 2020-03-27 RX ORDER — GABAPENTIN 100 MG/1
200 CAPSULE ORAL 3 TIMES DAILY
Status: DISCONTINUED | OUTPATIENT
Start: 2020-03-27 | End: 2020-03-31 | Stop reason: HOSPADM

## 2020-03-27 RX ORDER — MORPHINE SULFATE 4 MG/ML
4 INJECTION, SOLUTION INTRAMUSCULAR; INTRAVENOUS EVERY 4 HOURS PRN
Status: DISCONTINUED | OUTPATIENT
Start: 2020-03-27 | End: 2020-03-27

## 2020-03-27 RX ORDER — KETOROLAC TROMETHAMINE 30 MG/ML
15 INJECTION, SOLUTION INTRAMUSCULAR; INTRAVENOUS EVERY 6 HOURS
Status: DISCONTINUED | OUTPATIENT
Start: 2020-03-27 | End: 2020-03-27

## 2020-03-27 RX ORDER — OXYCODONE HYDROCHLORIDE 5 MG/1
10 TABLET ORAL 3 TIMES DAILY
Status: DISCONTINUED | OUTPATIENT
Start: 2020-03-27 | End: 2020-03-27

## 2020-03-27 RX ORDER — NALOXONE HYDROCHLORIDE 0.4 MG/ML
0.4 INJECTION, SOLUTION INTRAMUSCULAR; INTRAVENOUS; SUBCUTANEOUS PRN
Status: DISCONTINUED | OUTPATIENT
Start: 2020-03-27 | End: 2020-03-28

## 2020-03-27 RX ORDER — KETOROLAC TROMETHAMINE 30 MG/ML
15 INJECTION, SOLUTION INTRAMUSCULAR; INTRAVENOUS EVERY 6 HOURS
Status: DISCONTINUED | OUTPATIENT
Start: 2020-03-27 | End: 2020-03-29

## 2020-03-27 RX ADMIN — OXYCODONE HYDROCHLORIDE AND ACETAMINOPHEN 1 TABLET: 5; 325 TABLET ORAL at 06:30

## 2020-03-27 RX ADMIN — KETOROLAC TROMETHAMINE 15 MG: 30 INJECTION, SOLUTION INTRAMUSCULAR at 17:11

## 2020-03-27 RX ADMIN — KETOROLAC TROMETHAMINE 15 MG: 30 INJECTION, SOLUTION INTRAMUSCULAR at 11:39

## 2020-03-27 RX ADMIN — HEPARIN SODIUM 5000 UNITS: 5000 INJECTION INTRAVENOUS; SUBCUTANEOUS at 20:10

## 2020-03-27 RX ADMIN — SEVELAMER CARBONATE 800 MG: 800 TABLET, FILM COATED ORAL at 11:51

## 2020-03-27 RX ADMIN — MORPHINE SULFATE 4 MG: 4 INJECTION, SOLUTION INTRAMUSCULAR; INTRAVENOUS at 11:51

## 2020-03-27 RX ADMIN — ATORVASTATIN CALCIUM 40 MG: 40 TABLET, FILM COATED ORAL at 20:09

## 2020-03-27 RX ADMIN — HEPARIN SODIUM 5000 UNITS: 5000 INJECTION INTRAVENOUS; SUBCUTANEOUS at 06:30

## 2020-03-27 RX ADMIN — CLOPIDOGREL BISULFATE 75 MG: 75 TABLET ORAL at 07:43

## 2020-03-27 RX ADMIN — MORPHINE SULFATE 4 MG: 4 INJECTION, SOLUTION INTRAMUSCULAR; INTRAVENOUS at 02:23

## 2020-03-27 RX ADMIN — GABAPENTIN 200 MG: 100 CAPSULE ORAL at 20:09

## 2020-03-27 RX ADMIN — ISOSORBIDE MONONITRATE 30 MG: 60 TABLET, EXTENDED RELEASE ORAL at 07:42

## 2020-03-27 RX ADMIN — CLONIDINE HYDROCHLORIDE 0.1 MG: 0.1 TABLET ORAL at 07:42

## 2020-03-27 RX ADMIN — Medication 10 ML: at 07:44

## 2020-03-27 RX ADMIN — SEVELAMER CARBONATE 800 MG: 800 TABLET, FILM COATED ORAL at 07:43

## 2020-03-27 RX ADMIN — MINOXIDIL 10 MG: 10 TABLET ORAL at 07:43

## 2020-03-27 RX ADMIN — HYDROMORPHONE HYDROCHLORIDE: 10 INJECTION INTRAMUSCULAR; INTRAVENOUS; SUBCUTANEOUS at 18:01

## 2020-03-27 RX ADMIN — AMLODIPINE BESYLATE 10 MG: 5 TABLET ORAL at 07:43

## 2020-03-27 RX ADMIN — GABAPENTIN 200 MG: 100 CAPSULE ORAL at 14:37

## 2020-03-27 RX ADMIN — OXYCODONE HYDROCHLORIDE 10 MG: 10 TABLET, FILM COATED, EXTENDED RELEASE ORAL at 10:20

## 2020-03-27 RX ADMIN — OXYCODONE HYDROCHLORIDE AND ACETAMINOPHEN 1 TABLET: 5; 325 TABLET ORAL at 01:22

## 2020-03-27 RX ADMIN — CLONIDINE HYDROCHLORIDE 0.1 MG: 0.1 TABLET ORAL at 14:37

## 2020-03-27 RX ADMIN — MORPHINE SULFATE 4 MG: 4 INJECTION, SOLUTION INTRAMUSCULAR; INTRAVENOUS at 07:42

## 2020-03-27 RX ADMIN — HEPARIN SODIUM 5000 UNITS: 5000 INJECTION INTRAVENOUS; SUBCUTANEOUS at 14:36

## 2020-03-27 RX ADMIN — ASPIRIN 81 MG: 81 TABLET, COATED ORAL at 07:43

## 2020-03-27 RX ADMIN — SEVELAMER CARBONATE 800 MG: 800 TABLET, FILM COATED ORAL at 17:11

## 2020-03-27 RX ADMIN — OXYCODONE 10 MG: 5 TABLET ORAL at 14:37

## 2020-03-27 ASSESSMENT — PAIN SCALES - GENERAL
PAINLEVEL_OUTOF10: 8
PAINLEVEL_OUTOF10: 10
PAINLEVEL_OUTOF10: 10
PAINLEVEL_OUTOF10: 0
PAINLEVEL_OUTOF10: 7
PAINLEVEL_OUTOF10: 0
PAINLEVEL_OUTOF10: 10
PAINLEVEL_OUTOF10: 0
PAINLEVEL_OUTOF10: 0
PAINLEVEL_OUTOF10: 6
PAINLEVEL_OUTOF10: 6
PAINLEVEL_OUTOF10: 3
PAINLEVEL_OUTOF10: 10

## 2020-03-27 ASSESSMENT — PAIN DESCRIPTION - ONSET
ONSET: ON-GOING

## 2020-03-27 ASSESSMENT — PAIN DESCRIPTION - FREQUENCY
FREQUENCY: CONTINUOUS

## 2020-03-27 ASSESSMENT — PAIN DESCRIPTION - ORIENTATION
ORIENTATION: RIGHT

## 2020-03-27 ASSESSMENT — PAIN - FUNCTIONAL ASSESSMENT
PAIN_FUNCTIONAL_ASSESSMENT: PREVENTS OR INTERFERES SOME ACTIVE ACTIVITIES AND ADLS

## 2020-03-27 ASSESSMENT — PAIN DESCRIPTION - DESCRIPTORS
DESCRIPTORS: THROBBING
DESCRIPTORS: THROBBING
DESCRIPTORS: ACHING;CONSTANT
DESCRIPTORS: THROBBING
DESCRIPTORS: ACHING
DESCRIPTORS: ACHING
DESCRIPTORS: ACHING;CONSTANT
DESCRIPTORS: ACHING;CONSTANT

## 2020-03-27 ASSESSMENT — PAIN DESCRIPTION - PROGRESSION
CLINICAL_PROGRESSION: NOT CHANGED

## 2020-03-27 ASSESSMENT — PAIN DESCRIPTION - LOCATION
LOCATION: LEG

## 2020-03-27 ASSESSMENT — PAIN DESCRIPTION - PAIN TYPE
TYPE: SURGICAL PAIN

## 2020-03-27 ASSESSMENT — PAIN SCALES - WONG BAKER: WONGBAKER_NUMERICALRESPONSE: 6

## 2020-03-27 NOTE — PROGRESS NOTES
Physical Therapy    Facility/Department: [facility]/VAS/MED  Initial Assessment    NAME: Margret Aldridge  : 1960  MRN: [id number]    Date of Service: 3/27/2020    Discharge Recommendations:  Continue to assess pending progress, Patient would benefit from continued therapy after discharge        Assessment   Body structures, Functions, Activity limitations: Decreased functional mobility ; Decreased strength; Increased pain;Decreased endurance;Decreased posture  Assessment: pt WORKING WELL WITH PT/OT AND WOULD BE A GOOD CANDIDATE FOR SHORT TERM REHAB. Prognosis: Good  Decision Making: Low Complexity  PT Education: Functional Mobility Training;General Safety;Plan of Care  REQUIRES PT FOLLOW UP: Yes  Activity Tolerance  Activity Tolerance: Patient Tolerated treatment well;Patient limited by pain       Patient Diagnosis(es): There were no encounter diagnoses. has a past medical history of Anxiety, Blood circulation, collateral, CAD (coronary artery disease), Cancer ([address] CHF (congestive heart failure) ([address] Chyloperitoneum determined by paracentesis, CKD (chronic kidney disease), stage V ([address] Dialysis patient ([address] GERD (gastroesophageal reflux disease), Hemodialysis patient ([address] Hepatic cirrhosis ([address] Hepatitis C, chronic ([address] History of blood transfusion, HTN (hypertension), Hx of blood clots, Mixed hyperlipidemia, Osteoarthritis, Pacemaker, Pain management, Palliative care patient, PVD (peripheral vascular disease) ([address] Sepsis ([address] Skin ulcer of right heel with fat layer exposed ([address] Sleep apnea, Type II diabetes mellitus ([address] Ulcer of left heel, with fat layer exposed ([address] Ulcer of right foot, with fat layer exposed ([address] Wound infection, and Wound infection after surgery. has a past surgical history that includes other surgical history (2010); other surgical history ([id number]);  Dialysis fistula creation (11 [facility]); angioplasty (11 [facility]); other surgical history (2011 [facility]); other surgical history (5/22/12   [facility]); vascular surgery (6/19/12); vascular surgery (7/10/2012 [facility]); vascular surgery (7/30/15 [address]); vascular surgery (7/30/15 [address] cont); vascular surgery (8/13/15 [facility]); vascular surgery (5/3/16 [facility]); vascular surgery (12/08/2016); vascular surgery (01/20/2017); Cardiac catheterization (04/04/11); Paracentesis; Dialysis fistula creation (Left, 2/16/2017); vascular surgery (02/16/2017); vascular surgery (04/11/2017); vascular surgery (01/25/2018); pr anastomosis,av,any site (Left, 1/30/2018); vascular surgery (02/28/2018); vascular surgery (06/13/2019); vascular surgery (06/25/2019); Femoral-tibial Bypass Graft (Right, 6/25/2019); EXPLORATION OF WOUND OF EXTREMITY (Right, 7/12/2019); vascular surgery (08/16/2019); vascular surgery (10/23/2019); femoral bypass (Right, 1/27/2020); vascular surgery (01/30/2020); pacemaker placement (Right, 2015); pr angio retro brachial angio (N/A, 3/6/2020); vascular surgery (03/06/2020); and AMPUTATION ABOVE KNEE (Right, 3/26/2020). Restrictions  Restrictions/Precautions  Restrictions/Precautions: Fall Risk  Required Braces or Orthoses?: No  Vision/Hearing        Subjective  General  Patient assessed for rehabilitation services?: Yes  Diagnosis: R AKA  Follows Commands: Within Functional Limits  Subjective  Subjective: pt STATES HE IS HAVING MODERATE PAIN FROM SX. RN NOTIFIED.  Pt HAS HAD PAIN MEDS AN HR AGO PER RN. pt WILLING TO WORK WITH PT/OT  Pain Screening  Patient Currently in Pain: Yes          Orientation     Social/Functional History  Social/Functional History  Lives With: Spouse  ADL Assistance: Independent  Ambulation Assistance: Independent  Transfer Assistance: Independent  Cognition        Objective          AROM RLE (degrees)  RLE General AROM: AKA  AROM LLE (degrees)  LLE AROM : WFL  Strength RLE  Comment: ABLE TO MOVE SLIGHTLY AGAINST GRAVITY  Strength LLE  Comment: ANTIGRAVITY        Bed mobility  Supine to Sit: Minimal assistance  Sit to Supine: Contact guard assistance  Comment: ALL MOVEMENTS SLOW AND PAINFUL  Transfers  Sit to Stand: Contact guard assistance  Comment: STOOD WITH BEDRAILS AND WAS ABLE TO CLEAR BUTTOCKS OFF BED BUT DUE TO PAIN DID NOT TRY STANDING WITH RW, BUT FEEL THAT Pt WOULD BE ABLE TO DO SO WHEN PAIN CONTROLLED        Balance  Comments: POSTURE WITH ROUNDED SHOULDERS AND FW HEAD.  ABLE TO SIT UNSUPPORTED AND REACH OVER HEAD WITHOUT LOB BUT DID APPEAR GUARDED        Plan   Plan  Times per week: 5-7  Plan weeks: 2  Current Treatment Recommendations: Strengthening, Gait Training, Patient/Caregiver Education & Training, Transfer Training, Safety Education & Training, Functional Mobility Training, Positioning, Pain Management, Balance Training  Plan Comment: PROGRESSIVE MOBILITY AS TOLERATED BY PAIN. MAY NEED ASSIST OF TWO INITIALLY  Safety Devices  Type of devices: Call light within reach, Left in bed, Bed alarm in place    G-Code       OutComes Score                                                  AM-PAC Score             Goals  Short term goals  Time Frame for Short term goals: 2 WKS  Short term goal 1: SUP<>SIT, SBA  Short term goal 2: SIT<>STAND, CGA  Short term goal 3: STAND/SQUAT PIVOT TF WITH CGA  Short term goal 4: STAND STEP TF WITH RW AND CGA       Therapy Time   Individual Concurrent Group Co-treatment   Time In           Time Out           Minutes                   Alma Vargas PT    Electronically signed by Alma Vargas PT on 3/27/2020 at 12:00 PM

## 2020-03-27 NOTE — PROGRESS NOTES
[facility] Hospitalists Progress Note    Patient:  Ahmet Simon  YOB: 1960  Date of Service: 3/27/2020  MRN: [id number]   Acct: [de-identified]   Primary Care Physician: Zackary Welch DO  Advance Directive: Full Code  Admit Date: 3/25/2020       Hospital Day: 2      CHIEF COMPLAINT: Right lower extremity pain      3/27/2020 8:02 AM  Subjective / Interval History:   03/27/2020  Status post right AKA yesterday. Right lower extremity pain currently improved/controlled. Patient laying comfortably in bed no acute distress. Denies any acute complaints or distress at this time. 03/26/2020  No acute overnight event reported. Patient reports minimally controlled right lower extremity pain. Denies any acute complaints or distress at this time. Brief History:   Mr. Rasheed Grant, a 61 y.o. male with a history of severe peripheral vascular disease, HCV, CAD, ESRD, on HD (MWF), has been admitted for progressively worsening severe right lower extremity pain and nonhealing wound. Patient reportedly has had an arterial bypass about 7 months ago, and the wound never healed. Pain reportedly has progressively gotten worse to the point where it becomes difficult for the patient to ambulate.       Patient admitted directly, via vascular team, for plan right leg amputation      Review of Systems:   Review of Systems  ROS: 14 point review of systems is negative except as specifically addressed above. DIET RENAL;     Intake/Output Summary (Last 24 hours) at 3/27/2020 0802  Last data filed at 3/26/2020 1839  Gross per 24 hour   Intake 550 ml   Output 3000 ml   Net -2450 ml       Medications:  Current Facility-Administered Medications   Medication Dose Route Frequency Provider Last Rate Last Dose    morphine injection 4 mg  4 mg Intravenous Q2H PRN Carrington Rhoades MD   4 mg at 03/27/20 0742    amLODIPine (NORVASC) tablet 10 mg  10 mg Oral Daily Azalia Georges DO   10 mg at 03/27/20 0743    atorvastatin (LIPITOR) tablet 40 mg  40 mg Oral Nightly Oral Gu, DO   40 mg at 03/26/20 2026    aspirin EC tablet 81 mg  81 mg Oral Daily Oral Gu, DO   81 mg at 03/27/20 0743    clopidogrel (PLAVIX) tablet 75 mg  75 mg Oral Daily Oral Gu, DO   75 mg at 03/27/20 7217    isosorbide mononitrate (IMDUR) extended release tablet 30 mg  30 mg Oral Daily Oral Gu, DO   30 mg at 03/27/20 1616    cloNIDine (CATAPRES) tablet 0.1 mg  0.1 mg Oral TID Oral Gu, DO   0.1 mg at 03/27/20 5447    minoxidil (LONITEN) tablet 10 mg  10 mg Oral Daily Oral Gu, DO   10 mg at 03/27/20 0743    sevelamer (RENVELA) tablet 800 mg  800 mg Oral TID WC Oral Gu, DO   800 mg at 03/27/20 0743    sucralfate (CARAFATE) tablet 1 g  1 g Oral TID PRN Oral Gu, DO        sodium chloride flush 0.9 % injection 10 mL  10 mL Intravenous 2 times per day Oral Gu, DO   10 mL at 03/27/20 0744    sodium chloride flush 0.9 % injection 10 mL  10 mL Intravenous PRN Oral Gu, DO        potassium chloride (KLOR-CON M) extended release tablet 40 mEq  40 mEq Oral PRN Oral Gu, DO        Or    potassium bicarb-citric acid (EFFER-K) effervescent tablet 40 mEq  40 mEq Oral PRN Oral Gu, DO        Or    potassium chloride 10 mEq/100 mL IVPB (Peripheral Line)  10 mEq Intravenous PRN Oral Gu, DO        acetaminophen (TYLENOL) tablet 650 mg  650 mg Oral Q6H PRN Oral Gu, DO        Or    acetaminophen (TYLENOL) suppository 650 mg  650 mg Rectal Q6H PRN Oral Gu, DO        oxyCODONE-acetaminophen (PERCOCET) 5-325 MG per tablet 1 tablet  1 tablet Oral Q4H PRN Oral Gu, DO   1 tablet at 03/27/20 0630    heparin (porcine) injection 5,000 Units  5,000 Units Subcutaneous 3 times per day Oral Gu, DO   5,000 Units at 03/27/20 0630           amLODIPine  10 mg Oral Daily    atorvastatin  40 mg Oral Nightly    aspirin  81 mg Oral Daily    clopidogrel  75 mg Oral Daily    isosorbide mononitrate  30 mg Oral Daily    cloNIDine  0.1 mg Oral TID    minoxidil  10 mg Oral Daily    sevelamer  800 mg Oral TID WC    sodium chloride flush  10 mL Intravenous 2 times per day    heparin (porcine)  5,000 Units Subcutaneous 3 times per day     morphine, sucralfate, sodium chloride flush, potassium chloride **OR** potassium alternative oral replacement **OR** potassium chloride, acetaminophen **OR** acetaminophen, oxyCODONE-acetaminophen  DIET RENAL;       Labs:   CBC with DIFF:  Recent Labs     03/26/20  0200 03/26/20  1758 03/27/20  0219   WBC 6.9 9.6 7.4   RBC 3.06* 3.44* 3.17*   HGB 8.8* 9.7* 9.1*   HCT 28.5* 31.4* 28.8*   MCV 93.1 91.3 90.9   MCH 28.8 28.2 28.7   MCHC 30.9* 30.9* 31.6*   RDW 16.8* 16.7* 16.6*    161 148   MPV 11.7 11.6 12.2   NEUTOPHILPCT 73.6* 91.8* 88.8*   LYMPHOPCT 13.5* 4.8* 5.8*   MONOPCT 8.0 2.2 5.0   EOSRELPCT 4.2 0.5 0.0   BASOPCT 0.4 0.2 0.1   NEUTROABS 5.1 8.9* 6.6   LYMPHSABS 0.9* 0.5* 0.4*   MONOSABS 0.60 0.20 0.40   EOSABS 0.30 0.10 0.00   BASOSABS 0.00 0.00 0.00       CMP/BMP:  Recent Labs     03/26/20  0200 03/26/20  1227 03/27/20  0219   * 138 137   K 4.7 3.2* 4.3   CL 89* 96* 96*   CO2 29 27 26   ANIONGAP 17 15 15   GLUCOSE 124* 104 166*   BUN 46* 14 27*   CREATININE 6.0* 2.3* 4.2*   LABGLOM 10* 29* 15*   CALCIUM 9.3 9.3 9.3         CRP:  No results for input(s): CRP in the last 72 hours. Sed Rate:  No results for input(s): SEDRATE in the last 72 hours. HgBA1c:  No components found for: HGBA1C  FLP:    Lab Results   Component Value Date    TRIG 95 01/17/2014    HDL 32 01/17/2014    LDLDIRECT 94 01/17/2014     TSH:    Lab Results   Component Value Date    TSH 1.43 01/17/2014     Troponin T: No results for input(s): TROPONINI in the last 72 hours. Pro-BNP: No results for input(s): BNP in the last 72 hours.   INR:   Recent Labs     03/25/20  1746   INR 1.24*     ABGs:   Lab femoral all the way  to the tibial arteries. Great toe pressures are mildly depressed on the  left. I suspect there is a mild to moderate reduction in flow to the left  foot. Severe decreased in arterial flow to the right foot. Signature   ----------------------------------------------------------------  Electronically signed by Filemon Waters MD(Interpreting  physician) on 03/05/2020 06:54 AM  ----------------------------------------------------------------  Velocities are measured in cm/s ; Diameters are measured in mm Pressures +--------------------------------------++--------+-----+----+--------+-----+ ! ! !Right   ! ! Left!        !     ! +--------------------------------------++--------+-----+----+--------+-----+ ! Location                              ! !Pressure! Ratio! !Pressure! Ratio! +--------------------------------------++--------+-----+----+--------+-----+ ! Ankle PT                              !!51      !0.36 !    !255     !1.81 ! +--------------------------------------++--------+-----+----+--------+-----+ ! DP                                    ! !55      !0.39 !    !255     !1.81 ! +--------------------------------------++--------+-----+----+--------+-----+ ! Great Toe                             !!0       !0    !    !48      !0.34 ! +--------------------------------------++--------+-----+----+--------+-----+   - Brachial Pressure:Right: 141.   - GABRIEL:Right: 0.39. Left: 1.81. Plethysmographic Digit Evaluation +---------++--------+-----+---------------++--------+-----+----------------+ ! ! !Right   ! ! Left           !!        !     !                ! +---------++--------+-----+---------------++--------+-----+----------------+ ! Location ! !Pressure! Ratio! PPG Wave Form  ! !Pressure! Ratio! PPG Wave Form   ! +---------++--------+-----+---------------++--------+-----+----------------+ ! Great Toe!!0       !0    !               !!48      !0.34 !                ! +---------++--------+-----+---------------++--------+-----+----------------+    Vl Dup Lower Extremity Arteries Bilateral    Result Date: 3/5/2020  Vascular Lower Extremities Arterial Duplex Procedure  Demographics   Patient Name     Kinsey Nunez Age                   61   Patient Number   [id number]       Gender                Male   Visit Number     [id number]    Interpreting          Zev Travis MD                                Physician   Date of Birth    1960   Referring Physician   Karin Lubin   Accession Number [id number]    [facility] RT, RVT  Procedure Type of Study:   Extremities Arteries:Lower Extremities Arterial Duplex, VL LOWER EXTREMITY  ARTERIES DUPLEX BILATERAL. Indications for Study:Wound Lower Extremity (Right). Risk Factors   - The patient's risk factor(s) include: dyslipidemia and arterial     hypertension.   - The patient has a former tobacco history. Allergies   - No known allergies. Impression   Bilateral lower extremity arterial duplex exam performed. The right lower extremity extremity arterial duplex exam performed. The  right lower extremtiy shows occlusion of the bypass and occlusion of the  native SFA and popliteal artery. Only collateral arterial waveforms are  seen in the tibial vessels. The left low extremity shows mild local velocity elevations in the CFA,  suggestive of a stenosis in the left External iliac artery and Common  femoral artery. The SFA and popliteal artery are patent, however,  significant plaque is seen. the anterior tibial and posterior tibial and  peroneal artery are patent.    Signature   ----------------------------------------------------------------  Electronically signed by Zev Travis MD(Interpreting  physician) on 03/05/2020 07:03 AM  ----------------------------------------------------------------  Grafts   - The graft originated from the Right Dist Common Femoral to the Right     Dist Popliteal.

## 2020-03-27 NOTE — PROGRESS NOTES
Occupational Therapy   Occupational Therapy Initial Assessment  Date: 3/27/2020   Patient Name: Nichelle Quiles  MRN: [id number]     : 1960    Date of Service: 3/27/2020    Discharge Recommendations:          Assessment   Performance deficits / Impairments: Decreased endurance;Decreased balance;Decreased ADL status; Decreased functional mobility   Assessment: Will progress as tolerated. Would benefit from short term rehab stay to improve ADLs and tfers  Treatment Diagnosis: R AKA  Prognosis: Good  Decision Making: Low Complexity  REQUIRES OT FOLLOW UP: Yes  Activity Tolerance  Activity Tolerance: Patient Tolerated treatment well           Patient Diagnosis(es): There were no encounter diagnoses. has a past medical history of Anxiety, Blood circulation, collateral, CAD (coronary artery disease), Cancer ([address] CHF (congestive heart failure) ([address] Chyloperitoneum determined by paracentesis, CKD (chronic kidney disease), stage V ([address] Dialysis patient ([address] GERD (gastroesophageal reflux disease), Hemodialysis patient ([address] Hepatic cirrhosis ([address] Hepatitis C, chronic ([address] History of blood transfusion, HTN (hypertension), Hx of blood clots, Mixed hyperlipidemia, Osteoarthritis, Pacemaker, Pain management, Palliative care patient, PVD (peripheral vascular disease) ([address] Sepsis ([address] Skin ulcer of right heel with fat layer exposed ([address] Sleep apnea, Type II diabetes mellitus ([address] Ulcer of left heel, with fat layer exposed ([address] Ulcer of right foot, with fat layer exposed ([address] Wound infection, and Wound infection after surgery. has a past surgical history that includes other surgical history (2010); other surgical history ([id number]);  Dialysis fistula creation (11 [facility]); angioplasty (11 [facility]); other surgical history (2011   [facility]); other surgical history (12   [facility]); vascular surgery (12); vascular surgery (7/10/2012 [facility]); vascular surgery (7/30/15 [address]); vascular surgery

## 2020-03-27 NOTE — PROGRESS NOTES
9 x 40 cm  balloon. Completion fistulograms of the left upper extremity.  VASCULAR SURGERY  8/13/15 [facility]    Revision of left upper extremity arteriovenous fistula with excision of pseudoaneurysm and primary repair of cephalic vein.  VASCULAR SURGERY  5/3/16 [facility]    Left upper fistulograms/venograms, left cephalic balloon 8 x 20 cutter,9 x 40 conquest,left proximal cephalic vein stents (flair 2 9 x 50), balloon stents 9 x 40 conquest.    VASCULAR SURGERY  12/08/2016    [facility]- ultrasound guided cannulation of left distal cephalic vein ultrasound guided cannulation right internal jugular vein placement of right internal jugular vein tunneled dialysis catheter (Bard Equistream XK 23cm tip to cuff)     VASCULAR SURGERY  01/20/2017    [facility]-Right upper venograms, u/s guided cannulation left basilic vein, left upper venograms, balloon angioplasty left subclavian vein 10x40 conquest.    VASCULAR SURGERY  02/16/2017    [facility]. Left brachial artery to axillary vein arteriovenous graft with 7 mm artegraft. Left upper extremity venograms. Left subclavian vein stent viabahn 13x50. Left subclavian vein stent balloon angioplasty 12x40 atlas.  VASCULAR SURGERY  04/11/2017    [facility]. Removal of tunneled dialysis catheter right internal jugular vein.  VASCULAR SURGERY  01/25/2018    [facility]. Arch aortogram, left upper arteriogram, AV graft angiogram/venogram.    VASCULAR SURGERY  02/28/2018    [facility]. Right CFA 5f-6f-7f sheath, aortogram with bilateral lower arteriogram, left SFA/pop  balloon angioplasty 5x100 , 6x150 lutonix ( 2), left CFA  7f sheath, bilateral iliac kissing stents right 10x38 icast, left 9x59 icast expanded with 10x40 , completion aortogram, mynx left CFA , failed mynx right CFA.  VASCULAR SURGERY  06/13/2019    SJS left CFA 5f sheath, aortogram with bilateral lower arteriogram, mynx left CFA.  VASCULAR SURGERY  06/25/2019    [facility]-VI. Femoral tibial/perineal bypass with reversed greater saphenous % --   03/26/20 1755 -- -- -- 84 19 100 % --   03/26/20 1750 -- 97.7 °F (36.5 °C) Temporal 80 15 100 % --   03/26/20 1745 -- -- -- 81 13 100 % --   03/26/20 1740 -- -- -- 82 12 100 % --   03/26/20 1735 -- -- -- 79 13 100 % --   03/26/20 1730 -- -- -- 76 13 100 % --   03/26/20 1728 (!) 207/59 -- -- 76 14 100 % --   03/26/20 1724 -- 100.3 °F (37.9 °C) Temporal 76 -- 100 % --   03/26/20 1342 (!) 167/74 97.3 °F (36.3 °C) -- 69 18 99 % --   03/26/20 1318 (!) 164/61 99.3 °F (37.4 °C) Temporal 65 16 -- 166 lb 7.2 oz (75.5 kg)       Intake/Output Summary (Last 24 hours) at 3/27/2020 1129  Last data filed at 3/27/2020 0858  Gross per 24 hour   Intake 910 ml   Output 3000 ml   Net -2090 ml     General: awake/alert   Chest:  clear to auscultation bilaterally without respiratory distress  CVS: regular rate and rhythm  Abdominal: soft, nontender, normal bowel sounds  Extremities: no cyanosis or edema, right AKA  Skin: dry without rash      Labs:  BMP:   Recent Labs     03/26/20  0200 03/26/20  1227 03/27/20 0219   * 138 137   K 4.7 3.2* 4.3   CL 89* 96* 96*   CO2 29 27 26   PHOS 4.2  --   --    BUN 46* 14 27*   CREATININE 6.0* 2.3* 4.2*   CALCIUM 9.3 9.3 9.3     CBC:   Recent Labs     03/26/20 0200 03/26/20  1758 03/27/20 0219   WBC 6.9 9.6 7.4   HGB 8.8* 9.7* 9.1*   HCT 28.5* 31.4* 28.8*   MCV 93.1 91.3 90.9    161 148     LIVER PROFILE: No results for input(s): AST, ALT, LIPASE, BILIDIR, BILITOT, ALKPHOS in the last 72 hours. Invalid input(s): AMYLASE,  ALB  PT/INR:   Recent Labs     03/25/20  1746   PROTIME 15.6*   INR 1.24*     APTT:   Recent Labs     03/25/20  1746   APTT 30.6     BNP:  No results for input(s): BNP in the last 72 hours. Ionized Calcium:No results for input(s): IONCA in the last 72 hours. Magnesium:  Recent Labs     03/26/20  0200   MG 2.2     Phosphorus:  Recent Labs     03/26/20  0200   PHOS 4.2     HgbA1C: No results for input(s): LABA1C in the last 72 hours.   Hepatic: No results for

## 2020-03-27 NOTE — PROGRESS NOTES
Vascular Surgery  Dr.Victoria Angulo   Daily Progress Note    Pt Name: Gene Capone  Medical Record Number: [id number]  Date of Birth 1960   Today's Date: 3/27/2020    SUBJECTIVE:     Patient was seen and examined. Pain is poorly controlled, stating IV medicine wears off between doses  has not had nausea/vomiting  Stating that he is going to call the office about his pain meds     OBJECTIVE:     Patient Vitals for the past 24 hrs:   BP Temp Temp src Pulse Resp SpO2 Weight   03/27/20 0549 (!) 138/55 97.2 °F (36.2 °C) -- 59 18 96 % --   03/27/20 0531 -- -- -- -- -- -- 149 lb 3 oz (67.7 kg)   03/27/20 0326 (!) 109/57 97.5 °F (36.4 °C) -- 62 16 94 % --   03/26/20 2259 (!) 142/67 98.6 °F (37 °C) Temporal 69 16 94 % --   03/26/20 1855 (!) 144/58 97.7 °F (36.5 °C) Temporal 68 14 98 % --   03/26/20 1830 (!) 145/45 98.4 °F (36.9 °C) Temporal 70 13 100 % --   03/26/20 1825 -- -- -- 74 13 100 % --   03/26/20 1820 (!) 156/49 -- -- 71 12 100 % --   03/26/20 1815 -- -- -- 74 16 100 % --   03/26/20 1810 -- -- -- 77 9 100 % --   03/26/20 1805 -- -- -- 79 10 100 % --   03/26/20 1800 -- -- -- 79 16 100 % --   03/26/20 1755 -- -- -- 84 19 100 % --   03/26/20 1750 -- 97.7 °F (36.5 °C) Temporal 80 15 100 % --   03/26/20 1745 -- -- -- 81 13 100 % --   03/26/20 1740 -- -- -- 82 12 100 % --   03/26/20 1735 -- -- -- 79 13 100 % --   03/26/20 1730 -- -- -- 76 13 100 % --   03/26/20 1728 (!) 207/59 -- -- 76 14 100 % --   03/26/20 1724 -- 100.3 °F (37.9 °C) Temporal 76 -- 100 % --   03/26/20 1342 (!) 167/74 97.3 °F (36.3 °C) -- 69 18 99 % --   03/26/20 1318 (!) 164/61 99.3 °F (37.4 °C) Temporal 65 16 -- 166 lb 7.2 oz (75.5 kg)       Intake/Output Summary (Last 24 hours) at 3/27/2020 1108  Last data filed at 3/27/2020 0858  Gross per 24 hour   Intake 910 ml   Output 3000 ml   Net -2090 ml     In: 910 [P.O.:360; I.V.:550]  Out: 500     I/O last 3 completed shifts:   In: 550 [I.V.:550]  Out: 3000 [Other:2500; Blood:500]     Date 03/27/20 0000

## 2020-03-28 LAB
ANION GAP SERPL CALCULATED.3IONS-SCNC: 16 MMOL/L (ref 7–19)
BASOPHILS ABSOLUTE: 0 K/UL (ref 0–0.2)
BASOPHILS RELATIVE PERCENT: 0.3 % (ref 0–1)
BUN BLDV-MCNC: 49 MG/DL (ref 6–20)
CALCIUM SERPL-MCNC: 8.8 MG/DL (ref 8.6–10)
CHLORIDE BLD-SCNC: 95 MMOL/L (ref 98–111)
CO2: 25 MMOL/L (ref 22–29)
CREAT SERPL-MCNC: 5.7 MG/DL (ref 0.5–1.2)
EOSINOPHILS ABSOLUTE: 0.3 K/UL (ref 0–0.6)
EOSINOPHILS RELATIVE PERCENT: 3.6 % (ref 0–5)
GFR NON-AFRICAN AMERICAN: 10
GLUCOSE BLD-MCNC: 93 MG/DL (ref 74–109)
HCT VFR BLD CALC: 25.8 % (ref 42–52)
HEMOGLOBIN: 7.8 G/DL (ref 14–18)
IMMATURE GRANULOCYTES #: 0 K/UL
LYMPHOCYTES ABSOLUTE: 1.1 K/UL (ref 1.1–4.5)
LYMPHOCYTES RELATIVE PERCENT: 15.9 % (ref 20–40)
MCH RBC QN AUTO: 28.2 PG (ref 27–31)
MCHC RBC AUTO-ENTMCNC: 30.2 G/DL (ref 33–37)
MCV RBC AUTO: 93.1 FL (ref 80–94)
MONOCYTES ABSOLUTE: 0.7 K/UL (ref 0–0.9)
MONOCYTES RELATIVE PERCENT: 9.4 % (ref 0–10)
NEUTROPHILS ABSOLUTE: 5 K/UL (ref 1.5–7.5)
NEUTROPHILS RELATIVE PERCENT: 70.4 % (ref 50–65)
PDW BLD-RTO: 16.5 % (ref 11.5–14.5)
PLATELET # BLD: 159 K/UL (ref 130–400)
PMV BLD AUTO: 11.4 FL (ref 9.4–12.4)
POTASSIUM REFLEX MAGNESIUM: 4.7 MMOL/L (ref 3.5–5)
RBC # BLD: 2.77 M/UL (ref 4.7–6.1)
SODIUM BLD-SCNC: 136 MMOL/L (ref 136–145)
TROPONIN: 0.17 NG/ML (ref 0–0.03)
TROPONIN: 0.3 NG/ML (ref 0–0.03)
WBC # BLD: 7.2 K/UL (ref 4.8–10.8)

## 2020-03-28 PROCEDURE — 85025 COMPLETE CBC W/AUTO DIFF WBC: CPT

## 2020-03-28 PROCEDURE — 80048 BASIC METABOLIC PNL TOTAL CA: CPT

## 2020-03-28 PROCEDURE — 6370000000 HC RX 637 (ALT 250 FOR IP): Performed by: SURGERY

## 2020-03-28 PROCEDURE — 2700000000 HC OXYGEN THERAPY PER DAY

## 2020-03-28 PROCEDURE — 93005 ELECTROCARDIOGRAM TRACING: CPT | Performed by: INTERNAL MEDICINE

## 2020-03-28 PROCEDURE — 99024 POSTOP FOLLOW-UP VISIT: CPT | Performed by: NURSE PRACTITIONER

## 2020-03-28 PROCEDURE — 6370000000 HC RX 637 (ALT 250 FOR IP): Performed by: NURSE PRACTITIONER

## 2020-03-28 PROCEDURE — 6370000000 HC RX 637 (ALT 250 FOR IP): Performed by: INTERNAL MEDICINE

## 2020-03-28 PROCEDURE — 2580000003 HC RX 258: Performed by: INTERNAL MEDICINE

## 2020-03-28 PROCEDURE — 8010000000 HC HEMODIALYSIS ACUTE INPT

## 2020-03-28 PROCEDURE — 2140000000 HC CCU INTERMEDIATE R&B

## 2020-03-28 PROCEDURE — 2580000003 HC RX 258: Performed by: NURSE PRACTITIONER

## 2020-03-28 PROCEDURE — 36415 COLL VENOUS BLD VENIPUNCTURE: CPT

## 2020-03-28 PROCEDURE — 6360000002 HC RX W HCPCS: Performed by: SURGERY

## 2020-03-28 PROCEDURE — 6360000002 HC RX W HCPCS: Performed by: INTERNAL MEDICINE

## 2020-03-28 PROCEDURE — 6360000002 HC RX W HCPCS: Performed by: NURSE PRACTITIONER

## 2020-03-28 PROCEDURE — 97116 GAIT TRAINING THERAPY: CPT

## 2020-03-28 PROCEDURE — 84484 ASSAY OF TROPONIN QUANT: CPT

## 2020-03-28 PROCEDURE — 2500000003 HC RX 250 WO HCPCS

## 2020-03-28 PROCEDURE — 97530 THERAPEUTIC ACTIVITIES: CPT

## 2020-03-28 RX ORDER — LABETALOL HYDROCHLORIDE 5 MG/ML
10 INJECTION, SOLUTION INTRAVENOUS EVERY 6 HOURS PRN
Status: DISCONTINUED | OUTPATIENT
Start: 2020-03-28 | End: 2020-03-31 | Stop reason: HOSPADM

## 2020-03-28 RX ORDER — NALOXONE HYDROCHLORIDE 0.4 MG/ML
0.4 INJECTION, SOLUTION INTRAMUSCULAR; INTRAVENOUS; SUBCUTANEOUS PRN
Status: DISCONTINUED | OUTPATIENT
Start: 2020-03-28 | End: 2020-03-31 | Stop reason: HOSPADM

## 2020-03-28 RX ORDER — ATORVASTATIN CALCIUM 40 MG/1
80 TABLET, FILM COATED ORAL NIGHTLY
Status: DISCONTINUED | OUTPATIENT
Start: 2020-03-29 | End: 2020-03-31 | Stop reason: HOSPADM

## 2020-03-28 RX ORDER — LABETALOL HYDROCHLORIDE 5 MG/ML
INJECTION, SOLUTION INTRAVENOUS
Status: DISPENSED
Start: 2020-03-28 | End: 2020-03-29

## 2020-03-28 RX ORDER — OXYCODONE HYDROCHLORIDE 5 MG/1
10 TABLET ORAL EVERY 4 HOURS PRN
Status: DISCONTINUED | OUTPATIENT
Start: 2020-03-28 | End: 2020-03-31 | Stop reason: HOSPADM

## 2020-03-28 RX ORDER — ONDANSETRON 2 MG/ML
4 INJECTION INTRAMUSCULAR; INTRAVENOUS EVERY 6 HOURS PRN
Status: DISCONTINUED | OUTPATIENT
Start: 2020-03-28 | End: 2020-03-31 | Stop reason: HOSPADM

## 2020-03-28 RX ORDER — OXYCODONE HYDROCHLORIDE AND ACETAMINOPHEN 5; 325 MG/1; MG/1
1 TABLET ORAL EVERY 4 HOURS PRN
Status: DISCONTINUED | OUTPATIENT
Start: 2020-03-28 | End: 2020-03-28

## 2020-03-28 RX ORDER — PANTOPRAZOLE SODIUM 20 MG/1
20 TABLET, DELAYED RELEASE ORAL
Status: DISCONTINUED | OUTPATIENT
Start: 2020-03-28 | End: 2020-03-31 | Stop reason: HOSPADM

## 2020-03-28 RX ORDER — OXYCODONE HCL 10 MG/1
10 TABLET, FILM COATED, EXTENDED RELEASE ORAL EVERY 8 HOURS PRN
Status: DISCONTINUED | OUTPATIENT
Start: 2020-03-28 | End: 2020-03-28

## 2020-03-28 RX ORDER — NITROGLYCERIN 0.4 MG/1
0.4 TABLET SUBLINGUAL EVERY 5 MIN PRN
Status: DISCONTINUED | OUTPATIENT
Start: 2020-03-28 | End: 2020-03-31 | Stop reason: HOSPADM

## 2020-03-28 RX ADMIN — KETOROLAC TROMETHAMINE 15 MG: 30 INJECTION, SOLUTION INTRAMUSCULAR at 07:06

## 2020-03-28 RX ADMIN — OXYCODONE 10 MG: 5 TABLET ORAL at 22:09

## 2020-03-28 RX ADMIN — KETOROLAC TROMETHAMINE 15 MG: 30 INJECTION, SOLUTION INTRAMUSCULAR at 02:30

## 2020-03-28 RX ADMIN — CLONIDINE HYDROCHLORIDE 0.1 MG: 0.1 TABLET ORAL at 13:06

## 2020-03-28 RX ADMIN — HYDROMORPHONE HYDROCHLORIDE: 10 INJECTION INTRAMUSCULAR; INTRAVENOUS; SUBCUTANEOUS at 08:20

## 2020-03-28 RX ADMIN — MINOXIDIL 10 MG: 10 TABLET ORAL at 13:06

## 2020-03-28 RX ADMIN — CLOPIDOGREL BISULFATE 75 MG: 75 TABLET ORAL at 13:06

## 2020-03-28 RX ADMIN — ATORVASTATIN CALCIUM 40 MG: 40 TABLET, FILM COATED ORAL at 21:39

## 2020-03-28 RX ADMIN — PANTOPRAZOLE SODIUM 20 MG: 20 TABLET, DELAYED RELEASE ORAL at 16:54

## 2020-03-28 RX ADMIN — ACETAMINOPHEN 650 MG: 325 TABLET ORAL at 16:54

## 2020-03-28 RX ADMIN — SEVELAMER CARBONATE 800 MG: 800 TABLET, FILM COATED ORAL at 13:06

## 2020-03-28 RX ADMIN — HEPARIN SODIUM 5000 UNITS: 5000 INJECTION INTRAVENOUS; SUBCUTANEOUS at 21:39

## 2020-03-28 RX ADMIN — CLONIDINE HYDROCHLORIDE 0.1 MG: 0.1 TABLET ORAL at 21:39

## 2020-03-28 RX ADMIN — ONDANSETRON HYDROCHLORIDE 4 MG: 2 INJECTION, SOLUTION INTRAVENOUS at 14:42

## 2020-03-28 RX ADMIN — GABAPENTIN 200 MG: 100 CAPSULE ORAL at 13:06

## 2020-03-28 RX ADMIN — Medication 10 ML: at 21:40

## 2020-03-28 RX ADMIN — ISOSORBIDE MONONITRATE 30 MG: 60 TABLET, EXTENDED RELEASE ORAL at 13:06

## 2020-03-28 RX ADMIN — AMLODIPINE BESYLATE 10 MG: 5 TABLET ORAL at 13:06

## 2020-03-28 RX ADMIN — SUCRALFATE 1 G: 1 TABLET ORAL at 22:10

## 2020-03-28 RX ADMIN — ASPIRIN 81 MG: 81 TABLET, COATED ORAL at 13:06

## 2020-03-28 RX ADMIN — OXYCODONE 10 MG: 5 TABLET ORAL at 13:07

## 2020-03-28 RX ADMIN — HEPARIN SODIUM 5000 UNITS: 5000 INJECTION INTRAVENOUS; SUBCUTANEOUS at 13:06

## 2020-03-28 RX ADMIN — OXYCODONE 10 MG: 5 TABLET ORAL at 18:15

## 2020-03-28 RX ADMIN — HEPARIN SODIUM 5000 UNITS: 5000 INJECTION INTRAVENOUS; SUBCUTANEOUS at 07:06

## 2020-03-28 RX ADMIN — GABAPENTIN 200 MG: 100 CAPSULE ORAL at 21:39

## 2020-03-28 ASSESSMENT — PAIN DESCRIPTION - DESCRIPTORS
DESCRIPTORS: DISCOMFORT
DESCRIPTORS: ACHING

## 2020-03-28 ASSESSMENT — PAIN - FUNCTIONAL ASSESSMENT
PAIN_FUNCTIONAL_ASSESSMENT: PREVENTS OR INTERFERES SOME ACTIVE ACTIVITIES AND ADLS

## 2020-03-28 ASSESSMENT — PAIN DESCRIPTION - PAIN TYPE
TYPE: SURGICAL PAIN

## 2020-03-28 ASSESSMENT — PAIN DESCRIPTION - LOCATION
LOCATION: LEG
LOCATION: LEG
LOCATION: GENERALIZED
LOCATION: LEG
LOCATION: GENERALIZED
LOCATION: LEG
LOCATION: LEG
LOCATION: GENERALIZED
LOCATION: LEG

## 2020-03-28 ASSESSMENT — PAIN SCALES - GENERAL
PAINLEVEL_OUTOF10: 9
PAINLEVEL_OUTOF10: 7
PAINLEVEL_OUTOF10: 8
PAINLEVEL_OUTOF10: 8
PAINLEVEL_OUTOF10: 7
PAINLEVEL_OUTOF10: 9
PAINLEVEL_OUTOF10: 0
PAINLEVEL_OUTOF10: 9
PAINLEVEL_OUTOF10: 5
PAINLEVEL_OUTOF10: 8
PAINLEVEL_OUTOF10: 6
PAINLEVEL_OUTOF10: 6
PAINLEVEL_OUTOF10: 7
PAINLEVEL_OUTOF10: 0
PAINLEVEL_OUTOF10: 6

## 2020-03-28 ASSESSMENT — PAIN DESCRIPTION - ORIENTATION
ORIENTATION: RIGHT

## 2020-03-28 ASSESSMENT — PAIN DESCRIPTION - ONSET
ONSET: ON-GOING

## 2020-03-28 ASSESSMENT — PAIN DESCRIPTION - FREQUENCY
FREQUENCY: CONTINUOUS

## 2020-03-28 ASSESSMENT — PAIN DESCRIPTION - PROGRESSION
CLINICAL_PROGRESSION: NOT CHANGED

## 2020-03-28 NOTE — PROGRESS NOTES
Physical Therapy  Name: Abena Sky  MRN:  [id number]  Date of service:  3/28/2020       03/28/20 7571   Subjective   Subjective Pt agrees to work  with therapy   Bed Mobility   Supine to Sit Independent   Scooting Independent   Transfers   Sit to Stand Contact guard assistance   Stand to sit Contact guard assistance   Ambulation   Ambulation? Yes   Ambulation 1   Surface level tile   Device Rolling Walker   Assistance Contact guard assistance   Quality of Gait steady   Distance 2',2'   Comments pt stood and hopped to head of bed and then transferred to [facility] and back to bed. Short term goals   Time Frame for Short term goals 2 WKS   Short term goal 1 SUP<>SIT, SBA   Short term goal 2 SIT<>STAND, CGA   Short term goal 3 STAND/SQUAT PIVOT TF WITH CGA   Short term goal 4 STAND STEP TF WITH RW AND CGA   Conditions Requiring Skilled Therapeutic Intervention   Body structures, Functions, Activity limitations Decreased functional mobility ; Decreased strength; Increased pain;Decreased endurance;Decreased posture   Assessment Pt did well with transfers and getting to [facility], left pt sitting bedside at his request and nursing notified.     Activity Tolerance   Activity Tolerance Patient Tolerated treatment well;Patient limited by pain   Safety Devices   Type of devices Left in bed;Call light within reach       Electronically signed by Adilia Bailey PTA on 3/28/2020 at 9:21 AM

## 2020-03-28 NOTE — PROGRESS NOTES
[id number] [facility] Progress Note    Patient:  Sabine Krueger  YOB: 1960  Date of Service: 3/28/2020  MRN: [id number]   Acct: [de-identified]   Primary Care Physician: Ada Benavieds DO  Advance Directive: Full Code  Admit Date: 3/25/2020       Hospital Day: 3      CHIEF COMPLAINT: Right lower extremity pain      3/28/2020 7:44 AM  Subjective / Interval History:   03/28/2020  Reportedly with uncontrolled postop pain at the right AKA site. Placed on hydromorphone PCA pump by vascular. Patient laying comfortably in bed no acute distress. Pain at right AKA currently controlled as per patient. Denies any other acute complaints or distress at this time. Addendum @ 3:19 PM  · Called to bedside. · Patient seen and examined. · Patient reportedly with feeling nausea, with one episode of vomiting. Patient also complained of associated feeling of of indigestion/chest discomfort. Status post HD this p.m. Patient describes substernal burning sensation. · Troponin ordered  · Famotidine 20 mg IV twice daily  · Ondansetron as needed  · EKG  · Placed on telemetry monitor  · Continue to monitor closely    03/27/2020  Status post right AKA yesterday. Right lower extremity pain currently improved/controlled. Patient laying comfortably in bed no acute distress. Denies any acute complaints or distress at this time. 03/26/2020  No acute overnight event reported. Patient reports minimally controlled right lower extremity pain. Denies any acute complaints or distress at this time. Brief History:   Mr. Jorge Rey, a 61 y.o. male with a history of severe peripheral vascular disease, HCV, CAD, ESRD, on HD (MWF), has been admitted for progressively worsening severe right lower extremity pain and nonhealing wound. Patient reportedly has had an arterial bypass about 7 months ago, and the wound never healed.   Pain reportedly has progressively gotten worse to the point where it becomes difficult for the Plethysmography Procedure  Demographics   Patient Name     Miri File Age                   61   Patient Number   [id number]       Gender                Male   Visit Number     [id number]    Interpreting          Rickie Ovalle MD                                Physician   Date of Birth    1960   Referring Physician   Genesis Mirza   Accession Number [id number]    [address] [facility] RT, RVT  Procedure Type of Study:   Extremities Arteries:Lower Arterial Plethysmography, VL ANKLE / BRACHIAL  INDICES EXTREMITY COMPLETE . Indications for Study:Wound Lower Extremity (Right). Risk Factors   - The patient's risk factor(s) include: dyslipidemia and arterial     hypertension.   - The patient has a former tobacco history. Allergies   - No known allergies. Impression   The patient has noncompressible bilateral tibial arteries. This would be  consistent with a history of diabetes and calcified tibial vessels. However, there is good doppler waveforms from the left femoral all the way  to the tibial arteries. Great toe pressures are mildly depressed on the  left. I suspect there is a mild to moderate reduction in flow to the left  foot. Severe decreased in arterial flow to the right foot. Signature   ----------------------------------------------------------------  Electronically signed by Rickie Ovalle MD(Interpreting  physician) on 03/05/2020 06:54 AM  ----------------------------------------------------------------  Velocities are measured in cm/s ; Diameters are measured in mm Pressures +--------------------------------------++--------+-----+----+--------+-----+ ! ! !Right   ! ! Left!        !     ! +--------------------------------------++--------+-----+----+--------+-----+ ! Location                              ! !Pressure! Ratio! !Pressure! Ratio! +--------------------------------------++--------+-----+----+--------+-----+ ! Ankle PT Impression   Bilateral lower extremity arterial duplex exam performed. The right lower extremity extremity arterial duplex exam performed. The  right lower extremtiy shows occlusion of the bypass and occlusion of the  native SFA and popliteal artery. Only collateral arterial waveforms are  seen in the tibial vessels. The left low extremity shows mild local velocity elevations in the CFA,  suggestive of a stenosis in the left External iliac artery and Common  femoral artery. The SFA and popliteal artery are patent, however,  significant plaque is seen. the anterior tibial and posterior tibial and  peroneal artery are patent. Signature   ----------------------------------------------------------------  Electronically signed by Lauri Mccarthy MD(Interpreting  physician) on 03/05/2020 07:03 AM  ----------------------------------------------------------------  Grafts   - The graft originated from the Right Dist Common Femoral to the Right     Dist Popliteal. +--------------------------------------+----+---+-----+--------------------+ ! Location                              ! PSV ! EDV! Ratio!% Stenosis          ! +--------------------------------------+----+---+-----+--------------------+ ! Prox Anastomosis                      ! 35  !   !     !                    ! +--------------------------------------+----+---+-----+--------------------+ ! Prox Graft                            !0   !   !0    !                    ! +--------------------------------------+----+---+-----+--------------------+ ! Mid Graft                             !0   !   !     !                    ! +--------------------------------------+----+---+-----+--------------------+ ! Dist Graft                            !0   !   !     !                    ! +--------------------------------------+----+---+-----+--------------------+ ! Dist Anastomosis                      !0   !   !     !                    ! +--------------------------------------+----+---+-----+--------------------+ Velocities are measured in cm/s ; Diameters are measured in mm LE Duplex Measurements +---------------++-----+-----+----+-----------++---+-----+---+-------------+ ! ! !Right! ! Left!           !!   !     !   !             ! +---------------++-----+-----+----+-----------++---+-----+---+-------------+ ! Location       ! !PSV  ! Ratio! EDV ! Wave Desc. !!PSV! Ratio! EDV! Wave Desc.   ! +---------------++-----+-----+----+-----------++---+-----+---+-------------+ ! Common Femoral !!161  !     !    !           !!266!     !   !             ! +---------------++-----+-----+----+-----------++---+-----+---+-------------+ ! Prox PFA       !!244  !     !    !           !!60 !     !   !             ! +---------------++-----+-----+----+-----------++---+-----+---+-------------+ ! Prox SFA       !!27   !     !    !           !!282!     !   !             ! +---------------++-----+-----+----+-----------++---+-----+---+-------------+ ! Mid SFA        !!0    !0    !    !           !!98 !0.35 !   !             ! +---------------++-----+-----+----+-----------++---+-----+---+-------------+ ! Dist SFA       !!12   !     !    !           !!96 !0.98 !   !             ! +---------------++-----+-----+----+-----------++---+-----+---+-------------+ ! Prox Popliteal !!0    !0    !    !           !!76 !0.79 !   !             ! +---------------++-----+-----+----+-----------++---+-----+---+-------------+ ! Dist Popliteal !!0    !     !    !           !!158!2.08 !   !             ! +---------------++-----+-----+----+-----------++---+-----+---+-------------+ ! Mid PTA        !!21   !     !    !           !!46 !0.29 !   !             ! +---------------++-----+-----+----+-----------++---+-----+---+-------------+ ! Prox GWYN       !!19   !     !    !           !!42 !0.27 !   !             ! +---------------++-----+-----+----+-----------++---+-----+---+-------------+ ! Abebe Esquivel ! !19   !     !    !           !!53 !0.34 !   !             ! +---------------++-----+-----+----+-----------++---+-----+---+-------------      Objective:   Vitals: /71   Pulse 77   Temp 98.4 °F (36.9 °C)   Resp 18   Wt 149 lb 3 oz (67.7 kg)   SpO2 96%   BMI 22.68 kg/m²   24HR INTAKE/OUTPUT:      Intake/Output Summary (Last 24 hours) at 3/28/2020 0744  Last data filed at 3/27/2020 1811  Gross per 24 hour   Intake 1130 ml   Output --   Net 1130 ml       Physical Exam  Vitals signs and nursing note reviewed. Constitutional:       General: He is not in acute distress. Appearance: Normal appearance. He is normal weight. He is not ill-appearing, toxic-appearing or diaphoretic. HENT:      Head: Normocephalic and atraumatic. Right Ear: External ear normal.      Left Ear: External ear normal.      Nose: Nose normal. No congestion or rhinorrhea. Mouth/Throat:      Mouth: Mucous membranes are moist.      Pharynx: No oropharyngeal exudate or posterior oropharyngeal erythema. Eyes:      General: No scleral icterus. Right eye: No discharge. Left eye: No discharge. Extraocular Movements: Extraocular movements intact. Conjunctiva/sclera: Conjunctivae normal.      Pupils: Pupils are equal, round, and reactive to light. Neck:      Musculoskeletal: Normal range of motion and neck supple. No neck rigidity or muscular tenderness. Vascular: No carotid bruit. Cardiovascular:      Rate and Rhythm: Normal rate and regular rhythm. Heart sounds: Normal heart sounds. No murmur. No gallop. Pulmonary:      Effort: Pulmonary effort is normal. No respiratory distress. Breath sounds: Normal breath sounds. No stridor. No wheezing, rhonchi or rales. Chest:      Chest wall: No tenderness. Abdominal:      General: Bowel sounds are normal. There is no distension. Palpations: Abdomen is soft. Tenderness: There is no abdominal tenderness.  There is no guarding or

## 2020-03-28 NOTE — PLAN OF CARE
Problem: Falls - Risk of:  Goal: Will remain free from falls  Description: Will remain free from falls  Outcome: Ongoing  Goal: Absence of physical injury  Description: Absence of physical injury  Outcome: Ongoing     Problem: Pain:  Goal: Pain level will decrease  Description: Pain level will decrease  Outcome: Ongoing  Goal: Control of acute pain  Description: Control of acute pain  Outcome: Ongoing  Goal: Control of chronic pain  Description: Control of chronic pain  Outcome: Ongoing     Problem: ABCDS Injury Assessment  Goal: Absence of physical injury  Outcome: Ongoing

## 2020-03-28 NOTE — PROGRESS NOTES
by paracentesis     CKD (chronic kidney disease), stage V ([address]     Dialysis patient ([address]     mon wed fri at [place] GERD (gastroesophageal reflux disease)     Hemodialysis patient ([address]     mon wed fri in [place] Hepatic cirrhosis ([address]     dr. Dory Merlin; has been going to [facility] for liver and kidney transplant list.    Hepatitis C, chronic ([address]     History of blood transfusion     HTN (hypertension)     Hx of blood clots     arm/fistula    Mixed hyperlipidemia 1/9/2017    Osteoarthritis     Pacemaker     Pain management     Dr. Villalpando Givens care patient 07/09/2019    PVD (peripheral vascular disease) ([address]     Sepsis ([address]     Skin ulcer of right heel with fat layer exposed ([address] 8/16/2019    Sleep apnea     no cpap at present.  Type II diabetes mellitus (HCC)     no meds.  Ulcer of left heel, with fat layer exposed ([address] 2/28/2018    Ulcer of right foot, with fat layer exposed ([address] 8/16/2019    Wound infection     Wound infection after surgery 7/8/2019       Past Surgical History:  Past Surgical History:   Procedure Laterality Date    AMPUTATION ABOVE KNEE Right 3/26/2020    LEG AMPUTATION ABOVE KNEE performed by Nalini Enciso DO at [address] [place] [address] ANGIOPLASTY  08/09/11 [facility] cephalic vein balloon angioplasty with 7mm x 4cm balloon. coild embolization of 2 cephallic vein brances with 3mm x 5cm coils    CARDIAC CATHETERIZATION  04/04/11    cardiac cath and stent x1 by Dr. Olegario Olivares  07/26/11 [facility]    ALEJANDRA AV fistula. coild embolization of cephalic vein branch near the wrist with 3mm EMBO coils.  balloon a'plasty left cephalic vein with [id number] balloon and then [id number] balloon    DIALYSIS FISTULA CREATION Left 2/16/2017    LEFT UPPER EXTREMITY BRACHIAL / AXILLARY GRAFT AND STENT LEFT SUBCLAVIAN VEIN  performed by Chi Preston MD at [facility] Right 7/12/2019    RIGHT LEG WOUND WASHOUT performed by Vincent Judge the antecubital crease with 8 x 20 mm cutting balloon.  VASCULAR SURGERY  7/30/15 [facility] cont    Balloon angioplasty proximal end distal upper arm cephalic vein with 9 x 40 cm  balloon. Completion fistulograms of the left upper extremity.  VASCULAR SURGERY  8/13/15 S    Revision of left upper extremity arteriovenous fistula with excision of pseudoaneurysm and primary repair of cephalic vein.  VASCULAR SURGERY  5/3/16 [facility]    Left upper fistulograms/venograms, left cephalic balloon 8 x 20 cutter,9 x 40 conquest,left proximal cephalic vein stents (flair 2 9 x 50), balloon stents 9 x 40 conquest.    VASCULAR SURGERY  12/08/2016    [facility]- ultrasound guided cannulation of left distal cephalic vein ultrasound guided cannulation right internal jugular vein placement of right internal jugular vein tunneled dialysis catheter (Bard Equistream XK 23cm tip to cuff)     VASCULAR SURGERY  01/20/2017    [facility]-Right upper venograms, u/s guided cannulation left basilic vein, left upper venograms, balloon angioplasty left subclavian vein 10x40 conquest.    VASCULAR SURGERY  02/16/2017    [facility]. Left brachial artery to axillary vein arteriovenous graft with 7 mm artegraft. Left upper extremity venograms. Left subclavian vein stent viabahn 13x50. Left subclavian vein stent balloon angioplasty 12x40 atlas.  VASCULAR SURGERY  04/11/2017    [facility]. Removal of tunneled dialysis catheter right internal jugular vein.  VASCULAR SURGERY  01/25/2018    [facility]. Arch aortogram, left upper arteriogram, AV graft angiogram/venogram.    VASCULAR SURGERY  02/28/2018    [facility]. Right CFA 5f-6f-7f sheath, aortogram with bilateral lower arteriogram, left SFA/pop  balloon angioplasty 5x100 , 6x150 lutonix ( 2), left CFA  7f sheath, bilateral iliac kissing stents right 10x38 icast, left 9x59 icast expanded with 10x40 , completion aortogram, mynx left CFA , failed mynx right CFA.     VASCULAR SURGERY  06/13/2019    S left CFA 5f sheath, Minutes per session: Not on file    Stress: Not on file   Relationships    Social connections     Talks on phone: Not on file     Gets together: Not on file     Attends Buddhism service: Not on file     Active member of club or organization: Not on file     Attends meetings of clubs or organizations: Not on file     Relationship status: Not on file    Intimate partner violence     Fear of current or ex partner: Not on file     Emotionally abused: Not on file     Physically abused: Not on file     Forced sexual activity: Not on file   Other Topics Concern    Not on file   Social History Narrative    CODE STATUS: Full Code    HEALTH CARE PROXY: Mrs. Michelle Barbosa, [phone]    Back up: his Mother, Mrs. Marissa Bartlett, [phone]    AMBULATES: independantly    DOMICILED: lives in a private house with 2 steps to get in, has no stairs inside, lives with wife and step children, has 3 pets         Review of Systems:  History obtained from chart review and the patient  General ROS: No fever or chills  Respiratory ROS: No cough, shortness of breath, wheezing  Cardiovascular ROS: No chest pain or palpitations  Gastrointestinal ROS: No abdominal pain or melena  Genito-Urinary ROS: No dysuria or hematuria  Musculoskeletal ROS: No joint pain or swelling   14 point ROS reviewed with the patient and negative except as noted above and in the HPI unless unable to obtain.     Objective:  Patient Vitals for the past 24 hrs:   BP Temp Temp src Pulse Resp SpO2   03/28/20 0630 118/71 98.4 °F (36.9 °C) -- 77 18 96 %   03/27/20 2306 (!) 148/64 97.1 °F (36.2 °C) Temporal 64 16 99 %   03/27/20 1950 (!) 97/45 98.5 °F (36.9 °C) Temporal 70 16 96 %   03/27/20 1219 (!) 122/59 98.4 °F (36.9 °C) -- 61 16 97 %       Intake/Output Summary (Last 24 hours) at 3/28/2020 1126  Last data filed at 3/27/2020 1811  Gross per 24 hour   Intake 770 ml   Output --   Net 770 ml     General: awake/alert   Chest:  clear to auscultation

## 2020-03-28 NOTE — PROGRESS NOTES
PRN          PHYSICAL EXAM:     General appearance: Demonstrates an ill-appearing male who is alert and oriented in no acute distress. Undergoing hemodialysis during assessment. HEENT: Normocephalic. Atraumatic. RODOLFO. NECK: Supple. NO JVD. No carotids bruits auscultated. Chest: Clear to auscultation bilaterally without wheezes or rhonchi. Cardiac: Normal heart tones with regular rate and rhythm, S1, S2 normal. 2/6 systolic murmur, no gallops or rubs auscultated. Abdomen: Soft, non-tender; non-distended normal bowel sounds no masses, no organomegaly. Extremities: AV fistula, left arm. Right AKA with dressing dry and intact. Skin: Skin color, texture, turgor normal. No rashes or lesions. Neurologic: Grossly intact.       LABS:     CBC:   Recent Labs     03/26/20  1758 03/27/20  0219 03/28/20  0204   WBC 9.6 7.4 7.2   RBC 3.44* 3.17* 2.77*   HGB 9.7* 9.1* 7.8*   HCT 31.4* 28.8* 25.8*   MCV 91.3 90.9 93.1   MCH 28.2 28.7 28.2   MCHC 30.9* 31.6* 30.2*   RDW 16.7* 16.6* 16.5*    148 159   MPV 11.6 12.2 11.4      Last 3 CMP:   Recent Labs     03/26/20  1227 03/27/20  0219 03/28/20  0204    137 136   K 3.2* 4.3 4.7   CL 96* 96* 95*   CO2 27 26 25   BUN 14 27* 49*   CREATININE 2.3* 4.2* 5.7*   GLUCOSE 104 166* 93   CALCIUM 9.3 9.3 8.8        Calcium:   Lab Results   Component Value Date    CALCIUM 8.8 03/28/2020    CALCIUM 9.3 03/27/2020    CALCIUM 9.3 03/26/2020      INR:   Recent Labs     03/25/20  1746   INR 1.24*     Lactic Acid:   Lab Results   Component Value Date    LACTA 1.1 01/18/2020          DVT prophylaxis: Heparin 5000 units sq every 8 hours          ASSESSMENT:     1.         Non-Healing Surgical Wounds - RLE, S/P Right AKA   2. Ischemic Foot, Right 2' to Severe PVD  3.          Severe Multivessel CAD  4.         ESRD on Hemodialysis  5.         Hepatic Carcinoma  6.         Renovascular HTN  7.         DM, Type 2  8.         Chronic Diastolic CHF  9.         Hepatitis

## 2020-03-29 PROBLEM — I49.5 SINOATRIAL NODE DYSFUNCTION (HCC): Status: ACTIVE | Noted: 2020-03-29

## 2020-03-29 LAB
ANION GAP SERPL CALCULATED.3IONS-SCNC: 13 MMOL/L (ref 7–19)
APTT: 38.8 SEC (ref 26–36.2)
APTT: 41.6 SEC (ref 26–36.2)
APTT: 52.4 SEC (ref 26–36.2)
APTT: >200 SEC (ref 26–36.2)
BASOPHILS ABSOLUTE: 0 K/UL (ref 0–0.2)
BASOPHILS RELATIVE PERCENT: 0.3 % (ref 0–1)
BUN BLDV-MCNC: 26 MG/DL (ref 6–20)
CALCIUM SERPL-MCNC: 9 MG/DL (ref 8.6–10)
CHLORIDE BLD-SCNC: 95 MMOL/L (ref 98–111)
CHOLESTEROL, TOTAL: 83 MG/DL (ref 160–199)
CO2: 29 MMOL/L (ref 22–29)
CREAT SERPL-MCNC: 4.2 MG/DL (ref 0.5–1.2)
EKG P AXIS: 51 DEGREES
EKG P-R INTERVAL: 172 MS
EKG Q-T INTERVAL: 388 MS
EKG QRS DURATION: 112 MS
EKG QTC CALCULATION (BAZETT): 433 MS
EKG T AXIS: 123 DEGREES
EOSINOPHILS ABSOLUTE: 0.4 K/UL (ref 0–0.6)
EOSINOPHILS RELATIVE PERCENT: 6.2 % (ref 0–5)
GFR NON-AFRICAN AMERICAN: 15
GLUCOSE BLD-MCNC: 81 MG/DL (ref 74–109)
HBA1C MFR BLD: 5.7 % (ref 4–6)
HCT VFR BLD CALC: 26.3 % (ref 42–52)
HDLC SERPL-MCNC: 23 MG/DL (ref 55–121)
HEMOGLOBIN: 7.9 G/DL (ref 14–18)
IMMATURE GRANULOCYTES #: 0 K/UL
INR BLD: 1.25 (ref 0.88–1.18)
LDL CHOLESTEROL CALCULATED: 35 MG/DL
LYMPHOCYTES ABSOLUTE: 1.4 K/UL (ref 1.1–4.5)
LYMPHOCYTES RELATIVE PERCENT: 20.6 % (ref 20–40)
MAGNESIUM: 2.1 MG/DL (ref 1.6–2.6)
MCH RBC QN AUTO: 28.3 PG (ref 27–31)
MCHC RBC AUTO-ENTMCNC: 30 G/DL (ref 33–37)
MCV RBC AUTO: 94.3 FL (ref 80–94)
MONOCYTES ABSOLUTE: 0.5 K/UL (ref 0–0.9)
MONOCYTES RELATIVE PERCENT: 8 % (ref 0–10)
NEUTROPHILS ABSOLUTE: 4.3 K/UL (ref 1.5–7.5)
NEUTROPHILS RELATIVE PERCENT: 64.6 % (ref 50–65)
PDW BLD-RTO: 16.3 % (ref 11.5–14.5)
PHOSPHORUS: 3.7 MG/DL (ref 2.5–4.5)
PLATELET # BLD: 154 K/UL (ref 130–400)
PMV BLD AUTO: 11.7 FL (ref 9.4–12.4)
POTASSIUM REFLEX MAGNESIUM: 4.4 MMOL/L (ref 3.5–5)
PROTHROMBIN TIME: 15.7 SEC (ref 12–14.6)
RBC # BLD: 2.79 M/UL (ref 4.7–6.1)
SODIUM BLD-SCNC: 137 MMOL/L (ref 136–145)
TRIGL SERPL-MCNC: 125 MG/DL (ref 0–149)
TROPONIN: 0.47 NG/ML (ref 0–0.03)
TROPONIN: 0.57 NG/ML (ref 0–0.03)
TROPONIN: 0.64 NG/ML (ref 0–0.03)
TROPONIN: 0.67 NG/ML (ref 0–0.03)
TROPONIN: 0.69 NG/ML (ref 0–0.03)
TROPONIN: 0.79 NG/ML (ref 0–0.03)
TSH REFLEX FT4: 1.47 UIU/ML (ref 0.35–5.5)
WBC # BLD: 6.6 K/UL (ref 4.8–10.8)

## 2020-03-29 PROCEDURE — 6360000002 HC RX W HCPCS: Performed by: HOSPITALIST

## 2020-03-29 PROCEDURE — 2580000003 HC RX 258: Performed by: INTERNAL MEDICINE

## 2020-03-29 PROCEDURE — 36415 COLL VENOUS BLD VENIPUNCTURE: CPT

## 2020-03-29 PROCEDURE — 80048 BASIC METABOLIC PNL TOTAL CA: CPT

## 2020-03-29 PROCEDURE — 99024 POSTOP FOLLOW-UP VISIT: CPT | Performed by: SURGERY

## 2020-03-29 PROCEDURE — 84443 ASSAY THYROID STIM HORMONE: CPT

## 2020-03-29 PROCEDURE — 6370000000 HC RX 637 (ALT 250 FOR IP): Performed by: HOSPITALIST

## 2020-03-29 PROCEDURE — 84100 ASSAY OF PHOSPHORUS: CPT

## 2020-03-29 PROCEDURE — 6370000000 HC RX 637 (ALT 250 FOR IP): Performed by: INTERNAL MEDICINE

## 2020-03-29 PROCEDURE — 83036 HEMOGLOBIN GLYCOSYLATED A1C: CPT

## 2020-03-29 PROCEDURE — 84484 ASSAY OF TROPONIN QUANT: CPT

## 2020-03-29 PROCEDURE — 93010 ELECTROCARDIOGRAM REPORT: CPT | Performed by: INTERNAL MEDICINE

## 2020-03-29 PROCEDURE — 80061 LIPID PANEL: CPT

## 2020-03-29 PROCEDURE — 99223 1ST HOSP IP/OBS HIGH 75: CPT | Performed by: INTERNAL MEDICINE

## 2020-03-29 PROCEDURE — 83735 ASSAY OF MAGNESIUM: CPT

## 2020-03-29 PROCEDURE — 85025 COMPLETE CBC W/AUTO DIFF WBC: CPT

## 2020-03-29 PROCEDURE — 85730 THROMBOPLASTIN TIME PARTIAL: CPT

## 2020-03-29 PROCEDURE — 2140000000 HC CCU INTERMEDIATE R&B

## 2020-03-29 PROCEDURE — 85610 PROTHROMBIN TIME: CPT

## 2020-03-29 RX ORDER — METOPROLOL SUCCINATE 25 MG/1
25 TABLET, EXTENDED RELEASE ORAL DAILY
Status: DISCONTINUED | OUTPATIENT
Start: 2020-03-29 | End: 2020-03-30

## 2020-03-29 RX ORDER — HEPARIN SODIUM 1000 [USP'U]/ML
4000 INJECTION, SOLUTION INTRAVENOUS; SUBCUTANEOUS ONCE
Status: COMPLETED | OUTPATIENT
Start: 2020-03-29 | End: 2020-03-29

## 2020-03-29 RX ORDER — HEPARIN SODIUM 1000 [USP'U]/ML
4000 INJECTION, SOLUTION INTRAVENOUS; SUBCUTANEOUS PRN
Status: DISCONTINUED | OUTPATIENT
Start: 2020-03-29 | End: 2020-03-31 | Stop reason: HOSPADM

## 2020-03-29 RX ORDER — HEPARIN SODIUM 1000 [USP'U]/ML
2000 INJECTION, SOLUTION INTRAVENOUS; SUBCUTANEOUS PRN
Status: DISCONTINUED | OUTPATIENT
Start: 2020-03-29 | End: 2020-03-31 | Stop reason: HOSPADM

## 2020-03-29 RX ORDER — OXYCODONE HYDROCHLORIDE AND ACETAMINOPHEN 5; 325 MG/1; MG/1
1 TABLET ORAL EVERY 6 HOURS PRN
Status: DISCONTINUED | OUTPATIENT
Start: 2020-03-29 | End: 2020-03-31 | Stop reason: HOSPADM

## 2020-03-29 RX ORDER — HEPARIN SODIUM 10000 [USP'U]/100ML
12 INJECTION, SOLUTION INTRAVENOUS CONTINUOUS
Status: DISCONTINUED | OUTPATIENT
Start: 2020-03-29 | End: 2020-03-31 | Stop reason: HOSPADM

## 2020-03-29 RX ORDER — ISOSORBIDE MONONITRATE 60 MG/1
60 TABLET, EXTENDED RELEASE ORAL DAILY
Status: DISCONTINUED | OUTPATIENT
Start: 2020-03-30 | End: 2020-03-31 | Stop reason: HOSPADM

## 2020-03-29 RX ADMIN — MINOXIDIL 10 MG: 10 TABLET ORAL at 11:04

## 2020-03-29 RX ADMIN — CLONIDINE HYDROCHLORIDE 0.1 MG: 0.1 TABLET ORAL at 20:23

## 2020-03-29 RX ADMIN — OXYCODONE 10 MG: 5 TABLET ORAL at 03:58

## 2020-03-29 RX ADMIN — METOPROLOL SUCCINATE 25 MG: 25 TABLET, EXTENDED RELEASE ORAL at 11:10

## 2020-03-29 RX ADMIN — OXYCODONE 10 MG: 5 TABLET ORAL at 16:21

## 2020-03-29 RX ADMIN — AMLODIPINE BESYLATE 10 MG: 5 TABLET ORAL at 11:03

## 2020-03-29 RX ADMIN — HYDROMORPHONE HYDROCHLORIDE 1 MG: 1 INJECTION, SOLUTION INTRAMUSCULAR; INTRAVENOUS; SUBCUTANEOUS at 05:06

## 2020-03-29 RX ADMIN — GABAPENTIN 200 MG: 100 CAPSULE ORAL at 14:02

## 2020-03-29 RX ADMIN — HEPARIN SODIUM 4000 UNITS: 1000 INJECTION INTRAVENOUS; SUBCUTANEOUS at 01:10

## 2020-03-29 RX ADMIN — OXYCODONE 10 MG: 5 TABLET ORAL at 12:01

## 2020-03-29 RX ADMIN — CLOPIDOGREL BISULFATE 75 MG: 75 TABLET ORAL at 11:03

## 2020-03-29 RX ADMIN — Medication 10 ML: at 20:24

## 2020-03-29 RX ADMIN — PANTOPRAZOLE SODIUM 20 MG: 20 TABLET, DELAYED RELEASE ORAL at 14:02

## 2020-03-29 RX ADMIN — OXYCODONE HYDROCHLORIDE AND ACETAMINOPHEN 1 TABLET: 5; 325 TABLET ORAL at 22:51

## 2020-03-29 RX ADMIN — GABAPENTIN 200 MG: 100 CAPSULE ORAL at 11:03

## 2020-03-29 RX ADMIN — HEPARIN SODIUM 4000 UNITS: 1000 INJECTION INTRAVENOUS; SUBCUTANEOUS at 20:15

## 2020-03-29 RX ADMIN — HEPARIN SODIUM 12 UNITS/KG/HR: 10000 INJECTION, SOLUTION INTRAVENOUS at 01:09

## 2020-03-29 RX ADMIN — HEPARIN SODIUM 2000 UNITS: 1000 INJECTION INTRAVENOUS; SUBCUTANEOUS at 09:56

## 2020-03-29 RX ADMIN — PANTOPRAZOLE SODIUM 20 MG: 20 TABLET, DELAYED RELEASE ORAL at 17:02

## 2020-03-29 RX ADMIN — SEVELAMER CARBONATE 800 MG: 800 TABLET, FILM COATED ORAL at 17:02

## 2020-03-29 RX ADMIN — CLONIDINE HYDROCHLORIDE 0.1 MG: 0.1 TABLET ORAL at 11:04

## 2020-03-29 RX ADMIN — HYDROMORPHONE HYDROCHLORIDE 1 MG: 1 INJECTION, SOLUTION INTRAMUSCULAR; INTRAVENOUS; SUBCUTANEOUS at 14:08

## 2020-03-29 RX ADMIN — GABAPENTIN 200 MG: 100 CAPSULE ORAL at 20:22

## 2020-03-29 RX ADMIN — OXYCODONE 10 MG: 5 TABLET ORAL at 07:31

## 2020-03-29 RX ADMIN — HEPARIN SODIUM 10 UNITS/KG/HR: 10000 INJECTION, SOLUTION INTRAVENOUS at 20:14

## 2020-03-29 RX ADMIN — PANTOPRAZOLE SODIUM 20 MG: 20 TABLET, DELAYED RELEASE ORAL at 05:06

## 2020-03-29 RX ADMIN — SEVELAMER CARBONATE 800 MG: 800 TABLET, FILM COATED ORAL at 11:04

## 2020-03-29 RX ADMIN — OXYCODONE 10 MG: 5 TABLET ORAL at 20:27

## 2020-03-29 RX ADMIN — CLONIDINE HYDROCHLORIDE 0.1 MG: 0.1 TABLET ORAL at 14:02

## 2020-03-29 RX ADMIN — ATORVASTATIN CALCIUM 80 MG: 40 TABLET, FILM COATED ORAL at 20:22

## 2020-03-29 RX ADMIN — ASPIRIN 81 MG: 81 TABLET, COATED ORAL at 11:05

## 2020-03-29 RX ADMIN — HYDROMORPHONE HYDROCHLORIDE 1 MG: 1 INJECTION, SOLUTION INTRAMUSCULAR; INTRAVENOUS; SUBCUTANEOUS at 09:22

## 2020-03-29 ASSESSMENT — PAIN DESCRIPTION - PAIN TYPE
TYPE: ACUTE PAIN
TYPE: SURGICAL PAIN
TYPE: ACUTE PAIN
TYPE: SURGICAL PAIN
TYPE: ACUTE PAIN
TYPE: SURGICAL PAIN
TYPE: ACUTE PAIN
TYPE: ACUTE PAIN
TYPE: SURGICAL PAIN

## 2020-03-29 ASSESSMENT — PAIN DESCRIPTION - LOCATION
LOCATION: LEG

## 2020-03-29 ASSESSMENT — PAIN SCALES - GENERAL
PAINLEVEL_OUTOF10: 7
PAINLEVEL_OUTOF10: 6
PAINLEVEL_OUTOF10: 5
PAINLEVEL_OUTOF10: 8
PAINLEVEL_OUTOF10: 6
PAINLEVEL_OUTOF10: 10
PAINLEVEL_OUTOF10: 6
PAINLEVEL_OUTOF10: 8
PAINLEVEL_OUTOF10: 5
PAINLEVEL_OUTOF10: 7
PAINLEVEL_OUTOF10: 8
PAINLEVEL_OUTOF10: 6
PAINLEVEL_OUTOF10: 9
PAINLEVEL_OUTOF10: 6
PAINLEVEL_OUTOF10: 5
PAINLEVEL_OUTOF10: 8
PAINLEVEL_OUTOF10: 4
PAINLEVEL_OUTOF10: 7
PAINLEVEL_OUTOF10: 5
PAINLEVEL_OUTOF10: 4
PAINLEVEL_OUTOF10: 6
PAINLEVEL_OUTOF10: 3

## 2020-03-29 ASSESSMENT — PAIN DESCRIPTION - ORIENTATION
ORIENTATION: RIGHT

## 2020-03-29 NOTE — CONSULTS
afternoon and lasted hours   3. Quality: The chest discomfort was described as pressure, fullness and tightness. It was not crushing. 4. Severity: It was described as mild but progessive   5. Timing The symptoms began at rest.   6. Modifying Factors: Modifying features included:   none   7. Associated Signs and Symptoms: There was  associated manifestations such as nausea, vomiting, shortness of air. 8. Context: As noted above in the context of the presentation. It  was perhaps suggestive of myocardial ischemia. When being examined this morning, in PCU, # 719 with Bernadine RN, there was on going or continuous symptoms of exertional chest discomfort, unusual or change in shortness of air, presyncope or syncope.          CARDIAC RISK PROFILE:      Risk Factor Yes / No / Unknown       Gender Male   Cigarette Use No, but did use in the past    Family History of Cardiovascular Disease Yes: high blood pressure   Diabetes Mellitus yes   Hypercholesteremia yes   Hypertension yes          Cardiac Specific Problems:    Specialty Problems        Cardiology Problems    CAD (coronary artery disease)        Chronic diastolic congestive heart failure (HCC)        Essential hypertension        CHF (congestive heart failure) ([facility])        Mixed hyperlipidemia        Chronic deep vein thrombosis (DVT) of axillary vein of left upper extremity ([facility])        Steal syndrome as complication of dialysis access ([facility])        Steal syndrome of dialysis vascular access [facility])        Atherosclerosis of artery of extremity with ulceration ([facility])        Atherosclerosis of artery of extremity with rest pain ([facility])        PVD (peripheral vascular disease) ([facility])        Failing vascular bypass graft        Stenosis of artery of right lower extremity ([facility])        NSTEMI (non-ST elevated myocardial infarction) ([address]        Atherosclerosis of native arteries of extremities with rest pain, right leg ([address]                PRIOR CARDIAC

## 2020-03-29 NOTE — PROGRESS NOTES
fistulograms including venograms of the SVC, Left cephalic vein balloon angioplasty with a 4mm x 100mm Tod balloon, Completion fistulograms    PACEMAKER PLACEMENT Right 2015    medtronic    PARACENTESIS      multiple/recent; per dr[place] [address] [address] [facility] Left 1/30/2018    UPPER EXTREMITY DRIL PROCEDURE WITH LEFT SAPHENOUS VEIN HARVESTING performed by Michelle Thomas MD at [address] [facility] [address] ANGIO N/A 3/6/2020    [facility] AND RIGHT LEG ANGIOGRAM performed by Lexus Olivas DO at [address] [facility] VASCULAR SURGERY  6/19/12    LU arteriovenous fistulogram including venography of the superior vena cava. Balloon angioplasty, left forearm cephalic vein and upper arm cephalic vein with [id number] tod balloon.  VASCULAR SURGERY  7/10/2012 [facility]     Revision of left distal radial artery to cephalic vein arteriovenous fistula with 7mm Artegraft interposition.  VASCULAR SURGERY  7/30/15 [facility] & [address]    Left upper extremity arteriovenous fistulograms including venography of the superior vena cava. Left cephalic vein balloon angioplasty with an 8 x 20 cm cutting balloon proximal cephalic vein. Balloon angioplasty of left cephalic vein/antecubital vein near the antecubital crease with 8 x 20 mm cutting balloon.  VASCULAR SURGERY  7/30/15 [facility]    Balloon angioplasty proximal end distal upper arm cephalic vein with 9 x 40 cm  balloon. Completion fistulograms of the left upper extremity.  VASCULAR SURGERY  8/13/15 [facility]    Revision of left upper extremity arteriovenous fistula with excision of pseudoaneurysm and primary repair of cephalic vein.     VASCULAR SURGERY  5/3/16 [facility]    Left upper fistulograms/venograms, left cephalic balloon 8 x 20 cutter,9 x 40 conquest,left proximal cephalic vein stents (flair 2 9 x 50), balloon stents 9 x 40 conquest.    VASCULAR SURGERY  12/08/2016    [facility]- ultrasound guided cannulation of left distal cephalic vein AORTAILLIAC AND RIGHT LEG ANGIOGRAM       Family History  Family History   Problem Relation Age of Onset    High Blood Pressure Mother     High Blood Pressure Father     High Blood Pressure Sister        Social History  Social History     Socioeconomic History    Marital status:      Spouse name: Silvia Haley    Number of children: 0    Years of education: 15    Highest education level: Not on file   Occupational History    Occupation:    Social Needs    Financial resource strain: Not on file    Food insecurity     Worry: Not on file     Inability: Not on file   [employer] needs     Medical: Not on file     Non-medical: Not on file   Tobacco Use    Smoking status: Former Smoker     Packs/day: 0.25     Years: 45.00     Pack years: 11.25     Types: Cigarettes     Last attempt to quit: 3/18/2020     Years since quittin.0    Smokeless tobacco: Never Used   Substance and Sexual Activity    Alcohol use: No    Drug use: Not Currently     Types: Marijuana, Cocaine, Methamphetamines, IV, Opiates , Other-see comments     Comment: in the 1970s, LSD    Sexual activity: Yes     Partners: Female     Comment: has a wife   Lifestyle    Physical activity     Days per week: Not on file     Minutes per session: Not on file    Stress: Not on file   Relationships    Social connections     Talks on phone: Not on file     Gets together: Not on file     Attends Sikh service: Not on file     Active member of club or organization: Not on file     Attends meetings of clubs or organizations: Not on file     Relationship status: Not on file    Intimate partner violence     Fear of current or ex partner: Not on file     Emotionally abused: Not on file     Physically abused: Not on file     Forced sexual activity: Not on file   Other Topics Concern    Not on file   Social History Narrative    CODE STATUS: Full Code    HEALTH CARE PROXY: Mrs. Silvia Haley, [phone]    Back up: his Mother, Mrs. Rodrigo Lu, [phone]    AMBULATES: independantly    DOMICILED: lives in a private house with 2 steps to get in, has no stairs inside, lives with wife and step children, has 3 pets         Review of Systems:  History obtained from chart review and the patient  General ROS: No fever or chills  Respiratory ROS: No cough, shortness of breath, wheezing  Cardiovascular ROS: No chest pain or palpitations  Gastrointestinal ROS: No abdominal pain or melena  Genito-Urinary ROS: No dysuria or hematuria  Musculoskeletal ROS: No joint pain or swelling   14 point ROS reviewed with the patient and negative except as noted above and in the HPI unless unable to obtain.     Objective:  Patient Vitals for the past 24 hrs:   BP Temp Temp src Pulse Resp SpO2 Height Weight   03/29/20 1041 (!) 163/67 97 °F (36.1 °C) Temporal 67 16 99 % -- --   03/29/20 0704 (!) 154/65 97.3 °F (36.3 °C) Temporal 61 16 99 % -- --   03/29/20 0532 -- -- -- -- -- -- -- 148 lb 14.4 oz (67.5 kg)   03/29/20 0219 (!) 122/57 96.9 °F (36.1 °C) Temporal 56 18 97 % -- --   03/28/20 2217 (!) 129/57 97.9 °F (36.6 °C) Temporal 64 18 94 % -- --   03/28/20 2214 -- -- -- -- -- -- 5' 8\" (1.727 m) --   03/28/20 2200 -- -- -- 67 -- -- -- --   03/28/20 1813 (!) 115/53 97.6 °F (36.4 °C) Temporal 81 18 92 % -- --   03/28/20 1659 -- -- -- 105 -- -- -- --   03/28/20 1653 (!) 174/78 97 °F (36.1 °C) Temporal 103 18 92 % -- --   03/28/20 1500 (!) 164/84 -- -- -- -- -- -- --   03/28/20 1446 (!) 171/74 -- -- 89 -- -- -- --   03/28/20 1441 (!) 175/81 98.8 °F (37.1 °C) Temporal 93 16 93 % -- --   03/28/20 1426 (!) 160/87 -- -- 87 -- -- -- --   03/28/20 1306 (!) 155/70 98.6 °F (37 °C) Temporal 76 18 98 % -- --       Intake/Output Summary (Last 24 hours) at 3/29/2020 1154  Last data filed at 3/28/2020 1813  Gross per 24 hour   Intake 50 ml   Output 2500 ml   Net -2450 ml     General: awake/alert   Chest:  clear to auscultation bilaterally without respiratory distress  CVS: regular rate and rhythm  Abdominal: soft, nontender, normal bowel sounds  Extremities: no cyanosis or edema, right AKA  Skin: dry without rash      Labs:  BMP:   Recent Labs     03/27/20 0219 03/28/20  0204 03/28/20 2340 03/29/20 0317    136  --  137   K 4.3 4.7  --  4.4   CL 96* 95*  --  95*   CO2 26 25  --  29   PHOS  --   --  3.7  --    BUN 27* 49*  --  26*   CREATININE 4.2* 5.7*  --  4.2*   CALCIUM 9.3 8.8  --  9.0     CBC:   Recent Labs     03/27/20 0219 03/28/20 0204 03/29/20 0317   WBC 7.4 7.2 6.6   HGB 9.1* 7.8* 7.9*   HCT 28.8* 25.8* 26.3*   MCV 90.9 93.1 94.3*    159 154     LIVER PROFILE: No results for input(s): AST, ALT, LIPASE, BILIDIR, BILITOT, ALKPHOS in the last 72 hours. Invalid input(s): AMYLASE,  ALB  PT/INR:   Recent Labs     03/29/20 0136   PROTIME 15.7*   INR 1.25*     APTT:   Recent Labs     03/29/20 0136 03/29/20  0814   APTT >200.0* 41.6*     BNP:  No results for input(s): BNP in the last 72 hours. Ionized Calcium:No results for input(s): IONCA in the last 72 hours. Magnesium:  Recent Labs     03/28/20  2340   MG 2.1     Phosphorus:  Recent Labs     03/28/20  2340   PHOS 3.7     HgbA1C:   Recent Labs     03/28/20  2340   LABA1C 5.7     Hepatic: No results for input(s): ALKPHOS, ALT, AST, PROT, BILITOT, BILIDIR, LABALBU in the last 72 hours. Lactic Acid: No results for input(s): LACTA in the last 72 hours. Troponin: No results for input(s): CKTOTAL, CKMB, TROPONINT in the last 72 hours. ABGs: No results for input(s): PH, PCO2, PO2, HCO3, O2SAT in the last 72 hours. CRP:  No results for input(s): CRP in the last 72 hours. Sed Rate:  No results for input(s): SEDRATE in the last 72 hours. Cultures:   No results for input(s): CULTURE in the last 72 hours. No results for input(s): BC, Remington Can in the last 72 hours. No results for input(s): CXSURG in the last 72 hours. Radiology reports as per the Radiologist  Radiology: No results found.

## 2020-03-29 NOTE — PROGRESS NOTES
[facility])ists Progress Note    Patient:  Abena Sky  YOB: 1960  Date of Service: 3/29/2020  MRN: [id number]   Acct: [de-identified]   Primary Care Physician: Kevin Walters DO  Advance Directive: Full Code  Admit Date: 3/25/2020       Hospital Day: 4      CHIEF COMPLAINT: Right lower extremity pain      3/29/2020 8:03 AM  Subjective / Interval History:   03/29/2020  Doing well. No further chest pain reported. Patient laying comfortably in bed no acute distress. Right lower extremity/AKA site pain currently controlled. Patient denies any other acute complaints or distress at this time. 03/28/2020  Reportedly with uncontrolled postop pain at the right AKA site. Placed on hydromorphone PCA pump by vascular. Patient laying comfortably in bed no acute distress. Pain at right AKA currently controlled as per patient. Denies any other acute complaints or distress at this time. Addendum @ 3:19 PM  · Called to bedside. · Patient seen and examined. · Patient reportedly with feeling nausea, with one episode of vomiting. Patient also complained of associated feeling of of indigestion/chest discomfort. Status post HD this p.m. Patient describes substernal burning sensation. · Troponin ordered  · Famotidine 20 mg IV twice daily  · Ondansetron as needed  · EKG  · Placed on telemetry monitor  · Continue to monitor closely    03/27/2020  Status post right AKA yesterday. Right lower extremity pain currently improved/controlled. Patient laying comfortably in bed no acute distress. Denies any acute complaints or distress at this time. 03/26/2020  No acute overnight event reported. Patient reports minimally controlled right lower extremity pain. Denies any acute complaints or distress at this time.       Brief History:   Mr. Viktoria Fernández, a 61 y.o. male with a history of severe peripheral vascular disease, HCV, CAD, ESRD, on HD (MWF), has been admitted for progressively worsening severe Intravenous PRN Bora Mayo MD        HYDROmorphone (DILAUDID) injection 0.5 mg  0.5 mg Intravenous Q4H PRN Bora Mayo MD        HYDROmorphone (DILAUDID) injection 1 mg  1 mg Intravenous Q4H PRN Bora Mayo MD   1 mg at 03/29/20 0506    HYDROmorphone (DILAUDID) injection 2 mg  2 mg Intravenous Q4H PRN Bora Mayo MD       [facility] by provider] ketorolac (TORADOL) injection 15 mg  15 mg Intravenous Q6H Yon , DO   15 mg at 03/28/20 8541    gabapentin (NEURONTIN) capsule 200 mg  200 mg Oral TID Jamal Dinh MD   200 mg at 03/28/20 2139    amLODIPine (NORVASC) tablet 10 mg  10 mg Oral Daily Jamal Dinh MD   10 mg at 03/28/20 1306    aspirin EC tablet 81 mg  81 mg Oral Daily Jamal Dinh MD   81 mg at 03/28/20 1306    clopidogrel (PLAVIX) tablet 75 mg  75 mg Oral Daily Jamal Dinh MD   75 mg at 03/28/20 1306    isosorbide mononitrate (IMDUR) extended release tablet 30 mg  30 mg Oral Daily Jamal Dinh MD   30 mg at 03/28/20 1306    cloNIDine (CATAPRES) tablet 0.1 mg  0.1 mg Oral TID Jamal Dinh MD   0.1 mg at 03/28/20 2139    minoxidil (LONITEN) tablet 10 mg  10 mg Oral Daily Jamal Dinh MD   10 mg at 03/28/20 1306    sevelamer (RENVELA) tablet 800 mg  800 mg Oral TID WC Jamal Dinh MD   Stopped at 03/28/20 1814    sucralfate (CARAFATE) tablet 1 g  1 g Oral TID PRN Jamal Dinh MD   1 g at 03/28/20 2210    sodium chloride flush 0.9 % injection 10 mL  10 mL Intravenous 2 times per day Jamal Dinh MD   10 mL at 03/28/20 2140    sodium chloride flush 0.9 % injection 10 mL  10 mL Intravenous PRN Jamal Dinh MD        potassium chloride (KLOR-CON M) extended release tablet 40 mEq  40 mEq Oral PRN Jamal Dinh MD        Or    potassium bicarb-citric acid (EFFER-K) effervescent tablet 40 mEq  40 mEq Oral PRN Jamal Dinh MD        Or    potassium chloride 10 mEq/100 mL IVPB (Peripheral vessels. The left low extremity shows mild local velocity elevations in the CFA,  suggestive of a stenosis in the left External iliac artery and Common  femoral artery. The SFA and popliteal artery are patent, however,  significant plaque is seen. the anterior tibial and posterior tibial and  peroneal artery are patent. Signature   ----------------------------------------------------------------  Electronically signed by Beni Thompson MD(Interpreting  physician) on 03/05/2020 07:03 AM  ----------------------------------------------------------------  Grafts   - The graft originated from the Right Dist Common Femoral to the Right     Dist Popliteal. +--------------------------------------+----+---+-----+--------------------+ ! Location                              ! PSV ! EDV! Ratio!% Stenosis          ! +--------------------------------------+----+---+-----+--------------------+ ! Prox Anastomosis                      ! 35  !   !     !                    ! +--------------------------------------+----+---+-----+--------------------+ ! Prox Graft                            !0   !   !0    !                    ! +--------------------------------------+----+---+-----+--------------------+ ! Mid Graft                             !0   !   !     !                    ! +--------------------------------------+----+---+-----+--------------------+ ! Dist Graft                            !0   !   !     !                    ! +--------------------------------------+----+---+-----+--------------------+ ! Dist Anastomosis                      !0   !   !     !                    ! +--------------------------------------+----+---+-----+--------------------+ Velocities are measured in cm/s ; Diameters are measured in mm LE Duplex Measurements +---------------++-----+-----+----+-----------++---+-----+---+-------------+ ! ! !Right! ! Left!           !!   !     !   !             ! +---------------++-----+-----+----+-----------++---+-----+---+-------------+ ! Location       ! !PSV  ! Ratio! EDV ! Wave Desc. !!PSV! Ratio! EDV! Wave Desc.   ! +---------------++-----+-----+----+-----------++---+-----+---+-------------+ ! Common Femoral !!161  !     !    !           !!266!     !   !             ! +---------------++-----+-----+----+-----------++---+-----+---+-------------+ ! Prox PFA       !!244  !     !    !           !!60 !     !   !             ! +---------------++-----+-----+----+-----------++---+-----+---+-------------+ ! Prox SFA       !!27   !     !    !           !!282!     !   !             ! +---------------++-----+-----+----+-----------++---+-----+---+-------------+ ! Mid SFA        !!0    !0    !    !           !!98 !0.35 !   !             ! +---------------++-----+-----+----+-----------++---+-----+---+-------------+ ! Dist SFA       !!12   !     !    !           !!96 !0.98 !   !             ! +---------------++-----+-----+----+-----------++---+-----+---+-------------+ ! Prox Popliteal !!0    !0    !    !           !!76 !0.79 !   !             ! +---------------++-----+-----+----+-----------++---+-----+---+-------------+ ! Dist Popliteal !!0    !     !    !           !!158!2.08 !   !             ! +---------------++-----+-----+----+-----------++---+-----+---+-------------+ ! Mid PTA        !!21   !     !    !           !!46 !0.29 !   !             ! +---------------++-----+-----+----+-----------++---+-----+---+-------------+ ! Prox GWYN       !!19   !     !    !           !!42 !0.27 !   !             ! +---------------++-----+-----+----+-----------++---+-----+---+-------------+ ! Mid Peroneal   !!19   !     !    !           !!53 !0.34 !   !             ! +---------------++-----+-----+----+-----------++---+-----+---+-------------      Objective:   Vitals: BP (!) 154/65   Pulse 61   Temp 97.3 °F (36.3 °C) (Temporal)   Resp 16   Ht 5' 8\" (1.727 m)   Wt 148 lb 14.4 oz (67.5 kg)   SpO2 99%   BMI 22.64 Coloration: Skin is not jaundiced or pale. Findings: No bruising, erythema, lesion or rash. Neurological:      General: No focal deficit present. Mental Status: He is alert and oriented to person, place, and time. Cranial Nerves: No cranial nerve deficit. Sensory: No sensory deficit. Motor: No weakness. Coordination: Coordination normal.   Psychiatric:         Mood and Affect: Mood normal.         Behavior: Behavior normal.         Thought Content: Thought content normal.         Judgment: Judgment normal.           Assessment/plan:           Principal Problem:    Surgical wound, non healing, subsequent encounter  Active Problems:    PVD (peripheral vascular disease) (HCC)    ESRD on dialysis ([facility] [facility]    Chronic diastolic congestive heart failure ([facility] [address]    Essential hypertension    Liver disease  Resolved Problems:    * No resolved hospital problems. *      Chest pain  History of CAD  · - Transferred to PCU to r/o ACS - 03/28/2020  · - Initial troponin  Trends: 0.17 --> 0.30 --> 0.47 --> 0.57 ---> 0.64 --> 0.67   · - Serial EKGs for chest pain  · - Optimized medical management   · --> Nitro glycerin sublingual when necessary for chest pain  · - Continue to monitor on telemetry  · - Pantoprazole   · - Cardiology consulted  · - NPO   · Further management as per cardiology recommendation    Severe peripheral vascular disease  · Vascular surgery on board  · Status post right AKA (03/26/2020)  · Wound care consult  · Continue symptomatic supportive management, including pain management PRN      History of ESRD, on scheduled HD   · - Nephrology following  · - Continue scheduled HD  · - Continue management as per nephrology rec       Continue management of other chronic medical conditions - see above and orders.             Advance Directive: Full Code    Diet NPO Effective Now Exceptions are: Sips with Meds, Ice Chips     DVT prophylaxis: Heparin    Consults Made:   IP CONSULT TO

## 2020-03-29 NOTE — PROGRESS NOTES
pantoprazole  20 mg Oral BID AC    atorvastatin  80 mg Oral Nightly    [Held by provider] ketorolac  15 mg Intravenous Q6H    gabapentin  200 mg Oral TID    amLODIPine  10 mg Oral Daily    aspirin  81 mg Oral Daily    clopidogrel  75 mg Oral Daily    cloNIDine  0.1 mg Oral TID    minoxidil  10 mg Oral Daily    sevelamer  800 mg Oral TID WC    sodium chloride flush  10 mL Intravenous 2 times per day     Continuous Infusions:   heparin (porcine) 13.442 Units/kg/hr (03/29/20 0937)     PRN Meds:heparin (porcine), 4,000 Units, PRN  heparin (porcine), 2,000 Units, PRN  oxyCODONE, 10 mg, Q4H PRN  ondansetron, 4 mg, Q6H PRN  labetalol, 10 mg, Q6H PRN  nitroGLYCERIN, 0.4 mg, Q5 Min PRN  naloxone, 0.4 mg, PRN  HYDROmorphone, 0.5 mg, Q4H PRN  HYDROmorphone, 1 mg, Q4H PRN  HYDROmorphone, 2 mg, Q4H PRN  sucralfate, 1 g, TID PRN  sodium chloride flush, 10 mL, PRN  potassium chloride, 40 mEq, PRN    Or  potassium alternative oral replacement, 40 mEq, PRN    Or  potassium chloride, 10 mEq, PRN  acetaminophen, 650 mg, Q6H PRN    Or  acetaminophen, 650 mg, Q6H PRN          PHYSICAL EXAM:     CONSTITUTIONAL: Alert and oriented times 3, no acute distress and cooperative to examination. HEENT: Head is normocephalic, atraumatic. EOMI, PERRLA  NECK: Soft, trachea midline and straight  LUNGS: Chest expands equally bilaterally upon respiration, no accessory muscle used.    CARDIOVASCULAR: Heart regular rate and rhythm  ABDOMEN: not examined  WOUND/INCISION:  Dressing c/d/i   EXTREMITY: right AKA    LABS:       CBC:   Recent Labs     03/27/20  0219 03/28/20  0204 03/29/20  0317   WBC 7.4 7.2 6.6   RBC 3.17* 2.77* 2.79*   HGB 9.1* 7.8* 7.9*   HCT 28.8* 25.8* 26.3*   MCV 90.9 93.1 94.3*   MCH 28.7 28.2 28.3   MCHC 31.6* 30.2* 30.0*   RDW 16.6* 16.5* 16.3*    159 154   MPV 12.2 11.4 11.7      Last 3 CMP:   Recent Labs     03/27/20  0219 03/28/20  0204 03/29/20 0317    136 137   K 4.3 4.7 4.4   CL 96* 95* 95*   CO2 26 25

## 2020-03-30 LAB
ALBUMIN SERPL-MCNC: 3.2 G/DL (ref 3.5–5.2)
ALP BLD-CCNC: 91 U/L (ref 40–130)
ALT SERPL-CCNC: 7 U/L (ref 5–41)
ANION GAP SERPL CALCULATED.3IONS-SCNC: 10 MMOL/L (ref 7–19)
ANION GAP SERPL CALCULATED.3IONS-SCNC: 16 MMOL/L (ref 7–19)
APTT: 33.7 SEC (ref 26–36.2)
APTT: 43.4 SEC (ref 26–36.2)
APTT: 45.9 SEC (ref 26–36.2)
APTT: 59.2 SEC (ref 26–36.2)
AST SERPL-CCNC: 18 U/L (ref 5–40)
BASOPHILS ABSOLUTE: 0 K/UL (ref 0–0.2)
BASOPHILS RELATIVE PERCENT: 0.3 % (ref 0–1)
BILIRUB SERPL-MCNC: 0.4 MG/DL (ref 0.2–1.2)
BUN BLDV-MCNC: 13 MG/DL (ref 6–20)
BUN BLDV-MCNC: 42 MG/DL (ref 6–20)
CALCIUM SERPL-MCNC: 8.9 MG/DL (ref 8.6–10)
CALCIUM SERPL-MCNC: 9 MG/DL (ref 8.6–10)
CHLORIDE BLD-SCNC: 100 MMOL/L (ref 98–111)
CHLORIDE BLD-SCNC: 92 MMOL/L (ref 98–111)
CO2: 27 MMOL/L (ref 22–29)
CO2: 31 MMOL/L (ref 22–29)
CREAT SERPL-MCNC: 2.3 MG/DL (ref 0.5–1.2)
CREAT SERPL-MCNC: 5.7 MG/DL (ref 0.5–1.2)
EOSINOPHILS ABSOLUTE: 0.5 K/UL (ref 0–0.6)
EOSINOPHILS RELATIVE PERCENT: 8.4 % (ref 0–5)
GFR NON-AFRICAN AMERICAN: 10
GFR NON-AFRICAN AMERICAN: 29
GLUCOSE BLD-MCNC: 118 MG/DL (ref 74–109)
GLUCOSE BLD-MCNC: 118 MG/DL (ref 74–109)
HCT VFR BLD CALC: 26 % (ref 42–52)
HEMOGLOBIN: 8 G/DL (ref 14–18)
IMMATURE GRANULOCYTES #: 0 K/UL
LYMPHOCYTES ABSOLUTE: 1.2 K/UL (ref 1.1–4.5)
LYMPHOCYTES RELATIVE PERCENT: 21.3 % (ref 20–40)
MAGNESIUM: 2 MG/DL (ref 1.6–2.6)
MCH RBC QN AUTO: 27.9 PG (ref 27–31)
MCHC RBC AUTO-ENTMCNC: 30.8 G/DL (ref 33–37)
MCV RBC AUTO: 90.6 FL (ref 80–94)
MONOCYTES ABSOLUTE: 0.5 K/UL (ref 0–0.9)
MONOCYTES RELATIVE PERCENT: 9.2 % (ref 0–10)
NEUTROPHILS ABSOLUTE: 3.5 K/UL (ref 1.5–7.5)
NEUTROPHILS RELATIVE PERCENT: 60.5 % (ref 50–65)
PDW BLD-RTO: 16.2 % (ref 11.5–14.5)
PHOSPHORUS: 2.3 MG/DL (ref 2.5–4.5)
PLATELET # BLD: 138 K/UL (ref 130–400)
PMV BLD AUTO: 11.8 FL (ref 9.4–12.4)
POTASSIUM REFLEX MAGNESIUM: 5.1 MMOL/L (ref 3.5–5)
POTASSIUM SERPL-SCNC: 3.7 MMOL/L (ref 3.5–5)
RBC # BLD: 2.87 M/UL (ref 4.7–6.1)
SODIUM BLD-SCNC: 135 MMOL/L (ref 136–145)
SODIUM BLD-SCNC: 141 MMOL/L (ref 136–145)
TOTAL PROTEIN: 6.6 G/DL (ref 6.6–8.7)
TROPONIN: 0.96 NG/ML (ref 0–0.03)
WBC # BLD: 5.7 K/UL (ref 4.8–10.8)

## 2020-03-30 PROCEDURE — 99024 POSTOP FOLLOW-UP VISIT: CPT | Performed by: NURSE PRACTITIONER

## 2020-03-30 PROCEDURE — 99233 SBSQ HOSP IP/OBS HIGH 50: CPT | Performed by: INTERNAL MEDICINE

## 2020-03-30 PROCEDURE — 6370000000 HC RX 637 (ALT 250 FOR IP): Performed by: INTERNAL MEDICINE

## 2020-03-30 PROCEDURE — 97530 THERAPEUTIC ACTIVITIES: CPT

## 2020-03-30 PROCEDURE — 84484 ASSAY OF TROPONIN QUANT: CPT

## 2020-03-30 PROCEDURE — 80053 COMPREHEN METABOLIC PANEL: CPT

## 2020-03-30 PROCEDURE — 6370000000 HC RX 637 (ALT 250 FOR IP): Performed by: HOSPITALIST

## 2020-03-30 PROCEDURE — 85025 COMPLETE CBC W/AUTO DIFF WBC: CPT

## 2020-03-30 PROCEDURE — 36415 COLL VENOUS BLD VENIPUNCTURE: CPT

## 2020-03-30 PROCEDURE — 93288 INTERROG EVL PM/LDLS PM IP: CPT | Performed by: INTERNAL MEDICINE

## 2020-03-30 PROCEDURE — 85730 THROMBOPLASTIN TIME PARTIAL: CPT

## 2020-03-30 PROCEDURE — 97116 GAIT TRAINING THERAPY: CPT

## 2020-03-30 PROCEDURE — 2140000000 HC CCU INTERMEDIATE R&B

## 2020-03-30 PROCEDURE — 2580000003 HC RX 258: Performed by: INTERNAL MEDICINE

## 2020-03-30 PROCEDURE — 83735 ASSAY OF MAGNESIUM: CPT

## 2020-03-30 PROCEDURE — 6360000002 HC RX W HCPCS: Performed by: HOSPITALIST

## 2020-03-30 PROCEDURE — 8010000000 HC HEMODIALYSIS ACUTE INPT

## 2020-03-30 PROCEDURE — 84100 ASSAY OF PHOSPHORUS: CPT

## 2020-03-30 RX ORDER — ACETAMINOPHEN 325 MG/1
650 TABLET ORAL
Status: COMPLETED | OUTPATIENT
Start: 2020-03-30 | End: 2020-03-30

## 2020-03-30 RX ORDER — METOPROLOL SUCCINATE 25 MG/1
25 TABLET, EXTENDED RELEASE ORAL 2 TIMES DAILY
Status: DISCONTINUED | OUTPATIENT
Start: 2020-03-30 | End: 2020-03-31 | Stop reason: HOSPADM

## 2020-03-30 RX ADMIN — OXYCODONE 10 MG: 5 TABLET ORAL at 01:56

## 2020-03-30 RX ADMIN — METOPROLOL SUCCINATE 25 MG: 25 TABLET, EXTENDED RELEASE ORAL at 08:57

## 2020-03-30 RX ADMIN — SEVELAMER CARBONATE 800 MG: 800 TABLET, FILM COATED ORAL at 16:45

## 2020-03-30 RX ADMIN — CLONIDINE HYDROCHLORIDE 0.1 MG: 0.1 TABLET ORAL at 08:57

## 2020-03-30 RX ADMIN — AMLODIPINE BESYLATE 10 MG: 5 TABLET ORAL at 08:58

## 2020-03-30 RX ADMIN — OXYCODONE 10 MG: 5 TABLET ORAL at 16:46

## 2020-03-30 RX ADMIN — ATORVASTATIN CALCIUM 80 MG: 40 TABLET, FILM COATED ORAL at 20:08

## 2020-03-30 RX ADMIN — OXYCODONE HYDROCHLORIDE AND ACETAMINOPHEN 1 TABLET: 5; 325 TABLET ORAL at 20:07

## 2020-03-30 RX ADMIN — GABAPENTIN 200 MG: 100 CAPSULE ORAL at 20:08

## 2020-03-30 RX ADMIN — GABAPENTIN 200 MG: 100 CAPSULE ORAL at 08:58

## 2020-03-30 RX ADMIN — CLOPIDOGREL BISULFATE 75 MG: 75 TABLET ORAL at 08:56

## 2020-03-30 RX ADMIN — HEPARIN SODIUM 2000 UNITS: 1000 INJECTION INTRAVENOUS; SUBCUTANEOUS at 02:34

## 2020-03-30 RX ADMIN — OXYCODONE HYDROCHLORIDE AND ACETAMINOPHEN 1 TABLET: 5; 325 TABLET ORAL at 05:56

## 2020-03-30 RX ADMIN — OXYCODONE 10 MG: 5 TABLET ORAL at 22:10

## 2020-03-30 RX ADMIN — ASPIRIN 81 MG: 81 TABLET, COATED ORAL at 08:57

## 2020-03-30 RX ADMIN — Medication 10 ML: at 20:08

## 2020-03-30 RX ADMIN — HEPARIN SODIUM 11 UNITS/KG/HR: 10000 INJECTION, SOLUTION INTRAVENOUS at 02:33

## 2020-03-30 RX ADMIN — ISOSORBIDE MONONITRATE 60 MG: 60 TABLET, EXTENDED RELEASE ORAL at 08:57

## 2020-03-30 RX ADMIN — SEVELAMER CARBONATE 800 MG: 800 TABLET, FILM COATED ORAL at 08:57

## 2020-03-30 RX ADMIN — PANTOPRAZOLE SODIUM 20 MG: 20 TABLET, DELAYED RELEASE ORAL at 16:02

## 2020-03-30 RX ADMIN — PANTOPRAZOLE SODIUM 20 MG: 20 TABLET, DELAYED RELEASE ORAL at 05:19

## 2020-03-30 RX ADMIN — ACETAMINOPHEN 650 MG: 325 TABLET ORAL at 14:36

## 2020-03-30 RX ADMIN — Medication 10 ML: at 08:58

## 2020-03-30 RX ADMIN — HEPARIN SODIUM 11 UNITS/KG/HR: 10000 INJECTION, SOLUTION INTRAVENOUS at 16:45

## 2020-03-30 RX ADMIN — MINOXIDIL 10 MG: 10 TABLET ORAL at 08:57

## 2020-03-30 RX ADMIN — GABAPENTIN 200 MG: 100 CAPSULE ORAL at 16:02

## 2020-03-30 RX ADMIN — OXYCODONE 10 MG: 5 TABLET ORAL at 08:56

## 2020-03-30 ASSESSMENT — PAIN SCALES - GENERAL
PAINLEVEL_OUTOF10: 7
PAINLEVEL_OUTOF10: 8
PAINLEVEL_OUTOF10: 7
PAINLEVEL_OUTOF10: 10
PAINLEVEL_OUTOF10: 5
PAINLEVEL_OUTOF10: 9
PAINLEVEL_OUTOF10: 5
PAINLEVEL_OUTOF10: 5
PAINLEVEL_OUTOF10: 4
PAINLEVEL_OUTOF10: 6
PAINLEVEL_OUTOF10: 7
PAINLEVEL_OUTOF10: 5
PAINLEVEL_OUTOF10: 8
PAINLEVEL_OUTOF10: 5
PAINLEVEL_OUTOF10: 8

## 2020-03-30 ASSESSMENT — PAIN DESCRIPTION - ORIENTATION
ORIENTATION: RIGHT
ORIENTATION: LEFT;RIGHT
ORIENTATION: RIGHT
ORIENTATION: RIGHT

## 2020-03-30 ASSESSMENT — PAIN DESCRIPTION - PAIN TYPE
TYPE: ACUTE PAIN
TYPE: ACUTE PAIN;CHRONIC PAIN;SURGICAL PAIN

## 2020-03-30 ASSESSMENT — PAIN DESCRIPTION - LOCATION
LOCATION: BACK;LEG
LOCATION: INCISION
LOCATION: INCISION
LOCATION: LEG
LOCATION: ANKLE
LOCATION: LEG

## 2020-03-30 NOTE — PROGRESS NOTES
Physical Therapy      Name: Cruz Gary  MRN:  [id number]  Date of service:  3/30/2020       03/30/20 1400   General   Missed reason Conflicting appointment  (Patient in dialsysis this afternoon)       Electronically signed by Clare Nails PTA on 3/30/2020 at 3:28 PM

## 2020-03-30 NOTE — PROGRESS NOTES
Vascular Surgery  Dr. Paris Moreno   Daily Progress Note    Pt Name: Toña Boyd Record Number: [id number]  Date of Birth 1960   Today's Date: 3/30/2020        SUBJECTIVE:     Patient was seen and examined. Awake, alert, comfortable. Stated pain was better controlled. Transferred to PCU for complaint of chest pain/heartburn. OBJECTIVE:     Patient Vitals for the past 24 hrs:   BP Temp Temp src Pulse Resp SpO2 Weight   03/30/20 0618 125/73 97.8 °F (36.6 °C) Temporal 59 18 99 % --   03/30/20 0450 -- -- -- -- -- -- 151 lb 3.2 oz (68.6 kg)   03/30/20 0030 108/60 97.2 °F (36.2 °C) Temporal 65 16 96 % --   03/29/20 2035 -- -- -- 69 -- -- --   03/29/20 1817 (!) 122/56 97.5 °F (36.4 °C) Temporal 72 16 90 % --   03/29/20 1534 -- -- -- 72 -- -- --   03/29/20 1422 (!) 104/57 97.9 °F (36.6 °C) Temporal 66 16 94 % --   03/29/20 1041 (!) 163/67 97 °F (36.1 °C) Temporal 67 16 99 % --         Intake/Output Summary (Last 24 hours) at 3/30/2020 0749  Last data filed at 3/30/2020 0450  Gross per 24 hour   Intake 440 ml   Output --   Net 440 ml       In: 340 [P.O.:340]  Out: -     I/O last 3 completed shifts: In: 440 [P.O.:440]  Out: -      Date 03/30/20 0000 - 03/30/20 2359   Shift 1827-2879 4864-5879 7383-7080 24 Hour Total   INTAKE   P.O. 100   100   Shift Total(mL/kg) 100(1.5)   100(1.5)   OUTPUT   Shift Total(mL/kg)       Weight (kg) 68.6 68.6 68.6 68.6       Wt Readings from Last 3 Encounters:   03/30/20 151 lb 3.2 oz (68.6 kg)   03/23/20 174 lb (78.9 kg)   03/11/20 174 lb (78.9 kg)        Body mass index is 22.99 kg/m².      Diet: DIET RENAL;        MEDS:     Scheduled Meds:   metoprolol succinate  25 mg Oral Daily    isosorbide mononitrate  60 mg Oral Daily    pantoprazole  20 mg Oral BID AC    atorvastatin  80 mg Oral Nightly    gabapentin  200 mg Oral TID    amLODIPine  10 mg Oral Daily    aspirin  81 mg Oral Daily    clopidogrel  75 mg Oral Daily    cloNIDine  0.1 mg Oral TID    minoxidil  10

## 2020-03-30 NOTE — PROGRESS NOTES
Physical Therapy  Name: Sabine Krueger  MRN:  [id number]  Date of service:  3/30/2020     03/30/20 1022   Subjective   Subjective Patient agreeable to work with therapy   Vital Signs   Temp 97 °F (36.1 °C)   Temp Source Temporal   Pulse 62   Heart Rate Source Monitor   Resp 16   BP (!) 91/45   BP Location Right Arm   BP Upper/Lower Upper   Patient Position Supine   Level of Consciousness 0   MEWS Score 2   Oxygen Therapy   SpO2 94 %   O2 Device None (Room air)   Bed Mobility   Supine to Sit Modified independent  (uses bedrail)   Sit to Supine Modified independent   Scooting Independent   Transfers   Sit to Stand Minimal Assistance   Stand to sit Minimal Assistance   Bed to Chair Minimal assistance  (1+1 for safety)   Ambulation 1   Surface level tile   Device Rolling Walker   Assistance Minimal assistance  (1+1)   Quality of Gait unsteady today   Distance 3 feet to recliner   Comments Patient sat too early when going to the chair, was on very edge of recliner. Patient demonstrates decreased overall safety awareness this treatment. Short term goals   Time Frame for Short term goals 2 WKS   Short term goal 1 SUP<>SIT, SBA   Short term goal 2 SIT<>STAND, CGA   Short term goal 3 STAND/SQUAT PIVOT TF WITH CGA   Short term goal 4 STAND STEP TF WITH RW AND CGA   Conditions Requiring Skilled Therapeutic Intervention   Body structures, Functions, Activity limitations Decreased functional mobility ; Decreased strength; Increased pain;Decreased endurance;Decreased posture   Assessment Assisted patient to the recliner and left with all needs in reach.    Prognosis Good         Electronically signed by Jatinder Dumont PTA on 3/30/2020 at 11:21 AM

## 2020-03-30 NOTE — PROGRESS NOTES
Pt received medication and is resting. No complaints at this time. Will continue to monitor.  Electronically signed by Aura Simpson RN on 3/29/2020 at 8:46 PM

## 2020-03-30 NOTE — PROGRESS NOTES
1 g Oral TID PRN Glenna Christian MD   1 g at 03/28/20 2210    sodium chloride flush 0.9 % injection 10 mL  10 mL Intravenous 2 times per day Glenna Christian MD   10 mL at 03/30/20 0858    sodium chloride flush 0.9 % injection 10 mL  10 mL Intravenous PRN Glenna Christian MD        potassium chloride (KLOR-CON M) extended release tablet 40 mEq  40 mEq Oral PRN Glenna Christian MD        Or    potassium bicarb-citric acid (EFFER-K) effervescent tablet 40 mEq  40 mEq Oral PRN Glenna Christian MD        Or    potassium chloride 10 mEq/100 mL IVPB (Peripheral Line)  10 mEq Intravenous PRN Glenna Christian MD        acetaminophen (TYLENOL) tablet 650 mg  650 mg Oral Q6H PRN Glenna Christian MD   650 mg at 03/28/20 1654    Or    acetaminophen (TYLENOL) suppository 650 mg  650 mg Rectal Q6H PRN Glenna Christian MD           Past Medical History:  Past Medical History:   Diagnosis Date    Anxiety     Blood circulation, collateral     CAD (coronary artery disease)     stents x 2; per dr. Faby Alonzo [facility])     liver cancer    CHF (congestive heart failure) ([address]     Chyloperitoneum determined by paracentesis     CKD (chronic kidney disease), stage V ([address]     Dialysis patient ([address]     mon wed fri at [place] GERD (gastroesophageal reflux disease)     Hemodialysis patient ([address]     mon wed fri in [place] Hepatic cirrhosis ([address]     dr. Fernanda Heredia; has been going to [facility] for liver and kidney transplant list.    Hepatitis C, chronic ([address]     History of blood transfusion     HTN (hypertension)     Hx of blood clots     arm/fistula    Mixed hyperlipidemia 1/9/2017    Osteoarthritis     Pacemaker     Pain management     Dr. Leeroy Frost care patient 07/09/2019    PVD (peripheral vascular disease) ([address]     Sepsis ([address]     Skin ulcer of right heel with fat layer exposed ([address] 8/16/2019    Sleep apnea     no cpap at present.     Type II diabetes mellitus ([address] a 4mm x 100mm Tod balloon, Completion fistulograms    PACEMAKER PLACEMENT Right 2015    medtronic    PARACENTESIS      multiple/recent; per dr. Gonzalo Lewis at [address] [facility] Left 1/30/2018    UPPER EXTREMITY DRIL PROCEDURE WITH LEFT SAPHENOUS VEIN HARVESTING performed by Rox Light MD at [address] [facility] [address] ANGIO N/A 3/6/2020    BILATERAL AORTAILLIAC AND RIGHT LEG ANGIOGRAM performed by Latosha Kruger DO at [address] [facility] VASCULAR SURGERY  6/19/12    LUE arteriovenous fistulogram including venography of the superior vena cava. Balloon angioplasty, left forearm cephalic vein and upper arm cephalic vein with [id number] tod balloon.  VASCULAR SURGERY  7/10/2012 S     Revision of left distal radial artery to cephalic vein arteriovenous fistula with 7mm Artegraft interposition.  VASCULAR SURGERY  7/30/15 [facility] & [address]    Left upper extremity arteriovenous fistulograms including venography of the superior vena cava. Left cephalic vein balloon angioplasty with an 8 x 20 cm cutting balloon proximal cephalic vein. Balloon angioplasty of left cephalic vein/antecubital vein near the antecubital crease with 8 x 20 mm cutting balloon.  VASCULAR SURGERY  7/30/15 [facility]    Balloon angioplasty proximal end distal upper arm cephalic vein with 9 x 40 cm  balloon. Completion fistulograms of the left upper extremity.  VASCULAR SURGERY  8/13/15 S    Revision of left upper extremity arteriovenous fistula with excision of pseudoaneurysm and primary repair of cephalic vein.     VASCULAR SURGERY  5/3/16 [facility]    Left upper fistulograms/venograms, left cephalic balloon 8 x 20 cutter,9 x 40 conquest,left proximal cephalic vein stents (flair 2 9 x 50), balloon stents 9 x 40 conquest.    VASCULAR SURGERY  12/08/2016    [facility]- ultrasound guided cannulation of left distal cephalic vein ultrasound guided cannulation right internal jugular vein placement of right internal jugular 7. 8* 7.9* 8.0*   HCT 25.8* 26.3* 26.0*   MCV 93.1 94.3* 90.6    154 138     LIVER PROFILE: No results for input(s): AST, ALT, LIPASE, BILIDIR, BILITOT, ALKPHOS in the last 72 hours. Invalid input(s): AMYLASE,  ALB  PT/INR:   Recent Labs     03/29/20  0136   PROTIME 15.7*   INR 1.25*     APTT:   Recent Labs     03/30/20  0144 03/30/20  0639 03/30/20  1208   APTT 45.9* 43.4* 33.7     BNP:  No results for input(s): BNP in the last 72 hours. Ionized Calcium:No results for input(s): IONCA in the last 72 hours. Magnesium:  Recent Labs     03/28/20  2340   MG 2.1     Phosphorus:  Recent Labs     03/28/20  2340   PHOS 3.7     HgbA1C:   Recent Labs     03/28/20  2340   LABA1C 5.7     Hepatic: No results for input(s): ALKPHOS, ALT, AST, PROT, BILITOT, BILIDIR, LABALBU in the last 72 hours. Lactic Acid: No results for input(s): LACTA in the last 72 hours. Troponin: No results for input(s): CKTOTAL, CKMB, TROPONINT in the last 72 hours. ABGs: No results for input(s): PH, PCO2, PO2, HCO3, O2SAT in the last 72 hours. CRP:  No results for input(s): CRP in the last 72 hours. Sed Rate:  No results for input(s): SEDRATE in the last 72 hours. Cultures:   No results for input(s): CULTURE in the last 72 hours. No results for input(s): BC, Kraig Adriane in the last 72 hours. No results for input(s): CXSURG in the last 72 hours. Radiology reports as per the Radiologist  Radiology: No results found. Assessment   1. End-stage renal disease/currently seen on dialysis. 2.  Type II diabetic nephropathy. 3.  Anemia of chronic kidney disease. 4.  Chronic diastolic CHF. 5.  History of hepatitis C and cirrhosis. 6.  Secondary hyperparathyroidism. 7.  Severe peripheral vascular disease. 8.  Hyperkalemia/recurrent      Plan:  1. Tolerating hemodialysis very well. 2.  Agree to move him to rehab floor for rehabilitation.       Tye Franklin MD  03/30/20  2:36 PM

## 2020-03-30 NOTE — PROGRESS NOTES
[facility] Progress Note    Patient:  Gene Capone  YOB: 1960  Date of Service: 3/30/2020  MRN: [id number]   Acct: [de-identified]   Primary Care Physician: Johanna Ndiayeelor,   Advance Directive: Full Code  Admit Date: 3/25/2020       Hospital Day: 5      CHIEF COMPLAINT: Right lower extremity pain      3/30/2020 9:16 AM  Subjective / Interval History:   03/30/2020  Doing well. No acute changes or acute overnight event reported. Patient endorses overall improvement. Right lower extremity pain currently controlled. Denies any other acute complaints or distress at this time. Of note  Runs of torsades reported on telemetry  Follow-up repeat electrolytes  Review of medications, shows no obvious etiology  Continue monitor on telemetry    03/29/2020  Doing well. No further chest pain reported. Patient laying comfortably in bed no acute distress. Right lower extremity/AKA site pain currently controlled. Patient denies any other acute complaints or distress at this time. 03/28/2020  Reportedly with uncontrolled postop pain at the right AKA site. Placed on hydromorphone PCA pump by vascular. Patient laying comfortably in bed no acute distress. Pain at right AKA currently controlled as per patient. Denies any other acute complaints or distress at this time. Addendum @ 3:19 PM  · Called to bedside. · Patient seen and examined. · Patient reportedly with feeling nausea, with one episode of vomiting. Patient also complained of associated feeling of of indigestion/chest discomfort. Status post HD this p.m. Patient describes substernal burning sensation. · Troponin ordered  · Famotidine 20 mg IV twice daily  · Ondansetron as needed  · EKG  · Placed on telemetry monitor  · Continue to monitor closely    03/27/2020  Status post right AKA yesterday. Right lower extremity pain currently improved/controlled. Patient laying comfortably in bed no acute distress.   Denies any acute complaints or distress at this time. 03/26/2020  No acute overnight event reported. Patient reports minimally controlled right lower extremity pain. Denies any acute complaints or distress at this time. Brief History:   Mr. Willow Ocasio, a 61 y.o. male with a history of severe peripheral vascular disease, HCV, CAD, ESRD, on HD (MWF), has been admitted for progressively worsening severe right lower extremity pain and nonhealing wound. Patient reportedly has had an arterial bypass about 7 months ago, and the wound never healed. Pain reportedly has progressively gotten worse to the point where it becomes difficult for the patient to ambulate.       Patient admitted directly, via vascular team, for plan right leg amputation      Review of Systems:   Review of Systems  ROS: 14 point review of systems is negative except as specifically addressed above. DIET RENAL;     Intake/Output Summary (Last 24 hours) at 3/30/2020 0916  Last data filed at 3/30/2020 0450  Gross per 24 hour   Intake 440 ml   Output --   Net 440 ml       Medications:   heparin (porcine) 11 Units/kg/hr (03/30/20 0233)     Current Facility-Administered Medications   Medication Dose Route Frequency Provider Last Rate Last Dose    heparin (porcine) injection 4,000 Units  4,000 Units Intravenous PRN Gato Vázquez MD   4,000 Units at 03/29/20 2015    heparin (porcine) injection 2,000 Units  2,000 Units Intravenous PRN Gato Vázquez MD   2,000 Units at 03/30/20 0234    heparin 25,000 units in dextrose 5% 250 mL infusion  12 Units/kg/hr Intravenous Continuous Gato Vázquez MD 7.4 mL/hr at 03/30/20 0233 11 Units/kg/hr at 03/30/20 0233    metoprolol succinate (TOPROL XL) extended release tablet 25 mg  25 mg Oral Daily Efrain York MD   25 mg at 03/30/20 0857    isosorbide mononitrate (IMDUR) extended release tablet 60 mg  60 mg Oral Daily Efrain York MD   60 mg at 03/30/20 0857    oxyCODONE-acetaminophen (PERCOCET) 5-325 MG per tablet 1 03/29/20  0317 03/30/20  0144    137 135*   K 4.7 4.4 5.1*   CL 95* 95* 92*   CO2 25 29 27   ANIONGAP 16 13 16   GLUCOSE 93 81 118*   BUN 49* 26* 42*   CREATININE 5.7* 4.2* 5.7*   LABGLOM 10* 15* 10*   CALCIUM 8.8 9.0 9.0         CRP:  No results for input(s): CRP in the last 72 hours. Sed Rate:  No results for input(s): SEDRATE in the last 72 hours. HgBA1c:  No components found for: HGBA1C  FLP:    Lab Results   Component Value Date    TRIG 125 03/29/2020    HDL 23 03/29/2020    LDLCALC 35 03/29/2020    LDLDIRECT 94 01/17/2014     TSH:    Lab Results   Component Value Date    TSH 1.43 01/17/2014     Troponin T:   Recent Labs     03/29/20  0505 03/29/20  1101 03/29/20  1408   TROPONINI 0.67* 0.79* 0.69*     Pro-BNP: No results for input(s): BNP in the last 72 hours. INR:   Recent Labs     03/29/20  0136   INR 1.25*     ABGs:   Lab Results   Component Value Date    PHART 7.480 01/27/2020    PO2ART 58.0 01/27/2020    MFA5MMX 34.0 01/27/2020     UA:No results for input(s): NITRITE, COLORU, PHUR, LABCAST, WBCUA, RBCUA, MUCUS, TRICHOMONAS, YEAST, BACTERIA, CLARITYU, SPECGRAV, LEUKOCYTESUR, UROBILINOGEN, BILIRUBINUR, BLOODU, GLUCOSEU, AMORPHOUS in the last 72 hours. Invalid input(s): Treva Moulding      Culture Results:    No results for input(s): CXSURG in the last 720 hours. Blood Culture Recent: No results for input(s): BC in the last 720 hours. Cultures:   No results for input(s): CULTURE in the last 72 hours. No results for input(s): BC, Yamilet Garcia in the last 72 hours. No results for input(s): CXSURG in the last 72 hours. Recent Labs     03/28/20  2340   MG 2.1   PHOS 3.7     No results for input(s): AST, ALT, ALB, BILITOT, ALKPHOS, ALB in the last 72 hours.       RAD:   Vl Madiha Bilateral Limited 1-2 Levels    Result Date: 3/5/2020  Vascular Lower Arterial Plethysmography Procedure  Demographics   Patient Name     Virgilio Wall Age                   61   Patient Number   [id number]       Gender ! !55      !0.39 !    !255     !1.81 ! +--------------------------------------++--------+-----+----+--------+-----+ ! Great Toe                             !!0       !0    !    !48      !0.34 ! +--------------------------------------++--------+-----+----+--------+-----+   - Brachial Pressure:Right: 141.   - GABRIEL:Right: 0.39. Left: 1.81. Plethysmographic Digit Evaluation +---------++--------+-----+---------------++--------+-----+----------------+ ! ! !Right   ! ! Left           !!        !     !                ! +---------++--------+-----+---------------++--------+-----+----------------+ ! Location ! !Pressure! Ratio! PPG Wave Form  ! !Pressure! Ratio! PPG Wave Form   ! +---------++--------+-----+---------------++--------+-----+----------------+ ! Great Toe!!0       !0    !               !!48      !0.34 !                ! +---------++--------+-----+---------------++--------+-----+----------------+    Vl Dup Lower Extremity Arteries Bilateral    Result Date: 3/5/2020  Vascular Lower Extremities Arterial Duplex Procedure  Demographics   Patient Name     Jake Hoang Age                   61   Patient Number   [id number]       Gender                Male   Visit Number     [id number]    Interpreting          Esme Louis MD                                Physician   Date of Birth    1960   Referring Physician   Marissa Dunlap   Accession Number [id number]    [address] [facility] RT, RVT  Procedure Type of Study:   Extremities Arteries:Lower Extremities Arterial Duplex, VL LOWER EXTREMITY  ARTERIES DUPLEX BILATERAL. Indications for Study:Wound Lower Extremity (Right). Risk Factors   - The patient's risk factor(s) include: dyslipidemia and arterial     hypertension.   - The patient has a former tobacco history. Allergies   - No known allergies. Impression   Bilateral lower extremity arterial duplex exam performed. The right lower extremity extremity arterial duplex exam performed.  The /73   Pulse 59   Temp 97.8 °F (36.6 °C) (Temporal)   Resp 18   Ht 5' 8\" (1.727 m)   Wt 151 lb 3.2 oz (68.6 kg)   SpO2 99%   BMI 22.99 kg/m²   24HR INTAKE/OUTPUT:      Intake/Output Summary (Last 24 hours) at 3/30/2020 0916  Last data filed at 3/30/2020 0450  Gross per 24 hour   Intake 440 ml   Output --   Net 440 ml       Physical Exam  Vitals signs and nursing note reviewed. Constitutional:       General: He is not in acute distress. Appearance: Normal appearance. He is normal weight. He is not ill-appearing, toxic-appearing or diaphoretic. HENT:      Head: Normocephalic and atraumatic. Right Ear: External ear normal.      Left Ear: External ear normal.      Nose: Nose normal. No congestion or rhinorrhea. Mouth/Throat:      Mouth: Mucous membranes are moist.      Pharynx: No oropharyngeal exudate or posterior oropharyngeal erythema. Eyes:      General: No scleral icterus. Right eye: No discharge. Left eye: No discharge. Extraocular Movements: Extraocular movements intact. Conjunctiva/sclera: Conjunctivae normal.      Pupils: Pupils are equal, round, and reactive to light. Neck:      Musculoskeletal: Normal range of motion and neck supple. No neck rigidity or muscular tenderness. Vascular: No carotid bruit. Cardiovascular:      Rate and Rhythm: Normal rate and regular rhythm. Heart sounds: Normal heart sounds. No murmur. No gallop. Pulmonary:      Effort: Pulmonary effort is normal. No respiratory distress. Breath sounds: Normal breath sounds. No stridor. No wheezing, rhonchi or rales. Chest:      Chest wall: No tenderness. Abdominal:      General: Bowel sounds are normal. There is no distension. Palpations: Abdomen is soft. Tenderness: There is no abdominal tenderness. There is no guarding or rebound. Musculoskeletal: Normal range of motion. General: No swelling, tenderness or deformity.       Left lower leg: No Modification  2. On-going Medical Therapy  3. Consideration for open heart surgery\"       Severe peripheral vascular disease  · Vascular surgery on board  · Status post right AKA (03/26/2020)  · Wound care consult  · Continue symptomatic supportive management, including pain management PRN      History of ESRD, on scheduled HD   Hyperkalemia  · - Nephrology following  · - Continue scheduled HD  · - Continue management as per nephrology rec         Continue management of other chronic medical conditions - see above and orders. Advance Directive: Full Code    DIET RENAL;     DVT prophylaxis: Heparin    Consults Made:   IP CONSULT TO NEPHROLOGY  IP CONSULT TO VASCULAR SURGERY  IP CONSULT TO SOCIAL WORK  PALLIATIVE CARE EVAL  IP CONSULT TO REHAB/TCU ADMISSION COORDINATOR  IP CONSULT TO CARDIOLOGY    Discharge planning: tbd    Time Spent is 25 mins in the examination, evaluation, counseling and review of medications, assessment and plan.      Electronically signed by   Glenna Christian MD, MPH,   Internal Medicine Hospitalist   3/30/2020 9:16 AM

## 2020-03-30 NOTE — PROGRESS NOTES
[address]    Daily HOSPITAL Progress Note                            Date:  3/30/20  Patient: Cathy Joseph  Admission:  3/25/2020  4:12 PM  Admit DX: PVD (peripheral vascular disease) ([address] [I73.9]  Age:  61 y.o., 1960        Date of Admission 3/25/2020  [address] Length of Stay  LOS: 5 days            I personally saw the patient and rounded with:  Sivakumar Espinosa RN Nurse [id number]   RN on 3/30/20    The observations documented in this note, including the assessment   and plan are mine    Portions of this note have been copied forward, from prior notes, yet modified, to reflect today's clinical status of this patient.          Reason for initial evaluation or the patient's initial complaint    1. Reason for Hospital Admission: Ischemic leg        2. Reason for Cardiology Consultation: Elevated troponin         SUBJECTIVE:      Chief Complaint / Reason for the Visit   Follow up of:  Ischemic leg and CAD/elevated troponin and systemic arterial hypertension    Family present and in room during examination:  No    At the time of the examination, the patient was:  Finishing dialysis      Specialty Problems        Cardiology Problems    Sinoatrial node dysfunction (HCC)        CAD (coronary artery disease)        Chronic diastolic congestive heart failure ([address]        Essential hypertension        CHF (congestive heart failure) (HCC)        Mixed hyperlipidemia        Chronic deep vein thrombosis (DVT) of axillary vein of left upper extremity (HCC)        Steal syndrome as complication of dialysis access ([address]        Steal syndrome of dialysis vascular access [facility])        Atherosclerosis of artery of extremity with ulceration (HCC)        Atherosclerosis of artery of extremity with rest pain ([address]        PVD (peripheral vascular disease) ([address]        Failing vascular bypass graft        Stenosis of artery of right lower extremity ([address]        NSTEMI (non-ST elevated myocardial complication of vascular surgery 07/08/2019     Priority: Low    Hyponatremia 07/08/2019     Priority: Low    Normocytic anemia 07/08/2019     Priority: Low    Thrombocytopenia ([address] 07/08/2019     Priority: Low    Pacemaker      Priority: Low    Hepatitis C, chronic (HCC)      Priority: Low    PVD (peripheral vascular disease) ([facility]      Priority: Low    Anemia, chronic disease 06/26/2019     Priority: Low    Anticoagulated by anticoagulation treatment 07/16/2018     Priority: Low    Tobacco abuse 03/15/2018     Priority: Low    Atherosclerosis of artery of extremity with rest pain ([address] 02/28/2018     Priority: Low    Atherosclerosis of artery of extremity with ulceration ([address] 02/15/2018     Priority: Low    Steal syndrome of dialysis vascular access ([address] 01/30/2018     Priority: Low    Steal syndrome as complication of dialysis access ([address] 01/25/2018     Priority: Low    ESRD on dialysis [facility])      Priority: Low    Colon polyps 01/22/2018     Priority: Low    Hepatocellular carcinoma ([address] 01/22/2018     Priority: Low    Chronic deep vein thrombosis (DVT) of axillary vein of left upper extremity ([address] 01/20/2017     Priority: Low    Mixed hyperlipidemia 01/09/2017     Priority: Low    Spontaneous bacterial peritonitis ([address] 12/12/2016     Priority: Low    CHF (congestive heart failure) ([facility] 11/18/2016     Priority: Low    Dialysis patient ([facility] 11/18/2016     Priority: Low    Alcoholic cirrhosis of liver with ascites ([facility] 09/02/2016     Priority: Low    Osteoarthritis      Priority: Low    Chronic diastolic congestive heart failure (HCC)      Priority: Low    CAD (coronary artery disease)      Priority: Low    Essential hypertension      Priority: Low    Liver disease      Priority: Low     Current Facility-Administered Medications   Medication Dose Route Frequency Provider Last Rate Last Dose    heparin (porcine) injection 4,000 Units  4,000 Units Intravenous PRN Ann Pellet, [facility]    Removal of RT IJV tunneled dialysis cath    OTHER SURGICAL HISTORY  5/22/12   [facility] [place]    Ultrasound-guided cannulation of left cephalic vein in the mid forearm toward the AV anastomosis, Left upper  ext. fistulograms including venograms of the SVC, Left cephalic vein balloon angioplasty with a 4mm x 100mm Tod balloon, Completion fistulograms    PACEMAKER PLACEMENT Right 2015    medtronic    PARACENTESIS      multiple/recent; per dr. Kyler Pineda at [address] [facility] Left 1/30/2018    UPPER EXTREMITY DRIL PROCEDURE WITH LEFT SAPHENOUS VEIN HARVESTING performed by Efrain Vigil MD at [address] [facility] [employer] ANGIO N/A 3/6/2020    [facility] AND RIGHT LEG ANGIOGRAM performed by Karol Davidson DO at [address] [facility] VASCULAR SURGERY  6/19/12    [facility] arteriovenous fistulogram including venography of the superior vena cava. Balloon angioplasty, left forearm cephalic vein and upper arm cephalic vein with [id number] tod balloon.  VASCULAR SURGERY  7/10/2012 [facility] [place]     Revision of left distal radial artery to cephalic vein arteriovenous fistula with 7mm Artegraft interposition.  VASCULAR SURGERY  7/30/15 [facility] & [address]    Left upper extremity arteriovenous fistulograms including venography of the superior vena cava. Left cephalic vein balloon angioplasty with an 8 x 20 cm cutting balloon proximal cephalic vein. Balloon angioplasty of left cephalic vein/antecubital vein near the antecubital crease with 8 x 20 mm cutting balloon.  VASCULAR SURGERY  7/30/15 [facility]    Balloon angioplasty proximal end distal upper arm cephalic vein with 9 x 40 cm  balloon. Completion fistulograms of the left upper extremity.  VASCULAR SURGERY  8/13/15 [facility]    Revision of left upper extremity arteriovenous fistula with excision of pseudoaneurysm and primary repair of cephalic vein.     VASCULAR SURGERY  5/3/16 [facility]    Left upper fistulograms/venograms, left cephalic balloon 8 x 20 cutter,9 separate note Continue current medications:     Yes:                  3. Concern regarding systemic blood pressure Initial presentation during this evaluation The blood pressure for the lastr 36 hours has been:  Systolic (55MIC), LAR:189 , Min:91 , YGK:908    Diastolic (08EBM), WILLIAMS:91, Min:45, Max:73       No Continue current medications:        yes                     Summary of hospital course thus far:       Date Clinical Event Plan of Treatment           3/29/2020 Admitted with ischemic leg, s/p right AKA, troponin going up albeit on dialysis, no chest pain Has a 70% distal LM, obviously not a surgical candidate, no particularly good candidate for PCI, will continue medical management   03/30/20    No chest pain, troponin continues to rise, 0.3, 0.67, 0.69, 0.96, there is lateral T wave inversion; had a run of torsades de torsades de pointes Medical managment                                  CONSIDERATIONS, THOUGHTS, AND PLANS:     4. Continue present medications except for changes as noted above  5. Continue to monitor rhythm  6. Further orders per clinical course. Discussed with patient and nursing.      Electronically signed by Shailesh Booth MD on 03/30/20             [facility]

## 2020-03-31 ENCOUNTER — HOSPITAL ENCOUNTER (INPATIENT)
Age: 60
LOS: 11 days | Discharge: HOME HEALTH | DRG: 559 | End: 2020-04-11
Attending: PSYCHIATRY & NEUROLOGY | Admitting: PSYCHIATRY & NEUROLOGY
Payer: MEDICARE

## 2020-03-31 VITALS
TEMPERATURE: 97.6 F | WEIGHT: 152.38 LBS | HEART RATE: 65 BPM | HEIGHT: 68 IN | OXYGEN SATURATION: 90 % | DIASTOLIC BLOOD PRESSURE: 58 MMHG | SYSTOLIC BLOOD PRESSURE: 100 MMHG | BODY MASS INDEX: 23.09 KG/M2 | RESPIRATION RATE: 18 BRPM

## 2020-03-31 PROBLEM — S78.111A UNILATERAL AKA, RIGHT (HCC): Status: ACTIVE | Noted: 2020-03-31

## 2020-03-31 LAB
ANION GAP SERPL CALCULATED.3IONS-SCNC: 14 MMOL/L (ref 7–19)
APTT: 30.1 SEC (ref 26–36.2)
APTT: 36.6 SEC (ref 26–36.2)
APTT: 51.7 SEC (ref 26–36.2)
APTT: 52.6 SEC (ref 26–36.2)
BASOPHILS ABSOLUTE: 0 K/UL (ref 0–0.2)
BASOPHILS ABSOLUTE: 0 K/UL (ref 0–0.2)
BASOPHILS RELATIVE PERCENT: 0.5 % (ref 0–1)
BASOPHILS RELATIVE PERCENT: 0.6 % (ref 0–1)
BUN BLDV-MCNC: 20 MG/DL (ref 6–20)
CALCIUM SERPL-MCNC: 8.6 MG/DL (ref 8.6–10)
CHLORIDE BLD-SCNC: 92 MMOL/L (ref 98–111)
CO2: 28 MMOL/L (ref 22–29)
CREAT SERPL-MCNC: 3.4 MG/DL (ref 0.5–1.2)
EOSINOPHILS ABSOLUTE: 0.4 K/UL (ref 0–0.6)
EOSINOPHILS ABSOLUTE: 0.6 K/UL (ref 0–0.6)
EOSINOPHILS RELATIVE PERCENT: 5.7 % (ref 0–5)
EOSINOPHILS RELATIVE PERCENT: 8.3 % (ref 0–5)
GFR NON-AFRICAN AMERICAN: 19
GLUCOSE BLD-MCNC: 187 MG/DL (ref 74–109)
HCT VFR BLD CALC: 23 % (ref 42–52)
HCT VFR BLD CALC: 25 % (ref 42–52)
HEMOGLOBIN: 7 G/DL (ref 14–18)
HEMOGLOBIN: 7.7 G/DL (ref 14–18)
IMMATURE GRANULOCYTES #: 0 K/UL
IMMATURE GRANULOCYTES #: 0 K/UL
LYMPHOCYTES ABSOLUTE: 0.9 K/UL (ref 1.1–4.5)
LYMPHOCYTES ABSOLUTE: 1.1 K/UL (ref 1.1–4.5)
LYMPHOCYTES RELATIVE PERCENT: 13.6 % (ref 20–40)
LYMPHOCYTES RELATIVE PERCENT: 15.4 % (ref 20–40)
MCH RBC QN AUTO: 28.4 PG (ref 27–31)
MCH RBC QN AUTO: 28.5 PG (ref 27–31)
MCHC RBC AUTO-ENTMCNC: 30.4 G/DL (ref 33–37)
MCHC RBC AUTO-ENTMCNC: 30.8 G/DL (ref 33–37)
MCV RBC AUTO: 92.3 FL (ref 80–94)
MCV RBC AUTO: 93.5 FL (ref 80–94)
MONOCYTES ABSOLUTE: 0.6 K/UL (ref 0–0.9)
MONOCYTES ABSOLUTE: 0.6 K/UL (ref 0–0.9)
MONOCYTES RELATIVE PERCENT: 9 % (ref 0–10)
MONOCYTES RELATIVE PERCENT: 9.2 % (ref 0–10)
NEUTROPHILS ABSOLUTE: 4.6 K/UL (ref 1.5–7.5)
NEUTROPHILS ABSOLUTE: 4.8 K/UL (ref 1.5–7.5)
NEUTROPHILS RELATIVE PERCENT: 66.4 % (ref 50–65)
NEUTROPHILS RELATIVE PERCENT: 70.7 % (ref 50–65)
PDW BLD-RTO: 16.2 % (ref 11.5–14.5)
PDW BLD-RTO: 16.4 % (ref 11.5–14.5)
PLATELET # BLD: 129 K/UL (ref 130–400)
PLATELET # BLD: 153 K/UL (ref 130–400)
PMV BLD AUTO: 11.6 FL (ref 9.4–12.4)
PMV BLD AUTO: 11.9 FL (ref 9.4–12.4)
POTASSIUM REFLEX MAGNESIUM: 4.1 MMOL/L (ref 3.5–5)
PREALBUMIN: 16 MG/DL (ref 20–40)
RBC # BLD: 2.46 M/UL (ref 4.7–6.1)
RBC # BLD: 2.71 M/UL (ref 4.7–6.1)
REASON FOR REJECTION: NORMAL
REJECTED TEST: NORMAL
SODIUM BLD-SCNC: 134 MMOL/L (ref 136–145)
TROPONIN: 0.9 NG/ML (ref 0–0.03)
TROPONIN: 0.96 NG/ML (ref 0–0.03)
WBC # BLD: 6.5 K/UL (ref 4.8–10.8)
WBC # BLD: 7.1 K/UL (ref 4.8–10.8)

## 2020-03-31 PROCEDURE — 6360000002 HC RX W HCPCS: Performed by: HOSPITALIST

## 2020-03-31 PROCEDURE — 85730 THROMBOPLASTIN TIME PARTIAL: CPT

## 2020-03-31 PROCEDURE — 99232 SBSQ HOSP IP/OBS MODERATE 35: CPT | Performed by: INTERNAL MEDICINE

## 2020-03-31 PROCEDURE — 36415 COLL VENOUS BLD VENIPUNCTURE: CPT

## 2020-03-31 PROCEDURE — 85025 COMPLETE CBC W/AUTO DIFF WBC: CPT

## 2020-03-31 PROCEDURE — 99024 POSTOP FOLLOW-UP VISIT: CPT | Performed by: NURSE PRACTITIONER

## 2020-03-31 PROCEDURE — 6370000000 HC RX 637 (ALT 250 FOR IP): Performed by: INTERNAL MEDICINE

## 2020-03-31 PROCEDURE — 80048 BASIC METABOLIC PNL TOTAL CA: CPT

## 2020-03-31 PROCEDURE — 84134 ASSAY OF PREALBUMIN: CPT

## 2020-03-31 PROCEDURE — 97530 THERAPEUTIC ACTIVITIES: CPT

## 2020-03-31 PROCEDURE — 1180000000 HC REHAB R&B

## 2020-03-31 PROCEDURE — 93288 INTERROG EVL PM/LDLS PM IP: CPT | Performed by: INTERNAL MEDICINE

## 2020-03-31 PROCEDURE — 84484 ASSAY OF TROPONIN QUANT: CPT

## 2020-03-31 PROCEDURE — 2580000003 HC RX 258: Performed by: INTERNAL MEDICINE

## 2020-03-31 RX ORDER — BISACODYL 10 MG
10 SUPPOSITORY, RECTAL RECTAL DAILY PRN
Status: DISCONTINUED | OUTPATIENT
Start: 2020-03-31 | End: 2020-04-11 | Stop reason: HOSPADM

## 2020-03-31 RX ORDER — NITROGLYCERIN 0.4 MG/1
0.4 TABLET SUBLINGUAL EVERY 5 MIN PRN
Status: CANCELLED | OUTPATIENT
Start: 2020-03-31

## 2020-03-31 RX ORDER — LABETALOL HYDROCHLORIDE 5 MG/ML
10 INJECTION, SOLUTION INTRAVENOUS EVERY 6 HOURS PRN
Status: DISCONTINUED | OUTPATIENT
Start: 2020-03-31 | End: 2020-04-11 | Stop reason: HOSPADM

## 2020-03-31 RX ORDER — SUCRALFATE 1 G/1
1 TABLET ORAL 3 TIMES DAILY PRN
Status: CANCELLED | OUTPATIENT
Start: 2020-03-31

## 2020-03-31 RX ORDER — ASPIRIN 81 MG/1
81 TABLET ORAL DAILY
Status: DISCONTINUED | OUTPATIENT
Start: 2020-04-01 | End: 2020-04-11 | Stop reason: HOSPADM

## 2020-03-31 RX ORDER — ATORVASTATIN CALCIUM 40 MG/1
80 TABLET, FILM COATED ORAL NIGHTLY
Status: CANCELLED | OUTPATIENT
Start: 2020-03-31

## 2020-03-31 RX ORDER — MINOXIDIL 10 MG/1
10 TABLET ORAL DAILY
Status: CANCELLED | OUTPATIENT
Start: 2020-04-01

## 2020-03-31 RX ORDER — POTASSIUM CHLORIDE 7.45 MG/ML
10 INJECTION INTRAVENOUS PRN
Status: CANCELLED | OUTPATIENT
Start: 2020-03-31

## 2020-03-31 RX ORDER — SODIUM CHLORIDE 0.9 % (FLUSH) 0.9 %
10 SYRINGE (ML) INJECTION PRN
Status: CANCELLED | OUTPATIENT
Start: 2020-03-31

## 2020-03-31 RX ORDER — NALOXONE HYDROCHLORIDE 0.4 MG/ML
0.4 INJECTION, SOLUTION INTRAMUSCULAR; INTRAVENOUS; SUBCUTANEOUS PRN
Status: CANCELLED | OUTPATIENT
Start: 2020-03-31

## 2020-03-31 RX ORDER — CLOPIDOGREL BISULFATE 75 MG/1
75 TABLET ORAL DAILY
Status: DISCONTINUED | OUTPATIENT
Start: 2020-04-01 | End: 2020-04-11 | Stop reason: HOSPADM

## 2020-03-31 RX ORDER — POLYETHYLENE GLYCOL 3350 17 G/17G
17 POWDER, FOR SOLUTION ORAL DAILY
Status: DISCONTINUED | OUTPATIENT
Start: 2020-03-31 | End: 2020-04-11 | Stop reason: HOSPADM

## 2020-03-31 RX ORDER — ONDANSETRON 2 MG/ML
4 INJECTION INTRAMUSCULAR; INTRAVENOUS EVERY 6 HOURS PRN
Status: CANCELLED | OUTPATIENT
Start: 2020-03-31

## 2020-03-31 RX ORDER — ASPIRIN 81 MG/1
81 TABLET ORAL DAILY
Status: CANCELLED | OUTPATIENT
Start: 2020-04-01

## 2020-03-31 RX ORDER — PANTOPRAZOLE SODIUM 20 MG/1
20 TABLET, DELAYED RELEASE ORAL
Status: DISCONTINUED | OUTPATIENT
Start: 2020-03-31 | End: 2020-04-11 | Stop reason: HOSPADM

## 2020-03-31 RX ORDER — SODIUM CHLORIDE 0.9 % (FLUSH) 0.9 %
10 SYRINGE (ML) INJECTION PRN
Status: DISCONTINUED | OUTPATIENT
Start: 2020-03-31 | End: 2020-04-11 | Stop reason: HOSPADM

## 2020-03-31 RX ORDER — ACETAMINOPHEN 650 MG/1
650 SUPPOSITORY RECTAL EVERY 6 HOURS PRN
Status: DISCONTINUED | OUTPATIENT
Start: 2020-03-31 | End: 2020-04-11 | Stop reason: HOSPADM

## 2020-03-31 RX ORDER — ONDANSETRON 2 MG/ML
4 INJECTION INTRAMUSCULAR; INTRAVENOUS EVERY 6 HOURS PRN
Status: DISCONTINUED | OUTPATIENT
Start: 2020-03-31 | End: 2020-04-11 | Stop reason: HOSPADM

## 2020-03-31 RX ORDER — METOPROLOL SUCCINATE 25 MG/1
25 TABLET, EXTENDED RELEASE ORAL 2 TIMES DAILY
Status: CANCELLED | OUTPATIENT
Start: 2020-03-31

## 2020-03-31 RX ORDER — POTASSIUM CHLORIDE 20 MEQ/1
40 TABLET, EXTENDED RELEASE ORAL PRN
Status: CANCELLED | OUTPATIENT
Start: 2020-03-31

## 2020-03-31 RX ORDER — NITROGLYCERIN 0.4 MG/1
0.4 TABLET SUBLINGUAL EVERY 5 MIN PRN
Status: DISCONTINUED | OUTPATIENT
Start: 2020-03-31 | End: 2020-04-11 | Stop reason: HOSPADM

## 2020-03-31 RX ORDER — GABAPENTIN 100 MG/1
200 CAPSULE ORAL 3 TIMES DAILY
Status: DISCONTINUED | OUTPATIENT
Start: 2020-03-31 | End: 2020-04-01

## 2020-03-31 RX ORDER — AMLODIPINE BESYLATE 10 MG/1
10 TABLET ORAL DAILY
Status: CANCELLED | OUTPATIENT
Start: 2020-04-01

## 2020-03-31 RX ORDER — POTASSIUM CHLORIDE 20 MEQ/1
40 TABLET, EXTENDED RELEASE ORAL PRN
Status: DISCONTINUED | OUTPATIENT
Start: 2020-03-31 | End: 2020-04-11 | Stop reason: HOSPADM

## 2020-03-31 RX ORDER — OXYCODONE HYDROCHLORIDE 5 MG/1
10 TABLET ORAL EVERY 4 HOURS PRN
Status: CANCELLED | OUTPATIENT
Start: 2020-03-31

## 2020-03-31 RX ORDER — CLOPIDOGREL BISULFATE 75 MG/1
75 TABLET ORAL DAILY
Status: CANCELLED | OUTPATIENT
Start: 2020-04-01

## 2020-03-31 RX ORDER — CLONIDINE HYDROCHLORIDE 0.1 MG/1
0.1 TABLET ORAL 3 TIMES DAILY
Status: DISCONTINUED | OUTPATIENT
Start: 2020-03-31 | End: 2020-04-11 | Stop reason: HOSPADM

## 2020-03-31 RX ORDER — ISOSORBIDE MONONITRATE 60 MG/1
60 TABLET, EXTENDED RELEASE ORAL DAILY
Status: DISCONTINUED | OUTPATIENT
Start: 2020-04-01 | End: 2020-04-11 | Stop reason: HOSPADM

## 2020-03-31 RX ORDER — SEVELAMER CARBONATE 800 MG/1
800 TABLET, FILM COATED ORAL
Status: CANCELLED | OUTPATIENT
Start: 2020-03-31

## 2020-03-31 RX ORDER — PANTOPRAZOLE SODIUM 20 MG/1
20 TABLET, DELAYED RELEASE ORAL
Status: CANCELLED | OUTPATIENT
Start: 2020-03-31

## 2020-03-31 RX ORDER — ACETAMINOPHEN 650 MG/1
650 SUPPOSITORY RECTAL EVERY 6 HOURS PRN
Status: CANCELLED | OUTPATIENT
Start: 2020-03-31

## 2020-03-31 RX ORDER — ACETAMINOPHEN 325 MG/1
650 TABLET ORAL EVERY 6 HOURS PRN
Status: CANCELLED | OUTPATIENT
Start: 2020-03-31

## 2020-03-31 RX ORDER — OXYCODONE HYDROCHLORIDE 5 MG/1
10 TABLET ORAL EVERY 4 HOURS PRN
Status: DISCONTINUED | OUTPATIENT
Start: 2020-03-31 | End: 2020-04-01

## 2020-03-31 RX ORDER — SUCRALFATE 1 G/1
1 TABLET ORAL 3 TIMES DAILY PRN
Status: DISCONTINUED | OUTPATIENT
Start: 2020-03-31 | End: 2020-04-11 | Stop reason: HOSPADM

## 2020-03-31 RX ORDER — SODIUM CHLORIDE 0.9 % (FLUSH) 0.9 %
10 SYRINGE (ML) INJECTION EVERY 12 HOURS SCHEDULED
Status: DISCONTINUED | OUTPATIENT
Start: 2020-03-31 | End: 2020-03-31

## 2020-03-31 RX ORDER — ATORVASTATIN CALCIUM 80 MG/1
80 TABLET, FILM COATED ORAL NIGHTLY
Status: DISCONTINUED | OUTPATIENT
Start: 2020-03-31 | End: 2020-04-11 | Stop reason: HOSPADM

## 2020-03-31 RX ORDER — ACETAMINOPHEN 325 MG/1
650 TABLET ORAL EVERY 6 HOURS PRN
Status: DISCONTINUED | OUTPATIENT
Start: 2020-03-31 | End: 2020-04-11 | Stop reason: HOSPADM

## 2020-03-31 RX ORDER — NALOXONE HYDROCHLORIDE 0.4 MG/ML
0.4 INJECTION, SOLUTION INTRAMUSCULAR; INTRAVENOUS; SUBCUTANEOUS PRN
Status: DISCONTINUED | OUTPATIENT
Start: 2020-03-31 | End: 2020-04-11 | Stop reason: HOSPADM

## 2020-03-31 RX ORDER — LABETALOL HYDROCHLORIDE 5 MG/ML
10 INJECTION, SOLUTION INTRAVENOUS EVERY 6 HOURS PRN
Status: CANCELLED | OUTPATIENT
Start: 2020-03-31

## 2020-03-31 RX ORDER — ACETAMINOPHEN 325 MG/1
650 TABLET ORAL EVERY 4 HOURS PRN
Status: DISCONTINUED | OUTPATIENT
Start: 2020-03-31 | End: 2020-03-31

## 2020-03-31 RX ORDER — OXYCODONE HYDROCHLORIDE AND ACETAMINOPHEN 5; 325 MG/1; MG/1
1 TABLET ORAL EVERY 6 HOURS PRN
Status: CANCELLED | OUTPATIENT
Start: 2020-03-31

## 2020-03-31 RX ORDER — POTASSIUM CHLORIDE 7.45 MG/ML
10 INJECTION INTRAVENOUS PRN
Status: DISCONTINUED | OUTPATIENT
Start: 2020-03-31 | End: 2020-04-11 | Stop reason: HOSPADM

## 2020-03-31 RX ORDER — SODIUM CHLORIDE 0.9 % (FLUSH) 0.9 %
10 SYRINGE (ML) INJECTION EVERY 12 HOURS SCHEDULED
Status: CANCELLED | OUTPATIENT
Start: 2020-03-31

## 2020-03-31 RX ORDER — CLONIDINE HYDROCHLORIDE 0.1 MG/1
0.1 TABLET ORAL 3 TIMES DAILY
Status: CANCELLED | OUTPATIENT
Start: 2020-03-31

## 2020-03-31 RX ORDER — AMLODIPINE BESYLATE 10 MG/1
10 TABLET ORAL DAILY
Status: DISCONTINUED | OUTPATIENT
Start: 2020-04-01 | End: 2020-04-03

## 2020-03-31 RX ORDER — SEVELAMER CARBONATE 800 MG/1
800 TABLET, FILM COATED ORAL
Status: DISCONTINUED | OUTPATIENT
Start: 2020-03-31 | End: 2020-04-11 | Stop reason: HOSPADM

## 2020-03-31 RX ORDER — GABAPENTIN 100 MG/1
200 CAPSULE ORAL 3 TIMES DAILY
Status: CANCELLED | OUTPATIENT
Start: 2020-03-31

## 2020-03-31 RX ORDER — ISOSORBIDE MONONITRATE 60 MG/1
60 TABLET, EXTENDED RELEASE ORAL DAILY
Status: CANCELLED | OUTPATIENT
Start: 2020-04-01

## 2020-03-31 RX ORDER — OXYCODONE HYDROCHLORIDE AND ACETAMINOPHEN 5; 325 MG/1; MG/1
1 TABLET ORAL EVERY 6 HOURS PRN
Status: DISCONTINUED | OUTPATIENT
Start: 2020-03-31 | End: 2020-04-01

## 2020-03-31 RX ORDER — METOPROLOL SUCCINATE 25 MG/1
25 TABLET, EXTENDED RELEASE ORAL 2 TIMES DAILY
Status: DISCONTINUED | OUTPATIENT
Start: 2020-03-31 | End: 2020-04-05

## 2020-03-31 RX ORDER — MINOXIDIL 10 MG/1
10 TABLET ORAL DAILY
Status: DISCONTINUED | OUTPATIENT
Start: 2020-04-01 | End: 2020-04-11 | Stop reason: HOSPADM

## 2020-03-31 RX ADMIN — PANTOPRAZOLE SODIUM 20 MG: 20 TABLET, DELAYED RELEASE ORAL at 17:49

## 2020-03-31 RX ADMIN — GABAPENTIN 200 MG: 100 CAPSULE ORAL at 19:49

## 2020-03-31 RX ADMIN — GABAPENTIN 200 MG: 100 CAPSULE ORAL at 14:12

## 2020-03-31 RX ADMIN — OXYCODONE 10 MG: 5 TABLET ORAL at 19:49

## 2020-03-31 RX ADMIN — CLONIDINE HYDROCHLORIDE 0.1 MG: 0.1 TABLET ORAL at 14:13

## 2020-03-31 RX ADMIN — OXYCODONE HYDROCHLORIDE AND ACETAMINOPHEN 1 TABLET: 5; 325 TABLET ORAL at 23:50

## 2020-03-31 RX ADMIN — Medication 10 ML: at 08:30

## 2020-03-31 RX ADMIN — CLONIDINE HYDROCHLORIDE 0.1 MG: 0.1 TABLET ORAL at 08:29

## 2020-03-31 RX ADMIN — HEPARIN SODIUM 4000 UNITS: 1000 INJECTION INTRAVENOUS; SUBCUTANEOUS at 04:05

## 2020-03-31 RX ADMIN — OXYCODONE HYDROCHLORIDE AND ACETAMINOPHEN 1 TABLET: 5; 325 TABLET ORAL at 02:02

## 2020-03-31 RX ADMIN — OXYCODONE 10 MG: 5 TABLET ORAL at 14:16

## 2020-03-31 RX ADMIN — CLOPIDOGREL BISULFATE 75 MG: 75 TABLET ORAL at 08:29

## 2020-03-31 RX ADMIN — OXYCODONE HYDROCHLORIDE AND ACETAMINOPHEN 1 TABLET: 5; 325 TABLET ORAL at 17:48

## 2020-03-31 RX ADMIN — ATORVASTATIN CALCIUM 80 MG: 80 TABLET, FILM COATED ORAL at 19:49

## 2020-03-31 RX ADMIN — ASPIRIN 81 MG: 81 TABLET, COATED ORAL at 08:29

## 2020-03-31 RX ADMIN — AMLODIPINE BESYLATE 10 MG: 5 TABLET ORAL at 08:29

## 2020-03-31 RX ADMIN — ISOSORBIDE MONONITRATE 60 MG: 60 TABLET, EXTENDED RELEASE ORAL at 08:29

## 2020-03-31 RX ADMIN — METOPROLOL SUCCINATE 25 MG: 25 TABLET, EXTENDED RELEASE ORAL at 08:29

## 2020-03-31 RX ADMIN — SEVELAMER CARBONATE 800 MG: 800 TABLET, FILM COATED ORAL at 13:05

## 2020-03-31 RX ADMIN — SEVELAMER CARBONATE 800 MG: 800 TABLET, FILM COATED ORAL at 17:49

## 2020-03-31 RX ADMIN — OXYCODONE 10 MG: 5 TABLET ORAL at 10:05

## 2020-03-31 RX ADMIN — OXYCODONE 10 MG: 5 TABLET ORAL at 04:10

## 2020-03-31 RX ADMIN — MINOXIDIL 10 MG: 10 TABLET ORAL at 08:29

## 2020-03-31 RX ADMIN — GABAPENTIN 200 MG: 100 CAPSULE ORAL at 08:29

## 2020-03-31 RX ADMIN — PANTOPRAZOLE SODIUM 20 MG: 20 TABLET, DELAYED RELEASE ORAL at 08:31

## 2020-03-31 RX ADMIN — SEVELAMER CARBONATE 800 MG: 800 TABLET, FILM COATED ORAL at 08:29

## 2020-03-31 RX ADMIN — HEPARIN SODIUM 9 UNITS/KG/HR: 10000 INJECTION, SOLUTION INTRAVENOUS at 04:05

## 2020-03-31 RX ADMIN — OXYCODONE HYDROCHLORIDE AND ACETAMINOPHEN 1 TABLET: 5; 325 TABLET ORAL at 08:29

## 2020-03-31 ASSESSMENT — PAIN DESCRIPTION - LOCATION
LOCATION: LEG
LOCATION: INCISION
LOCATION: INCISION
LOCATION: LEG
LOCATION: LEG;INCISION
LOCATION: LEG;INCISION
LOCATION: LEG
LOCATION: LEG;INCISION
LOCATION: LEG;INCISION

## 2020-03-31 ASSESSMENT — PAIN SCALES - GENERAL
PAINLEVEL_OUTOF10: 7
PAINLEVEL_OUTOF10: 7
PAINLEVEL_OUTOF10: 8
PAINLEVEL_OUTOF10: 8
PAINLEVEL_OUTOF10: 7
PAINLEVEL_OUTOF10: 5
PAINLEVEL_OUTOF10: 8
PAINLEVEL_OUTOF10: 3
PAINLEVEL_OUTOF10: 7
PAINLEVEL_OUTOF10: 5
PAINLEVEL_OUTOF10: 5
PAINLEVEL_OUTOF10: 3
PAINLEVEL_OUTOF10: 3
PAINLEVEL_OUTOF10: 0
PAINLEVEL_OUTOF10: 7
PAINLEVEL_OUTOF10: 3

## 2020-03-31 ASSESSMENT — PAIN DESCRIPTION - DESCRIPTORS
DESCRIPTORS: DISCOMFORT

## 2020-03-31 ASSESSMENT — PAIN DESCRIPTION - ORIENTATION
ORIENTATION: RIGHT

## 2020-03-31 ASSESSMENT — PAIN DESCRIPTION - PAIN TYPE
TYPE: ACUTE PAIN
TYPE: SURGICAL PAIN
TYPE: ACUTE PAIN

## 2020-03-31 ASSESSMENT — PAIN DESCRIPTION - PROGRESSION
CLINICAL_PROGRESSION: NOT CHANGED
CLINICAL_PROGRESSION: NOT CHANGED

## 2020-03-31 ASSESSMENT — PAIN DESCRIPTION - ONSET
ONSET: ON-GOING

## 2020-03-31 ASSESSMENT — PAIN - FUNCTIONAL ASSESSMENT: PAIN_FUNCTIONAL_ASSESSMENT: PREVENTS OR INTERFERES SOME ACTIVE ACTIVITIES AND ADLS

## 2020-03-31 ASSESSMENT — PAIN DESCRIPTION - FREQUENCY
FREQUENCY: CONTINUOUS

## 2020-03-31 NOTE — PROGRESS NOTES
mg at 03/31/20 0829    heparin (porcine) injection 4,000 Units  4,000 Units Intravenous PRN Dameon Reynoso MD   4,000 Units at 03/31/20 0405    heparin (porcine) injection 2,000 Units  2,000 Units Intravenous PRN Dameon Reynoso MD   2,000 Units at 03/30/20 0234    heparin 25,000 units in dextrose 5% 250 mL infusion  12 Units/kg/hr Intravenous Continuous Dameon Reynoso MD 6.1 mL/hr at 03/31/20 0924 9 Units/kg/hr at 03/31/20 0924    isosorbide mononitrate (IMDUR) extended release tablet 60 mg  60 mg Oral Daily Diandra Hein MD   60 mg at 03/31/20 0829    oxyCODONE-acetaminophen (PERCOCET) 5-325 MG per tablet 1 tablet  1 tablet Oral Q6H PRN Nesha Babb MD   1 tablet at 03/31/20 0829    oxyCODONE (ROXICODONE) immediate release tablet 10 mg  10 mg Oral Q4H PRN Nesha Babb MD   10 mg at 03/31/20 1416    ondansetron (ZOFRAN) injection 4 mg  4 mg Intravenous Q6H PRN Nesha Babb MD   4 mg at 03/28/20 1442    labetalol (NORMODYNE;TRANDATE) injection 10 mg  10 mg Intravenous Q6H PRN Nesha Babb MD   Stopped at 03/28/20 1447    pantoprazole (PROTONIX) tablet 20 mg  20 mg Oral BID AC Nseha Babb MD   20 mg at 03/31/20 0831    atorvastatin (LIPITOR) tablet 80 mg  80 mg Oral Nightly Dameon Reynoso MD   80 mg at 03/30/20 2008    nitroGLYCERIN (NITROSTAT) SL tablet 0.4 mg  0.4 mg Sublingual Q5 Min PRN Dameon Reynoso MD        naloxone [facility]) injection 0.4 mg  0.4 mg Intravenous PRJOSÉ ANTONIO Reynoso MD        gabapentin (NEURONTIN) capsule 200 mg  200 mg Oral TID Nesha Babb MD   200 mg at 03/31/20 1412    amLODIPine (NORVASC) tablet 10 mg  10 mg Oral Daily Nesha Babb MD   10 mg at 03/31/20 0829    aspirin EC tablet 81 mg  81 mg Oral Daily Nesha Babb MD   81 mg at 03/31/20 0829    clopidogrel (PLAVIX) tablet 75 mg  75 mg Oral Daily Nesha Babb MD   75 mg at 03/31/20 2342    cloNIDine (CATAPRES) tablet 0.1 mg  0.1 mg Oral TID Nathen Carmona Osteoarthritis     Pacemaker     Pain management     Dr. Cara Esparza care patient 07/09/2019    PVD (peripheral vascular disease) ([address]     Sepsis ([address]     Skin ulcer of right heel with fat layer exposed ([address] 8/16/2019    Sleep apnea     no cpap at present.  Type II diabetes mellitus (HCC)     no meds.  Ulcer of left heel, with fat layer exposed ([address] 2/28/2018    Ulcer of right foot, with fat layer exposed ([address] 8/16/2019    Wound infection     Wound infection after surgery 7/8/2019     Past Surgical History:   Procedure Laterality Date    AMPUTATION ABOVE KNEE Right 3/26/2020    LEG AMPUTATION ABOVE KNEE performed by Ilan Uriarte DO at [facility]  08/09/11 [facility] [place]    HUMAIRA cephalic vein balloon angioplasty with 7mm x 4cm balloon. coild embolization of 2 cephallic vein brances with 3mm x 5cm coils    CARDIAC CATHETERIZATION  04/04/11    cardiac cath and stent x1 by Dr. Keke Galloway  07/26/11 JAI GERARDO AV fistula. coild embolization of cephalic vein branch near the wrist with 3mm EMBO coils.  balloon a'plasty left cephalic vein with [id number] balloon and then [id number] balloon    DIALYSIS FISTULA CREATION Left 2/16/2017    LEFT UPPER EXTREMITY BRACHIAL / AXILLARY GRAFT AND STENT LEFT SUBCLAVIAN VEIN  performed by Yanick Garsia MD at [address] Right 7/12/2019    RIGHT LEG WOUND WASHOUT performed by Raudel Broussard MD at [address] Right 1/27/2020    REVISION OF RIGHT LEG BYPASS GRAFT performed by Raudel Broussard MD at [address] [facility] FEMORAL-TIBIAL BYPASS GRAFT Right 6/25/2019    FEMORAL TIBIAL/PERINEAL BYPASS WITH REVERSED GREATER SAPHENOUS VEIN performed by Yanick Garsia MD at [address] [facility]  12/26/2010    Right internal jugular vein tunneled dialysis catheter placement    OTHER SURGICAL HISTORY  [id number]    Redo of dialysis catheter    OTHER SURGICAL HISTORY  9/6/2011   SUKHWINDER 2020    [facility] Endarterectomy of the right common femoral artery, superficial femoral artery, and profunda femoris artery. Redo right groin x3.  VASCULAR SURGERY  2020    VI. BILATERAL AORTAILLIAC AND RIGHT LEG ANGIOGRAM     Family History   Problem Relation Age of Onset    High Blood Pressure Mother     High Blood Pressure Father     High Blood Pressure Sister      Social History     Tobacco Use    Smoking status: Former Smoker     Packs/day: 0.25     Years: 45.00     Pack years: 11.25     Types: Cigarettes     Last attempt to quit: 3/18/2020     Years since quittin.0    Smokeless tobacco: Never Used   Substance Use Topics    Alcohol use: No          Review of Systems:    General:      Complaint / Symptom Yes / No / Description if Yes       Fatigue Yes:  chronic   Weight gain NA   Insomnia NA       Respiratory:        Complaint / Symptom Yes / No / Description if Yes       Cough No   Horseness NA       Cardiovascular:    Complaint / Symptom Yes / No / Description if Yes       Chest Pain No   Shortness of Air / Orthopnea Yes: chronic and stable   Presyncope / Syncope No   Palpitations No         Objective:    BP (!) 100/58   Pulse 65   Temp 97.6 °F (36.4 °C) (Temporal)   Resp 18   Ht 5' 8\" (1.727 m)   Wt 152 lb 6 oz (69.1 kg)   SpO2 90%   BMI 23.17 kg/m² ,     Intake/Output Summary (Last 24 hours) at 3/31/2020 1443  Last data filed at 3/31/2020 1316  Gross per 24 hour   Intake 720 ml   Output --   Net 720 ml       GENERAL - well developed and well nourished, is an active participant in this examination  HEENT -  PERRLA, Hearing appears normal, conjunctiva and lids are normal, ears and nose appear normal  NECK - no thyromegaly, no JVD, trachea is in the midline  CARDIOVASCULAR - PMI is in the left mid line clavicular position, Normal S1 and S2 with a grade 1/6 systolic murmur. No S3 or S4    PULMONARY - No respiratory distress. scattered wheezes and rales.   Breath sounds in both  lung fields are Decreased  ABDOMEN  - soft, non tender, no rebound, no hepatomegaly or splenomegaly  MUSCULOSKELETAL  - Prone/Supine, digitals and nails are without clubbing or cyanosis  EXTREMITIES - trace edema, right AKA  NEUROLOGIC - cranial nerves, II-XII, are normal  SKIN - turgor is normal, no rash  PSYCHIATRIC - normal mood and affect, alert and orientated x 3, judgement and insight appear appropriate      ASSESSMENT:    ALL THE CARDIOLOGY PROBLEMS ARE LISTED ABOVE; HOWEVER, THE FOLLOWING SPECIFIC CARDIAC PROBLEMS / CONDITIONS WERE ADDRESSED AND TREATED DURING THE HOSPITAL VISIT TODAY:                                                                                            MEDICAL DECISION MAKING                   Cardiac Specific Problem / Diagnosis   Discussion and Data Reviewed Diagnostic or Therapeutic Procedures Ordered Management Options Selected                               1. Presenting problem / symptom     CAD  are worsening    The troponin is going up and there was T wave inversion and there is known CAD (see cath of 12/16/2019)     4/4/2011  cardiac cath and stent x1 by Dr. Kelly Mustafa  1/17/2014  Echo  EF 35%  1/23/2014  lexiscan negative for myocardial ischemia, EF 37  %   6/24/2019  lexiscan negative for myocardial ischemia, EF 56  %   12/12/2019  lexiscan Positive for inferior MI + myocardial ischemia, EF 38%, 2% ischemic myocardium on stress, low risk findings, AUC indication 15, AUC score 4, Ghulam Blackman MD)   12/16/19  Cath  70% left main, 70% mid circumflex, 80% OM2, o/w luminals, normal LVFX    Yes: troponins and ekgs Continue current medications:     Yes: increase anti anginals                        2.  Sinoatrial node dysfunction Initial presentation during this evaluation    Review and summation of old records:     7-day ZIO Patch Holter monitor which showed significant pauses and significant  Bradycardia  6/8/2016  Medtronic DDD pacemaker Arminda Geiger, [facility])      Yes: pacemaker interrogation ok see

## 2020-03-31 NOTE — PROGRESS NOTES
Nephrology ([facility]) Progress Note      Patient:  Raul Blum  YOB: 1960  Date of Service: 3/31/2020  MRN: [id number]   Acct: [de-identified]   Primary Care Physician: Danny Hale DO  Advance Directive: Full Code  Admit Date: 3/25/2020       Hospital Day: 6  Referring Provider: Mena Cronin MD    Patient independently seen and examined, Chart, Consults, Notes, Operative notes, Labs, Cardiology, and Radiology studies reviewed as able. Chief complaint: Abnormal labs. Subjective:  Raul Blum is a 61 y.o. male  whom we were consulted for end-stage renal disease. Patient goes to [address] [place] [address] dialysis clinic on Monday Wednesday Friday. Patient has a known history of severe peripheral vascular disease, history of CABG, congestive heart failure, cirrhosis of liver, sleep apnea and hepatitis C. Admitted this time for nonhealing foot wound with plans for RLE amputation. Hospital course remarkable for right above-the-knee amputation. He is going to be admitted to rehab unit for rehabilitation. Once on rehab he will be converted to Monday Wednesday Friday dialysis. This morning he is feeling much better. He had one episode of hypotension during dialysis yesterday. Allergies:  Patient has no known allergies.     Medicines:  Current Facility-Administered Medications   Medication Dose Route Frequency Provider Last Rate Last Dose    metoprolol succinate (TOPROL XL) extended release tablet 25 mg  25 mg Oral BID Billie Torres MD   25 mg at 03/31/20 2470    heparin (porcine) injection 4,000 Units  4,000 Units Intravenous PRN Jose Montoya MD   4,000 Units at 03/31/20 0405    heparin (porcine) injection 2,000 Units  2,000 Units Intravenous PRN Jose Montoya MD   2,000 Units at 03/30/20 0234    heparin 25,000 units in dextrose 5% 250 mL infusion  12 Units/kg/hr Intravenous Continuous Jose Montoya MD 6.1 mL/hr at 03/31/20 0924 9 Units/kg/hr at 03/31/20 0924    infection     Wound infection after surgery 7/8/2019       Past Surgical History:  Past Surgical History:   Procedure Laterality Date    AMPUTATION ABOVE KNEE Right 3/26/2020    LEG AMPUTATION ABOVE KNEE performed by Larissa Whalen DO at [address] [facility] ANGIOPLASTY  08/09/11 [facility]    HUMAIRA cephalic vein balloon angioplasty with 7mm x 4cm balloon. coild embolization of 2 cephallic vein brances with 3mm x 5cm coils    CARDIAC CATHETERIZATION  04/04/11    cardiac cath and stent x1 by Dr. Jayda Denise  07/26/11 [facility]    LULEONARD AV fistula. coild embolization of cephalic vein branch near the wrist with 3mm EMBO coils. balloon a'plasty left cephalic vein with [id number] balloon and then [id number] balloon    DIALYSIS FISTULA CREATION Left 2/16/2017    LEFT UPPER EXTREMITY BRACHIAL / AXILLARY GRAFT AND STENT LEFT SUBCLAVIAN VEIN  performed by Andrzej Giron MD at [address] Right 7/12/2019    RIGHT LEG WOUND WASHOUT performed by Mary Lopez MD at [facility] Right 1/27/2020    REVISION OF RIGHT LEG BYPASS GRAFT performed by Mary Lopez MD at [address] [facility] FEMORAL-TIBIAL BYPASS GRAFT Right 6/25/2019    FEMORAL TIBIAL/PERINEAL BYPASS WITH REVERSED GREATER SAPHENOUS VEIN performed by Andrzej Giron MD at [address] [facility]  12/26/2010    Right internal jugular vein tunneled dialysis catheter placement    OTHER SURGICAL HISTORY  [id number]    Redo of dialysis catheter    OTHER SURGICAL HISTORY  9/6/2011   [facility]    Removal of RT IJV tunneled dialysis cath    OTHER SURGICAL HISTORY  5/22/12   [facility]    Ultrasound-guided cannulation of left cephalic vein in the mid forearm toward the AV anastomosis, Left upper  ext.  fistulograms including venograms of the SVC, Left cephalic vein balloon angioplasty with a 4mm x 100mm Darrell balloon, Completion fistulograms    PACEMAKER PLACEMENT Right 2015    medtronic    PARACENTESIS      multiple/recent; per cannulation left basilic vein, left upper venograms, balloon angioplasty left subclavian vein 10x40 conquest.    VASCULAR SURGERY  02/16/2017    [facility]. Left brachial artery to axillary vein arteriovenous graft with 7 mm artegraft. Left upper extremity venograms. Left subclavian vein stent viabahn 13x50. Left subclavian vein stent balloon angioplasty 12x40 atlas.  VASCULAR SURGERY  04/11/2017    [facility]. Removal of tunneled dialysis catheter right internal jugular vein.  VASCULAR SURGERY  01/25/2018    [facility]. Arch aortogram, left upper arteriogram, AV graft angiogram/venogram.    VASCULAR SURGERY  02/28/2018    [facility]. Right CFA 5f-6f-7f sheath, aortogram with bilateral lower arteriogram, left SFA/pop  balloon angioplasty 5x100 , 6x150 lutonix ( 2), left CFA  7f sheath, bilateral iliac kissing stents right 10x38 icast, left 9x59 icast expanded with 10x40 , completion aortogram, mynx left CFA , failed mynx right CFA.  VASCULAR SURGERY  06/13/2019    [facility] left CFA 5f sheath, aortogram with bilateral lower arteriogram, mynx left CFA.  VASCULAR SURGERY  06/25/2019    [facility]. Femoral tibial/perineal bypass with reversed greater saphenous vein.  VASCULAR SURGERY  08/16/2019    [facility]. Aortogram and runoff, selective right CFA injections. PTA of fem-tp bypass with 4.0 x 220 mm tod balloon to 4.33 mm    VASCULAR SURGERY  10/23/2019    VI. Thrombin injection in the left SFA PSA, right common femoral artery us guided access, left SFA stent treatment of the flap.  VASCULAR SURGERY  01/30/2020    [facility] Endarterectomy of the right common femoral artery, superficial femoral artery, and profunda femoris artery. Redo right groin x3.  VASCULAR SURGERY  03/06/2020    VI.  BILATERAL AORTAILLIAC AND RIGHT LEG ANGIOGRAM       Family History  Family History   Problem Relation Age of Onset    High Blood Pressure Mother     High Blood Pressure Father     High Blood Pressure Sister        Social History  Social History

## 2020-03-31 NOTE — PROGRESS NOTES
PRN  oxyCODONE-acetaminophen, 1 tablet, Q6H PRN  oxyCODONE, 10 mg, Q4H PRN  ondansetron, 4 mg, Q6H PRN  labetalol, 10 mg, Q6H PRN  nitroGLYCERIN, 0.4 mg, Q5 Min PRN  naloxone, 0.4 mg, PRN  sucralfate, 1 g, TID PRN  sodium chloride flush, 10 mL, PRN  potassium chloride, 40 mEq, PRN    Or  potassium alternative oral replacement, 40 mEq, PRN    Or  potassium chloride, 10 mEq, PRN  acetaminophen, 650 mg, Q6H PRN    Or  acetaminophen, 650 mg, Q6H PRN          PHYSICAL EXAM:     General appearance: Demonstrates an ill-appearing male who is alert and oriented in no acute distress. HEENT: Normocephalic. Atraumatic. RODOLFO. NECK: Supple. NO JVD. No carotids bruits auscultated. Chest: Clear to auscultation bilaterally without wheezes or rhonchi. Cardiac: Normal heart tones with regular rate and rhythm, S1, S2 normal. 2/6 systolic murmur, no gallops or rubs auscultated. Abdomen: Soft, non-tender; non-distended normal bowel sounds no masses, no organomegaly. Extremities: AV fistula, left arm. Right AKA with dressing dry and intact. Skin: Skin color, texture, turgor normal. No rashes or lesions.   Neurologic: Grossly intact.       LABS:     CBC:   Recent Labs     03/29/20  0317 03/30/20  0144 03/31/20  0113   WBC 6.6 5.7 6.5   RBC 2.79* 2.87* 2.71*   HGB 7.9* 8.0* 7.7*   HCT 26.3* 26.0* 25.0*   MCV 94.3* 90.6 92.3   MCH 28.3 27.9 28.4   MCHC 30.0* 30.8* 30.8*   RDW 16.3* 16.2* 16.2*    138 153   MPV 11.7 11.8 11.6      Last 3 CMP:   Recent Labs     03/30/20  0144 03/30/20  1518 03/31/20  0113   * 141 134*   K 5.1* 3.7 4.1   CL 92* 100 92*   CO2 27 31* 28   BUN 42* 13 20   CREATININE 5.7* 2.3* 3.4*   GLUCOSE 118* 118* 187*   CALCIUM 9.0 8.9 8.6   PROT  --  6.6  --    LABALBU  --  3.2*  --    BILITOT  --  0.4  --    ALKPHOS  --  91  --    AST  --  18  --    ALT  --  7  --         Calcium:   Lab Results   Component Value Date    CALCIUM 8.6 03/31/2020    CALCIUM 8.9 03/30/2020    CALCIUM 9.0 03/30/2020      INR: Recent Labs     03/29/20  0136   INR 1.25*     Lactic Acid:   Lab Results   Component Value Date    LACTA 1.1 01/18/2020          DVT prophylaxis: Heparin 5000 units sq every 8 hours          ASSESSMENT:     1.         Non-Healing Surgical Wounds - RLE, S/P Right AKA   2. Ischemic Foot, Right 2' to Severe PVD  3. Severe Multivessel CAD  4.         ESRD on Hemodialysis  5.         Hepatic Carcinoma  6.         Renovascular HTN  7.         DM, Type 2  8.         Chronic Diastolic CHF  9.         Hepatitis C  10.       Chronic Anemia with Thrombocytopenia  11.       Chronic Pain Syndrome - Followed by Dr. Starks Congress:     1. Ok from vascular standpoint for transfer to acute rehab.        TIFF Magallanes-BC

## 2020-03-31 NOTE — DISCHARGE SUMMARY
nephrology rec        Continued management of other chronic medical conditions       Physical Exam:  Vital Signs: BP (!) 100/58   Pulse 65   Temp 97.6 °F (36.4 °C) (Temporal)   Resp 18   Ht 5' 8\" (1.727 m)   Wt 152 lb 6 oz (69.1 kg)   SpO2 90%   BMI 23.17 kg/m²   Physical Exam  Vitals signs and nursing note reviewed. Constitutional:       General: He is not in acute distress. Appearance: Normal appearance. He is not ill-appearing, toxic-appearing or diaphoretic. HENT:      Head: Normocephalic and atraumatic. Right Ear: External ear normal.      Left Ear: External ear normal.      Nose: Nose normal. No congestion or rhinorrhea. Mouth/Throat:      Mouth: Mucous membranes are moist.      Pharynx: No oropharyngeal exudate or posterior oropharyngeal erythema. Eyes:      General: No scleral icterus. Right eye: No discharge. Left eye: No discharge. Extraocular Movements: Extraocular movements intact. Conjunctiva/sclera: Conjunctivae normal.      Pupils: Pupils are equal, round, and reactive to light. Neck:      Musculoskeletal: Normal range of motion and neck supple. No neck rigidity or muscular tenderness. Vascular: No carotid bruit. Cardiovascular:      Rate and Rhythm: Normal rate and regular rhythm. Heart sounds: Normal heart sounds. No murmur. No friction rub. No gallop. Pulmonary:      Effort: Pulmonary effort is normal. No respiratory distress. Breath sounds: Normal breath sounds. No stridor. No wheezing, rhonchi or rales. Chest:      Chest wall: No tenderness. Abdominal:      General: Bowel sounds are normal. There is no distension. Palpations: Abdomen is soft. Tenderness: There is no abdominal tenderness. There is no guarding. Musculoskeletal: Normal range of motion. General: No swelling, tenderness, deformity or signs of injury. Right lower leg: No edema. Left lower leg: No edema.       Comments: Status post
97

## 2020-04-01 LAB
ALBUMIN SERPL-MCNC: 3.1 G/DL (ref 3.5–5.2)
ALP BLD-CCNC: 96 U/L (ref 40–130)
ALT SERPL-CCNC: 8 U/L (ref 5–41)
ANION GAP SERPL CALCULATED.3IONS-SCNC: 14 MMOL/L (ref 7–19)
APTT: 31.1 SEC (ref 26–36.2)
APTT: 31.5 SEC (ref 26–36.2)
AST SERPL-CCNC: 17 U/L (ref 5–40)
BASOPHILS ABSOLUTE: 0 K/UL (ref 0–0.2)
BASOPHILS RELATIVE PERCENT: 0.5 % (ref 0–1)
BILIRUB SERPL-MCNC: 0.3 MG/DL (ref 0.2–1.2)
BUN BLDV-MCNC: 33 MG/DL (ref 6–20)
CALCIUM SERPL-MCNC: 8.6 MG/DL (ref 8.6–10)
CHLORIDE BLD-SCNC: 95 MMOL/L (ref 98–111)
CO2: 27 MMOL/L (ref 22–29)
CREAT SERPL-MCNC: 5.5 MG/DL (ref 0.5–1.2)
EOSINOPHILS ABSOLUTE: 0.5 K/UL (ref 0–0.6)
EOSINOPHILS RELATIVE PERCENT: 8.2 % (ref 0–5)
GFR NON-AFRICAN AMERICAN: 11
GLUCOSE BLD-MCNC: 110 MG/DL (ref 74–109)
HCT VFR BLD CALC: 23.2 % (ref 42–52)
HCT VFR BLD CALC: 26.1 % (ref 42–52)
HEMOGLOBIN: 7.1 G/DL (ref 14–18)
HEMOGLOBIN: 8.1 G/DL (ref 14–18)
IMMATURE GRANULOCYTES #: 0 K/UL
LYMPHOCYTES ABSOLUTE: 1.3 K/UL (ref 1.1–4.5)
LYMPHOCYTES RELATIVE PERCENT: 22.4 % (ref 20–40)
MCH RBC QN AUTO: 28.3 PG (ref 27–31)
MCHC RBC AUTO-ENTMCNC: 30.6 G/DL (ref 33–37)
MCV RBC AUTO: 92.4 FL (ref 80–94)
MONOCYTES ABSOLUTE: 0.5 K/UL (ref 0–0.9)
MONOCYTES RELATIVE PERCENT: 8.2 % (ref 0–10)
NEUTROPHILS ABSOLUTE: 3.6 K/UL (ref 1.5–7.5)
NEUTROPHILS RELATIVE PERCENT: 60.4 % (ref 50–65)
PDW BLD-RTO: 16.4 % (ref 11.5–14.5)
PLATELET # BLD: 129 K/UL (ref 130–400)
PMV BLD AUTO: 11.6 FL (ref 9.4–12.4)
POTASSIUM REFLEX MAGNESIUM: 4.7 MMOL/L (ref 3.5–5)
RBC # BLD: 2.51 M/UL (ref 4.7–6.1)
SODIUM BLD-SCNC: 136 MMOL/L (ref 136–145)
TOTAL PROTEIN: 6.3 G/DL (ref 6.6–8.7)
WBC # BLD: 5.9 K/UL (ref 4.8–10.8)

## 2020-04-01 PROCEDURE — 1180000000 HC REHAB R&B

## 2020-04-01 PROCEDURE — 97110 THERAPEUTIC EXERCISES: CPT

## 2020-04-01 PROCEDURE — 6370000000 HC RX 637 (ALT 250 FOR IP): Performed by: PSYCHIATRY & NEUROLOGY

## 2020-04-01 PROCEDURE — 85730 THROMBOPLASTIN TIME PARTIAL: CPT

## 2020-04-01 PROCEDURE — 99223 1ST HOSP IP/OBS HIGH 75: CPT | Performed by: PSYCHIATRY & NEUROLOGY

## 2020-04-01 PROCEDURE — 97116 GAIT TRAINING THERAPY: CPT

## 2020-04-01 PROCEDURE — 80053 COMPREHEN METABOLIC PANEL: CPT

## 2020-04-01 PROCEDURE — 97530 THERAPEUTIC ACTIVITIES: CPT

## 2020-04-01 PROCEDURE — 85025 COMPLETE CBC W/AUTO DIFF WBC: CPT

## 2020-04-01 PROCEDURE — 97165 OT EVAL LOW COMPLEX 30 MIN: CPT

## 2020-04-01 PROCEDURE — 8010000000 HC HEMODIALYSIS ACUTE INPT

## 2020-04-01 PROCEDURE — 85014 HEMATOCRIT: CPT

## 2020-04-01 PROCEDURE — 97161 PT EVAL LOW COMPLEX 20 MIN: CPT

## 2020-04-01 PROCEDURE — 97535 SELF CARE MNGMENT TRAINING: CPT

## 2020-04-01 PROCEDURE — 36415 COLL VENOUS BLD VENIPUNCTURE: CPT

## 2020-04-01 PROCEDURE — 85018 HEMOGLOBIN: CPT

## 2020-04-01 PROCEDURE — 6370000000 HC RX 637 (ALT 250 FOR IP): Performed by: INTERNAL MEDICINE

## 2020-04-01 PROCEDURE — 5A1D70Z PERFORMANCE OF URINARY FILTRATION, INTERMITTENT, LESS THAN 6 HOURS PER DAY: ICD-10-PCS | Performed by: INTERNAL MEDICINE

## 2020-04-01 RX ORDER — GABAPENTIN 400 MG/1
400 CAPSULE ORAL 3 TIMES DAILY
Status: DISCONTINUED | OUTPATIENT
Start: 2020-04-01 | End: 2020-04-11 | Stop reason: HOSPADM

## 2020-04-01 RX ORDER — OXYCODONE HYDROCHLORIDE 5 MG/1
10 TABLET ORAL EVERY 4 HOURS
Status: DISCONTINUED | OUTPATIENT
Start: 2020-04-01 | End: 2020-04-11 | Stop reason: HOSPADM

## 2020-04-01 RX ORDER — CYCLOBENZAPRINE HCL 10 MG
10 TABLET ORAL 3 TIMES DAILY
Status: DISCONTINUED | OUTPATIENT
Start: 2020-04-01 | End: 2020-04-11 | Stop reason: HOSPADM

## 2020-04-01 RX ADMIN — ATORVASTATIN CALCIUM 80 MG: 80 TABLET, FILM COATED ORAL at 20:10

## 2020-04-01 RX ADMIN — GABAPENTIN 200 MG: 100 CAPSULE ORAL at 07:53

## 2020-04-01 RX ADMIN — OXYCODONE 10 MG: 5 TABLET ORAL at 16:49

## 2020-04-01 RX ADMIN — SEVELAMER CARBONATE 800 MG: 800 TABLET, FILM COATED ORAL at 07:52

## 2020-04-01 RX ADMIN — POLYETHYLENE GLYCOL 3350 17 G: 17 POWDER, FOR SOLUTION ORAL at 07:53

## 2020-04-01 RX ADMIN — CYCLOBENZAPRINE HYDROCHLORIDE 10 MG: 10 TABLET, FILM COATED ORAL at 09:24

## 2020-04-01 RX ADMIN — GABAPENTIN 400 MG: 400 CAPSULE ORAL at 20:10

## 2020-04-01 RX ADMIN — OXYCODONE 10 MG: 5 TABLET ORAL at 11:52

## 2020-04-01 RX ADMIN — CYCLOBENZAPRINE HYDROCHLORIDE 10 MG: 10 TABLET, FILM COATED ORAL at 20:10

## 2020-04-01 RX ADMIN — GABAPENTIN 400 MG: 400 CAPSULE ORAL at 16:53

## 2020-04-01 RX ADMIN — MINOXIDIL 10 MG: 10 TABLET ORAL at 07:51

## 2020-04-01 RX ADMIN — SEVELAMER CARBONATE 800 MG: 800 TABLET, FILM COATED ORAL at 16:49

## 2020-04-01 RX ADMIN — PANTOPRAZOLE SODIUM 20 MG: 20 TABLET, DELAYED RELEASE ORAL at 16:50

## 2020-04-01 RX ADMIN — OXYCODONE 10 MG: 5 TABLET ORAL at 20:11

## 2020-04-01 RX ADMIN — ASPIRIN 81 MG: 81 TABLET, COATED ORAL at 07:52

## 2020-04-01 RX ADMIN — PANTOPRAZOLE SODIUM 20 MG: 20 TABLET, DELAYED RELEASE ORAL at 05:45

## 2020-04-01 RX ADMIN — CLOPIDOGREL BISULFATE 75 MG: 75 TABLET ORAL at 07:53

## 2020-04-01 RX ADMIN — OXYCODONE 10 MG: 5 TABLET ORAL at 03:57

## 2020-04-01 RX ADMIN — SEVELAMER CARBONATE 800 MG: 800 TABLET, FILM COATED ORAL at 11:52

## 2020-04-01 RX ADMIN — BISACODYL 5 MG: 5 TABLET, COATED ORAL at 07:53

## 2020-04-01 ASSESSMENT — PAIN DESCRIPTION - PROGRESSION
CLINICAL_PROGRESSION: NOT CHANGED

## 2020-04-01 ASSESSMENT — PAIN DESCRIPTION - ONSET
ONSET: ON-GOING

## 2020-04-01 ASSESSMENT — PAIN SCALES - GENERAL
PAINLEVEL_OUTOF10: 6
PAINLEVEL_OUTOF10: 0
PAINLEVEL_OUTOF10: 7
PAINLEVEL_OUTOF10: 2
PAINLEVEL_OUTOF10: 0
PAINLEVEL_OUTOF10: 8
PAINLEVEL_OUTOF10: 8
PAINLEVEL_OUTOF10: 4

## 2020-04-01 ASSESSMENT — PAIN DESCRIPTION - DESCRIPTORS
DESCRIPTORS: ACHING

## 2020-04-01 ASSESSMENT — PAIN DESCRIPTION - FREQUENCY
FREQUENCY: CONTINUOUS

## 2020-04-01 ASSESSMENT — PAIN - FUNCTIONAL ASSESSMENT
PAIN_FUNCTIONAL_ASSESSMENT: ACTIVITIES ARE NOT PREVENTED

## 2020-04-01 ASSESSMENT — PAIN DESCRIPTION - LOCATION
LOCATION: LEG

## 2020-04-01 ASSESSMENT — PAIN DESCRIPTION - ORIENTATION
ORIENTATION: RIGHT

## 2020-04-01 ASSESSMENT — PAIN DESCRIPTION - PAIN TYPE
TYPE: ACUTE PAIN

## 2020-04-01 NOTE — PROGRESS NOTES
Occupational Therapy   Occupational Therapy Initial Assessment  Date: 2020   Patient Name: Nakita Mckeon  MRN: [id number]     : 1960    Date of Service: 2020    Discharge Recommendations:  Home with Home health OT       Assessment   Performance deficits / Impairments: Decreased endurance;Decreased balance;Decreased ADL status; Decreased functional mobility   Assessment: Patient requires increased assistance with ADLs and functional mobility. He requires skilled OT to address the above deficits and return the patient home independently. Treatment Diagnosis: R AKA  OT Education: OT Role;Plan of Care;Precautions; ADL Adaptive Strategies;Transfer Training  Activity Tolerance  Activity Tolerance: Patient Tolerated treatment well  Safety Devices  Safety Devices in place: Yes  Type of devices: Call light within reach; Chair alarm in place           Patient Diagnosis(es): There were no encounter diagnoses. has a past medical history of Anxiety, Blood circulation, collateral, CAD (coronary artery disease), Cancer ([address] CHF (congestive heart failure) ([address] Chyloperitoneum determined by paracentesis, CKD (chronic kidney disease), stage V ([address] Dialysis patient ([address] GERD (gastroesophageal reflux disease), Hemodialysis patient ([address] Hepatic cirrhosis ([address] Hepatitis C, chronic ([address] History of blood transfusion, HTN (hypertension), Hx of blood clots, Mixed hyperlipidemia, Osteoarthritis, Pacemaker, Pain management, Palliative care patient, PVD (peripheral vascular disease) ([address] Sepsis ([address] Skin ulcer of right heel with fat layer exposed ([address] Sleep apnea, Type II diabetes mellitus ([address] Ulcer of left heel, with fat layer exposed ([address] Ulcer of right foot, with fat layer exposed ([address] Wound infection, and Wound infection after surgery. has a past surgical history that includes other surgical history (2010); other surgical history ([id number]);  Dialysis fistula creation (11 [facility]); angioplasty (08/09/11 [facility]); other surgical history (9/6/2011   [facility]); other surgical history (5/22/12   [facility]); vascular surgery (6/19/12); vascular surgery (7/10/2012 [facility]); vascular surgery (7/30/15 [address]); vascular surgery (7/30/15 [address] cont); vascular surgery (8/13/15 [facility]); vascular surgery (5/3/16 [facility]); vascular surgery (12/08/2016); vascular surgery (01/20/2017); Cardiac catheterization (04/04/11); Paracentesis; Dialysis fistula creation (Left, 2/16/2017); vascular surgery (02/16/2017); vascular surgery (04/11/2017); vascular surgery (01/25/2018); pr anastomosis,av,any site (Left, 1/30/2018); vascular surgery (02/28/2018); vascular surgery (06/13/2019); vascular surgery (06/25/2019); Femoral-tibial Bypass Graft (Right, 6/25/2019); EXPLORATION OF WOUND OF EXTREMITY (Right, 7/12/2019); vascular surgery (08/16/2019); vascular surgery (10/23/2019); femoral bypass (Right, 1/27/2020); vascular surgery (01/30/2020); pacemaker placement (Right, 2015); pr angio retro brachial angio (N/A, 3/6/2020); vascular surgery (03/06/2020); and AMPUTATION ABOVE KNEE (Right, 3/26/2020).     Treatment Diagnosis: R AKA      Restrictions  Restrictions/Precautions  Restrictions/Precautions: Weight Bearing, Fall Risk(Hepatitis C )  Required Braces or Orthoses?: No  Implants present? : Pacemaker  Lower Extremity Weight Bearing Restrictions  Right Lower Extremity Weight Bearing: Non Weight Bearing  Left Lower Extremity Weight Bearing: Weight Bearing As Tolerated    Subjective   General  Chart Reviewed: Yes  Patient assessed for rehabilitation services?: Yes  Additional Pertinent Hx: Liver Cancer, ESRD with dialysis  Diagnosis: R AKA  Pain Assessment  Pain Level: 7  Vital Signs  Level of Consciousness: Alert  Social/Functional History  Social/Functional History  Lives With: Spouse(and has been staying with his mom due to her having less steps)  Type of Home: House  Home Layout: Two level, Able to Live on Main level with bedroom/bathroom  Home Access: Stairs to enter without rails  Entrance Stairs - Number of Steps: 1  Bathroom Shower/Tub: Walk-in shower, Tub/Shower unit(walk in shower, small lip to step over into )  [employer]: Standard  Home Equipment: [employer], Rolling walker, Crutches  ADL Assistance: Independent  Homemaking Assistance: Independent  Ambulation Assistance: Independent  Transfer Assistance: Independent  Active : Yes  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 Him and his wife may look into getting a new apartment. His apartment has 6 steps and 2 in the back. Objective   Vision: Impaired  Vision Exceptions: Wears glasses at all times  Hearing: Within functional limits    Orientation  Overall Orientation Status: Within Normal Limits     Balance  Sitting Balance: Supervision  Standing Balance: Contact guard assistance  Functional Mobility  Functional - Mobility Device: Wheelchair  Activity: To/from bathroom; To/From therapy gym  Assist Level: Supervision              Transfers  Stand Pivot Transfers: Contact guard assistance  Sit to stand: Contact guard assistance  Stand to sit: Contact guard assistance         LUE AROM (degrees)  LUE AROM : WNL  Left Hand AROM (degrees)  Left Hand AROM: WNL  RUE AROM (degrees)  RUE AROM : WNL  Right Hand AROM (degrees)  Right Hand AROM: WNL  LUE Strength  Gross LUE Strength: WNL  RUE Strength  Gross RUE Strength:  WNL                   Plan   Plan  Current Treatment Recommendations: Strengthening, Balance Training, Functional Mobility Training, Endurance Training, Equipment Evaluation, Education, & procurement, Patient/Caregiver Education & Training, Safety Education & Training, Self-Care / ADL         OutComes Individual Concurrent Group Co-treatment   Time In 1000         Time Out 1130         Minutes 90         Timed Code Treatment Minutes: [address], OT

## 2020-04-01 NOTE — H&P
chest pain. He had a run of torsade de points during hemodilaysis on 3/30/20. He was taken for interrogtion of his pacemaker by Dr. Natalie Melissa. His pacemaker was found to be funtioning normally. He is felt to need a stay on Rehab to work towards his goal of returning home with his wife. He is now felt ready to start the Rehab program.  REVIEW OF SYSTEMS:  Constitutional - No fever or chills. No diaphoresis or significant fatigue. HENT -  No tinnitus or significant hearing loss. Eyes - no sudden vision change or eye pain  Respiratory - no significant shortness of breath or cough  Cardiovascular - no chest pain No palpitations or significant leg swelling  Gastrointestinal - no abdominal swelling or pain. Genitourinary - No difficulty urinating, dysuria  Musculoskeletal - no back pain or myalgia. Skin - no color change or rash  Neurologic - No seizures. No lateralizing weakness. Hematologic - no easy bruising or excessive bleeding. Psychiatric - no severe anxiety or nervousness. All other review of systems are negative.       Past Medical History:      Diagnosis Date    Anxiety     Blood circulation, collateral     CAD (coronary artery disease)     stents x 2; per dr. Sherly Colunga [facility])     liver cancer    CHF (congestive heart failure) ([address]     Chyloperitoneum determined by paracentesis     CKD (chronic kidney disease), stage V ([address]     Dialysis patient ([address]     mon wed fri at [place] GERD (gastroesophageal reflux disease)     Hemodialysis patient ([address]     mon wed fri in [place] Hepatic cirrhosis ([address]     dr. Julieta Rowland; [facility] for liver and kidney transplant list.    Hepatitis C, chronic ([address]     History of blood transfusion     HTN (hypertension)     Hx of blood clots     arm/fistula    Mixed hyperlipidemia 1/9/2017    Osteoarthritis     Pacemaker     Pain management     Dr. Kady Hook care patient 07/09/2019    PVD (peripheral vascular disease) ultrasound guided cannulation of left distal cephalic vein ultrasound guided cannulation right internal jugular vein placement of right internal jugular vein tunneled dialysis catheter (Bard Equistream XK 23cm tip to cuff)     VASCULAR SURGERY  01/20/2017    [facility]-Right upper venograms, u/s guided cannulation left basilic vein, left upper venograms, balloon angioplasty left subclavian vein 10x40 conquest.    VASCULAR SURGERY  02/16/2017    S[facility]. Left brachial artery to axillary vein arteriovenous graft with 7 mm artegraft. Left upper extremity venograms. Left subclavian vein stent viabahn 13x50. Left subclavian vein stent balloon angioplasty 12x40 atlas.  VASCULAR SURGERY  04/11/2017    SJS. Removal of tunneled dialysis catheter right internal jugular vein.  VASCULAR SURGERY  01/25/2018    SJS. Arch aortogram, left upper arteriogram, AV graft angiogram/venogram.    VASCULAR SURGERY  02/28/2018    SJS. Right CFA 5f-6f-7f sheath, aortogram with bilateral lower arteriogram, left SFA/pop  balloon angioplasty 5x100 , 6x150 lutonix ( 2), left CFA  7f sheath, bilateral iliac kissing stents right 10x38 icast, left 9x59 icast expanded with 10x40 , completion aortogram, mynx left CFA , failed mynx right CFA.  VASCULAR SURGERY  06/13/2019    SJS left CFA 5f sheath, aortogram with bilateral lower arteriogram, mynx left CFA.  VASCULAR SURGERY  06/25/2019    SJS-VI. Femoral tibial/perineal bypass with reversed greater saphenous vein.  VASCULAR SURGERY  08/16/2019    [facility]. Aortogram and runoff, selective right CFA injections. PTA of fem-tp bypass with 4.0 x 220 mm tod balloon to 4.33 mm    VASCULAR SURGERY  10/23/2019    VI. Thrombin injection in the left SFA PSA, right common femoral artery us guided access, left SFA stent treatment of the flap.  VASCULAR SURGERY  01/30/2020    TJR Endarterectomy of the right common femoral artery, superficial femoral artery, and profunda femoris artery.  Redo right on hemodialysis-continue. Nephrology following  3. Hyperlipidemia-continue statin  4. Coronary artery disease-continue Imdur. Patient has pacer  5. Essential hypertension-continue current medications and monitoring  6. GI-bowel regimen  7. DVT prophylaxis-SCDs  8. History of hepatitis C and hepatocellular carcinoma-stable  9. Anemia of chronic disease with acute blood loss anemia-continue to monitor    Patient's functional assessment  Prior to hospitalization the patient was continent of bowel and bladder    Functional assessment  All notes from reehab data were reviewed regarding the patient's functional status.     Current therapy  Requires PT, OT and/or speech therapy    Anticipated discharge approximately 14 days    Anticipated discharge setting  Home with home care    No clear barriers to discharge    The patient appears to be an appropriate candidate for inpatient rehabilitation    Sufficiently stable: Patient's condition is sufficiently stable at the time of admission to allow the patient to actively participate in an intensive rehabilitation program    Close medical supervision: A rehabilitation physician, or other licensed treating physician with specialized training and experience in inpatient rehabilitation, will conduct face-to-face visits with the patient a minimum of at least 3 days per week throughout the patient's stay    This patient requires close medical supervision for the active management of the ongoing conditions and potential complications stated in the admission note    Intensive rehabilitation nursing: The patient demonstrates the need for 24-hour rehabilitation nursing care for active management of the multiple medical issues documented in the admission note    Appropriate therapy needed: The patient requires the active and ongoing therapeutic intervention of at least 2 therapeutic disciplines, one of which must be physical or occupational therapy and/or speech therapy    Intensive

## 2020-04-01 NOTE — CONSULTS
[facility]    [id number]    Consulting Physician: Marva Tesfaye MD  Reason for Consult: Medical Management  Primacy Care Physician: Shanice Daugherty DO      History Obtained From:  patient, electronic medical record    History of present illness:  Mr. Murray Cuenca, a 56 y. o. male with a history of severe peripheral vascular disease, HCV, CAD, ESRD, on HD (MWF), has been admitted for progressively worsening severe right lower extremity pain and nonhealing wound.  Patient reportedly has had an arterial bypass about 7 months ago, and the wound never healed. Kelly Monsivais reportedly has progressively gotten worse to the point where it becomes difficult for the patient to ambulate.      Patient initially admitted to the medical floor, via vascular team, - 03/25/2020   Status post right AKA (03/26/2020)  Transferred to Acute Rehab - 03/31/2020    Medicine consulted for continuous medical management      Past Medical History:        Diagnosis Date    Anxiety     Blood circulation, collateral     CAD (coronary artery disease)     stents x 2; per dr. Johann Madden [facility])     liver cancer    CHF (congestive heart failure) ([address]     Chyloperitoneum determined by paracentesis     CKD (chronic kidney disease), stage V ([address]     Dialysis patient ([address]     mon wed fri at [place] GERD (gastroesophageal reflux disease)     Hemodialysis patient ([address]     mon wed fri in [place] Hepatic cirrhosis ([address]     dr. Blaze Reinoso; has been going [facility] for liver and kidney transplant list.    Hepatitis C, chronic ([address]     History of blood transfusion     HTN (hypertension)     Hx of blood clots     arm/fistula    Mixed hyperlipidemia 1/9/2017    Osteoarthritis     Pacemaker     Pain management     Dr. Terri Medrano care patient 07/09/2019    PVD (peripheral vascular disease) ([address]     Sepsis ([address]     Skin ulcer of right heel with fat layer exposed ([address] 8/16/2019    Sleep apnea     no cpap at present.  Type II diabetes mellitus (HCC)     no meds.  Ulcer of left heel, with fat layer exposed ([address] 2/28/2018    Ulcer of right foot, with fat layer exposed ([address] 8/16/2019    Wound infection     Wound infection after surgery 7/8/2019       Past Surgical History:        Procedure Laterality Date    AMPUTATION ABOVE KNEE Right 3/26/2020    LEG AMPUTATION ABOVE KNEE performed by Filemon Busch DO at [facility]  08/09/11 S    LULEONARD cephalic vein balloon angioplasty with 7mm x 4cm balloon. coild embolization of 2 cephallic vein brances with 3mm x 5cm coils    CARDIAC CATHETERIZATION  04/04/11    cardiac cath and stent x1 by Dr. Maine Odom  07/26/11 JAI GERARDO AV fistula. coild embolization of cephalic vein branch near the wrist with 3mm EMBO coils. balloon a'plasty left cephalic vein with [id number] balloon and then [id number] balloon    DIALYSIS FISTULA CREATION Left 2/16/2017    LEFT UPPER EXTREMITY BRACHIAL / AXILLARY GRAFT AND STENT LEFT SUBCLAVIAN VEIN  performed by Rimma Pimentel MD at [address] Right 7/12/2019    RIGHT LEG WOUND WASHOUT performed by Delores Colindres MD at [address] Right 1/27/2020    REVISION OF RIGHT LEG BYPASS GRAFT performed by Delores Colindres MD at [address] [facility] FEMORAL-TIBIAL BYPASS GRAFT Right 6/25/2019    FEMORAL TIBIAL/PERINEAL BYPASS WITH REVERSED GREATER SAPHENOUS VEIN performed by Rimma Pimentel MD at [address]  12/26/2010    Right internal jugular vein tunneled dialysis catheter placement    OTHER SURGICAL HISTORY  [id number]    Redo of dialysis catheter    OTHER SURGICAL HISTORY  9/6/2011   [facility]    Removal of RT IJV tunneled dialysis cath    OTHER SURGICAL HISTORY  5/22/12   [facility]    Ultrasound-guided cannulation of left cephalic vein in the mid forearm toward the AV anastomosis, Left upper  ext.  fistulograms including venograms of the SVC, Left cephalic vein balloon angioplasty with a 4mm x 100mm Darrell balloon, Completion fistulograms    PACEMAKER PLACEMENT Right 2015    medtronic    PARACENTESIS      multiple/recent; per dr. Kraft Freeze [place] [address] [address] [facility] Left 1/30/2018    UPPER EXTREMITY DRIL PROCEDURE WITH LEFT SAPHENOUS VEIN HARVESTING performed by Armani Elliott MD at [address] [facility] [employer] ANGIO N/A 3/6/2020    [facility] AND RIGHT LEG ANGIOGRAM performed by Yon Mohan DO at [address] [facility] VASCULAR SURGERY  6/19/12    [facility] arteriovenous fistulogram including venography of the superior vena cava. Balloon angioplasty, left forearm cephalic vein and upper arm cephalic vein with [id number] darrell balloon.  VASCULAR SURGERY  7/10/2012 [facility]     Revision of left distal radial artery to cephalic vein arteriovenous fistula with 7mm Artegraft interposition.  VASCULAR SURGERY  7/30/15 [facility] & [address]    Left upper extremity arteriovenous fistulograms including venography of the superior vena cava. Left cephalic vein balloon angioplasty with an 8 x 20 cm cutting balloon proximal cephalic vein. Balloon angioplasty of left cephalic vein/antecubital vein near the antecubital crease with 8 x 20 mm cutting balloon.  VASCULAR SURGERY  7/30/15 [facility] cont    Balloon angioplasty proximal end distal upper arm cephalic vein with 9 x 40 cm  balloon. Completion fistulograms of the left upper extremity.  VASCULAR SURGERY  8/13/15 [facility]    Revision of left upper extremity arteriovenous fistula with excision of pseudoaneurysm and primary repair of cephalic vein.     VASCULAR SURGERY  5/3/16 [facility]    Left upper fistulograms/venograms, left cephalic balloon 8 x 20 cutter,9 x 40 conquest,left proximal cephalic vein stents (flair 2 9 x 50), balloon stents 9 x 40 conquest.    VASCULAR SURGERY  12/08/2016    [facility]- ultrasound guided cannulation of left distal cephalic vein ultrasound guided cannulation right internal jugular vein placement of right internal jugular vein tunneled dialysis catheter (Bard Equistream XK 23cm tip to cuff)     VASCULAR SURGERY  01/20/2017    [facility]-Right upper venograms, u/s guided cannulation left basilic vein, left upper venograms, balloon angioplasty left subclavian vein 10x40 conquest.    VASCULAR SURGERY  02/16/2017    [facility]. Left brachial artery to axillary vein arteriovenous graft with 7 mm artegraft. Left upper extremity venograms. Left subclavian vein stent viabahn 13x50. Left subclavian vein stent balloon angioplasty 12x40 atlas.  VASCULAR SURGERY  04/11/2017    [facility]. Removal of tunneled dialysis catheter right internal jugular vein.  VASCULAR SURGERY  01/25/2018    [facility]. Arch aortogram, left upper arteriogram, AV graft angiogram/venogram.    VASCULAR SURGERY  02/28/2018    [facility]. Right CFA 5f-6f-7f sheath, aortogram with bilateral lower arteriogram, left SFA/pop  balloon angioplasty 5x100 , 6x150 lutonix ( 2), left CFA  7f sheath, bilateral iliac kissing stents right 10x38 icast, left 9x59 icast expanded with 10x40 , completion aortogram, mynx left CFA , failed mynx right CFA.  VASCULAR SURGERY  06/13/2019    [facility] left CFA 5f sheath, aortogram with bilateral lower arteriogram, mynx left CFA.  VASCULAR SURGERY  06/25/2019    [facility]. Femoral tibial/perineal bypass with reversed greater saphenous vein.  VASCULAR SURGERY  08/16/2019    [facility]. Aortogram and runoff, selective right CFA injections. PTA of fem-tp bypass with 4.0 x 220 mm tod balloon to 4.33 mm    VASCULAR SURGERY  10/23/2019    VI. Thrombin injection in the left SFA PSA, right common femoral artery us guided access, left SFA stent treatment of the flap.  VASCULAR SURGERY  01/30/2020    [facility] Endarterectomy of the right common femoral artery, superficial femoral artery, and profunda femoris artery. Redo right groin x3.  VASCULAR SURGERY  03/06/2020    VI.  BILATERAL AORTAILLIAC AND RIGHT LEG ANGIOGRAM       Medications (LONITEN) tablet 10 mg  10 mg Oral Daily Dee Calloway MD   10 mg at 04/01/20 0751    naloxone (NARCAN) injection 0.4 mg  0.4 mg Intravenous PRN Dee Calloway MD        nitroGLYCERIN (NITROSTAT) SL tablet 0.4 mg  0.4 mg Sublingual Q5 Min PRN Dee Calloway MD        ondansetron [facility]) injection 4 mg  4 mg Intravenous Q6H PRN Dee Calloway MD        pantoprazole (PROTONIX) tablet 20 mg  20 mg Oral BID AC Dee Calloway MD   20 mg at 04/01/20 0545    sevelamer (RENVELA) tablet 800 mg  800 mg Oral TID WC Dee Calloway MD   800 mg at 04/01/20 1152    sucralfate (CARAFATE) tablet 1 g  1 g Oral TID PRN Dee Calloway MD        polyethylene glycol (GLYCOLAX) packet 17 g  17 g Oral Daily Claudia Don MD   17 g at 04/01/20 0753    bisacodyl (DULCOLAX) suppository 10 mg  10 mg Rectal Daily PRN Claudia Don MD        bisacodyl (DULCOLAX) EC tablet 5 mg  5 mg Oral Daily Claudia Don MD   5 mg at 04/01/20 4696       Allergies:  Patient has no known allergies. Social History:   TOBACCO:   reports that he quit smoking about 2 weeks ago. His smoking use included cigarettes. He has a 11.25 pack-year smoking history. He has never used smokeless tobacco.  ETOH:   reports no history of alcohol use. Family History:       Problem Relation Age of Onset    High Blood Pressure Mother     High Blood Pressure Father     High Blood Pressure Sister        REVIEW OF SYSTEMS:  Review of Systems  ROS: 14 point review of systems is negative except as specifically addressed above. PHYSICAL EXAM:  BP (!) 98/54   Pulse 69   Temp 98.2 °F (36.8 °C) (Temporal)   Resp 16   Ht 5' 8\" (1.727 m)   Wt 163 lb 1.6 oz (74 kg)   SpO2 90%   BMI 24.80 kg/m²     Physical Exam  Vitals signs and nursing note reviewed. Constitutional:       General: He is not in acute distress. Appearance: Normal appearance. He is not ill-appearing, toxic-appearing or diaphoretic.    HENT:      Head: Physician   Date of Birth    1960   Referring Physician   Cliff Araujo   Accession Number [id number]    [address] [facility] RT, RVT  Procedure Type of Study:   Extremities Arteries:Lower Arterial Plethysmography, VL ANKLE / BRACHIAL  INDICES EXTREMITY COMPLETE . Indications for Study:Wound Lower Extremity (Right). Risk Factors   - The patient's risk factor(s) include: dyslipidemia and arterial     hypertension.   - The patient has a former tobacco history. Allergies   - No known allergies. Impression   The patient has noncompressible bilateral tibial arteries. This would be  consistent with a history of diabetes and calcified tibial vessels. However, there is good doppler waveforms from the left femoral all the way  to the tibial arteries. Great toe pressures are mildly depressed on the  left. I suspect there is a mild to moderate reduction in flow to the left  foot. Severe decreased in arterial flow to the right foot. Signature   ----------------------------------------------------------------  Electronically signed by Meliton Lucas MD(Interpreting  physician) on 03/05/2020 06:54 AM  ----------------------------------------------------------------  Velocities are measured in cm/s ; Diameters are measured in mm Pressures +--------------------------------------++--------+-----+----+--------+-----+ ! ! !Right   ! ! Left!        !     ! +--------------------------------------++--------+-----+----+--------+-----+ ! Location                              ! !Pressure! Ratio! !Pressure! Ratio! +--------------------------------------++--------+-----+----+--------+-----+ ! Ankle PT                              !!51      !0.36 !    !255     !1.81 ! +--------------------------------------++--------+-----+----+--------+-----+ ! DP                                    ! !55      !0.39 !    !255     !1.81 ! !!Right! ! Left!           !!   !     !   !             ! +---------------++-----+-----+----+-----------++---+-----+---+-------------+ ! Location       ! !PSV  ! Ratio! EDV ! Wave Desc. !!PSV! Ratio! EDV! Wave Desc.   ! +---------------++-----+-----+----+-----------++---+-----+---+-------------+ ! Common Femoral !!161  !     !    !           !!266!     !   !             ! +---------------++-----+-----+----+-----------++---+-----+---+-------------+ ! Prox PFA       !!244  !     !    !           !!60 !     !   !             ! +---------------++-----+-----+----+-----------++---+-----+---+-------------+ ! Prox SFA       !!27   !     !    !           !!282!     !   !             ! +---------------++-----+-----+----+-----------++---+-----+---+-------------+ ! Mid SFA        !!0    !0    !    !           !!98 !0.35 !   !             ! +---------------++-----+-----+----+-----------++---+-----+---+-------------+ ! Dist SFA       !!12   !     !    !           !!96 !0.98 !   !             ! +---------------++-----+-----+----+-----------++---+-----+---+-------------+ ! Prox Popliteal !!0    !0    !    !           !!76 !0.79 !   !             ! +---------------++-----+-----+----+-----------++---+-----+---+-------------+ ! Dist Popliteal !!0    !     !    !           !!158!2.08 !   !             ! +---------------++-----+-----+----+-----------++---+-----+---+-------------+ ! Mid PTA        !!21   !     !    !           !!46 !0.29 !   !             ! +---------------++-----+-----+----+-----------++---+-----+---+-------------+ ! Prox GWYN       !!19   !     !    !           !!42 !0.27 !   !             ! +---------------++-----+-----+----+-----------++---+-----+---+-------------+ ! Abebe Perdat   !!19   !     !    !           !!53 !0.34 !   !             ! +---------------++-----+-----+----+-----------++---+-----+---+-------------+        Assessment / Plan           Active Problems:    PVD (peripheral vascular disease) (HCC)    ESRD on dialysis [facility])    CAD (coronary artery disease)    Essential hypertension    Unilateral AKA, right ([facility]    Sleep apnea    Type II diabetes mellitus ([facility]  Resolved Problems:    * No resolved hospital problems. *     Severe peripheral vascular disease  · Status post right AKA (03/26/2020)  · Continue symptomatic supportive management, including pain management PRN  · Continue PT/Rehab     History of Essential Hypertension  Now with persistent hypotension   Several BP meds currently on hold, including amlodipine, clonidine, metoprolol, and minoxidil.  Continue isosorbide mononitrate (Imdur) 60 mg p.o. daily   Consider adjusting dose if BP continue to remain low.    Continue serial vitals  · Continue to monitor      Anemia of chronic disease   Hemoglobin 7.1 (04/01/2020)   Gradual drop in hemoglobin   Monitor H&H Q8H x4 occurrences   Transfusion Goal: Hgb < 7      History of CAD  · - Initial troponin  Trends: 0.17 --> 0.30 --> 0.47 --> 0.57 ---> 0.64 --> 0.67 --> 0.79 --> 0.69 --> 0.96 --> 0.96 --> 0.90 (03/31/2020)  · Serial EKGs for chest pain  ·  Optimized medical management   · Nitro glycerin sublingual when necessary for chest pain  · Status post dual-chamber pacemaker interrogation  - 03/31/2020  · Continue to monitor    Of Note  Recent Cardiac Cath - 12/11/2019  \"Summary:    1.  Successful femoral artery ultrasound  2.  Successful femoral artery arteriogram  3.  Supervision of the administration of moderate conscious sedation  4.  Severe left main and circumflex coronary artery disease  5.  Left ventricular function is normal  6.  Patent stent(s) in the proximal to mid LAD     RECOMMENDATIONS:  1.  Risk Factor Modification  2.  On-going Medical Therapy  3.  Consideration for open heart surgery\"       History of ESRD, on scheduled HD (MWF)  · Nephrology consulted  · Continue scheduled HD  · Continue management as per nephrology rec             Consults Made:   IP CONSULT TO SOCIAL WORK  IP CONSULT TO NEPHROLOGY  IP CONSULT TO HOSPITALIST      Total face-to-face time spent with this patient, time spent reviewing medical records, and in coordination of care with the nursing staff, in the examination, evaluation/assessment, counseling, review of medications and plan, was more than 55 mins . Thanks, I appreciate the opportunity to care for your patient.   We will gladly follow with   you     Electronically signed by   Carlos Lindsey MD, MPH  Internal Medicine Hospitalist   4/1/2020 4:37 PM

## 2020-04-01 NOTE — CONSULTS
Nephrology ([facility]) Consult Note      Patient:  Nya Naranjo  YOB: 1960  Date of Service: 4/1/2020  MRN: [id number]   Acct: [de-identified]   Primary Care Physician: Rolanda Card DO  Advance Directive: Full Code  Admit Date: 3/31/2020       Hospital Day: 1  Referring Provider: Bella Ovalle MD    Patient independently seen and examined, Chart, Consults, Notes, Operative notes, Labs, Cardiology, and Radiology studies reviewed as able. Chief complaint: End-stage renal disease. Subjective:  Nya Naranjo is a 61 y.o. male  whom we were consulted for end-stage renal disease. Patient has end-stage renal disease secondary to diabetic nephropathy and he goes to [facility] dialysis clinic on Tuesday Thursday Saturday. He was electively admitted, underwent right AKA. Postprocedure, he has no complication. He is now admitted to rehab floor for rehabilitation. His dialysis date has been changed to Monday Wednesday Friday. 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 Currently seen on hemodialysis  Hemodialysis access: AV fistula  Hemodialysis: 3-1/2-hour  Ultrafiltration: 2000 cc  2K bath  Blood flow rate is 450 cc/min. Allergies:  Patient has no known allergies.     Medicines:  Current Facility-Administered Medications   Medication Dose Route Frequency Provider Last Rate Last Dose    oxyCODONE (ROXICODONE) immediate release tablet 10 mg  10 mg Oral Q4H Bella Ovalle MD   10 mg at 04/01/20 1152    cyclobenzaprine (FLEXERIL) tablet 10 mg  10 mg Oral TID Bella Ovalle MD   10 mg at 04/01/20 1432    gabapentin (NEURONTIN) capsule 400 mg  400 mg Oral TID Bella Ovalle MD        acetaminophen (TYLENOL) tablet 650 mg  650 mg Oral Q6H PRN Myles Gallegos MD        Or    acetaminophen (TYLENOL) suppository 650 mg  650 mg Rectal Q6H PRN Michaela Starch Jackelyn Sanchez MD        potassium chloride (KLOR-CON M) extended release tablet 40 mEq  40 mEq Oral PRN Chaitanya Miranda MD        Or    potassium bicarb-citric acid (EFFER-K) effervescent tablet 40 mEq  40 mEq Oral PRN Chaitanya Miranda MD        Or    potassium chloride 10 mEq/100 mL IVPB (Peripheral Line)  10 mEq Intravenous PRN Chaitanya Miranda MD        sodium chloride flush 0.9 % injection 10 mL  10 mL Intravenous PRN Chaitanya Miranda MD        [Held by provider] amLODIPine (NORVASC) tablet 10 mg  10 mg Oral Daily Chaitanya Miranda MD        aspirin EC tablet 81 mg  81 mg Oral Daily Chaitanya Miranda MD   81 mg at 04/01/20 6008    atorvastatin (LIPITOR) tablet 80 mg  80 mg Oral Nightly Chaitanya Miranda MD   80 mg at 03/31/20 1949    [Held by provider] cloNIDine (CATAPRES) tablet 0.1 mg  0.1 mg Oral TID Chaitanya Miranda MD        clopidogrel (PLAVIX) tablet 75 mg  75 mg Oral Daily Chaitanya Miranda MD   75 mg at 04/01/20 0753    isosorbide mononitrate (IMDUR) extended release tablet 60 mg  60 mg Oral Daily Chaitanya Miranda MD        labetalol (NORMODYNE;TRANDATE) injection 10 mg  10 mg Intravenous Q6H PRN Chaitanya Miranda MD        [Held by provider] metoprolol succinate (TOPROL XL) extended release tablet 25 mg  25 mg Oral BID Chaitanya Miranda MD        [Held by provider] minoxidil (LONITEN) tablet 10 mg  10 mg Oral Daily Chaitanya Miranda MD   10 mg at 04/01/20 0751    naloxone (NARCAN) injection 0.4 mg  0.4 mg Intravenous PRN Chaitanya Miranda MD        nitroGLYCERIN (NITROSTAT) SL tablet 0.4 mg  0.4 mg Sublingual Q5 Min PRN Chaitanya Miranda MD        ondansetron [facility]) injection 4 mg  4 mg Intravenous Q6H PRN Chaitanya Miranda MD        pantoprazole (PROTONIX) tablet 20 mg  20 mg Oral BID AC Chaitanya Miranda MD   20 mg at 04/01/20 0545    sevelamer (RENVELA) tablet 800 mg  800 mg Oral TID  Chaitanya Miranda MD   800 mg at 04/01/20 1156    angioplasty 12x40 atlas.  VASCULAR SURGERY  04/11/2017    [facility]. Removal of tunneled dialysis catheter right internal jugular vein.  VASCULAR SURGERY  01/25/2018    [facility]. Arch aortogram, left upper arteriogram, AV graft angiogram/venogram.    VASCULAR SURGERY  02/28/2018    [facility]. Right CFA 5f-6f-7f sheath, aortogram with bilateral lower arteriogram, left SFA/pop  balloon angioplasty 5x100 , 6x150 lutonix ( 2), left CFA  7f sheath, bilateral iliac kissing stents right 10x38 icast, left 9x59 icast expanded with 10x40 , completion aortogram, mynx left CFA , failed mynx right CFA.  VASCULAR SURGERY  06/13/2019    [facility] left CFA 5f sheath, aortogram with bilateral lower arteriogram, mynx left CFA.  VASCULAR SURGERY  06/25/2019    [facility]. Femoral tibial/perineal bypass with reversed greater saphenous vein.  VASCULAR SURGERY  08/16/2019    [facility]. Aortogram and runoff, selective right CFA injections. PTA of fem-tp bypass with 4.0 x 220 mm tod balloon to 4.33 mm    VASCULAR SURGERY  10/23/2019    VI. Thrombin injection in the left SFA PSA, right common femoral artery us guided access, left SFA stent treatment of the flap.  VASCULAR SURGERY  01/30/2020    [facility] Endarterectomy of the right common femoral artery, superficial femoral artery, and profunda femoris artery. Redo right groin x3.  VASCULAR SURGERY  03/06/2020    VI.  BILATERAL AORTAILLIAC AND RIGHT LEG ANGIOGRAM       Family History  Family History   Problem Relation Age of Onset    High Blood Pressure Mother     High Blood Pressure Father     High Blood Pressure Sister        Social History  Social History     Socioeconomic History    Marital status:      Spouse name: Alyson Chavez    Number of children: 0    Years of education: 15    Highest education level: Not on file   Occupational History    Occupation:    Social Needs    Financial resource strain: Not on file    Food insecurity     Worry: Not on file Phosphorus:  Recent Labs     03/30/20  1518   PHOS 2.3*     HgbA1C: No results for input(s): LABA1C in the last 72 hours. Hepatic:   Recent Labs     03/30/20  1518 04/01/20  0135   ALKPHOS 91 96   ALT 7 8   AST 18 17   PROT 6.6 6.3*   BILITOT 0.4 0.3   LABALBU 3.2* 3.1*     Lactic Acid: No results for input(s): LACTA in the last 72 hours. Troponin: No results for input(s): CKTOTAL, CKMB, TROPONINT in the last 72 hours. ABGs: No results for input(s): PH, PCO2, PO2, HCO3, O2SAT in the last 72 hours. CRP:  No results for input(s): CRP in the last 72 hours. Sed Rate:  No results for input(s): SEDRATE in the last 72 hours. Cultures:   No results for input(s): CULTURE in the last 72 hours. No results for input(s): BCHerlinda in the last 72 hours. No results for input(s): CXSURG in the last 72 hours. Radiology reports as per the Radiologist  Radiology: No results found. Assessment   1. End-stage renal disease/currently seen on hemodialysis. 2.  Type II diabetic nephropathy. 3.  PVD/right AKA. 4.  Chronic diastolic CHF. 5.  History of hepatitis C/cirrhosis. 6.  Secondary hyperparathyroidism. 7.  Anemia of chronic kidney disease. 8.  Recurrent hyperkalemia    Plan:  1. Tolerating hemodialysis very well. 2.  Continue physical therapy. 3.  Adjust blood pressure medications. Thank you for the consult, we appreciate the opportunity to provide care to your patients. Feel free to contact me if I can be of any further assistance.       Flor Olivares MD  04/01/20  2:12 PM

## 2020-04-01 NOTE — PROGRESS NOTES
Physical Therapy EVALUATION Note  DATE:  2020  NAME:  Jorje Crouch  :  1960  (61 y.o.,male)  MRN:  [id number]    HEIGHT:  Height: 5' 8\" (172.7 cm)  WEIGHT:  Weight: 163 lb 1.6 oz (74 kg)    PATIENT DIAGNOSIS(ES):    Diagnosis: RIGHT AKA  Additional Pertinent Hx: ESRD, DIALYSIS, DM, PVD, CHF, HEP C, HEPATOCELLULAR CARCINOMA  RESTRICTIONS/PRECAUTIONS:    Restrictions/Precautions  Restrictions/Precautions: Weight Bearing, Fall Risk(Hepatitis C )  Required Braces or Orthoses?: No  Implants present? : Pacemaker     OVERALL  ORIENTATION STATUS:     PAIN:  Pain Level: 0  Pain Type: Surgical pain    Pain Location: Leg     Pain Orientation: Right      NEUROLOGICAL                          STRENGTH  Strength RLE  Strength RLE: Exception  Comment: grossly 3/5  Strength LLE  Strength LLE: Exception  Comment: grossly 4/5  ROM        POSTURE/BALANCE  Balance  Posture: Fair  Sitting - Static: Good  Sitting - Dynamic: Good  Standing - Static: Fair  Standing - Dynamic: Fair       ACTIVITY TOLERANCE  Activity Tolerance  Activity Tolerance: Patient limited by fatigue, Patient limited by pain, Patient limited by endurance      BED MOBILITY  Bed Mobility  Rolling: Stand by assistance  Supine to Sit: Stand by assistance  Sit to Supine: Stand by assistance  TRANSFERS  Sit to Stand: Contact guard assistance      Bed to Chair: Contact guard assistance        WHEELCHAIR PROPULSION 1  Propulsion 1  Propulsion: Manual  Method: VILMA GERARDO  Level of Assistance: Stand by assistance  Distance: 50 ft(fatigued quickly)  WHEELCHAIR PROPULSION 2     AMBULATION 1  Ambulation 1  Device: Parallel Bars  Assistance: Minimal assistance  Quality of Gait: PROPER SWING TO GAIT  Distance: 24 FT  AMBULATION 2     STAIRS       GOALS:  Short term goals  Time Frame for Short term goals: 1 week  Short term goal 1: TF SURFACE TO SURFACE SBA  Short term goal 2: AMBULATE 25 FT WTIH RW CGA  Short term goal 3: PROPEL  FT ON FLAT SURFACE INDEP  Short term 4  Discharge Goal: Independent    Sit to Lying  Assistance Needed: Supervision or touching assistance  CARE Score: 4  Discharge Goal: Independent    Lying to Sitting on Side of Bed  Assistance Needed: Supervision or touching assistance  CARE Score: 4  Discharge Goal: Independent    Sit to Stand  Assistance Needed: Supervision or touching assistance  CARE Score: 4  Discharge Goal: Independent    Chair/Bed-to-Chair Transfer  Assistance Needed: Supervision or touching assistance  CARE Score: 4  Discharge Goal: Independent    Car Transfer  Reason if not Attempted: Not attempted due to medical condition or safety concerns  CARE Score: 88  Discharge Goal: Independent    Walk 10 Feet  Assistance Needed: Supervision or touching assistance  CARE Score: 4  Discharge Goal: Independent    Walk 50 Feet with Two Turns  Reason if not Attempted: Not attempted due to medical condition or safety concerns  CARE Score: 88  Discharge Goal: Supervision or touching assistance    Walk 150 Feet  Reason if not Attempted: Not attempted due to medical condition or safety concerns  CARE Score: 88  Discharge Goal: Not Attempted    Walking 10 Feet on Uneven Surfaces  Reason if not Attempted: Not attempted due to medical condition or safety concerns  CARE Score: 88  Discharge Goal: Not Attempted    1 Step (Curb)  Reason if not Attempted: Not attempted due to medical condition or safety concerns  CARE Score: 88  Discharge Goal: Supervision or touching assistance    4 Steps  Reason if not Attempted: Not attempted due to medical condition or safety concerns  CARE Score: 88  Discharge Goal: Not Attempted    12 Steps  Reason if not Attempted: Not attempted due to medical condition or safety concerns  CARE Score: 88  Discharge Goal: Not Attempted    Wheel 50 Feet with Two Turns  Assistance Needed: Supervision or touching assistance  CARE Score: 4  Discharge Goal: Independent    Wheel 150 Feet  Reason if not Attempted: Not attempted due to medical

## 2020-04-01 NOTE — PROGRESS NOTES
Vascular Surgery  Dr.Scott Lenore Jimenez   Daily Progress Note    Pt Name: Sabine Krueger  Medical Record Number: [id number]  Date of Birth 1960   Today's Date: 4/1/2020    SUBJECTIVE:     Patient was seen and examined in dialysis. Pain is  controlled  No complaints     OBJECTIVE:     Patient Vitals for the past 24 hrs:   BP Temp Temp src Pulse Resp SpO2 Height Weight   04/01/20 0645 -- -- -- -- -- -- -- 163 lb 1.6 oz (74 kg)   04/01/20 0629 (!) 98/54 98.2 °F (36.8 °C) Temporal 69 16 90 % -- --   03/31/20 2145 (!) 120/56 -- -- -- -- -- -- --   03/31/20 1943 (!) 91/46 97.1 °F (36.2 °C) Temporal 62 16 92 % -- --   03/31/20 1649 (!) 101/51 96 °F (35.6 °C) Temporal 61 18 93 % 5' 8\" (1.727 m) 165 lb (74.8 kg)       Intake/Output Summary (Last 24 hours) at 4/1/2020 1236  Last data filed at 4/1/2020 0827  Gross per 24 hour   Intake 480 ml   Output --   Net 480 ml     In: 480 [P.O.:480]  Out: -     No intake/output data recorded. Date 04/01/20 0000 - 04/01/20 2359   Shift 1435-5732 3179-1389 1843-3910 24 Hour Total   INTAKE   P.O.(mL/kg/hr)  480  480   Shift Total(mL/kg)  480(6.5)  480(6.5)   OUTPUT   Shift Total(mL/kg)       Weight (kg) 74 74 74 74     Wt Readings from Last 3 Encounters:   04/01/20 163 lb 1.6 oz (74 kg)   03/31/20 152 lb 6 oz (69.1 kg)   03/23/20 174 lb (78.9 kg)      Body mass index is 24.8 kg/m².      Diet: DIET RENAL;    MEDS:     Scheduled Meds:   oxyCODONE  10 mg Oral Q4H    cyclobenzaprine  10 mg Oral TID    gabapentin  400 mg Oral TID    [Held by provider] amLODIPine  10 mg Oral Daily    aspirin  81 mg Oral Daily    atorvastatin  80 mg Oral Nightly    [Held by provider] cloNIDine  0.1 mg Oral TID    clopidogrel  75 mg Oral Daily    isosorbide mononitrate  60 mg Oral Daily    [Held by provider] metoprolol succinate  25 mg Oral BID    [Held by provider] minoxidil  10 mg Oral Daily    pantoprazole  20 mg Oral BID AC    sevelamer  800 mg Oral TID WC    polyethylene glycol  17 g Oral Daily    file.    PLAN:     1. Daily dressing changes to AKA incision  2. PT/OT Rehab  3.  Continue with pain control

## 2020-04-01 NOTE — PROGRESS NOTES
[facility] ACUTE INPATIENT REHABILITATION  TEAM CONFERENCE NOTE    Date: 2020  Patient Name: Matt Johnston        MRN: [id number]    : 1960  (64 y.o.)  Gender: male      Diagnosis: RIGHT AKA      PHYSICAL THERAPY:  Evaluate today    SPEECH THERAPY:  N/A      OCCUPATIONAL THERAPY:  Evaluate today      NUTRITION  Current Wt: Weight: 163 lb 1.6 oz (74 kg) / Body mass index is 24.8 kg/m². Admission Wt: Admission Body Weight: 163 lb (73.9 kg)  Oral Diet Orders: Renal   Oral Nutrition Supplement (ONS) Orders: None  Please see nutrition note for details. NURSING    FIMS:  Bladder  Level of Assistance: Bladder Level of Assistance: 5- Requires helper to provide or empty urinal, bedside commode, or requires set-up/cues to empty bladder (ie. helper provides self-catheter supplies )  Frequency of Accidents: Bladder Frequency of Accidents: 6 - No accidents,uses device like urinal, bedpan, absorbant pad, or requires medication to manage bladder    Bowel  Level of Assistance: Bowel Level of Assistance: 5- Requires helper to provide or empty bedpan, bedside commode, or requires set-up/cues to move bowels  Frequency of Accidents: Bowel Frequency of Accidents: 6 - No accidents, uses device like bedpan, diaper, bedside commode colostomy, or requires medication to manage bowels    Wounds/Incisions/Ulcers: Incision healing well     Jose Scale Score: 17    Pain: Patient's pain is currently controlled with Percocet/Oxy IR    Consultations/Labs/X-rays: Dr. Papito Del Valle, Hospitalist.     Family Education: No family available for education    Fall Risk:  Joe Score: 76    Fall in the last week?no      Other Nursing Issues: Renal diet. Pills whole. BM 31. Cont B/B. SBA transfers. A/O x4. Fistula LUE. Pacemaker. Hemodialysis      SOCIAL WORK/CASE MANAGEMENT  Assessment: Is -because wife has to work he usually stays at his mother's home during the week and then with his wife over the weekends.  Both are eager to support him.    Discharge Plan   Estimated Length of Stay: to be determined  Destination: home health    Pass: No    Services at Discharge: [address], Occupational Therapy and Nursing per evaluations    Equipment at Discharge: owns wheelchair, wants electric mobility    Progress made in the prior week:  1. N/A, new patient  2.  3.  4.  5.      Goals for following week:  1. Establish treatment plan  2.   3.   4.   5.     Factors facilitating achievement of predicted outcomes: Family support    Barriers to the achievement of predicted outcomes: Pain (chronic)    Team Members Present at Conference:  : Wilson Lewis [facility]   Occupational Therapist: Simon Brasher, OTR/L  Physical Therapist: Angelica Mcfarlane PT,DPT  Speech Therapist: Lewis Martinez [address] [place] [address]  Nurse: Almaz Calloway RN,BSN   Nurse Manager:  Almaz Calloway RN, BSN  Dietitian:  Rula Alcala MS, RD, LD  Rehab Director:  Celestino Fernandez approve the established interdisciplinary plan of care as documented within the medical record of [facility].

## 2020-04-02 LAB
ALBUMIN SERPL-MCNC: 3.3 G/DL (ref 3.5–5.2)
ALP BLD-CCNC: 101 U/L (ref 40–130)
ALT SERPL-CCNC: 8 U/L (ref 5–41)
ANION GAP SERPL CALCULATED.3IONS-SCNC: 16 MMOL/L (ref 7–19)
AST SERPL-CCNC: 18 U/L (ref 5–40)
BASOPHILS ABSOLUTE: 0 K/UL (ref 0–0.2)
BASOPHILS RELATIVE PERCENT: 0.2 % (ref 0–1)
BILIRUB SERPL-MCNC: 0.4 MG/DL (ref 0.2–1.2)
BUN BLDV-MCNC: 21 MG/DL (ref 6–20)
CALCIUM SERPL-MCNC: 8.9 MG/DL (ref 8.6–10)
CHLORIDE BLD-SCNC: 95 MMOL/L (ref 98–111)
CO2: 26 MMOL/L (ref 22–29)
CREAT SERPL-MCNC: 3.4 MG/DL (ref 0.5–1.2)
EOSINOPHILS ABSOLUTE: 0.4 K/UL (ref 0–0.6)
EOSINOPHILS RELATIVE PERCENT: 5.8 % (ref 0–5)
GFR NON-AFRICAN AMERICAN: 19
GLUCOSE BLD-MCNC: 104 MG/DL (ref 74–109)
HCT VFR BLD CALC: 23.5 % (ref 42–52)
HCT VFR BLD CALC: 23.6 % (ref 42–52)
HCT VFR BLD CALC: 24.7 % (ref 42–52)
HCT VFR BLD CALC: 26.2 % (ref 42–52)
HEMOGLOBIN: 7.4 G/DL (ref 14–18)
HEMOGLOBIN: 7.5 G/DL (ref 14–18)
HEMOGLOBIN: 7.6 G/DL (ref 14–18)
HEMOGLOBIN: 8.2 G/DL (ref 14–18)
IMMATURE GRANULOCYTES #: 0 K/UL
LYMPHOCYTES ABSOLUTE: 1 K/UL (ref 1.1–4.5)
LYMPHOCYTES RELATIVE PERCENT: 16 % (ref 20–40)
MCH RBC QN AUTO: 27.9 PG (ref 27–31)
MCHC RBC AUTO-ENTMCNC: 30.8 G/DL (ref 33–37)
MCV RBC AUTO: 90.8 FL (ref 80–94)
MONOCYTES ABSOLUTE: 0.6 K/UL (ref 0–0.9)
MONOCYTES RELATIVE PERCENT: 9.3 % (ref 0–10)
NEUTROPHILS ABSOLUTE: 4.3 K/UL (ref 1.5–7.5)
NEUTROPHILS RELATIVE PERCENT: 68.4 % (ref 50–65)
PDW BLD-RTO: 16.4 % (ref 11.5–14.5)
PLATELET # BLD: 143 K/UL (ref 130–400)
PMV BLD AUTO: 11.6 FL (ref 9.4–12.4)
POTASSIUM REFLEX MAGNESIUM: 4.3 MMOL/L (ref 3.5–5)
POTASSIUM SERPL-SCNC: 4.3 MMOL/L (ref 3.5–5)
RBC # BLD: 2.72 M/UL (ref 4.7–6.1)
SODIUM BLD-SCNC: 137 MMOL/L (ref 136–145)
TOTAL PROTEIN: 7 G/DL (ref 6.6–8.7)
WBC # BLD: 6.3 K/UL (ref 4.8–10.8)

## 2020-04-02 PROCEDURE — 85014 HEMATOCRIT: CPT

## 2020-04-02 PROCEDURE — 99232 SBSQ HOSP IP/OBS MODERATE 35: CPT | Performed by: PSYCHIATRY & NEUROLOGY

## 2020-04-02 PROCEDURE — 85018 HEMOGLOBIN: CPT

## 2020-04-02 PROCEDURE — 97110 THERAPEUTIC EXERCISES: CPT

## 2020-04-02 PROCEDURE — 97530 THERAPEUTIC ACTIVITIES: CPT

## 2020-04-02 PROCEDURE — 36415 COLL VENOUS BLD VENIPUNCTURE: CPT

## 2020-04-02 PROCEDURE — 1180000000 HC REHAB R&B

## 2020-04-02 PROCEDURE — 97116 GAIT TRAINING THERAPY: CPT

## 2020-04-02 PROCEDURE — 6370000000 HC RX 637 (ALT 250 FOR IP): Performed by: PSYCHIATRY & NEUROLOGY

## 2020-04-02 PROCEDURE — 80053 COMPREHEN METABOLIC PANEL: CPT

## 2020-04-02 PROCEDURE — 85025 COMPLETE CBC W/AUTO DIFF WBC: CPT

## 2020-04-02 PROCEDURE — 6370000000 HC RX 637 (ALT 250 FOR IP): Performed by: INTERNAL MEDICINE

## 2020-04-02 RX ORDER — NORTRIPTYLINE HYDROCHLORIDE 10 MG/1
20 CAPSULE ORAL NIGHTLY
Status: DISCONTINUED | OUTPATIENT
Start: 2020-04-02 | End: 2020-04-11 | Stop reason: HOSPADM

## 2020-04-02 RX ADMIN — CLOPIDOGREL BISULFATE 75 MG: 75 TABLET ORAL at 08:36

## 2020-04-02 RX ADMIN — OXYCODONE 10 MG: 5 TABLET ORAL at 00:27

## 2020-04-02 RX ADMIN — GABAPENTIN 400 MG: 400 CAPSULE ORAL at 21:01

## 2020-04-02 RX ADMIN — CYCLOBENZAPRINE HYDROCHLORIDE 10 MG: 10 TABLET, FILM COATED ORAL at 08:36

## 2020-04-02 RX ADMIN — OXYCODONE 10 MG: 5 TABLET ORAL at 04:46

## 2020-04-02 RX ADMIN — CYCLOBENZAPRINE HYDROCHLORIDE 10 MG: 10 TABLET, FILM COATED ORAL at 21:02

## 2020-04-02 RX ADMIN — OXYCODONE 10 MG: 5 TABLET ORAL at 11:57

## 2020-04-02 RX ADMIN — SEVELAMER CARBONATE 800 MG: 800 TABLET, FILM COATED ORAL at 11:57

## 2020-04-02 RX ADMIN — ATORVASTATIN CALCIUM 80 MG: 80 TABLET, FILM COATED ORAL at 21:01

## 2020-04-02 RX ADMIN — ISOSORBIDE MONONITRATE 60 MG: 60 TABLET, EXTENDED RELEASE ORAL at 08:35

## 2020-04-02 RX ADMIN — CYCLOBENZAPRINE HYDROCHLORIDE 10 MG: 10 TABLET, FILM COATED ORAL at 14:23

## 2020-04-02 RX ADMIN — OXYCODONE 10 MG: 5 TABLET ORAL at 21:01

## 2020-04-02 RX ADMIN — OXYCODONE 10 MG: 5 TABLET ORAL at 08:35

## 2020-04-02 RX ADMIN — NORTRIPTYLINE HYDROCHLORIDE 20 MG: 10 CAPSULE ORAL at 21:01

## 2020-04-02 RX ADMIN — GABAPENTIN 400 MG: 400 CAPSULE ORAL at 14:23

## 2020-04-02 RX ADMIN — ASPIRIN 81 MG: 81 TABLET, COATED ORAL at 08:35

## 2020-04-02 RX ADMIN — PANTOPRAZOLE SODIUM 20 MG: 20 TABLET, DELAYED RELEASE ORAL at 16:21

## 2020-04-02 RX ADMIN — OXYCODONE 10 MG: 5 TABLET ORAL at 16:21

## 2020-04-02 RX ADMIN — PANTOPRAZOLE SODIUM 20 MG: 20 TABLET, DELAYED RELEASE ORAL at 08:35

## 2020-04-02 RX ADMIN — SEVELAMER CARBONATE 800 MG: 800 TABLET, FILM COATED ORAL at 08:34

## 2020-04-02 RX ADMIN — GABAPENTIN 400 MG: 400 CAPSULE ORAL at 08:35

## 2020-04-02 RX ADMIN — SEVELAMER CARBONATE 800 MG: 800 TABLET, FILM COATED ORAL at 16:21

## 2020-04-02 ASSESSMENT — PAIN SCALES - GENERAL
PAINLEVEL_OUTOF10: 8
PAINLEVEL_OUTOF10: 8
PAINLEVEL_OUTOF10: 7
PAINLEVEL_OUTOF10: 10
PAINLEVEL_OUTOF10: 7
PAINLEVEL_OUTOF10: 3

## 2020-04-02 ASSESSMENT — PAIN DESCRIPTION - FREQUENCY
FREQUENCY: CONTINUOUS

## 2020-04-02 ASSESSMENT — PAIN DESCRIPTION - PROGRESSION
CLINICAL_PROGRESSION: NOT CHANGED

## 2020-04-02 ASSESSMENT — PAIN DESCRIPTION - LOCATION
LOCATION: LEG
LOCATION: LEG;INCISION

## 2020-04-02 ASSESSMENT — PAIN DESCRIPTION - ONSET
ONSET: ON-GOING
ONSET: ON-GOING
ONSET: GRADUAL
ONSET: ON-GOING
ONSET: ON-GOING

## 2020-04-02 ASSESSMENT — PAIN DESCRIPTION - DESCRIPTORS
DESCRIPTORS: ACHING
DESCRIPTORS: ACHING
DESCRIPTORS: DISCOMFORT
DESCRIPTORS: ACHING
DESCRIPTORS: ACHING

## 2020-04-02 ASSESSMENT — PAIN DESCRIPTION - PAIN TYPE
TYPE: SURGICAL PAIN
TYPE: ACUTE PAIN
TYPE: ACUTE PAIN;SURGICAL PAIN;PHANTOM PAIN
TYPE: SURGICAL PAIN
TYPE: ACUTE PAIN

## 2020-04-02 ASSESSMENT — PAIN - FUNCTIONAL ASSESSMENT
PAIN_FUNCTIONAL_ASSESSMENT: ACTIVITIES ARE NOT PREVENTED

## 2020-04-02 ASSESSMENT — PAIN DESCRIPTION - ORIENTATION
ORIENTATION: RIGHT

## 2020-04-02 NOTE — PROGRESS NOTES
Atherosclerosis of native arteries of extremities with rest pain, right leg (HCC)    Leg ulcer, right, with fat layer exposed ([address]    Open wound of right knee    Right second toe ulcer, with fat layer exposed ([address]    Surgical wound, non healing, subsequent encounter    Sinoatrial node dysfunction (HCC)    Unilateral AKA, right ([address]    Sleep apnea    Type II diabetes mellitus ([address]       Chief Complaint:  No chief complaint on file. PLAN:     1. Daily dressing changes to AKA incision  2. PT/OT Rehab, he is doing well with therapy  3.  Continue with prn pain control, titrate down as possible

## 2020-04-02 NOTE — PROGRESS NOTES
Nephrology ([facility]) Progress Note      Patient:  Nichelle Quiles  YOB: 1960  Date of Service: 4/2/2020  MRN: [id number]   Acct: [de-identified]   Primary Care Physician: Denisha Anaya DO  Advance Directive: Full Code  Admit Date: 3/31/2020       Hospital Day: 2  Referring Provider: Lake Monroy MD    Patient independently seen and examined, Chart, Consults, Notes, Operative notes, Labs, Cardiology, and Radiology studies reviewed as able. Chief complaint: End-stage renal disease. Subjective:  Nichelle Quiles is a 61 y.o. male  whom we were consulted for end-stage renal disease. Patient has end-stage renal disease secondary to diabetic nephropathy and he goes to [place] dialysis clinic on Tuesday Thursday Saturday. He was electively admitted, underwent right AKA. Postprocedure, he has no complication. He is now admitted to rehab floor for rehabilitation. His dialysis date has been changed to Monday Wednesday Friday. 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 This morning he is feeling very well. Allergies:  Patient has no known allergies.     Medicines:  Current Facility-Administered Medications   Medication Dose Route Frequency Provider Last Rate Last Dose    nortriptyline (PAMELOR) capsule 20 mg  20 mg Oral Nightly Lake Monroy MD        oxyCODONE (ROXICODONE) immediate release tablet 10 mg  10 mg Oral Q4H Lake Monroy MD   10 mg at 04/02/20 7471    cyclobenzaprine (FLEXERIL) tablet 10 mg  10 mg Oral TID Lake Monroy MD   10 mg at 04/02/20 5770    gabapentin (NEURONTIN) capsule 400 mg  400 mg Oral TID Lake Monroy MD   400 mg at 04/02/20 0835    acetaminophen (TYLENOL) tablet 650 mg  650 mg Oral Q6H PRN Murray Mathew MD        Or   Kelly acetaminophen (TYLENOL) suppository 650 mg  650 mg Rectal Q6H PRN Murray Mathew MD x 4cm balloon. coild embolization of 2 cephallic vein brances with 3mm x 5cm coils    CARDIAC CATHETERIZATION  11    cardiac cath and stent x1 by Dr. René Lutz  [facility]    LUE AV fistula. coild embolization of cephalic vein branch near the wrist with 3mm EMBO coils. balloon a'plasty left cephalic vein with [id number] balloon and then [id number] balloon    DIALYSIS FISTULA CREATION Left 2017    LEFT UPPER EXTREMITY BRACHIAL / AXILLARY GRAFT AND STENT LEFT SUBCLAVIAN VEIN  performed by Dania Rodriguez MD at [address] Right 2019    RIGHT LEG WOUND WASHOUT performed by Paco Howard MD at [address] Right 2020    REVISION OF RIGHT LEG BYPASS GRAFT performed by Paco Howard MD at [address] [place] [address] FEMORAL-TIBIAL BYPASS GRAFT Right 2019    FEMORAL TIBIAL/PERINEAL BYPASS WITH REVERSED GREATER SAPHENOUS VEIN performed by Dania Rodriguez MD at [address] [facility]  2010    Right internal jugular vein tunneled dialysis catheter placement    OTHER SURGICAL HISTORY  [id number]    Redo of dialysis catheter    OTHER SURGICAL HISTORY  2011   [facility]    Removal of RT IJV tunneled dialysis cath    OTHER SURGICAL HISTORY  12   [facility]    Ultrasound-guided cannulation of left cephalic vein in the mid forearm toward the AV anastomosis, Left upper  ext.  fistulograms including venograms of the SVC, Left cephalic vein balloon angioplasty with a 4mm x 100mm Darrell balloon, Completion fistulograms    PACEMAKER PLACEMENT Right     medtronic    PARACENTESIS      multiple/recent; per dr. Rendon Gift at [address] [facility] Left 2018    UPPER EXTREMITY DRIL PROCEDURE WITH LEFT SAPHENOUS VEIN HARVESTING performed by Dania Rodriguez MD at [address] ANGIO N/A 3/6/2020    BILATERAL AORTAILLIAC AND RIGHT LEG ANGIOGRAM performed by Nancy Arevalo DO at [address] [place] [address] Inability: Not on file    Transportation needs     Medical: Not on file     Non-medical: Not on file   Tobacco Use    Smoking status: Former Smoker     Packs/day: 0.25     Years: 45.00     Pack years: 11.25     Types: Cigarettes     Last attempt to quit: 3/18/2020     Years since quittin.0    Smokeless tobacco: Never Used   Substance and Sexual Activity    Alcohol use: No    Drug use: Not Currently     Types: Marijuana, Cocaine, Methamphetamines, IV, Opiates , Other-see comments     Comment: in the 1970s, LSD    Sexual activity: Yes     Partners: Female     Comment: has a wife   Lifestyle    Physical activity     Days per week: Not on file     Minutes per session: Not on file    Stress: Not on file   Relationships    Social connections     Talks on phone: Not on file     Gets together: Not on file     Attends Sikhism service: Not on file     Active member of club or organization: Not on file     Attends meetings of clubs or organizations: Not on file     Relationship status: Not on file    Intimate partner violence     Fear of current or ex partner: Not on file     Emotionally abused: Not on file     Physically abused: Not on file     Forced sexual activity: Not on file   Other Topics Concern    Not on file   Social History Narrative    CODE STATUS: Full Code    HEALTH CARE PROXY: Mrs. Dee Griggs, [phone]    Back up: his Mother, Mrs. Kathleen Varner, [phone]    AMBULATES: independantly    DOMICILED: lives in a private house with 2 steps to get in, has no stairs inside, lives with wife and step children, has 3 pets         Review of Systems:  History obtained from chart review and the patient  General ROS: No fever or chills  Respiratory ROS: No cough, shortness of breath, wheezing  Cardiovascular ROS: No chest pain or palpitations  Gastrointestinal ROS: No abdominal pain or melena  Genito-Urinary ROS: No dysuria or hematuria  Musculoskeletal ROS: No joint pain or swelling   14

## 2020-04-02 NOTE — PROGRESS NOTES
Name: Sol Calderon  MRN:  [id number]  Date of service:  4/2/2020 04/02/20 1403   Subjective   Subjective Pt agrees to work with therapy. States he is feeling stronger already. Bed Mobility   Sit to Supine Independent   Scooting Independent   Transfers   Sit to Stand Stand by assistance   Stand to sit Stand by assistance   Ambulation   Ambulation? Yes   WB Status WBAT LEFT LE   Ambulation 1   Surface level tile   Device Rolling Walker   Assistance Contact guard assistance   Distance 15'   Exercises   Straight Leg Raise 20x B   Quad Sets 20x L   Heelslides 20x L   Gluteal Sets 20x    Hip Abduction 20x L   Knee Short Arc Quad 20x L   Ankle Pumps 40x L   Comments L bent knee fall out 20x    Short term goals   Time Frame for Short term goals 1 week   Short term goal 1 TF SURFACE TO SURFACE SBA   Short term goal 2 AMBULATE 25 FT WTIH RW CGA   Short term goal 3 PROPEL  FT ON FLAT SURFACE INDEP   Short term goal 4 HEP SBA   Long term goals   Time Frame for Long term goals  2 WEEKS   Long term goal 1 INDEP BED MOB   Long term goal 2 INDEP TF SURFACE TO SURFACE   Long term goal 3 INDEP AMB 25 FT WITH RW FLAT SURFACE   Long term goal 4 PROPEL WC INDEP OVER UNEVEN SURFACES   Long term goal 5 HEP INDEP   Conditions Requiring Skilled Therapeutic Intervention   Body structures, Functions, Activity limitations Decreased functional mobility ; Decreased ADL status; Decreased ROM; Decreased strength;Decreased safe awareness;Decreased endurance;Decreased sensation;Decreased balance; Increased pain;Decreased posture   Assessment Pt is feeling stronger. Did well with amb. IND with bed mobility. Activity Tolerance   Activity Tolerance Patient Tolerated treatment well   Safety Devices   Type of devices Left in bed;Call light within reach; Bed alarm in place   Electronically signed by Farrukh Thomas PTA on 4/2/2020 at 2:27 PM

## 2020-04-02 NOTE — PROGRESS NOTES
----------------------------------------------------------------  Velocities are measured in cm/s ; Diameters are measured in mm Pressures +--------------------------------------++--------+-----+----+--------+-----+ ! ! !Right   ! ! Left!        !     ! +--------------------------------------++--------+-----+----+--------+-----+ ! Location                              ! !Pressure! Ratio! !Pressure! Ratio! +--------------------------------------++--------+-----+----+--------+-----+ ! Ankle PT                              !!51      !0.36 !    !255     !1.81 ! +--------------------------------------++--------+-----+----+--------+-----+ ! DP                                    ! !55      !0.39 !    !255     !1.81 ! +--------------------------------------++--------+-----+----+--------+-----+ ! Great Toe                             !!0       !0    !    !48      !0.34 ! +--------------------------------------++--------+-----+----+--------+-----+   - Brachial Pressure:Right: 141.   - GABRIEL:Right: 0.39. Left: 1.81. Plethysmographic Digit Evaluation +---------++--------+-----+---------------++--------+-----+----------------+ ! ! !Right   ! ! Left           !!        !     !                ! +---------++--------+-----+---------------++--------+-----+----------------+ ! Location ! !Pressure! Ratio! PPG Wave Form  ! !Pressure! Ratio! PPG Wave Form   ! +---------++--------+-----+---------------++--------+-----+----------------+ ! Great Toe!!0       !0    !               !!48      !0.34 !                ! +---------++--------+-----+---------------++--------+-----+----------------+    Vl Dup Lower Extremity Arteries Bilateral    Result Date: 3/5/2020  Vascular Lower Extremities Arterial Duplex Procedure  Demographics   Patient Name     Arch Dole Age                   61   Patient Number   [id number]       Gender                Male   Visit Number     [id number]    Sacha Louis MD                                Physician   Date of Birth    1960   Referring Physician   Vannesa Geronimo   Accession Number [id number]    [address] [facility] RT, RVT  Procedure Type of Study:   Extremities Arteries:Lower Extremities Arterial Duplex, VL LOWER EXTREMITY  ARTERIES DUPLEX BILATERAL. Indications for Study:Wound Lower Extremity (Right). Risk Factors   - The patient's risk factor(s) include: dyslipidemia and arterial     hypertension.   - The patient has a former tobacco history. Allergies   - No known allergies. Impression   Bilateral lower extremity arterial duplex exam performed. The right lower extremity extremity arterial duplex exam performed. The  right lower extremtiy shows occlusion of the bypass and occlusion of the  native SFA and popliteal artery. Only collateral arterial waveforms are  seen in the tibial vessels. The left low extremity shows mild local velocity elevations in the CFA,  suggestive of a stenosis in the left External iliac artery and Common  femoral artery. The SFA and popliteal artery are patent, however,  significant plaque is seen. the anterior tibial and posterior tibial and  peroneal artery are patent. Signature   ----------------------------------------------------------------  Electronically signed by Denny Guevara MD(Interpreting  physician) on 03/05/2020 07:03 AM  ----------------------------------------------------------------  Grafts   - The graft originated from the Right Dist Common Femoral to the Right     Dist Popliteal. +--------------------------------------+----+---+-----+--------------------+ ! Location                              ! PSV ! EDV! Ratio!% Stenosis          ! +--------------------------------------+----+---+-----+--------------------+ ! Prox Anastomosis                      ! 35  !   !     !                    ! +--------------------------------------+----+---+-----+--------------------+ ! Prox Graft +---------------++-----+-----+----+-----------++---+-----+---+-------------+ ! Dist Popliteal !!0    !     !    !           !!158!2.08 !   !             ! +---------------++-----+-----+----+-----------++---+-----+---+-------------+ ! Mid PTA        !!21   !     !    !           !!46 !0.29 !   !             ! +---------------++-----+-----+----+-----------++---+-----+---+-------------+ ! Prox GWYN       !!19   !     !    !           !!42 !0.27 !   !             ! +---------------++-----+-----+----+-----------++---+-----+---+-------------+ ! Mid Peroneal   !!19   !     !    !           !!53 !0.34 !   !             ! +---------------++-----+-----+----+-----------++---+-----+---+-------------      Objective:   Vitals: /60   Pulse 68   Temp 97.2 °F (36.2 °C) (Temporal)   Resp 16   Ht 5' 8\" (1.727 m)   Wt 162 lb 4.8 oz (73.6 kg)   SpO2 90%   BMI 24.68 kg/m²   24HR INTAKE/OUTPUT:      Intake/Output Summary (Last 24 hours) at 4/2/2020 0802  Last data filed at 4/2/2020 6845  Gross per 24 hour   Intake 1110 ml   Output --   Net 1110 ml       Physical Exam  Vitals signs and nursing note reviewed. Constitutional:       General: He is not in acute distress. Appearance: Normal appearance. He is normal weight. He is not ill-appearing, toxic-appearing or diaphoretic. HENT:      Head: Normocephalic and atraumatic. Right Ear: External ear normal.      Left Ear: External ear normal.      Nose: Nose normal. No congestion or rhinorrhea. Mouth/Throat:      Mouth: Mucous membranes are moist.      Pharynx: No oropharyngeal exudate or posterior oropharyngeal erythema. Eyes:      General: No scleral icterus. Right eye: No discharge. Left eye: No discharge. Extraocular Movements: Extraocular movements intact. Conjunctiva/sclera: Conjunctivae normal.      Pupils: Pupils are equal, round, and reactive to light. Neck:      Musculoskeletal: Normal range of motion and neck supple.  No neck Hypertension  Now with persistent hypotension  · Several BP meds currently on hold, including amlodipine, clonidine, metoprolol, and minoxidil. · Continue isosorbide mononitrate (Imdur) 60 mg p.o. daily  · Blood pressure stable  · Continue serial vitals  · Continue to monitor        Anemia of chronic disease  · Hemoglobin 7.1 (04/01/2020)  · Hemoglobin stable  · Gradual drop in hemoglobin  · Monitor H&H Q8H x4 occurrences  · Transfusion Goal: Hgb < 7        History of CAD  · - Initial troponin  Trends: 0.17 --> 0.30 --> 0.47 --> 0.57 ---> 0.64 --> 0.67 --> 0.79 --> 0.69 --> 0.96 --> 0.96 --> 0.90 (03/31/2020)  · Serial EKGs for chest pain  ·  Optimized medical management   · Nitro glycerin sublingual when necessary for chest pain  · Status post dual-chamber pacemaker interrogation  - 03/31/2020  · Continue to monitor     Of Note  Recent Cardiac Cath - 12/11/2019  \"Summary:    1.  Successful femoral artery ultrasound  2.  Successful femoral artery arteriogram  3.  Supervision of the administration of moderate conscious sedation  4.  Severe left main and circumflex coronary artery disease  5.  Left ventricular function is normal  6.  Patent stent(s) in the proximal to mid LAD     RECOMMENDATIONS:  1.  Risk Factor Modification  2.  On-going Medical Therapy  3.  Consideration for open heart surgery\"        History of ESRD, on scheduled HD (MWF)  · Nephrology consulted  · Continue scheduled HD  · Continue management as per nephrology rec    Continue management of other chronic medical conditions - see above and orders. Advance Directive: Full Code    DIET RENAL;     DVT prophylaxis: Heparin    Consults Made:   IP CONSULT TO SOCIAL WORK  IP CONSULT TO NEPHROLOGY  IP CONSULT TO HOSPITALIST    Discharge planning: tbd    Time Spent is 25 mins in the examination, evaluation, counseling and review of medications, assessment and plan.      Electronically signed by   Magrie Briggs MD, MPH,   Internal Medicine Hospitalist   4/2/2020 8:02 AM

## 2020-04-03 LAB
ANION GAP SERPL CALCULATED.3IONS-SCNC: 18 MMOL/L (ref 7–19)
BASOPHILS ABSOLUTE: 0 K/UL (ref 0–0.2)
BASOPHILS RELATIVE PERCENT: 0.6 % (ref 0–1)
BUN BLDV-MCNC: 38 MG/DL (ref 6–20)
CALCIUM SERPL-MCNC: 8.8 MG/DL (ref 8.6–10)
CHLORIDE BLD-SCNC: 91 MMOL/L (ref 98–111)
CO2: 25 MMOL/L (ref 22–29)
CREAT SERPL-MCNC: 5.2 MG/DL (ref 0.5–1.2)
EOSINOPHILS ABSOLUTE: 0.6 K/UL (ref 0–0.6)
EOSINOPHILS RELATIVE PERCENT: 9.9 % (ref 0–5)
GFR NON-AFRICAN AMERICAN: 11
GLUCOSE BLD-MCNC: 109 MG/DL (ref 74–109)
HCT VFR BLD CALC: 23.8 % (ref 42–52)
HEMOGLOBIN: 7.3 G/DL (ref 14–18)
IMMATURE GRANULOCYTES #: 0 K/UL
LYMPHOCYTES ABSOLUTE: 1.3 K/UL (ref 1.1–4.5)
LYMPHOCYTES RELATIVE PERCENT: 20.1 % (ref 20–40)
MCH RBC QN AUTO: 28 PG (ref 27–31)
MCHC RBC AUTO-ENTMCNC: 30.7 G/DL (ref 33–37)
MCV RBC AUTO: 91.2 FL (ref 80–94)
MONOCYTES ABSOLUTE: 0.7 K/UL (ref 0–0.9)
MONOCYTES RELATIVE PERCENT: 11.6 % (ref 0–10)
NEUTROPHILS ABSOLUTE: 3.6 K/UL (ref 1.5–7.5)
NEUTROPHILS RELATIVE PERCENT: 57.3 % (ref 50–65)
PDW BLD-RTO: 16.6 % (ref 11.5–14.5)
PLATELET # BLD: 145 K/UL (ref 130–400)
PMV BLD AUTO: 11.7 FL (ref 9.4–12.4)
POTASSIUM REFLEX MAGNESIUM: 4.9 MMOL/L (ref 3.5–5)
RBC # BLD: 2.61 M/UL (ref 4.7–6.1)
SODIUM BLD-SCNC: 134 MMOL/L (ref 136–145)
WBC # BLD: 6.4 K/UL (ref 4.8–10.8)

## 2020-04-03 PROCEDURE — 36415 COLL VENOUS BLD VENIPUNCTURE: CPT

## 2020-04-03 PROCEDURE — 80048 BASIC METABOLIC PNL TOTAL CA: CPT

## 2020-04-03 PROCEDURE — 97535 SELF CARE MNGMENT TRAINING: CPT

## 2020-04-03 PROCEDURE — 6370000000 HC RX 637 (ALT 250 FOR IP): Performed by: PSYCHIATRY & NEUROLOGY

## 2020-04-03 PROCEDURE — 97530 THERAPEUTIC ACTIVITIES: CPT

## 2020-04-03 PROCEDURE — 85025 COMPLETE CBC W/AUTO DIFF WBC: CPT

## 2020-04-03 PROCEDURE — 8010000000 HC HEMODIALYSIS ACUTE INPT

## 2020-04-03 PROCEDURE — 97110 THERAPEUTIC EXERCISES: CPT

## 2020-04-03 PROCEDURE — 97116 GAIT TRAINING THERAPY: CPT

## 2020-04-03 PROCEDURE — 99232 SBSQ HOSP IP/OBS MODERATE 35: CPT | Performed by: PSYCHIATRY & NEUROLOGY

## 2020-04-03 PROCEDURE — 6370000000 HC RX 637 (ALT 250 FOR IP): Performed by: INTERNAL MEDICINE

## 2020-04-03 PROCEDURE — 1180000000 HC REHAB R&B

## 2020-04-03 RX ORDER — AMLODIPINE BESYLATE 10 MG/1
10 TABLET ORAL DAILY
Status: DISCONTINUED | OUTPATIENT
Start: 2020-04-03 | End: 2020-04-11 | Stop reason: HOSPADM

## 2020-04-03 RX ADMIN — ASPIRIN 81 MG: 81 TABLET, COATED ORAL at 08:10

## 2020-04-03 RX ADMIN — CLOPIDOGREL BISULFATE 75 MG: 75 TABLET ORAL at 08:11

## 2020-04-03 RX ADMIN — SEVELAMER CARBONATE 800 MG: 800 TABLET, FILM COATED ORAL at 11:57

## 2020-04-03 RX ADMIN — SEVELAMER CARBONATE 800 MG: 800 TABLET, FILM COATED ORAL at 08:11

## 2020-04-03 RX ADMIN — OXYCODONE 10 MG: 5 TABLET ORAL at 16:54

## 2020-04-03 RX ADMIN — OXYCODONE 10 MG: 5 TABLET ORAL at 04:35

## 2020-04-03 RX ADMIN — PANTOPRAZOLE SODIUM 20 MG: 20 TABLET, DELAYED RELEASE ORAL at 16:54

## 2020-04-03 RX ADMIN — OXYCODONE 10 MG: 5 TABLET ORAL at 21:14

## 2020-04-03 RX ADMIN — SEVELAMER CARBONATE 800 MG: 800 TABLET, FILM COATED ORAL at 16:54

## 2020-04-03 RX ADMIN — ATORVASTATIN CALCIUM 80 MG: 80 TABLET, FILM COATED ORAL at 21:13

## 2020-04-03 RX ADMIN — PANTOPRAZOLE SODIUM 20 MG: 20 TABLET, DELAYED RELEASE ORAL at 05:56

## 2020-04-03 RX ADMIN — CYCLOBENZAPRINE HYDROCHLORIDE 10 MG: 10 TABLET, FILM COATED ORAL at 21:13

## 2020-04-03 RX ADMIN — NORTRIPTYLINE HYDROCHLORIDE 20 MG: 10 CAPSULE ORAL at 21:13

## 2020-04-03 RX ADMIN — OXYCODONE 10 MG: 5 TABLET ORAL at 11:57

## 2020-04-03 RX ADMIN — GABAPENTIN 400 MG: 400 CAPSULE ORAL at 08:11

## 2020-04-03 RX ADMIN — CYCLOBENZAPRINE HYDROCHLORIDE 10 MG: 10 TABLET, FILM COATED ORAL at 08:11

## 2020-04-03 RX ADMIN — AMLODIPINE BESYLATE 10 MG: 10 TABLET ORAL at 18:36

## 2020-04-03 RX ADMIN — OXYCODONE 10 MG: 5 TABLET ORAL at 00:27

## 2020-04-03 RX ADMIN — GABAPENTIN 400 MG: 400 CAPSULE ORAL at 21:14

## 2020-04-03 RX ADMIN — OXYCODONE 10 MG: 5 TABLET ORAL at 08:10

## 2020-04-03 ASSESSMENT — PAIN DESCRIPTION - ORIENTATION
ORIENTATION: RIGHT

## 2020-04-03 ASSESSMENT — PAIN DESCRIPTION - PROGRESSION
CLINICAL_PROGRESSION: NOT CHANGED
CLINICAL_PROGRESSION: GRADUALLY IMPROVING
CLINICAL_PROGRESSION: NOT CHANGED
CLINICAL_PROGRESSION: GRADUALLY IMPROVING
CLINICAL_PROGRESSION: NOT CHANGED

## 2020-04-03 ASSESSMENT — PAIN DESCRIPTION - PAIN TYPE
TYPE: ACUTE PAIN
TYPE: SURGICAL PAIN;ACUTE PAIN
TYPE: ACUTE PAIN
TYPE: SURGICAL PAIN;PHANTOM PAIN
TYPE: SURGICAL PAIN
TYPE: ACUTE PAIN
TYPE: SURGICAL PAIN

## 2020-04-03 ASSESSMENT — PAIN SCALES - GENERAL
PAINLEVEL_OUTOF10: 8
PAINLEVEL_OUTOF10: 6
PAINLEVEL_OUTOF10: 8
PAINLEVEL_OUTOF10: 7
PAINLEVEL_OUTOF10: 8
PAINLEVEL_OUTOF10: 3
PAINLEVEL_OUTOF10: 2

## 2020-04-03 ASSESSMENT — PAIN DESCRIPTION - ONSET
ONSET: ON-GOING
ONSET: GRADUAL
ONSET: ON-GOING
ONSET: GRADUAL
ONSET: ON-GOING
ONSET: ON-GOING

## 2020-04-03 ASSESSMENT — PAIN DESCRIPTION - DESCRIPTORS
DESCRIPTORS: ACHING;THROBBING
DESCRIPTORS: SORE
DESCRIPTORS: ACHING
DESCRIPTORS: ACHING
DESCRIPTORS: ACHING;SORE;SHARP
DESCRIPTORS: ACHING
DESCRIPTORS: ACHING;THROBBING
DESCRIPTORS: SORE
DESCRIPTORS: ACHING;THROBBING

## 2020-04-03 ASSESSMENT — PAIN - FUNCTIONAL ASSESSMENT
PAIN_FUNCTIONAL_ASSESSMENT: ACTIVITIES ARE NOT PREVENTED

## 2020-04-03 ASSESSMENT — PAIN DESCRIPTION - FREQUENCY
FREQUENCY: CONTINUOUS
FREQUENCY: INTERMITTENT
FREQUENCY: INTERMITTENT
FREQUENCY: CONTINUOUS
FREQUENCY: INTERMITTENT
FREQUENCY: INTERMITTENT
FREQUENCY: CONTINUOUS

## 2020-04-03 ASSESSMENT — PAIN DESCRIPTION - LOCATION
LOCATION: LEG
LOCATION: LEG;INCISION
LOCATION: LEG

## 2020-04-03 NOTE — PROGRESS NOTES
Nephrology ([address] [facility]) Progress Note      Patient:  Margret Aldridge  YOB: 1960  Date of Service: 4/3/2020  MRN: [id number]   Acct: [de-identified]   Primary Care Physician: Kaye Santoro DO  Advance Directive: Full Code  Admit Date: 3/31/2020       Hospital Day: 3  Referring Provider: Jason Olivo MD    Patient independently seen and examined, Chart, Consults, Notes, Operative notes, Labs, Cardiology, and Radiology studies reviewed as able. Chief complaint: End-stage renal disease. Subjective:  Margret Aldridge is a 61 y.o. male  whom we were consulted for end-stage renal disease. Patient has end-stage renal disease secondary to diabetic nephropathy and he goes to [place] dialysis clinic on Tuesday Thursday Saturday. He was electively admitted, underwent right AKA. Postprocedure, he has no complication. He is now admitted to rehab floor for rehabilitation. His dialysis date has been changed to Monday Wednesday Friday. 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 Currently seen on hemodialysis  Hemodialysis access: AV fistula  Hemodialysis: 3-1/2-hour  Ultrafiltration: 4000 cc  2K bath  Blood flow rate is 450 cc/min. Allergies:  Patient has no known allergies.     Medicines:  Current Facility-Administered Medications   Medication Dose Route Frequency Provider Last Rate Last Dose    nortriptyline (PAMELOR) capsule 20 mg  20 mg Oral Nightly Jason Olivo MD   20 mg at 04/02/20 2101    oxyCODONE (ROXICODONE) immediate release tablet 10 mg  10 mg Oral Q4H Jason Olivo MD   10 mg at 04/03/20 1157    cyclobenzaprine (FLEXERIL) tablet 10 mg  10 mg Oral TID Jason Olivo MD   10 mg at 04/03/20 0142    gabapentin (NEURONTIN) capsule 400 mg  400 mg Oral TID Jason Olivo MD   400 mg at 04/03/20 0811    acetaminophen (TYLENOL) tablet 650 mg  650 mg Oral Q6H PRN Tita Rosenbaum MD        Or   Kelly acetaminophen (TYLENOL) suppository 650 mg  650 mg Rectal Q6H PRN Tita Rosenbaum MD        potassium chloride (KLOR-CON M) extended release tablet 40 mEq  40 mEq Oral PRN Tita Rosenbaum MD        Or    potassium bicarb-citric acid (EFFER-K) effervescent tablet 40 mEq  40 mEq Oral PRN Tita Rosenbaum MD        Or    potassium chloride 10 mEq/100 mL IVPB (Peripheral Line)  10 mEq Intravenous PRN Tita Rosenbaum MD        sodium chloride flush 0.9 % injection 10 mL  10 mL Intravenous PRN Tita Rosenbaum MD        [Held by provider] amLODIPine (NORVASC) tablet 10 mg  10 mg Oral Daily Tita Rosenbaum MD        aspirin EC tablet 81 mg  81 mg Oral Daily Tita oRsenbaum MD   81 mg at 04/03/20 0810    atorvastatin (LIPITOR) tablet 80 mg  80 mg Oral Nightly Tita Rosenbaum MD   80 mg at 04/02/20 2101    [Held by provider] cloNIDine (CATAPRES) tablet 0.1 mg  0.1 mg Oral TID Tita Rosenbaum MD        clopidogrel (PLAVIX) tablet 75 mg  75 mg Oral Daily Tita Rosenbaum MD   75 mg at 04/03/20 8523    isosorbide mononitrate (IMDUR) extended release tablet 60 mg  60 mg Oral Daily Tita Rosenbaum MD   60 mg at 04/02/20 0835    labetalol (NORMODYNE;TRANDATE) injection 10 mg  10 mg Intravenous Q6H PRN Tita Rosenbaum MD        [Held by provider] metoprolol succinate (TOPROL XL) extended release tablet 25 mg  25 mg Oral BID Ttia Rosenbaum MD        [Held by provider] minoxidil (LONITEN) tablet 10 mg  10 mg Oral Daily Tita Rosenbaum MD   10 mg at 04/01/20 0751    naloxone (NARCAN) injection 0.4 mg  0.4 mg Intravenous PRN Tita Rosenbaum MD        nitroGLYCERIN (NITROSTAT) SL tablet 0.4 mg  0.4 mg Sublingual Q5 Min PRN Tita Rosenbaum MD        ondansetron [facility]) injection 4 mg  4 mg Intravenous Q6H PRN Tita Rosenbaum MD        pantoprazole (PROTONIX) tablet 20 mg  20 mg Oral BID AC Micki Leon arteriovenous graft with 7 mm artegraft. Left upper extremity venograms. Left subclavian vein stent viabahn 13x50. Left subclavian vein stent balloon angioplasty 12x40 atlas.  VASCULAR SURGERY  04/11/2017    SJS. Removal of tunneled dialysis catheter right internal jugular vein.  VASCULAR SURGERY  01/25/2018    SJS. Arch aortogram, left upper arteriogram, AV graft angiogram/venogram.    VASCULAR SURGERY  02/28/2018    SJS. Right CFA 5f-6f-7f sheath, aortogram with bilateral lower arteriogram, left SFA/pop  balloon angioplasty 5x100 , 6x150 lutonix ( 2), left CFA  7f sheath, bilateral iliac kissing stents right 10x38 icast, left 9x59 icast expanded with 10x40 , completion aortogram, mynx left CFA , failed mynx right CFA.  VASCULAR SURGERY  06/13/2019    SJS left CFA 5f sheath, aortogram with bilateral lower arteriogram, mynx left CFA.  VASCULAR SURGERY  06/25/2019    SJS-VI. Femoral tibial/perineal bypass with reversed greater saphenous vein.  VASCULAR SURGERY  08/16/2019    TJR. Aortogram and runoff, selective right CFA injections. PTA of fem-tp bypass with 4.0 x 220 mm tod balloon to 4.33 mm    VASCULAR SURGERY  10/23/2019    VI. Thrombin injection in the left SFA PSA, right common femoral artery us guided access, left SFA stent treatment of the flap.  VASCULAR SURGERY  01/30/2020    TJR Endarterectomy of the right common femoral artery, superficial femoral artery, and profunda femoris artery. Redo right groin x3.  VASCULAR SURGERY  03/06/2020    VI.  BILATERAL AORTAILLIAC AND RIGHT LEG ANGIOGRAM       Family History  Family History   Problem Relation Age of Onset    High Blood Pressure Mother     High Blood Pressure Father     High Blood Pressure Sister        Social History  Social History     Socioeconomic History    Marital status:      Spouse name: Solomon Kaiser    Number of children: 0    Years of education: 15    Highest education level: Not on file

## 2020-04-03 NOTE — PROGRESS NOTES
Complexity   History ESRD, DIALYSIS, DM, PVD, CHF, HEP C, HEPATOCELLULAR CARCINOMA   Exam IMPAIRED STRENGHT, BALANCE, ENDURANCE, ROM, PAIN       Electronically signed by Gilbert Tillman PTA on 4/3/2020 at 11:50 AM

## 2020-04-03 NOTE — PROGRESS NOTES
limited by fatigue        04/02/20 1403   Subjective   Subjective Pt agrees to work with therapy. States he is feeling stronger already. Bed Mobility   Sit to Supine Independent   Scooting Independent   Transfers   Sit to Stand Stand by assistance   Stand to sit Stand by assistance   Ambulation   Ambulation? Yes   WB Status WBAT LEFT LE   Ambulation 1   Surface level tile   Device Rolling Walker   Assistance Contact guard assistance   Distance 15'   Exercises   Straight Leg Raise 20x B   Quad Sets 20x L   Heelslides 20x L   Gluteal Sets 20x    Hip Abduction 20x L   Knee Short Arc Quad 20x L   Ankle Pumps 40x L   Comments L bent knee fall out 20x    Short term goals   Time Frame for Short term goals 1 week   Short term goal 1 TF SURFACE TO SURFACE SBA   Short term goal 2 AMBULATE 25 FT WTIH RW CGA   Short term goal 3 PROPEL  FT ON FLAT SURFACE INDEP   Short term goal 4 HEP SBA   Long term goals   Time Frame for Long term goals  2 WEEKS   Long term goal 1 INDEP BED MOB   Long term goal 2 INDEP TF SURFACE TO SURFACE   Long term goal 3 INDEP AMB 25 FT WITH RW FLAT SURFACE   Long term goal 4 PROPEL WC INDEP OVER UNEVEN SURFACES   Long term goal 5 HEP INDEP   Conditions Requiring Skilled Therapeutic Intervention   Body structures, Functions, Activity limitations Decreased functional mobility ; Decreased ADL status; Decreased ROM; Decreased strength;Decreased safe awareness;Decreased endurance;Decreased sensation;Decreased balance; Increased pain;Decreased posture   Assessment Pt is feeling stronger. Did well with amb. IND with bed mobility. Activity Tolerance   Activity Tolerance Patient Tolerated treatment well   Safety Devices   Type of devices Left in bed;Call light within reach; Bed alarm in place         RECORD REVIEW: Previous medical records, medications were reviewed at today's visit    IMPRESSION:   1.   Peripheral vascular disease, status post right AKA with significant pain control issues-PT/OT/pain

## 2020-04-03 NOTE — PROGRESS NOTES
minoxidil (LONITEN) tablet 10 mg  10 mg Oral Daily Jamal Dinh MD   10 mg at 04/01/20 0751    naloxone (NARCAN) injection 0.4 mg  0.4 mg Intravenous PRN Jamal iDnh MD        nitroGLYCERIN (NITROSTAT) SL tablet 0.4 mg  0.4 mg Sublingual Q5 Min PRN Jamal Dinh MD        ondansetron [facility]) injection 4 mg  4 mg Intravenous Q6H PRN Jamal Dinh MD        pantoprazole (PROTONIX) tablet 20 mg  20 mg Oral BID AC Jamal Dinh MD   20 mg at 04/03/20 0556    sevelamer (RENVELA) tablet 800 mg  800 mg Oral TID WC Jamal Dinh MD   800 mg at 04/03/20 8111    sucralfate (CARAFATE) tablet 1 g  1 g Oral TID PRN Jamal Dinh MD        polyethylene glycol [facility]) packet 17 g  17 g Oral Daily Ronny Spivey MD   17 g at 04/01/20 0623    bisacodyl (DULCOLAX) suppository 10 mg  10 mg Rectal Daily PRN Ronny Spivey MD        bisacodyl (DULCOLAX) EC tablet 5 mg  5 mg Oral Daily Ronny Spivey MD   5 mg at 04/01/20 1710           nortriptyline  20 mg Oral Nightly    oxyCODONE  10 mg Oral Q4H    cyclobenzaprine  10 mg Oral TID    gabapentin  400 mg Oral TID    [Held by provider] amLODIPine  10 mg Oral Daily    aspirin  81 mg Oral Daily    atorvastatin  80 mg Oral Nightly    [Held by provider] cloNIDine  0.1 mg Oral TID    clopidogrel  75 mg Oral Daily    isosorbide mononitrate  60 mg Oral Daily    [Held by provider] metoprolol succinate  25 mg Oral BID    [Held by provider] minoxidil  10 mg Oral Daily    pantoprazole  20 mg Oral BID AC    sevelamer  800 mg Oral TID WC    polyethylene glycol  17 g Oral Daily    bisacodyl  5 mg Oral Daily     acetaminophen **OR** acetaminophen, potassium chloride **OR** potassium alternative oral replacement **OR** potassium chloride, sodium chloride flush, labetalol, naloxone, nitroGLYCERIN, ondansetron, sucralfate, bisacodyl  DIET RENAL;       Labs:   CBC with DIFF:  Recent Labs     04/01/20  0135  04/02/20  0126 04/02/20  0841 +---------++--------+-----+---------------++--------+-----+----------------+    Vl Dup Lower Extremity Arteries Bilateral    Result Date: 3/5/2020  Vascular Lower Extremities Arterial Duplex Procedure  Demographics   Patient Name     Vivek Harden Age                   61   Patient Number   [id number]       Gender                Male   Visit Number     [id number]    Interpreting          Santos Beth MD                                Physician   Date of Birth    1960   Referring Physician   Gio Santiago   Accession Number [id number]    [address], RVT  Procedure Type of Study:   Extremities Arteries:Lower Extremities Arterial Duplex, VL LOWER EXTREMITY  ARTERIES DUPLEX BILATERAL. Indications for Study:Wound Lower Extremity (Right). Risk Factors   - The patient's risk factor(s) include: dyslipidemia and arterial     hypertension.   - The patient has a former tobacco history. Allergies   - No known allergies. Impression   Bilateral lower extremity arterial duplex exam performed. The right lower extremity extremity arterial duplex exam performed. The  right lower extremtiy shows occlusion of the bypass and occlusion of the  native SFA and popliteal artery. Only collateral arterial waveforms are  seen in the tibial vessels. The left low extremity shows mild local velocity elevations in the CFA,  suggestive of a stenosis in the left External iliac artery and Common  femoral artery. The SFA and popliteal artery are patent, however,  significant plaque is seen. the anterior tibial and posterior tibial and  peroneal artery are patent.    Signature   ----------------------------------------------------------------  Electronically signed by Santos Beth MD(Interpreting  physician) on 03/05/2020 07:03 AM  ----------------------------------------------------------------  Grafts   - The graft originated from the Right Dist Common Femoral to the Right     Dist Popliteal. +--------------------------------------+----+---+-----+--------------------+ ! Location                              ! PSV ! EDV! Ratio!% Stenosis          ! +--------------------------------------+----+---+-----+--------------------+ ! Prox Anastomosis                      ! 35  !   !     !                    ! +--------------------------------------+----+---+-----+--------------------+ ! Prox Graft                            !0   !   !0    !                    ! +--------------------------------------+----+---+-----+--------------------+ ! Mid Graft                             !0   !   !     !                    ! +--------------------------------------+----+---+-----+--------------------+ ! Dist Graft                            !0   !   !     !                    ! +--------------------------------------+----+---+-----+--------------------+ ! Dist Anastomosis                      !0   !   !     !                    ! +--------------------------------------+----+---+-----+--------------------+ Velocities are measured in cm/s ; Diameters are measured in mm LE Duplex Measurements +---------------++-----+-----+----+-----------++---+-----+---+-------------+ ! ! !Right! ! Left!           !!   !     !   !             ! +---------------++-----+-----+----+-----------++---+-----+---+-------------+ ! Location       ! !PSV  ! Ratio! EDV ! Wave Desc. !!PSV! Ratio! EDV! Wave Desc.   ! +---------------++-----+-----+----+-----------++---+-----+---+-------------+ ! Common Femoral !!161  !     !    !           !!266!     !   !             ! +---------------++-----+-----+----+-----------++---+-----+---+-------------+ ! Prox PFA       !!244  !     !    !           !!60 !     !   !             ! +---------------++-----+-----+----+-----------++---+-----+---+-------------+ ! Prox SFA       !!27   !     !    !           !!282!     !   !             ! +---------------++-----+-----+----+-----------++---+-----+---+-------------+ ! Mid SFA

## 2020-04-03 NOTE — PROGRESS NOTES
Occupational Therapy     04/03/20 0900   General   Diagnosis R AKA   Pain Assessment   Patient Currently in Pain Yes   Pain Assessment 0-10   Pain Level 7   Pain Type Surgical pain; Phantom pain   Pain Location Leg;Incision   Pain Orientation Right   Pain Descriptors Aching;Sore;Sharp   Pain Frequency Intermittent   Clinical Progression Not changed   Response to Pain Intervention Patient Satisfied   Balance   Sitting Balance Supervision   Standing Balance Minimal assistance  (1 LOB when retrieving item from cabinet, able to regain balance with Min A, otherwise CGA.)   Functional Mobility   Functional - Mobility Device Wheelchair   Activity To/From therapy gym;Retrieve items;Transport items   Assist Level Supervision   Functional Mobility Comments Light food prep task in kitchen. Transfers   Stand Pivot Transfers Contact guard assistance   Sit to stand Contact guard assistance   Stand to sit Contact guard assistance   Transfer Comments Cushioned wingback chair, recliner, bed, and w/c. Type of ROM/Therapeutic Exercise   Type of ROM/Therapeutic Exercise Felipe   Comment Felipe: 15# BUE 3 sets x 20 reps. Assessment   Performance deficits / Impairments Decreased functional mobility ; Decreased ADL status; Decreased strength;Decreased endurance;Decreased balance;Decreased high-level IADLs   Treatment Diagnosis R AKA   Prognosis Good   Timed Code Treatment Minutes 60 Minutes   Activity Tolerance   Activity Tolerance Patient Tolerated treatment well   Safety Devices   Safety Devices in place Yes   Type of devices Call light within reach; Chair alarm in place   Plan   Current Treatment Recommendations Strengthening;Balance Training;Functional Mobility Training; Endurance Training;Equipment Evaluation, Education, & procurement;Patient/Caregiver Education & Training; Safety Education & Training;Self-Care / ADL      04/03/20 0900   Upper Body Dressing   Assistance Needed Independent   CARE Score 6   Lower Body Dressing

## 2020-04-04 LAB
ANION GAP SERPL CALCULATED.3IONS-SCNC: 14 MMOL/L (ref 7–19)
BASOPHILS ABSOLUTE: 0 K/UL (ref 0–0.2)
BASOPHILS RELATIVE PERCENT: 0.6 % (ref 0–1)
BUN BLDV-MCNC: 21 MG/DL (ref 6–20)
CALCIUM SERPL-MCNC: 9.4 MG/DL (ref 8.6–10)
CHLORIDE BLD-SCNC: 89 MMOL/L (ref 98–111)
CO2: 31 MMOL/L (ref 22–29)
CREAT SERPL-MCNC: 3.8 MG/DL (ref 0.5–1.2)
EOSINOPHILS ABSOLUTE: 0.5 K/UL (ref 0–0.6)
EOSINOPHILS RELATIVE PERCENT: 8.3 % (ref 0–5)
GFR NON-AFRICAN AMERICAN: 16
GLUCOSE BLD-MCNC: 104 MG/DL (ref 74–109)
HCT VFR BLD CALC: 30.9 % (ref 42–52)
HEMOGLOBIN: 9.5 G/DL (ref 14–18)
IMMATURE GRANULOCYTES #: 0 K/UL
LYMPHOCYTES ABSOLUTE: 1 K/UL (ref 1.1–4.5)
LYMPHOCYTES RELATIVE PERCENT: 15.1 % (ref 20–40)
MCH RBC QN AUTO: 28.2 PG (ref 27–31)
MCHC RBC AUTO-ENTMCNC: 30.7 G/DL (ref 33–37)
MCV RBC AUTO: 91.7 FL (ref 80–94)
MONOCYTES ABSOLUTE: 0.6 K/UL (ref 0–0.9)
MONOCYTES RELATIVE PERCENT: 9.7 % (ref 0–10)
NEUTROPHILS ABSOLUTE: 4.2 K/UL (ref 1.5–7.5)
NEUTROPHILS RELATIVE PERCENT: 66 % (ref 50–65)
PDW BLD-RTO: 16.4 % (ref 11.5–14.5)
PLATELET # BLD: 185 K/UL (ref 130–400)
PMV BLD AUTO: 11.5 FL (ref 9.4–12.4)
POTASSIUM REFLEX MAGNESIUM: 5.2 MMOL/L (ref 3.5–5)
RBC # BLD: 3.37 M/UL (ref 4.7–6.1)
SODIUM BLD-SCNC: 134 MMOL/L (ref 136–145)
WBC # BLD: 6.3 K/UL (ref 4.8–10.8)

## 2020-04-04 PROCEDURE — 1180000000 HC REHAB R&B

## 2020-04-04 PROCEDURE — 6370000000 HC RX 637 (ALT 250 FOR IP): Performed by: PSYCHIATRY & NEUROLOGY

## 2020-04-04 PROCEDURE — 97116 GAIT TRAINING THERAPY: CPT

## 2020-04-04 PROCEDURE — 97535 SELF CARE MNGMENT TRAINING: CPT

## 2020-04-04 PROCEDURE — 97110 THERAPEUTIC EXERCISES: CPT

## 2020-04-04 PROCEDURE — 6370000000 HC RX 637 (ALT 250 FOR IP): Performed by: INTERNAL MEDICINE

## 2020-04-04 PROCEDURE — 97530 THERAPEUTIC ACTIVITIES: CPT

## 2020-04-04 PROCEDURE — 80048 BASIC METABOLIC PNL TOTAL CA: CPT

## 2020-04-04 PROCEDURE — 36415 COLL VENOUS BLD VENIPUNCTURE: CPT

## 2020-04-04 PROCEDURE — 85025 COMPLETE CBC W/AUTO DIFF WBC: CPT

## 2020-04-04 RX ADMIN — ATORVASTATIN CALCIUM 80 MG: 80 TABLET, FILM COATED ORAL at 20:31

## 2020-04-04 RX ADMIN — SEVELAMER CARBONATE 800 MG: 800 TABLET, FILM COATED ORAL at 12:28

## 2020-04-04 RX ADMIN — SEVELAMER CARBONATE 800 MG: 800 TABLET, FILM COATED ORAL at 08:09

## 2020-04-04 RX ADMIN — NORTRIPTYLINE HYDROCHLORIDE 20 MG: 10 CAPSULE ORAL at 20:30

## 2020-04-04 RX ADMIN — SEVELAMER CARBONATE 800 MG: 800 TABLET, FILM COATED ORAL at 16:50

## 2020-04-04 RX ADMIN — CYCLOBENZAPRINE HYDROCHLORIDE 10 MG: 10 TABLET, FILM COATED ORAL at 13:59

## 2020-04-04 RX ADMIN — GABAPENTIN 400 MG: 400 CAPSULE ORAL at 13:59

## 2020-04-04 RX ADMIN — OXYCODONE 10 MG: 5 TABLET ORAL at 16:50

## 2020-04-04 RX ADMIN — GABAPENTIN 400 MG: 400 CAPSULE ORAL at 20:30

## 2020-04-04 RX ADMIN — OXYCODONE 10 MG: 5 TABLET ORAL at 05:08

## 2020-04-04 RX ADMIN — OXYCODONE 10 MG: 5 TABLET ORAL at 01:03

## 2020-04-04 RX ADMIN — PANTOPRAZOLE SODIUM 20 MG: 20 TABLET, DELAYED RELEASE ORAL at 16:50

## 2020-04-04 RX ADMIN — OXYCODONE 10 MG: 5 TABLET ORAL at 12:28

## 2020-04-04 RX ADMIN — OXYCODONE 10 MG: 5 TABLET ORAL at 20:31

## 2020-04-04 RX ADMIN — CYCLOBENZAPRINE HYDROCHLORIDE 10 MG: 10 TABLET, FILM COATED ORAL at 20:31

## 2020-04-04 RX ADMIN — AMLODIPINE BESYLATE 10 MG: 10 TABLET ORAL at 08:08

## 2020-04-04 RX ADMIN — GABAPENTIN 400 MG: 400 CAPSULE ORAL at 08:09

## 2020-04-04 RX ADMIN — OXYCODONE 10 MG: 5 TABLET ORAL at 08:08

## 2020-04-04 RX ADMIN — PANTOPRAZOLE SODIUM 20 MG: 20 TABLET, DELAYED RELEASE ORAL at 05:08

## 2020-04-04 RX ADMIN — CYCLOBENZAPRINE HYDROCHLORIDE 10 MG: 10 TABLET, FILM COATED ORAL at 08:09

## 2020-04-04 RX ADMIN — ISOSORBIDE MONONITRATE 60 MG: 60 TABLET, EXTENDED RELEASE ORAL at 08:08

## 2020-04-04 RX ADMIN — ASPIRIN 81 MG: 81 TABLET, COATED ORAL at 08:09

## 2020-04-04 RX ADMIN — CLOPIDOGREL BISULFATE 75 MG: 75 TABLET ORAL at 08:09

## 2020-04-04 ASSESSMENT — PAIN DESCRIPTION - LOCATION
LOCATION: LEG

## 2020-04-04 ASSESSMENT — PAIN DESCRIPTION - ONSET: ONSET: ON-GOING

## 2020-04-04 ASSESSMENT — PAIN DESCRIPTION - ORIENTATION
ORIENTATION: RIGHT

## 2020-04-04 ASSESSMENT — PAIN DESCRIPTION - DESCRIPTORS
DESCRIPTORS: ACHING;THROBBING

## 2020-04-04 ASSESSMENT — PAIN SCALES - GENERAL
PAINLEVEL_OUTOF10: 2
PAINLEVEL_OUTOF10: 7
PAINLEVEL_OUTOF10: 3
PAINLEVEL_OUTOF10: 3
PAINLEVEL_OUTOF10: 7

## 2020-04-04 ASSESSMENT — PAIN DESCRIPTION - PAIN TYPE: TYPE: ACUTE PAIN

## 2020-04-04 ASSESSMENT — PAIN DESCRIPTION - PROGRESSION: CLINICAL_PROGRESSION: NOT CHANGED

## 2020-04-04 ASSESSMENT — PAIN - FUNCTIONAL ASSESSMENT: PAIN_FUNCTIONAL_ASSESSMENT: ACTIVITIES ARE NOT PREVENTED

## 2020-04-04 ASSESSMENT — PAIN DESCRIPTION - FREQUENCY: FREQUENCY: CONTINUOUS

## 2020-04-04 NOTE — PROGRESS NOTES
Nephrology ([facility]) Progress Note      Patient:  Mario Barrientos  YOB: 1960  Date of Service: 4/4/2020  MRN: [id number]   Acct: [de-identified]   Primary Care Physician: Rachel Fernández DO  Advance Directive: Full Code  Admit Date: 3/31/2020       Hospital Day: 4  Referring Provider: Jeimy Qureshi MD    Patient independently seen and examined, Chart, Consults, Notes, Operative notes, Labs, Cardiology, and Radiology studies reviewed as able. Chief complaint: End-stage renal disease. Subjective:  Mario Barrientos is a 61 y.o. male  whom we were consulted for end-stage renal disease. Patient has end-stage renal disease secondary to diabetic nephropathy and he goes to [facility] dialysis clinic on Tuesday Thursday Saturday. He was electively admitted, underwent right AKA. Postprocedure, he has no complication. He is now admitted to rehab floor for rehabilitation. His dialysis date has been changed to Monday Wednesday Friday. 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 This morning feels well and has participated in physical therapy. Allergies:  Patient has no known allergies.     Medicines:  Current Facility-Administered Medications   Medication Dose Route Frequency Provider Last Rate Last Dose    amLODIPine (NORVASC) tablet 10 mg  10 mg Oral Daily Marta Arriaga MD   10 mg at 04/04/20 8424    nortriptyline (PAMELOR) capsule 20 mg  20 mg Oral Nightly Jeimy Qureshi MD   20 mg at 04/03/20 2113    oxyCODONE (ROXICODONE) immediate release tablet 10 mg  10 mg Oral Q4H Jeimy Qureshi MD   10 mg at 04/04/20 0621    cyclobenzaprine (FLEXERIL) tablet 10 mg  10 mg Oral TID Jeimy Qureshi MD   10 mg at 04/04/20 0809    gabapentin (NEURONTIN) capsule 400 mg  400 mg Oral TID Jeimy Qureshi MD   400 mg at 04/04/20 0809    acetaminophen (TYLENOL) tablet 650 mg HUMAIRA cephalic vein balloon angioplasty with 7mm x 4cm balloon. coild embolization of 2 cephallic vein brances with 3mm x 5cm coils    CARDIAC CATHETERIZATION  04/04/11    cardiac cath and stent x1 by Dr. Napoleon Tilley  07/26/11 [facility]    LUE AV fistula. coild embolization of cephalic vein branch near the wrist with 3mm EMBO coils. balloon a'plasty left cephalic vein with [id number] balloon and then [id number] balloon    DIALYSIS FISTULA CREATION Left 2/16/2017    LEFT UPPER EXTREMITY BRACHIAL / AXILLARY GRAFT AND STENT LEFT SUBCLAVIAN VEIN  performed by Gideon Tate MD at [address] Right 7/12/2019    RIGHT LEG WOUND WASHOUT performed by Tylene Moritz, MD at [address] Right 1/27/2020    REVISION OF RIGHT LEG BYPASS GRAFT performed by Tylene Moritz, MD at [address] [facility] FEMORAL-TIBIAL BYPASS GRAFT Right 6/25/2019    FEMORAL TIBIAL/PERINEAL BYPASS WITH REVERSED GREATER SAPHENOUS VEIN performed by Gideon Tate MD at [address] [facility] Jefry Said  12/26/2010    Right internal jugular vein tunneled dialysis catheter placement    OTHER SURGICAL HISTORY  [id number]    Redo of dialysis catheter    OTHER SURGICAL HISTORY  9/6/2011   SJS    Removal of RT IJV tunneled dialysis cath    OTHER SURGICAL HISTORY  5/22/12   SJS    Ultrasound-guided cannulation of left cephalic vein in the mid forearm toward the AV anastomosis, Left upper  ext.  fistulograms including venograms of the SVC, Left cephalic vein balloon angioplasty with a 4mm x 100mm Darrell balloon, Completion fistulograms    PACEMAKER PLACEMENT Right 2015    medtronic    PARACENTESIS      multiple/recent; per dr. Butler Party at [address] [facility] Left 1/30/2018    UPPER EXTREMITY DRIL PROCEDURE WITH LEFT SAPHENOUS VEIN HARVESTING performed by Gideon Tate MD at [address] N/A 3/6/2020    BILATERAL AORTAILLIAC AND RIGHT LEG ANGIOGRAM input(s): MG in the last 72 hours. Phosphorus:  No results for input(s): PHOS in the last 72 hours. HgbA1C: No results for input(s): LABA1C in the last 72 hours. Hepatic:   Recent Labs     04/02/20  0126   ALKPHOS 101   ALT 8   AST 18   PROT 7.0   BILITOT 0.4   LABALBU 3.3*     Lactic Acid: No results for input(s): LACTA in the last 72 hours. Troponin: No results for input(s): CKTOTAL, CKMB, TROPONINT in the last 72 hours. ABGs: No results for input(s): PH, PCO2, PO2, HCO3, O2SAT in the last 72 hours. CRP:  No results for input(s): CRP in the last 72 hours. Sed Rate:  No results for input(s): SEDRATE in the last 72 hours. Cultures:   No results for input(s): CULTURE in the last 72 hours. No results for input(s): BC, Jeana Dillon in the last 72 hours. No results for input(s): CXSURG in the last 72 hours. Radiology reports as per the Radiologist  Radiology: No results found. Assessment   1. End-stage renal disease/hemodialysis dependent. 2.  Type II diabetic nephropathy. 3.  PVD/right AKA. 4.  Chronic diastolic CHF. 5.  History of hepatitis C/cirrhosis. 6.  Secondary hyperparathyroidism. 7.  Anemia of chronic kidney disease. 8.  Recurrent hyperkalemia    Plan:  1. Continue physical therapy. 2.  Continue REY.     Arturo Garcia MD  04/04/20  11:43 AM

## 2020-04-04 NOTE — PROGRESS NOTES
Occupational Therapy     04/04/20 1300   Restrictions/Precautions   Restrictions/Precautions Surgical Protocols; Fall Risk   Lower Extremity Weight Bearing Restrictions   Right Lower Extremity Weight Bearing Non Weight Bearing   General   Additional Pertinent Hx Liver Cancer, ESRD with dialysis   Diagnosis R AKA   Balance   Sitting Balance Independent   Standing Balance Contact guard assistance   Standing Balance   Time 3.5 mins   Activity 1 hand act   Comment weight shifted onto heel, cues to shift to mid and fore foot but unable to maintain   Functional Mobility   Functional - Mobility Device Wheelchair   Assist Level Stand by assistance   Transfers   Stand Pivot Transfers Contact guard assistance  (w/c <-> recliner)   Type of ROM/Therapeutic Exercise   Type of ROM/Therapeutic Exercise Free weights   Comment 2#   Assessment   Performance deficits / Impairments Decreased functional mobility ; Decreased ADL status; Decreased strength;Decreased endurance;Decreased balance;Decreased high-level IADLs   Treatment Diagnosis R AKA   Timed Code Treatment Minutes 45 Minutes   Activity Tolerance   Activity Tolerance Patient Tolerated treatment well   Safety Devices   Safety Devices in place Yes

## 2020-04-04 NOTE — PROGRESS NOTES
[facility] Hospitalists Progress Note    Patient:  Joselyn Macias  YOB: 1960  Date of Service: 4/4/2020  MRN: [id number]   Acct: [de-identified]   Primary Care Physician: Ricardo Ward DO  Advance Directive: Full Code  Admit Date: 3/31/2020       Hospital Day: 4      CHIEF COMPLAINT: Right lower extremity pain      4/4/2020 7:43 AM  Subjective / Interval History:   04/04/2020  Doing well. Sitting comfortably in bed, having breakfast.  Denies any acute complaints or distress at this time. Pain in right AKA site currently controlled as per patient. No acute changes or acute overnight event reported. 04/03/2020  No acute changes or acute overnight event reported. Patient reportedly tolerating rehab without any issues. Endorses overall improvement. Pain at right lower extremity/AKA site currently controlled. 04/02/2020  Doing well. No acute overnight event reported. Patient tolerating rehab. Right lower extremity pain currently controlled as per patient. No acute overnight event reported. Blood pressure currently remained stable. Brief History:   Mr. Jennifer Banuelos, a 56 y. o. male with a history of severe peripheral vascular disease, HCV, CAD, ESRD, on HD (MWF), has been admitted for progressively worsening severe right lower extremity pain and nonhealing wound.  Patient reportedly has had an arterial bypass about 7 months ago, and the wound never healed. Shalom Powers reportedly has progressively gotten worse to the point where it becomes difficult for the patient to ambulate.       Patient initially admitted to the medical floor, via vascular team, - 03/25/2020   Status post right AKA (03/26/2020)  Transferred to Acute Rehab - 03/31/2020     Medicine consulted for continuous medical management      Review of Systems:   Review of Systems  ROS: 14 point review of systems is negative except as specifically addressed above. DIET RENAL;     Intake/Output Summary (Last 24 hours) at 4/4/2020 (NORMODYNE;TRANDATE) injection 10 mg  10 mg Intravenous Q6H PRN Nesha Babb MD        [Held by provider] metoprolol succinate (TOPROL XL) extended release tablet 25 mg  25 mg Oral BID Nesha Babb MD        [Held by provider] minoxidil (LONITEN) tablet 10 mg  10 mg Oral Daily Nesha Babb MD   10 mg at 04/01/20 0751    naloxone (NARCAN) injection 0.4 mg  0.4 mg Intravenous PRN Nesha Babb MD        nitroGLYCERIN (NITROSTAT) SL tablet 0.4 mg  0.4 mg Sublingual Q5 Min PRN Nesha Babb MD        ondansetron [facility]) injection 4 mg  4 mg Intravenous Q6H PRN Nesha Babb MD        pantoprazole (PROTONIX) tablet 20 mg  20 mg Oral BID AC Nesha Babb MD   20 mg at 04/04/20 0508    sevelamer (RENVELA) tablet 800 mg  800 mg Oral TID WC Nesha Babb MD   800 mg at 04/03/20 1654    sucralfate (CARAFATE) tablet 1 g  1 g Oral TID PRN Nesha Babb MD        polyethylene glycol [facility]) packet 17 g  17 g Oral Daily Edra Snellen, MD   17 g at 04/01/20 0753    bisacodyl (DULCOLAX) suppository 10 mg  10 mg Rectal Daily PRN Edra Snellen, MD        bisacodyl (DULCOLAX) EC tablet 5 mg  5 mg Oral Daily Edra Snellen, MD   5 mg at 04/01/20 0753           amLODIPine  10 mg Oral Daily    nortriptyline  20 mg Oral Nightly    oxyCODONE  10 mg Oral Q4H    cyclobenzaprine  10 mg Oral TID    gabapentin  400 mg Oral TID    aspirin  81 mg Oral Daily    atorvastatin  80 mg Oral Nightly    [Held by provider] cloNIDine  0.1 mg Oral TID    clopidogrel  75 mg Oral Daily    isosorbide mononitrate  60 mg Oral Daily    [Held by provider] metoprolol succinate  25 mg Oral BID    [Held by provider] minoxidil  10 mg Oral Daily    pantoprazole  20 mg Oral BID AC    sevelamer  800 mg Oral TID WC    polyethylene glycol  17 g Oral Daily    bisacodyl  5 mg Oral Daily     acetaminophen **OR** acetaminophen, potassium chloride **OR** potassium alternative oral replacement ! !48      !0.34 !                ! +---------++--------+-----+---------------++--------+-----+----------------+    Vl Dup Lower Extremity Arteries Bilateral    Result Date: 3/5/2020  Vascular Lower Extremities Arterial Duplex Procedure  Demographics   Patient Name     Annemarie Valenzuela Age                   61   Patient Number   [id number]       Gender                Male   Visit Number     [id number]    Interpreting          Bartolo Worthington MD                                Physician   Date of Birth    1960   Referring Physician   Mc Rosa   Accession Number [id number]    [address] RT, RVT  Procedure Type of Study:   Extremities Arteries:Lower Extremities Arterial Duplex, VL LOWER EXTREMITY  ARTERIES DUPLEX BILATERAL. Indications for Study:Wound Lower Extremity (Right). Risk Factors   - The patient's risk factor(s) include: dyslipidemia and arterial     hypertension.   - The patient has a former tobacco history. Allergies   - No known allergies. Impression   Bilateral lower extremity arterial duplex exam performed. The right lower extremity extremity arterial duplex exam performed. The  right lower extremtiy shows occlusion of the bypass and occlusion of the  native SFA and popliteal artery. Only collateral arterial waveforms are  seen in the tibial vessels. The left low extremity shows mild local velocity elevations in the CFA,  suggestive of a stenosis in the left External iliac artery and Common  femoral artery. The SFA and popliteal artery are patent, however,  significant plaque is seen. the anterior tibial and posterior tibial and  peroneal artery are patent.    Signature   ----------------------------------------------------------------  Electronically signed by Bartolo Worthington MD(Interpreting  physician) on 03/05/2020 07:03 AM  ----------------------------------------------------------------  Grafts   - The graft originated from the Right Dist Common Femoral to the Right

## 2020-04-04 NOTE — PROGRESS NOTES
Physical Therapy  Name: Gene Capone  MRN:  [id number]  Date of service:  4/4/2020 04/04/20 1507   Restrictions/Precautions   Restrictions/Precautions Surgical Protocols; Fall Risk   Required Braces or Orthoses? No   General   Additional Pertinent Hx ESRD, DIALYSIS, DM, PVD, CHF, HEP C, HEPATOCELLULAR CARCINOMA   Subjective   Subjective This leg (right) is really sore. Oxygen Therapy   O2 Device None (Room air)   Bed Mobility   Supine to Sit Independent   Scooting Independent   Transfers   Sit to Stand Stand by assistance   Stand to sit Stand by assistance   Bed to Chair Contact guard assistance;Stand by assistance   Stand Pivot Transfers Contact guard assistance;Stand by assistance   Ambulation   Ambulation? Yes   WB Status WBAT LEFT LE   Ambulation 1   Surface level tile   Device Parallel Bars   Assistance Contact guard assistance   Gait Deviations Slow Manuela   Distance 12 feet x 2   Comments froward hops and then backward hops. Wheelchair Activities   Propulsion Yes   Propulsion 1   Propulsion Manual   Level Level Tile   Method LUE;RUE   Level of Assistance Modified independent   Distance 200 feet   Balance   Posture Fair   Sitting - Static Good   Sitting - Dynamic Good   Standing - Static Fair   Standing - Dynamic Fair   Exercises   Hip Flexion standing mini squats x 10   Hip Extension/Leg Presses standing R hip ext x 10   Hip Abduction standing R hip abd x 10   Ankle Pumps standiong heel rasies x 10   Comments worked on upright posture with standing.    Short term goals   Time Frame for Short term goals 1 week   Short term goal 1 TF SURFACE TO SURFACE SBA   Short term goal 2 AMBULATE 25 FT WTIH RW CGA   Short term goal 3 PROPEL  FT ON FLAT SURFACE INDEP   Short term goal 4 HEP SBA   Long term goals   Time Frame for Long term goals  2 WEEKS   Long term goal 1 INDEP BED MOB   Long term goal 2 INDEP TF SURFACE TO SURFACE   Long term goal 3 INDEP AMB 25 FT WITH RW FLAT SURFACE   Long term goal 4 PROPEL

## 2020-04-04 NOTE — PROGRESS NOTES
Occupational Therapy     04/04/20 0815   Restrictions/Precautions   Restrictions/Precautions Surgical Protocols; Fall Risk   Lower Extremity Weight Bearing Restrictions   Right Lower Extremity Weight Bearing Non Weight Bearing   General   Additional Pertinent Hx Liver Cancer, ESRD with dialysis   Diagnosis R AKA   ADL   Toileting Contact guard assistance   Balance   Standing Balance Contact guard assistance   Functional Mobility   Functional - Mobility Device Wheelchair   Assist Level Stand by assistance   Toilet Transfers   Toilet - Technique Stand pivot   Equipment Used Grab bars   Toilet Transfer Contact guard assistance   Toilet Transfers Comments cues for foot placement   Type of ROM/Therapeutic Exercise   Type of ROM/Therapeutic Exercise Felipe   Comment 15#   Assessment   Performance deficits / Impairments Decreased functional mobility ; Decreased ADL status; Decreased strength;Decreased endurance;Decreased balance;Decreased high-level IADLs   Timed Code Treatment Minutes 45 Minutes   Activity Tolerance   Activity Tolerance Patient Tolerated treatment well   Safety Devices   Safety Devices in place Yes

## 2020-04-05 LAB
ANION GAP SERPL CALCULATED.3IONS-SCNC: 19 MMOL/L (ref 7–19)
BASOPHILS ABSOLUTE: 0.1 K/UL (ref 0–0.2)
BASOPHILS RELATIVE PERCENT: 0.7 % (ref 0–1)
BUN BLDV-MCNC: 39 MG/DL (ref 6–20)
CALCIUM SERPL-MCNC: 9.5 MG/DL (ref 8.6–10)
CHLORIDE BLD-SCNC: 85 MMOL/L (ref 98–111)
CO2: 27 MMOL/L (ref 22–29)
CREAT SERPL-MCNC: 5.3 MG/DL (ref 0.5–1.2)
EOSINOPHILS ABSOLUTE: 0.6 K/UL (ref 0–0.6)
EOSINOPHILS RELATIVE PERCENT: 8.1 % (ref 0–5)
GFR NON-AFRICAN AMERICAN: 11
GLUCOSE BLD-MCNC: 102 MG/DL (ref 74–109)
HCT VFR BLD CALC: 29.8 % (ref 42–52)
HEMOGLOBIN: 9.1 G/DL (ref 14–18)
IMMATURE GRANULOCYTES #: 0 K/UL
LYMPHOCYTES ABSOLUTE: 1.1 K/UL (ref 1.1–4.5)
LYMPHOCYTES RELATIVE PERCENT: 14.6 % (ref 20–40)
MCH RBC QN AUTO: 28.2 PG (ref 27–31)
MCHC RBC AUTO-ENTMCNC: 30.5 G/DL (ref 33–37)
MCV RBC AUTO: 92.3 FL (ref 80–94)
MONOCYTES ABSOLUTE: 0.6 K/UL (ref 0–0.9)
MONOCYTES RELATIVE PERCENT: 7.6 % (ref 0–10)
NEUTROPHILS ABSOLUTE: 5.1 K/UL (ref 1.5–7.5)
NEUTROPHILS RELATIVE PERCENT: 68.6 % (ref 50–65)
PDW BLD-RTO: 16.4 % (ref 11.5–14.5)
PLATELET # BLD: 198 K/UL (ref 130–400)
PMV BLD AUTO: 11.4 FL (ref 9.4–12.4)
POTASSIUM REFLEX MAGNESIUM: 5 MMOL/L (ref 3.5–5)
RBC # BLD: 3.23 M/UL (ref 4.7–6.1)
SODIUM BLD-SCNC: 131 MMOL/L (ref 136–145)
WBC # BLD: 7.4 K/UL (ref 4.8–10.8)

## 2020-04-05 PROCEDURE — 36415 COLL VENOUS BLD VENIPUNCTURE: CPT

## 2020-04-05 PROCEDURE — 85025 COMPLETE CBC W/AUTO DIFF WBC: CPT

## 2020-04-05 PROCEDURE — 6370000000 HC RX 637 (ALT 250 FOR IP): Performed by: PSYCHIATRY & NEUROLOGY

## 2020-04-05 PROCEDURE — 80048 BASIC METABOLIC PNL TOTAL CA: CPT

## 2020-04-05 PROCEDURE — 6370000000 HC RX 637 (ALT 250 FOR IP): Performed by: INTERNAL MEDICINE

## 2020-04-05 PROCEDURE — 1180000000 HC REHAB R&B

## 2020-04-05 RX ORDER — METOPROLOL SUCCINATE 25 MG/1
25 TABLET, EXTENDED RELEASE ORAL 2 TIMES DAILY
Status: DISCONTINUED | OUTPATIENT
Start: 2020-04-05 | End: 2020-04-11 | Stop reason: HOSPADM

## 2020-04-05 RX ADMIN — METOPROLOL SUCCINATE 25 MG: 25 TABLET, EXTENDED RELEASE ORAL at 17:11

## 2020-04-05 RX ADMIN — SEVELAMER CARBONATE 800 MG: 800 TABLET, FILM COATED ORAL at 08:03

## 2020-04-05 RX ADMIN — PANTOPRAZOLE SODIUM 20 MG: 20 TABLET, DELAYED RELEASE ORAL at 05:15

## 2020-04-05 RX ADMIN — CYCLOBENZAPRINE HYDROCHLORIDE 10 MG: 10 TABLET, FILM COATED ORAL at 13:52

## 2020-04-05 RX ADMIN — CLOPIDOGREL BISULFATE 75 MG: 75 TABLET ORAL at 08:02

## 2020-04-05 RX ADMIN — GABAPENTIN 400 MG: 400 CAPSULE ORAL at 13:52

## 2020-04-05 RX ADMIN — GABAPENTIN 400 MG: 400 CAPSULE ORAL at 20:38

## 2020-04-05 RX ADMIN — ISOSORBIDE MONONITRATE 60 MG: 60 TABLET, EXTENDED RELEASE ORAL at 08:03

## 2020-04-05 RX ADMIN — OXYCODONE 10 MG: 5 TABLET ORAL at 00:53

## 2020-04-05 RX ADMIN — PANTOPRAZOLE SODIUM 20 MG: 20 TABLET, DELAYED RELEASE ORAL at 17:07

## 2020-04-05 RX ADMIN — OXYCODONE 10 MG: 5 TABLET ORAL at 12:38

## 2020-04-05 RX ADMIN — CYCLOBENZAPRINE HYDROCHLORIDE 10 MG: 10 TABLET, FILM COATED ORAL at 20:37

## 2020-04-05 RX ADMIN — GABAPENTIN 400 MG: 400 CAPSULE ORAL at 08:02

## 2020-04-05 RX ADMIN — OXYCODONE 10 MG: 5 TABLET ORAL at 05:15

## 2020-04-05 RX ADMIN — ATORVASTATIN CALCIUM 80 MG: 80 TABLET, FILM COATED ORAL at 20:37

## 2020-04-05 RX ADMIN — OXYCODONE 10 MG: 5 TABLET ORAL at 08:03

## 2020-04-05 RX ADMIN — SEVELAMER CARBONATE 800 MG: 800 TABLET, FILM COATED ORAL at 12:38

## 2020-04-05 RX ADMIN — NORTRIPTYLINE HYDROCHLORIDE 20 MG: 10 CAPSULE ORAL at 20:38

## 2020-04-05 RX ADMIN — AMLODIPINE BESYLATE 10 MG: 10 TABLET ORAL at 08:03

## 2020-04-05 RX ADMIN — OXYCODONE 10 MG: 5 TABLET ORAL at 17:07

## 2020-04-05 RX ADMIN — ASPIRIN 81 MG: 81 TABLET, COATED ORAL at 08:02

## 2020-04-05 RX ADMIN — OXYCODONE 10 MG: 5 TABLET ORAL at 20:38

## 2020-04-05 RX ADMIN — SEVELAMER CARBONATE 800 MG: 800 TABLET, FILM COATED ORAL at 17:07

## 2020-04-05 RX ADMIN — CYCLOBENZAPRINE HYDROCHLORIDE 10 MG: 10 TABLET, FILM COATED ORAL at 08:03

## 2020-04-05 ASSESSMENT — PAIN DESCRIPTION - ORIENTATION
ORIENTATION: RIGHT

## 2020-04-05 ASSESSMENT — PAIN DESCRIPTION - ONSET
ONSET: ON-GOING
ONSET: ON-GOING

## 2020-04-05 ASSESSMENT — PAIN SCALES - GENERAL
PAINLEVEL_OUTOF10: 3
PAINLEVEL_OUTOF10: 8
PAINLEVEL_OUTOF10: 0
PAINLEVEL_OUTOF10: 8
PAINLEVEL_OUTOF10: 3
PAINLEVEL_OUTOF10: 2
PAINLEVEL_OUTOF10: 8

## 2020-04-05 ASSESSMENT — PAIN DESCRIPTION - PAIN TYPE
TYPE: ACUTE PAIN
TYPE: ACUTE PAIN

## 2020-04-05 ASSESSMENT — PAIN DESCRIPTION - FREQUENCY
FREQUENCY: CONTINUOUS
FREQUENCY: CONTINUOUS

## 2020-04-05 ASSESSMENT — PAIN DESCRIPTION - LOCATION
LOCATION: LEG

## 2020-04-05 ASSESSMENT — PAIN DESCRIPTION - PROGRESSION
CLINICAL_PROGRESSION: NOT CHANGED

## 2020-04-05 ASSESSMENT — PAIN DESCRIPTION - DESCRIPTORS
DESCRIPTORS: ACHING
DESCRIPTORS: ACHING;THROBBING

## 2020-04-05 NOTE — PROGRESS NOTES
04/05/20 0803    gabapentin (NEURONTIN) capsule 400 mg  400 mg Oral TID Ad Barnard MD   400 mg at 04/05/20 0802    acetaminophen (TYLENOL) tablet 650 mg  650 mg Oral Q6H PRN Jennifer Cuevas MD        Or   [facility] acetaminophen (TYLENOL) suppository 650 mg  650 mg Rectal Q6H PRN Jennifer Cuevas MD        potassium chloride (KLOR-CON M) extended release tablet 40 mEq  40 mEq Oral PRN Jennifer Cuevas MD        Or    potassium bicarb-citric acid (EFFER-K) effervescent tablet 40 mEq  40 mEq Oral PRN Jennifer Cuevas MD        Or    potassium chloride 10 mEq/100 mL IVPB (Peripheral Line)  10 mEq Intravenous PRN Jennifer Cuevas MD        sodium chloride flush 0.9 % injection 10 mL  10 mL Intravenous PRN Jennifer Cuevas MD        aspirin EC tablet 81 mg  81 mg Oral Daily Jennifer Cuevas MD   81 mg at 04/05/20 0802    atorvastatin (LIPITOR) tablet 80 mg  80 mg Oral Nightly Jennifer Cuevas MD   80 mg at 04/04/20 2031    [Held by provider] cloNIDine (CATAPRES) tablet 0.1 mg  0.1 mg Oral TID Jennifer Cuevas MD        clopidogrel (PLAVIX) tablet 75 mg  75 mg Oral Daily Jennifer Cueavs MD   75 mg at 04/05/20 0802    isosorbide mononitrate (IMDUR) extended release tablet 60 mg  60 mg Oral Daily Jennifer Cuevas MD   60 mg at 04/05/20 0803    labetalol (NORMODYNE;TRANDATE) injection 10 mg  10 mg Intravenous Q6H PRN Jennifer Cuevas MD        [Held by provider] metoprolol succinate (TOPROL XL) extended release tablet 25 mg  25 mg Oral BID Jennifer Cuevas MD        [Held by provider] minoxidil (LONITEN) tablet 10 mg  10 mg Oral Daily Jennifer Cuevas MD   10 mg at 04/01/20 0751    naloxone (NARCAN) injection 0.4 mg  0.4 mg Intravenous PRN Jennifer Cuevas MD        nitroGLYCERIN (NITROSTAT) SL tablet 0.4 mg  0.4 mg Sublingual Q5 Min PRN Jennifer Cuevas MD        ondansetron [facility]) injection 4 mg  4 mg Intravenous Q6H PRN Jennifer Cuevas MD       [facility] pantoprazole (PROTONIX) tablet 20 mg  20 mg Oral BID AC Akanksha White MD   20 mg at 04/05/20 0515    sevelamer (RENVELA) tablet 800 mg  800 mg Oral TID WC Akanksha White MD   800 mg at 04/05/20 0803    sucralfate (CARAFATE) tablet 1 g  1 g Oral TID PRN Akanksha White MD        polyethylene glycol [facility]) packet 17 g  17 g Oral Daily Marion León MD   17 g at 04/01/20 0753    bisacodyl (DULCOLAX) suppository 10 mg  10 mg Rectal Daily PRN Marion León MD        bisacodyl (DULCOLAX) EC tablet 5 mg  5 mg Oral Daily Marion León MD   5 mg at 04/01/20 4699       Past Medical History:  Past Medical History:   Diagnosis Date    Anxiety     Blood circulation, collateral     CAD (coronary artery disease)     stents x 2; per dr. Andrae Ballard [facility])     liver cancer    CHF (congestive heart failure) ([address]     Chyloperitoneum determined by paracentesis     CKD (chronic kidney disease), stage V ([address]     Dialysis patient ([address]     mon wed fri at [place] GERD (gastroesophageal reflux disease)     Hemodialysis patient ([address]     mon wed fri in [place] Hepatic cirrhosis ([address]     dr. Gonzalo Lewis; has [facility] for liver and kidney transplant list.    Hepatitis C, chronic ([address]     History of blood transfusion     HTN (hypertension)     Hx of blood clots     arm/fistula    Mixed hyperlipidemia 1/9/2017    Osteoarthritis     Pacemaker     Pain management     Dr. Shahbaz Yepez care patient 07/09/2019    PVD (peripheral vascular disease) ([address]     Sepsis ([address]     Skin ulcer of right heel with fat layer exposed ([address] 8/16/2019    Sleep apnea     no cpap at present.  Type II diabetes mellitus (HCC)     no meds.     Ulcer of left heel, with fat layer exposed ([address] 2/28/2018    Ulcer of right foot, with fat layer exposed ([address] 8/16/2019    Wound infection     Wound infection after surgery 7/8/2019       Past Surgical History:  Past Surgical History:   Procedure SURGERY  02/16/2017    SJS. Left brachial artery to axillary vein arteriovenous graft with 7 mm artegraft. Left upper extremity venograms. Left subclavian vein stent viabahn 13x50. Left subclavian vein stent balloon angioplasty 12x40 atlas.  VASCULAR SURGERY  04/11/2017    SJS. Removal of tunneled dialysis catheter right internal jugular vein.  VASCULAR SURGERY  01/25/2018    SJS. Arch aortogram, left upper arteriogram, AV graft angiogram/venogram.    VASCULAR SURGERY  02/28/2018    SJS. Right CFA 5f-6f-7f sheath, aortogram with bilateral lower arteriogram, left SFA/pop  balloon angioplasty 5x100 , 6x150 lutonix ( 2), left CFA  7f sheath, bilateral iliac kissing stents right 10x38 icast, left 9x59 icast expanded with 10x40 , completion aortogram, mynx left CFA , failed mynx right CFA.  VASCULAR SURGERY  06/13/2019    SJS left CFA 5f sheath, aortogram with bilateral lower arteriogram, mynx left CFA.  VASCULAR SURGERY  06/25/2019    SJS-VI. Femoral tibial/perineal bypass with reversed greater saphenous vein.  VASCULAR SURGERY  08/16/2019    TJR. Aortogram and runoff, selective right CFA injections. PTA of fem-tp bypass with 4.0 x 220 mm tod balloon to 4.33 mm    VASCULAR SURGERY  10/23/2019    VI. Thrombin injection in the left SFA PSA, right common femoral artery us guided access, left SFA stent treatment of the flap.  VASCULAR SURGERY  01/30/2020    TJR Endarterectomy of the right common femoral artery, superficial femoral artery, and profunda femoris artery. Redo right groin x3.  VASCULAR SURGERY  03/06/2020    VI.  BILATERAL AORTAILLIAC AND RIGHT LEG ANGIOGRAM       Family History  Family History   Problem Relation Age of Onset    High Blood Pressure Mother     High Blood Pressure Father     High Blood Pressure Sister        Social History  Social History     Socioeconomic History    Marital status:      Spouse name: Patria Olmos    Number of children: 0    Years of wheezing  Cardiovascular ROS: No chest pain or palpitations  Gastrointestinal ROS: No abdominal pain or melena  Genito-Urinary ROS: No dysuria or hematuria  Musculoskeletal ROS: No joint pain or swelling   14 point ROS reviewed with the patient and negative except as noted above and in the HPI unless unable to obtain. Objective:  Patient Vitals for the past 24 hrs:   BP Temp Temp src Pulse Resp SpO2 Weight   04/05/20 0711 (!) 172/71 96.3 °F (35.7 °C) Temporal 72 18 90 % --   04/05/20 0314 -- -- -- -- -- -- 154 lb 3 oz (69.9 kg)   04/04/20 1813 (!) 143/64 97.6 °F (36.4 °C) Temporal 66 16 91 % --       Intake/Output Summary (Last 24 hours) at 4/5/2020 1047  Last data filed at 4/5/2020 0324  Gross per 24 hour   Intake 720 ml   Output --   Net 720 ml     General: awake/alert   HEENT: Normocephalic atraumatic head  Neck: Supple with no JVD or carotid bruits. Chest:  clear to auscultation bilaterally without respiratory distress  CVS: regular rate and rhythm  Abdominal: soft, nontender, normal bowel sounds  Extremities: Right AKA stump site looks clean  Skin: warm and dry without rash      Labs:  BMP:   Recent Labs     04/03/20 0305 04/04/20 0435 04/05/20  0440   * 134* 131*   K 4.9 5.2* 5.0   CL 91* 89* 85*   CO2 25 31* 27   BUN 38* 21* 39*   CREATININE 5.2* 3.8* 5.3*   CALCIUM 8.8 9.4 9.5     CBC:   Recent Labs     04/03/20 0305 04/04/20  0435 04/05/20  0440   WBC 6.4 6.3 7.4   HGB 7.3* 9.5* 9.1*   HCT 23.8* 30.9* 29.8*   MCV 91.2 91.7 92.3    185 198     LIVER PROFILE:   No results for input(s): AST, ALT, LIPASE, BILIDIR, BILITOT, ALKPHOS in the last 72 hours. Invalid input(s): AMYLASE,  ALB  PT/INR: No results for input(s): PROTIME, INR in the last 72 hours. APTT:   No results for input(s): APTT in the last 72 hours. BNP:  No results for input(s): BNP in the last 72 hours. Ionized Calcium:No results for input(s): IONCA in the last 72 hours.   Magnesium:  No results for input(s): MG in the

## 2020-04-05 NOTE — PROGRESS NOTES
[facility]    Patient:  Raina Sarabia  YOB: 1960  Date of Service: 4/5/2020  MRN: [id number]   Acct: [de-identified]   Primary Care Physician: Torsten Cagle DO  Advance Directive: Full Code  Admit Date: 3/31/2020       Hospital Day: 5      CHIEF COMPLAINT: Right lower extremity pain      4/5/2020 2:30 PM  Subjective / Interval History:   04/05/2020  No acute changes or acute overnight event reported. Patient laying comfortably in bed no acute distress. Endorses overall improvement in tolerating rehab without any significant issues. Continues to endorse some pain in/around AKA site. 04/04/2020  Doing well. Sitting comfortably in bed, having breakfast.  Denies any acute complaints or distress at this time. Pain in right AKA site currently controlled as per patient. No acute changes or acute overnight event reported. 04/03/2020  No acute changes or acute overnight event reported. Patient reportedly tolerating rehab without any issues. Endorses overall improvement. Pain at right lower extremity/AKA site currently controlled. 04/02/2020  Doing well. No acute overnight event reported. Patient tolerating rehab. Right lower extremity pain currently controlled as per patient. No acute overnight event reported. Blood pressure currently remained stable. Brief History:   Mr. Olga Lidia Sheriff, a 56 y. o. male with a history of severe peripheral vascular disease, HCV, CAD, ESRD, on HD (MWF), has been admitted for progressively worsening severe right lower extremity pain and nonhealing wound.  Patient reportedly has had an arterial bypass about 7 months ago, and the wound never healed. [place] reportedly has progressively gotten worse to the point where it becomes difficult for the patient to ambulate.       Patient initially admitted to the medical floor, via vascular team, - 03/25/2020   Status post right AKA (03/26/2020)  Transferred to Acute Rehab - [Held by provider] cloNIDine  0.1 mg Oral TID    clopidogrel  75 mg Oral Daily    isosorbide mononitrate  60 mg Oral Daily    [Held by provider] metoprolol succinate  25 mg Oral BID    [Held by provider] minoxidil  10 mg Oral Daily    pantoprazole  20 mg Oral BID AC    sevelamer  800 mg Oral TID WC    polyethylene glycol  17 g Oral Daily    bisacodyl  5 mg Oral Daily     acetaminophen **OR** acetaminophen, potassium chloride **OR** potassium alternative oral replacement **OR** potassium chloride, sodium chloride flush, labetalol, naloxone, nitroGLYCERIN, ondansetron, sucralfate, bisacodyl  DIET RENAL;       Labs:   CBC with DIFF:  Recent Labs     04/03/20 0305 04/04/20 0435 04/05/20 0440   WBC 6.4 6.3 7.4   RBC 2.61* 3.37* 3.23*   HGB 7.3* 9.5* 9.1*   HCT 23.8* 30.9* 29.8*   MCV 91.2 91.7 92.3   MCH 28.0 28.2 28.2   MCHC 30.7* 30.7* 30.5*   RDW 16.6* 16.4* 16.4*    185 198   MPV 11.7 11.5 11.4   NEUTOPHILPCT 57.3 66.0* 68.6*   LYMPHOPCT 20.1 15.1* 14.6*   MONOPCT 11.6* 9.7 7.6   EOSRELPCT 9.9* 8.3* 8.1*   BASOPCT 0.6 0.6 0.7   NEUTROABS 3.6 4.2 5.1   LYMPHSABS 1.3 1.0* 1.1   MONOSABS 0.70 0.60 0.60   EOSABS 0.60 0.50 0.60   BASOSABS 0.00 0.00 0.10       CMP/BMP:  Recent Labs     04/03/20 0305 04/04/20 0435 04/05/20  0440   * 134* 131*   K 4.9 5.2* 5.0   CL 91* 89* 85*   CO2 25 31* 27   ANIONGAP 18 14 19   GLUCOSE 109 104 102   BUN 38* 21* 39*   CREATININE 5.2* 3.8* 5.3*   LABGLOM 11* 16* 11*   CALCIUM 8.8 9.4 9.5         CRP:  No results for input(s): CRP in the last 72 hours. Sed Rate:  No results for input(s): SEDRATE in the last 72 hours. HgBA1c:  No components found for: HGBA1C  FLP:    Lab Results   Component Value Date    TRIG 125 03/29/2020    HDL 23 03/29/2020    LDLCALC 35 03/29/2020    LDLDIRECT 94 01/17/2014     TSH:    Lab Results   Component Value Date    TSH 1.43 01/17/2014     Troponin T:   No results for input(s): TROPONINI in the last 72 hours.   Pro-BNP: No results +---------++--------+-----+---------------++--------+-----+----------------+ ! Great Toe!!0       !0    !               !!48      !0.34 !                ! +---------++--------+-----+---------------++--------+-----+----------------+    Vl Dup Lower Extremity Arteries Bilateral    Result Date: 3/5/2020  Vascular Lower Extremities Arterial Duplex Procedure  Demographics   Patient Name     Janiya Cabrera Age                   61   Patient Number   [id number]       Gender                Male   Visit Number     [id number]    Interpreting          Larisa Rea MD                                Physician   Date of Birth    1960   Referring Physician   Sajan Hutchins   Accession Number [id number]    [address] [place] [address] RT, [facility]  Procedure Type of Study:   Extremities Arteries:Lower Extremities Arterial Duplex, VL LOWER EXTREMITY  ARTERIES DUPLEX BILATERAL. Indications for Study:Wound Lower Extremity (Right). Risk Factors   - The patient's risk factor(s) include: dyslipidemia and arterial     hypertension.   - The patient has a former tobacco history. Allergies   - No known allergies. Impression   Bilateral lower extremity arterial duplex exam performed. The right lower extremity extremity arterial duplex exam performed. The  right lower extremtiy shows occlusion of the bypass and occlusion of the  native SFA and popliteal artery. Only collateral arterial waveforms are  seen in the tibial vessels. The left low extremity shows mild local velocity elevations in the CFA,  suggestive of a stenosis in the left External iliac artery and Common  femoral artery. The SFA and popliteal artery are patent, however,  significant plaque is seen. the anterior tibial and posterior tibial and  peroneal artery are patent.    Signature   ----------------------------------------------------------------  Electronically signed by Larisa Rea MD(Interpreting  physician) on 03/05/2020 07:03 AM ----------------------------------------------------------------  Grafts   - The graft originated from the Right Dist Common Femoral to the Right     Dist Popliteal. +--------------------------------------+----+---+-----+--------------------+ ! Location                              ! PSV ! EDV! Ratio!% Stenosis          ! +--------------------------------------+----+---+-----+--------------------+ ! Prox Anastomosis                      ! 35  !   !     !                    ! +--------------------------------------+----+---+-----+--------------------+ ! Prox Graft                            !0   !   !0    !                    ! +--------------------------------------+----+---+-----+--------------------+ ! Mid Graft                             !0   !   !     !                    ! +--------------------------------------+----+---+-----+--------------------+ ! Dist Graft                            !0   !   !     !                    ! +--------------------------------------+----+---+-----+--------------------+ ! Dist Anastomosis                      !0   !   !     !                    ! +--------------------------------------+----+---+-----+--------------------+ Velocities are measured in cm/s ; Diameters are measured in mm LE Duplex Measurements +---------------++-----+-----+----+-----------++---+-----+---+-------------+ ! ! !Right! ! Left!           !!   !     !   !             ! +---------------++-----+-----+----+-----------++---+-----+---+-------------+ ! Location       ! !PSV  ! Ratio! EDV ! Wave Desc. !!PSV! Ratio! EDV! Wave Desc.   ! +---------------++-----+-----+----+-----------++---+-----+---+-------------+ ! Common Femoral !!161  !     !    !           !!266!     !   !             ! +---------------++-----+-----+----+-----------++---+-----+---+-------------+ ! Prox PFA       !!244  !     !    !           !!60 !     !   !             ! +---------------++-----+-----+----+-----------++---+-----+---+-------------+ Medical Therapy  3.  Consideration for open heart surgery\"        History of ESRD, on scheduled HD (MWF)  · Nephrology consulted  · Continue scheduled HD  · Continue management as per nephrology rec    Continue management of other chronic medical conditions - see above and orders. Advance Directive: Full Code    DIET RENAL;     DVT prophylaxis: Heparin    Consults Made:   IP CONSULT TO SOCIAL WORK  IP CONSULT TO NEPHROLOGY  IP CONSULT TO HOSPITALIST    Discharge planning: tbd    Time Spent is 25 mins in the examination, evaluation, counseling and review of medications, assessment and plan.      Electronically signed by   Herlinda Balderas MD, MPH,   Internal Medicine Hospitalist   4/5/2020 2:30 PM

## 2020-04-06 LAB
ANION GAP SERPL CALCULATED.3IONS-SCNC: 21 MMOL/L (ref 7–19)
BASOPHILS ABSOLUTE: 0.1 K/UL (ref 0–0.2)
BASOPHILS RELATIVE PERCENT: 0.6 % (ref 0–1)
BUN BLDV-MCNC: 52 MG/DL (ref 6–20)
CALCIUM SERPL-MCNC: 9.4 MG/DL (ref 8.6–10)
CHLORIDE BLD-SCNC: 82 MMOL/L (ref 98–111)
CO2: 25 MMOL/L (ref 22–29)
CREAT SERPL-MCNC: 6.6 MG/DL (ref 0.5–1.2)
EOSINOPHILS ABSOLUTE: 0.6 K/UL (ref 0–0.6)
EOSINOPHILS RELATIVE PERCENT: 6.2 % (ref 0–5)
GFR NON-AFRICAN AMERICAN: 9
GLUCOSE BLD-MCNC: 96 MG/DL (ref 74–109)
HCT VFR BLD CALC: 28.5 % (ref 42–52)
HEMOGLOBIN: 8.8 G/DL (ref 14–18)
IMMATURE GRANULOCYTES #: 0 K/UL
LYMPHOCYTES ABSOLUTE: 1.2 K/UL (ref 1.1–4.5)
LYMPHOCYTES RELATIVE PERCENT: 12.8 % (ref 20–40)
MCH RBC QN AUTO: 28.1 PG (ref 27–31)
MCHC RBC AUTO-ENTMCNC: 30.9 G/DL (ref 33–37)
MCV RBC AUTO: 91.1 FL (ref 80–94)
MONOCYTES ABSOLUTE: 0.8 K/UL (ref 0–0.9)
MONOCYTES RELATIVE PERCENT: 8.5 % (ref 0–10)
NEUTROPHILS ABSOLUTE: 6.4 K/UL (ref 1.5–7.5)
NEUTROPHILS RELATIVE PERCENT: 71.6 % (ref 50–65)
PDW BLD-RTO: 16.8 % (ref 11.5–14.5)
PLATELET # BLD: 202 K/UL (ref 130–400)
PMV BLD AUTO: 11.9 FL (ref 9.4–12.4)
POTASSIUM REFLEX MAGNESIUM: 6.3 MMOL/L (ref 3.5–5)
RBC # BLD: 3.13 M/UL (ref 4.7–6.1)
SODIUM BLD-SCNC: 128 MMOL/L (ref 136–145)
WBC # BLD: 9 K/UL (ref 4.8–10.8)

## 2020-04-06 PROCEDURE — 1180000000 HC REHAB R&B

## 2020-04-06 PROCEDURE — 80048 BASIC METABOLIC PNL TOTAL CA: CPT

## 2020-04-06 PROCEDURE — 97535 SELF CARE MNGMENT TRAINING: CPT

## 2020-04-06 PROCEDURE — 6370000000 HC RX 637 (ALT 250 FOR IP): Performed by: PSYCHIATRY & NEUROLOGY

## 2020-04-06 PROCEDURE — 97530 THERAPEUTIC ACTIVITIES: CPT

## 2020-04-06 PROCEDURE — 6370000000 HC RX 637 (ALT 250 FOR IP): Performed by: INTERNAL MEDICINE

## 2020-04-06 PROCEDURE — 85025 COMPLETE CBC W/AUTO DIFF WBC: CPT

## 2020-04-06 PROCEDURE — 36415 COLL VENOUS BLD VENIPUNCTURE: CPT

## 2020-04-06 PROCEDURE — 99232 SBSQ HOSP IP/OBS MODERATE 35: CPT | Performed by: PSYCHIATRY & NEUROLOGY

## 2020-04-06 PROCEDURE — 8010000000 HC HEMODIALYSIS ACUTE INPT

## 2020-04-06 PROCEDURE — 97110 THERAPEUTIC EXERCISES: CPT

## 2020-04-06 RX ADMIN — OXYCODONE 10 MG: 5 TABLET ORAL at 08:28

## 2020-04-06 RX ADMIN — PANTOPRAZOLE SODIUM 20 MG: 20 TABLET, DELAYED RELEASE ORAL at 16:44

## 2020-04-06 RX ADMIN — SEVELAMER CARBONATE 800 MG: 800 TABLET, FILM COATED ORAL at 16:44

## 2020-04-06 RX ADMIN — CYCLOBENZAPRINE HYDROCHLORIDE 10 MG: 10 TABLET, FILM COATED ORAL at 20:18

## 2020-04-06 RX ADMIN — NORTRIPTYLINE HYDROCHLORIDE 20 MG: 10 CAPSULE ORAL at 20:18

## 2020-04-06 RX ADMIN — SEVELAMER CARBONATE 800 MG: 800 TABLET, FILM COATED ORAL at 11:55

## 2020-04-06 RX ADMIN — OXYCODONE 10 MG: 5 TABLET ORAL at 16:43

## 2020-04-06 RX ADMIN — CLOPIDOGREL BISULFATE 75 MG: 75 TABLET ORAL at 08:29

## 2020-04-06 RX ADMIN — GABAPENTIN 400 MG: 400 CAPSULE ORAL at 20:18

## 2020-04-06 RX ADMIN — GABAPENTIN 400 MG: 400 CAPSULE ORAL at 08:29

## 2020-04-06 RX ADMIN — ISOSORBIDE MONONITRATE 60 MG: 60 TABLET, EXTENDED RELEASE ORAL at 08:28

## 2020-04-06 RX ADMIN — ASPIRIN 81 MG: 81 TABLET, COATED ORAL at 08:29

## 2020-04-06 RX ADMIN — OXYCODONE 10 MG: 5 TABLET ORAL at 04:10

## 2020-04-06 RX ADMIN — OXYCODONE 10 MG: 5 TABLET ORAL at 00:59

## 2020-04-06 RX ADMIN — ATORVASTATIN CALCIUM 80 MG: 80 TABLET, FILM COATED ORAL at 20:18

## 2020-04-06 RX ADMIN — CYCLOBENZAPRINE HYDROCHLORIDE 10 MG: 10 TABLET, FILM COATED ORAL at 08:28

## 2020-04-06 RX ADMIN — SEVELAMER CARBONATE 800 MG: 800 TABLET, FILM COATED ORAL at 08:28

## 2020-04-06 RX ADMIN — OXYCODONE 10 MG: 5 TABLET ORAL at 20:18

## 2020-04-06 RX ADMIN — METOPROLOL SUCCINATE 25 MG: 25 TABLET, EXTENDED RELEASE ORAL at 08:28

## 2020-04-06 RX ADMIN — OXYCODONE 10 MG: 5 TABLET ORAL at 11:55

## 2020-04-06 RX ADMIN — BISACODYL 5 MG: 5 TABLET, COATED ORAL at 08:28

## 2020-04-06 RX ADMIN — METOPROLOL SUCCINATE 25 MG: 25 TABLET, EXTENDED RELEASE ORAL at 20:18

## 2020-04-06 RX ADMIN — PANTOPRAZOLE SODIUM 20 MG: 20 TABLET, DELAYED RELEASE ORAL at 05:30

## 2020-04-06 ASSESSMENT — PAIN DESCRIPTION - LOCATION
LOCATION: LEG

## 2020-04-06 ASSESSMENT — PAIN SCALES - GENERAL
PAINLEVEL_OUTOF10: 0
PAINLEVEL_OUTOF10: 0
PAINLEVEL_OUTOF10: 6
PAINLEVEL_OUTOF10: 5
PAINLEVEL_OUTOF10: 6
PAINLEVEL_OUTOF10: 0
PAINLEVEL_OUTOF10: 2
PAINLEVEL_OUTOF10: 6
PAINLEVEL_OUTOF10: 8
PAINLEVEL_OUTOF10: 7

## 2020-04-06 ASSESSMENT — PAIN DESCRIPTION - PROGRESSION
CLINICAL_PROGRESSION: GRADUALLY IMPROVING

## 2020-04-06 ASSESSMENT — PAIN DESCRIPTION - DESCRIPTORS
DESCRIPTORS: ACHING;BURNING
DESCRIPTORS: ACHING

## 2020-04-06 ASSESSMENT — PAIN DESCRIPTION - PAIN TYPE
TYPE: PHANTOM PAIN

## 2020-04-06 ASSESSMENT — PAIN DESCRIPTION - FREQUENCY
FREQUENCY: CONTINUOUS
FREQUENCY: CONTINUOUS

## 2020-04-06 ASSESSMENT — PAIN DESCRIPTION - ONSET
ONSET: ON-GOING
ONSET: ON-GOING

## 2020-04-06 ASSESSMENT — PAIN DESCRIPTION - ORIENTATION
ORIENTATION: RIGHT

## 2020-04-06 ASSESSMENT — PAIN - FUNCTIONAL ASSESSMENT: PAIN_FUNCTIONAL_ASSESSMENT: ACTIVITIES ARE NOT PREVENTED

## 2020-04-06 NOTE — PROGRESS NOTES
Nephrology ([address]) Consult Note      Patient:  Mehrdad Pearson  YOB: 1960  Date of Service: 4/6/2020  MRN: [id number]   Acct: [de-identified]   Primary Care Physician: True Mart DO  Advance Directive: Full Code  Admit Date: 3/31/2020       Hospital Day: 6  Referring Provider: Hamilton Lopez MD    Patient independently seen and examined, Chart, Consults, Notes, Operative notes, Labs, Cardiology, and Radiology studies reviewed as able. Subjective:  Mehrdad Pearson is a 61 y.o. male  whom we were consulted for end-stage renal disease secondary to diabetic nephropathy. Patient goes to [address] [place] [address] dialysis clinic on Monday Wednesday Friday. Patient has a known history of severe peripheral vascular disease, history of CABG, congestive heart failure, cirrhosis of liver, sleep apnea and hepatitis C. Admitted this time for nonhealing foot wound with plans for RLE amputation. Denied any issues with dialysis aside from pain in the foot as an outpatient. Denied cp/soa/n/v. Today, no overnight events. Tolerated dialysis. Denies chest pain, shortness of air, nausea vomiting. Dialysis   Pt was seen on RRT  Modality: Hemodialysis  Access: Arterial Venous Fistula  Location: left upper  QB: 450  QD: 800  UF: 1140      Allergies:  Patient has no known allergies.     Medicines:  Current Facility-Administered Medications   Medication Dose Route Frequency Provider Last Rate Last Dose    metoprolol succinate (TOPROL XL) extended release tablet 25 mg  25 mg Oral BID Rachell Rollins MD   25 mg at 04/06/20 8847    [START ON 4/7/2020] darbepoetin sandrine-polysorbate (ARANESP) injection 100 mcg  100 mcg Subcutaneous Weekly Cale Guadalupe MD        amLODIPine (NORVASC) tablet 10 mg  10 mg Oral Daily Rachell Rollins MD   10 mg at 04/05/20 0803    nortriptyline (PAMELOR) capsule 20 mg  20 mg Oral Nightly Hamilton Lopez MD   20 mg at 04/05/20 2038    oxyCODONE (ROXICODONE) immediate Procedure Laterality Date    AMPUTATION ABOVE KNEE Right 3/26/2020    LEG AMPUTATION ABOVE KNEE performed by Stefano Bowers DO at [address] ANGIOPLASTY  08/09/11 [facility]    LUE cephalic vein balloon angioplasty with 7mm x 4cm balloon. coild embolization of 2 cephallic vein brances with 3mm x 5cm coils    CARDIAC CATHETERIZATION  04/04/11    cardiac cath and stent x1 by Dr. Mis Gibbons  07/26/11 [facility]    LULEONARD AV fistula. coild embolization of cephalic vein branch near the wrist with 3mm EMBO coils. balloon a'plasty left cephalic vein with [id number] balloon and then 0yux9up balloon    DIALYSIS FISTULA CREATION Left 2/16/2017    LEFT UPPER EXTREMITY BRACHIAL / AXILLARY GRAFT AND STENT LEFT SUBCLAVIAN VEIN  performed by Meg Calderon MD at [address] Right 7/12/2019    RIGHT LEG WOUND WASHOUT performed by Rochelle Olivares MD at [facility] Right 1/27/2020    REVISION OF RIGHT LEG BYPASS GRAFT performed by Rochelle Olivares MD at [facility] FEMORAL-TIBIAL BYPASS GRAFT Right 6/25/2019    FEMORAL TIBIAL/PERINEAL BYPASS WITH REVERSED GREATER SAPHENOUS VEIN performed by Meg Calderon MD at [address]  12/26/2010    Right internal jugular vein tunneled dialysis catheter placement    OTHER SURGICAL HISTORY  [id number]    Redo of dialysis catheter    OTHER SURGICAL HISTORY  9/6/2011   [facility]    Removal of RT IJV tunneled dialysis cath    OTHER SURGICAL HISTORY  5/22/12   S    Ultrasound-guided cannulation of left cephalic vein in the mid forearm toward the AV anastomosis, Left upper  ext.  fistulograms including venograms of the SVC, Left cephalic vein balloon angioplasty with a 4mm x 100mm Darrell balloon, Completion fistulograms    PACEMAKER PLACEMENT Right 2015    medtronic    PARACENTESIS      multiple/recent; per dr. Juany Rodriguez at [address] [facility] Left 1/30/2018    UPPER EXTREMITY DRIL PROCEDURE  VASCULAR SURGERY  02/16/2017    [facility]. Left brachial artery to axillary vein arteriovenous graft with 7 mm artegraft. Left upper extremity venograms. Left subclavian vein stent viabahn 13x50. Left subclavian vein stent balloon angioplasty 12x40 atlas.  VASCULAR SURGERY  04/11/2017    [facility]. Removal of tunneled dialysis catheter right internal jugular vein.  VASCULAR SURGERY  01/25/2018    S[facility]. Arch aortogram, left upper arteriogram, AV graft angiogram/venogram.    VASCULAR SURGERY  02/28/2018    SJS. Right CFA 5f-6f-7f sheath, aortogram with bilateral lower arteriogram, left SFA/pop  balloon angioplasty 5x100 , 6x150 lutonix ( 2), left CFA  7f sheath, bilateral iliac kissing stents right 10x38 icast, left 9x59 icast expanded with 10x40 , completion aortogram, mynx left CFA , failed mynx right CFA.  VASCULAR SURGERY  06/13/2019    SJS left CFA 5f sheath, aortogram with bilateral lower arteriogram, mynx left CFA.  VASCULAR SURGERY  06/25/2019    S[facility]-VI. Femoral tibial/perineal bypass with reversed greater saphenous vein.  VASCULAR SURGERY  08/16/2019    TJR. Aortogram and runoff, selective right CFA injections. PTA of fem-tp bypass with 4.0 x 220 mm tod balloon to 4.33 mm    VASCULAR SURGERY  10/23/2019    VI. Thrombin injection in the left SFA PSA, right common femoral artery us guided access, left SFA stent treatment of the flap.  VASCULAR SURGERY  01/30/2020    TJR Endarterectomy of the right common femoral artery, superficial femoral artery, and profunda femoris artery. Redo right groin x3.  VASCULAR SURGERY  03/06/2020    VI.  BILATERAL AORTAILLIAC AND RIGHT LEG ANGIOGRAM       Family History  Family History   Problem Relation Age of Onset    High Blood Pressure Mother     High Blood Pressure Father     High Blood Pressure Sister        Social History  Social History     Socioeconomic History    Marital status:      Spouse name: Wade Mccullough    Number of children: 0  Years of education: 15    Highest education level: Not on file   Occupational History    Occupation:    Social Needs    Financial resource strain: Not on file    Food insecurity     Worry: Not on file     Inability: Not on file    Transportation needs     Medical: Not on file     Non-medical: Not on file   Tobacco Use    Smoking status: Former Smoker     Packs/day: 0.25     Years: 45.00     Pack years: 11.25     Types: Cigarettes     Last attempt to quit: 3/18/2020     Years since quittin.0    Smokeless tobacco: Never Used   Substance and Sexual Activity    Alcohol use: No    Drug use: Not Currently     Types: Marijuana, Cocaine, Methamphetamines, IV, Opiates , Other-see comments     Comment: in the 1970s, LSD    Sexual activity: Yes     Partners: Female     Comment: has a wife   Lifestyle    Physical activity     Days per week: Not on file     Minutes per session: Not on file    Stress: Not on file   Relationships    Social connections     Talks on phone: Not on file     Gets together: Not on file     Attends [employer] service: Not on file     Active member of club or organization: Not on file     Attends meetings of clubs or organizations: Not on file     Relationship status: Not on file    Intimate partner violence     Fear of current or ex partner: Not on file     Emotionally abused: Not on file     Physically abused: Not on file     Forced sexual activity: Not on file   Other Topics Concern    Not on file   Social History Narrative    CODE STATUS: Full Code    HEALTH CARE PROXY: Mrs. Earlene Archibald, [phone]    Back up: his Mother, Mrs. Colletta Ladd, [phone]    AMBULATES: independantly    DOMICILED: lives in a private house with 2 steps to get in, has no stairs inside, lives with wife and step children, has 3 pets         Review of Systems:  History obtained from chart review and the patient  General ROS: No fever or chills  Respiratory ROS: No cough, input(s): PHOS in the last 72 hours. HgbA1C:   No results for input(s): LABA1C in the last 72 hours. Hepatic: No results for input(s): ALKPHOS, ALT, AST, PROT, BILITOT, BILIDIR, LABALBU in the last 72 hours. Lactic Acid: No results for input(s): LACTA in the last 72 hours. Troponin: No results for input(s): CKTOTAL, CKMB, TROPONINT in the last 72 hours. ABGs: No results for input(s): PH, PCO2, PO2, HCO3, O2SAT in the last 72 hours. CRP:  No results for input(s): CRP in the last 72 hours. Sed Rate:  No results for input(s): SEDRATE in the last 72 hours. Cultures:   No results for input(s): CULTURE in the last 72 hours. No results for input(s): BC, Shabana Carlson in the last 72 hours. No results for input(s): CXSURG in the last 72 hours. Radiology reports as per the Radiologist  Radiology: No results found.      Assessment   End-stage renal disease  Diabetes type 2 with diabetic nephropathy  Hypertension  Anemia of chronic kidney disease  Chronic diastolic congestive heart failure  History of cirrhosis and hepatitis C  Secondary hyperparathyroidism  Peripheral arterial disease status post endarterectomies and bypasses  Hyperkalemia      Plan:  HD MWF while on rehab floor  Monitor labs  Discussed with patient, nursing      Ramona Shi MD  04/06/20  2:40 PM

## 2020-04-06 NOTE — PATIENT CARE CONFERENCE
of Assistance: 5- Requires helper to provide or empty urinal, bedside commode, or requires set-up/cues to empty bladder (ie. helper provides self-catheter supplies )  Frequency of Accidents: Bladder Frequency of Accidents: 6 - No accidents,uses device like urinal, bedpan, absorbant pad, or requires medication to manage bladder    Bowel  Level of Assistance: Bowel Level of Assistance: 5- Requires helper to provide or empty bedpan, bedside commode, or requires set-up/cues to move bowels  Frequency of Accidents: Bowel Frequency of Accidents: 6 - No accidents, uses device like bedpan, diaper, bedside commode colostomy, or requires medication to manage bowels    Wounds/Incisions/Ulcers: Incision healing well     Jose Scale Score: 17    Pain: Patient's pain is currently controlled with Oxy IR RS. Consultations/Labs/X-rays: Dr. Bal Vann, hospitalist    Family Education: No family available for education    Fall Risk:  Inis Ache Score: 76    Fall in the last week?no      Other Nursing Issues: Renal diet. HD MWF. BM 4. X2 fistula left arm. No lab/b/p left arm. Pacemaker right chest. Pills whole. Up x1 assist. Cont B/B. A/O x4. Refuses SCD's. SOCIAL WORK/CASE MANAGEMENT  Assessment: Had previously been at his mother's home for accessibility and assist.  His wife has been seeking accessible apartment housing, is eager for him to be home with her more-she works and he is usually with her on weekends. He goes to [facility] unit. They had previously looked at electric mobility    Discharge Plan   Estimated Length of Stay: 4/11/20  Destination: home health    Pass: No    Services at Discharge: [address], Occupational Therapy and Nursing per evaluations    Equipment at Discharge: Has a wheelchair, RW, crutches- bathroom recommendations to be determined    Progress made in the prior week:  1. IMPROVED AMBULATORY DISTANCE WITH RW   2.  Improved t/f technique to toilet  3.  4.  5.      Goals

## 2020-04-06 NOTE — PROGRESS NOTES
imaging,labs and vitals reviewed. PT,OT and/or speech notes reviewed    Lab Results   Component Value Date    WBC 9.0 04/06/2020    HGB 8.8 (L) 04/06/2020    HCT 28.5 (L) 04/06/2020    MCV 91.1 04/06/2020     04/06/2020     Lab Results   Component Value Date     (L) 04/06/2020    K 6.3 (HH) 04/06/2020    CL 82 (L) 04/06/2020    CO2 25 04/06/2020    BUN 52 (H) 04/06/2020    CREATININE 6.6 (H) 04/06/2020    GLUCOSE 96 04/06/2020    CALCIUM 9.4 04/06/2020    PROT 7.0 04/02/2020    LABALBU 3.3 (L) 04/02/2020    BILITOT 0.4 04/02/2020    ALKPHOS 101 04/02/2020    AST 18 04/02/2020    ALT 8 04/02/2020    LABGLOM 9 (A) 04/06/2020    GLOB 3.3 12/17/2016     Lab Results   Component Value Date    INR 1.25 (H) 03/29/2020    INR 1.24 (H) 03/25/2020    INR 1.11 03/06/2020   No results found for: PHENYTOIN, ESR, CRP    04/04/20 0815   Restrictions/Precautions   Restrictions/Precautions Surgical Protocols; Fall Risk   Lower Extremity Weight Bearing Restrictions   Right Lower Extremity Weight Bearing Non Weight Bearing   General   Additional Pertinent Hx Liver Cancer, ESRD with dialysis   Diagnosis R AKA   ADL   Toileting Contact guard assistance   Balance   Standing Balance Contact guard assistance   Functional Mobility   Functional - Mobility Device Wheelchair   Assist Level Stand by assistance   Toilet Transfers   Toilet - Technique Stand pivot   Equipment Used Grab bars   Toilet Transfer Contact guard assistance   Toilet Transfers Comments cues for foot placement   Type of ROM/Therapeutic Exercise   Type of ROM/Therapeutic Exercise Felipe   Comment 15#   Assessment   Performance deficits / Impairments Decreased functional mobility ; Decreased ADL status; Decreased strength;Decreased endurance;Decreased balance;Decreased high-level IADLs   Timed Code Treatment Minutes 45 Minutes   Activity Tolerance   Activity Tolerance Patient Tolerated treatment well   Safety Devices   Safety Devices in place Yes       04/04/20 1507   Restrictions/Precautions   Restrictions/Precautions Surgical Protocols; Fall Risk   Required Braces or Orthoses? No   General   Additional Pertinent Hx ESRD, DIALYSIS, DM, PVD, CHF, HEP C, HEPATOCELLULAR CARCINOMA   Subjective   Subjective This leg (right) is really sore. Oxygen Therapy   O2 Device None (Room air)   Bed Mobility   Supine to Sit Independent   Scooting Independent   Transfers   Sit to Stand Stand by assistance   Stand to sit Stand by assistance   Bed to Chair Contact guard assistance;Stand by assistance   Stand Pivot Transfers Contact guard assistance;Stand by assistance   Ambulation   Ambulation? Yes   WB Status WBAT LEFT LE   Ambulation 1   Surface level tile   Device Parallel Bars   Assistance Contact guard assistance   Gait Deviations Slow Manuela   Distance 12 feet x 2   Comments froward hops and then backward hops. Wheelchair Activities   Propulsion Yes   Propulsion 1   Propulsion Manual   Level Level Tile   Method LUE;RUE   Level of Assistance Modified independent   Distance 200 feet   Balance   Posture Fair   Sitting - Static Good   Sitting - Dynamic Good   Standing - Static Fair   Standing - Dynamic Fair   Exercises   Hip Flexion standing mini squats x 10   Hip Extension/Leg Presses standing R hip ext x 10   Hip Abduction standing R hip abd x 10   Ankle Pumps standiong heel rasies x 10   Comments worked on upright posture with standing.    Short term goals   Time Frame for Short term goals 1 week   Short term goal 1 TF SURFACE TO SURFACE SBA   Short term goal 2 AMBULATE 25 FT WTIH RW CGA   Short term goal 3 PROPEL  FT ON FLAT SURFACE INDEP   Short term goal 4 HEP SBA   Long term goals   Time Frame for Long term goals  2 WEEKS   Long term goal 1 INDEP BED MOB   Long term goal 2 INDEP TF SURFACE TO SURFACE   Long term goal 3 INDEP AMB 25 FT WITH RW FLAT SURFACE   Long term goal 4 PROPEL WC INDEP OVER UNEVEN SURFACES   Long term goal 5 HEP INDEP   Conditions

## 2020-04-06 NOTE — PROGRESS NOTES
Ahmet Simon  [id number]     04/06/20 8592 04/06/20 0954 04/06/20 0955   General   Response To Previous Treatment Patient with no complaints from previous session. --   --    Subjective   Subjective Pt agreed to therapy this morning. --   --    Pain Screening   Patient Currently in Pain Yes  --   --    Pain Assessment   Pain Assessment 0-10  --   --    Pain Level 6  --   --    Pain Type Phantom pain  --   --    Pain Location Leg  --   --    Pain Orientation Right  --   --    Pain Descriptors Aching;Burning  --   --    Pain Frequency Continuous  --   --    Pain Onset On-going  --   --    Clinical Progression Gradually improving  --   --    Response to Pain Intervention Patient Satisfied  --   --    Pre Treatment Pain Screening   Pain at present 6  --   --    Scale Used Numeric Score  --   --    Intervention List Patient able to continue with treatment  --   --    Bed Mobility   Rolling  --  Independent  --    Supine to Sit  --  Independent  --    Sit to Supine  --  Independent  --    Transfers   Bed to Chair  --  Contact guard assistance  (Squat Pivot to L side)  --    Ambulation   Ambulation?  --   --  No   Wheelchair Activities   Propulsion  --   --  Yes   Propulsion 1   Propulsion  --   --  Manual   Level  --   --  Level Tile   Method  --   --  RUE;LUE   Level of Assistance  --   --  Independent   Description/ Details  --   --  Pt propelled W/C well. Distance  --   --  250'   Exercises   Comments  --   --  Supine Ranjan LE AKA Protocol ther ex on mat table. Conditions Requiring Skilled Therapeutic Intervention   Body structures, Functions, Activity limitations  --   --   --    Assessment  --   --   --    Prognosis  --   --   --    Activity Tolerance   Activity Tolerance  --   --  Patient limited by pain; Patient limited by fatigue;Patient limited by endurance      04/06/20 0968   General   Response To Previous Treatment  --    Subjective   Subjective  --    Pain Screening   Patient Currently in Pain  --    Pain Assessment   Pain Assessment  --    Pain Level  --    Pain Type  --    Pain Location  --    Pain Orientation  --    Pain Descriptors  --    Pain Frequency  --    Pain Onset  --    Clinical Progression  --    Response to Pain Intervention  --    Pre Treatment Pain Screening   Pain at present  --    Scale Used  --    Intervention List  --    Bed Mobility   Rolling  --    Supine to Sit  --    Sit to Supine  --    Transfers   Bed to Chair  --    Ambulation   Ambulation?  --    Wheelchair Activities   Propulsion  --    Propulsion 1   Propulsion  --    Level  --    Method  --    Level of Assistance  --    Description/ Details  --    Distance  --    Exercises   Comments  --    Conditions Requiring Skilled Therapeutic Intervention   Body structures, Functions, Activity limitations Decreased functional mobility ; Decreased ADL status; Decreased ROM; Decreased strength;Decreased endurance;Decreased balance   Assessment Pt was having more phantom pain in RLE this morning during ther ex. Pt performed Supine Ranjan LE ther ex AKA protocol on mat table. Pt is doing well w/ bed mobilty and only needed CGA for Squat Pivot TF to L side.    Prognosis Good   Activity Tolerance   Activity Tolerance  --    Electronically signed by Dao Tovar PTA on 4/6/2020 at 9:58 AM

## 2020-04-06 NOTE — PROGRESS NOTES
Vascular Surgery  Dr.Victoria Angulo   Daily Progress Note    Pt Name: Dorian Dillard  Medical Record Number: [id number]  Date of Birth 1960   Today's Date: 4/6/2020    SUBJECTIVE:     Patient was seen and examined. Pain is controlled  Stating right leg incision still painful, still having phantom pains     OBJECTIVE:     Patient Vitals for the past 24 hrs:   BP Temp Temp src Pulse Resp SpO2 Weight   04/06/20 0607 (!) 145/66 97.2 °F (36.2 °C) Temporal 73 16 99 % 165 lb 11.2 oz (75.2 kg)   04/05/20 1831 139/65 97.9 °F (36.6 °C) Temporal 64 16 (!) 88 % 167 lb 14.4 oz (76.2 kg)   04/05/20 1711 134/62 -- -- 72 -- -- --       Intake/Output Summary (Last 24 hours) at 4/6/2020 0721  Last data filed at 4/5/2020 2208  Gross per 24 hour   Intake 840 ml   Output --   Net 840 ml     In: 240 [P.O.:240]  Out: -     I/O last 3 completed shifts: In: 840 [P.O.:840]  Out: -      Wt Readings from Last 3 Encounters:   04/06/20 165 lb 11.2 oz (75.2 kg)   03/31/20 152 lb 6 oz (69.1 kg)   03/23/20 174 lb (78.9 kg)      Body mass index is 25.19 kg/m².      Diet: DIET RENAL;    MEDS:     Scheduled Meds:   metoprolol succinate  25 mg Oral BID    [START ON 4/7/2020] darbepoetin sandrine-polysorbate  100 mcg Subcutaneous Weekly    amLODIPine  10 mg Oral Daily    nortriptyline  20 mg Oral Nightly    oxyCODONE  10 mg Oral Q4H    cyclobenzaprine  10 mg Oral TID    gabapentin  400 mg Oral TID    aspirin  81 mg Oral Daily    atorvastatin  80 mg Oral Nightly    [Held by provider] cloNIDine  0.1 mg Oral TID    clopidogrel  75 mg Oral Daily    isosorbide mononitrate  60 mg Oral Daily    [Held by provider] minoxidil  10 mg Oral Daily    pantoprazole  20 mg Oral BID AC    sevelamer  800 mg Oral TID WC    polyethylene glycol  17 g Oral Daily    bisacodyl  5 mg Oral Daily     Continuous Infusions:  PRN Meds:acetaminophen, 650 mg, Q6H PRN    Or  acetaminophen, 650 mg, Q6H PRN  potassium chloride, 40 mEq, PRN    Or  potassium alternative

## 2020-04-06 NOTE — PROGRESS NOTES
healed. Stanley Prom reportedly has progressively gotten worse to the point where it becomes difficult for the patient to ambulate.       Patient initially admitted to the medical floor, via vascular team, - 03/25/2020   Status post right AKA (03/26/2020)  Transferred to Acute Rehab - 03/31/2020     Medicine consulted for continuous medical management      Review of Systems:   Review of Systems  ROS: 14 point review of systems is negative except as specifically addressed above. DIET RENAL;     Intake/Output Summary (Last 24 hours) at 4/6/2020 1209  Last data filed at 4/6/2020 0851  Gross per 24 hour   Intake 720 ml   Output --   Net 720 ml       Medications:    Current Facility-Administered Medications   Medication Dose Route Frequency Provider Last Rate Last Dose    metoprolol succinate (TOPROL XL) extended release tablet 25 mg  25 mg Oral BID Myles Gallegos MD   25 mg at 04/06/20 7280    [START ON 4/7/2020] darbepoetin sandrine-polysorbate (ARANESP) injection 100 mcg  100 mcg Subcutaneous Weekly Heraclio Alexandra MD        amLODIPine (NORVASC) tablet 10 mg  10 mg Oral Daily Myles Gallegos MD   10 mg at 04/05/20 0803    nortriptyline (PAMELOR) capsule 20 mg  20 mg Oral Nightly Bella Ovalle MD   20 mg at 04/05/20 2038    oxyCODONE (ROXICODONE) immediate release tablet 10 mg  10 mg Oral Q4H Bella Ovalle MD   10 mg at 04/06/20 1155    cyclobenzaprine (FLEXERIL) tablet 10 mg  10 mg Oral TID Bella Ovalle MD   10 mg at 04/06/20 4697    gabapentin (NEURONTIN) capsule 400 mg  400 mg Oral TID Bella Ovalle MD   400 mg at 04/06/20 0829    acetaminophen (TYLENOL) tablet 650 mg  650 mg Oral Q6H PRN Myles Gallegos MD        Or   [place] acetaminophen (TYLENOL) suppository 650 mg  650 mg Rectal Q6H PRN Myles Gallegos MD        potassium chloride (KLOR-CON M) extended release tablet 40 mEq  40 mEq Oral PRN Myles Gallegos MD        Or    potassium bicarb-citric acid (EFFER-K) effervescent tablet 40 mEq  40 mEq [address] 03/29/2020    LDLDIRECT 94 01/17/2014     TSH:    Lab Results   Component Value Date    TSH 1.43 01/17/2014     Troponin T:   No results for input(s): TROPONINI in the last 72 hours. Pro-BNP: No results for input(s): BNP in the last 72 hours. INR:   No results for input(s): INR in the last 72 hours. ABGs:   Lab Results   Component Value Date    PHART 7.480 01/27/2020    PO2ART 58.0 01/27/2020    JHO1JQH 34.0 01/27/2020     UA:No results for input(s): NITRITE, COLORU, PHUR, LABCAST, WBCUA, RBCUA, MUCUS, TRICHOMONAS, YEAST, BACTERIA, CLARITYU, SPECGRAV, LEUKOCYTESUR, UROBILINOGEN, BILIRUBINUR, BLOODU, GLUCOSEU, AMORPHOUS in the last 72 hours. Invalid input(s): Pam Uriarte      Culture Results:    No results for input(s): CXSURG in the last 720 hours. Blood Culture Recent: No results for input(s): BC in the last 720 hours. Cultures:   No results for input(s): CULTURE in the last 72 hours. No results for input(s): BC, Natalia Sickle in the last 72 hours. No results for input(s): CXSURG in the last 72 hours. No results for input(s): MG, PHOS in the last 72 hours. No results for input(s): AST, ALT, ALB, BILITOT, ALKPHOS, ALB in the last 72 hours. RAD:   Vl Madiha Bilateral Limited 1-2 Levels    Result Date: 3/5/2020  Vascular Lower Arterial Plethysmography Procedure  Demographics   Patient Name     Jose Luis Adamson Age                   61   Patient Number   [id number]       Gender                Male   Visit Number     [id number]    Sacha Dahl MD                                Physician   Date of Birth    1960   Referring Physician   Jayson Tristan   Accession Number [id number]    [address] [employer] RT, RVT  Procedure Type of Study:   Extremities Arteries:Lower Arterial Plethysmography, VL ANKLE / BRACHIAL  INDICES EXTREMITY COMPLETE . Indications for Study:Wound Lower Extremity (Right).  Risk Factors   - The patient's risk factor(s) include: !Prox PFA       !!244  !     !    !           !!60 !     !   !             ! +---------------++-----+-----+----+-----------++---+-----+---+-------------+ ! Prox SFA       !!27   !     !    !           !!282!     !   !             ! +---------------++-----+-----+----+-----------++---+-----+---+-------------+ ! Mid SFA        !!0    !0    !    !           !!98 !0.35 !   !             ! +---------------++-----+-----+----+-----------++---+-----+---+-------------+ ! Dist SFA       !!12   !     !    !           !!96 !0.98 !   !             ! +---------------++-----+-----+----+-----------++---+-----+---+-------------+ ! Prox Popliteal !!0    !0    !    !           !!76 !0.79 !   !             ! +---------------++-----+-----+----+-----------++---+-----+---+-------------+ ! Dist Popliteal !!0    !     !    !           !!158!2.08 !   !             ! +---------------++-----+-----+----+-----------++---+-----+---+-------------+ ! Mid PTA        !!21   !     !    !           !!46 !0.29 !   !             ! +---------------++-----+-----+----+-----------++---+-----+---+-------------+ ! Prox GWYN       !!19   !     !    !           !!42 !0.27 !   !             ! +---------------++-----+-----+----+-----------++---+-----+---+-------------+ ! Mid Peroneal   !!19   !     !    !           !!53 !0.34 !   !             ! +---------------++-----+-----+----+-----------++---+-----+---+-------------      Objective:   Vitals: BP (!) 145/66   Pulse 73   Temp 97.2 °F (36.2 °C) (Temporal)   Resp 16   Ht 5' 8\" (1.727 m)   Wt 165 lb 11.2 oz (75.2 kg)   SpO2 99%   BMI 25.19 kg/m²   24HR INTAKE/OUTPUT:      Intake/Output Summary (Last 24 hours) at 4/6/2020 1209  Last data filed at 4/6/2020 0851  Gross per 24 hour   Intake 720 ml   Output --   Net 720 ml       Physical Exam  Vitals signs and nursing note reviewed. Constitutional:       General: He is not in acute distress. Appearance: Normal appearance. He is normal weight.  He is not ill-appearing,

## 2020-04-07 LAB
ANION GAP SERPL CALCULATED.3IONS-SCNC: 17 MMOL/L (ref 7–19)
BASOPHILS ABSOLUTE: 0.1 K/UL (ref 0–0.2)
BASOPHILS RELATIVE PERCENT: 0.8 % (ref 0–1)
BUN BLDV-MCNC: 27 MG/DL (ref 6–20)
CALCIUM SERPL-MCNC: 9.1 MG/DL (ref 8.6–10)
CHLORIDE BLD-SCNC: 91 MMOL/L (ref 98–111)
CO2: 28 MMOL/L (ref 22–29)
CREAT SERPL-MCNC: 4.3 MG/DL (ref 0.5–1.2)
EOSINOPHILS ABSOLUTE: 0.4 K/UL (ref 0–0.6)
EOSINOPHILS RELATIVE PERCENT: 7 % (ref 0–5)
GFR NON-AFRICAN AMERICAN: 14
GLUCOSE BLD-MCNC: 103 MG/DL (ref 74–109)
HCT VFR BLD CALC: 25.6 % (ref 42–52)
HEMOGLOBIN: 7.7 G/DL (ref 14–18)
IMMATURE GRANULOCYTES #: 0 K/UL
LYMPHOCYTES ABSOLUTE: 0.9 K/UL (ref 1.1–4.5)
LYMPHOCYTES RELATIVE PERCENT: 14.7 % (ref 20–40)
MCH RBC QN AUTO: 27.8 PG (ref 27–31)
MCHC RBC AUTO-ENTMCNC: 30.1 G/DL (ref 33–37)
MCV RBC AUTO: 92.4 FL (ref 80–94)
MONOCYTES ABSOLUTE: 0.7 K/UL (ref 0–0.9)
MONOCYTES RELATIVE PERCENT: 11.2 % (ref 0–10)
NEUTROPHILS ABSOLUTE: 4.1 K/UL (ref 1.5–7.5)
NEUTROPHILS RELATIVE PERCENT: 66 % (ref 50–65)
PDW BLD-RTO: 16.9 % (ref 11.5–14.5)
PLATELET # BLD: 137 K/UL (ref 130–400)
PMV BLD AUTO: 12.3 FL (ref 9.4–12.4)
POTASSIUM REFLEX MAGNESIUM: 4.4 MMOL/L (ref 3.5–5)
RBC # BLD: 2.77 M/UL (ref 4.7–6.1)
SODIUM BLD-SCNC: 136 MMOL/L (ref 136–145)
WBC # BLD: 6.3 K/UL (ref 4.8–10.8)

## 2020-04-07 PROCEDURE — 85025 COMPLETE CBC W/AUTO DIFF WBC: CPT

## 2020-04-07 PROCEDURE — 97116 GAIT TRAINING THERAPY: CPT

## 2020-04-07 PROCEDURE — 99232 SBSQ HOSP IP/OBS MODERATE 35: CPT | Performed by: PSYCHIATRY & NEUROLOGY

## 2020-04-07 PROCEDURE — 36415 COLL VENOUS BLD VENIPUNCTURE: CPT

## 2020-04-07 PROCEDURE — 97535 SELF CARE MNGMENT TRAINING: CPT

## 2020-04-07 PROCEDURE — 97110 THERAPEUTIC EXERCISES: CPT

## 2020-04-07 PROCEDURE — 80048 BASIC METABOLIC PNL TOTAL CA: CPT

## 2020-04-07 PROCEDURE — 6370000000 HC RX 637 (ALT 250 FOR IP): Performed by: INTERNAL MEDICINE

## 2020-04-07 PROCEDURE — 6360000002 HC RX W HCPCS: Performed by: INTERNAL MEDICINE

## 2020-04-07 PROCEDURE — 6370000000 HC RX 637 (ALT 250 FOR IP): Performed by: PSYCHIATRY & NEUROLOGY

## 2020-04-07 PROCEDURE — 1180000000 HC REHAB R&B

## 2020-04-07 PROCEDURE — 97530 THERAPEUTIC ACTIVITIES: CPT

## 2020-04-07 RX ADMIN — PANTOPRAZOLE SODIUM 20 MG: 20 TABLET, DELAYED RELEASE ORAL at 16:59

## 2020-04-07 RX ADMIN — OXYCODONE 10 MG: 5 TABLET ORAL at 20:36

## 2020-04-07 RX ADMIN — SEVELAMER CARBONATE 800 MG: 800 TABLET, FILM COATED ORAL at 16:59

## 2020-04-07 RX ADMIN — SEVELAMER CARBONATE 800 MG: 800 TABLET, FILM COATED ORAL at 08:37

## 2020-04-07 RX ADMIN — OXYCODONE 10 MG: 5 TABLET ORAL at 00:39

## 2020-04-07 RX ADMIN — NORTRIPTYLINE HYDROCHLORIDE 20 MG: 10 CAPSULE ORAL at 20:36

## 2020-04-07 RX ADMIN — OXYCODONE 10 MG: 5 TABLET ORAL at 04:52

## 2020-04-07 RX ADMIN — MINOXIDIL 10 MG: 10 TABLET ORAL at 08:37

## 2020-04-07 RX ADMIN — METOPROLOL SUCCINATE 25 MG: 25 TABLET, EXTENDED RELEASE ORAL at 08:37

## 2020-04-07 RX ADMIN — OXYCODONE 10 MG: 5 TABLET ORAL at 12:02

## 2020-04-07 RX ADMIN — ATORVASTATIN CALCIUM 80 MG: 80 TABLET, FILM COATED ORAL at 20:37

## 2020-04-07 RX ADMIN — CLOPIDOGREL BISULFATE 75 MG: 75 TABLET ORAL at 08:38

## 2020-04-07 RX ADMIN — GABAPENTIN 400 MG: 400 CAPSULE ORAL at 08:37

## 2020-04-07 RX ADMIN — SEVELAMER CARBONATE 800 MG: 800 TABLET, FILM COATED ORAL at 12:02

## 2020-04-07 RX ADMIN — GABAPENTIN 400 MG: 400 CAPSULE ORAL at 20:36

## 2020-04-07 RX ADMIN — OXYCODONE 10 MG: 5 TABLET ORAL at 16:59

## 2020-04-07 RX ADMIN — CYCLOBENZAPRINE HYDROCHLORIDE 10 MG: 10 TABLET, FILM COATED ORAL at 13:42

## 2020-04-07 RX ADMIN — CYCLOBENZAPRINE HYDROCHLORIDE 10 MG: 10 TABLET, FILM COATED ORAL at 20:36

## 2020-04-07 RX ADMIN — ISOSORBIDE MONONITRATE 60 MG: 60 TABLET, EXTENDED RELEASE ORAL at 08:37

## 2020-04-07 RX ADMIN — ASPIRIN 81 MG: 81 TABLET, COATED ORAL at 08:37

## 2020-04-07 RX ADMIN — PANTOPRAZOLE SODIUM 20 MG: 20 TABLET, DELAYED RELEASE ORAL at 05:17

## 2020-04-07 RX ADMIN — GABAPENTIN 400 MG: 400 CAPSULE ORAL at 13:42

## 2020-04-07 RX ADMIN — AMLODIPINE BESYLATE 10 MG: 10 TABLET ORAL at 08:38

## 2020-04-07 RX ADMIN — DARBEPOETIN ALFA 100 MCG: 100 SOLUTION INTRAVENOUS; SUBCUTANEOUS at 08:38

## 2020-04-07 RX ADMIN — CYCLOBENZAPRINE HYDROCHLORIDE 10 MG: 10 TABLET, FILM COATED ORAL at 08:37

## 2020-04-07 RX ADMIN — OXYCODONE 10 MG: 5 TABLET ORAL at 08:38

## 2020-04-07 RX ADMIN — METOPROLOL SUCCINATE 25 MG: 25 TABLET, EXTENDED RELEASE ORAL at 20:37

## 2020-04-07 ASSESSMENT — PAIN DESCRIPTION - FREQUENCY
FREQUENCY: CONTINUOUS

## 2020-04-07 ASSESSMENT — PAIN SCALES - GENERAL
PAINLEVEL_OUTOF10: 6
PAINLEVEL_OUTOF10: 0
PAINLEVEL_OUTOF10: 5
PAINLEVEL_OUTOF10: 7
PAINLEVEL_OUTOF10: 6
PAINLEVEL_OUTOF10: 8
PAINLEVEL_OUTOF10: 7
PAINLEVEL_OUTOF10: 5
PAINLEVEL_OUTOF10: 0
PAINLEVEL_OUTOF10: 6
PAINLEVEL_OUTOF10: 5
PAINLEVEL_OUTOF10: 0
PAINLEVEL_OUTOF10: 7

## 2020-04-07 ASSESSMENT — PAIN DESCRIPTION - PROGRESSION
CLINICAL_PROGRESSION: GRADUALLY IMPROVING

## 2020-04-07 ASSESSMENT — PAIN DESCRIPTION - PAIN TYPE
TYPE: PHANTOM PAIN

## 2020-04-07 ASSESSMENT — PAIN DESCRIPTION - LOCATION
LOCATION: LEG

## 2020-04-07 ASSESSMENT — PAIN DESCRIPTION - DESCRIPTORS
DESCRIPTORS: ACHING;SORE
DESCRIPTORS: ACHING;SORE
DESCRIPTORS: ACHING

## 2020-04-07 ASSESSMENT — PAIN DESCRIPTION - ORIENTATION
ORIENTATION: RIGHT

## 2020-04-07 ASSESSMENT — PAIN DESCRIPTION - ONSET
ONSET: ON-GOING
ONSET: ON-GOING

## 2020-04-07 NOTE — PROGRESS NOTES
04/07/20 1000   General   Response To Previous Treatment  --   --    Family / Caregiver Present  --   --    Subjective   Subjective  --   --    Pain Screening   Patient Currently in Pain  --  Yes   Pain Assessment   Pain Assessment  --  0-10   Pain Level  --  7   Pain Type  --  Phantom pain   Pain Location  --  Leg   Pain Orientation  --  Right   Pain Descriptors  --  Aching; Sore   Pain Frequency  --  Continuous   Bed Mobility   Rolling  --   --    Supine to Sit  --   --    Transfers   Sit to Stand  --   --    Stand to sit  --   --    Bed to Chair  --   --    Ambulation   Ambulation?  --   --    WB Status  --   --    Ambulation 1   Surface  --   --    Device  --   --    Other Apparatus  --   --    Assistance  --   --    Quality of Gait  --   --    Distance  --   --    Wheelchair Activities   Propulsion  --   --    Propulsion 1   Propulsion  --   --    Level  --   --    Method  --   --    Level of Assistance  --   --    Description/ Details  --   --    Distance  --   --    Exercises   Comments  --   --    Conditions Requiring Skilled Therapeutic Intervention   Body structures, Functions, Activity limitations Decreased functional mobility ; Decreased ADL status; Decreased ROM; Decreased strength;Decreased endurance;Decreased balance  --    Assessment Assisted Pt w/ dressing and ADL's this morning. Pt is only SBA for bed mobility when Rolling and going from Supine-Sit from L side. Pt continues to need cues for handplacement on bed rails and technique, but can sit up on his own. Pt continues to need CGA for TF's due to weakness and unsteadiness in LLE. Pt needed VC's for proper Sit-Stand TF's. Pt tends to lean posterior when standing becoming off balance needing CGA. Pt amb well, but needed VC's for shorter step length. --    Prognosis Good  --    Activity Tolerance   Activity Tolerance Patient limited by fatigue;Patient limited by pain; Patient limited by endurance  --    Electronically signed by Beni Yarbrough

## 2020-04-07 NOTE — PROGRESS NOTES
Visited with pt to provide spiritual care. Pt was in his room but began to roll down the soliman with the PT provider. Pt says he is doing well and he is supposed to discharge in a few days. Provided encouragement and sustaining presence.      Electronically signed by Marlyn Ro on 4/7/2020 at 2:44 PM

## 2020-04-07 NOTE — PROGRESS NOTES
Harlan Howard  [id number]     04/07/20 1331   General   Response To Previous Treatment Patient with no complaints from previous session. Family / Caregiver Present No   Subjective   Subjective Pt agreed to therapy this afternoon. Transfers   Sit to Stand Contact guard assistance   Stand to sit Contact guard assistance   Ambulation   Ambulation? Yes   WB Status WBAT LEFT LE   Ambulation 1   Surface level tile   Device Rolling Walker   Other Apparatus Wheelchair follow   Assistance Contact guard assistance   Quality of Gait Pt showed increased step length on LLE needing VC's to shorten. Distance 20'   Exercises   Comments Sitting Ranjan LE ther ex x 15 reps. Conditions Requiring Skilled Therapeutic Intervention   Body structures, Functions, Activity limitations Decreased functional mobility ; Decreased ADL status; Decreased ROM; Decreased strength;Decreased endurance;Decreased balance   Assessment Pt tolerated amb w/ an increase in fatigue. Pt tolerated sitting ther ex w/ minimal increase in fatigue. Pt continues to need VC's during TF's and amb for techniques and safety. Prognosis Good   Activity Tolerance   Activity Tolerance Patient limited by fatigue;Patient limited by pain; Patient limited by endurance   Electronically signed by Santana Aparicio PTA on 4/7/2020 at 1:33 PM

## 2020-04-07 NOTE — PROGRESS NOTES
Patient:   Raul Blum  MR#:    [id number]   Room:    [facility]/629-89   YOB: 1960  Date of Progress Note: 4/7/2020  Time of Note                           9:19 AM  Consulting Physician:   Pepper Vazquez M.D. Attending Physician:  Pepper Vazquez MD     CHIEF COMPLAINT: Right AKA        Subjective  This 61 y.o. 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 He is followed at [facility] by Dr. Debra Anthony for a non-healing right lower extremity wound. He was seen on 3/23/20 and found to have worsening of his RLE wound and ischemia, it was recommended that he have an amputation if the pain became intolerable. Patient contacted [facility] [address] [place] [address],[address] on 3/24/20 requesting to be admitted for amputation. He was admitted to the hospitalist service with consult for nephrology and vascular surgery. He was seen by vascular surgeon Dr. Kimo Jon who recommended a right above the knee amputation. He was in agreement and went for surgery on 3/26/20. He tolerated the procedure well. He is participating in both PT/OT. His pain is He is still 7/10 but is being controlled by PO medication. On 3/28/20 s/p dialysis. patient had c/o feeling nauseated, with one episode of vomiting,associated feeling of of indigestion/chest discomfort and describes substernal burning sensation. His troponin was elvated. He was transferred to PCU with consult for cardiology. He was placed on a heparin gtt which has now been changed to SQ. Patient has had no further c/o of chest pain. He had a run of torsade de points during hemodilaysis on 3/30/20. He was taken for interrogtion of his pacemaker by Dr. Prerna Raymond. His pacemaker was found to be funtioning normally.    No complaints today  REVIEW OF SYSTEMS:  Constitutional: No fevers No chills  Neck:No notes reviewed    Lab Results   Component Value Date    WBC 6.3 04/07/2020    HGB 7.7 (L) 04/07/2020    HCT 25.6 (L) 04/07/2020    MCV 92.4 04/07/2020     04/07/2020     Lab Results   Component Value Date     04/07/2020    K 4.4 04/07/2020    CL 91 (L) 04/07/2020    CO2 28 04/07/2020    BUN 27 (H) 04/07/2020    CREATININE 4.3 (H) 04/07/2020    GLUCOSE 103 04/07/2020    CALCIUM 9.1 04/07/2020    PROT 7.0 04/02/2020    LABALBU 3.3 (L) 04/02/2020    BILITOT 0.4 04/02/2020    ALKPHOS 101 04/02/2020    AST 18 04/02/2020    ALT 8 04/02/2020    LABGLOM 14 (A) 04/07/2020    GLOB 3.3 12/17/2016     Lab Results   Component Value Date    INR 1.25 (H) 03/29/2020    INR 1.24 (H) 03/25/2020    INR 1.11 03/06/2020   No results found for: PHENYTOIN, ESR, CRP    04/04/20 0815   Restrictions/Precautions   Restrictions/Precautions Surgical Protocols; Fall Risk   Lower Extremity Weight Bearing Restrictions   Right Lower Extremity Weight Bearing Non Weight Bearing   General   Additional Pertinent Hx Liver Cancer, ESRD with dialysis   Diagnosis R AKA   ADL   Toileting Contact guard assistance   Balance   Standing Balance Contact guard assistance   Functional Mobility   Functional - Mobility Device Wheelchair   Assist Level Stand by assistance   Toilet Transfers   Toilet - Technique Stand pivot   Equipment Used Grab bars   Toilet Transfer Contact guard assistance   Toilet Transfers Comments cues for foot placement   Type of ROM/Therapeutic Exercise   Type of ROM/Therapeutic Exercise Felipe   Comment 15#   Assessment   Performance deficits / Impairments Decreased functional mobility ; Decreased ADL status; Decreased strength;Decreased endurance;Decreased balance;Decreased high-level IADLs   Timed Code Treatment Minutes 45 Minutes   Activity Tolerance   Activity Tolerance Patient Tolerated treatment well   Safety Devices   Safety Devices in place Yes                    04/04/20 1507   Restrictions/Precautions

## 2020-04-07 NOTE — PROGRESS NOTES
Nephrology ([facility]) Consult Note      Patient:  Artie Blackburn  YOB: 1960  Date of Service: 4/7/2020  MRN: [id number]   Acct: [de-identified]   Primary Care Physician: Chong Cabot, DO  Advance Directive: Full Code  Admit Date: 3/31/2020       Hospital Day: 7  Referring Provider: Nina Varela MD    Patient independently seen and examined, Chart, Consults, Notes, Operative notes, Labs, Cardiology, and Radiology studies reviewed as able. Subjective:  Artie Blackburn is a 61 y.o. male  whom we were consulted for end-stage renal disease secondary to diabetic nephropathy. Patient goes to [facility] dialysis clinic on Monday Wednesday Friday. Patient has a known history of severe peripheral vascular disease, history of CABG, congestive heart failure, cirrhosis of liver, sleep apnea and hepatitis C. Admitted this time for nonhealing foot wound with plans for RLE amputation. Denied any issues with dialysis aside from pain in the foot as an outpatient. Denied cp/soa/n/v. Today, no overnight events. Tolerated dialysis. Denies chest pain, shortness of air, nausea vomiting. In wheelchair to get a coffee. Allergies:  Patient has no known allergies.     Medicines:  Current Facility-Administered Medications   Medication Dose Route Frequency Provider Last Rate Last Dose    metoprolol succinate (TOPROL XL) extended release tablet 25 mg  25 mg Oral BID Chaitanya Miranda MD   25 mg at 04/07/20 5680    darbepoetin sandrine-polysorbate (ARANESP) injection 100 mcg  100 mcg Subcutaneous Weekly Nimisha Carter MD   100 mcg at 04/07/20 0838    amLODIPine (NORVASC) tablet 10 mg  10 mg Oral Daily Chaitanya Miranda MD   10 mg at 04/07/20 0838    nortriptyline (PAMELOR) capsule 20 mg  20 mg Oral Nightly Nina Varela MD   20 mg at 04/06/20 2018    oxyCODONE (ROXICODONE) immediate release tablet 10 mg  10 mg Oral Q4H Nina Varela MD   10 mg at 04/07/20 1202    cyclobenzaprine (FLEXERIL) tablet 10 mg  10 mg Oral TID Nelly Anderson MD   10 mg at 04/07/20 3958    gabapentin (NEURONTIN) capsule 400 mg  400 mg Oral TID Nelly Anderson MD   400 mg at 04/07/20 3458    acetaminophen (TYLENOL) tablet 650 mg  650 mg Oral Q6H PRN Shakila Avina MD        Or   [place] acetaminophen (TYLENOL) suppository 650 mg  650 mg Rectal Q6H PRN Shakila Avina MD        potassium chloride (KLOR-CON M) extended release tablet 40 mEq  40 mEq Oral PRN Shakila Avina MD        Or    potassium bicarb-citric acid (EFFER-K) effervescent tablet 40 mEq  40 mEq Oral PRN Shakila Avina MD        Or    potassium chloride 10 mEq/100 mL IVPB (Peripheral Line)  10 mEq Intravenous PRN Shakila Avina MD        sodium chloride flush 0.9 % injection 10 mL  10 mL Intravenous PRN Shakila Avina MD        aspirin EC tablet 81 mg  81 mg Oral Daily Shakila Avina MD   81 mg at 04/07/20 0837    atorvastatin (LIPITOR) tablet 80 mg  80 mg Oral Nightly Shakila Avina MD   80 mg at 04/06/20 2018    [Held by provider] cloNIDine (CATAPRES) tablet 0.1 mg  0.1 mg Oral TID Shakila Avina MD        clopidogrel (PLAVIX) tablet 75 mg  75 mg Oral Daily Shakila Avina MD   75 mg at 04/07/20 0636    isosorbide mononitrate (IMDUR) extended release tablet 60 mg  60 mg Oral Daily Shakila Avina MD   60 mg at 04/07/20 0837    labetalol (NORMODYNE;TRANDATE) injection 10 mg  10 mg Intravenous Q6H PRN Shakila Avina MD        minoxidil (LONITEN) tablet 10 mg  10 mg Oral Daily Shakila Avina MD   10 mg at 04/07/20 0837    naloxone (NARCAN) injection 0.4 mg  0.4 mg Intravenous PRN Shakila Avina MD        nitroGLYCERIN (NITROSTAT) SL tablet 0.4 mg  0.4 mg Sublingual Q5 Min PRN Shakila Avina MD        ondansetron [facility]) injection 4 mg  4 mg Intravenous Q6H PRN Shakila Avina MD        pantoprazole (PROTONIX) tablet 20 mg  20 mg Oral BID AC Shakila Avina MD   20 mg at 04/07/20 0517    sevelamer (RENVELA) tablet 800 mg  800 mg Oral TID  Tita Rosenbaum MD   800 mg at 04/07/20 1202    sucralfate (CARAFATE) tablet 1 g  1 g Oral TID PRN Tita Rosenbaum MD        polyethylene glycol [facility]) packet 17 g  17 g Oral Daily Shanita Alston MD   Stopped at 04/06/20 0830    bisacodyl (DULCOLAX) suppository 10 mg  10 mg Rectal Daily PRN Shanita Alston MD        bisacodyl (DULCOLAX) EC tablet 5 mg  5 mg Oral Daily Shanita Alston MD   5 mg at 04/06/20 4433       Past Medical History:  Past Medical History:   Diagnosis Date    Anxiety     Blood circulation, collateral     CAD (coronary artery disease)     stents x 2; per dr. Melody Egan [facility])     liver cancer    CHF (congestive heart failure) ([address]     Chyloperitoneum determined by paracentesis     CKD (chronic kidney disease), stage V ([address]     Dialysis patient ([address]     mon wed fri at [place] GERD (gastroesophageal reflux disease)     Hemodialysis patient ([address]     mon wed fri in [place] Hepatic cirrhosis ([address]     dr. Alexandrea Brown; [facility] kidney [facility].    Hepatitis C, chronic ([address]     History of blood transfusion     HTN (hypertension)     Hx of blood clots     arm/fistula    Mixed hyperlipidemia 1/9/2017    Osteoarthritis     Pacemaker     Pain management     Dr. Jodi Treadwell care patient 07/09/2019    PVD (peripheral vascular disease) ([address]     Sepsis ([address]     Skin ulcer of right heel with fat layer exposed ([address] 8/16/2019    Sleep apnea     no cpap at present.  Type II diabetes mellitus (HCC)     no meds.     Ulcer of left heel, with fat layer exposed ([address] 2/28/2018    Ulcer of right foot, with fat layer exposed ([address] 8/16/2019    Wound infection     Wound infection after surgery 7/8/2019       Past Surgical History:  Past Surgical History:   Procedure Laterality Date    AMPUTATION ABOVE KNEE Right 3/26/2020    LEG AMPUTATION ABOVE KNEE BRACHIAL ANGIO N/A 3/6/2020    BILATERAL AORTAILLIAC AND RIGHT LEG ANGIOGRAM performed by Heather Garcia DO at [facility] VASCULAR SURGERY  6/19/12    LUE arteriovenous fistulogram including venography of the superior vena cava. Balloon angioplasty, left forearm cephalic vein and upper arm cephalic vein with [id number] tod balloon.  VASCULAR SURGERY  7/10/2012 S     Revision of left distal radial artery to cephalic vein arteriovenous fistula with 7mm Artegraft interposition.  VASCULAR SURGERY  7/30/15 [facility] & [address]    Left upper extremity arteriovenous fistulograms including venography of the superior vena cava. Left cephalic vein balloon angioplasty with an 8 x 20 cm cutting balloon proximal cephalic vein. Balloon angioplasty of left cephalic vein/antecubital vein near the antecubital crease with 8 x 20 mm cutting balloon.  VASCULAR SURGERY  7/30/15 [facility]    Balloon angioplasty proximal end distal upper arm cephalic vein with 9 x 40 cm  balloon. Completion fistulograms of the left upper extremity.  VASCULAR SURGERY  8/13/15 [facility]    Revision of left upper extremity arteriovenous fistula with excision of pseudoaneurysm and primary repair of cephalic vein.  VASCULAR SURGERY  5/3/16 S    Left upper fistulograms/venograms, left cephalic balloon 8 x 20 cutter,9 x 40 conquest,left proximal cephalic vein stents (flair 2 9 x 50), balloon stents 9 x 40 conquest.    VASCULAR SURGERY  12/08/2016    SJS- ultrasound guided cannulation of left distal cephalic vein ultrasound guided cannulation right internal jugular vein placement of right internal jugular vein tunneled dialysis catheter (Bard Equistream XK 23cm tip to cuff)     VASCULAR SURGERY  01/20/2017    SJS-Right upper venograms, u/s guided cannulation left basilic vein, left upper venograms, balloon angioplasty left subclavian vein 10x40 conquest.    VASCULAR SURGERY  02/16/2017    SJS. Left brachial artery to axillary vein arteriovenous graft with 7 mm artegraft. Left upper extremity venograms. Left subclavian vein stent viabahn 13x50. Left subclavian vein stent balloon angioplasty 12x40 atlas.  VASCULAR SURGERY  04/11/2017    SJS. Removal of tunneled dialysis catheter right internal jugular vein.  VASCULAR SURGERY  01/25/2018    SJS. Arch aortogram, left upper arteriogram, AV graft angiogram/venogram.    VASCULAR SURGERY  02/28/2018    SJS. Right CFA 5f-6f-7f sheath, aortogram with bilateral lower arteriogram, left SFA/pop  balloon angioplasty 5x100 , 6x150 lutonix ( 2), left CFA  7f sheath, bilateral iliac kissing stents right 10x38 icast, left 9x59 icast expanded with 10x40 , completion aortogram, mynx left CFA , failed mynx right CFA.  VASCULAR SURGERY  06/13/2019    SJS left CFA 5f sheath, aortogram with bilateral lower arteriogram, mynx left CFA.  VASCULAR SURGERY  06/25/2019    SJS-VI. Femoral tibial/perineal bypass with reversed greater saphenous vein.  VASCULAR SURGERY  08/16/2019    [facility]. Aortogram and runoff, selective right CFA injections. PTA of fem-tp bypass with 4.0 x 220 mm tod balloon to 4.33 mm    VASCULAR SURGERY  10/23/2019    VI. Thrombin injection in the left SFA PSA, right common femoral artery us guided access, left SFA stent treatment of the flap.  VASCULAR SURGERY  01/30/2020    TJR Endarterectomy of the right common femoral artery, superficial femoral artery, and profunda femoris artery. Redo right groin x3.  VASCULAR SURGERY  03/06/2020    VI.  BILATERAL AORTAILLIAC AND RIGHT LEG ANGIOGRAM       Family History  Family History   Problem Relation Age of Onset    High Blood Pressure Mother     High Blood Pressure Father     High Blood Pressure Sister        Social History  Social History     Socioeconomic History    Marital status:      Spouse name: Erik Colunga    Number of children: 0    Years of education: 15    Highest education level: Not on file   Occupational History    ROS: No abdominal pain or melena  Genito-Urinary ROS: No dysuria or hematuria  Musculoskeletal ROS: No joint pain or swelling   14 point ROS reviewed with the patient and negative except as noted above and in the HPI unless unable to obtain. Objective:  Patient Vitals for the past 24 hrs:   BP Temp Temp src Pulse Resp SpO2 Weight   04/07/20 0627 (!) 146/63 96.2 °F (35.7 °C) Temporal 62 17 91 % --   04/07/20 0600 -- -- -- -- -- -- 160 lb 14.4 oz (73 kg)   04/06/20 1639 (!) 140/60 96.6 °F (35.9 °C) Temporal 67 16 97 % --       Intake/Output Summary (Last 24 hours) at 4/7/2020 1223  Last data filed at 4/7/2020 0947  Gross per 24 hour   Intake 720 ml   Output --   Net 720 ml     General: awake/alert   Chest:  clear to auscultation bilaterally without respiratory distress  CVS: regular rate and rhythm  Abdominal: soft, nontender, normal bowel sounds  Extremities: no cyanosis or edema, right AKA  Skin: dry without rash      Labs:  BMP:   Recent Labs     04/05/20  0440 04/06/20  0332 04/07/20  0312   * 128* 136   K 5.0 6.3* 4.4   CL 85* 82* 91*   CO2 27 25 28   BUN 39* 52* 27*   CREATININE 5.3* 6.6* 4.3*   CALCIUM 9.5 9.4 9.1     CBC:   Recent Labs     04/05/20  0440 04/06/20  0332 04/07/20  0312   WBC 7.4 9.0 6.3   HGB 9.1* 8.8* 7.7*   HCT 29.8* 28.5* 25.6*   MCV 92.3 91.1 92.4    202 137     LIVER PROFILE: No results for input(s): AST, ALT, LIPASE, BILIDIR, BILITOT, ALKPHOS in the last 72 hours. Invalid input(s): AMYLASE,  ALB  PT/INR:   No results for input(s): PROTIME, INR in the last 72 hours. APTT:   No results for input(s): APTT in the last 72 hours. BNP:  No results for input(s): BNP in the last 72 hours. Ionized Calcium:No results for input(s): IONCA in the last 72 hours. Magnesium:  No results for input(s): MG in the last 72 hours. Phosphorus:  No results for input(s): PHOS in the last 72 hours. HgbA1C:   No results for input(s): LABA1C in the last 72 hours.   Hepatic: No results for

## 2020-04-07 NOTE — PROGRESS NOTES
Occupational Therapy  Facility/Department: [facility] 8 REHAB UNIT  Daily Treatment Note  NAME: Gene Capone  : 1960  MRN: [id number]    Date of Service: 2020    Discharge Recommendations:  Home with Home health OT       Assessment   Performance deficits / Impairments: Decreased functional mobility ; Decreased ADL status; Decreased strength;Decreased endurance;Decreased balance;Decreased high-level IADLs  Assessment: pt tolerated tx well, focus of tx was on UE strengthening, self care, functional transfers and safety. Pt would benefit from further therapy to address above deficits. Treatment Diagnosis: R AKA  Prognosis: Good  Activity Tolerance  Activity Tolerance: Patient Tolerated treatment well         Patient Diagnosis(es): There were no encounter diagnoses. has a past medical history of Anxiety, Blood circulation, collateral, CAD (coronary artery disease), Cancer ([address] CHF (congestive heart failure) ([address] Chyloperitoneum determined by paracentesis, CKD (chronic kidney disease), stage V ([address] Dialysis patient ([address] GERD (gastroesophageal reflux disease), Hemodialysis patient ([address] Hepatic cirrhosis ([address] Hepatitis C, chronic ([address] History of blood transfusion, HTN (hypertension), Hx of blood clots, Mixed hyperlipidemia, Osteoarthritis, Pacemaker, Pain management, Palliative care patient, PVD (peripheral vascular disease) ([address] Sepsis ([address] Skin ulcer of right heel with fat layer exposed ([address] Sleep apnea, Type II diabetes mellitus ([address] Ulcer of left heel, with fat layer exposed ([address] Ulcer of right foot, with fat layer exposed ([address] Wound infection, and Wound infection after surgery. has a past surgical history that includes other surgical history (2010); other surgical history ([id number]);  Dialysis fistula creation (11 [facility]); angioplasty (11 S[facility]); other surgical history (2011   SJS); other surgical history (12   SJS); vascular surgery (12); vascular surgery (7/10/2012 S[facility]); vascular surgery (7/30/15 [facility] & [address]); vascular surgery (7/30/15 [facility] & [address] cont); vascular surgery (8/13/15 S[facility]); vascular surgery (5/3/16 S[facility]); vascular surgery (12/08/2016); vascular surgery (01/20/2017); Cardiac catheterization (04/04/11); Paracentesis; Dialysis fistula creation (Left, 2/16/2017); vascular surgery (02/16/2017); vascular surgery (04/11/2017); vascular surgery (01/25/2018); pr anastomosis,av,any site (Left, 1/30/2018); vascular surgery (02/28/2018); vascular surgery (06/13/2019); vascular surgery (06/25/2019); Femoral-tibial Bypass Graft (Right, 6/25/2019); EXPLORATION OF WOUND OF EXTREMITY (Right, 7/12/2019); vascular surgery (08/16/2019); vascular surgery (10/23/2019); femoral bypass (Right, 1/27/2020); vascular surgery (01/30/2020); pacemaker placement (Right, 2015); pr angio retro brachial angio (N/A, 3/6/2020); vascular surgery (03/06/2020); and AMPUTATION ABOVE KNEE (Right, 3/26/2020). Restrictions  Restrictions/Precautions  Restrictions/Precautions: Surgical Protocols, Fall Risk  Required Braces or Orthoses?: No  Implants present? : Pacemaker  Lower Extremity Weight Bearing Restrictions  Right Lower Extremity Weight Bearing: Non Weight Bearing  Left Lower Extremity Weight Bearing: Weight Bearing As Tolerated  Subjective   General  Chart Reviewed: Yes  Patient assessed for rehabilitation services?: Yes  Additional Pertinent Hx: Liver Cancer, ESRD with dialysis  Response to previous treatment: Patient with no complaints from previous session  Family / Caregiver Present: No  Diagnosis: R AKA  Subjective  Subjective: Reporting significant phantom pain  Pain Assessment  Pain Assessment: 0-10  Pain Level: 7  Pain Type: Phantom pain  Pain Location: Leg  Pain Orientation: Right  Pain Descriptors: Aching; Sore  Pain Frequency: Continuous  Vital Signs  Patient Currently in Pain: Yes   Orientation     Objective    ADL  Toileting: Contact guard assistance        Toilet Transfers  Toilet - Technique: Stand pivot  Equipment Used: Grab bars  Toilet Transfer: Contact guard assistance     Transfers  Stand Pivot Transfers: Contact guard assistance  Sit to stand: Contact guard assistance  Stand to sit: Contact guard assistance                                            Type of ROM/Therapeutic Exercise  Type of ROM/Therapeutic Exercise: Free weights;Cane/Wand;Rickshaw  Comment: 1 set of 10 elbow bends with 5# wt.  2 sets of 10 shoulder extension with 3# wand. 2 sets of 15 on Rickshaw with 15# on R and 10# on L. Plan   Plan  Times per day: Twice a day  Current Treatment Recommendations: Strengthening, Balance Training, Functional Mobility Training, Endurance Training, Equipment Evaluation, Education, & procurement, Patient/Caregiver Education & Training, Safety Education & Training, Self-Care / ADL  G-Code     OutComes Score                                                  AM-PAC Score             Goals  Short term goals  Time Frame for Short term goals: 1 week  Short term goal 1: Supervision with bathing hygiene. Short term goal 2: Supervision with clothing management/hygiene for toileting. Short term goal 3: Supervision with toilet transfers. Short term goal 4: Supervsion with LB dressing. Short term goal 5: Supervision with ambulatory homemaking tasks. Long term goals  Time Frame for Long term goals : 2 weeks   Long term goal 1: Modified independent with bathing hygiene. Long term goal 2: Modified independent with toileting and toilet transfers. Long term goal 3: Modified independent with overall dressing. Long term goal 4: Independent with HEP. Long term goal 5: Patient verbalize DME needs. Long term goal 7: Modified independent with ambulatory home making tasks.    Patient Goals   Patient goals : Return home independently        Therapy Time   Individual Concurrent Group Co-treatment   Time In 1000         Time Out 1100         Minutes 60         Timed Code Treatment

## 2020-04-07 NOTE — PROGRESS NOTES
Patient post op right AKA on 3/26/20 per , currently in Acute Rehab. Patient up in wheelchair at bedside, leaving to go back to therapy at present time. Dressing right AKA CDI, stating he feels better but still some phantom pains.  Patient has a post op visit scheduled for 4/16/20 at [facility] with [address], and has home instructions in AVS.

## 2020-04-07 NOTE — PROGRESS NOTES
Nutrition Assessment (Low Risk)    Type and Reason for Visit: Reassess    Nutrition Assessment:  Patient assessed for nutritional risk. Deemed to be at low risk at this time. Will continue to monitor for changes in status. Pt remains nutritionally stable AEB adequate po intake, stable wt.      Malnutrition Assessment:  · Malnutrition Status: No malnutrition    Nutrition Risk Level   Risk Level: Low    Nutrition Diagnosis:   · Problem: Increased nutrient needs  · Etiology: Renal dysfunction, Increased demand for energy/nutrients    Signs and symptoms: Presence of wounds, Known losses from dialysis    Nutrition Intervention:  Food and/or Delivery: Continue current diet  Nutrition Education/Counseling/Coordination of Care:  Continued Inpatient Monitoring      Electronically signed by Tabitha Montiel MS, RD, LD on 4/7/20 at 1:15 PM CDT    Contact Number: [phone]

## 2020-04-08 LAB
ANION GAP SERPL CALCULATED.3IONS-SCNC: 17 MMOL/L (ref 7–19)
BASOPHILS ABSOLUTE: 0 K/UL (ref 0–0.2)
BASOPHILS RELATIVE PERCENT: 0.7 % (ref 0–1)
BUN BLDV-MCNC: 45 MG/DL (ref 6–20)
CALCIUM SERPL-MCNC: 8.7 MG/DL (ref 8.6–10)
CHLORIDE BLD-SCNC: 90 MMOL/L (ref 98–111)
CO2: 26 MMOL/L (ref 22–29)
CREAT SERPL-MCNC: 6 MG/DL (ref 0.5–1.2)
EOSINOPHILS ABSOLUTE: 0.6 K/UL (ref 0–0.6)
EOSINOPHILS RELATIVE PERCENT: 11 % (ref 0–5)
GFR NON-AFRICAN AMERICAN: 10
GLUCOSE BLD-MCNC: 124 MG/DL (ref 74–109)
HAV IGM SER IA-ACNC: ABNORMAL
HCT VFR BLD CALC: 24.1 % (ref 42–52)
HEMOGLOBIN: 7.4 G/DL (ref 14–18)
HEPATITIS B CORE IGM ANTIBODY: ABNORMAL
HEPATITIS B SURFACE ANTIGEN INTERPRETATION: ABNORMAL
HEPATITIS C ANTIBODY INTERPRETATION: REACTIVE
IMMATURE GRANULOCYTES #: 0 K/UL
LYMPHOCYTES ABSOLUTE: 0.8 K/UL (ref 1.1–4.5)
LYMPHOCYTES RELATIVE PERCENT: 14.9 % (ref 20–40)
MCH RBC QN AUTO: 27.7 PG (ref 27–31)
MCHC RBC AUTO-ENTMCNC: 30.7 G/DL (ref 33–37)
MCV RBC AUTO: 90.3 FL (ref 80–94)
MONOCYTES ABSOLUTE: 0.7 K/UL (ref 0–0.9)
MONOCYTES RELATIVE PERCENT: 12.1 % (ref 0–10)
NEUTROPHILS ABSOLUTE: 3.4 K/UL (ref 1.5–7.5)
NEUTROPHILS RELATIVE PERCENT: 61.1 % (ref 50–65)
PDW BLD-RTO: 17 % (ref 11.5–14.5)
PLATELET # BLD: 169 K/UL (ref 130–400)
PMV BLD AUTO: 10.8 FL (ref 9.4–12.4)
POTASSIUM REFLEX MAGNESIUM: 4.6 MMOL/L (ref 3.5–5)
RBC # BLD: 2.67 M/UL (ref 4.7–6.1)
SODIUM BLD-SCNC: 133 MMOL/L (ref 136–145)
WBC # BLD: 5.6 K/UL (ref 4.8–10.8)

## 2020-04-08 PROCEDURE — 8010000000 HC HEMODIALYSIS ACUTE INPT

## 2020-04-08 PROCEDURE — 80048 BASIC METABOLIC PNL TOTAL CA: CPT

## 2020-04-08 PROCEDURE — 85025 COMPLETE CBC W/AUTO DIFF WBC: CPT

## 2020-04-08 PROCEDURE — 99233 SBSQ HOSP IP/OBS HIGH 50: CPT | Performed by: PSYCHIATRY & NEUROLOGY

## 2020-04-08 PROCEDURE — 97110 THERAPEUTIC EXERCISES: CPT

## 2020-04-08 PROCEDURE — 36415 COLL VENOUS BLD VENIPUNCTURE: CPT

## 2020-04-08 PROCEDURE — 6370000000 HC RX 637 (ALT 250 FOR IP): Performed by: INTERNAL MEDICINE

## 2020-04-08 PROCEDURE — 1180000000 HC REHAB R&B

## 2020-04-08 PROCEDURE — 6370000000 HC RX 637 (ALT 250 FOR IP): Performed by: PSYCHIATRY & NEUROLOGY

## 2020-04-08 PROCEDURE — 97535 SELF CARE MNGMENT TRAINING: CPT

## 2020-04-08 PROCEDURE — 97530 THERAPEUTIC ACTIVITIES: CPT

## 2020-04-08 PROCEDURE — 80074 ACUTE HEPATITIS PANEL: CPT

## 2020-04-08 RX ADMIN — CYCLOBENZAPRINE HYDROCHLORIDE 10 MG: 10 TABLET, FILM COATED ORAL at 17:06

## 2020-04-08 RX ADMIN — OXYCODONE 10 MG: 5 TABLET ORAL at 23:57

## 2020-04-08 RX ADMIN — PANTOPRAZOLE SODIUM 20 MG: 20 TABLET, DELAYED RELEASE ORAL at 17:05

## 2020-04-08 RX ADMIN — ATORVASTATIN CALCIUM 80 MG: 80 TABLET, FILM COATED ORAL at 20:25

## 2020-04-08 RX ADMIN — SEVELAMER CARBONATE 800 MG: 800 TABLET, FILM COATED ORAL at 17:05

## 2020-04-08 RX ADMIN — GABAPENTIN 400 MG: 400 CAPSULE ORAL at 08:11

## 2020-04-08 RX ADMIN — MINOXIDIL 10 MG: 10 TABLET ORAL at 08:11

## 2020-04-08 RX ADMIN — CLOPIDOGREL BISULFATE 75 MG: 75 TABLET ORAL at 08:11

## 2020-04-08 RX ADMIN — OXYCODONE 10 MG: 5 TABLET ORAL at 00:26

## 2020-04-08 RX ADMIN — SEVELAMER CARBONATE 800 MG: 800 TABLET, FILM COATED ORAL at 08:11

## 2020-04-08 RX ADMIN — SEVELAMER CARBONATE 800 MG: 800 TABLET, FILM COATED ORAL at 11:55

## 2020-04-08 RX ADMIN — OXYCODONE 10 MG: 5 TABLET ORAL at 08:12

## 2020-04-08 RX ADMIN — CYCLOBENZAPRINE HYDROCHLORIDE 10 MG: 10 TABLET, FILM COATED ORAL at 20:24

## 2020-04-08 RX ADMIN — GABAPENTIN 400 MG: 400 CAPSULE ORAL at 17:05

## 2020-04-08 RX ADMIN — ASPIRIN 81 MG: 81 TABLET, COATED ORAL at 08:11

## 2020-04-08 RX ADMIN — OXYCODONE 10 MG: 5 TABLET ORAL at 04:27

## 2020-04-08 RX ADMIN — GABAPENTIN 400 MG: 400 CAPSULE ORAL at 20:24

## 2020-04-08 RX ADMIN — OXYCODONE 10 MG: 5 TABLET ORAL at 17:06

## 2020-04-08 RX ADMIN — PANTOPRAZOLE SODIUM 20 MG: 20 TABLET, DELAYED RELEASE ORAL at 05:33

## 2020-04-08 RX ADMIN — METOPROLOL SUCCINATE 25 MG: 25 TABLET, EXTENDED RELEASE ORAL at 20:25

## 2020-04-08 RX ADMIN — OXYCODONE 10 MG: 5 TABLET ORAL at 11:55

## 2020-04-08 RX ADMIN — NORTRIPTYLINE HYDROCHLORIDE 20 MG: 10 CAPSULE ORAL at 20:24

## 2020-04-08 RX ADMIN — OXYCODONE 10 MG: 5 TABLET ORAL at 20:25

## 2020-04-08 RX ADMIN — CYCLOBENZAPRINE HYDROCHLORIDE 10 MG: 10 TABLET, FILM COATED ORAL at 08:11

## 2020-04-08 RX ADMIN — ISOSORBIDE MONONITRATE 60 MG: 60 TABLET, EXTENDED RELEASE ORAL at 08:11

## 2020-04-08 ASSESSMENT — PAIN SCALES - GENERAL
PAINLEVEL_OUTOF10: 5
PAINLEVEL_OUTOF10: 8
PAINLEVEL_OUTOF10: 8
PAINLEVEL_OUTOF10: 7
PAINLEVEL_OUTOF10: 7
PAINLEVEL_OUTOF10: 0
PAINLEVEL_OUTOF10: 1
PAINLEVEL_OUTOF10: 6
PAINLEVEL_OUTOF10: 6
PAINLEVEL_OUTOF10: 0

## 2020-04-08 ASSESSMENT — PAIN DESCRIPTION - DESCRIPTORS
DESCRIPTORS: ACHING;THROBBING
DESCRIPTORS: ACHING;THROBBING

## 2020-04-08 ASSESSMENT — PAIN DESCRIPTION - FREQUENCY
FREQUENCY: CONTINUOUS
FREQUENCY: CONTINUOUS

## 2020-04-08 ASSESSMENT — PAIN - FUNCTIONAL ASSESSMENT: PAIN_FUNCTIONAL_ASSESSMENT: ACTIVITIES ARE NOT PREVENTED

## 2020-04-08 ASSESSMENT — PAIN DESCRIPTION - LOCATION
LOCATION: LEG

## 2020-04-08 ASSESSMENT — PAIN DESCRIPTION - PAIN TYPE
TYPE: ACUTE PAIN;PHANTOM PAIN
TYPE: PHANTOM PAIN

## 2020-04-08 ASSESSMENT — PAIN DESCRIPTION - PROGRESSION: CLINICAL_PROGRESSION: GRADUALLY IMPROVING

## 2020-04-08 ASSESSMENT — PAIN DESCRIPTION - ORIENTATION
ORIENTATION: RIGHT
ORIENTATION: RIGHT

## 2020-04-08 ASSESSMENT — PAIN DESCRIPTION - ONSET: ONSET: ON-GOING

## 2020-04-08 NOTE — PROGRESS NOTES
History:   Mr. Mariaa Martinez, a 56 y. o. male with a history of severe peripheral vascular disease, HCV, CAD, ESRD, on HD (MWF), has been admitted for progressively worsening severe right lower extremity pain and nonhealing wound.  Patient reportedly has had an arterial bypass about 7 months ago, and the wound never healed. Lacie Brunner reportedly has progressively gotten worse to the point where it becomes difficult for the patient to ambulate.       Patient initially admitted to the medical floor, via vascular team, - 03/25/2020   Status post right AKA (03/26/2020)  Transferred to Acute Rehab - 03/31/2020     Medicine consulted for continuous medical management      Review of Systems:   Review of Systems  ROS: 14 point review of systems is negative except as specifically addressed above. DIET RENAL;     Intake/Output Summary (Last 24 hours) at 4/8/2020 0848  Last data filed at 4/7/2020 1837  Gross per 24 hour   Intake 600 ml   Output --   Net 600 ml       Medications:    Current Facility-Administered Medications   Medication Dose Route Frequency Provider Last Rate Last Dose    metoprolol succinate (TOPROL XL) extended release tablet 25 mg  25 mg Oral BID Jennifer Cuevas MD   Stopped at 04/08/20 2485    darbepoetin sandrine-polysorbate (ARANESP) injection 100 mcg  100 mcg Subcutaneous Weekly Enedina Chairez MD   100 mcg at 04/07/20 0838    amLODIPine (NORVASC) tablet 10 mg  10 mg Oral Daily Jennifer Cuevas MD   Stopped at 04/08/20 0811    nortriptyline (PAMELOR) capsule 20 mg  20 mg Oral Nightly Ad Barnard MD   20 mg at 04/07/20 2036    oxyCODONE (ROXICODONE) immediate release tablet 10 mg  10 mg Oral Q4H Ad Barnard MD   10 mg at 04/08/20 4385    cyclobenzaprine (FLEXERIL) tablet 10 mg  10 mg Oral TID Ad Barnard MD   10 mg at 04/08/20 4916    gabapentin (NEURONTIN) capsule 400 mg  400 mg Oral TID Ad Barnard MD   400 mg at 04/08/20 0811    acetaminophen (TYLENOL) tablet 650 mg  650 mg Oral Q6H PRN Nina Patch Maryjane Monterroso MD        Or    acetaminophen (TYLENOL) suppository 650 mg  650 mg Rectal Q6H PRN Alma Litten, MD        potassium chloride (KLOR-CON M) extended release tablet 40 mEq  40 mEq Oral PRN Alma Litten, MD        Or    potassium bicarb-citric acid (EFFER-K) effervescent tablet 40 mEq  40 mEq Oral PRN Alma Litten, MD        Or    potassium chloride 10 mEq/100 mL IVPB (Peripheral Line)  10 mEq Intravenous PRN Alma Litten, MD        sodium chloride flush 0.9 % injection 10 mL  10 mL Intravenous PRN Alma Litten, MD        aspirin EC tablet 81 mg  81 mg Oral Daily Alma Litten, MD   81 mg at 04/08/20 8971    atorvastatin (LIPITOR) tablet 80 mg  80 mg Oral Nightly Alma Litten, MD   80 mg at 04/07/20 2037    [Held by provider] cloNIDine (CATAPRES) tablet 0.1 mg  0.1 mg Oral TID Alma Litten, MD        clopidogrel (PLAVIX) tablet 75 mg  75 mg Oral Daily Alma Litten, MD   75 mg at 04/08/20 4697    isosorbide mononitrate (IMDUR) extended release tablet 60 mg  60 mg Oral Daily Alma Litten, MD   60 mg at 04/08/20 0811    labetalol (NORMODYNE;TRANDATE) injection 10 mg  10 mg Intravenous Q6H PRN Alma Litten, MD        minoxidil (LONITEN) tablet 10 mg  10 mg Oral Daily Alma Litten, MD   10 mg at 04/08/20 0811    naloxone (NARCAN) injection 0.4 mg  0.4 mg Intravenous PRN Alma Litten, MD        nitroGLYCERIN (NITROSTAT) SL tablet 0.4 mg  0.4 mg Sublingual Q5 Min PRN Alma Litten, MD        ondansetron [facility]) injection 4 mg  4 mg Intravenous Q6H PRN Alma Litten, MD        pantoprazole (PROTONIX) tablet 20 mg  20 mg Oral BID AC Alma Litten, MD   20 mg at 04/08/20 0533    sevelamer (RENVELA) tablet 800 mg  800 mg Oral TID WC Alma Litten, MD   800 mg at 04/08/20 4826    sucralfate (CARAFATE) tablet 1 g  1 g Oral TID PRN Alma Litten, MD        polyethylene glycol (GLYCOLAX) packet 17 g  17 g Oral Daily Dirk Nunez MD   Stopped at 04/06/20 0830    bisacodyl (DULCOLAX) suppository 10 mg  10 mg Rectal Daily PRN Dirk Nunez MD        bisacodyl (DULCOLAX) EC tablet 5 mg  5 mg Oral Daily Dirk Nunez MD   5 mg at 04/06/20 2116           metoprolol succinate  25 mg Oral BID    darbepoetin sandrine-polysorbate  100 mcg Subcutaneous Weekly    amLODIPine  10 mg Oral Daily    nortriptyline  20 mg Oral Nightly    oxyCODONE  10 mg Oral Q4H    cyclobenzaprine  10 mg Oral TID    gabapentin  400 mg Oral TID    aspirin  81 mg Oral Daily    atorvastatin  80 mg Oral Nightly    [Held by provider] cloNIDine  0.1 mg Oral TID    clopidogrel  75 mg Oral Daily    isosorbide mononitrate  60 mg Oral Daily    minoxidil  10 mg Oral Daily    pantoprazole  20 mg Oral BID AC    sevelamer  800 mg Oral TID WC    polyethylene glycol  17 g Oral Daily    bisacodyl  5 mg Oral Daily     acetaminophen **OR** acetaminophen, potassium chloride **OR** potassium alternative oral replacement **OR** potassium chloride, sodium chloride flush, labetalol, naloxone, nitroGLYCERIN, ondansetron, sucralfate, bisacodyl  DIET RENAL;       Labs:   CBC with DIFF:  Recent Labs     04/06/20  0332 04/07/20  0312 04/08/20  0319   WBC 9.0 6.3 5.6   RBC 3.13* 2.77* 2.67*   HGB 8.8* 7.7* 7.4*   HCT 28.5* 25.6* 24.1*   MCV 91.1 92.4 90.3   MCH 28.1 27.8 27.7   MCHC 30.9* 30.1* 30.7*   RDW 16.8* 16.9* 17.0*    137 169   MPV 11.9 12.3 10.8   NEUTOPHILPCT 71.6* 66.0* 61.1   LYMPHOPCT 12.8* 14.7* 14.9*   MONOPCT 8.5 11.2* 12.1*   EOSRELPCT 6.2* 7.0* 11.0*   BASOPCT 0.6 0.8 0.7   NEUTROABS 6.4 4.1 3.4   LYMPHSABS 1.2 0.9* 0.8*   MONOSABS 0.80 0.70 0.70   EOSABS 0.60 0.40 0.60   BASOSABS 0.10 0.10 0.00       CMP/BMP:  Recent Labs     04/06/20  0332 04/07/20  0312 04/08/20  0319   * 136 133*   K 6.3* 4.4 4.6   CL 82* 91* 90*   CO2 25 28 26   ANIONGAP 21* 17 17   GLUCOSE 96 103 124*   BUN 52* 27* 45*   CREATININE 6.6* 4.3* 6.0*   LABGLOM 9* 14* 10*   CALCIUM 9.4 9.1 8.7         CRP:  No results for input(s): CRP in the last 72 hours. Sed Rate:  No results for input(s): SEDRATE in the last 72 hours. HgBA1c:  No components found for: HGBA1C  FLP:    Lab Results   Component Value Date    TRIG 125 03/29/2020    HDL 23 03/29/2020    LDLCALC 35 03/29/2020    LDLDIRECT 94 01/17/2014     TSH:    Lab Results   Component Value Date    TSH 1.43 01/17/2014     Troponin T:   No results for input(s): TROPONINI in the last 72 hours. Pro-BNP: No results for input(s): BNP in the last 72 hours. INR:   No results for input(s): INR in the last 72 hours. ABGs:   Lab Results   Component Value Date    PHART 7.480 01/27/2020    PO2ART 58.0 01/27/2020    CSW7RFT 34.0 01/27/2020     UA:No results for input(s): NITRITE, COLORU, PHUR, LABCAST, WBCUA, RBCUA, MUCUS, TRICHOMONAS, YEAST, BACTERIA, CLARITYU, SPECGRAV, LEUKOCYTESUR, UROBILINOGEN, BILIRUBINUR, BLOODU, GLUCOSEU, AMORPHOUS in the last 72 hours. Invalid input(s): Norina Clock      Culture Results:    No results for input(s): CXSURG in the last 720 hours. Blood Culture Recent: No results for input(s): BC in the last 720 hours. Cultures:   No results for input(s): CULTURE in the last 72 hours. No results for input(s): BC, Jeana Osage in the last 72 hours. No results for input(s): CXSURG in the last 72 hours. No results for input(s): MG, PHOS in the last 72 hours. No results for input(s): AST, ALT, ALB, BILITOT, ALKPHOS, ALB in the last 72 hours.       RAD:   Vl Madiha Bilateral Limited 1-2 Levels    Result Date: 3/5/2020  Vascular Lower Arterial Plethysmography Procedure  Demographics   Patient Name     Tere Perdue Age                   61   Patient Number   [id number]       Gender                Male   Visit Number     [id number]    Sacha Hernandez MD                                Physician   Date of Birth    1960   Referring Physician   Daria Conroy   Accession Number [id number] Sonographer           Jazmine Lisa RT, RVT  Procedure Type of Study:   Extremities Arteries:Lower Arterial Plethysmography, VL ANKLE / BRACHIAL  INDICES EXTREMITY COMPLETE . Indications for Study:Wound Lower Extremity (Right). Risk Factors   - The patient's risk factor(s) include: dyslipidemia and arterial     hypertension.   - The patient has a former tobacco history. Allergies   - No known allergies. Impression   The patient has noncompressible bilateral tibial arteries. This would be  consistent with a history of diabetes and calcified tibial vessels. However, there is good doppler waveforms from the left femoral all the way  to the tibial arteries. Great toe pressures are mildly depressed on the  left. I suspect there is a mild to moderate reduction in flow to the left  foot. Severe decreased in arterial flow to the right foot. Signature   ----------------------------------------------------------------  Electronically signed by Hildy Claude MD(Interpreting  physician) on 03/05/2020 06:54 AM  ----------------------------------------------------------------  Velocities are measured in cm/s ; Diameters are measured in mm Pressures +--------------------------------------++--------+-----+----+--------+-----+ ! ! !Right   ! ! Left!        !     ! +--------------------------------------++--------+-----+----+--------+-----+ ! Location                              ! !Pressure! Ratio! !Pressure! Ratio! +--------------------------------------++--------+-----+----+--------+-----+ ! Ankle PT                              !!51      !0.36 !    !255     !1.81 ! +--------------------------------------++--------+-----+----+--------+-----+ ! DP                                    ! !55      !0.39 !    !255     !1.81 ! +--------------------------------------++--------+-----+----+--------+-----+ ! Great Toe                             !!0       !0    !    !48      !0.34 ! !! PSV  ! Ratio! EDV ! Wave Desc. !!PSV! Ratio! EDV! Wave Desc.   ! +---------------++-----+-----+----+-----------++---+-----+---+-------------+ ! Common Femoral !!161  !     !    !           !!266!     !   !             ! +---------------++-----+-----+----+-----------++---+-----+---+-------------+ ! Prox PFA       !!244  !     !    !           !!60 !     !   !             ! +---------------++-----+-----+----+-----------++---+-----+---+-------------+ ! Prox SFA       !!27   !     !    !           !!282!     !   !             ! +---------------++-----+-----+----+-----------++---+-----+---+-------------+ ! Mid SFA        !!0    !0    !    !           !!98 !0.35 !   !             ! +---------------++-----+-----+----+-----------++---+-----+---+-------------+ ! Dist SFA       !!12   !     !    !           !!96 !0.98 !   !             ! +---------------++-----+-----+----+-----------++---+-----+---+-------------+ ! Prox Popliteal !!0    !0    !    !           !!76 !0.79 !   !             ! +---------------++-----+-----+----+-----------++---+-----+---+-------------+ ! Dist Popliteal !!0    !     !    !           !!158!2.08 !   !             ! +---------------++-----+-----+----+-----------++---+-----+---+-------------+ ! Mid PTA        !!21   !     !    !           !!46 !0.29 !   !             ! +---------------++-----+-----+----+-----------++---+-----+---+-------------+ ! Prox GWYN       !!19   !     !    !           !!42 !0.27 !   !             ! +---------------++-----+-----+----+-----------++---+-----+---+-------------+ ! Mid Peroneal   !!19   !     !    !           !!53 !0.34 !   !             ! +---------------++-----+-----+----+-----------++---+-----+---+-------------      Objective:   Vitals: BP (!) 119/58   Pulse 59   Temp 97.7 °F (36.5 °C) (Temporal)   Resp 18   Ht 5' 8\" (1.727 m)   Wt 158 lb 9.6 oz (71.9 kg)   SpO2 93%   BMI 24.12 kg/m²   24HR INTAKE/OUTPUT:      Intake/Output Summary (Last 24 hours) at 4/8/2020 0848  Last data filed at 4/7/2020 1837  Gross per 24 hour   Intake 600 ml   Output --   Net 600 ml       Physical Exam  Vitals signs and nursing note reviewed. Constitutional:       General: He is not in acute distress. Appearance: Normal appearance. He is normal weight. He is not ill-appearing, toxic-appearing or diaphoretic. HENT:      Head: Normocephalic and atraumatic. Right Ear: External ear normal.      Left Ear: External ear normal.      Nose: Nose normal. No congestion or rhinorrhea. Mouth/Throat:      Mouth: Mucous membranes are moist.      Pharynx: No oropharyngeal exudate or posterior oropharyngeal erythema. Eyes:      General: No scleral icterus. Right eye: No discharge. Left eye: No discharge. Extraocular Movements: Extraocular movements intact. Conjunctiva/sclera: Conjunctivae normal.      Pupils: Pupils are equal, round, and reactive to light. Neck:      Musculoskeletal: Normal range of motion and neck supple. No neck rigidity or muscular tenderness. Vascular: No carotid bruit. Cardiovascular:      Rate and Rhythm: Normal rate and regular rhythm. Heart sounds: Normal heart sounds. No murmur. No gallop. Pulmonary:      Effort: Pulmonary effort is normal. No respiratory distress. Breath sounds: Normal breath sounds. No stridor. No wheezing, rhonchi or rales. Chest:      Chest wall: No tenderness. Abdominal:      General: Bowel sounds are normal. There is no distension. Palpations: Abdomen is soft. Tenderness: There is no abdominal tenderness. There is no guarding or rebound. Musculoskeletal: Normal range of motion. General: No swelling, tenderness or deformity. Left lower leg: No edema. Comments: Right AKA   Skin:     General: Skin is warm and dry. Capillary Refill: Capillary refill takes less than 2 seconds. Coloration: Skin is not jaundiced or pale.       Findings: No bruising, erythema,

## 2020-04-08 NOTE — PROGRESS NOTES
(7/10/2012 [facility]); vascular surgery (7/30/15 [facility] & [address]); vascular surgery (7/30/15 [address] cont); vascular surgery (8/13/15 [facility]); vascular surgery (5/3/16 [facility]); vascular surgery (12/08/2016); vascular surgery (01/20/2017); Cardiac catheterization (04/04/11); Paracentesis; Dialysis fistula creation (Left, 2/16/2017); vascular surgery (02/16/2017); vascular surgery (04/11/2017); vascular surgery (01/25/2018); pr anastomosis,av,any site (Left, 1/30/2018); vascular surgery (02/28/2018); vascular surgery (06/13/2019); vascular surgery (06/25/2019); Femoral-tibial Bypass Graft (Right, 6/25/2019); EXPLORATION OF WOUND OF EXTREMITY (Right, 7/12/2019); vascular surgery (08/16/2019); vascular surgery (10/23/2019); femoral bypass (Right, 1/27/2020); vascular surgery (01/30/2020); pacemaker placement (Right, 2015); pr angio retro brachial angio (N/A, 3/6/2020); vascular surgery (03/06/2020); and AMPUTATION ABOVE KNEE (Right, 3/26/2020). Restrictions  Restrictions/Precautions  Restrictions/Precautions: Surgical Protocols, Fall Risk  Required Braces or Orthoses?: No  Implants present? : Pacemaker  Lower Extremity Weight Bearing Restrictions  Right Lower Extremity Weight Bearing: Non Weight Bearing  Left Lower Extremity Weight Bearing: Weight Bearing As Tolerated  Subjective   General  Chart Reviewed: Yes  Patient assessed for rehabilitation services?: Yes  Additional Pertinent Hx: Liver Cancer, ESRD with dialysis  Response to previous treatment: Patient with no complaints from previous session  Family / Caregiver Present: No  Diagnosis: R AKA  Subjective  Subjective: Pt states he doesn't know why but he is very sleepy this afternoon \"must be medication\"  General Comment  Comments: slower processing of verbal instructions  Pain Assessment  Pain Assessment: 0-10  Pain Level: 6  Pain Type: Phantom pain  Pain Location: Leg  Pain Orientation: Right  Pain Descriptors: Aching; Throbbing  Pain Frequency: Continuous  Vital Signs  Patient Currently in Pain: Yes   Orientation     Objective    ADL  Grooming: Independent  LE Dressing: Minimal assistance(Min assist to pull pants up over hips.)           Bed mobility  Supine to Sit: Stand by assistance  Transfers  Stand Step Transfers: Contact guard assistance  Sit to stand: Contact guard assistance  Stand to sit: Contact guard assistance                                            Type of ROM/Therapeutic Exercise  Type of ROM/Therapeutic Exercise: Free weights  Comment: 2 sets of 10 aubrey UE's with 3# wt. Plan   Plan  Times per day: Twice a day  Current Treatment Recommendations: Strengthening, Balance Training, Functional Mobility Training, Endurance Training, Equipment Evaluation, Education, & procurement, Patient/Caregiver Education & Training, Safety Education & Training, Self-Care / ADL  G-Code     OutComes Score                                                  AM-PAC Score             Goals  Short term goals  Time Frame for Short term goals: 1 week  Short term goal 1: Supervision with bathing hygiene. Short term goal 2: Supervision with clothing management/hygiene for toileting. Short term goal 3: Supervision with toilet transfers. Short term goal 4: Supervsion with LB dressing. Short term goal 5: Supervision with ambulatory homemaking tasks. Long term goals  Time Frame for Long term goals : 2 weeks   Long term goal 1: Modified independent with bathing hygiene. Long term goal 2: Modified independent with toileting and toilet transfers. Long term goal 3: Modified independent with overall dressing. Long term goal 4: Independent with HEP. Long term goal 5: Patient verbalize DME needs. Long term goal 7: Modified independent with ambulatory home making tasks.    Patient Goals   Patient goals : Return home independently        Therapy Time   Individual Concurrent Group Co-treatment   Time In 1000         Time Out 1100         Minutes 60         Timed Code Treatment Minutes: 06 [address] [facility] Collene Peers

## 2020-04-08 NOTE — PROGRESS NOTES
Patient:   Raymond Forman  MR#:    [id number]   Room:    4976/342-22   YOB: 1960  Date of Progress Note: 4/8/2020  Time of Note                           9:11 AM  Consulting Physician:   Christiane Coronado M.D. Attending Physician:  Christiane Coronado MD     CHIEF COMPLAINT: Right AKA        Subjective  This 61 y.o. male     with ESRD on hemodialysis ,type II DM,PAD/PVD,CHF,thrombocytopenia,severe multivessel CAD non-operable,hepatic cirrhosis secondary to hepatitis C, hepatocellular carcinoma that is followed by [facility] in [place]. He is followed at [facility] by Dr. Iggy Geronimo for a non-healing right lower extremity wound. He was seen on 3/23/20 and found to have worsening of his RLE wound and ischemia, it was recommended that he have an amputation if the pain became intolerable. Patient contacted [facility] on 3/24/20 requesting to be admitted for amputation. He was admitted to the hospitalist service with consult for nephrology and vascular surgery. He was seen by vascular surgeon Dr. Signe Sacks who recommended a right above the knee amputation. He was in agreement and went for surgery on 3/26/20. He tolerated the procedure well. He is participating in both PT/OT. His pain is He is still 7/10 but is being controlled by PO medication. On 3/28/20 s/p dialysis. patient had c/o feeling nauseated, with one episode of vomiting,associated feeling of of indigestion/chest discomfort and describes substernal burning sensation. His troponin was elvated. He was transferred to PCU with consult for cardiology. He was placed on a heparin gtt which has now been changed to SQ. Patient has had no further c/o of chest pain. He had a run of torsade de points during hemodilaysis on 3/30/20. He was taken for interrogtion of his pacemaker by Dr. Bianca Fernandez. His pacemaker was found to be funtioning normally.    No complaints this morning  REVIEW OF SYSTEMS:  Constitutional: No fevers No chills  Neck:No stiffness  Respiratory: No shortness of breath  Cardiovascular: No chest pain No palpitations  Gastrointestinal: No abdominal pain    Genitourinary: No Dysuria  Neurological: No headache, no confusion      PHYSICAL EXAM:  BP (!) 119/58   Pulse 59   Temp 97.7 °F (36.5 °C) (Temporal)   Resp 18   Ht 5' 8\" (1.727 m)   Wt 158 lb 9.6 oz (71.9 kg)   SpO2 93%   BMI 24.12 kg/m²     Constitutional: he appears well-developed and well-nourished. Eyes - conjunctiva normal.  Pupils react to light  Ear, nose, throat -hearing intact to voice. No scars, masses, or lesions over external nose or ears, no atrophy of tongue  Neck-symmetric, no masses noted, no jugular vein distension  Respiration- chest wall appears symmetric, good expansion,   normal effort without use of accessory muscles  Cardiovascular- RRR  Musculoskeletal - no significant wasting of muscles noted, no bony deformities, gait no gross ataxia  Extremities-no clubbing, cyanosis or edema  Skin - warm, dry, and intact. No rash, erythema, or pallor. Psychiatric - mood, affect, and behavior appear normal.      Neurology  NEUROLOGICAL EXAM:      Mental status   Awake, alert, fluent oriented x 3 appropriate affect  Attention and concentration appear appropriate  Recent and remote memory appears unremarkable  Speech normal without dysarthria  No clear issues with language       Cranial Nerves   CN II- Visual fields grossly unremarkable  CN III, IV,VI-EOMI, No nystagmus, conjugate eye movements, no ptosis  CN VII-no facial asymmetry  CN VIII-Hearing intact   CN IX and X- Palate elevates in midline  CN XI-good shoulder shrug  CN XII-Tongue midline with no fasciculations or fibrillations          Motor function  Antigravity x 4  Right above-knee amputation   Sensory function Intact to light touch     Cerebellar F-N intact     Tremor None present     Gait                  Not tested           Nursing/pcp notes, imaging,labs and vitals reviewed.      PT,OT and/or speech notes reviewed    Lab Results   Component Value Date    WBC 5.6 04/08/2020    HGB 7.4 (L) 04/08/2020    HCT 24.1 (L) 04/08/2020    MCV 90.3 04/08/2020     04/08/2020     Lab Results   Component Value Date     (L) 04/08/2020    K 4.6 04/08/2020    CL 90 (L) 04/08/2020    CO2 26 04/08/2020    BUN 45 (H) 04/08/2020    CREATININE 6.0 (H) 04/08/2020    GLUCOSE 124 (H) 04/08/2020    CALCIUM 8.7 04/08/2020    PROT 7.0 04/02/2020    LABALBU 3.3 (L) 04/02/2020    BILITOT 0.4 04/02/2020    ALKPHOS 101 04/02/2020    AST 18 04/02/2020    ALT 8 04/02/2020    LABGLOM 10 (A) 04/08/2020    GLOB 3.3 12/17/2016     Lab Results   Component Value Date    INR 1.25 (H) 03/29/2020    INR 1.24 (H) 03/25/2020    INR 1.11 03/06/2020   No results found for: PHENYTOIN, ESR, CRP    PHYSICAL THERAPY  STRENGTH  Strength RLE  Strength RLE: Exception  Comment: grossly 3/5  Strength LLE  Strength LLE: Exception  Comment: grossly 4/5  ROM  BED MOBILITY  Bed Mobility  Rolling: Stand by assistance(To L side)  Supine to Sit: Stand by assistance(To L side)  Sit to Supine: Stand by assistance  Scooting: Independent  TRANSFERS  Transfers  Sit to Stand: Contact guard assistance  Stand to sit: Contact guard assistance  Bed to Chair: Contact guard assistance(Squat Pivot to W/C)  Stand Pivot Transfers: Contact guard assistance, Stand by assistance  Comment: PREFERS 180 DEG PIVOT DESPITE BEING SHOWN STAND PIVOT WITH RW CLOSE  WHEELCHAIR PROPULSION  Propulsion 1  Propulsion: Manual  Level: Level Tile  Method: HUMAIRA ESPARZA  Level of Assistance: Independent  Description/ Details: Pt propelled W/C well. Distance: 175'  AMBULATION  Ambulation 1  Surface: level tile  Device: Rolling Walker  Other Apparatus: Wheelchair follow  Assistance: Contact guard assistance  Quality of Gait: Pt showed increased step length on LLE needing VC's to shorten. Gait Deviations: Slow Manuela  Distance: 20'  Comments: froward hops and then backward hops.   STAIRS  GOALS:  Short term goals  Time Frame for Short term goals: 1 week  Short term goal 1: TF SURFACE TO SURFACE SBA  Short term goal 2: AMBULATE 25 FT WTIH RW CGA  Short term goal 3: PROPEL  FT ON FLAT SURFACE INDEP  Short term goal 4: HEP SBA     Long term goals  Time Frame for Long term goals : 2 WEEKS  Long term goal 1: INDEP BED MOB  Long term goal 2: INDEP TF SURFACE TO SURFACE  Long term goal 3: INDEP AMB 25 FT WITH RW FLAT SURFACE  Long term goal 4: PROPEL WC INDEP OVER UNEVEN SURFACES  Long term goal 5: HEP INDEP     ASSESSMENT:  Assessment: Pt tolerated amb w/ an increase in fatigue. Pt tolerated sitting ther ex w/ minimal increase in fatigue. Pt continues to need VC's during TF's and amb for techniques and safety.        SPEECH THERAPY        OCCUPATIONAL THERAPY     CURRENT IRF-AMBIKA SCORES  Eating: CARE Score: 5  Oral Hygiene: CARE Score: 5  Toileting: CARE Score: 3  Shower/Bathe: CARE Score: 4  Upper Body Dressing: CARE Score: 6  Lower Body Dressing: CARE Score: 4  Footwear: CARE Score: 3  Toilet Transfers: CARE Score: 3  Picking Up Object:  CARE Score: 1        UE Functioning:  WFLs- sudden jerking movements at times     Pain Assessment:  Pain Level: 7  Pain Location: Leg     STGs:  Short term goals  Time Frame for Short term goals: 1 week  Short term goal 1: Supervision with bathing hygiene. Short term goal 2: Supervision with clothing management/hygiene for toileting. Short term goal 3: Supervision with toilet transfers. Short term goal 4: Supervsion with LB dressing. Short term goal 5: Supervision with ambulatory homemaking tasks.      LTGs:  Long term goals  Time Frame for Long term goals : 2 weeks   Long term goal 1: Modified independent with bathing hygiene. Long term goal 2: Modified independent with toileting and toilet transfers. Long term goal 3: Modified independent with overall dressing. Long term goal 4: Independent with HEP. Long term goal 5: Patient verbalize DME needs.    Long term

## 2020-04-08 NOTE — PROGRESS NOTES
Nephrology ([address]) Consult Note      Patient:  Ahmet Simon  YOB: 1960  Date of Service: 4/8/2020  MRN: [id number]   Acct: [de-identified]   Primary Care Physician: Zackary Welch DO  Advance Directive: Full Code  Admit Date: 3/31/2020       Hospital Day: 8  Referring Provider: Sathya Fairchild MD    Patient independently seen and examined, Chart, Consults, Notes, Operative notes, Labs, Cardiology, and Radiology studies reviewed as able. Subjective:  Ahmet Simon is a 61 y.o. male  whom we were consulted for end-stage renal disease secondary to diabetic nephropathy. Patient goes to [address] [place] [address] dialysis clinic on Monday Wednesday Friday. Patient has a known history of severe peripheral vascular disease, history of CABG, congestive heart failure, cirrhosis of liver, sleep apnea and hepatitis C. Admitted this time for nonhealing foot wound with plans for RLE amputation. Denied any issues with dialysis aside from pain in the foot as an outpatient. Denied cp/soa/n/v. Today, no overnight events. Tolerated dialysis. Denies chest pain, shortness of air, nausea vomiting. Dialysis   Pt was seen on RRT  Modality: Hemodialysis  Access: Arterial Venous Fistula  Location: left upper  QB: 450  QD: 800  UF: 990        Allergies:  Patient has no known allergies.     Medicines:  Current Facility-Administered Medications   Medication Dose Route Frequency Provider Last Rate Last Dose    metoprolol succinate (TOPROL XL) extended release tablet 25 mg  25 mg Oral BID Dylan Lan MD   Stopped at 04/08/20 2893    darbepoetin sandrine-polysorbate (ARANESP) injection 100 mcg  100 mcg Subcutaneous Weekly Dov Tamayo MD   100 mcg at 04/07/20 0838    amLODIPine (NORVASC) tablet 10 mg  10 mg Oral Daily Dylan Lan MD   Stopped at 04/08/20 0811    nortriptyline (PAMELOR) capsule 20 mg  20 mg Oral Nightly Sathya Fairchild MD   20 mg at 04/07/20 2036    oxyCODONE (Belita Brisker) immediate release tablet 10 mg  10 mg Oral Q4H Iman De MD   10 mg at 04/08/20 5118    cyclobenzaprine (FLEXERIL) tablet 10 mg  10 mg Oral TID Iman De MD   10 mg at 04/08/20 7250    gabapentin (NEURONTIN) capsule 400 mg  400 mg Oral TID Iman De MD   400 mg at 04/08/20 1596    acetaminophen (TYLENOL) tablet 650 mg  650 mg Oral Q6H PRN Dago Goldman MD        Or   Kelly acetaminophen (TYLENOL) suppository 650 mg  650 mg Rectal Q6H PRN Dago Goldman MD        potassium chloride (KLOR-CON M) extended release tablet 40 mEq  40 mEq Oral PRN Dago Goldman MD        Or    potassium bicarb-citric acid (EFFER-K) effervescent tablet 40 mEq  40 mEq Oral PRN Dago Goldman MD        Or    potassium chloride 10 mEq/100 mL IVPB (Peripheral Line)  10 mEq Intravenous PRN Dago Goldman MD        sodium chloride flush 0.9 % injection 10 mL  10 mL Intravenous PRN Dago Goldman MD        aspirin EC tablet 81 mg  81 mg Oral Daily Dago Goldman MD   81 mg at 04/08/20 1505    atorvastatin (LIPITOR) tablet 80 mg  80 mg Oral Nightly Dago Goldman MD   80 mg at 04/07/20 2037    [Held by provider] cloNIDine (CATAPRES) tablet 0.1 mg  0.1 mg Oral TID Dago Goldman MD        clopidogrel (PLAVIX) tablet 75 mg  75 mg Oral Daily Dago Goldman MD   75 mg at 04/08/20 0850    isosorbide mononitrate (IMDUR) extended release tablet 60 mg  60 mg Oral Daily Dago Goldman MD   60 mg at 04/08/20 0811    labetalol (NORMODYNE;TRANDATE) injection 10 mg  10 mg Intravenous Q6H PRN Dago Goldman MD        minoxidil (LONITEN) tablet 10 mg  10 mg Oral Daily Dago Goldman MD   10 mg at 04/08/20 0811    naloxone (NARCAN) injection 0.4 mg  0.4 mg Intravenous PRN Dago Goldman MD        nitroGLYCERIN (NITROSTAT) SL tablet 0.4 mg  0.4 mg Sublingual Q5 Min PRN Dago Goldman MD        ondansetron [facility]) injection 4 mg  4 mg Intravenous Q6H PRN Francis Ren

## 2020-04-09 LAB
ANION GAP SERPL CALCULATED.3IONS-SCNC: 17 MMOL/L (ref 7–19)
BASOPHILS ABSOLUTE: 0 K/UL (ref 0–0.2)
BASOPHILS RELATIVE PERCENT: 0.7 % (ref 0–1)
BUN BLDV-MCNC: 24 MG/DL (ref 6–20)
CALCIUM SERPL-MCNC: 9 MG/DL (ref 8.6–10)
CHLORIDE BLD-SCNC: 97 MMOL/L (ref 98–111)
CO2: 26 MMOL/L (ref 22–29)
CREAT SERPL-MCNC: 3.8 MG/DL (ref 0.5–1.2)
EOSINOPHILS ABSOLUTE: 0.5 K/UL (ref 0–0.6)
EOSINOPHILS RELATIVE PERCENT: 8.6 % (ref 0–5)
GFR NON-AFRICAN AMERICAN: 16
GLUCOSE BLD-MCNC: 111 MG/DL (ref 74–109)
HCT VFR BLD CALC: 25.6 % (ref 42–52)
HCT VFR BLD CALC: 27.4 % (ref 42–52)
HEMOGLOBIN: 7.8 G/DL (ref 14–18)
HEMOGLOBIN: 8.3 G/DL (ref 14–18)
IMMATURE GRANULOCYTES #: 0 K/UL
LYMPHOCYTES ABSOLUTE: 0.8 K/UL (ref 1.1–4.5)
LYMPHOCYTES RELATIVE PERCENT: 14.3 % (ref 20–40)
MCH RBC QN AUTO: 27.7 PG (ref 27–31)
MCHC RBC AUTO-ENTMCNC: 30.5 G/DL (ref 33–37)
MCV RBC AUTO: 90.8 FL (ref 80–94)
MONOCYTES ABSOLUTE: 0.6 K/UL (ref 0–0.9)
MONOCYTES RELATIVE PERCENT: 10.8 % (ref 0–10)
NEUTROPHILS ABSOLUTE: 3.6 K/UL (ref 1.5–7.5)
NEUTROPHILS RELATIVE PERCENT: 65.2 % (ref 50–65)
PDW BLD-RTO: 16.7 % (ref 11.5–14.5)
PLATELET # BLD: 178 K/UL (ref 130–400)
PMV BLD AUTO: 11.5 FL (ref 9.4–12.4)
POTASSIUM REFLEX MAGNESIUM: 4.2 MMOL/L (ref 3.5–5)
RBC # BLD: 2.82 M/UL (ref 4.7–6.1)
SODIUM BLD-SCNC: 140 MMOL/L (ref 136–145)
WBC # BLD: 5.6 K/UL (ref 4.8–10.8)

## 2020-04-09 PROCEDURE — 99232 SBSQ HOSP IP/OBS MODERATE 35: CPT | Performed by: PSYCHIATRY & NEUROLOGY

## 2020-04-09 PROCEDURE — 97110 THERAPEUTIC EXERCISES: CPT

## 2020-04-09 PROCEDURE — 1180000000 HC REHAB R&B

## 2020-04-09 PROCEDURE — 97530 THERAPEUTIC ACTIVITIES: CPT

## 2020-04-09 PROCEDURE — 36415 COLL VENOUS BLD VENIPUNCTURE: CPT

## 2020-04-09 PROCEDURE — 97535 SELF CARE MNGMENT TRAINING: CPT

## 2020-04-09 PROCEDURE — 85025 COMPLETE CBC W/AUTO DIFF WBC: CPT

## 2020-04-09 PROCEDURE — 85014 HEMATOCRIT: CPT

## 2020-04-09 PROCEDURE — 6370000000 HC RX 637 (ALT 250 FOR IP): Performed by: INTERNAL MEDICINE

## 2020-04-09 PROCEDURE — 97116 GAIT TRAINING THERAPY: CPT

## 2020-04-09 PROCEDURE — 6370000000 HC RX 637 (ALT 250 FOR IP): Performed by: PSYCHIATRY & NEUROLOGY

## 2020-04-09 PROCEDURE — 85018 HEMOGLOBIN: CPT

## 2020-04-09 PROCEDURE — 80048 BASIC METABOLIC PNL TOTAL CA: CPT

## 2020-04-09 RX ADMIN — OXYCODONE 10 MG: 5 TABLET ORAL at 08:06

## 2020-04-09 RX ADMIN — CLOPIDOGREL BISULFATE 75 MG: 75 TABLET ORAL at 08:06

## 2020-04-09 RX ADMIN — METOPROLOL SUCCINATE 25 MG: 25 TABLET, EXTENDED RELEASE ORAL at 08:06

## 2020-04-09 RX ADMIN — GABAPENTIN 400 MG: 400 CAPSULE ORAL at 08:06

## 2020-04-09 RX ADMIN — SEVELAMER CARBONATE 800 MG: 800 TABLET, FILM COATED ORAL at 08:05

## 2020-04-09 RX ADMIN — SEVELAMER CARBONATE 800 MG: 800 TABLET, FILM COATED ORAL at 16:06

## 2020-04-09 RX ADMIN — ATORVASTATIN CALCIUM 80 MG: 80 TABLET, FILM COATED ORAL at 20:55

## 2020-04-09 RX ADMIN — CYCLOBENZAPRINE HYDROCHLORIDE 10 MG: 10 TABLET, FILM COATED ORAL at 08:06

## 2020-04-09 RX ADMIN — OXYCODONE 10 MG: 5 TABLET ORAL at 20:55

## 2020-04-09 RX ADMIN — NORTRIPTYLINE HYDROCHLORIDE 20 MG: 10 CAPSULE ORAL at 20:55

## 2020-04-09 RX ADMIN — OXYCODONE 10 MG: 5 TABLET ORAL at 11:50

## 2020-04-09 RX ADMIN — PANTOPRAZOLE SODIUM 20 MG: 20 TABLET, DELAYED RELEASE ORAL at 05:30

## 2020-04-09 RX ADMIN — SEVELAMER CARBONATE 800 MG: 800 TABLET, FILM COATED ORAL at 11:50

## 2020-04-09 RX ADMIN — GABAPENTIN 400 MG: 400 CAPSULE ORAL at 20:55

## 2020-04-09 RX ADMIN — MINOXIDIL 10 MG: 10 TABLET ORAL at 08:06

## 2020-04-09 RX ADMIN — CYCLOBENZAPRINE HYDROCHLORIDE 10 MG: 10 TABLET, FILM COATED ORAL at 13:59

## 2020-04-09 RX ADMIN — PANTOPRAZOLE SODIUM 20 MG: 20 TABLET, DELAYED RELEASE ORAL at 16:06

## 2020-04-09 RX ADMIN — ASPIRIN 81 MG: 81 TABLET, COATED ORAL at 08:05

## 2020-04-09 RX ADMIN — CYCLOBENZAPRINE HYDROCHLORIDE 10 MG: 10 TABLET, FILM COATED ORAL at 20:56

## 2020-04-09 RX ADMIN — OXYCODONE 10 MG: 5 TABLET ORAL at 05:30

## 2020-04-09 RX ADMIN — GABAPENTIN 400 MG: 400 CAPSULE ORAL at 13:58

## 2020-04-09 RX ADMIN — ISOSORBIDE MONONITRATE 60 MG: 60 TABLET, EXTENDED RELEASE ORAL at 08:05

## 2020-04-09 RX ADMIN — OXYCODONE 10 MG: 5 TABLET ORAL at 16:06

## 2020-04-09 RX ADMIN — AMLODIPINE BESYLATE 10 MG: 10 TABLET ORAL at 08:06

## 2020-04-09 ASSESSMENT — PAIN DESCRIPTION - FREQUENCY
FREQUENCY: CONTINUOUS
FREQUENCY: INTERMITTENT
FREQUENCY: INTERMITTENT

## 2020-04-09 ASSESSMENT — PAIN DESCRIPTION - ONSET
ONSET: GRADUAL
ONSET: GRADUAL

## 2020-04-09 ASSESSMENT — PAIN DESCRIPTION - PROGRESSION
CLINICAL_PROGRESSION: GRADUALLY IMPROVING
CLINICAL_PROGRESSION: GRADUALLY IMPROVING

## 2020-04-09 ASSESSMENT — PAIN SCALES - GENERAL
PAINLEVEL_OUTOF10: 6
PAINLEVEL_OUTOF10: 9
PAINLEVEL_OUTOF10: 7
PAINLEVEL_OUTOF10: 7
PAINLEVEL_OUTOF10: 4
PAINLEVEL_OUTOF10: 7
PAINLEVEL_OUTOF10: 3

## 2020-04-09 ASSESSMENT — PAIN DESCRIPTION - LOCATION
LOCATION: LEG

## 2020-04-09 ASSESSMENT — PAIN DESCRIPTION - ORIENTATION
ORIENTATION: RIGHT

## 2020-04-09 ASSESSMENT — PAIN DESCRIPTION - PAIN TYPE
TYPE: PHANTOM PAIN
TYPE: PHANTOM PAIN
TYPE: ACUTE PAIN
TYPE: PHANTOM PAIN

## 2020-04-09 ASSESSMENT — PAIN DESCRIPTION - DESCRIPTORS
DESCRIPTORS: ACHING;THROBBING

## 2020-04-09 NOTE — PROGRESS NOTES
BRACHIAL ANGIO N/A 3/6/2020    BILATERAL AORTAILLIAC AND RIGHT LEG ANGIOGRAM performed by Navi Jauregui DO at [facility] VASCULAR SURGERY  6/19/12    LUE arteriovenous fistulogram including venography of the superior vena cava. Balloon angioplasty, left forearm cephalic vein and upper arm cephalic vein with [id number] tod balloon.  VASCULAR SURGERY  7/10/2012 S     Revision of left distal radial artery to cephalic vein arteriovenous fistula with 7mm Artegraft interposition.  VASCULAR SURGERY  7/30/15 [facility] & [address]    Left upper extremity arteriovenous fistulograms including venography of the superior vena cava. Left cephalic vein balloon angioplasty with an 8 x 20 cm cutting balloon proximal cephalic vein. Balloon angioplasty of left cephalic vein/antecubital vein near the antecubital crease with 8 x 20 mm cutting balloon.  VASCULAR SURGERY  7/30/15 [facility]    Balloon angioplasty proximal end distal upper arm cephalic vein with 9 x 40 cm  balloon. Completion fistulograms of the left upper extremity.  VASCULAR SURGERY  8/13/15 \[facility]\""    Revision of left upper extremity arteriovenous fistula with excision of pseudoaneurysm and primary repair of cephalic vein.  VASCULAR SURGERY  5/3/16 S    Left upper fistulograms/venograms, left cephalic balloon 8 x 20 cutter,9 x 40 conquest,left proximal cephalic vein stents (flair 2 9 x 50), balloon stents 9 x 40 conquest.    VASCULAR SURGERY  12/08/2016    SJS- ultrasound guided cannulation of left distal cephalic vein ultrasound guided cannulation right internal jugular vein placement of right internal jugular vein tunneled dialysis catheter (Bard Equistream XK 23cm tip to cuff)     VASCULAR SURGERY  01/20/2017    SJS-Right upper venograms, u/s guided cannulation left basilic vein, left upper venograms, balloon angioplasty left subclavian vein 10x40 conquest.    VASCULAR SURGERY  02/16/2017    SJS. Left brachial artery to axillary vein arteriovenous graft with 7 mm artegraft. Left upper extremity venograms. Left subclavian vein stent viabahn 13x50. Left subclavian vein stent balloon angioplasty 12x40 atlas.  VASCULAR SURGERY  04/11/2017    SJS. Removal of tunneled dialysis catheter right internal jugular vein.  VASCULAR SURGERY  01/25/2018    SJS. Arch aortogram, left upper arteriogram, AV graft angiogram/venogram.    VASCULAR SURGERY  02/28/2018    SJS. Right CFA 5f-6f-7f sheath, aortogram with bilateral lower arteriogram, left SFA/pop  balloon angioplasty 5x100 , 6x150 lutonix ( 2), left CFA  7f sheath, bilateral iliac kissing stents right 10x38 icast, left 9x59 icast expanded with 10x40 , completion aortogram, mynx left CFA , failed mynx right CFA.  VASCULAR SURGERY  06/13/2019    SJS left CFA 5f sheath, aortogram with bilateral lower arteriogram, mynx left CFA.  VASCULAR SURGERY  06/25/2019    SJS-VI. Femoral tibial/perineal bypass with reversed greater saphenous vein.  VASCULAR SURGERY  08/16/2019    TJR. Aortogram and runoff, selective right CFA injections. PTA of fem-tp bypass with 4.0 x 220 mm tod balloon to 4.33 mm    VASCULAR SURGERY  10/23/2019    VI. Thrombin injection in the left SFA PSA, right common femoral artery us guided access, left SFA stent treatment of the flap.  VASCULAR SURGERY  01/30/2020    TJR Endarterectomy of the right common femoral artery, superficial femoral artery, and profunda femoris artery. Redo right groin x3.  VASCULAR SURGERY  03/06/2020    VI.  BILATERAL AORTAILLIAC AND RIGHT LEG ANGIOGRAM       Family History  Family History   Problem Relation Age of Onset    High Blood Pressure Mother     High Blood Pressure Father     High Blood Pressure Sister        Social History  Social History     Socioeconomic History    Marital status:      Spouse name: Tamika Kumar    Number of children: 0    Years of education: 15    Highest education level: Not on file   Occupational History    ROS: No abdominal pain or melena  Genito-Urinary ROS: No dysuria or hematuria  Musculoskeletal ROS: No joint pain or swelling   14 point ROS reviewed with the patient and negative except as noted above and in the HPI unless unable to obtain. Objective:  Patient Vitals for the past 24 hrs:   BP Temp Temp src Pulse Resp SpO2 Weight   04/09/20 0630 (!) 130/57 96.6 °F (35.9 °C) Temporal 62 17 99 % --   04/09/20 0233 -- -- -- -- -- -- 159 lb 6 oz (72.3 kg)   04/08/20 1705 (!) 103/57 97.1 °F (36.2 °C) Temporal 63 18 94 % --       Intake/Output Summary (Last 24 hours) at 4/9/2020 1115  Last data filed at 4/9/2020 0930  Gross per 24 hour   Intake 720 ml   Output --   Net 720 ml     General: awake/alert   Chest:  clear to auscultation bilaterally without respiratory distress  CVS: regular rate and rhythm  Abdominal: soft, nontender, normal bowel sounds  Extremities: no cyanosis or edema, right AKA  Skin: dry without rash      Labs:  BMP:   Recent Labs     04/07/20 0312 04/08/20 0319 04/09/20  0428    133* 140   K 4.4 4.6 4.2   CL 91* 90* 97*   CO2 28 26 26   BUN 27* 45* 24*   CREATININE 4.3* 6.0* 3.8*   CALCIUM 9.1 8.7 9.0     CBC:   Recent Labs     04/07/20 0312 04/08/20 0319 04/09/20  0428   WBC 6.3 5.6 5.6   HGB 7.7* 7.4* 7.8*   HCT 25.6* 24.1* 25.6*   MCV 92.4 90.3 90.8    169 178     LIVER PROFILE: No results for input(s): AST, ALT, LIPASE, BILIDIR, BILITOT, ALKPHOS in the last 72 hours. Invalid input(s): AMYLASE,  ALB  PT/INR:   No results for input(s): PROTIME, INR in the last 72 hours. APTT:   No results for input(s): APTT in the last 72 hours. BNP:  No results for input(s): BNP in the last 72 hours. Ionized Calcium:No results for input(s): IONCA in the last 72 hours. Magnesium:  No results for input(s): MG in the last 72 hours. Phosphorus:  No results for input(s): PHOS in the last 72 hours. HgbA1C:   No results for input(s): LABA1C in the last 72 hours.   Hepatic: No results for input(s): ALKPHOS, ALT, AST, PROT, BILITOT, BILIDIR, LABALBU in the last 72 hours. Lactic Acid: No results for input(s): LACTA in the last 72 hours. Troponin: No results for input(s): CKTOTAL, CKMB, TROPONINT in the last 72 hours. ABGs: No results for input(s): PH, PCO2, PO2, HCO3, O2SAT in the last 72 hours. CRP:  No results for input(s): CRP in the last 72 hours. Sed Rate:  No results for input(s): SEDRATE in the last 72 hours. Cultures:   No results for input(s): CULTURE in the last 72 hours. No results for input(s): BC, Venson Bleak in the last 72 hours. No results for input(s): CXSURG in the last 72 hours. Radiology reports as per the Radiologist  Radiology: No results found.      Assessment   End-stage renal disease  Diabetes type 2 with diabetic nephropathy  Hypertension  Anemia of chronic kidney disease  Chronic diastolic congestive heart failure  History of cirrhosis and hepatitis C  Secondary hyperparathyroidism  Peripheral arterial disease status post endarterectomies and bypasses  Hyperkalemia      Plan:  HD MWF while on rehab floor  Monitor labs  Discussed with patient, nursing, OT  Encourage therapy    Silvia Ferris MD  04/09/20  11:15 AM

## 2020-04-09 NOTE — PROGRESS NOTES
Patient:   Taras Roa  MR#:    [id number]   Room:    2923/456-07   YOB: 1960  Date of Progress Note: 4/9/2020  Time of Note                           9:39 AM  Consulting Physician:   Inga Royal M.D. Attending Physician:  Inga Royal MD     CHIEF COMPLAINT: Right AKA        Subjective  This 61 y.o. male     with ESRD on hemodialysis ,type II DM,PAD/PVD,CHF,thrombocytopenia,severe multivessel CAD non-operable,hepatic cirrhosis secondary to hepatitis C, hepatocellular carcinoma that is followed by [facility] in [place]. He is [facility] at [facility] by Dr. Adamaris Llanos for a non-healing right lower extremity wound. He was seen on 3/23/20 and found to have worsening of his RLE wound and ischemia, it was recommended that he have an amputation if the pain became intolerable. Patient contacted [facility] on 3/24/20 requesting to be admitted for amputation. He was admitted to the hospitalist service with consult for nephrology and vascular surgery. He was seen by vascular surgeon Dr. Monique De Paz who recommended a right above the knee amputation. He was in agreement and went for surgery on 3/26/20. He tolerated the procedure well. He is participating in both PT/OT. His pain is He is still 7/10 but is being controlled by PO medication. On 3/28/20 s/p dialysis. patient had c/o feeling nauseated, with one episode of vomiting,associated feeling of of indigestion/chest discomfort and describes substernal burning sensation. His troponin was elvated. He was transferred to PCU with consult for cardiology. He was placed on a heparin gtt which has now been changed to SQ. Patient has had no further c/o of chest pain. He had a run of torsade de points during hemodilaysis on 3/30/20. He was taken for interrogtion of his pacemaker by Dr. Dre Buchanan. His pacemaker was found to be funtioning normally.    No complaints today  REVIEW OF SYSTEMS:  Constitutional: No fevers No chills  Neck:No term goals  Time Frame for Short term goals: 1 week  Short term goal 1: TF SURFACE TO SURFACE SBA  Short term goal 2: AMBULATE 25 FT WTIH RW CGA  Short term goal 3: PROPEL  FT ON FLAT SURFACE INDEP  Short term goal 4: HEP SBA     Long term goals  Time Frame for Long term goals : 2 WEEKS  Long term goal 1: INDEP BED MOB  Long term goal 2: INDEP TF SURFACE TO SURFACE  Long term goal 3: INDEP AMB 25 FT WITH RW FLAT SURFACE  Long term goal 4: PROPEL WC INDEP OVER UNEVEN SURFACES  Long term goal 5: HEP INDEP     ASSESSMENT:  Assessment: Pt tolerated amb w/ an increase in fatigue. Pt tolerated sitting ther ex w/ minimal increase in fatigue. Pt continues to need VC's during TF's and amb for techniques and safety.        SPEECH THERAPY        OCCUPATIONAL THERAPY     CURRENT IRF-AMBIKA SCORES  Eating: CARE Score: 5  Oral Hygiene: CARE Score: 5  Toileting: CARE Score: 3  Shower/Bathe: CARE Score: 4  Upper Body Dressing: CARE Score: 6  Lower Body Dressing: CARE Score: 4  Footwear: CARE Score: 3  Toilet Transfers: CARE Score: 3  Picking Up Object:  CARE Score: 1        UE Functioning:  WFLs- sudden jerking movements at times     Pain Assessment:  Pain Level: 7  Pain Location: Leg     STGs:  Short term goals  Time Frame for Short term goals: 1 week  Short term goal 1: Supervision with bathing hygiene. Short term goal 2: Supervision with clothing management/hygiene for toileting. Short term goal 3: Supervision with toilet transfers. Short term goal 4: Supervsion with LB dressing. Short term goal 5: Supervision with ambulatory homemaking tasks.      LTGs:  Long term goals  Time Frame for Long term goals : 2 weeks   Long term goal 1: Modified independent with bathing hygiene. Long term goal 2: Modified independent with toileting and toilet transfers. Long term goal 3: Modified independent with overall dressing. Long term goal 4: Independent with HEP. Long term goal 5: Patient verbalize DME needs.    Long term

## 2020-04-09 NOTE — PROGRESS NOTES
Intervention   Body structures, Functions, Activity limitations Decreased functional mobility ; Decreased ADL status; Decreased ROM; Decreased strength;Decreased endurance;Decreased balance   Assessment Assisted patient to recliner and left with all needs in reach. Treatment Diagnosis INTERFERENCE WITH ACTIVITY   Prognosis Good   History ESRD, DIALYSIS, DM, PVD, CHF, HEP C, HEPATOCELLULAR CARCINOMA   Safety Devices   Type of devices All fall risk precautions in place;Call light within reach; Chair alarm in place; Left in chair     Electronically signed by Andrzej Jiang PTA on 4/9/2020 at 4:16 PM

## 2020-04-09 NOTE — PROGRESS NOTES
surgery (7/10/2012 [facility]); vascular surgery (7/30/15 [facility] & [address]); vascular surgery (7/30/15 [address] cont); vascular surgery (8/13/15 [facility]); vascular surgery (5/3/16 [facility]); vascular surgery (12/08/2016); vascular surgery (01/20/2017); Cardiac catheterization (04/04/11); Paracentesis; Dialysis fistula creation (Left, 2/16/2017); vascular surgery (02/16/2017); vascular surgery (04/11/2017); vascular surgery (01/25/2018); pr anastomosis,av,any site (Left, 1/30/2018); vascular surgery (02/28/2018); vascular surgery (06/13/2019); vascular surgery (06/25/2019); Femoral-tibial Bypass Graft (Right, 6/25/2019); EXPLORATION OF WOUND OF EXTREMITY (Right, 7/12/2019); vascular surgery (08/16/2019); vascular surgery (10/23/2019); femoral bypass (Right, 1/27/2020); vascular surgery (01/30/2020); pacemaker placement (Right, 2015); pr angio retro brachial angio (N/A, 3/6/2020); vascular surgery (03/06/2020); and AMPUTATION ABOVE KNEE (Right, 3/26/2020). Restrictions  Restrictions/Precautions  Restrictions/Precautions: Surgical Protocols, Fall Risk  Required Braces or Orthoses?: No  Implants present? : Pacemaker  Lower Extremity Weight Bearing Restrictions  Right Lower Extremity Weight Bearing: Non Weight Bearing  Left Lower Extremity Weight Bearing: Weight Bearing As Tolerated  Subjective   General  Chart Reviewed: Yes  Patient assessed for rehabilitation services?: Yes  Additional Pertinent Hx: Liver Cancer, ESRD with dialysis  Response to previous treatment: Patient with no complaints from previous session  Family / Caregiver Present: No  Diagnosis: R AKA  Subjective  Subjective: Pt states he doesn't know why but he is very sleepy this afternoon \"must be medication\"  General Comment  Comments: slower processing of verbal instructions  Pain Assessment  Pain Assessment: 0-10  Pain Level: 6  Pain Type: Acute pain  Pain Location: Leg  Pain Orientation: Right  Pain Descriptors: Aching; Throbbing  Vital Signs  Patient Currently in Pain: Yes Orientation     Objective    ADL  Grooming: Independent  UE Bathing: Supervision  LE Bathing: Moderate assistance  UE Dressing: Supervision  LE Dressing: Minimal assistance              Transfers  Stand Pivot Transfers: Contact guard assistance  Sit to stand: Contact guard assistance  Stand to sit: Contact guard assistance                                                                 Plan   Plan  Times per day: Twice a day  Current Treatment Recommendations: Strengthening, Balance Training, Functional Mobility Training, Endurance Training, Equipment Evaluation, Education, & procurement, Patient/Caregiver Education & Training, Safety Education & Training, Self-Care / ADL  G-Code     OutComes Score                                                  AM-PAC Score             Goals  Short term goals  Time Frame for Short term goals: 1 week  Short term goal 1: Supervision with bathing hygiene. Short term goal 2: Supervision with clothing management/hygiene for toileting. Short term goal 3: Supervision with toilet transfers. Short term goal 4: Supervsion with LB dressing. Short term goal 5: Supervision with ambulatory homemaking tasks. Long term goals  Time Frame for Long term goals : 2 weeks   Long term goal 1: Modified independent with bathing hygiene. Long term goal 2: Modified independent with toileting and toilet transfers. Long term goal 3: Modified independent with overall dressing. Long term goal 4: Independent with HEP. Long term goal 5: Patient verbalize DME needs. Long term goal 7: Modified independent with ambulatory home making tasks.    Patient Goals   Patient goals : Return home independently        Therapy Time   Individual Concurrent Group Co-treatment   Time In 0900         Time Out 1000         Minutes 60         Timed Code Treatment Minutes: [address] Jason Rodriguez

## 2020-04-10 LAB
ANION GAP SERPL CALCULATED.3IONS-SCNC: 17 MMOL/L (ref 7–19)
BASOPHILS ABSOLUTE: 0.1 K/UL (ref 0–0.2)
BASOPHILS RELATIVE PERCENT: 0.8 % (ref 0–1)
BUN BLDV-MCNC: 40 MG/DL (ref 6–20)
CALCIUM SERPL-MCNC: 8.8 MG/DL (ref 8.6–10)
CHLORIDE BLD-SCNC: 91 MMOL/L (ref 98–111)
CO2: 24 MMOL/L (ref 22–29)
CREAT SERPL-MCNC: 5.2 MG/DL (ref 0.5–1.2)
EOSINOPHILS ABSOLUTE: 0.9 K/UL (ref 0–0.6)
EOSINOPHILS RELATIVE PERCENT: 13.6 % (ref 0–5)
GFR NON-AFRICAN AMERICAN: 11
GLUCOSE BLD-MCNC: 116 MG/DL (ref 74–109)
HCT VFR BLD CALC: 24.2 % (ref 42–52)
HCT VFR BLD CALC: 26.1 % (ref 42–52)
HEMOGLOBIN: 7.7 G/DL (ref 14–18)
HEMOGLOBIN: 8.1 G/DL (ref 14–18)
IMMATURE GRANULOCYTES #: 0 K/UL
LYMPHOCYTES ABSOLUTE: 1 K/UL (ref 1.1–4.5)
LYMPHOCYTES RELATIVE PERCENT: 16 % (ref 20–40)
MCH RBC QN AUTO: 28.4 PG (ref 27–31)
MCHC RBC AUTO-ENTMCNC: 31.8 G/DL (ref 33–37)
MCV RBC AUTO: 89.3 FL (ref 80–94)
MONOCYTES ABSOLUTE: 0.5 K/UL (ref 0–0.9)
MONOCYTES RELATIVE PERCENT: 7.3 % (ref 0–10)
NEUTROPHILS ABSOLUTE: 4 K/UL (ref 1.5–7.5)
NEUTROPHILS RELATIVE PERCENT: 62.1 % (ref 50–65)
PDW BLD-RTO: 16.5 % (ref 11.5–14.5)
PLATELET # BLD: 167 K/UL (ref 130–400)
PMV BLD AUTO: 11.8 FL (ref 9.4–12.4)
POTASSIUM REFLEX MAGNESIUM: 4.8 MMOL/L (ref 3.5–5)
RBC # BLD: 2.71 M/UL (ref 4.7–6.1)
SODIUM BLD-SCNC: 132 MMOL/L (ref 136–145)
WBC # BLD: 6.4 K/UL (ref 4.8–10.8)

## 2020-04-10 PROCEDURE — 80048 BASIC METABOLIC PNL TOTAL CA: CPT

## 2020-04-10 PROCEDURE — 97530 THERAPEUTIC ACTIVITIES: CPT

## 2020-04-10 PROCEDURE — 97535 SELF CARE MNGMENT TRAINING: CPT

## 2020-04-10 PROCEDURE — 6370000000 HC RX 637 (ALT 250 FOR IP): Performed by: INTERNAL MEDICINE

## 2020-04-10 PROCEDURE — 97110 THERAPEUTIC EXERCISES: CPT

## 2020-04-10 PROCEDURE — 97116 GAIT TRAINING THERAPY: CPT

## 2020-04-10 PROCEDURE — 99233 SBSQ HOSP IP/OBS HIGH 50: CPT | Performed by: PSYCHIATRY & NEUROLOGY

## 2020-04-10 PROCEDURE — 1180000000 HC REHAB R&B

## 2020-04-10 PROCEDURE — 85025 COMPLETE CBC W/AUTO DIFF WBC: CPT

## 2020-04-10 PROCEDURE — 36415 COLL VENOUS BLD VENIPUNCTURE: CPT

## 2020-04-10 PROCEDURE — 85018 HEMOGLOBIN: CPT

## 2020-04-10 PROCEDURE — 85014 HEMATOCRIT: CPT

## 2020-04-10 PROCEDURE — 6370000000 HC RX 637 (ALT 250 FOR IP): Performed by: PSYCHIATRY & NEUROLOGY

## 2020-04-10 RX ORDER — ATORVASTATIN CALCIUM 80 MG/1
80 TABLET, FILM COATED ORAL NIGHTLY
Qty: 30 TABLET | Refills: 3 | Status: SHIPPED | OUTPATIENT
Start: 2020-04-10

## 2020-04-10 RX ORDER — ISOSORBIDE MONONITRATE 60 MG/1
60 TABLET, EXTENDED RELEASE ORAL DAILY
Qty: 30 TABLET | Refills: 3 | Status: SHIPPED | OUTPATIENT
Start: 2020-04-11

## 2020-04-10 RX ORDER — NORTRIPTYLINE HYDROCHLORIDE 10 MG/1
20 CAPSULE ORAL NIGHTLY
Qty: 30 CAPSULE | Refills: 3 | Status: SHIPPED | OUTPATIENT
Start: 2020-04-10

## 2020-04-10 RX ORDER — CYCLOBENZAPRINE HCL 10 MG
10 TABLET ORAL 3 TIMES DAILY
Qty: 30 TABLET | Refills: 0 | Status: SHIPPED | OUTPATIENT
Start: 2020-04-10 | End: 2020-04-20

## 2020-04-10 RX ORDER — NITROGLYCERIN 0.4 MG/1
TABLET SUBLINGUAL
Qty: 25 TABLET | Refills: 3 | Status: SHIPPED | OUTPATIENT
Start: 2020-04-10

## 2020-04-10 RX ORDER — GABAPENTIN 400 MG/1
400 CAPSULE ORAL 3 TIMES DAILY
Qty: 90 CAPSULE | Refills: 0 | Status: SHIPPED | OUTPATIENT
Start: 2020-04-10 | End: 2020-05-10

## 2020-04-10 RX ORDER — MIDODRINE HYDROCHLORIDE 5 MG/1
5 TABLET ORAL
Status: ACTIVE | OUTPATIENT
Start: 2020-04-10 | End: 2020-04-11

## 2020-04-10 RX ORDER — OXYCODONE HYDROCHLORIDE 10 MG/1
10 TABLET ORAL EVERY 6 HOURS PRN
Qty: 28 TABLET | Refills: 0 | Status: SHIPPED | OUTPATIENT
Start: 2020-04-10 | End: 2020-04-17

## 2020-04-10 RX ORDER — PANTOPRAZOLE SODIUM 20 MG/1
20 TABLET, DELAYED RELEASE ORAL
Qty: 30 TABLET | Refills: 3 | Status: SHIPPED | OUTPATIENT
Start: 2020-04-10

## 2020-04-10 RX ORDER — MINOXIDIL 10 MG/1
10 TABLET ORAL DAILY
Qty: 30 TABLET | Refills: 3 | Status: SHIPPED | OUTPATIENT
Start: 2020-04-10

## 2020-04-10 RX ADMIN — OXYCODONE 10 MG: 5 TABLET ORAL at 17:17

## 2020-04-10 RX ADMIN — ISOSORBIDE MONONITRATE 60 MG: 60 TABLET, EXTENDED RELEASE ORAL at 07:48

## 2020-04-10 RX ADMIN — OXYCODONE 10 MG: 5 TABLET ORAL at 20:30

## 2020-04-10 RX ADMIN — GABAPENTIN 400 MG: 400 CAPSULE ORAL at 07:48

## 2020-04-10 RX ADMIN — GABAPENTIN 400 MG: 400 CAPSULE ORAL at 20:29

## 2020-04-10 RX ADMIN — ATORVASTATIN CALCIUM 80 MG: 80 TABLET, FILM COATED ORAL at 20:29

## 2020-04-10 RX ADMIN — NORTRIPTYLINE HYDROCHLORIDE 20 MG: 10 CAPSULE ORAL at 20:29

## 2020-04-10 RX ADMIN — GABAPENTIN 400 MG: 400 CAPSULE ORAL at 15:26

## 2020-04-10 RX ADMIN — OXYCODONE 10 MG: 5 TABLET ORAL at 04:38

## 2020-04-10 RX ADMIN — CYCLOBENZAPRINE HYDROCHLORIDE 10 MG: 10 TABLET, FILM COATED ORAL at 07:47

## 2020-04-10 RX ADMIN — SEVELAMER CARBONATE 800 MG: 800 TABLET, FILM COATED ORAL at 17:17

## 2020-04-10 RX ADMIN — OXYCODONE 10 MG: 5 TABLET ORAL at 11:35

## 2020-04-10 RX ADMIN — ASPIRIN 81 MG: 81 TABLET, COATED ORAL at 07:47

## 2020-04-10 RX ADMIN — METOPROLOL SUCCINATE 25 MG: 25 TABLET, EXTENDED RELEASE ORAL at 20:29

## 2020-04-10 RX ADMIN — PANTOPRAZOLE SODIUM 20 MG: 20 TABLET, DELAYED RELEASE ORAL at 04:38

## 2020-04-10 RX ADMIN — CYCLOBENZAPRINE HYDROCHLORIDE 10 MG: 10 TABLET, FILM COATED ORAL at 15:26

## 2020-04-10 RX ADMIN — CYCLOBENZAPRINE HYDROCHLORIDE 10 MG: 10 TABLET, FILM COATED ORAL at 20:30

## 2020-04-10 RX ADMIN — SEVELAMER CARBONATE 800 MG: 800 TABLET, FILM COATED ORAL at 07:46

## 2020-04-10 RX ADMIN — CLOPIDOGREL BISULFATE 75 MG: 75 TABLET ORAL at 07:48

## 2020-04-10 RX ADMIN — OXYCODONE 10 MG: 5 TABLET ORAL at 07:47

## 2020-04-10 RX ADMIN — PANTOPRAZOLE SODIUM 20 MG: 20 TABLET, DELAYED RELEASE ORAL at 17:17

## 2020-04-10 ASSESSMENT — PAIN SCALES - GENERAL
PAINLEVEL_OUTOF10: 7
PAINLEVEL_OUTOF10: 5
PAINLEVEL_OUTOF10: 7
PAINLEVEL_OUTOF10: 6
PAINLEVEL_OUTOF10: 7
PAINLEVEL_OUTOF10: 6

## 2020-04-10 ASSESSMENT — PAIN DESCRIPTION - FREQUENCY
FREQUENCY: INTERMITTENT

## 2020-04-10 ASSESSMENT — PAIN DESCRIPTION - DESCRIPTORS
DESCRIPTORS: ACHING;SORE
DESCRIPTORS: ACHING;SORE;SPASM

## 2020-04-10 ASSESSMENT — PAIN DESCRIPTION - ONSET
ONSET: GRADUAL

## 2020-04-10 ASSESSMENT — PAIN DESCRIPTION - PAIN TYPE
TYPE: SURGICAL PAIN;PHANTOM PAIN
TYPE: PHANTOM PAIN;SURGICAL PAIN
TYPE: SURGICAL PAIN;PHANTOM PAIN

## 2020-04-10 ASSESSMENT — PAIN DESCRIPTION - ORIENTATION
ORIENTATION: RIGHT

## 2020-04-10 ASSESSMENT — PAIN DESCRIPTION - LOCATION
LOCATION: LEG

## 2020-04-10 ASSESSMENT — PAIN DESCRIPTION - PROGRESSION
CLINICAL_PROGRESSION: GRADUALLY IMPROVING

## 2020-04-10 NOTE — PROGRESS NOTES
Ish Lindo MD        pantoprazole (PROTONIX) tablet 20 mg  20 mg Oral BID AC Guy Naidu MD   20 mg at 04/10/20 0438    sevelamer (RENVELA) tablet 800 mg  800 mg Oral TID WC Guy Naidu MD   800 mg at 04/10/20 0746    sucralfate (CARAFATE) tablet 1 g  1 g Oral TID PRN Guy Naidu MD        polyethylene glycol [facility]) packet 17 g  17 g Oral Daily Radha Weeks MD   Stopped at 04/06/20 0830    bisacodyl (DULCOLAX) suppository 10 mg  10 mg Rectal Daily PRN Radha Weeks MD        bisacodyl (DULCOLAX) EC tablet 5 mg  5 mg Oral Daily Radha Weeks MD   5 mg at 04/06/20 1018       Past Medical History:  Past Medical History:   Diagnosis Date    Anxiety     Blood circulation, collateral     CAD (coronary artery disease)     stents x 2; per dr. Shantel العراقي [facility])     liver cancer    CHF (congestive heart failure) ([address]     Chyloperitoneum determined by paracentesis     CKD (chronic kidney disease), stage V ([address]     Dialysis patient ([address]     mon wed fri at [place] GERD (gastroesophageal reflux disease)     Hemodialysis patient ([address]     mon wed fri in [place] Hepatic cirrhosis ([address]     dr. Katie Ruelas; has been going to [facility] for liver and kidney transplant list.    Hepatitis C, chronic ([address]     History of blood transfusion     HTN (hypertension)     Hx of blood clots     arm/fistula    Mixed hyperlipidemia 1/9/2017    Osteoarthritis     Pacemaker     Pain management     Dr. Mulu Pelletier care patient 07/09/2019    PVD (peripheral vascular disease) ([address]     Sepsis ([address]     Skin ulcer of right heel with fat layer exposed ([address] 8/16/2019    Sleep apnea     no cpap at present.  Type II diabetes mellitus (HCC)     no meds.     Ulcer of left heel, with fat layer exposed ([address] 2/28/2018    Ulcer of right foot, with fat layer exposed ([address] 8/16/2019    Wound infection     Wound infection after surgery 7/8/2019       Past Surgical History:  Past Surgical PROCEDURE WITH LEFT SAPHENOUS VEIN HARVESTING [facility] N/A 3/6/2020    BILATERAL AORTAILLIAC AND RIGHT LEG ANGIOGRAM performed by Navi Jauregui DO at [address] [facility] VASCULAR SURGERY  6/19/12    LUE arteriovenous fistulogram including venography of the superior vena cava. Balloon angioplasty, left forearm cephalic vein and upper arm cephalic vein with [id number] balloon.  VASCULAR SURGERY  7/10/2012 S     Revision of left distal radial artery to cephalic vein arteriovenous fistula with 7mm Artegraft interposition.  VASCULAR SURGERY  7/30/15 [facility] & [address]    Left upper extremity arteriovenous fistulograms including venography of the superior vena cava. Left cephalic vein balloon angioplasty with an 8 x 20 cm cutting balloon proximal cephalic vein. Balloon angioplasty of left cephalic vein/antecubital vein near the antecubital crease with 8 x 20 mm cutting balloon.  VASCULAR SURGERY  7/30/15 [facility] cont    Balloon angioplasty proximal end distal upper arm cephalic vein with 9 x 40 cm  balloon. Completion fistulograms of the left upper extremity.  VASCULAR SURGERY  8/13/15 S    Revision of left upper extremity arteriovenous fistula with excision of pseudoaneurysm and primary repair of cephalic vein.     VASCULAR SURGERY  5/3/16 [facility]    Left upper fistulograms/venograms, left cephalic balloon 8 x 20 cutter,9 x 40 conquest,left proximal cephalic vein stents (flair 2 9 x 50), balloon stents 9 x 40 conquest.    VASCULAR SURGERY  12/08/2016    [facility]- ultrasound guided cannulation of left distal cephalic vein ultrasound guided cannulation right internal jugular vein placement of right internal jugular vein tunneled dialysis catheter (Bard Equistream XK 23cm tip to cuff)     VASCULAR SURGERY  01/20/2017    [facility]-Right upper venograms, u/s guided cannulation left basilic vein, left upper venograms, balloon angioplasty left subclavian vein 10x40 cough, shortness of breath, wheezing  Cardiovascular ROS: No chest pain or palpitations  Gastrointestinal ROS: No abdominal pain or melena  Genito-Urinary ROS: No dysuria or hematuria  Musculoskeletal ROS: No joint pain or swelling   14 point ROS reviewed with the patient and negative except as noted above and in the HPI unless unable to obtain. Objective:  Patient Vitals for the past 24 hrs:   BP Temp Temp src Pulse Resp SpO2 Weight   04/10/20 0604 108/60 97.5 °F (36.4 °C) Temporal 62 16 96 % --   04/10/20 0459 -- -- -- -- -- -- 158 lb 6.4 oz (71.8 kg)   04/09/20 2059 (!) 93/52 -- -- 65 18 100 % --   04/09/20 1839 (!) 83/45 98.4 °F (36.9 °C) Temporal 60 18 95 % --       Intake/Output Summary (Last 24 hours) at 4/10/2020 1114  Last data filed at 4/10/2020 0912  Gross per 24 hour   Intake 1160 ml   Output --   Net 1160 ml     General: awake/alert   Chest:  clear to auscultation bilaterally without respiratory distress  CVS: regular rate and rhythm  Abdominal: soft, nontender, normal bowel sounds  Extremities: no cyanosis or edema, right AKA  Skin: dry without rash      Labs:  BMP:   Recent Labs     04/08/20  0319 04/09/20  0428 04/10/20  0434   * 140 132*   K 4.6 4.2 4.8   CL 90* 97* 91*   CO2 26 26 24   BUN 45* 24* 40*   CREATININE 6.0* 3.8* 5.2*   CALCIUM 8.7 9.0 8.8     CBC:   Recent Labs     04/08/20 0319 04/09/20 0428 04/09/20  1739 04/10/20  0434   WBC 5.6 5.6  --  6.4   HGB 7.4* 7.8* 8.3* 7.7*   HCT 24.1* 25.6* 27.4* 24.2*   MCV 90.3 90.8  --  89.3    178  --  167     LIVER PROFILE: No results for input(s): AST, ALT, LIPASE, BILIDIR, BILITOT, ALKPHOS in the last 72 hours. Invalid input(s): AMYLASE,  ALB  PT/INR:   No results for input(s): PROTIME, INR in the last 72 hours. APTT:   No results for input(s): APTT in the last 72 hours. BNP:  No results for input(s): BNP in the last 72 hours. Ionized Calcium:No results for input(s): IONCA in the last 72 hours.   Magnesium:  No results for input(s): MG in the last 72 hours. Phosphorus:  No results for input(s): PHOS in the last 72 hours. HgbA1C:   No results for input(s): LABA1C in the last 72 hours. Hepatic: No results for input(s): ALKPHOS, ALT, AST, PROT, BILITOT, BILIDIR, LABALBU in the last 72 hours. Lactic Acid: No results for input(s): LACTA in the last 72 hours. Troponin: No results for input(s): CKTOTAL, CKMB, TROPONINT in the last 72 hours. ABGs: No results for input(s): PH, PCO2, PO2, HCO3, O2SAT in the last 72 hours. CRP:  No results for input(s): CRP in the last 72 hours. Sed Rate:  No results for input(s): SEDRATE in the last 72 hours. Cultures:   No results for input(s): CULTURE in the last 72 hours. No results for input(s): BC, Vallie Gut in the last 72 hours. No results for input(s): CXSURG in the last 72 hours. Radiology reports as per the Radiologist  Radiology: No results found.      Assessment   End-stage renal disease  Diabetes type 2 with diabetic nephropathy  Hypertension  Anemia of chronic kidney disease  Chronic diastolic congestive heart failure  History of cirrhosis and hepatitis C  Secondary hyperparathyroidism  Peripheral arterial disease status post endarterectomies and bypasses  Hyperkalemia      Plan:  HD MWF while on rehab floor  Monitor labs  Discussed with patient, nursing, OT  Encouraged therapy    Rosalia Chatman MD  04/10/20  11:14 AM

## 2020-04-10 NOTE — PROGRESS NOTES
04/10/20 0747    cyclobenzaprine (FLEXERIL) tablet 10 mg  10 mg Oral TID Stefanie Macario MD   10 mg at 04/10/20 0747    gabapentin (NEURONTIN) capsule 400 mg  400 mg Oral TID Stefanie Macario MD   400 mg at 04/10/20 0748    acetaminophen (TYLENOL) tablet 650 mg  650 mg Oral Q6H PRN Herlinda Balderas MD        Or   Redd Ferrera acetaminophen (TYLENOL) suppository 650 mg  650 mg Rectal Q6H PRN Herlinda Balderas MD        potassium chloride (KLOR-CON M) extended release tablet 40 mEq  40 mEq Oral PRN Herlinda Balderas MD        Or    potassium bicarb-citric acid (EFFER-K) effervescent tablet 40 mEq  40 mEq Oral PRN Herlinda Balderas MD        Or    potassium chloride 10 mEq/100 mL IVPB (Peripheral Line)  10 mEq Intravenous PRN Herlinda Balderas MD        sodium chloride flush 0.9 % injection 10 mL  10 mL Intravenous PRN Herlinda Balderas MD        aspirin EC tablet 81 mg  81 mg Oral Daily Herlinda Balderas MD   81 mg at 04/10/20 0747    atorvastatin (LIPITOR) tablet 80 mg  80 mg Oral Nightly Herlinda Balderas MD   80 mg at 04/09/20 2055    [Held by provider] cloNIDine (CATAPRES) tablet 0.1 mg  0.1 mg Oral TID Herlinda Balderas MD        clopidogrel (PLAVIX) tablet 75 mg  75 mg Oral Daily Herlinda Balderas MD   75 mg at 04/10/20 0748    isosorbide mononitrate (IMDUR) extended release tablet 60 mg  60 mg Oral Daily Herlinda Balderas MD   60 mg at 04/10/20 0748    labetalol (NORMODYNE;TRANDATE) injection 10 mg  10 mg Intravenous Q6H PRN Herlinda Balderas MD        minoxidil (LONITEN) tablet 10 mg  10 mg Oral Daily Herlinda Balderas MD   10 mg at 04/09/20 0806    naloxone (NARCAN) injection 0.4 mg  0.4 mg Intravenous PRN Herlinda Balderas MD        nitroGLYCERIN (NITROSTAT) SL tablet 0.4 mg  0.4 mg Sublingual Q5 Min PRN Herlinda Balderas MD        ondansetron [facility]) injection 4 mg  4 mg Intravenous Q6H PRN Herlinda Balderas MD        pantoprazole (PROTONIX) tablet 20 mg  20 mg Oral BID Mckenzie Maldonado MD   20 mg at 04/10/20 0438    sevelamer (RENVELA) tablet 800 mg  800 mg Oral TID WC Glenna Christian MD   800 mg at 04/10/20 0746    sucralfate (CARAFATE) tablet 1 g  1 g Oral TID PRN Glenna Christian MD        polyethylene glycol [facility]) packet 17 g  17 g Oral Daily Lucila Stiles MD   Stopped at 04/06/20 0830    bisacodyl (DULCOLAX) suppository 10 mg  10 mg Rectal Daily PRN Lucila Stiles MD        bisacodyl (DULCOLAX) EC tablet 5 mg  5 mg Oral Daily Lucila Stiles MD   5 mg at 04/06/20 0519           metoprolol succinate  25 mg Oral BID    darbepoetin sandrine-polysorbate  100 mcg Subcutaneous Weekly    amLODIPine  10 mg Oral Daily    nortriptyline  20 mg Oral Nightly    oxyCODONE  10 mg Oral Q4H    cyclobenzaprine  10 mg Oral TID    gabapentin  400 mg Oral TID    aspirin  81 mg Oral Daily    atorvastatin  80 mg Oral Nightly    [Held by provider] cloNIDine  0.1 mg Oral TID    clopidogrel  75 mg Oral Daily    isosorbide mononitrate  60 mg Oral Daily    minoxidil  10 mg Oral Daily    pantoprazole  20 mg Oral BID AC    sevelamer  800 mg Oral TID WC    polyethylene glycol  17 g Oral Daily    bisacodyl  5 mg Oral Daily     acetaminophen **OR** acetaminophen, potassium chloride **OR** potassium alternative oral replacement **OR** potassium chloride, sodium chloride flush, labetalol, naloxone, nitroGLYCERIN, ondansetron, sucralfate, bisacodyl  DIET RENAL;       Labs:   CBC with DIFF:  Recent Labs     04/08/20  0319 04/09/20  0428 04/09/20  1739 04/10/20  0434   WBC 5.6 5.6  --  6.4   RBC 2.67* 2.82*  --  2.71*   HGB 7.4* 7.8* 8.3* 7.7*   HCT 24.1* 25.6* 27.4* 24.2*   MCV 90.3 90.8  --  89.3   MCH 27.7 27.7  --  28.4   MCHC 30.7* 30.5*  --  31.8*   RDW 17.0* 16.7*  --  16.5*    178  --  167   MPV 10.8 11.5  --  11.8   NEUTOPHILPCT 61.1 65.2*  --  62.1   LYMPHOPCT 14.9* 14.3*  --  16.0*   MONOPCT 12.1* 10.8*  --  7.3   EOSRELPCT 11.0* 8.6*  --  13.6*   BASOPCT 0.7 0.7 Levels    Result Date: 3/5/2020  Vascular Lower Arterial Plethysmography Procedure  Demographics   Patient Name     Erasmo Moctezuma Age                   61   Patient Number   [id number]       Gender                Male   Visit Number     [id number]    Interpreting          Janeth Perdue MD                                Physician   Date of Birth    1960   Referring Physician   Rock [place]   Accession Number [id number]    [address] [facility] RT, RVT  Procedure Type of Study:   Extremities Arteries:Lower Arterial Plethysmography, VL ANKLE / BRACHIAL  INDICES EXTREMITY COMPLETE . Indications for Study:Wound Lower Extremity (Right). Risk Factors   - The patient's risk factor(s) include: dyslipidemia and arterial     hypertension.   - The patient has a former tobacco history. Allergies   - No known allergies. Impression   The patient has noncompressible bilateral tibial arteries. This would be  consistent with a history of diabetes and calcified tibial vessels. However, there is good doppler waveforms from the left femoral all the way  to the tibial arteries. Great toe pressures are mildly depressed on the  left. I suspect there is a mild to moderate reduction in flow to the left  foot. Severe decreased in arterial flow to the right foot. Signature   ----------------------------------------------------------------  Electronically signed by Janeth Perdue MD(Interpreting  physician) on 03/05/2020 06:54 AM  ----------------------------------------------------------------  Velocities are measured in cm/s ; Diameters are measured in mm Pressures +--------------------------------------++--------+-----+----+--------+-----+ ! ! !Right   ! ! Left!        !     ! +--------------------------------------++--------+-----+----+--------+-----+ ! Location                              ! !Pressure! Ratio! !Pressure! Ratio! +--------------------------------------++--------+-----+----+--------+-----+ ! Ankle PT                              !!51      !0.36 !    !255     !1.81 ! +--------------------------------------++--------+-----+----+--------+-----+ ! DP                                    ! !55      !0.39 !    !255     !1.81 ! +--------------------------------------++--------+-----+----+--------+-----+ ! Great Toe                             !!0       !0    !    !48      !0.34 ! +--------------------------------------++--------+-----+----+--------+-----+   - Brachial Pressure:Right: 141.   - GABRIEL:Right: 0.39. Left: 1.81. Plethysmographic Digit Evaluation +---------++--------+-----+---------------++--------+-----+----------------+ ! ! !Right   ! ! Left           !!        !     !                ! +---------++--------+-----+---------------++--------+-----+----------------+ ! Location ! !Pressure! Ratio! PPG Wave Form  ! !Pressure! Ratio! PPG Wave Form   ! +---------++--------+-----+---------------++--------+-----+----------------+ ! Great Toe!!0       !0    !               !!48      !0.34 !                ! +---------++--------+-----+---------------++--------+-----+----------------+    Vl Dup Lower Extremity Arteries Bilateral    Result Date: 3/5/2020  Vascular Lower Extremities Arterial Duplex Procedure  Demographics   [facility]                   61   Patient Number   [id number]       Gender                Male   Visit Number     [id number]    Sacha Richardson MD                                Physician   Date of Birth    1960   Referring Physician   Ned Segura   Accession Number [id number]    [address] [place] [address] RT, RVT  Procedure Type of Study:   Extremities Arteries:Lower Extremities Arterial Duplex, VL LOWER EXTREMITY  ARTERIES DUPLEX BILATERAL. Indications for Study:Wound Lower Extremity (Right).  Risk Factors   - The patient's risk factor(s) include: dyslipidemia and arterial     hypertension.   - The patient has a former tobacco history. Allergies   - No known allergies. Impression   Bilateral lower extremity arterial duplex exam performed. The right lower extremity extremity arterial duplex exam performed. The  right lower extremtiy shows occlusion of the bypass and occlusion of the  native SFA and popliteal artery. Only collateral arterial waveforms are  seen in the tibial vessels. The left low extremity shows mild local velocity elevations in the CFA,  suggestive of a stenosis in the left External iliac artery and Common  femoral artery. The SFA and popliteal artery are patent, however,  significant plaque is seen. the anterior tibial and posterior tibial and  peroneal artery are patent. Signature   ----------------------------------------------------------------  Electronically signed by Filemon Waters MD(Interpreting  physician) on 03/05/2020 07:03 AM  ----------------------------------------------------------------  Grafts   - The graft originated from the Right Dist Common Femoral to the Right     Dist Popliteal. +--------------------------------------+----+---+-----+--------------------+ ! Location                              ! PSV ! EDV! Ratio!% Stenosis          ! +--------------------------------------+----+---+-----+--------------------+ ! Prox Anastomosis                      ! 35  !   !     !                    ! +--------------------------------------+----+---+-----+--------------------+ ! Prox Graft                            !0   !   !0    !                    ! +--------------------------------------+----+---+-----+--------------------+ ! Mid Graft                             !0   !   !     !                    ! +--------------------------------------+----+---+-----+--------------------+ ! Dist Graft                            !0   !   !     !                    ! +--------------------------------------+----+---+-----+--------------------+ ! Dist Anastomosis ! 0   !   !     !                    ! +--------------------------------------+----+---+-----+--------------------+ Velocities are measured in cm/s ; Diameters are measured in mm LE Duplex Measurements +---------------++-----+-----+----+-----------++---+-----+---+-------------+ ! ! !Right! ! Left!           !!   !     !   !             ! +---------------++-----+-----+----+-----------++---+-----+---+-------------+ ! Location       ! !PSV  ! Ratio! EDV ! Wave Desc. !!PSV! Ratio! EDV! Wave Desc.   ! +---------------++-----+-----+----+-----------++---+-----+---+-------------+ ! Common Femoral !!161  !     !    !           !!266!     !   !             ! +---------------++-----+-----+----+-----------++---+-----+---+-------------+ ! Prox PFA       !!244  !     !    !           !!60 !     !   !             ! +---------------++-----+-----+----+-----------++---+-----+---+-------------+ ! Prox SFA       !!27   !     !    !           !!282!     !   !             ! +---------------++-----+-----+----+-----------++---+-----+---+-------------+ ! Mid SFA        !!0    !0    !    !           !!98 !0.35 !   !             ! +---------------++-----+-----+----+-----------++---+-----+---+-------------+ ! Dist SFA       !!12   !     !    !           !!96 !0.98 !   !             ! +---------------++-----+-----+----+-----------++---+-----+---+-------------+ ! Prox Popliteal !!0    !0    !    !           !!76 !0.79 !   !             ! +---------------++-----+-----+----+-----------++---+-----+---+-------------+ ! Dist Popliteal !!0    !     !    !           !!158!2.08 !   !             ! +---------------++-----+-----+----+-----------++---+-----+---+-------------+ ! Mid PTA        !!21   !     !    !           !!46 !0.29 !   !             ! +---------------++-----+-----+----+-----------++---+-----+---+-------------+ ! Prox GWYN       !!19   !     !    !           !!42 !0.27 !   !             ! stable  · Daily hemoglobin trends: 9.5 --> 9.1 --> 8.8 --> 7.7 -- 7.4  --> 7.8 --> 8.3 --> 7.7 (04/10/2020)  · No obvious source of bleeding  · Follow-up FOBT, to rule out any possible GI source. · Continue Pantoprazole  · Monitor H&H  · Transfusion Goal: Hgb < 7        History of CAD  · - Initial troponin  Trends: 0.17 --> 0.30 --> 0.47 --> 0.57 ---> 0.64 --> 0.67 --> 0.79 --> 0.69 --> 0.96 --> 0.96 --> 0.90 (03/31/2020)  · Serial EKGs for chest pain  ·  Continue optimized medical management   · Nitro glycerin sublingual when necessary for chest pain  · Status post dual-chamber pacemaker interrogation  - 03/31/2020  · Continue to monitor     Of Note  Recent Cardiac Cath - 12/11/2019  \"Summary:    1.  Successful femoral artery ultrasound  2.  Successful femoral artery arteriogram  3.  Supervision of the administration of moderate conscious sedation  4.  Severe left main and circumflex coronary artery disease  5.  Left ventricular function is normal  6.  Patent stent(s) in the proximal to mid LAD     RECOMMENDATIONS:  1.  Risk Factor Modification  2.  On-going Medical Therapy  3.  Consideration for open heart surgery\"        History of ESRD, on scheduled HD (MWF)  · Nephrology consulted  · Continue scheduled HD  · Continue management as per nephrology rec    Continue management of other chronic medical conditions - see above and orders. Advance Directive: Full Code    DIET RENAL;     DVT prophylaxis: CDs, continue ambulation    Consults Made:   IP CONSULT TO SOCIAL WORK  IP CONSULT TO NEPHROLOGY  IP CONSULT TO HOSPITALIST    Discharge planning: tbd    Time Spent is 25 mins in the examination, evaluation, counseling and review of medications, assessment and plan.      Electronically signed by   Abigail Paulson MD, MPH,   Internal Medicine Hospitalist   4/10/2020 10:17 AM

## 2020-04-10 NOTE — CARE COORDINATION
Discharge Planning:  Mr. Wil Chiu, expecting discharge Saturday 4/11/20, will be going to his mothers' home [facility]) where has has stayed most often previously- for assistance. They will come at 2:30 to  and physical therapy staff will meet at the door for further instructions- car transfer, wheelchair up one step. Information given about [employer] (not open again until Monday) regarding electric mobility possibility and Ram[employer] program.  He returns to hemodialysis at the [facility] on Tues. Referral made to [facility].

## 2020-04-10 NOTE — PROGRESS NOTES
Occupational Therapy  Facility/Department: [facility] 8 REHAB UNIT  Daily Treatment Note  NAME: Cathy Joseph  : 1960  MRN: [id number]    Date of Service: 4/10/2020    Discharge Recommendations:  Home with Home health OT       Assessment   Performance deficits / Impairments: Decreased functional mobility ; Decreased ADL status; Decreased strength;Decreased endurance;Decreased balance;Decreased high-level IADLs  Assessment: Pt tolerated tx well, pt progressing well toward goals. Pt benefits from further therapy to address above deficits. Treatment Diagnosis: R AKA  Prognosis: Good  Activity Tolerance  Activity Tolerance: Patient Tolerated treatment well         Patient Diagnosis(es): The encounter diagnosis was Unilateral AKA, right ([address] has a past medical history of Anxiety, Blood circulation, collateral, CAD (coronary artery disease), Cancer ([address] CHF (congestive heart failure) ([address] Chyloperitoneum determined by paracentesis, CKD (chronic kidney disease), stage V ([address] Dialysis patient ([address] GERD (gastroesophageal reflux disease), Hemodialysis patient ([address] Hepatic cirrhosis ([address] Hepatitis C, chronic ([address] History of blood transfusion, HTN (hypertension), Hx of blood clots, Mixed hyperlipidemia, Osteoarthritis, Pacemaker, Pain management, Palliative care patient, PVD (peripheral vascular disease) ([address] Sepsis ([address] Skin ulcer of right heel with fat layer exposed ([address] Sleep apnea, Type II diabetes mellitus ([address] Ulcer of left heel, with fat layer exposed ([address] Ulcer of right foot, with fat layer exposed ([address] Wound infection, and Wound infection after surgery. has a past surgical history that includes other surgical history (2010); other surgical history ([id number]);  Dialysis fistula creation (11 [facility]); angioplasty (11 [facility]); other surgical history (2011   [facility]); other surgical history (12   [facility]); vascular surgery (12); vascular surgery (7/10/2012 [facility]); vascular surgery (7/30/15 [facility]); vascular surgery (7/30/15 [facility] cont); vascular surgery (8/13/15 [facility]); vascular surgery (5/3/16 [facility]); vascular surgery (12/08/2016); vascular surgery (01/20/2017); Cardiac catheterization (04/04/11); Paracentesis; Dialysis fistula creation (Left, 2/16/2017); vascular surgery (02/16/2017); vascular surgery (04/11/2017); vascular surgery (01/25/2018); pr anastomosis,av,any site (Left, 1/30/2018); vascular surgery (02/28/2018); vascular surgery (06/13/2019); vascular surgery (06/25/2019); Femoral-tibial Bypass Graft (Right, 6/25/2019); EXPLORATION OF WOUND OF EXTREMITY (Right, 7/12/2019); vascular surgery (08/16/2019); vascular surgery (10/23/2019); femoral bypass (Right, 1/27/2020); vascular surgery (01/30/2020); pacemaker placement (Right, 2015); pr angio retro brachial angio (N/A, 3/6/2020); vascular surgery (03/06/2020); and AMPUTATION ABOVE KNEE (Right, 3/26/2020). Restrictions  Restrictions/Precautions  Restrictions/Precautions: Surgical Protocols, Fall Risk  Required Braces or Orthoses?: No  Implants present? : Pacemaker  Lower Extremity Weight Bearing Restrictions  Right Lower Extremity Weight Bearing: Non Weight Bearing  Left Lower Extremity Weight Bearing: Weight Bearing As Tolerated  Subjective   General  Chart Reviewed: Yes  Patient assessed for rehabilitation services?: Yes  Additional Pertinent Hx: Liver Cancer, ESRD with dialysis  Response to previous treatment: Patient with no complaints from previous session  Family / Caregiver Present: No  Diagnosis: R AKA  Subjective  Subjective: Pt states he doesn't know why but he is very sleepy this afternoon \"must be medication\"  General Comment  Comments: slower processing of verbal instructions  Pain Assessment  Pain Assessment: 0-10  Pain Level: 7  Pain Type: Surgical pain; Phantom pain  Pain Location: Leg  Pain Orientation: Right  Pain Descriptors: Aching; Sore  Pain Frequency: Intermittent  Pain Onset: Gradual  Clinical Progression:

## 2020-04-11 VITALS
WEIGHT: 161.04 LBS | TEMPERATURE: 97.8 F | DIASTOLIC BLOOD PRESSURE: 65 MMHG | BODY MASS INDEX: 24.41 KG/M2 | HEART RATE: 63 BPM | HEIGHT: 68 IN | SYSTOLIC BLOOD PRESSURE: 158 MMHG | OXYGEN SATURATION: 94 % | RESPIRATION RATE: 17 BRPM

## 2020-04-11 LAB
ANION GAP SERPL CALCULATED.3IONS-SCNC: 15 MMOL/L (ref 7–19)
BASOPHILS ABSOLUTE: 0.1 K/UL (ref 0–0.2)
BASOPHILS RELATIVE PERCENT: 0.9 % (ref 0–1)
BUN BLDV-MCNC: 21 MG/DL (ref 6–20)
CALCIUM SERPL-MCNC: 8.8 MG/DL (ref 8.6–10)
CHLORIDE BLD-SCNC: 98 MMOL/L (ref 98–111)
CO2: 26 MMOL/L (ref 22–29)
CREAT SERPL-MCNC: 3.5 MG/DL (ref 0.5–1.2)
EOSINOPHILS ABSOLUTE: 0.7 K/UL (ref 0–0.6)
EOSINOPHILS RELATIVE PERCENT: 11.4 % (ref 0–5)
GFR NON-AFRICAN AMERICAN: 18
GLUCOSE BLD-MCNC: 107 MG/DL (ref 74–109)
HCT VFR BLD CALC: 24.1 % (ref 42–52)
HEMOGLOBIN: 7.4 G/DL (ref 14–18)
IMMATURE GRANULOCYTES #: 0 K/UL
LYMPHOCYTES ABSOLUTE: 0.7 K/UL (ref 1.1–4.5)
LYMPHOCYTES RELATIVE PERCENT: 12.6 % (ref 20–40)
MCH RBC QN AUTO: 28.1 PG (ref 27–31)
MCHC RBC AUTO-ENTMCNC: 30.7 G/DL (ref 33–37)
MCV RBC AUTO: 91.6 FL (ref 80–94)
MONOCYTES ABSOLUTE: 0.6 K/UL (ref 0–0.9)
MONOCYTES RELATIVE PERCENT: 9.4 % (ref 0–10)
NEUTROPHILS ABSOLUTE: 3.9 K/UL (ref 1.5–7.5)
NEUTROPHILS RELATIVE PERCENT: 65.5 % (ref 50–65)
PDW BLD-RTO: 16.8 % (ref 11.5–14.5)
PLATELET # BLD: 133 K/UL (ref 130–400)
PMV BLD AUTO: 12 FL (ref 9.4–12.4)
POTASSIUM REFLEX MAGNESIUM: 4.4 MMOL/L (ref 3.5–5)
RBC # BLD: 2.63 M/UL (ref 4.7–6.1)
SODIUM BLD-SCNC: 139 MMOL/L (ref 136–145)
WBC # BLD: 5.9 K/UL (ref 4.8–10.8)

## 2020-04-11 PROCEDURE — 36415 COLL VENOUS BLD VENIPUNCTURE: CPT

## 2020-04-11 PROCEDURE — 80048 BASIC METABOLIC PNL TOTAL CA: CPT

## 2020-04-11 PROCEDURE — 8010000000 HC HEMODIALYSIS ACUTE INPT

## 2020-04-11 PROCEDURE — 6370000000 HC RX 637 (ALT 250 FOR IP): Performed by: PSYCHIATRY & NEUROLOGY

## 2020-04-11 PROCEDURE — 6370000000 HC RX 637 (ALT 250 FOR IP): Performed by: INTERNAL MEDICINE

## 2020-04-11 PROCEDURE — 85025 COMPLETE CBC W/AUTO DIFF WBC: CPT

## 2020-04-11 PROCEDURE — 97110 THERAPEUTIC EXERCISES: CPT

## 2020-04-11 RX ADMIN — OXYCODONE 10 MG: 5 TABLET ORAL at 05:30

## 2020-04-11 RX ADMIN — CLOPIDOGREL BISULFATE 75 MG: 75 TABLET ORAL at 11:05

## 2020-04-11 RX ADMIN — ASPIRIN 81 MG: 81 TABLET, COATED ORAL at 11:04

## 2020-04-11 RX ADMIN — OXYCODONE 10 MG: 5 TABLET ORAL at 11:04

## 2020-04-11 RX ADMIN — SEVELAMER CARBONATE 800 MG: 800 TABLET, FILM COATED ORAL at 11:04

## 2020-04-11 RX ADMIN — ISOSORBIDE MONONITRATE 60 MG: 60 TABLET, EXTENDED RELEASE ORAL at 11:05

## 2020-04-11 RX ADMIN — PANTOPRAZOLE SODIUM 20 MG: 20 TABLET, DELAYED RELEASE ORAL at 05:30

## 2020-04-11 ASSESSMENT — PAIN DESCRIPTION - PAIN TYPE
TYPE: SURGICAL PAIN;PHANTOM PAIN
TYPE: SURGICAL PAIN;PHANTOM PAIN

## 2020-04-11 ASSESSMENT — PAIN SCALES - GENERAL
PAINLEVEL_OUTOF10: 5
PAINLEVEL_OUTOF10: 4
PAINLEVEL_OUTOF10: 7
PAINLEVEL_OUTOF10: 5
PAINLEVEL_OUTOF10: 7

## 2020-04-11 ASSESSMENT — PAIN - FUNCTIONAL ASSESSMENT: PAIN_FUNCTIONAL_ASSESSMENT: ACTIVITIES ARE NOT PREVENTED

## 2020-04-11 ASSESSMENT — PAIN DESCRIPTION - ORIENTATION: ORIENTATION: RIGHT

## 2020-04-11 ASSESSMENT — PAIN DESCRIPTION - LOCATION: LOCATION: LEG

## 2020-04-11 ASSESSMENT — PAIN DESCRIPTION - DESCRIPTORS: DESCRIPTORS: ACHING;SORE

## 2020-04-11 ASSESSMENT — PAIN DESCRIPTION - ONSET: ONSET: ON-GOING

## 2020-04-11 ASSESSMENT — PAIN DESCRIPTION - FREQUENCY: FREQUENCY: CONTINUOUS

## 2020-04-11 ASSESSMENT — PAIN DESCRIPTION - PROGRESSION: CLINICAL_PROGRESSION: GRADUALLY IMPROVING

## 2020-04-11 NOTE — PROGRESS NOTES
Discharge instructions and med info given and explained to patient, verbalized understanding. Escorted to waiting family vehicle by PT for transfer training.

## 2020-04-11 NOTE — PROGRESS NOTES
Nephrology ([facility]) Consult Note      Patient:  Sabine Krueger  YOB: 1960  Date of Service: 4/11/2020  MRN: [id number]   Acct: [de-identified]   Primary Care Physician: Ada Benavides DO  Advance Directive: Full Code  Admit Date: 3/31/2020       Hospital Day: 11  Referring Provider: Marion León MD    Patient independently seen and examined, Chart, Consults, Notes, Operative notes, Labs, Cardiology, and Radiology studies reviewed as able. Subjective:  Sabine Krueger is a 61 y.o. male  whom we were consulted for end-stage renal disease secondary to diabetic nephropathy. Patient goes to [address] [place] [address] dialysis clinic on Monday Wednesday Friday. Patient has a known history of severe peripheral vascular disease, history of CABG, congestive heart failure, cirrhosis of liver, sleep apnea and hepatitis C. Admitted this time for nonhealing foot wound with plans for RLE amputation. Denied any issues with dialysis aside from pain in the foot as an outpatient. Denied cp/soa/n/v. Today, no overnight events. Tolerated dialysis. Denies chest pain, shortness of air, nausea vomiting. Hoping for d/c today. Dialysis   Pt was seen on RRT  Modality: Hemodialysis  Access: Arterial Venous Fistula  Location: left upper  QB: 600  QD: 800  UF: 600        Allergies:  Patient has no known allergies.     Medicines:  Current Facility-Administered Medications   Medication Dose Route Frequency Provider Last Rate Last Dose    metoprolol succinate (TOPROL XL) extended release tablet 25 mg  25 mg Oral BID Akanksha White MD   25 mg at 04/10/20 2029    darbepoetin sandrine-polysorbate (ARANESP) injection 100 mcg  100 mcg Subcutaneous Weekly Tye Franklin MD   100 mcg at 04/07/20 0838    amLODIPine (NORVASC) tablet 10 mg  10 mg Oral Daily Akanksha White MD   10 mg at 04/09/20 0806    nortriptyline (PAMELOR) capsule 20 mg  20 mg Oral Nightly Marion León MD   20 mg at 04/10/20 2029    Intravenous Q6H PRN Sigifredo Ellis MD        pantoprazole (PROTONIX) tablet 20 mg  20 mg Oral BID AC Sigifredo Ellis MD   20 mg at 04/11/20 0530    sevelamer (RENVELA) tablet 800 mg  800 mg Oral TID WC Sigifredo Ellis MD   800 mg at 04/11/20 1104    sucralfate (CARAFATE) tablet 1 g  1 g Oral TID PRN Sigifredo Ellis MD        polyethylene glycol [facility]) packet 17 g  17 g Oral Daily Herman Arguello MD   Stopped at 04/06/20 0830    bisacodyl (DULCOLAX) suppository 10 mg  10 mg Rectal Daily PRN Herman Arguello MD        bisacodyl (DULCOLAX) EC tablet 5 mg  5 mg Oral Daily Herman Arguello MD   5 mg at 04/06/20 7313       Past Medical History:  Past Medical History:   Diagnosis Date    Anxiety     Blood circulation, collateral     CAD (coronary artery disease)     stents x 2; per dr. [facility])     liver cancer    CHF (congestive heart failure) ([address]     Chyloperitoneum determined by paracentesis     CKD (chronic kidney disease), stage V ([address]     Dialysis patient ([address]     mon wed fri at [place] GERD (gastroesophageal reflux disease)     Hemodialysis patient ([address]     mon wed fri in [place] Hepatic cirrhosis ([address]     dr. Lissy Garrido; has been going to [facility] for liver and kidney transplant list.    Hepatitis C, chronic ([address]     History of blood transfusion     HTN (hypertension)     Hx of blood clots     arm/fistula    Mixed hyperlipidemia 1/9/2017    Osteoarthritis     Pacemaker     Pain management     Dr. Harjit Turner care patient 07/09/2019    PVD (peripheral vascular disease) ([address]     Sepsis ([address]     Skin ulcer of right heel with fat layer exposed ([address] 8/16/2019    Sleep apnea     no cpap at present.  Type II diabetes mellitus (HCC)     no meds.     Ulcer of left heel, with fat layer exposed ([address] 2/28/2018    Ulcer of right foot, with fat layer exposed ([address] 8/16/2019    Wound infection     Wound infection after surgery 7/8/2019       Past Surgical History:  Past Surgical History:   Procedure Laterality Date    AMPUTATION ABOVE KNEE Right 3/26/2020    LEG AMPUTATION ABOVE KNEE performed by Evy Vera DO at [facility] ANGIOPLASTY  08/09/11 SUKHWINDER    LULEONARD cephalic vein balloon angioplasty with 7mm x 4cm balloon. coild embolization of 2 cephallic vein brances with 3mm x 5cm coils    CARDIAC CATHETERIZATION  04/04/11    cardiac cath and stent x1 by Dr. Fidel Hamman  07/26/11 SUKHWINDER    LULEONARD AV fistula. coild embolization of cephalic vein branch near the wrist with 3mm EMBO coils. balloon a'plasty left cephalic vein with [id number] balloon and then [id number] balloon    DIALYSIS FISTULA CREATION Left 2/16/2017    LEFT UPPER EXTREMITY BRACHIAL / AXILLARY GRAFT AND STENT LEFT SUBCLAVIAN VEIN  performed by Shannon Edwards MD at [address] Right 7/12/2019    RIGHT LEG WOUND WASHOUT performed by Bernardino Daugherty MD at [facility] Right 1/27/2020    REVISION OF RIGHT LEG BYPASS GRAFT performed by Bernardino Daugherty MD at [facility] FEMORAL-TIBIAL BYPASS GRAFT Right 6/25/2019    FEMORAL TIBIAL/PERINEAL BYPASS WITH REVERSED GREATER SAPHENOUS VEIN performed by Shannon Edwards MD at [address] [facility] Jefry Said  12/26/2010    Right internal jugular vein tunneled dialysis catheter placement    OTHER SURGICAL HISTORY  [id number]    Redo of dialysis catheter    OTHER SURGICAL HISTORY  9/6/2011   [facility]    Removal of RT IJV tunneled dialysis cath    OTHER SURGICAL HISTORY  5/22/12   [facility]    Ultrasound-guided cannulation of left cephalic vein in the mid forearm toward the AV anastomosis, Left upper  ext.  fistulograms including venograms of the SVC, Left cephalic vein balloon angioplasty with a 4mm x 100mm Darrell balloon, Completion fistulograms    PACEMAKER PLACEMENT Right 2015    medtr[employer]    PARACENTESIS      multiple/recent; per dr. Meaghan Sotelo at [address] [facility] Left 1/30/2018    UPPER EXTREMITY DRIL PROCEDURE WITH LEFT SAPHENOUS VEIN HARVESTING performed by Andi Villagomez MD at [address] ANGIO N/A 3/6/2020    BILATERAL AORTAILLIAC AND RIGHT LEG ANGIOGRAM performed by Mario Arita DO at [facility] VASCULAR SURGERY  6/19/12    LUE arteriovenous fistulogram including venography of the superior vena cava. Balloon angioplasty, left forearm cephalic vein and upper arm cephalic vein with [id number] balloon.  VASCULAR SURGERY  7/10/2012 [facility]     Revision of left distal radial artery to cephalic vein arteriovenous fistula with 7mm Artegraft interposition.  VASCULAR SURGERY  7/30/15 [facility] & [address]    Left upper extremity arteriovenous fistulograms including venography of the superior vena cava. Left cephalic vein balloon angioplasty with an 8 x 20 cm cutting balloon proximal cephalic vein. Balloon angioplasty of left cephalic vein/antecubital vein near the antecubital crease with 8 x 20 mm cutting balloon.  VASCULAR SURGERY  7/30/15 [facility] cont    Balloon angioplasty proximal end distal upper arm cephalic vein with 9 x 40 cm  balloon. Completion fistulograms of the left upper extremity.  VASCULAR SURGERY  8/13/15 [facility]    Revision of left upper extremity arteriovenous fistula with excision of pseudoaneurysm and primary repair of cephalic vein.     VASCULAR SURGERY  5/3/16 [facility]    Left upper fistulograms/venograms, left cephalic balloon 8 x 20 cutter,9 x 40 conquest,left proximal cephalic vein stents (flair 2 9 x 50), balloon stents 9 x 40 conquest.    VASCULAR SURGERY  12/08/2016    [facility]- ultrasound guided cannulation of left distal cephalic vein ultrasound guided cannulation right internal jugular vein placement of right internal jugular vein tunneled dialysis catheter (Bard Equistream XK 23cm tip to cuff)     VASCULAR SURGERY  01/20/2017    [facility]-Right upper venograms, u/s guided cannulation left basilic vein, left upper venograms, balloon

## 2020-04-11 NOTE — PLAN OF CARE
Problem: SAFETY  Goal: Free from accidental physical injury  4/4/2020 1214 by Evelio Cisse LPN  Outcome: Ongoing  4/4/2020 0242 by Suki Jerome LPN  Outcome: Ongoing     Problem: DAILY CARE  Goal: Daily care needs are met  4/4/2020 1214 by Evelio Cisse LPN  Outcome: Ongoing  4/4/2020 0242 by Suki Jerome LPN  Outcome: Ongoing     Problem: PAIN  Goal: Patient's pain/discomfort is manageable  4/4/2020 1214 by Evelio Cisse LPN  Outcome: Ongoing  4/4/2020 0242 by Suki Jerome LPN  Outcome: Ongoing     Problem: SKIN INTEGRITY  Goal: Skin integrity is maintained or improved  4/4/2020 1214 by Evelio Cisse LPN  Outcome: Ongoing  4/4/2020 0242 by Suki Jerome LPN  Outcome: Ongoing     Problem: KNOWLEDGE DEFICIT  Goal: Patient/S.O. demonstrates understanding of disease process, treatment plan, medications, and discharge instructions.   4/4/2020 1214 by Evelio Cisse LPN  Outcome: Ongoing  4/4/2020 0242 by Suki Jerome LPN  Outcome: Ongoing     Problem: DISCHARGE BARRIERS  Goal: Patient's continuum of care needs are met  4/4/2020 1214 by Evelio Cisse LPN  Outcome: Ongoing  4/4/2020 0242 by Suki Jerome LPN  Outcome: Ongoing     Problem: Infection - Surgical Site:  Goal: Will show no infection signs and symptoms  Description: Will show no infection signs and symptoms  4/4/2020 1214 by Evelio Cisse LPN  Outcome: Ongoing  4/4/2020 0242 by Suki Jerome LPN  Outcome: Ongoing     Problem: Falls - Risk of:  Goal: Will remain free from falls  Description: Will remain free from falls  4/4/2020 1214 by Evelio Cisse LPN  Outcome: Ongoing  4/4/2020 0242 by Suki Jerome LPN  Outcome: Ongoing  Goal: Absence of physical injury  Description: Absence of physical injury  4/4/2020 1214 by Evelio Cisse LPN  Outcome: Ongoing  4/4/2020 0242 by Suki Jerome LPN  Outcome: Ongoing     Problem: Pain:  Goal: Pain level will decrease  Description: Pain level will
Problem: SAFETY  Goal: Free from accidental physical injury  Outcome: Ongoing     Problem: DAILY CARE  Goal: Daily care needs are met  Outcome: Ongoing     Problem: PAIN  Goal: Patient's pain/discomfort is manageable  Outcome: Ongoing     Problem: SKIN INTEGRITY  Goal: Skin integrity is maintained or improved  Outcome: Ongoing     Problem: KNOWLEDGE DEFICIT  Goal: Patient/S.O. demonstrates understanding of disease process, treatment plan, medications, and discharge instructions.   Outcome: Ongoing     Problem: DISCHARGE BARRIERS  Goal: Patient's continuum of care needs are met  Outcome: Ongoing     Problem: Infection - Surgical Site:  Goal: Will show no infection signs and symptoms  Description: Will show no infection signs and symptoms  Outcome: Ongoing     Problem: Falls - Risk of:  Goal: Will remain free from falls  Description: Will remain free from falls  Outcome: Ongoing  Goal: Absence of physical injury  Description: Absence of physical injury  Outcome: Ongoing     Problem: Pain:  Goal: Pain level will decrease  Description: Pain level will decrease  Outcome: Ongoing  Goal: Control of acute pain  Description: Control of acute pain  Outcome: Ongoing  Goal: Control of chronic pain  Description: Control of chronic pain  Outcome: Ongoing     Problem: Mood - Altered:  Goal: Mood stable  Description: Mood stable  Outcome: Ongoing
Problem: SAFETY  Goal: Free from accidental physical injury  Outcome: Ongoing     Problem: DAILY CARE  Goal: Daily care needs are met  Outcome: Ongoing     Problem: PAIN  Goal: Patient's pain/discomfort is manageable  Outcome: Ongoing     Problem: SKIN INTEGRITY  Goal: Skin integrity is maintained or improved  Outcome: Ongoing     Problem: KNOWLEDGE DEFICIT  Goal: Patient/S.O. demonstrates understanding of disease process, treatment plan, medications, and discharge instructions.   Outcome: Ongoing     Problem: DISCHARGE BARRIERS  Goal: Patient's continuum of care needs are met  Outcome: Ongoing     Problem: Infection - Surgical Site:  Goal: Will show no infection signs and symptoms  Description: Will show no infection signs and symptoms  Outcome: Ongoing     Problem: Falls - Risk of:  Goal: Will remain free from falls  Description: Will remain free from falls  Outcome: Ongoing  Goal: Absence of physical injury  Description: Absence of physical injury  Outcome: Ongoing     Problem: Pain:  Goal: Pain level will decrease  Description: Pain level will decrease  Outcome: Ongoing  Goal: Control of acute pain  Description: Control of acute pain  Outcome: Ongoing  Goal: Control of chronic pain  Description: Control of chronic pain  Outcome: Ongoing     Problem: Mood - Altered:  Goal: Mood stable  Description: Mood stable  Outcome: Ongoing
Problem: SAFETY  Goal: Free from accidental physical injury  Outcome: Ongoing   Up to wheelchair with 1 assist
decrease  4/5/2020 0138 by Jeanne Cordero LPN  Outcome: Ongoing  4/4/2020 1214 by Marie Snider LPN  Outcome: Ongoing  Goal: Control of acute pain  Description: Control of acute pain  4/5/2020 0138 by Jeanne Cordero LPN  Outcome: Ongoing  4/4/2020 1214 by Marie Snider LPN  Outcome: Ongoing  Goal: Control of chronic pain  Description: Control of chronic pain  4/5/2020 0138 by Jeanne Cordero LPN  Outcome: Ongoing  4/4/2020 1214 by Marie Snider LPN  Outcome: Ongoing     Problem: Mood - Altered:  Goal: Mood stable  Description: Mood stable  4/5/2020 0138 by Jeanne Cordero LPN  Outcome: Ongoing  4/4/2020 1214 by Marie Snider LPN  Outcome: Ongoing
Plan of Care:  Frequency:   [] 5 days per week, 60 minutes per day                            [x] Not appropriate for Speech Therapy  Treatments may include speech/language/communication therapy, cognitive training, group therapy, education, and/or dysphagia therapy based on the above goals. These goals were reviewed with this patient at the time of assessment and Mehrdad Pearson is in agreement. CASE MANAGEMENT:  Goals:   Assist patient/family with discharge planning, patient/family counseling,  and coordination with insurance during ARU stay. Patient Goals: pain control, wound healing, safely transfer and mobility               Activities Prior to Admit:      Active : Yes        Leisure & Hobbies: fishing, golf          Mehrdad Pearson will be seen a minimum of 3 hours of therapy per day/a minimum of 5 out of 7 days per week. [x] In this rare instance due to the nature of this patient's medical involvement, this patient will be seen 15 hours per week (900 minutes within a 7 day period). Treatments may include therapeutic exercises, gait training, neuromuscular re-ed, transfer training, community reintegration, bed mobility, w/c mobility and training, self care, home mgmt, cognitive training, energy conservation,dysphagia tx, speech/language/communication therapy, group therapy, and patient/family education. In addition, dietician/nutritionist may monitor calorie count as well as intake and collaboratively work with SLP on dietary upgrades. Neuropsychology/Psychology may evaluate and provide necessary support.     Medical issues being managed closely and that require 24 hour availability of a physician:   [] Swallowing Precautions  [] Bowel/Bladder Fx  [] Weight bearing precautions   [] Wound Care    [x] Pain Mgmt   [] Infection Protection   [x] DVT Prophylaxis   [] Fall Precautions  [x] Fluid/Electrolyte/Nutrition Balance   [] Voice Protection   [] Respiratory  [] Other:    Medical Prognosis:

## 2020-04-13 NOTE — DISCHARGE SUMMARY
Occupational Therapy Discharge Summary         Date: 2020  Patient Name: Mario Barrientos        MRN: [id number]    : 1960  (61 y.o.)  Gender: male      Diagnosis: RIGHT AKA  Restrictions/Precautions  Restrictions/Precautions: Surgical Protocols, Fall Risk  Required Braces or Orthoses?: No  Implants present? : Pacemaker      Discharge Date: 20      UE Functioning:  WFLs, occasional tremors    Home Management:  Functional Mobility  Functional - Mobility Device: Wheelchair  Activity: To/From therapy gym  Assist Level: Stand by assistance  Functional Mobility Comments: Light food prep task in kitchen. Adaptive Equipment/DME Status:   Recommend power wheelchair and ramp  Home Equipment: Wheelchair-manual, Rolling walker, Crutches      Pain Assessment:  Pain Level: 7  Pain Location: Leg    Remaining Problems:  Decreased functional mobility ; Decreased ADL status; Decreased strength; Decreased endurance; Decreased balance; Decreased high-level IADLs    STGs:  Short term goals  Time Frame for Short term goals: 1 week  Short term goal 1: Supervision with bathing hygiene. Short term goal 2: Supervision with clothing management/hygiene for toileting. Short term goal 3: Supervision with toilet transfers. Short term goal 4: Supervsion with LB dressing. Short term goal 5: Supervision with ambulatory homemaking tasks. (not met- pt is nonambulatory)  Met 4/5 STGs    LTGs:  Long term goals  Time Frame for Long term goals : 2 weeks   Long term goal 1: Modified independent with bathing hygiene. Long term goal 2: Modified independent with toileting and toilet transfers. Long term goal 3: Modified independent with overall dressing. Long term goal 4: Independent with HEP. Long term goal 5: Patient verbalize DME needs. Long term goal 7: Modified independent with ambulatory home making tasks. / LTGS met    Discharge Setting and Recommendations:   To mother's home with home health Occupational
none  Code status: Full Code   Activity: activity as tolerated  Diet: DIET RENAL;    Wound Care: as directed  Equipment: as per therapy      Marion León MD    At least 35 minutes were spent in discharging the patient

## 2020-04-16 ENCOUNTER — HOSPITAL ENCOUNTER (OUTPATIENT)
Dept: WOUND CARE | Age: 60
Discharge: HOME | End: 2020-04-16
Payer: MEDICARE

## 2020-04-16 VITALS
TEMPERATURE: 98.7 F | SYSTOLIC BLOOD PRESSURE: 146 MMHG | DIASTOLIC BLOOD PRESSURE: 58 MMHG | RESPIRATION RATE: 16 BRPM | BODY MASS INDEX: 24.4 KG/M2 | HEART RATE: 65 BPM | WEIGHT: 161 LBS | HEIGHT: 68 IN

## 2020-04-16 PROBLEM — Z89.611 STATUS POST ABOVE-KNEE AMPUTATION OF RIGHT LOWER EXTREMITY (HCC): Status: ACTIVE | Noted: 2020-04-16

## 2020-04-16 PROCEDURE — 99024 POSTOP FOLLOW-UP VISIT: CPT | Performed by: SURGERY

## 2020-04-16 PROCEDURE — 99213 OFFICE O/P EST LOW 20 MIN: CPT

## 2020-04-16 ASSESSMENT — PAIN DESCRIPTION - FREQUENCY: FREQUENCY: CONTINUOUS

## 2020-04-16 ASSESSMENT — PAIN DESCRIPTION - LOCATION: LOCATION: LEG

## 2020-04-16 ASSESSMENT — PAIN DESCRIPTION - PAIN TYPE: TYPE: SURGICAL PAIN;PHANTOM PAIN

## 2020-04-16 ASSESSMENT — PAIN DESCRIPTION - ORIENTATION: ORIENTATION: RIGHT

## 2020-04-16 ASSESSMENT — PAIN DESCRIPTION - DESCRIPTORS: DESCRIPTORS: ACHING

## 2020-04-16 ASSESSMENT — PAIN SCALES - GENERAL: PAINLEVEL_OUTOF10: 7

## 2020-04-16 NOTE — PROGRESS NOTES
post debridement.         Assessment:      Patient Active Problem List   Diagnosis    Osteoarthritis    Chronic diastolic congestive heart failure ([address]    CAD (coronary artery disease)    Essential hypertension    Liver disease    Chronic deep vein thrombosis (DVT) of axillary vein of left upper extremity (HCC)    Steal syndrome as complication of dialysis access ([address]    ESRD on dialysis ([address]    Steal syndrome of dialysis vascular access ([address]    Atherosclerosis of artery of extremity with ulceration ([address]    Atherosclerosis of artery of extremity with rest pain (HCC)    Anemia, chronic disease    Iatrogenic complication of vascular surgery    Pacemaker    Hepatitis C, chronic ([address]    PVD (peripheral vascular disease) ([address]    Hyponatremia    Normocytic anemia    Thrombocytopenia (HCC)    Palliative care patient    Alcoholic cirrhosis of liver with ascites ([address]    Anticoagulated by anticoagulation treatment    CHF (congestive heart failure) ([address]    Colon polyps    Dialysis patient ([address]    Hepatocellular carcinoma ([address]    Mixed hyperlipidemia    Spontaneous bacterial peritonitis ([address]    Tobacco abuse    Non-healing non-surgical wound    Knee pain, right    Effusion of knee joint right    Failing vascular bypass graft    Hematoma    Stenosis of artery of right lower extremity ([address]    NSTEMI (non-ST elevated myocardial infarction) ([address]    Cellulitis of right lower extremity without foot    Right leg pain    Cellulitis of right lower extremity    Atherosclerosis of native arteries of extremities with rest pain, right leg (HCC)    Leg ulcer, right, with fat layer exposed ([address]    Open wound of right knee    Right second toe ulcer, with fat layer exposed ([address]    Surgical wound, non healing, subsequent encounter    Sinoatrial node dysfunction (HCC)    Unilateral AKA, right ([address]    Sleep apnea    Type II diabetes mellitus ([address]    Status post above-knee amputation of right lower extremity ([facility]          Plan:          Plan for wound - Dress per physician order  Treatment:     Compression : stump    Offloading : No   Dressing : none   Additional Therapy : none  Patient is s/p right AKA. He is motivated to ambulate. He is level 3. Variable solo, unlimited community Ambulator. Stitches and staples removed today. 1. Discussed appropriate home care of this wound. Wound redressed. 2. Patient instructions were given. 3. Follow up: prn. We will see him in vascular office in 4 weeks.                        Electronically signed by Ilan Uriarte DO on 4/16/2020 at 9:28 AM

## 2020-04-16 NOTE — DISCHARGE INSTR - COC
vein balloon angioplasty with an 8 x 20 cm cutting balloon proximal cephalic vein. Balloon angioplasty of left cephalic vein/antecubital vein near the antecubital crease with 8 x 20 mm cutting balloon.  VASCULAR SURGERY  7/30/15 [facility] cont    Balloon angioplasty proximal end distal upper arm cephalic vein with 9 x 40 cm  balloon. Completion fistulograms of the left upper extremity.  VASCULAR SURGERY  8/13/15 [facility]    Revision of left upper extremity arteriovenous fistula with excision of pseudoaneurysm and primary repair of cephalic vein.  VASCULAR SURGERY  5/3/16 [facility]    Left upper fistulograms/venograms, left cephalic balloon 8 x 20 cutter,9 x 40 conquest,left proximal cephalic vein stents (flair 2 9 x 50), balloon stents 9 x 40 conquest.    VASCULAR SURGERY  12/08/2016    [facility]- ultrasound guided cannulation of left distal cephalic vein ultrasound guided cannulation right internal jugular vein placement of right internal jugular vein tunneled dialysis catheter (Bard Equistream XK 23cm tip to cuff)     VASCULAR SURGERY  01/20/2017    [facility]-Right upper venograms, u/s guided cannulation left basilic vein, left upper venograms, balloon angioplasty left subclavian vein 10x40 conquest.    VASCULAR SURGERY  02/16/2017    [facility]. Left brachial artery to axillary vein arteriovenous graft with 7 mm artegraft. Left upper extremity venograms. Left subclavian vein stent viabahn 13x50. Left subclavian vein stent balloon angioplasty 12x40 atlas.  VASCULAR SURGERY  04/11/2017    [facility]. Removal of tunneled dialysis catheter right internal jugular vein.  VASCULAR SURGERY  01/25/2018    [facility]. Arch aortogram, left upper arteriogram, AV graft angiogram/venogram.    VASCULAR SURGERY  02/28/2018    [facility]. Right CFA 5f-6f-7f sheath, aortogram with bilateral lower arteriogram, left SFA/pop  balloon angioplasty 5x100 , 6x150 lutonix ( 2), left CFA  7f sheath, bilateral iliac kissing stents right 10x38 icast, left 9x59 icast expanded with 10x40 , completion aortogram, mynx left CFA , failed mynx right CFA.  VASCULAR SURGERY  06/13/2019    [facility] left CFA 5f sheath, aortogram with bilateral lower arteriogram, mynx left CFA.  VASCULAR SURGERY  06/25/2019    [facility]-VI. Femoral tibial/perineal bypass with reversed greater saphenous vein.  VASCULAR SURGERY  08/16/2019    [facility]. Aortogram and runoff, selective right CFA injections. PTA of fem-tp bypass with 4.0 x 220 mm tod balloon to 4.33 mm    VASCULAR SURGERY  10/23/2019    VI. Thrombin injection in the left SFA PSA, right common femoral artery us guided access, left SFA stent treatment of the flap.  VASCULAR SURGERY  01/30/2020    [facility] Endarterectomy of the right common femoral artery, superficial femoral artery, and profunda femoris artery. Redo right groin x3.  VASCULAR SURGERY  03/06/2020    VI. BILATERAL AORTAILLIAC AND RIGHT LEG ANGIOGRAM       Immunization History: There is no immunization history on file for this patient. Active Problems:  Patient Active Problem List   Diagnosis Code    Osteoarthritis M19.90    Chronic diastolic congestive heart failure (HCC) I50.32    CAD (coronary artery disease) I25.10    Essential hypertension I10    Liver disease K76.9    Chronic deep vein thrombosis (DVT) of axillary vein of left upper extremity ([address] I82. A22    Steal syndrome as complication of dialysis access ([address] T82.898A    ESRD on dialysis ([address] N18.6, Z99.2    Steal syndrome of dialysis vascular access [facility]) T82.898A    Atherosclerosis of artery of extremity with ulceration ([address] I70.299, L97.909    Atherosclerosis of artery of extremity with rest pain (HCC) I70.229    Anemia, chronic disease D63.8    Iatrogenic complication of vascular surgery T81. 9XXA    Pacemaker Z95.0    Hepatitis C, chronic (HCC) B18.2    PVD (peripheral vascular disease) (HCC) I73.9    Hyponatremia E87.1    Normocytic anemia D64.9    Thrombocytopenia (HCC) D69.6    Mobility/ADLs:  Walking   {[facility]P DME AOPX:506498398}  Transfer  {CHP DME JSUP:451994550}  Bathing  {CHP DME JXAO:375477887}  Dressing  {CHP DME XWSK:769669251}  Toileting  {CHP DME QTHT:863108497}  Feeding  {CHP DME BNME:792379881}  Med Admin  {P DME EYFA:168625875}  Med Delivery   {[facility] MED Delivery:284040685}    Wound Care Documentation and Therapy:        Elimination:  Continence:   · Bowel: {YES / CAIO:59832}  · Bladder: {YES / QK:80557}  Urinary Catheter: {Urinary Catheter:642551507}   Colostomy/Ileostomy/Ileal Conduit: {YES / QV:85078}       Date of Last BM: ***  No intake or output data in the 24 hours ending 20 0827  No intake/output data recorded.     Safety Concerns:     [employer] Safety Concerns:375307839}    Impairments/Disabilities:      [employer] Impairments/Disabilities:597329800}    Nutrition Therapy:  Current Nutrition Therapy:   [employer] Diet List:511121634}    Routes of Feeding: {[facility] DME Other Feedings:018001419}  Liquids: {Slp liquid thickness:92493}  Daily Fluid Restriction: {CHP DME Yes amt example:907290299}  Last Modified Barium Swallow with Video (Video Swallowing Test): {Done Not Done TCCX:548045068}    Treatments at the Time of Hospital Discharge:   Respiratory Treatments: ***  Oxygen Therapy:  {Therapy; copd oxygen:57508}  Ventilator:    { CC Vent FNF}    Rehab Therapies: {THERAPEUTIC INTERVENTION:4249971886}  Weight Bearing Status/Restrictions: [employer] Weight [employer]}  Other Medical Equipment (for information only, NOT a DME order):  {EQUIPMENT:811772981}  Other Treatments: ***    Patient's personal belongings (please select all that are sent with patient):  {[facility] DME Belongings:002306446}    RN SIGNATURE:  {Esignature:833909456}    CASE MANAGEMENT/SOCIAL WORK SECTION    Inpatient Status Date: ***    Readmission Risk Assessment Score:  Readmission Risk              Risk of Unplanned Readmission:        0           Discharging to Facility/ Agency   · Name:

## 2020-04-20 ENCOUNTER — TELEPHONE (OUTPATIENT)
Dept: CARDIOLOGY | Age: 60
End: 2020-04-20

## 2020-04-20 NOTE — TELEPHONE ENCOUNTER
LVM; Left vm for pt to contact the office if he would like to change his appt to a VV; Pt can also keep his OV;     IF pt wants to change to a VV; Please message the office so we can change it; It pf wants to keep as an OV; please update office notes.

## 2020-04-28 ENCOUNTER — TELEPHONE (OUTPATIENT)
Dept: CARDIOLOGY | Age: 60
End: 2020-04-28

## 2020-04-29 ENCOUNTER — TELEPHONE (OUTPATIENT)
Dept: CARDIOLOGY | Age: 60
End: 2020-04-29

## 2020-04-30 ENCOUNTER — TELEPHONE (OUTPATIENT)
Dept: CARDIOLOGY | Age: 60
End: 2020-04-30

## 2020-05-06 ENCOUNTER — HOSPITAL ENCOUNTER (OUTPATIENT)
Dept: CT IMAGING | Facility: HOSPITAL | Age: 60
End: 2020-05-06

## 2020-07-03 RX ORDER — ATORVASTATIN CALCIUM 80 MG/1
TABLET, FILM COATED ORAL
Qty: 90 TABLET | Refills: 1 | OUTPATIENT
Start: 2020-07-03

## 2023-05-13 NOTE — FLOWSHEET NOTE
Pt alert and cooperative, and in pain when he arrived to [facility] from dialysis. Pt asking for pain med for back and right leg pain. Lortab 5 mg x 2 given to to patient at 1523.    1530  Pt had onset of nausea and vomiting. 1  Drinks of 7-UP given to patient to settle his stomach. Pt and patient's mother asking for the doctor to give him a script for pain meds. 1550  A call placed to Dr. Mariola Weber to ask for a script for pain med for the patient, and he said no.  1600  Zofran 4 mg IV given to patient for NV.  1605  Pt insisting on getting up and leaving. Left groin remains stable,  Discharge instructions explained to patient with voiced understanding. Assisted getting patient ready for discharge. Dressings remain to right knee and leg and foot. Left groin clear of bleeding. 80  [facility] for discharge home. A sandwich given to patient to eat on way home. Pt as stable as he can be at this time. No

## 2024-04-25 NOTE — PROGRESS NOTES
Bilateral Lower Extremity Vein Mapping performed.  (measurements in mm)       GSV Right   Left    Z1: 4.4   5.5  Z2: 4.0   4.0  Z3: 3.0   2.5  Z4: 2.5   1.7  Z5: 2.7   3.2  Z6: 2.0   2.2  Z7: 1.9   2.2  Z8: 3.1   1.8    LSV  Right   Left    Z5: 1.6   2.5  Z7: 2.0   2.1  Z8: 1.7   1.8 75% of the time

## (undated) DEVICE — GLOVE SURG SZ 7 L12IN FNGR THK79MIL GRN LTX FREE

## (undated) DEVICE — ZIMMER® STERILE DISPOSABLE TOURNIQUET CUFF WITH PLC, DUAL PORT, SINGLE BLADDER, 34 IN. (86 CM)

## (undated) DEVICE — 3M™ BAIR HUGGER® LOWER BODY BLANKET, 10 PER CASE 52500: Brand: BAIR HUGGER™

## (undated) DEVICE — SUTURE ETHLN SZ 3-0 L18IN NONABSORBABLE BLK FS-1 L24MM 3/8 663H

## (undated) DEVICE — SUTURE PROL SZ 6-0 L24IN NONABSORBABLE BLU L9.3MM BV-1 3/8 8805H

## (undated) DEVICE — ADHESIVE SKIN CLSR 0.7ML TOP DERMBND ADV

## (undated) DEVICE — DRESSING FOAM W4XL12IN SIL RECT ADH WTRPRF FLM BK W/ BORD

## (undated) DEVICE — SURGICAL PROCEDURE PACK VASC LOURDES HOSP

## (undated) DEVICE — DISCONTINUED USE 127221 SUTURE SILK PRMHND ETHLN BR BLK REV 2-0 18 685H

## (undated) DEVICE — GUIDEWIRE VASC L180CM DIA0.035IN L15CM STR TIP PTFE S STL

## (undated) DEVICE — CATHETER KIT 5 FR 21 GAX7 CM MICROINTRODUCER GUIDEWIRE STIFF

## (undated) DEVICE — SUTURE PROL SZ 7-0 L24IN NONABSORBABLE BLU L9.3MM BV-1 3/8 M8702

## (undated) DEVICE — ARM BOARD PAD: Brand: DEVON

## (undated) DEVICE — SUTURE VCRL + SZ 4-0 L18IN ABSRB WHT TIE POLYGLACTIN 910 VCP109G

## (undated) DEVICE — SUTURE VCRL SZ 3-0 L18IN ABSRB UD L26MM SH 1/2 CIR J864D

## (undated) DEVICE — PERCUTANEOUS ENTRY THINWALL NEEDLE  ONE-PART: Brand: COOK

## (undated) DEVICE — TOWEL,OR,DSP,ST,BLUE,DLX,4/PK,20PK/CS: Brand: MEDLINE

## (undated) DEVICE — SUTURE ETHLN SZ 2-0 L30IN NONABSORBABLE BLK L36MM FSLX 3/8 1674H

## (undated) DEVICE — GLIDESHEATH BASIC HYDROPHILIC COATED INTRODUCER SHEATH: Brand: GLIDESHEATH

## (undated) DEVICE — BANDAGE COMPR W6INXL12FT SMOOTH FOR LIMB EXSANG ESMARCH

## (undated) DEVICE — SOLUTION IV 500ML 0.9% SOD CHL PH 5 INJ USP VIAFLX PLAS

## (undated) DEVICE — SUTURE PERMAHAND SZ 2-0 L30IN NONABSORBABLE BLK SILK W/O A305H

## (undated) DEVICE — GOWN,PREVENTION PLUS,XL,ST,24/CS: Brand: MEDLINE

## (undated) DEVICE — Z DISCONTINUED USE 2429233 DRESSING FOAM W10XL10CM 5 LAYR SELF ADH VERSATILE SAFETAC

## (undated) DEVICE — BANDAGE COMPR W3INXL15FT BGE E SGL LAYERED CLP CLSR

## (undated) DEVICE — SUTURE PERMAHAND SZ 2-0 L18IN NONABSORBABLE BLK L26MM FS 685G

## (undated) DEVICE — MINOR CDS: Brand: MEDLINE INDUSTRIES, INC.

## (undated) DEVICE — SUTURE NONABSORBABLE MONOFILAMENT 7-0 BV-1 1X24 IN PROLENE 8702H

## (undated) DEVICE — CANNULA PERF L1.3IN TIP L2MM S STL POLYUR TB ARTOTMY BLB

## (undated) DEVICE — Device: Brand: LEVEL 1

## (undated) DEVICE — CHECK-FLO PERFORMER INTRODUCER: Brand: PERFORMER

## (undated) DEVICE — SUTURE ABSORBABLE MONOFILAMENT 3-0 SH 27 IN UD PDS + PDP416H

## (undated) DEVICE — SUTURE ETHLN SZ 4-0 L18IN NONABSORBABLE BLK L19MM PS-2 3/8 1667H

## (undated) DEVICE — PLATE ES AD W 9FT CRD 2

## (undated) DEVICE — DECANTER FLD 9IN ST BG FOR ASEP TRNSF OF FLD

## (undated) DEVICE — GLOW 'N TELL 30CM TAPE (50 STRIPS): Brand: VASCUTAPE RADIOPAQUE TAPE

## (undated) DEVICE — CURAVIEW LED LARYNSCP BLDE

## (undated) DEVICE — SUTURE VCRL SZ 3-0 L18IN ABSRB UD W/O NDL POLYGLACTIN 910 J110T

## (undated) DEVICE — Z INACTIVE USE 2535480 CLIP LIG M BLU TI HRT SHP WIRE HORZ 180 PER BX

## (undated) DEVICE — CHLORAPREP 26ML ORANGE

## (undated) DEVICE — ROYAL SILK SURGICAL GOWN, XXL: Brand: CONVERTORS

## (undated) DEVICE — C-ARM: Brand: UNBRANDED

## (undated) DEVICE — SHEET,DRAPE,53X77,STERILE: Brand: MEDLINE

## (undated) DEVICE — 3M™ IOBAN™ 2 ANTIMICROBIAL INCISE DRAPE 6651EZ: Brand: IOBAN™ 2

## (undated) DEVICE — SOLUTION IV 100ML 0.9% SOD CHL PLAS CONT USP VIAFLX 1 PER

## (undated) DEVICE — CAUTERY TIP POLISHER: Brand: DEVON

## (undated) DEVICE — RADIFOCUS GLIDEWIRE ADVANTAGE GUIDEWIRE: Brand: GLIDEWIRE ADVANTAGE

## (undated) DEVICE — CLIP INT SM WIDE RED TI TRNSVRS GRV CHEVRON SHP W/ PRECIS

## (undated) DEVICE — GOWN,PRECEPT,XLNG/XXLARGE,STRL: Brand: MEDLINE

## (undated) DEVICE — STERILE LATEX POWDER FREE SURGICAL GLOVES WITH HYDROGEL COATING: Brand: PROTEXIS

## (undated) DEVICE — SUTURE PROL SZ 3-0 L36IN NONABSORBABLE BLU L26MM SH 1/2 CIR 8522H

## (undated) DEVICE — CONVERTORS STOCKINETTE: Brand: CONVERTORS

## (undated) DEVICE — SOLUTION IRRIG 1000ML 09% SOD CHL USP PIC PLAS CONTAINER

## (undated) DEVICE — TUBING, SUCTION, 1/4" X 20', STRAIGHT: Brand: MEDLINE INDUSTRIES, INC.

## (undated) DEVICE — TRAY PREP DRY W/ PREM GLV 2 APPL 6 SPNG 2 UNDPD 1 OVERWRAP

## (undated) DEVICE — RADIFOCUS GLIDECATH: Brand: GLIDECATH

## (undated) DEVICE — SOLUTION IV IRRIG POUR BRL 0.9% SODIUM CHL 2F7124

## (undated) DEVICE — SOLUTION IV 1000ML 0.9% SOD CHL PH 5 INJ USP VIAFLX PLAS

## (undated) DEVICE — BANDAGE GZ W45INXL4 1 10YD FLUF RL 6 PLY DERMACEA

## (undated) DEVICE — SUTURE VCRL SZ 4-0 L18IN ABSRB UD VCRL POLYGLACTIN 910 COAT J109T

## (undated) DEVICE — SUTURE VCRL CTRL REL 2-0 CTX 18IN ABSRB BRAID UD J723D

## (undated) DEVICE — SPONGE LAP W18XL18IN WHT COT 4 PLY FLD STRUNG RADPQ DISP ST

## (undated) DEVICE — SUTURE ABSORBABLE BRAIDED 3-0 12X18 IN COAT UD VICRYL + VCP110G

## (undated) DEVICE — 2108 SERIES SAGITTAL BLADE FAN, OFFSET  (34.5 X 0.8 X 64.0MM)

## (undated) DEVICE — BANDAGE COMPR W4INXL15FT BGE E SGL LAYERED CLP CLSR

## (undated) DEVICE — SUTURE PERMAHAND SZ 3-0 L18IN NONABSORBABLE BLK L26MM SH C013D

## (undated) DEVICE — TUBE ET 8MM NSL ORAL BASIC CUF INTMED MURPHY EYE RADPQ MRK

## (undated) DEVICE — SUTURE PERMAHAND SZ 3-0 L30IN NONABSORBABLE BLK SILK BRAID A304H

## (undated) DEVICE — SHEET,T,THYROID,STERILE: Brand: MEDLINE

## (undated) DEVICE — PENCIL CAUT PUSH BTTN W HOLSTER AND CRD 15FT

## (undated) DEVICE — SUTURE PROL SZ 6-0 L30IN NONABSORBABLE BLU L9.3MM BV-1 3/8 M8709

## (undated) DEVICE — SUTURE PERMA-HAND SZ 2-0 L30IN NONABSORBABLE BLK L26MM SH K833H

## (undated) DEVICE — GUIDEWIRE VASC L260CM DIA0.035IN L10CM DIA3MM J TIP PTFE S

## (undated) DEVICE — CATHETER ANGIO AD 5FR L65CM DIA0.049IN GWIRE 0.035IN SHEP

## (undated) DEVICE — SOLUTION IV 250ML 0.9% SOD CHL PH 5 INJ USP VIAFLX PLAS

## (undated) DEVICE — LOOP VES W1.3MM THK0.9MM MINI WHT SIL FLD REPELLENT

## (undated) DEVICE — GLOVE SURG SZ 65 THK91MIL LTX FREE SYN POLYISOPRENE

## (undated) DEVICE — SUTURE PROL SZ 6-0 L24IN NONABSORBABLE BLU BV-1 L9.3MM 3/8 M8805

## (undated) DEVICE — SUTURE VCRL SZ 3-0 L27IN ABSRB UD L26MM SH 1/2 CIR J416H

## (undated) DEVICE — ARM SLING: Brand: DEROYAL

## (undated) DEVICE — GAUZE,SPONGE,FLUFF,6"X6.75",STRL,10/TRAY: Brand: MEDLINE

## (undated) DEVICE — TUBING ANGIO L72IN 900PSI CLR PVC M TO FEM AIRLESS ROT ADPT

## (undated) DEVICE — FOGARTY ARTERIAL EMBOLECTOMY CATHETER 4F 40CM: Brand: FOGARTY

## (undated) DEVICE — Device

## (undated) DEVICE — SCANLAN® VASCU-STATT® II SINGLE-USE BULLDOG CLAMP W/FIRMER CLAMPING PRESS - MAXI ANGLED 45°(ORANGE), CLAMPING PRESSURE 165-175 G (2/STERILE PKG): Brand: SCANLAN® VASCU-STATT® II SINGLE-USE BULLDOG CLAMP W/FIRMER CLAMPING PRESS

## (undated) DEVICE — SUTURE PERMAHAND SZ 4-0 L12X30IN NONABSORBABLE BLK SILK A303H

## (undated) DEVICE — AGENT HEMSTAT W4XL8IN OXIDIZED REGENERATED CELOS ABSRB

## (undated) DEVICE — DUAL CUT SAGITTAL BLADE

## (undated) DEVICE — COVER US PRB W5XL96IN LTX W/ GEL

## (undated) DEVICE — 3M™ STERI-DRAPE™ INSTRUMENT POUCH 1018: Brand: STERI-DRAPE™

## (undated) DEVICE — DRAPE,EXTREMITY,89X128,STERILE: Brand: MEDLINE

## (undated) DEVICE — SUTURE VCRL SZ 2-0 L36IN ABSRB UD L36MM CT-1 1/2 CIR J945H

## (undated) DEVICE — SUTURE PROL SZ 4-0 L36IN NONABSORBABLE BLU L26MM SH 1/2 CIR 8521H

## (undated) DEVICE — DRESSING PETRO W3XL8IN OIL EMUL N ADH GZ KNIT IMPREG CELOS

## (undated) DEVICE — ATLAS®  PTA BALLOON DILATATION CATHETER 12 MM X 40 MM, 120 CM CATHETER: Brand: ATLAS®

## (undated) DEVICE — INFLATION DEVICE: Brand: ENCORE™ 26

## (undated) DEVICE — HANDPIECE SET WITH COAXIAL MULTI-ORIFICE TIP AND SUCTION TUBE: Brand: INTERPULSE

## (undated) DEVICE — TOTAL TRAY, 16FR 10ML SIL FOLEY, URN: Brand: MEDLINE

## (undated) DEVICE — PAD,ARMBOARD,CONV,FOAM,2X8X20",12PR/CS: Brand: MEDLINE

## (undated) DEVICE — SUTURE VCRL SZ 2-0 L18IN ABSRB UD POLYGLACTIN 910 BRAID TIE J111T

## (undated) DEVICE — FOGARTY ARTERIAL EMBOLECTOMY CATHETER 3F 40CM: Brand: FOGARTY

## (undated) DEVICE — RADIFOCUS GLIDEWIRE: Brand: GLIDEWIRE

## (undated) DEVICE — STAPLER SKIN L39MM DIA0.53MM CRWN 5.7MM S STL FIX HD PROX

## (undated) DEVICE — DISCONTINUED USE 367100 MEDIA CONTRAST INJ VISIPAQUE 50ML 320MG

## (undated) DEVICE — SUTURE PERMAHAND SZ 2-0 L18IN NONABSORBABLE BLK L26MM SH C012D

## (undated) DEVICE — SUTURE PERMAHAND SZ 0 L30IN NONABSORBABLE BLK SILK BRAID A306H

## (undated) DEVICE — MEDIA CONTRAST INJ VISIPAQUE 150ML 320MG

## (undated) DEVICE — DRESSING FOAM 4X8IN DISP POSTOP MEPILEX BORD AG

## (undated) DEVICE — INTRODUCER SHTH MIC 40 CM 5 FRX10 CM KT STIFFEN FLUENT

## (undated) DEVICE — GLOVE SURG SZ 65 L12IN FNGR THK79MIL GRN LTX FREE

## (undated) DEVICE — GLOVE SURG SZ 7 L12IN FNGR THK94MIL TRNSLUC YEL LTX HYDRGEL

## (undated) DEVICE — SUTURE PROL SZ 5-0 L36IN NONABSORBABLE BLU L17MM RB-1 1/2 8556H

## (undated) DEVICE — SOLUTION INJ VISIPAQUE 50ML